# Patient Record
Sex: FEMALE | Race: WHITE | NOT HISPANIC OR LATINO | Employment: OTHER | URBAN - METROPOLITAN AREA
[De-identification: names, ages, dates, MRNs, and addresses within clinical notes are randomized per-mention and may not be internally consistent; named-entity substitution may affect disease eponyms.]

---

## 2017-01-03 ENCOUNTER — TELEPHONE (OUTPATIENT)
Dept: INTERNAL MEDICINE | Facility: CLINIC | Age: 82
End: 2017-01-03

## 2017-01-03 NOTE — TELEPHONE ENCOUNTER
----- Message from Romelia Antunez sent at 1/3/2017  9:23 AM CST -----  Contact: pt [phone]  Pt will like lab orders in for 2/20/2017 @ 9:00am,pt will like them at [place],pt has an appointment on 3/2/2017please advise pt

## 2017-02-24 ENCOUNTER — CLINICAL SUPPORT (OUTPATIENT)
Dept: ELECTROPHYSIOLOGY | Facility: CLINIC | Age: 82
End: 2017-02-24
Payer: MEDICARE

## 2017-02-24 ENCOUNTER — HOSPITAL ENCOUNTER (OUTPATIENT)
Dept: CARDIOLOGY | Facility: CLINIC | Age: 82
Discharge: HOME | End: 2017-02-24
Payer: MEDICARE

## 2017-02-24 ENCOUNTER — OFFICE VISIT (OUTPATIENT)
Dept: ELECTROPHYSIOLOGY | Facility: CLINIC | Age: 82
End: 2017-02-24
Payer: MEDICARE

## 2017-02-24 VITALS
HEART RATE: 68 BPM | SYSTOLIC BLOOD PRESSURE: 132 MMHG | WEIGHT: 181 LBS | BODY MASS INDEX: 29.09 KG/M2 | HEIGHT: 66 IN | DIASTOLIC BLOOD PRESSURE: 68 MMHG

## 2017-02-24 DIAGNOSIS — I10 ESSENTIAL HYPERTENSION: ICD-10-CM

## 2017-02-24 DIAGNOSIS — I44.1 AV BLOCK, 2ND DEGREE: Primary | ICD-10-CM

## 2017-02-24 DIAGNOSIS — Z95.0 PACEMAKER: ICD-10-CM

## 2017-02-24 DIAGNOSIS — I44.30 AVB (ATRIOVENTRICULAR BLOCK): ICD-10-CM

## 2017-02-24 DIAGNOSIS — Z95.0 CARDIAC PACEMAKER IN SITU: ICD-10-CM

## 2017-02-24 DIAGNOSIS — I44.30 AV BLOCK: ICD-10-CM

## 2017-02-24 DIAGNOSIS — Z95.1 S/P CABG X 3: ICD-10-CM

## 2017-02-24 DIAGNOSIS — I25.10 CORONARY ARTERY DISEASE INVOLVING NATIVE CORONARY ARTERY OF NATIVE HEART WITHOUT ANGINA PECTORIS: ICD-10-CM

## 2017-02-24 LAB
DIASTOLIC DYSFUNCTION: YES
ESTIMATED PA SYSTOLIC PRESSURE: 23
GLOBAL PERICARDIAL EFFUSION: ABNORMAL
MITRAL VALVE REGURGITATION: ABNORMAL
RETIRED EF AND QEF - SEE NOTES: 60 (ref 55–65)
TRICUSPID VALVE REGURGITATION: ABNORMAL

## 2017-02-24 PROCEDURE — 93010 ELECTROCARDIOGRAM REPORT: CPT | Mod: S$PBB,,, | Performed by: INTERNAL MEDICINE

## 2017-02-24 PROCEDURE — 93280 PM DEVICE PROGR EVAL DUAL: CPT | Mod: 26,S$PBB,, | Performed by: INTERNAL MEDICINE

## 2017-02-24 PROCEDURE — 99213 OFFICE O/P EST LOW 20 MIN: CPT | Mod: PBBFAC | Performed by: INTERNAL MEDICINE

## 2017-02-24 PROCEDURE — 93306 TTE W/DOPPLER COMPLETE: CPT | Mod: 26,S$PBB,, | Performed by: INTERNAL MEDICINE

## 2017-02-24 PROCEDURE — 99214 OFFICE O/P EST MOD 30 MIN: CPT | Mod: S$PBB,,, | Performed by: INTERNAL MEDICINE

## 2017-02-24 PROCEDURE — 99999 PR PBB SHADOW E&M-EST. PATIENT-LVL III: CPT | Mod: PBBFAC,,, | Performed by: INTERNAL MEDICINE

## 2017-02-24 NOTE — PROGRESS NOTES
Subjective:    Patient ID:  Lynn Mckinney is a 81 y.o. female who presents for evaluation of Pacemaker Check      Atrial Fibrillation   Symptoms are negative for chest pain, dizziness, palpitations, shortness of breath, syncope and weakness. Past medical history includes atrial fibrillation.   Hypertension   Pertinent negatives include no blurred vision, chest pain, malaise/fatigue, palpitations or shortness of breath.   81 y.o. F  CAD  CABG 2001 HL HTN PVD carotidStent    Progressive bradycardia over the years with Dr. Avery.  holter showed 2:1 AV block, AVWB. Also has RBBB and LAFB.  PPM placed 10/13. Feels well since. Paced >90% of the time. Good PPM function.  C/o PARMAR at 2 flights (stable), but exertion is limited mostly by PAD.    10/13 LVEF 60%; 1/2016: 50-55%. Today pending.  PPM: good function. NSAT, NSVT (all asx)    My interpretation of today's ECG is SR with A sense V pace 68 bpm    Review of Systems   Constitution: Negative. Negative for weakness and malaise/fatigue.   HENT: Negative.  Negative for ear pain and tinnitus.    Eyes: Negative for blurred vision.   Cardiovascular: Positive for dyspnea on exertion. Negative for chest pain, near-syncope, palpitations and syncope.   Respiratory: Negative.  Negative for shortness of breath.    Endocrine: Negative.  Negative for polyuria.   Hematologic/Lymphatic: Does not bruise/bleed easily.   Skin: Negative.  Negative for rash.   Musculoskeletal: Negative.  Negative for joint pain and muscle weakness.   Gastrointestinal: Negative.  Negative for abdominal pain and change in bowel habit.   Genitourinary: Negative for frequency.   Neurological: Negative.  Negative for dizziness.   Psychiatric/Behavioral: Negative.  Negative for depression. The patient is not nervous/anxious.    Allergic/Immunologic: Negative for environmental allergies.        Objective:    Physical Exam   Constitutional: She is oriented to person, place, and time. Vital signs are normal. She  appears well-developed and well-nourished. She is active and cooperative.   HENT:   Head: Normocephalic and atraumatic.   Eyes: Conjunctivae and EOM are normal.   Neck: Normal range of motion. Carotid bruit is not present. No tracheal deviation and no edema present. No thyroid mass and no thyromegaly present.   Cardiovascular: Normal rate, regular rhythm, normal heart sounds, intact distal pulses and normal pulses.   No extrasystoles are present. PMI is not displaced.  Exam reveals no gallop and no friction rub.    No murmur heard.  Pulmonary/Chest: Effort normal and breath sounds normal. No respiratory distress. She has no wheezes. She has no rales.   Abdominal: Soft. Normal appearance. She exhibits no distension. There is no hepatosplenomegaly.   Musculoskeletal: Normal range of motion.   Neurological: She is alert and oriented to person, place, and time. Coordination normal.   Skin: Skin is warm and dry. No rash noted.   Psychiatric: She has a normal mood and affect. Her speech is normal and behavior is normal. Thought content normal. Cognition and memory are normal.   Nursing note and vitals reviewed.        Assessment:       1. AV block, 2nd degree    2. Coronary artery disease involving native coronary artery of native heart without angina pectoris    3. Essential hypertension    4. Pacemaker    5. S/P CABG x 3         Plan:       Doing well re: PPM  will f/u on echo (pending)  F/u 1 year with echo, or earlier prn.

## 2017-02-24 NOTE — MR AVS SNAPSHOT
[facility]  [address]  [facility] [address]  Phone: [phone]  Fax: [phone]                  Lynn Mckinney   2017 2:40 PM   Office Visit    Description:  Female : 1935   Provider:  Diony Coleman MD   Department:  [facility]           Reason for Visit     Pacemaker Check           Diagnoses this Visit        Comments    AV block, 2nd degree    -  Primary     Coronary artery disease involving native coronary artery of native heart without angina pectoris         Essential hypertension         Pacemaker         S/P CABG x 3                To Do List           Future Appointments        Provider Department Dept Phone    3/2/2017 10:30 AM Marielena Moise MD [facility] - Internal Medicine [phone]    2017 10:40 AM TELEPHONE CHECK, [facility] [phone]      Goals (5 Years of Data)     None      Follow-Up and Disposition     Return in about 1 year (around 2018).      [facility]     [facility] On Call Nurse [facility] -  Assistance  Registered nurses in the [facility] On Call Center provide clinical advisement, health education, appointment booking, and other advisory services.  Call for this free service at [phone].             Medications           Message regarding Medications     Verify the changes and/or additions to your medication regime listed below are the same as discussed with your clinician today.  If any of these changes or additions are incorrect, please notify your healthcare provider.             Verify that the below list of medications is an accurate representation of the medications you are currently taking.  If none reported, the list may be blank. If incorrect, please contact your healthcare provider. Carry this list with you in case of emergency.           Current Medications     amlodipine (NORVASC) 10 MG tablet Take 1 tablet (10 mg total) by mouth once daily.    aspirin 81 MG Chew Take 81 mg  by mouth every evening. 1 Tablet, Chewable Oral Every day    b complex vitamins capsule Take 1 capsule by mouth every other day.     CALCIUM CARBONATE/VITAMIN D3 (CALTRATE 600 + D ORAL) Take 1 tablet by mouth once daily.    co-enzyme Q-10 30 mg capsule Take 30 mg by mouth once daily.     fluticasone (FLONASE) 50 mcg/actuation nasal spray 1 spray by Each Nare route once daily.    glucosamine-chondroitin 500-400 mg tablet Take 1 tablet by mouth once daily.     KRILL OIL ORAL Take 1 capsule by mouth once daily.    labetalol (NORMODYNE) 200 MG tablet TAKE 1 TABLET BY MOUTH TWICE DAILY    LACTOSE-FREE FOOD (BOOST ORAL) Take by mouth. 2-3 days per week    levothyroxine (SYNTHROID) 112 MCG tablet TAKE 1 TABLET BY MOUTH EVERY DAY FOR 6 WEEKS AND 1/2 TABLET ON SUNDAY    multivitamin (THERAGRAN) per tablet Take by mouth. 1 Tablet Oral Every day    nitroGLYCERIN (NITROSTAT) 0.4 MG SL tablet Place 1 tablet (0.4 mg total) under the tongue every 5 (five) minutes as needed for Chest pain (to a total of three).    rosuvastatin (CRESTOR) 20 MG tablet Take 1 tablet (20 mg total) by mouth once daily.    valsartan-hydrochlorothiazide (DIOVAN-HCT) 320-25 mg per tablet TAKE 1 TABLET BY MOUTH ONCE DAILY           Clinical Reference Information           Your Vitals Were     BP                   132/68           Blood Pressure          Most Recent Value    BP  132/68      Allergies as of 2/24/2017     Lipitor [Atorvastatin]    Fosamax [Alendronate]    Iodinated Contrast Media - Iv Dye    Sulfa (Sulfonamide Antibiotics)      Immunizations Administered on Date of Encounter - 2/24/2017     None      Orders Placed During Today's Visit     Future Labs/Procedures Expected by Expires    2D echo with color flow doppler  1/30/2018 2/24/2018      [facility] Sign-Up     Activating your [facility] account is as easy as 1-2-3!     1) Visit [email or web address], select Sign Up Now, enter this activation code and your date of birth, then select  Next.  [email or web address]  Expires: 4/10/2017  3:28 PM      2) Create a username and password to use when you visit [facility] in the future and select a security question in case you lose your password and select Next.    3) Enter your e-mail address and click Sign Up!    Additional Information  If you have questions, please e-mail [email or web address] or call [phone] to talk to our [facility] staff. Remember, [facility] is NOT to be used for urgent needs. For medical emergencies, dial 911.         Language Assistance Services     ATTENTION: Language assistance services are available, free of charge. Please call [phone].      ATENCIÓN: Si estefanía timmy, tiene a fenton disposición servicios gratuitos de asistencia lingüística. Llame al [phone].     BLAS Ý: N?u b?n nói Ti?ng Vi?t, có các d?ch v? h? tr? ngôn ng? mi?n phí dành cho b?n. G?i s? [phone].         [facility] complies with applicable Federal civil rights laws and does not discriminate on the basis of race, color, national origin, age, disability, or sex.

## 2017-02-27 ENCOUNTER — LAB VISIT (OUTPATIENT)
Dept: LAB | Facility: HOSPITAL | Age: 82
End: 2017-02-27
Attending: INTERNAL MEDICINE
Payer: MEDICARE

## 2017-02-27 DIAGNOSIS — E03.4 HYPOTHYROIDISM DUE TO ACQUIRED ATROPHY OF THYROID: ICD-10-CM

## 2017-02-27 DIAGNOSIS — I10 ESSENTIAL HYPERTENSION: ICD-10-CM

## 2017-02-27 DIAGNOSIS — E78.49 OTHER HYPERLIPIDEMIA: ICD-10-CM

## 2017-02-27 LAB
ALBUMIN SERPL BCP-MCNC: 3.8 G/DL
ALP SERPL-CCNC: 59 U/L
ALT SERPL W/O P-5'-P-CCNC: 14 U/L
ANION GAP SERPL CALC-SCNC: 9 MMOL/L
AST SERPL-CCNC: 24 U/L
BASOPHILS # BLD AUTO: 0.01 K/UL
BASOPHILS NFR BLD: 0.2 %
BILIRUB SERPL-MCNC: 0.6 MG/DL
BUN SERPL-MCNC: 32 MG/DL
CALCIUM SERPL-MCNC: 9.4 MG/DL
CHLORIDE SERPL-SCNC: 105 MMOL/L
CHOLEST/HDLC SERPL: 2.6 {RATIO}
CO2 SERPL-SCNC: 26 MMOL/L
CREAT SERPL-MCNC: 1.4 MG/DL
DIFFERENTIAL METHOD: NORMAL
EOSINOPHIL # BLD AUTO: 0.3 K/UL
EOSINOPHIL NFR BLD: 4.5 %
ERYTHROCYTE [DISTWIDTH] IN BLOOD BY AUTOMATED COUNT: 12.5 %
EST. GFR  (AFRICAN AMERICAN): 40.6 ML/MIN/1.73 M^2
EST. GFR  (NON AFRICAN AMERICAN): 35.3 ML/MIN/1.73 M^2
GLUCOSE SERPL-MCNC: 99 MG/DL
HCT VFR BLD AUTO: 39.6 %
HDL/CHOLESTEROL RATIO: 38.9 %
HDLC SERPL-MCNC: 126 MG/DL
HDLC SERPL-MCNC: 49 MG/DL
HGB BLD-MCNC: 13.3 G/DL
LDLC SERPL CALC-MCNC: 59.4 MG/DL
LYMPHOCYTES # BLD AUTO: 1.1 K/UL
LYMPHOCYTES NFR BLD: 18.7 %
MCH RBC QN AUTO: 30.9 PG
MCHC RBC AUTO-ENTMCNC: 33.6 %
MCV RBC AUTO: 92 FL
MONOCYTES # BLD AUTO: 0.6 K/UL
MONOCYTES NFR BLD: 9.9 %
NEUTROPHILS # BLD AUTO: 3.9 K/UL
NEUTROPHILS NFR BLD: 66.5 %
NONHDLC SERPL-MCNC: 77 MG/DL
PLATELET # BLD AUTO: 174 K/UL
PMV BLD AUTO: 9.5 FL
POTASSIUM SERPL-SCNC: 4.1 MMOL/L
PROT SERPL-MCNC: 7 G/DL
RBC # BLD AUTO: 4.3 M/UL
SODIUM SERPL-SCNC: 140 MMOL/L
T4 FREE SERPL-MCNC: 1.12 NG/DL
TRIGL SERPL-MCNC: 88 MG/DL
TSH SERPL DL<=0.005 MIU/L-ACNC: 4.16 UIU/ML
WBC # BLD AUTO: 5.83 K/UL

## 2017-02-27 PROCEDURE — 80061 LIPID PANEL: CPT

## 2017-02-27 PROCEDURE — 85025 COMPLETE CBC W/AUTO DIFF WBC: CPT

## 2017-02-27 PROCEDURE — 80053 COMPREHEN METABOLIC PANEL: CPT

## 2017-02-27 PROCEDURE — 36415 COLL VENOUS BLD VENIPUNCTURE: CPT | Mod: PO

## 2017-02-27 PROCEDURE — 84443 ASSAY THYROID STIM HORMONE: CPT

## 2017-02-27 PROCEDURE — 84439 ASSAY OF FREE THYROXINE: CPT

## 2017-03-02 ENCOUNTER — OFFICE VISIT (OUTPATIENT)
Dept: INTERNAL MEDICINE | Facility: CLINIC | Age: 82
End: 2017-03-02
Payer: MEDICARE

## 2017-03-02 VITALS
HEIGHT: 66 IN | WEIGHT: 179.44 LBS | SYSTOLIC BLOOD PRESSURE: 115 MMHG | HEART RATE: 67 BPM | BODY MASS INDEX: 28.84 KG/M2 | DIASTOLIC BLOOD PRESSURE: 62 MMHG

## 2017-03-02 DIAGNOSIS — I73.9 CLAUDICATION: ICD-10-CM

## 2017-03-02 DIAGNOSIS — N18.30 CHRONIC KIDNEY DISEASE, STAGE III (MODERATE): ICD-10-CM

## 2017-03-02 DIAGNOSIS — I25.10 CORONARY ARTERY DISEASE INVOLVING NATIVE CORONARY ARTERY OF NATIVE HEART WITHOUT ANGINA PECTORIS: ICD-10-CM

## 2017-03-02 DIAGNOSIS — I73.9 PERIPHERAL ARTERIAL DISEASE: ICD-10-CM

## 2017-03-02 DIAGNOSIS — R26.89 BALANCE DISORDER: ICD-10-CM

## 2017-03-02 DIAGNOSIS — I10 ESSENTIAL HYPERTENSION: Primary | ICD-10-CM

## 2017-03-02 DIAGNOSIS — I77.9 BILATERAL CAROTID ARTERY DISEASE: ICD-10-CM

## 2017-03-02 DIAGNOSIS — E55.9 VITAMIN D DEFICIENCY: ICD-10-CM

## 2017-03-02 DIAGNOSIS — E78.00 PURE HYPERCHOLESTEROLEMIA: ICD-10-CM

## 2017-03-02 DIAGNOSIS — Z95.0 PACEMAKER: ICD-10-CM

## 2017-03-02 DIAGNOSIS — Z12.31 ENCOUNTER FOR SCREENING MAMMOGRAM FOR BREAST CANCER: ICD-10-CM

## 2017-03-02 DIAGNOSIS — I65.23 BILATERAL CAROTID ARTERY STENOSIS: ICD-10-CM

## 2017-03-02 DIAGNOSIS — E03.4 HYPOTHYROIDISM DUE TO ACQUIRED ATROPHY OF THYROID: ICD-10-CM

## 2017-03-02 PROCEDURE — 99214 OFFICE O/P EST MOD 30 MIN: CPT | Mod: PBBFAC | Performed by: INTERNAL MEDICINE

## 2017-03-02 PROCEDURE — 99214 OFFICE O/P EST MOD 30 MIN: CPT | Mod: S$PBB,,, | Performed by: INTERNAL MEDICINE

## 2017-03-02 PROCEDURE — 99999 PR PBB SHADOW E&M-EST. PATIENT-LVL IV: CPT | Mod: PBBFAC,,, | Performed by: INTERNAL MEDICINE

## 2017-03-02 NOTE — PROGRESS NOTES
Subjective:       Patient ID: Lynn Mckinney is a 81 y.o. female.    Chief Complaint: Follow-up    HPI     Hypertension: well contolled, known CAD, PAD and followed in Cardiology and Vascular Surgery with follow up within the last year     Cardiology consult:  Pacemaker: recent follow up and no changes     Nephrology consult: stable  Pramod Manley  CKD  And followed in 11/2016 follow up in May     Optometry: Patient is due to see Dr. Melgar     Mammo: is due     Vascular: Patient is seen by Dr. Sams once a year in July     Review  Colonoscopy: 2007 normal, patient states that she had a small polyps on the initial colonoscopy ( last colonoscopy was normal): declines any further  Mammogram:: ordered after 3/2/3107  Gyn: hysterectomy and oophorectomy  Optho: Dr. Melgar  Flu: done 2016  Tetanus: had a Tdap 15 years ago)  Shingles 11/26/2014  Pneumovax 11/6/2014  PCV 7 vaccine     Consultants:  Dr. Talia Sanches        Pacemaker: Jared        Review of Systems   Constitutional: Negative for chills, fever and unexpected weight change.   HENT: Negative for trouble swallowing.    Respiratory: Negative for cough, shortness of breath and wheezing.    Cardiovascular: Negative for chest pain and palpitations.   Gastrointestinal: Negative for abdominal distention, abdominal pain, blood in stool and vomiting.   Musculoskeletal: Negative for back pain.       Patient complains of balance disorder  Objective:      Physical Exam   Constitutional: She is oriented to person, place, and time. She appears well-developed and well-nourished. No distress.   Neck: Carotid bruit is not present. No thyromegaly present.   Cardiovascular: Normal rate, regular rhythm and normal heart sounds.  PMI is not displaced.    Pulmonary/Chest: Effort normal and breath sounds normal. No respiratory distress.   Abdominal: Soft. Bowel sounds are normal. She exhibits no distension. There is no tenderness.   Musculoskeletal: She  exhibits no edema.   Neurological: She is alert and oriented to person, place, and time.       Assessment:       1. Essential hypertension    2. Encounter for screening mammogram for breast cancer    3. Balance disorder    4. Claudication    5. Bilateral carotid artery stenosis    6. Coronary artery disease involving native coronary artery of native heart without angina pectoris    7. Bilateral carotid artery disease    8. Peripheral arterial disease    9. Chronic kidney disease, stage 3    10. Hypothyroidism due to acquired atrophy of thyroid    11. Pure hypercholesterolemia    12. Vitamin D deficiency    13. Pacemaker        Plan:       Lynn was seen today for follow-up.    Diagnoses and all orders for this visit:    Essential hypertension: well controlled same medications    Encounter for screening mammogram for breast cancer  -     Mammo Digital Screening Bilat with CAD; Future    Balance disorder: patient wished to try PT  -     Ambulatory consult to Physical Therapy    Claudication: stable    Bilateral carotid artery stenosis: followed in vascular surgery    Coronary artery disease involving native coronary artery of native heart without angina pectoris; no active symptoms      Peripheral arterial disease; stable    Chronic kidney disease, stage 3: stable and followed in nephrology    Hypothyroidism due to acquired atrophy of thyroid: continue on levothyroxine 112    Pure hypercholesterolemia: continue on statin    Vitamin D deficiency; continue on vti D    Pacemaker      Return in about 6 months (around 9/2/2017) for Follow up.    New Prescriptions    No medications on file       Modified Medications    No medications on file       Orders Placed This Encounter   Procedures    Mammo Digital Screening Bilat with CAD     Standing Status:   Future     Standing Expiration Date:   3/2/2018    CBC auto differential     Standing Status:   Future     Standing Expiration Date:   10/28/2017    Comprehensive metabolic  panel     Standing Status:   Future     Standing Expiration Date:   10/28/2017    Lipid panel     Standing Status:   Future     Standing Expiration Date:   10/28/2017    TSH     Standing Status:   Future     Standing Expiration Date:   10/28/2017    Ambulatory consult to Physical Therapy     Referral Priority:   Routine     Referral Type:   Physical Medicine     Referral Reason:   Specialty Services Required     Requested Specialty:   Physical Therapy     Number of Visits Requested:   1       Labs, studies and consults associated with this visit were reviewed

## 2017-03-02 NOTE — MR AVS SNAPSHOT
[facility]  [address]  [facility] [address]  Phone: [phone]  Fax: [phone]                  Lynn Mckinney   3/2/2017 10:30 AM   Office Visit    Description:  Female : 1935   Provider:  Marielena Moise MD   Department:  [facility]           Reason for Visit     Follow-up           Diagnoses this Visit        Comments    Essential hypertension    -  Primary     Encounter for screening mammogram for breast cancer         Balance disorder         Claudication         Bilateral carotid artery stenosis         Coronary artery disease involving native coronary artery of native heart without angina pectoris         Bilateral carotid artery disease         Peripheral arterial disease         Chronic kidney disease, stage III (moderate)         Hypothyroidism due to acquired atrophy of thyroid         Pure hypercholesterolemia         Vitamin D deficiency         Pacemaker                To Do List           Future Appointments        Provider Department Dept Phone    3/24/2017 10:30 AM [facility] [phone]    2017 10:40 AM TELEPHONE CHECK, PACEMAKER [facility] [phone]      Goals (5 Years of Data)     None      Follow-Up and Disposition     Return in about 6 months (around 2017) for Follow up.      [facility]     [facility] -  Assistance  Registered nurses in the [facility] Center provide clinical advisement, health education, appointment booking, and other advisory services.  Call for this free service at [phone].             Medications           Message regarding Medications     Verify the changes and/or additions to your medication regime listed below are the same as discussed with your clinician today.  If any of these changes or additions are incorrect, please notify your healthcare provider.             Verify that the below list of medications  is an accurate representation of the medications you are currently taking.  If none reported, the list may be blank. If incorrect, please contact your healthcare provider. Carry this list with you in case of emergency.           Current Medications     amlodipine (NORVASC) 10 MG tablet Take 1 tablet (10 mg total) by mouth once daily.    aspirin 81 MG Chew Take 81 mg by mouth every evening. 1 Tablet, Chewable Oral Every day    b complex vitamins capsule Take 1 capsule by mouth every other day.     CALCIUM CARBONATE/VITAMIN D3 (CALTRATE 600 + D ORAL) Take 1 tablet by mouth once daily.    co-enzyme Q-10 30 mg capsule Take 30 mg by mouth once daily.     fluticasone (FLONASE) 50 mcg/actuation nasal spray 1 spray by Each Nare route once daily.    glucosamine-chondroitin 500-400 mg tablet Take 1 tablet by mouth once daily.     KRILL OIL ORAL Take 1 capsule by mouth once daily.    labetalol (NORMODYNE) 200 MG tablet TAKE 1 TABLET BY MOUTH TWICE DAILY    LACTOSE-FREE FOOD (BOOST ORAL) Take by mouth. 2-3 days per week    levothyroxine (SYNTHROID) 112 MCG tablet TAKE 1 TABLET BY MOUTH EVERY DAY FOR 6 WEEKS AND 1/2 TABLET ON SUNDAY    multivitamin (THERAGRAN) per tablet Take by mouth. 1 Tablet Oral Every day    nitroGLYCERIN (NITROSTAT) 0.4 MG SL tablet Place 1 tablet (0.4 mg total) under the tongue every 5 (five) minutes as needed for Chest pain (to a total of three).    rosuvastatin (CRESTOR) 20 MG tablet Take 1 tablet (20 mg total) by mouth once daily.    valsartan-hydrochlorothiazide (DIOVAN-HCT) 320-25 mg per tablet TAKE 1 TABLET BY MOUTH ONCE DAILY           Clinical Reference Information           Your Vitals Were     BP                   115/62           Blood Pressure          Most Recent Value    BP  115/62      Allergies as of 3/2/2017     Lipitor [Atorvastatin]    Fosamax [Alendronate]    Iodinated Contrast Media - Iv Dye    Sulfa (Sulfonamide Antibiotics)      Immunizations Administered on Date of Encounter -  3/2/2017     None      Orders Placed During Today's Visit      Normal Orders This Visit    Ambulatory consult to Physical Therapy     Future Labs/Procedures Expected by Expires    Mammo Digital Screening Bilat with CAD  3/2/2017 (Approximate) 3/2/2018    CBC auto differential  8/29/2017 (Approximate) 10/28/2017    Comprehensive metabolic panel  8/29/2017 (Approximate) 10/28/2017    Lipid panel  8/29/2017 (Approximate) 10/28/2017    TSH  8/29/2017 (Approximate) 10/28/2017      [facility] Sign-Up     Activating your [facility] account is as easy as 1-2-3!     1) Visit [email or web address], select Sign Up Now, enter this activation code and your date of birth, then select Next.  [email or web address]  Expires: 4/10/2017  3:28 PM      2) Create a username and password to use when you visit [facility] in the future and select a security question in case you lose your password and select Next.    3) Enter your e-mail address and click Sign Up!    Additional Information  If you have questions, please e-mail [email or web address] or call [phone] to talk to our [facility] staff. Remember, [facility] is NOT to be used for urgent needs. For medical emergencies, dial 911.         Language Assistance Services     ATTENTION: Language assistance services are available, free of charge. Please call [phone].      ATENCIÓN: Si habla español, tiene a fenton disposición servicios gratuitos de asistencia lingüística. Llame al [phone].     CHÚ Ý: N?u b?n nói Ti?ng Vi?t, có các d?ch v? h? tr? ngôn ng? mi?n phí dành cho b?n. G?i s? [phone].         [facility] complies with applicable Federal civil rights laws and does not discriminate on the basis of race, color, national origin, age, disability, or sex.

## 2017-03-30 ENCOUNTER — HOSPITAL ENCOUNTER (OUTPATIENT)
Dept: RADIOLOGY | Facility: HOSPITAL | Age: 82
Discharge: HOME | End: 2017-03-30
Attending: INTERNAL MEDICINE
Payer: MEDICARE

## 2017-03-30 DIAGNOSIS — Z12.31 ENCOUNTER FOR SCREENING MAMMOGRAM FOR BREAST CANCER: ICD-10-CM

## 2017-03-30 PROCEDURE — 77067 SCR MAMMO BI INCL CAD: CPT | Mod: TC

## 2017-05-17 ENCOUNTER — TELEPHONE (OUTPATIENT)
Dept: ELECTROPHYSIOLOGY | Facility: CLINIC | Age: 82
End: 2017-05-17

## 2017-05-17 RX ORDER — AMLODIPINE BESYLATE 10 MG/1
TABLET ORAL
Qty: 90 TABLET | Refills: 3 | Status: SHIPPED | OUTPATIENT
Start: 2017-05-17 | End: 2018-01-10

## 2017-05-17 NOTE — TELEPHONE ENCOUNTER
Returned patient's call on this morning.  Rescheduled patient's Transtelephonic pacemaker check with the patient to 5/24//17 @ 2:40 pm.

## 2017-05-17 NOTE — TELEPHONE ENCOUNTER
----- Message from Adrienne Westbrook sent at 5/17/2017  8:06 AM CDT -----  Contact: patient  Please call pt at [phone]. Need reschedule pacemaker appt for 05/25/17    Thank you

## 2017-05-24 ENCOUNTER — LAB VISIT (OUTPATIENT)
Dept: LAB | Facility: HOSPITAL | Age: 82
End: 2017-05-24
Attending: INTERNAL MEDICINE
Payer: MEDICARE

## 2017-05-24 ENCOUNTER — CLINICAL SUPPORT (OUTPATIENT)
Dept: ELECTROPHYSIOLOGY | Facility: CLINIC | Age: 82
End: 2017-05-24
Payer: MEDICARE

## 2017-05-24 DIAGNOSIS — I25.10 CORONARY ARTERY DISEASE INVOLVING NATIVE CORONARY ARTERY OF NATIVE HEART WITHOUT ANGINA PECTORIS: ICD-10-CM

## 2017-05-24 DIAGNOSIS — Z95.1 S/P CABG X 3: ICD-10-CM

## 2017-05-24 DIAGNOSIS — E03.4 HYPOTHYROIDISM DUE TO ACQUIRED ATROPHY OF THYROID: ICD-10-CM

## 2017-05-24 DIAGNOSIS — I73.9 PERIPHERAL ARTERIAL DISEASE: ICD-10-CM

## 2017-05-24 DIAGNOSIS — Z95.0 CARDIAC PACEMAKER IN SITU: ICD-10-CM

## 2017-05-24 DIAGNOSIS — Z95.0 PACEMAKER: ICD-10-CM

## 2017-05-24 DIAGNOSIS — N18.30 CHRONIC KIDNEY DISEASE, STAGE III (MODERATE): ICD-10-CM

## 2017-05-24 DIAGNOSIS — E78.5 HYPERLIPIDEMIA, UNSPECIFIED: ICD-10-CM

## 2017-05-24 DIAGNOSIS — I44.1 AV BLOCK, 2ND DEGREE: ICD-10-CM

## 2017-05-24 DIAGNOSIS — I77.9 LEFT-SIDED CAROTID ARTERY DISEASE: ICD-10-CM

## 2017-05-24 DIAGNOSIS — R32 INCONTINENCE: ICD-10-CM

## 2017-05-24 DIAGNOSIS — E55.9 VITAMIN D DEFICIENCY: ICD-10-CM

## 2017-05-24 DIAGNOSIS — I10 ESSENTIAL HYPERTENSION: ICD-10-CM

## 2017-05-24 LAB
25(OH)D3+25(OH)D2 SERPL-MCNC: 30 NG/ML
ALBUMIN SERPL BCP-MCNC: 3.7 G/DL
ANION GAP SERPL CALC-SCNC: 10 MMOL/L
BUN SERPL-MCNC: 31 MG/DL
CALCIUM SERPL-MCNC: 9.3 MG/DL
CHLORIDE SERPL-SCNC: 101 MMOL/L
CO2 SERPL-SCNC: 25 MMOL/L
CREAT SERPL-MCNC: 1.4 MG/DL
EST. GFR  (AFRICAN AMERICAN): 40.6 ML/MIN/1.73 M^2
EST. GFR  (NON AFRICAN AMERICAN): 35.3 ML/MIN/1.73 M^2
GLUCOSE SERPL-MCNC: 95 MG/DL
PHOSPHATE SERPL-MCNC: 3.5 MG/DL
POTASSIUM SERPL-SCNC: 4.3 MMOL/L
PTH-INTACT SERPL-MCNC: 70 PG/ML
SODIUM SERPL-SCNC: 136 MMOL/L

## 2017-05-24 PROCEDURE — 93293 PM PHONE R-STRIP DEVICE EVAL: CPT | Mod: PBBFAC | Performed by: INTERNAL MEDICINE

## 2017-05-25 ENCOUNTER — TELEPHONE (OUTPATIENT)
Dept: NEPHROLOGY | Facility: CLINIC | Age: 82
End: 2017-05-25

## 2017-05-25 NOTE — TELEPHONE ENCOUNTER
Spoke with patient who will keep her appointment on [facility].   She would love to see Dr. Corey, but [place] is still too far.

## 2017-05-25 NOTE — TELEPHONE ENCOUNTER
Received lab results from pt. She was a [facility] pt of [facility] who lives in [facility]. Please see if she would like to see me in [place]. Thank you

## 2017-05-26 ENCOUNTER — TELEPHONE (OUTPATIENT)
Dept: NEPHROLOGY | Facility: CLINIC | Age: 82
End: 2017-05-26

## 2017-05-26 NOTE — TELEPHONE ENCOUNTER
I spoke with the patient again this morning.  She really wanted to see Dr. Corey, but on the [facility].  I clarified that Dr. Corey only in [place] and [place] now.

## 2017-05-26 NOTE — TELEPHONE ENCOUNTER
----- Message from Omar Gama sent at 5/26/2017  9:23 AM CDT -----  Contact: same  Patient called in and stated that she got a call from nurse to see if patient wanted to be seen in [place]???   Patient stated that she was confused about that.  I did explain to patient that Dr. Corey also has patients at [facility].  Patient then stated she would love to see Dr. Corey in [facility].    Patient call back number [phone]

## 2017-06-01 ENCOUNTER — OFFICE VISIT (OUTPATIENT)
Dept: NEPHROLOGY | Facility: CLINIC | Age: 82
End: 2017-06-01
Payer: MEDICARE

## 2017-06-01 VITALS
HEART RATE: 72 BPM | WEIGHT: 177.94 LBS | OXYGEN SATURATION: 96 % | BODY MASS INDEX: 28.6 KG/M2 | SYSTOLIC BLOOD PRESSURE: 150 MMHG | DIASTOLIC BLOOD PRESSURE: 80 MMHG | HEIGHT: 66 IN

## 2017-06-01 DIAGNOSIS — E55.9 VITAMIN D DEFICIENCY: ICD-10-CM

## 2017-06-01 DIAGNOSIS — N18.30 CHRONIC KIDNEY DISEASE, STAGE III (MODERATE): Primary | ICD-10-CM

## 2017-06-01 DIAGNOSIS — I10 ESSENTIAL HYPERTENSION: ICD-10-CM

## 2017-06-01 PROCEDURE — 99213 OFFICE O/P EST LOW 20 MIN: CPT | Mod: PBBFAC | Performed by: INTERNAL MEDICINE

## 2017-06-01 PROCEDURE — 99999 PR PBB SHADOW E&M-EST. PATIENT-LVL III: CPT | Mod: PBBFAC,,, | Performed by: INTERNAL MEDICINE

## 2017-06-01 PROCEDURE — 99213 OFFICE O/P EST LOW 20 MIN: CPT | Mod: S$PBB,,, | Performed by: INTERNAL MEDICINE

## 2017-06-01 NOTE — PROGRESS NOTES
"Subjective:       Patient ID: Lynn Mckinney is a 81 y.o. White female who presents for follow-up evaluation of renal function   HPI 81 yo WF with HTn, CAD, PVD, CKD stage 3 baseline serum crt 1.3-1.4 stable since 2004. No LUTS, no hematuria, no dysuria. She claims she has white coat syndrome, she usually has a bp of 130-140 /80 , she ahs not taken her bp at home recently. She takes her amlodipine and valsartan/HCTZ in the am and did not take them this am.    Review of Systems   Constitutional: Negative for appetite change, chills, fatigue, fever and unexpected weight change.   HENT: Positive for congestion, sinus pressure and trouble swallowing. Negative for facial swelling, hearing loss and nosebleeds.         Postnasal drip x 1 month following with PCP   Eyes: Negative for pain, discharge, redness and visual disturbance.   Respiratory: Negative for cough, chest tightness, shortness of breath and wheezing.    Cardiovascular: Positive for leg swelling. Negative for chest pain and palpitations.        Wears support hose, and minimal edema.   Gastrointestinal: Negative for abdominal pain, constipation, diarrhea, nausea and vomiting.   Endocrine: Negative for cold intolerance, heat intolerance and polydipsia.   Genitourinary: Negative for decreased urine volume, difficulty urinating, dysuria, flank pain, hematuria and urgency.   Musculoskeletal: Negative for arthralgias, back pain, joint swelling and myalgias.        Rare Bilateral thumb cramping    Skin: Negative for color change, pallor, rash and wound.   Neurological: Negative for dizziness, tremors, seizures, syncope, speech difficulty, weakness and headaches.   Hematological: Negative for adenopathy. Does not bruise/bleed easily.   Psychiatric/Behavioral: Negative for agitation, behavioral problems, dysphoric mood, self-injury and sleep disturbance.       Objective:     Blood pressure (!) 150/80, pulse 72, height 5' 6" (1.676 m), weight 80.7 kg (177 lb 14.6 oz), " SpO2 96 %.       Physical Exam   Constitutional: She is oriented to person, place, and time. She appears well-developed and well-nourished. No distress.   HENT:   Head: Normocephalic and atraumatic.   Mouth/Throat: No oropharyngeal exudate.   Eyes: Conjunctivae and EOM are normal. Pupils are equal, round, and reactive to light. Right eye exhibits no discharge. Left eye exhibits no discharge. No scleral icterus.   Neck: Normal range of motion. Neck supple. No JVD present. No tracheal deviation present. No thyromegaly present.   Cardiovascular: Normal rate, regular rhythm, normal heart sounds and intact distal pulses.  Exam reveals no gallop.    No murmur heard.  No peripheral edema, DP pulses not palpable, radial pulses +2 bilaterally warm digits.   Pulmonary/Chest: Effort normal and breath sounds normal. No stridor. No respiratory distress. She has no wheezes. She has no rales. She exhibits no tenderness.   Abdominal: Soft. Bowel sounds are normal. She exhibits no distension and no mass. There is no tenderness. There is no rebound and no guarding. No hernia.   Musculoskeletal: She exhibits no edema or tenderness.   Lymphadenopathy:     She has no cervical adenopathy.   Neurological: She is alert and oriented to person, place, and time. She exhibits normal muscle tone.   Skin: Skin is warm and dry. No rash noted. She is not diaphoretic. No erythema.   Psychiatric: She has a normal mood and affect. Judgment and thought content normal.   Nursing note and vitals reviewed.      Assessment:       1. Chronic kidney disease, stage 3    2. Vitamin D deficiency    3. Essential hypertension        Plan:       1. CKD: stage 3 est gfr 35- 40 cc /min stable over > 15 years.   Stable, I have asked the patent for urine, lab never completed requested UA and UPRCT on order from 11/2016  Lab Results   Component Value Date    CREATININE 1.4 05/24/2017     Protein Creatinine Ratios:stable    Prot/Creat Ratio, Ur   Date Value Ref Range  Status   10/10/2016 0.06 0.00 - 0.20 Final   06/09/2016 0.08 0.00 - 0.20 Final   11/30/2015 0.10 0.00 - 0.20 Final       2. Acid-Base: stable  Lab Results   Component Value Date     05/24/2017    K 4.3 05/24/2017    CO2 25 05/24/2017         3. Renal osteodystrophy: last PTH stable  Bone density suggested  She has been on fosomax in past  Lab Results   Component Value Date    PTH 70.0 05/24/2017    CALCIUM 9.3 05/24/2017    PHOS 3.5 05/24/2017       4. Anemia: stable  Lab Results   Component Value Date    HGB 13.3 02/27/2017        5. HTN: stable      Medication: no change      Monitor BP as directed in instructions    7. Weight: Weight: 80.7 kg (177 lb 14.6 oz). she states that her daily weights  stable    Wt Readings from Last 3 Encounters:   06/01/17 80.7 kg (177 lb 14.6 oz)   03/02/17 81.4 kg (179 lb 7.3 oz)   02/24/17 82.1 kg (181 lb)       8. Lipid management: stable  Lab Results   Component Value Date    LDLCALC 59.4 (L) 02/27/2017       9. Diet:  Education/referral:       Sodium: 2 gm         11. Please avoid or minimize all NSAIDS (ibuprofen, motrin, aleve, indocin, naprosyn) to minimize the risk to your kidneys.      12. Follow up in  6 months

## 2017-06-02 ENCOUNTER — TELEPHONE (OUTPATIENT)
Dept: VASCULAR SURGERY | Facility: CLINIC | Age: 82
End: 2017-06-02

## 2017-06-02 RX ORDER — LABETALOL 200 MG/1
TABLET, FILM COATED ORAL
Qty: 180 TABLET | Refills: 3 | Status: SHIPPED | OUTPATIENT
Start: 2017-06-02 | End: 2018-01-10 | Stop reason: SDUPTHER

## 2017-06-02 NOTE — TELEPHONE ENCOUNTER
----- Message from Vane Christian sent at 6/2/2017 10:09 AM CDT -----  Contact: Self   Lynn  Called and wanted to schedule a appointment with   And also had general questions. Please call Lynn [phone]  . Thanks :)

## 2017-06-05 ENCOUNTER — TELEPHONE (OUTPATIENT)
Dept: VASCULAR SURGERY | Facility: CLINIC | Age: 82
End: 2017-06-05

## 2017-06-05 NOTE — TELEPHONE ENCOUNTER
----- Message from Eneida Mendez sent at 6/5/2017  8:23 AM CDT -----  Contact: self  Pt states she would like to reschedule apt to afternoon or another day.Please call Lynn back @ [phone].Thank you.

## 2017-06-12 ENCOUNTER — DOCUMENTATION ONLY (OUTPATIENT)
Dept: NEPHROLOGY | Facility: CLINIC | Age: 82
End: 2017-06-12

## 2017-06-12 ENCOUNTER — LAB VISIT (OUTPATIENT)
Dept: LAB | Facility: HOSPITAL | Age: 82
End: 2017-06-12
Attending: INTERNAL MEDICINE
Payer: MEDICARE

## 2017-06-12 DIAGNOSIS — N18.30 CHRONIC KIDNEY DISEASE, STAGE III (MODERATE): ICD-10-CM

## 2017-06-12 DIAGNOSIS — I25.10 CORONARY ARTERY DISEASE INVOLVING NATIVE CORONARY ARTERY OF NATIVE HEART WITHOUT ANGINA PECTORIS: ICD-10-CM

## 2017-06-12 DIAGNOSIS — E78.5 HYPERLIPIDEMIA: ICD-10-CM

## 2017-06-12 LAB
ALT SERPL W/O P-5'-P-CCNC: 12 U/L
ANION GAP SERPL CALC-SCNC: 11 MMOL/L
AST SERPL-CCNC: 22 U/L
BUN SERPL-MCNC: 25 MG/DL
CALCIUM SERPL-MCNC: 9.7 MG/DL
CHLORIDE SERPL-SCNC: 103 MMOL/L
CHOLEST/HDLC SERPL: 2.8 {RATIO}
CO2 SERPL-SCNC: 26 MMOL/L
CREAT SERPL-MCNC: 1.3 MG/DL
EST. GFR  (AFRICAN AMERICAN): 44.5 ML/MIN/1.73 M^2
EST. GFR  (NON AFRICAN AMERICAN): 38.6 ML/MIN/1.73 M^2
GLUCOSE SERPL-MCNC: 104 MG/DL
HDL/CHOLESTEROL RATIO: 35.7 %
HDLC SERPL-MCNC: 126 MG/DL
HDLC SERPL-MCNC: 45 MG/DL
LDLC SERPL CALC-MCNC: 61.6 MG/DL
NONHDLC SERPL-MCNC: 81 MG/DL
POTASSIUM SERPL-SCNC: 4.2 MMOL/L
SODIUM SERPL-SCNC: 140 MMOL/L
TRIGL SERPL-MCNC: 97 MG/DL

## 2017-06-12 PROCEDURE — 80061 LIPID PANEL: CPT

## 2017-06-12 PROCEDURE — 84450 TRANSFERASE (AST) (SGOT): CPT

## 2017-06-12 PROCEDURE — 80048 BASIC METABOLIC PNL TOTAL CA: CPT

## 2017-06-12 PROCEDURE — 36415 COLL VENOUS BLD VENIPUNCTURE: CPT | Mod: PO

## 2017-06-12 PROCEDURE — 84460 ALANINE AMINO (ALT) (SGPT): CPT

## 2017-06-13 ENCOUNTER — OFFICE VISIT (OUTPATIENT)
Dept: CARDIOLOGY | Facility: CLINIC | Age: 82
End: 2017-06-13
Payer: MEDICARE

## 2017-06-13 VITALS
DIASTOLIC BLOOD PRESSURE: 78 MMHG | HEART RATE: 76 BPM | HEIGHT: 65 IN | SYSTOLIC BLOOD PRESSURE: 130 MMHG | WEIGHT: 177.5 LBS | BODY MASS INDEX: 29.57 KG/M2

## 2017-06-13 DIAGNOSIS — I77.9 BILATERAL CAROTID ARTERY DISEASE: Chronic | ICD-10-CM

## 2017-06-13 DIAGNOSIS — Z95.0 PACEMAKER: ICD-10-CM

## 2017-06-13 DIAGNOSIS — I25.10 CORONARY ARTERY DISEASE INVOLVING NATIVE CORONARY ARTERY OF NATIVE HEART WITHOUT ANGINA PECTORIS: Primary | Chronic | ICD-10-CM

## 2017-06-13 DIAGNOSIS — N18.30 CHRONIC KIDNEY DISEASE, STAGE III (MODERATE): Chronic | ICD-10-CM

## 2017-06-13 DIAGNOSIS — Z95.1 S/P CABG X 3: ICD-10-CM

## 2017-06-13 DIAGNOSIS — I73.9 PERIPHERAL ARTERIAL DISEASE: Chronic | ICD-10-CM

## 2017-06-13 DIAGNOSIS — E78.00 PURE HYPERCHOLESTEROLEMIA: Chronic | ICD-10-CM

## 2017-06-13 DIAGNOSIS — E03.4 HYPOTHYROIDISM DUE TO ACQUIRED ATROPHY OF THYROID: Chronic | ICD-10-CM

## 2017-06-13 DIAGNOSIS — I10 ESSENTIAL HYPERTENSION: Chronic | ICD-10-CM

## 2017-06-13 PROCEDURE — 93010 ELECTROCARDIOGRAM REPORT: CPT | Mod: ,,, | Performed by: INTERNAL MEDICINE

## 2017-06-13 PROCEDURE — 99213 OFFICE O/P EST LOW 20 MIN: CPT | Mod: PBBFAC,PO | Performed by: NURSE PRACTITIONER

## 2017-06-13 PROCEDURE — 1159F MED LIST DOCD IN RCRD: CPT | Mod: ,,, | Performed by: NURSE PRACTITIONER

## 2017-06-13 PROCEDURE — 1126F AMNT PAIN NOTED NONE PRSNT: CPT | Mod: ,,, | Performed by: NURSE PRACTITIONER

## 2017-06-13 PROCEDURE — 99214 OFFICE O/P EST MOD 30 MIN: CPT | Mod: S$PBB,,, | Performed by: NURSE PRACTITIONER

## 2017-06-13 PROCEDURE — 99999 PR PBB SHADOW E&M-EST. PATIENT-LVL III: CPT | Mod: PBBFAC,,, | Performed by: NURSE PRACTITIONER

## 2017-06-13 RX ORDER — ERGOCALCIFEROL (VITAMIN D2) 10 MCG
2000 TABLET ORAL DAILY
COMMUNITY
End: 2022-06-07 | Stop reason: DRUGHIGH

## 2017-06-13 NOTE — PROGRESS NOTES
Ms. Mckinney is a patient of Dr. Avery and was last seen in [facility] Cardiology Visit 6/7/16.    Subjective:   Patient ID:  Lynn Mckinney is a 81 y.o. female who presents for follow-up of Coronary Artery Disease    HPI  Mr. Mckinney is an 81y.o. Female with coronary artery disease with CABGx3 vessels (LIMA-Ramus, SVG-OM1 and PDA, carotid artery disease with stent to left, hyperlipidemia, hypertension, pacemaker for complete heart block, PAD. Followed by Dr. Dupont in vascular surgery for carotid artery disease and was last seen 7/22/16.  Remote h/o smoking, quit 1984.  Hyperlipidemia treated with rosuvastatin 20mg and LDL 61.6.  CAD is treated with ASA and high intensity statin therapy.  Home blood pressures running 130s/60-70s.  Reports low salt diet.  Reports sedentary lifestyle. Patient denies chest pain with exertion or at rest, palpitations,   dizziness, syncope, edema, orthopnea, and PND. Patient cannot  walk up a flight of stairs without dyspnea.  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  She is followed by Dr. Coleman in the arrhythmia clinic and was last seen on 2/24/17.  Echo 1/27/17: Mitral inflow pattern reveals fusion of E & A waves. Therefore, no comment on diastolic function can be made. EF 60%    Review of Systems   Constitution: Negative for decreased appetite, diaphoresis, weakness, malaise/fatigue, weight gain and weight loss.   HENT: Negative for headaches.    Eyes: Negative for visual disturbance.   Cardiovascular: Negative for chest pain, claudication, dyspnea on exertion, irregular heartbeat, leg swelling, near-syncope, orthopnea, palpitations, paroxysmal nocturnal dyspnea and syncope.        Denies chest pressure   Respiratory: Negative for cough, hemoptysis, shortness of breath, sleep disturbances due to breathing and snoring.    Endocrine: Negative for cold intolerance and heat intolerance.   Hematologic/Lymphatic: Negative for bleeding  "problem. Does not bruise/bleed easily.   Musculoskeletal: Negative for myalgias.   Gastrointestinal: Negative for bloating, abdominal pain, anorexia, change in bowel habit, constipation, diarrhea, nausea and vomiting.   Neurological: Negative for difficulty with concentration, disturbances in coordination, excessive daytime sleepiness, dizziness, light-headedness, loss of balance and numbness.   Psychiatric/Behavioral: The patient does not have insomnia.      Allergies and current medications updated and reviewed:  Review of patient's allergies indicates:   Allergen Reactions    Lipitor [atorvastatin] Itching    Fosamax [alendronate]      Felt "strange"    Iodinated contrast media - oral and iv dye      Dye is only to be used if absolutely needed because of kidney function    Sulfa (sulfonamide antibiotics)      Current Outpatient Prescriptions   Medication Sig    amlodipine (NORVASC) 10 MG tablet TAKE 1 TABLET BY MOUTH ONCE DAILY (Patient taking differently: Pt taking 1/2 pill once a day.)    aspirin 81 MG Chew Take 81 mg by mouth every evening. 1 Tablet, Chewable Oral Every day    b complex vitamins capsule Take 1 capsule by mouth every other day.     CALCIUM CARBONATE/VITAMIN D3 (CALTRATE 600 + D ORAL) Take 1 tablet by mouth once daily.    co-enzyme Q-10 30 mg capsule Take 30 mg by mouth once daily.     ergocalciferol, vitamin D2, 400 unit Tab Take 400 Units by mouth once daily.    fluticasone (FLONASE) 50 mcg/actuation nasal spray 1 spray by Each Nare route once daily.    glucosamine-chondroitin 500-400 mg tablet Take 1 tablet by mouth once daily.     KRILL OIL ORAL Take 1 capsule by mouth once daily.    labetalol (NORMODYNE) 200 MG tablet TAKE 1 TABLET BY MOUTH TWICE DAILY    LACTOSE-FREE FOOD (BOOST ORAL) Take by mouth. 2-3 days per week    levothyroxine (SYNTHROID) 112 MCG tablet TAKE 1 TABLET BY MOUTH EVERY DAY FOR 6 WEEKS AND 1/2 TABLET ON SUNDAY    multivitamin (THERAGRAN) per tablet Take " "by mouth. Pt taking one pill three times a week.    nitroGLYCERIN (NITROSTAT) 0.4 MG SL tablet Place 1 tablet (0.4 mg total) under the tongue every 5 (five) minutes as needed for Chest pain (to a total of three).    rosuvastatin (CRESTOR) 20 MG tablet Take 1 tablet (20 mg total) by mouth once daily.    valsartan-hydrochlorothiazide (DIOVAN-HCT) 320-25 mg per tablet TAKE 1 TABLET BY MOUTH ONCE DAILY     No current facility-administered medications for this visit.      Objective:     /78   Pulse 76   Ht 5' 5" (1.651 m)   Wt 80.5 kg (177 lb 7.5 oz)   BMI 29.53 kg/m²     Physical Exam   Constitutional: She is oriented to person, place, and time. Vital signs are normal. She appears well-developed and well-nourished. She is active. No distress.   HENT:   Head: Normocephalic and atraumatic.   Eyes: Conjunctivae and lids are normal. No scleral icterus.   Neck: Neck supple. Normal carotid pulses, no hepatojugular reflux and no JVD present. Carotid bruit is present.   Cardiovascular: Normal rate, regular rhythm, S1 normal, S2 normal and intact distal pulses.  PMI is not displaced.  Exam reveals no gallop and no friction rub.    Murmur heard.  Pulses:       Carotid pulses are 2+ on the right side, and 2+ on the left side.       Radial pulses are 2+ on the right side, and 2+ on the left side.        Popliteal pulses are 1+ on the right side, and 1+ on the left side.        Dorsalis pedis pulses are 0 on the right side, and 0 on the left side.        Posterior tibial pulses are 0 on the right side, and 0 on the left side.   +click (h/o RBBB prior to pacemaker)   Pulmonary/Chest: Effort normal and breath sounds normal. No respiratory distress. She has no decreased breath sounds. She has no wheezes. She has no rhonchi. She has no rales. She exhibits no tenderness.   Basilar crackles clear with cough, likely atelectasis   Abdominal: Soft. Normal appearance and bowel sounds are normal. She exhibits no distension, no " fluid wave, no abdominal bruit, no ascites and no pulsatile midline mass. There is no hepatosplenomegaly. There is no tenderness.   Musculoskeletal: She exhibits no edema.   Neurological: She is alert and oriented to person, place, and time. Gait normal.   Skin: Skin is warm, dry and intact. No rash noted. She is not diaphoretic. Nails show no clubbing.   Psychiatric: She has a normal mood and affect. Her speech is normal and behavior is normal. Judgment and thought content normal. Cognition and memory are normal.   Nursing note and vitals reviewed.      Chemistry        Component Value Date/Time     06/12/2017 0944    K 4.2 06/12/2017 0944     06/12/2017 0944    CO2 26 06/12/2017 0944    BUN 25 (H) 06/12/2017 0944    CREATININE 1.3 06/12/2017 0944     06/12/2017 0944        Component Value Date/Time    CALCIUM 9.7 06/12/2017 0944    ALKPHOS 59 02/27/2017 0956    AST 22 06/12/2017 0944    ALT 12 06/12/2017 0944    BILITOT 0.6 02/27/2017 0956          Recent Labs  Lab 02/27/17  0956 06/12/17  0944   WHITE BLOOD CELL COUNT 5.83  --    HEMOGLOBIN 13.3  --    HEMATOCRIT 39.6  --    MCV 92  --    PLATELETS 174  --    TSH 4.164 H  --    CHOLESTEROL 126 126   HDL 49 45   LDL CHOLESTEROL 59.4 L 61.6 L   TRIGLYCERIDES 88 97   HDL/CHOLESTEROL RATIO 38.9 35.7          Lab Results   Component Value Date    HGBA1C 5.2 06/09/2016     Test(s) Reviewed  I have reviewed the following in detail:  [] Stress test   [] Angiography   [x] Echocardiogram   [x] Labs   [] Other:       Assessment:     1. Coronary artery disease involving native coronary artery of native heart without angina pectoris  Asymptomatic.  Taking GDMT   2. Bilateral carotid artery disease  Followed by Dr. Dupont. Carotid ultrasound 2015 60-79% stenosis on the left and 20-39% stenosis on the right. +bruit bilateral   3. Pure hypercholesterolemia  LDL<70 on Rosuvastatin 20mg   4. Essential hypertension  At goal, <150/90 per JNC8 for patients >65  years   5. Peripheral arterial disease  Walking with the cart in target with some claudication that is worse without the cart.   6. Chronic kidney disease, stage 3  stable   7. Hypothyroidism due to acquired atrophy of thyroid  TSH slightly elevated on 2/27/17   8. Pacemaker  Managed by Dr. Arreola   9. S/P CABG x 3        Plan:     Coronary artery disease involving native coronary artery of native heart without angina pectoris  Comments:  Continue GDMT  Orders:  -     EKG 12-lead    Bilateral carotid artery disease  Comments:  Follow up with Dr. Dupont in 7/2017    Pure hypercholesterolemia  Comments:  Continue Rosuvastatin    Essential hypertension  Comments:  Continue current regimen    Peripheral arterial disease  Comments:  Instructed to walk until claudication and slow pace until resolved. Given written information    Chronic kidney disease, stage 3  Comments:  Monitor.     Hypothyroidism due to acquired atrophy of thyroid  Comments:  Followed by PCP    Pacemaker  Comments:  Follow up with Dr. Coleman in 2/2018    S/P CABG x 3      Return in about 1 year (around 6/13/2018).

## 2017-06-13 NOTE — PATIENT INSTRUCTIONS
A Walking Program for Peripheral Arterial Disease (PAD)  Peripheral arterial disease (PAD) is a condition where the arteries in the legs are severely damaged. When you have PAD, walking can be painful. So you may start to walk less. Walking less makes your leg muscles weaker. It then becomes harder and more painful to walk. A walking program can break this cycle. This sheet gives you tips on how to get started.     Walking farther without pain  Exercise strengthens leg muscles that are out of shape. It also trains these muscles to work with less oxygen. This helps your leg muscles work better even with reduced blood flow to your legs. When you have PAD, a walking program can be helpful. Your program can:  · Let you walk longer and farther without claudication. This is an ache in your legs during exercise that goes away with rest.  · Let you do more and be more active  · Add to your overall health and well-being  · Help you control your blood sugar and blood pressure  · Help you become healthier with no risk and at little or no cost  Getting started  Your local hospital, vascular center, or cardiac rehab center may have a special walking program for people with PAD. If so, this is your best option. But if you cant find a program, or its not covered by insurance, you can still walk on your own. Follow these steps at each session:  · Step 1. Start at a pace that lets you walk for 5 to 10 minutes before you start to feel claudication. This feeling is unpleasant, but it doesnt hurt you. Keep going until the pain makes you feel that you need to stop.  · Step 2. Stop and rest for 3 to 5 minutes, just long enough for the pain to go away. You can rest standing or sitting. (Some people like to bring along a cane or a lightweight folding chair.)  · Step 3. Again walk at a pace that lets you walk for 5 to 10 minutes more before you feel pain. This may be slower than your starting pace in step 1. Then rest again.  · Step 4.  Repeat this process until youve walked for 45 minutes. This should be about 60 to 80 minutes total, including resting time. You may not be able to do a full 45 minutes at first. Do as much as you can, and increase your time as you improve.  Making the most of your program  · Walk at least 3 times a week. Take no more than 2 days off between sessions. If you stop walking, even for a week or two, you can lose the health benefits of your program.  · Find a good place to walk. A treadmill or a track may be better at first. That way, you wont run the risk of going too far and finding that you cant walk back. Be sure to have a place to walk indoors in bad weather, such as a gym or a mall.  · Wear shoes with sturdy, flexible soles. The heel should fit without slipping. You should have room to wiggle your toes.  · Keep track of how long you walk. A pedometer will show your daily progress. It can also show how much farther you can walk as time goes by.  · Ask a friend to keep you company. You may enjoy walking with someone else. Or you may want to make your walking sessions a time to relax by yourself.  · Make it fun. Listen to music while you walk and rest. Walk in a favorite park. Get to know the people at the gym, or the folks that you pass on your route. While you rest, you can window-shop, read, or feed the birds. Do whatever will help you enjoy your exercise sessions.  Date Last Reviewed: 6/1/2016 © 2000-2016 [employer]. [address] [place] [address], [place] [address]. All rights reserved. This information is not intended as a substitute for professional medical care. Always follow your healthcare professional's instructions.

## 2017-07-11 ENCOUNTER — HOSPITAL ENCOUNTER (OUTPATIENT)
Dept: VASCULAR SURGERY | Facility: CLINIC | Age: 82
Discharge: HOME | End: 2017-07-11
Attending: SURGERY
Payer: MEDICARE

## 2017-07-11 ENCOUNTER — OFFICE VISIT (OUTPATIENT)
Dept: VASCULAR SURGERY | Facility: CLINIC | Age: 82
End: 2017-07-11
Attending: SURGERY
Payer: MEDICARE

## 2017-07-11 VITALS
BODY MASS INDEX: 28.61 KG/M2 | TEMPERATURE: 98 F | WEIGHT: 178 LBS | HEIGHT: 66 IN | HEART RATE: 83 BPM | SYSTOLIC BLOOD PRESSURE: 130 MMHG | DIASTOLIC BLOOD PRESSURE: 60 MMHG

## 2017-07-11 DIAGNOSIS — I65.23 BILATERAL CAROTID ARTERY STENOSIS: ICD-10-CM

## 2017-07-11 DIAGNOSIS — I65.23 BILATERAL CAROTID ARTERY STENOSIS: Primary | ICD-10-CM

## 2017-07-11 DIAGNOSIS — I77.9 LEFT-SIDED CAROTID ARTERY DISEASE: ICD-10-CM

## 2017-07-11 PROCEDURE — 93880 EXTRACRANIAL BILAT STUDY: CPT | Mod: 26,S$PBB,, | Performed by: SURGERY

## 2017-07-11 PROCEDURE — 99213 OFFICE O/P EST LOW 20 MIN: CPT | Mod: S$PBB,,, | Performed by: SURGERY

## 2017-07-11 PROCEDURE — 99213 OFFICE O/P EST LOW 20 MIN: CPT | Mod: PBBFAC | Performed by: SURGERY

## 2017-07-11 PROCEDURE — 1125F AMNT PAIN NOTED PAIN PRSNT: CPT | Mod: ,,, | Performed by: SURGERY

## 2017-07-11 PROCEDURE — 99999 PR PBB SHADOW E&M-EST. PATIENT-LVL III: CPT | Mod: PBBFAC,,, | Performed by: SURGERY

## 2017-07-11 PROCEDURE — 1159F MED LIST DOCD IN RCRD: CPT | Mod: ,,, | Performed by: SURGERY

## 2017-07-11 NOTE — PROGRESS NOTES
See my prior note; review of systems, family history and social history are   unchanged.    HISTORY OF PRESENT ILLNESS:  An 81-year-old female status post:  1.  Left carotid stent placement in 2007, (Dr. Bowen) after an episode of   amaurosis fugax.  2.  CREST trial participant.    This is a 1-year followup.  She denies interval stroke, TIA or amaurosis.    MEDICATIONS:  Include aspirin and statin.    PHYSICAL EXAMINATION:  VITAL SIGNS:  See nursing note.  NEUROLOGIC:  Cranial nerves VII through XII are intact and 5/5 motor strength in   all extremities.    IMAGING:  Carotid duplex reveals no right carotid stenosis and a stable left 60%   to 79% carotid stenosis with peak systolic velocity of 217 and end diastolic of   48. 3 years ago,   this was 222 and 43.    ASSESSMENT:  A 10-year followup, left carotid stent placement in CREST trial with   stable moderate stenosis, asymptomatic.    RECOMMENDATIONS:  1.  Continue current medical therapy.  2.  Follow up in 1 year with a screening carotid duplex, sooner if clinically   indicated.    GUS Sams III, MD, FACS  Professor and Chief, Vascular and Endovascular Surgery

## 2017-08-14 RX ORDER — LEVOTHYROXINE SODIUM 112 UG/1
TABLET ORAL
Qty: 90 TABLET | Refills: 3 | Status: SHIPPED | OUTPATIENT
Start: 2017-08-14 | End: 2017-09-06

## 2017-08-23 ENCOUNTER — TELEPHONE (OUTPATIENT)
Dept: ELECTROPHYSIOLOGY | Facility: CLINIC | Age: 82
End: 2017-08-23

## 2017-08-23 NOTE — TELEPHONE ENCOUNTER
----- Message from Adrienne Westbrook sent at 8/23/2017 10:50 AM CDT -----  Contact: michelle  Please call pt at [phone] if any question. Please fax her medication list to [facility] office# [phone]/fax#[phone]. Patient is there now. Dr Coleman pt    Thank you

## 2017-08-29 ENCOUNTER — TELEPHONE (OUTPATIENT)
Dept: INTERNAL MEDICINE | Facility: CLINIC | Age: 82
End: 2017-08-29

## 2017-08-29 ENCOUNTER — CLINICAL SUPPORT (OUTPATIENT)
Dept: ELECTROPHYSIOLOGY | Facility: CLINIC | Age: 82
End: 2017-08-29
Payer: MEDICARE

## 2017-08-29 DIAGNOSIS — I44.1 AV BLOCK, 2ND DEGREE: ICD-10-CM

## 2017-08-29 DIAGNOSIS — Z95.0 CARDIAC PACEMAKER IN SITU: ICD-10-CM

## 2017-08-29 PROCEDURE — 93293 PM PHONE R-STRIP DEVICE EVAL: CPT | Mod: PBBFAC | Performed by: INTERNAL MEDICINE

## 2017-08-29 NOTE — TELEPHONE ENCOUNTER
----- Message from Kaye Crowder sent at 8/29/2017  2:41 PM CDT -----  Contact: Self  Doctor appointment and lab have been scheduled.  Please link lab orders to the lab appointment.  Date of doctor appointment:  9/6  Physical or EP:  Physical   Date of lab appointment:  9/2  Comments:

## 2017-09-02 ENCOUNTER — LAB VISIT (OUTPATIENT)
Dept: LAB | Facility: HOSPITAL | Age: 82
End: 2017-09-02
Attending: INTERNAL MEDICINE
Payer: MEDICARE

## 2017-09-02 DIAGNOSIS — I25.10 CORONARY ARTERY DISEASE INVOLVING NATIVE CORONARY ARTERY OF NATIVE HEART WITHOUT ANGINA PECTORIS: ICD-10-CM

## 2017-09-02 DIAGNOSIS — E03.4 HYPOTHYROIDISM DUE TO ACQUIRED ATROPHY OF THYROID: ICD-10-CM

## 2017-09-02 DIAGNOSIS — I10 ESSENTIAL HYPERTENSION: ICD-10-CM

## 2017-09-02 LAB
ALBUMIN SERPL BCP-MCNC: 3.6 G/DL
ALP SERPL-CCNC: 53 U/L
ALT SERPL W/O P-5'-P-CCNC: 14 U/L
ANION GAP SERPL CALC-SCNC: 8 MMOL/L
AST SERPL-CCNC: 26 U/L
BASOPHILS # BLD AUTO: 0.02 K/UL
BASOPHILS NFR BLD: 0.3 %
BILIRUB SERPL-MCNC: 0.5 MG/DL
BUN SERPL-MCNC: 25 MG/DL
CALCIUM SERPL-MCNC: 9.6 MG/DL
CHLORIDE SERPL-SCNC: 107 MMOL/L
CHOLEST SERPL-MCNC: 124 MG/DL
CHOLEST/HDLC SERPL: 2.7 {RATIO}
CO2 SERPL-SCNC: 26 MMOL/L
CREAT SERPL-MCNC: 1.3 MG/DL
DIFFERENTIAL METHOD: NORMAL
EOSINOPHIL # BLD AUTO: 0.3 K/UL
EOSINOPHIL NFR BLD: 4.4 %
ERYTHROCYTE [DISTWIDTH] IN BLOOD BY AUTOMATED COUNT: 12.6 %
EST. GFR  (AFRICAN AMERICAN): 44.5 ML/MIN/1.73 M^2
EST. GFR  (NON AFRICAN AMERICAN): 38.6 ML/MIN/1.73 M^2
GLUCOSE SERPL-MCNC: 95 MG/DL
HCT VFR BLD AUTO: 38.6 %
HDLC SERPL-MCNC: 46 MG/DL
HDLC SERPL: 37.1 %
HGB BLD-MCNC: 13.6 G/DL
LDLC SERPL CALC-MCNC: 58.2 MG/DL
LYMPHOCYTES # BLD AUTO: 1.4 K/UL
LYMPHOCYTES NFR BLD: 21 %
MCH RBC QN AUTO: 30.9 PG
MCHC RBC AUTO-ENTMCNC: 35.2 G/DL
MCV RBC AUTO: 88 FL
MONOCYTES # BLD AUTO: 0.5 K/UL
MONOCYTES NFR BLD: 7 %
NEUTROPHILS # BLD AUTO: 4.4 K/UL
NEUTROPHILS NFR BLD: 67.1 %
NONHDLC SERPL-MCNC: 78 MG/DL
PLATELET # BLD AUTO: 162 K/UL
PMV BLD AUTO: 9.5 FL
POTASSIUM SERPL-SCNC: 4.2 MMOL/L
PROT SERPL-MCNC: 7 G/DL
RBC # BLD AUTO: 4.4 M/UL
SODIUM SERPL-SCNC: 141 MMOL/L
TRIGL SERPL-MCNC: 99 MG/DL
TSH SERPL DL<=0.005 MIU/L-ACNC: 0.92 UIU/ML
WBC # BLD AUTO: 6.53 K/UL

## 2017-09-02 PROCEDURE — 36415 COLL VENOUS BLD VENIPUNCTURE: CPT | Mod: PO

## 2017-09-02 PROCEDURE — 80053 COMPREHEN METABOLIC PANEL: CPT

## 2017-09-02 PROCEDURE — 80061 LIPID PANEL: CPT

## 2017-09-02 PROCEDURE — 85025 COMPLETE CBC W/AUTO DIFF WBC: CPT

## 2017-09-02 PROCEDURE — 84443 ASSAY THYROID STIM HORMONE: CPT

## 2017-09-06 ENCOUNTER — OFFICE VISIT (OUTPATIENT)
Dept: INTERNAL MEDICINE | Facility: CLINIC | Age: 82
End: 2017-09-06
Payer: MEDICARE

## 2017-09-06 ENCOUNTER — HOSPITAL ENCOUNTER (OUTPATIENT)
Dept: RADIOLOGY | Facility: HOSPITAL | Age: 82
Discharge: HOME | End: 2017-09-06
Attending: INTERNAL MEDICINE
Payer: MEDICARE

## 2017-09-06 VITALS
WEIGHT: 175.69 LBS | BODY MASS INDEX: 28.36 KG/M2 | SYSTOLIC BLOOD PRESSURE: 138 MMHG | HEART RATE: 68 BPM | DIASTOLIC BLOOD PRESSURE: 72 MMHG

## 2017-09-06 DIAGNOSIS — E55.9 VITAMIN D DEFICIENCY: ICD-10-CM

## 2017-09-06 DIAGNOSIS — I65.23 BILATERAL CAROTID ARTERY STENOSIS: ICD-10-CM

## 2017-09-06 DIAGNOSIS — Z95.0 PACEMAKER: ICD-10-CM

## 2017-09-06 DIAGNOSIS — N18.30 CHRONIC KIDNEY DISEASE, STAGE III (MODERATE): ICD-10-CM

## 2017-09-06 DIAGNOSIS — I10 ESSENTIAL HYPERTENSION: Primary | ICD-10-CM

## 2017-09-06 DIAGNOSIS — M25.571 RIGHT ANKLE PAIN, UNSPECIFIED CHRONICITY: ICD-10-CM

## 2017-09-06 DIAGNOSIS — Z12.11 COLON CANCER SCREENING: ICD-10-CM

## 2017-09-06 DIAGNOSIS — I73.9 PERIPHERAL ARTERIAL DISEASE: ICD-10-CM

## 2017-09-06 DIAGNOSIS — E78.00 PURE HYPERCHOLESTEROLEMIA: ICD-10-CM

## 2017-09-06 DIAGNOSIS — E03.9 HYPOTHYROIDISM, UNSPECIFIED TYPE: ICD-10-CM

## 2017-09-06 DIAGNOSIS — I25.10 CORONARY ARTERY DISEASE INVOLVING NATIVE CORONARY ARTERY OF NATIVE HEART WITHOUT ANGINA PECTORIS: ICD-10-CM

## 2017-09-06 PROCEDURE — 3078F DIAST BP <80 MM HG: CPT | Mod: ,,, | Performed by: INTERNAL MEDICINE

## 2017-09-06 PROCEDURE — 1159F MED LIST DOCD IN RCRD: CPT | Mod: ,,, | Performed by: INTERNAL MEDICINE

## 2017-09-06 PROCEDURE — 99214 OFFICE O/P EST MOD 30 MIN: CPT | Mod: S$PBB,,, | Performed by: INTERNAL MEDICINE

## 2017-09-06 PROCEDURE — 73600 X-RAY EXAM OF ANKLE: CPT | Mod: 26,RT,, | Performed by: RADIOLOGY

## 2017-09-06 PROCEDURE — 99999 PR PBB SHADOW E&M-EST. PATIENT-LVL III: CPT | Mod: PBBFAC,,, | Performed by: INTERNAL MEDICINE

## 2017-09-06 PROCEDURE — 99213 OFFICE O/P EST LOW 20 MIN: CPT | Mod: PBBFAC,25 | Performed by: INTERNAL MEDICINE

## 2017-09-06 PROCEDURE — 73600 X-RAY EXAM OF ANKLE: CPT | Mod: TC,RT

## 2017-09-06 PROCEDURE — 3075F SYST BP GE 130 - 139MM HG: CPT | Mod: ,,, | Performed by: INTERNAL MEDICINE

## 2017-09-06 NOTE — PROGRESS NOTES
Subjective:      Patient ID: Lynn Mckinney is a 81 y.o. female.    Chief Complaint: Follow-up    HPI:  HPI     Hypertension: well contolled, known CAD, PAD and followed in Cardiology and Vascular Surgery .     Cardiology consult:  Pacemaker: recent follow up and no changes     Nephrology consult: stable  Dr: Vineet  CKD  5/2017     Optometry: Patient is due      Mammo: 3/2017     Vascular: Patient is seen by Dr. Sams once a year in July     Review  Colonoscopy: 2007 normal, patient states that she had a small polyps on the initial colonoscopy ( last colonoscopy was normal): declines any further  FIT: given 9/6/2017  Mammogram:: ordered after 3/2/3107  Gyn: hysterectomy and oophorectomy  Optho: Dr. Melgar will make appt  Flu: due  Tetanus: had a Tdap 15 years ago)  Shingles 11/26/2014  Pneumovax 11/6/2014  PCV 7 vaccine  Patient declines bone density     Consultants:  Dr. Talia Sanches        Pacemaker: Jared        Patient Active Problem List   Diagnosis    Hypertension    Hyperlipidemia    Peripheral arterial disease    CAD (coronary artery disease)    S/P CABG x 3    Hypothyroidism    Incontinence    Chronic kidney disease, stage 3    Vitamin D deficiency    AV block, 2nd degree    Pacemaker    Left-sided carotid artery disease    Claudication    Right hip pain    Lumbar stenosis    Thoracic or lumbosacral neuritis or radiculitis    Hip pain    Bilateral carotid artery stenosis     Past Medical History:   Diagnosis Date    Allergy     seasonal    Blood clotting tendency     Carotid artery disease 5/29/2014    Coronary artery disease     Heart block     Hyperlipidemia     Hypertension     Hypothyroid     Obesity     Pacemaker     PAD (peripheral artery disease)     Spinal stenosis     Thoracic or lumbosacral neuritis or radiculitis 11/25/2014    Tubular adenoma      Past Surgical History:   Procedure Laterality Date    ANGIOPLASTY       APPENDECTOMY      CARDIAC CATHETERIZATION      CARDIAC PACEMAKER PLACEMENT  10/10/13    by Dr. Coleman    CAROTID STENT Left 2007    by Dr. Bowen    CATARACT EXTRACTION W/  INTRAOCULAR LENS IMPLANT Bilateral 2012    Dr Sharp    CORONARY ARTERY BYPASS GRAFT  2001     x 3 LIMA-Ramus, SVG-OM1 & SVG-PDA    CYST REMOVAL      wrist    EYE SURGERY      HYSTERECTOMY      THYROIDECTOMY       Family History   Problem Relation Age of Onset    Heart disease Father      coronary thrombosis    Hypertension Father     Alzheimer's disease Mother     Heart disease Mother      pacemaker    Hypertension Mother     Diabetes Brother     No Known Problems Sister     No Known Problems Maternal Grandmother     No Known Problems Maternal Grandfather     No Known Problems Paternal Grandmother     No Known Problems Paternal Grandfather     Heart disease Brother     No Known Problems Maternal Aunt     No Known Problems Maternal Uncle     No Known Problems Paternal Aunt     No Known Problems Paternal Uncle     Amblyopia Neg Hx     Blindness Neg Hx     Cancer Neg Hx     Cataracts Neg Hx     Glaucoma Neg Hx     Macular degeneration Neg Hx     Retinal detachment Neg Hx     Strabismus Neg Hx     Stroke Neg Hx     Thyroid disease Neg Hx     Melanoma Neg Hx      Review of Systems   Constitutional: Negative for chills, fever and unexpected weight change.   HENT: Negative for ear pain, nosebleeds, sore throat, trouble swallowing and voice change.    Eyes: Negative for photophobia and visual disturbance.   Respiratory: Negative for cough, shortness of breath and wheezing.    Cardiovascular: Negative for chest pain, palpitations and leg swelling.   Gastrointestinal: Negative for abdominal distention, abdominal pain and blood in stool.   Genitourinary: Negative for difficulty urinating, dysuria, flank pain and hematuria.   Musculoskeletal: Negative for back pain, gait problem and joint swelling.   Skin: Negative for color  change and rash.   Neurological: Negative for seizures and headaches.   Hematological: Negative for adenopathy. Does not bruise/bleed easily.   Psychiatric/Behavioral: Negative for dysphoric mood and suicidal ideas.     Objective:     Vitals:    09/06/17 1257 09/06/17 1259 09/06/17 1300   BP:  138/72    Pulse:   68   Weight: 79.7 kg (175 lb 11.3 oz)       Body mass index is 28.36 kg/m².  Physical Exam   Constitutional: She is oriented to person, place, and time. She appears well-developed and well-nourished. No distress.   Neck: Carotid bruit is not present. No thyromegaly present.   Cardiovascular: Normal rate, regular rhythm and normal heart sounds.  PMI is not displaced.    Pulmonary/Chest: Effort normal and breath sounds normal. No respiratory distress.   Abdominal: Soft. Bowel sounds are normal. She exhibits no distension. There is no tenderness.   Musculoskeletal: She exhibits no edema.   Neurological: She is alert and oriented to person, place, and time.     Assessment:     1. Essential hypertension    2. Hypothyroidism, unspecified type    3. Bilateral carotid artery stenosis    4. Coronary artery disease involving native coronary artery of native heart without angina pectoris    5. Chronic kidney disease, stage 3    6. Pure hypercholesterolemia    7. Pacemaker    8. Peripheral arterial disease    9. Vitamin D deficiency    10. Colon cancer screening    11. Right ankle pain, unspecified chronicity      Plan:   Lynn was seen today for follow-up.    Diagnoses and all orders for this visit:    Essential hypertension    Hypothyroidism, unspecified type    Bilateral carotid artery stenosis    Coronary artery disease involving native coronary artery of native heart without angina pectoris    Chronic kidney disease, stage 3    Pure hypercholesterolemia    Pacemaker    Peripheral arterial disease    Vitamin D deficiency    Colon cancer screening  -     Fecal Immunochemical Test (iFOBT); Future    Right ankle pain,  unspecified chronicity  -     X-Ray Ankle 2 View Right; Future       Return in about 6 months (around 3/6/2018) for Follow up cbc, cmp, lipid and tsh.       Medication List          Accurate as of 9/6/17  1:07 PM. If you have any questions, ask your nurse or doctor.               CHANGE how you take these medications    amlodipine 10 MG tablet  Commonly known as:  NORVASC  TAKE 1 TABLET BY MOUTH ONCE DAILY  What changed:  See the new instructions.     levothyroxine 112 MCG tablet  Commonly known as:  SYNTHROID  TAKE 1 TABLET BY MOUTH EVERY DAY FOR 6 WEEKS AND 1/2 TABLET ON SUNDAY  What changed:  Another medication with the same name was removed. Continue taking this medication, and follow the directions you see here.  Changed by:  Marielena Moise MD        CONTINUE taking these medications    aspirin 81 MG Chew     b complex vitamins capsule     BOOST ORAL     CALTRATE 600 + D ORAL     co-enzyme Q-10 30 mg capsule     ergocalciferol (vitamin D2) 400 unit Tab     fluticasone 50 mcg/actuation nasal spray  Commonly known as:  FLONASE  1 spray by Each Nare route once daily.     glucosamine-chondroitin 500-400 mg tablet     KRILL OIL ORAL     labetalol 200 MG tablet  Commonly known as:  NORMODYNE  TAKE 1 TABLET BY MOUTH TWICE DAILY     multivitamin per tablet  Commonly known as:  THERAGRAN     nitroGLYCERIN 0.4 MG SL tablet  Commonly known as:  NITROSTAT  Place 1 tablet (0.4 mg total) under the tongue every 5 (five) minutes as needed for Chest pain (to a total of three).     rosuvastatin 20 MG tablet  Commonly known as:  CRESTOR  Take 1 tablet (20 mg total) by mouth once daily.     valsartan-hydrochlorothiazide 320-25 mg per tablet  Commonly known as:  DIOVAN-HCT  TAKE 1 TABLET BY MOUTH ONCE DAILY

## 2017-10-12 ENCOUNTER — TELEPHONE (OUTPATIENT)
Dept: NEPHROLOGY | Facility: CLINIC | Age: 82
End: 2017-10-12

## 2017-10-12 NOTE — TELEPHONE ENCOUNTER
Spoke with pt appt and labs schedule for Dec.----- Message from Minerva Arroyo sent at 10/12/2017  9:10 AM CDT -----  Contact: Self  Good morning,     Pt is requesting an appt due to recall (appt books are not open).    Pt can be reached at [phone].    Thank you!

## 2017-10-16 RX ORDER — ROSUVASTATIN CALCIUM 20 MG/1
TABLET, COATED ORAL
Qty: 90 TABLET | Refills: 3 | Status: SHIPPED | OUTPATIENT
Start: 2017-10-16 | End: 2018-01-10 | Stop reason: SDUPTHER

## 2017-11-17 RX ORDER — VALSARTAN AND HYDROCHLOROTHIAZIDE 320; 25 MG/1; MG/1
TABLET, FILM COATED ORAL
Qty: 90 TABLET | Refills: 3 | Status: SHIPPED | OUTPATIENT
Start: 2017-11-17 | End: 2018-01-10 | Stop reason: SDUPTHER

## 2017-12-05 ENCOUNTER — TELEPHONE (OUTPATIENT)
Dept: ELECTROPHYSIOLOGY | Facility: CLINIC | Age: 82
End: 2017-12-05

## 2017-12-05 ENCOUNTER — CLINICAL SUPPORT (OUTPATIENT)
Dept: ELECTROPHYSIOLOGY | Facility: CLINIC | Age: 82
End: 2017-12-05
Payer: MEDICARE

## 2017-12-05 DIAGNOSIS — Z95.0 CARDIAC PACEMAKER IN SITU: ICD-10-CM

## 2017-12-05 DIAGNOSIS — I44.1 AV BLOCK, 2ND DEGREE: ICD-10-CM

## 2017-12-05 DIAGNOSIS — I25.10 CORONARY ARTERY DISEASE, ANGINA PRESENCE UNSPECIFIED, UNSPECIFIED VESSEL OR LESION TYPE, UNSPECIFIED WHETHER NATIVE OR TRANSPLANTED HEART: Primary | ICD-10-CM

## 2017-12-05 PROCEDURE — 93293 PM PHONE R-STRIP DEVICE EVAL: CPT | Mod: PBBFAC | Performed by: INTERNAL MEDICINE

## 2017-12-08 ENCOUNTER — LAB VISIT (OUTPATIENT)
Dept: LAB | Facility: HOSPITAL | Age: 82
End: 2017-12-08
Attending: INTERNAL MEDICINE
Payer: MEDICARE

## 2017-12-08 DIAGNOSIS — I10 ESSENTIAL HYPERTENSION: ICD-10-CM

## 2017-12-08 DIAGNOSIS — N18.30 CHRONIC KIDNEY DISEASE, STAGE III (MODERATE): ICD-10-CM

## 2017-12-08 DIAGNOSIS — E55.9 VITAMIN D DEFICIENCY: ICD-10-CM

## 2017-12-08 LAB
ALBUMIN SERPL BCP-MCNC: 3.8 G/DL
ANION GAP SERPL CALC-SCNC: 8 MMOL/L
BASOPHILS # BLD AUTO: 0.02 K/UL
BASOPHILS NFR BLD: 0.3 %
BUN SERPL-MCNC: 31 MG/DL
CALCIUM SERPL-MCNC: 9.5 MG/DL
CHLORIDE SERPL-SCNC: 103 MMOL/L
CO2 SERPL-SCNC: 29 MMOL/L
CREAT SERPL-MCNC: 1.4 MG/DL
DIFFERENTIAL METHOD: ABNORMAL
EOSINOPHIL # BLD AUTO: 0.3 K/UL
EOSINOPHIL NFR BLD: 4.9 %
ERYTHROCYTE [DISTWIDTH] IN BLOOD BY AUTOMATED COUNT: 12.1 %
EST. GFR  (AFRICAN AMERICAN): 40.4 ML/MIN/1.73 M^2
EST. GFR  (NON AFRICAN AMERICAN): 35 ML/MIN/1.73 M^2
GLUCOSE SERPL-MCNC: 97 MG/DL
HCT VFR BLD AUTO: 38.6 %
HGB BLD-MCNC: 13.4 G/DL
IMM GRANULOCYTES # BLD AUTO: 0.01 K/UL
IMM GRANULOCYTES NFR BLD AUTO: 0.2 %
LYMPHOCYTES # BLD AUTO: 1 K/UL
LYMPHOCYTES NFR BLD: 15.1 %
MCH RBC QN AUTO: 31.8 PG
MCHC RBC AUTO-ENTMCNC: 34.7 G/DL
MCV RBC AUTO: 92 FL
MONOCYTES # BLD AUTO: 0.7 K/UL
MONOCYTES NFR BLD: 11.1 %
NEUTROPHILS # BLD AUTO: 4.5 K/UL
NEUTROPHILS NFR BLD: 68.4 %
NRBC BLD-RTO: 0 /100 WBC
PHOSPHATE SERPL-MCNC: 3.5 MG/DL
PLATELET # BLD AUTO: 178 K/UL
PMV BLD AUTO: 9.5 FL
POTASSIUM SERPL-SCNC: 4 MMOL/L
PTH-INTACT SERPL-MCNC: 86 PG/ML
RBC # BLD AUTO: 4.21 M/UL
SODIUM SERPL-SCNC: 140 MMOL/L
WBC # BLD AUTO: 6.5 K/UL

## 2017-12-08 PROCEDURE — 83970 ASSAY OF PARATHORMONE: CPT

## 2017-12-08 PROCEDURE — 36415 COLL VENOUS BLD VENIPUNCTURE: CPT | Mod: PO

## 2017-12-08 PROCEDURE — 80069 RENAL FUNCTION PANEL: CPT

## 2017-12-08 PROCEDURE — 85025 COMPLETE CBC W/AUTO DIFF WBC: CPT

## 2017-12-11 ENCOUNTER — OFFICE VISIT (OUTPATIENT)
Dept: NEPHROLOGY | Facility: CLINIC | Age: 82
End: 2017-12-11
Payer: MEDICARE

## 2017-12-11 VITALS
HEIGHT: 66 IN | BODY MASS INDEX: 28.12 KG/M2 | HEART RATE: 68 BPM | WEIGHT: 175 LBS | SYSTOLIC BLOOD PRESSURE: 124 MMHG | OXYGEN SATURATION: 94 % | DIASTOLIC BLOOD PRESSURE: 80 MMHG

## 2017-12-11 DIAGNOSIS — E55.9 VITAMIN D DEFICIENCY: ICD-10-CM

## 2017-12-11 DIAGNOSIS — E78.49 OTHER HYPERLIPIDEMIA: ICD-10-CM

## 2017-12-11 DIAGNOSIS — I10 ESSENTIAL HYPERTENSION: ICD-10-CM

## 2017-12-11 DIAGNOSIS — N18.30 CHRONIC KIDNEY DISEASE, STAGE III (MODERATE): Primary | ICD-10-CM

## 2017-12-11 PROCEDURE — 99213 OFFICE O/P EST LOW 20 MIN: CPT | Mod: PBBFAC | Performed by: INTERNAL MEDICINE

## 2017-12-11 PROCEDURE — 99999 PR PBB SHADOW E&M-EST. PATIENT-LVL III: CPT | Mod: PBBFAC,,, | Performed by: INTERNAL MEDICINE

## 2017-12-11 PROCEDURE — 99213 OFFICE O/P EST LOW 20 MIN: CPT | Mod: S$PBB,,, | Performed by: INTERNAL MEDICINE

## 2017-12-11 RX ORDER — PSYLLIUM HUSK 0.4 G
1 CAPSULE ORAL 2 TIMES DAILY
Qty: 180 CAPSULE | Refills: 3 | Status: SHIPPED | OUTPATIENT
Start: 2017-12-11 | End: 2018-07-05

## 2017-12-11 NOTE — PATIENT INSTRUCTIONS
LABS IN 6 MONTHS    PROBIOTIC AND PSYLLIUM CAP ONCE OR TWICE DAILY TO HELP REGULATE BOWELS     rtc 6 MO

## 2017-12-11 NOTE — PROGRESS NOTES
Subjective:       Patient ID: Lynn Mckinney is a 82 y.o. White female who presents for follow-up evaluation of renal function   HPI 79 yo WF with HTN, CAD, PVD, CKD stage 3 baseline serum crt 1.3-1.4 stable since 2004. No LUTS, no hematuria, no dysuria. She claims she has white coat syndrome, she usually has a bp of 130-140 /80 , she has not taken her bp at home recently. She takes her amlodipine and valsartan/HCTZ in the am and did not take them this am.  The patient has no symptoms of fatigue, shortness of breath, chest pain, peripheral edema, flank pain, dysuria, hematuria, foamy urine, orange colored urine, nephrolithiasis,  diarrhea, constipation, lower extremity leg cramping, headache, nausea and vomiting, or weakness.      Review of Systems   Constitutional: Negative for appetite change, chills, fatigue, fever and unexpected weight change.   HENT: Negative for facial swelling, hearing loss and nosebleeds.    Eyes: Negative for pain, discharge, redness and visual disturbance.   Respiratory: Negative for cough, chest tightness, shortness of breath and wheezing.    Cardiovascular: Positive for leg swelling. Negative for chest pain and palpitations.        Wears support hose, and minimal edema.   Gastrointestinal: Negative for abdominal pain, constipation, diarrhea, nausea and vomiting.   Endocrine: Negative for cold intolerance, heat intolerance and polydipsia.   Genitourinary: Negative for decreased urine volume, difficulty urinating, dysuria, flank pain, hematuria and urgency.   Musculoskeletal: Negative for arthralgias, back pain, joint swelling and myalgias.   Skin: Negative for color change, pallor, rash and wound.   Neurological: Negative for dizziness, tremors, seizures, syncope, speech difficulty, weakness and headaches.   Hematological: Negative for adenopathy. Does not bruise/bleed easily.   Psychiatric/Behavioral: Negative for agitation, behavioral problems, dysphoric mood, self-injury and sleep  "disturbance.       Objective:   Blood pressure 124/80, pulse 68, height 5' 6" (1.676 m), weight 79.4 kg (175 lb), SpO2 (!) 94 %.      Physical Exam   Constitutional: She is oriented to person, place, and time. She appears well-developed and well-nourished. No distress.   HENT:   Head: Normocephalic and atraumatic.   Mouth/Throat: No oropharyngeal exudate.   Eyes: Conjunctivae and EOM are normal. Pupils are equal, round, and reactive to light. Right eye exhibits no discharge. Left eye exhibits no discharge. No scleral icterus.   Neck: Normal range of motion. Neck supple. No JVD present. No tracheal deviation present. No thyromegaly present.   Cardiovascular: Normal rate, regular rhythm, normal heart sounds and intact distal pulses.  Exam reveals no gallop.    No murmur heard.  No peripheral edema, DP pulses not palpable, radial pulses +2 bilaterally warm digits.   Pulmonary/Chest: Effort normal and breath sounds normal. No stridor. No respiratory distress. She has no wheezes. She has no rales. She exhibits no tenderness.   Abdominal: Soft. Bowel sounds are normal. She exhibits no distension and no mass. There is no tenderness. There is no rebound and no guarding. No hernia.   Musculoskeletal: She exhibits no edema or tenderness.   Lymphadenopathy:     She has no cervical adenopathy.   Neurological: She is alert and oriented to person, place, and time. She exhibits normal muscle tone.   Skin: Skin is warm and dry. No rash noted. She is not diaphoretic. No erythema.   Psychiatric: She has a normal mood and affect. Judgment and thought content normal.   Nursing note and vitals reviewed.      Assessment:       1. Chronic kidney disease, stage 3    2. Vitamin D deficiency    3. Essential hypertension    4. Other hyperlipidemia        Plan:        81 yo WF with HTN, CAD, PVD, CKD stage 3 baseline serum crt 1.3-1.4 stable     1. CKD: stage 3 est gfr 35- 40 cc /min stable over > 15 years.   Stable,  Lab Results   Component " Value Date    CREATININE 1.4 12/08/2017     Protein Creatinine Ratios:stable    Prot/Creat Ratio, Ur   Date Value Ref Range Status   12/08/2017 0.06 0.00 - 0.20 Final   06/12/2017 0.09 0.00 - 0.20 Final   10/10/2016 0.06 0.00 - 0.20 Final       2. Acid-Base: stable  Lab Results   Component Value Date     12/08/2017    K 4.0 12/08/2017    CO2 29 12/08/2017         3. Renal osteodystrophy: last PTH stable  Bone density suggested  She has been on fosomax in past  Lab Results   Component Value Date    PTH 86.0 (H) 12/08/2017    CALCIUM 9.5 12/08/2017    PHOS 3.5 12/08/2017       4. Anemia: stable  Lab Results   Component Value Date    HGB 13.4 12/08/2017        5. HTN: stable      Medication: no change      Monitor BP as directed in instructions    7. Weight:  . she states that her daily weights  stable    Wt Readings from Last 3 Encounters:   09/06/17 79.7 kg (175 lb 11.3 oz)   07/11/17 80.7 kg (178 lb)   06/13/17 80.5 kg (177 lb 7.5 oz)       8. Lipid management: stable  Lab Results   Component Value Date    LDLCALC 58.2 (L) 09/02/2017       9. Diet:  Education/referral:       Sodium: 2 gm         11. Please avoid or minimize all NSAIDS (ibuprofen, motrin, aleve, indocin, naprosyn) to minimize the risk to your kidneys.      12. Follow up in  6 months

## 2018-01-10 ENCOUNTER — TELEPHONE (OUTPATIENT)
Dept: INTERNAL MEDICINE | Facility: CLINIC | Age: 83
End: 2018-01-10

## 2018-01-10 RX ORDER — LABETALOL 200 MG/1
200 TABLET, FILM COATED ORAL 2 TIMES DAILY
Qty: 180 TABLET | Refills: 3 | Status: SHIPPED | OUTPATIENT
Start: 2018-01-10 | End: 2019-01-05 | Stop reason: SDUPTHER

## 2018-01-10 RX ORDER — VALSARTAN AND HYDROCHLOROTHIAZIDE 320; 25 MG/1; MG/1
1 TABLET, FILM COATED ORAL DAILY
Qty: 90 TABLET | Refills: 3 | Status: SHIPPED | OUTPATIENT
Start: 2018-01-10 | End: 2018-09-26

## 2018-01-10 RX ORDER — LEVOTHYROXINE SODIUM 112 UG/1
TABLET ORAL
Qty: 90 TABLET | Refills: 3 | Status: SHIPPED | OUTPATIENT
Start: 2018-01-10 | End: 2019-01-05 | Stop reason: SDUPTHER

## 2018-01-10 RX ORDER — ROSUVASTATIN CALCIUM 20 MG/1
TABLET, COATED ORAL
Qty: 90 TABLET | Refills: 3 | Status: SHIPPED | OUTPATIENT
Start: 2018-01-10 | End: 2019-01-08 | Stop reason: SDUPTHER

## 2018-01-10 RX ORDER — AMLODIPINE BESYLATE 5 MG/1
5 TABLET ORAL DAILY
Qty: 90 TABLET | Refills: 3 | Status: SHIPPED | OUTPATIENT
Start: 2018-01-10 | End: 2019-01-05 | Stop reason: SDUPTHER

## 2018-01-10 NOTE — TELEPHONE ENCOUNTER
----- Message from [place] sent at 1/8/2018  1:33 PM CST -----  Contact: self [phone]  Patient is changing Insurance company and needs to change pharmacy to [facility] and needs guidance as to what to do next, she's also not feeling well, congested, sneezing, dry cough, please call her back at [phone].    Thank you

## 2018-01-10 NOTE — TELEPHONE ENCOUNTER
I spoke to the patient, she has no fever. She is feeling better on Benadryl which has dried the sinuses.    I sent her medications to [facility] as requested with her new insurance.

## 2018-01-22 ENCOUNTER — TELEPHONE (OUTPATIENT)
Dept: INTERNAL MEDICINE | Facility: CLINIC | Age: 83
End: 2018-01-22

## 2018-01-22 NOTE — TELEPHONE ENCOUNTER
----- Message from Jeanine Phillips sent at 1/22/2018  9:42 AM CST -----  Doctor appointment and lab have been scheduled.  Please link lab orders to the lab appointment.  Date of doctor appointment:  3/5  Physical or EP:  EP  Date of lab appointment:  2/28  Comments:        Rest, ice and elevate your left wrist.    Follow up with your doctor in 3-5 days or sooner if needed.      What Is Acute Bronchitis?  Acute or short-term bronchitis last for days or weeks. It occurs when the bronchial tubes (airways in the lungs) are irritated by a virus, bacteria, or allergen. This causes a cough that produces yellow or greenish mucus.  Inside healthy lungs    Air travels in and out of the lungs through the airways. The linings of these airways produce sticky mucus. This mucus traps particles that enter the lungs. Tiny structures called cilia then sweep the particles out of the airways.     Healthy airway: Airways are normally open. Air moves in and out easily.      Healthy cilia: Tiny, hairlike cilia sweep mucus and particles up and out of the airways.   Lungs with bronchitis  Bronchitis often occurs with a cold or the flu virus. The airways become inflamed (red and swollen). There is a deep “hacking” cough from the extra mucus. Other symptoms may include:  · Wheezing  · Chest discomfort  · Shortness of breath  · Mild fever  A second infection, this time due to bacteria, may then occur. And airways irritated by allergens or smoke are more likely to get infected.        Inflamed airway: Inflammation and extra mucus narrow the airway, causing shortness of breath.      Impaired cilia: Extra mucus impairs cilia, causing congestion and wheezing. Smoking makes the problem worse.   Making a diagnosis  A physical exam, health history, and certain tests help your healthcare provider make the diagnosis.  Health history  Your healthcare provider will ask you about your symptoms.  The exam  Your provider listens to your chest for signs of congestion. He or she may also check your ears, nose, and throat.  Possible tests  · A sputum test for bacteria. This requires a sample of mucus from the lungs.  · A nasal or throat swab for bacterial infection.  · A chest X-ray if your healthcare provider thinks you have  pneumonia.  · Tests to check for an underlying condition, such as allergies, asthma, or COPD. You may need to see a specialist for more lung function testing.  Treating a cough  The main treatment for bronchitis is easing symptoms. Avoiding smoke, allergens, and other things that trigger coughing can often help. If the infection is bacterial, you may be given antibiotics. During the illness, it's important to get plenty of sleep. To ease symptoms:  · Don’t smoke, and avoid secondhand smoke.  · Use a humidifier, or breathe in steam from a hot shower. This may help loosen mucus.  · Drink a lot of water and juice. They can soothe the throat and may help thin mucus.  · Sit up or use extra pillows when in bed to help lessen coughing and congestion.  · Ask your provider about using cough medicine, pain and fever medicine, or a decongestant.  Antibiotics  Most cases of bronchitis are caused by cold or flu viruses. Antibiotics don’t treat viral illness. Taking antibiotics when they are not needed increases your risk of getting an infection later that is antibiotic-resistant. Your provider will prescribe antibiotics if the infection is caused by bacteria. If they are prescribed:  · Take the medicine until it is used up, even if symptoms have improved. If you don’t, the bronchitis may come back.  · Take them as directed. For instance, some medicines should be taken with food.  · Ask your provider or pharmacist what side effects are common, and what to do about them.  Follow-up care  You should see your provider again in 2 to 3 weeks. By this time, symptoms should have improved. An infection that lasts longer may mean you have a more serious problem.  Prevention  · Avoid tobacco smoke. If you smoke, quit. Stay away from smoky places. Ask friends and family not to smoke around you, or in your home or car.  · Get checked for allergies.  · Ask your provider about getting a yearly flu shot, and pneumococcal or pneumonia  shots.  · Wash your hands often. This helps reduce the chance of picking up viruses that cause colds and flu.  Call your healthcare provider if:  · Symptoms worsen, or new symptoms develop.  · Breathing problems worsen or  become severe.  · Symptoms don’t get better within a week, or within 3 days of taking antibiotics.   © 8761-0931 [employer]. [address] [place] [address], [place] [address]. All rights reserved. This information is not intended as a substitute for professional medical care. Always follow your healthcare professional's instructions.            Wrist Fracture, General  You have a broken bone (fracture) in your wrist. This may be a small crack or chip in the bone. Or it may be a major break, with the broken parts pushed out of position. Wrist fractures are treated with a splint or cast. They take about 4 to 6 weeks to heal. Severe injuries may need surgery.    Home care  Follow these guidelines when caring for yourself at home:  · Keep your arm elevated to reduce pain and swelling. When sitting or lying down keep your arm above the level of your heart. You can do this by placing your arm on a pillow that rests on your chest or on a pillow at your side. This is most important during the first 2 days (48 hours) after the injury.  · Put an ice pack on the injured area. Do this for 20 minutes every 1 to 2 hours the first day for pain relief. You can make an ice pack by wrapping a plastic bag of ice cubes in a thin towel. As the ice melts, be careful that the cast or splint doesn’t get wet. Continue using the ice pack 3 to 4 times a day for the next 2 days. Then use the ice pack as needed to ease pain and swelling.  · Keep the cast or splint completely dry at all times. Bathe with your cast or splint out of the water. Protect it with a large plastic bag, rubber-banded at the top end. If a fiberglass cast or splint gets wet, you can dry it with a hair dryer.  · You may use acetaminophen or  ibuprofen to control pain, unless another pain medicine was prescribed. If you have chronic liver or kidney disease, talk with your health care provider before using these medicines. Also talk with your provider if you’ve had a stomach ulcer or GI bleeding.  · Don’t put creams or objects under the cast if you have itching.  Follow-up care  Follow up with your health care provider in 1 week, or as advised. This is to make sure the bone is healing the way it should. If a splint was put on, it will be changed to a cast during your follow-up visit. A cast may need to be changed at 2 to 3 weeks, as the swelling goes down.  If X-rays were taken, a radiologist will look at them. You will be told of any new findings that may affect your care.  When to seek medical advice  Call your health care provider right away if any of these occur:  · The plaster cast or splint becomes wet or soft  · The cast cracks  · Bad odor from the cast or wound fluid stains the cast  · The fiberglass cast or splint stays wet for more than 24 hours  · Tightness or pain under the cast or splint gets worse  · Fingers become swollen, cold, blue, numb, or tingly  · You can’t move your fingers  · Skin around cast becomes red  © 9591-0816 [employer]. [address] [place] [address], [place] [address]. All rights reserved. This information is not intended as a substitute for professional medical care. Always follow your healthcare professional's instructions.

## 2018-02-08 DIAGNOSIS — I44.1 AV BLOCK, 2ND DEGREE: ICD-10-CM

## 2018-02-08 DIAGNOSIS — Z95.0 CARDIAC PACEMAKER: Primary | ICD-10-CM

## 2018-02-09 ENCOUNTER — OFFICE VISIT (OUTPATIENT)
Dept: ELECTROPHYSIOLOGY | Facility: CLINIC | Age: 83
End: 2018-02-09
Payer: MEDICARE

## 2018-02-09 ENCOUNTER — HOSPITAL ENCOUNTER (OUTPATIENT)
Dept: CARDIOLOGY | Facility: CLINIC | Age: 83
Discharge: HOME | End: 2018-02-09
Payer: COMMERCIAL

## 2018-02-09 ENCOUNTER — HOSPITAL ENCOUNTER (OUTPATIENT)
Dept: CARDIOLOGY | Facility: CLINIC | Age: 83
Discharge: HOME | End: 2018-02-09
Payer: MEDICARE

## 2018-02-09 ENCOUNTER — CLINICAL SUPPORT (OUTPATIENT)
Dept: ELECTROPHYSIOLOGY | Facility: CLINIC | Age: 83
End: 2018-02-09
Payer: MEDICARE

## 2018-02-09 VITALS
BODY MASS INDEX: 29.37 KG/M2 | SYSTOLIC BLOOD PRESSURE: 126 MMHG | WEIGHT: 172 LBS | DIASTOLIC BLOOD PRESSURE: 64 MMHG | HEART RATE: 72 BPM | HEIGHT: 64 IN

## 2018-02-09 DIAGNOSIS — I44.1 AV BLOCK, 2ND DEGREE: ICD-10-CM

## 2018-02-09 DIAGNOSIS — N18.30 CHRONIC KIDNEY DISEASE, STAGE III (MODERATE): ICD-10-CM

## 2018-02-09 DIAGNOSIS — I25.10 CORONARY ARTERY DISEASE, ANGINA PRESENCE UNSPECIFIED, UNSPECIFIED VESSEL OR LESION TYPE, UNSPECIFIED WHETHER NATIVE OR TRANSPLANTED HEART: ICD-10-CM

## 2018-02-09 DIAGNOSIS — Z95.0 PACEMAKER: ICD-10-CM

## 2018-02-09 DIAGNOSIS — Z95.1 S/P CABG X 3: ICD-10-CM

## 2018-02-09 DIAGNOSIS — I25.10 CORONARY ARTERY DISEASE INVOLVING NATIVE CORONARY ARTERY OF NATIVE HEART WITHOUT ANGINA PECTORIS: ICD-10-CM

## 2018-02-09 DIAGNOSIS — Z95.0 CARDIAC PACEMAKER: ICD-10-CM

## 2018-02-09 DIAGNOSIS — Z95.1 S/P CABG X 3: Primary | ICD-10-CM

## 2018-02-09 DIAGNOSIS — E78.49 OTHER HYPERLIPIDEMIA: ICD-10-CM

## 2018-02-09 DIAGNOSIS — I10 ESSENTIAL HYPERTENSION: ICD-10-CM

## 2018-02-09 LAB
AORTIC VALVE REGURGITATION: NORMAL
ESTIMATED PA SYSTOLIC PRESSURE: 26.04
MITRAL VALVE REGURGITATION: NORMAL
RETIRED EF AND QEF - SEE NOTES: 55 (ref 55–65)

## 2018-02-09 PROCEDURE — 99999 PR PBB SHADOW E&M-EST. PATIENT-LVL III: CPT | Mod: PBBFAC,,, | Performed by: INTERNAL MEDICINE

## 2018-02-09 PROCEDURE — 1159F MED LIST DOCD IN RCRD: CPT | Mod: S$GLB,,, | Performed by: INTERNAL MEDICINE

## 2018-02-09 PROCEDURE — 93280 PM DEVICE PROGR EVAL DUAL: CPT | Mod: PBBFAC | Performed by: INTERNAL MEDICINE

## 2018-02-09 PROCEDURE — 1126F AMNT PAIN NOTED NONE PRSNT: CPT | Mod: S$GLB,,, | Performed by: INTERNAL MEDICINE

## 2018-02-09 PROCEDURE — 99214 OFFICE O/P EST MOD 30 MIN: CPT | Mod: S$GLB,,, | Performed by: INTERNAL MEDICINE

## 2018-02-09 PROCEDURE — 99213 OFFICE O/P EST LOW 20 MIN: CPT | Mod: PBBFAC | Performed by: INTERNAL MEDICINE

## 2018-02-09 PROCEDURE — 93306 TTE W/DOPPLER COMPLETE: CPT | Mod: PBBFAC | Performed by: INTERNAL MEDICINE

## 2018-02-09 PROCEDURE — 93005 ELECTROCARDIOGRAM TRACING: CPT | Mod: PBBFAC | Performed by: INTERNAL MEDICINE

## 2018-02-09 PROCEDURE — 93000 ELECTROCARDIOGRAM COMPLETE: CPT | Mod: S$GLB,,, | Performed by: INTERNAL MEDICINE

## 2018-02-09 NOTE — PROGRESS NOTES
Subjective:    Patient ID:  Lynn Mckinney is a 82 y.o. female who presents for evaluation of Pacemaker Check      Atrial Fibrillation   Symptoms are negative for chest pain, dizziness, palpitations, shortness of breath, syncope and weakness. Past medical history includes atrial fibrillation.   Hypertension   Pertinent negatives include no blurred vision, chest pain, malaise/fatigue, palpitations or shortness of breath.   82 y.o. F  CAD  CABG 2001   HL on meds   HTN on meds   PVD carotidStent    Progressive bradycardia over the years with Dr. Avery.  holter showed 2:1 AV block, AVWB. Also has RBBB and LAFB.  PPM placed 10/13. Feels well since. C/o PARMAR at 2 flights (stable), but exertion is limited mostly by PAD.    Today's interrogation: Paced 99% of the time. Good PPM function.    10/13 LVEF 60%; 1/2016: 50-55%. Today 55-60%  PPM: good function. NSAT, NSVT (all asx)    My interpretation of today's ECG is SR with A sense V pace 72 bpm. PVCs.    Review of Systems   Constitution: Negative. Negative for weakness and malaise/fatigue.   HENT: Negative.  Negative for ear pain and tinnitus.    Eyes: Negative for blurred vision.   Cardiovascular: Positive for dyspnea on exertion. Negative for chest pain, near-syncope, palpitations and syncope.   Respiratory: Negative.  Negative for shortness of breath.    Endocrine: Negative.  Negative for polyuria.   Hematologic/Lymphatic: Does not bruise/bleed easily.   Skin: Negative.  Negative for rash.   Musculoskeletal: Positive for joint pain. Negative for muscle weakness.   Gastrointestinal: Negative.  Negative for abdominal pain and change in bowel habit.   Genitourinary: Negative for frequency.   Neurological: Negative.  Negative for dizziness.   Psychiatric/Behavioral: Negative.  Negative for depression. The patient is not nervous/anxious.    Allergic/Immunologic: Negative for environmental allergies.        Objective:    Physical Exam   Constitutional: She is oriented to  person, place, and time. Vital signs are normal. She appears well-developed and well-nourished. She is active and cooperative.   HENT:   Head: Normocephalic and atraumatic.   Eyes: Conjunctivae and EOM are normal.   Neck: Normal range of motion. Carotid bruit is not present. No tracheal deviation and no edema present. No thyroid mass and no thyromegaly present.   Cardiovascular: Normal rate, regular rhythm, normal heart sounds, intact distal pulses and normal pulses.   No extrasystoles are present. PMI is not displaced.  Exam reveals no gallop and no friction rub.    No murmur heard.  Pulmonary/Chest: Effort normal and breath sounds normal. No respiratory distress. She has no wheezes. She has no rales.   Abdominal: Soft. Normal appearance. She exhibits no distension. There is no hepatosplenomegaly.   Musculoskeletal: Normal range of motion.   Neurological: She is alert and oriented to person, place, and time. Coordination normal.   Skin: Skin is warm and dry. No rash noted.   Psychiatric: She has a normal mood and affect. Her speech is normal and behavior is normal. Thought content normal. Cognition and memory are normal.   Nursing note and vitals reviewed.        Assessment:       1. S/P CABG x 3    2. Coronary artery disease involving native coronary artery of native heart without angina pectoris    3. Other hyperlipidemia    4. Essential hypertension    5. Chronic kidney disease, stage 3         Plan:       Doing well re: PPM  F/u 1 year with echo, or earlier prn.

## 2018-02-28 ENCOUNTER — LAB VISIT (OUTPATIENT)
Dept: LAB | Facility: HOSPITAL | Age: 83
End: 2018-02-28
Attending: INTERNAL MEDICINE
Payer: COMMERCIAL

## 2018-02-28 DIAGNOSIS — I25.10 CORONARY ARTERY DISEASE INVOLVING NATIVE CORONARY ARTERY OF NATIVE HEART WITHOUT ANGINA PECTORIS: ICD-10-CM

## 2018-02-28 DIAGNOSIS — I10 ESSENTIAL HYPERTENSION: ICD-10-CM

## 2018-02-28 DIAGNOSIS — E03.9 HYPOTHYROIDISM, UNSPECIFIED TYPE: ICD-10-CM

## 2018-02-28 LAB
ALBUMIN SERPL BCP-MCNC: 3.6 G/DL
ALP SERPL-CCNC: 51 U/L
ALT SERPL W/O P-5'-P-CCNC: 15 U/L
ANION GAP SERPL CALC-SCNC: 10 MMOL/L
AST SERPL-CCNC: 24 U/L
BASOPHILS # BLD AUTO: 0.02 K/UL
BASOPHILS NFR BLD: 0.4 %
BILIRUB SERPL-MCNC: 0.6 MG/DL
BUN SERPL-MCNC: 36 MG/DL
CALCIUM SERPL-MCNC: 9.7 MG/DL
CHLORIDE SERPL-SCNC: 104 MMOL/L
CHOLEST SERPL-MCNC: 129 MG/DL
CHOLEST/HDLC SERPL: 2.3 {RATIO}
CO2 SERPL-SCNC: 27 MMOL/L
CREAT SERPL-MCNC: 1.3 MG/DL
DIFFERENTIAL METHOD: ABNORMAL
EOSINOPHIL # BLD AUTO: 0.3 K/UL
EOSINOPHIL NFR BLD: 4.7 %
ERYTHROCYTE [DISTWIDTH] IN BLOOD BY AUTOMATED COUNT: 12.1 %
EST. GFR  (AFRICAN AMERICAN): 44.1 ML/MIN/1.73 M^2
EST. GFR  (NON AFRICAN AMERICAN): 38.3 ML/MIN/1.73 M^2
GLUCOSE SERPL-MCNC: 99 MG/DL
HCT VFR BLD AUTO: 38.3 %
HDLC SERPL-MCNC: 56 MG/DL
HDLC SERPL: 43.4 %
HGB BLD-MCNC: 12.9 G/DL
IMM GRANULOCYTES # BLD AUTO: 0.01 K/UL
IMM GRANULOCYTES NFR BLD AUTO: 0.2 %
LDLC SERPL CALC-MCNC: 58.4 MG/DL
LYMPHOCYTES # BLD AUTO: 1 K/UL
LYMPHOCYTES NFR BLD: 17.3 %
MCH RBC QN AUTO: 31 PG
MCHC RBC AUTO-ENTMCNC: 33.7 G/DL
MCV RBC AUTO: 92 FL
MONOCYTES # BLD AUTO: 0.6 K/UL
MONOCYTES NFR BLD: 10.7 %
NEUTROPHILS # BLD AUTO: 3.7 K/UL
NEUTROPHILS NFR BLD: 66.7 %
NONHDLC SERPL-MCNC: 73 MG/DL
NRBC BLD-RTO: 0 /100 WBC
PLATELET # BLD AUTO: 163 K/UL
PMV BLD AUTO: 9.5 FL
POTASSIUM SERPL-SCNC: 4.1 MMOL/L
PROT SERPL-MCNC: 7 G/DL
RBC # BLD AUTO: 4.16 M/UL
SODIUM SERPL-SCNC: 141 MMOL/L
TRIGL SERPL-MCNC: 73 MG/DL
TSH SERPL DL<=0.005 MIU/L-ACNC: 0.89 UIU/ML
WBC # BLD AUTO: 5.5 K/UL

## 2018-02-28 PROCEDURE — 85025 COMPLETE CBC W/AUTO DIFF WBC: CPT

## 2018-02-28 PROCEDURE — 80053 COMPREHEN METABOLIC PANEL: CPT

## 2018-02-28 PROCEDURE — 36415 COLL VENOUS BLD VENIPUNCTURE: CPT | Mod: PO

## 2018-02-28 PROCEDURE — 80061 LIPID PANEL: CPT

## 2018-02-28 PROCEDURE — 84443 ASSAY THYROID STIM HORMONE: CPT

## 2018-03-05 ENCOUNTER — HOSPITAL ENCOUNTER (OUTPATIENT)
Dept: RADIOLOGY | Facility: HOSPITAL | Age: 83
Discharge: HOME | End: 2018-03-05
Attending: INTERNAL MEDICINE
Payer: COMMERCIAL

## 2018-03-05 ENCOUNTER — OFFICE VISIT (OUTPATIENT)
Dept: INTERNAL MEDICINE | Facility: CLINIC | Age: 83
End: 2018-03-05
Payer: COMMERCIAL

## 2018-03-05 VITALS
SYSTOLIC BLOOD PRESSURE: 138 MMHG | BODY MASS INDEX: 29.77 KG/M2 | OXYGEN SATURATION: 94 % | DIASTOLIC BLOOD PRESSURE: 68 MMHG | HEART RATE: 68 BPM | WEIGHT: 174.38 LBS | HEIGHT: 64 IN

## 2018-03-05 DIAGNOSIS — M70.61 TROCHANTERIC BURSITIS OF RIGHT HIP: ICD-10-CM

## 2018-03-05 DIAGNOSIS — M79.604 RIGHT LEG PAIN: ICD-10-CM

## 2018-03-05 DIAGNOSIS — E55.9 VITAMIN D DEFICIENCY: ICD-10-CM

## 2018-03-05 DIAGNOSIS — I73.9 CLAUDICATION: ICD-10-CM

## 2018-03-05 DIAGNOSIS — I10 ESSENTIAL HYPERTENSION: ICD-10-CM

## 2018-03-05 DIAGNOSIS — N18.30 CHRONIC KIDNEY DISEASE, STAGE III (MODERATE): ICD-10-CM

## 2018-03-05 DIAGNOSIS — M81.0 POSTMENOPAUSAL BONE LOSS: Primary | ICD-10-CM

## 2018-03-05 DIAGNOSIS — I65.23 BILATERAL CAROTID ARTERY STENOSIS: ICD-10-CM

## 2018-03-05 DIAGNOSIS — I25.10 CORONARY ARTERY DISEASE INVOLVING NATIVE CORONARY ARTERY OF NATIVE HEART WITHOUT ANGINA PECTORIS: ICD-10-CM

## 2018-03-05 DIAGNOSIS — E78.00 PURE HYPERCHOLESTEROLEMIA: ICD-10-CM

## 2018-03-05 DIAGNOSIS — Z01.00 ENCOUNTER FOR VISION SCREENING: ICD-10-CM

## 2018-03-05 DIAGNOSIS — R26.89 BALANCE DISORDER: ICD-10-CM

## 2018-03-05 DIAGNOSIS — Z12.31 ENCOUNTER FOR SCREENING MAMMOGRAM FOR BREAST CANCER: ICD-10-CM

## 2018-03-05 DIAGNOSIS — E03.8 OTHER SPECIFIED HYPOTHYROIDISM: ICD-10-CM

## 2018-03-05 DIAGNOSIS — I73.9 PERIPHERAL ARTERIAL DISEASE: ICD-10-CM

## 2018-03-05 DIAGNOSIS — M25.551 PAIN OF RIGHT HIP JOINT: ICD-10-CM

## 2018-03-05 PROCEDURE — 73502 X-RAY EXAM HIP UNI 2-3 VIEWS: CPT | Mod: 26,RT,, | Performed by: RADIOLOGY

## 2018-03-05 PROCEDURE — 99999 PR PBB SHADOW E&M-EST. PATIENT-LVL V: CPT | Mod: PBBFAC,,, | Performed by: INTERNAL MEDICINE

## 2018-03-05 PROCEDURE — 99214 OFFICE O/P EST MOD 30 MIN: CPT | Mod: S$GLB,,, | Performed by: INTERNAL MEDICINE

## 2018-03-05 PROCEDURE — 73502 X-RAY EXAM HIP UNI 2-3 VIEWS: CPT | Mod: TC,RT

## 2018-03-05 PROCEDURE — 3075F SYST BP GE 130 - 139MM HG: CPT | Mod: S$GLB,,, | Performed by: INTERNAL MEDICINE

## 2018-03-05 PROCEDURE — 3078F DIAST BP <80 MM HG: CPT | Mod: S$GLB,,, | Performed by: INTERNAL MEDICINE

## 2018-03-05 NOTE — PROGRESS NOTES
Subjective:      Patient ID: Lynn Mckinney is a 82 y.o. female.    Chief Complaint: Follow-up    HPI:  HPI   Right leg: she points to the right buttock, right groin and lateral aspect of the thigh. She has a history of sciatic but has not had it evaluated.She has had her pulses checked for vascular disease.     She is due for bone density.    She is due for mammogram    Since the last appt:   Dr. Coleman results reviewed: follow up in one year  Dr. Manley: due around 6/2018 ( 6 months  Patient Active Problem List   Diagnosis    Hypertension    Hyperlipidemia    Peripheral arterial disease    CAD (coronary artery disease)    S/P CABG x 3    Hypothyroidism    Incontinence    Chronic kidney disease, stage 3    Vitamin D deficiency    AV block, 2nd degree    Pacemaker    Left-sided carotid artery disease    Claudication    Right hip pain    Lumbar stenosis    Thoracic or lumbosacral neuritis or radiculitis    Hip pain    Bilateral carotid artery stenosis     Past Medical History:   Diagnosis Date    Allergy     seasonal    Blood clotting tendency     Carotid artery disease 5/29/2014    Coronary artery disease     Heart block     Hyperlipidemia     Hypertension     Hypothyroid     Obesity     Pacemaker     PAD (peripheral artery disease)     Spinal stenosis     Thoracic or lumbosacral neuritis or radiculitis 11/25/2014    Tubular adenoma      Past Surgical History:   Procedure Laterality Date    ANGIOPLASTY      APPENDECTOMY      CARDIAC CATHETERIZATION      CARDIAC PACEMAKER PLACEMENT  10/10/13    by Dr. Coleman    CAROTID STENT Left 2007    by Dr. Bowen    CATARACT EXTRACTION W/  INTRAOCULAR LENS IMPLANT Bilateral 2012    Dr Sharp    CORONARY ARTERY BYPASS GRAFT  2001     x 3 LIMA-Ramus, SVG-OM1 & SVG-PDA    CYST REMOVAL      wrist    EYE SURGERY      HYSTERECTOMY      THYROIDECTOMY       Family History   Problem Relation Age of Onset    Heart disease Father      coronary  "thrombosis    Hypertension Father     Alzheimer's disease Mother     Heart disease Mother      pacemaker    Hypertension Mother     Diabetes Brother     No Known Problems Sister     No Known Problems Maternal Grandmother     No Known Problems Maternal Grandfather     No Known Problems Paternal Grandmother     No Known Problems Paternal Grandfather     Heart disease Brother     No Known Problems Maternal Aunt     No Known Problems Maternal Uncle     No Known Problems Paternal Aunt     No Known Problems Paternal Uncle     Amblyopia Neg Hx     Blindness Neg Hx     Cancer Neg Hx     Cataracts Neg Hx     Glaucoma Neg Hx     Macular degeneration Neg Hx     Retinal detachment Neg Hx     Strabismus Neg Hx     Stroke Neg Hx     Thyroid disease Neg Hx     Melanoma Neg Hx      Review of Systems   Constitutional: Negative for chills, fever and unexpected weight change.   HENT: Negative for trouble swallowing.    Respiratory: Negative for cough, shortness of breath and wheezing.    Cardiovascular: Negative for chest pain and palpitations.   Gastrointestinal: Negative for abdominal distention, abdominal pain, blood in stool and vomiting.   Musculoskeletal: Negative for back pain.     Objective:     Vitals:    03/05/18 1014   BP: 138/68   Pulse: 68   SpO2: (!) 94%   Weight: 79.1 kg (174 lb 6.1 oz)   Height: 5' 4" (1.626 m)   PainSc: 0-No pain     Body mass index is 29.93 kg/m².  Physical Exam   Constitutional: She is oriented to person, place, and time. She appears well-developed and well-nourished. No distress.   Neck: Carotid bruit is not present. No thyromegaly present.   Cardiovascular: Normal rate, regular rhythm and normal heart sounds.  PMI is not displaced.    Pulmonary/Chest: Effort normal and breath sounds normal. No respiratory distress.   Abdominal: Soft. Bowel sounds are normal. She exhibits no distension. There is no tenderness.   Musculoskeletal: She exhibits no edema.   Neurological: She " is alert and oriented to person, place, and time.   Pain over the right trochanteric burse  Assessment:     1. Postmenopausal bone loss    2. Encounter for screening mammogram for breast cancer    3. Encounter for vision screening    4. Right leg pain    5. Bilateral carotid artery stenosis    6. Coronary artery disease involving native coronary artery of native heart without angina pectoris    7. Chronic kidney disease, stage 3    8. Claudication    9. Pain of right hip joint    10. Pure hypercholesterolemia    11. Essential hypertension    12. Other specified hypothyroidism    13. Peripheral arterial disease    14. Vitamin D deficiency    15. Balance disorder    16. Trochanteric bursitis of right hip      Plan:   Lynn was seen today for follow-up.    Ordered This Visit            Ambulatory consult to Optometry         Ambulatory consult to Pain Clinic         Ambulatory consult to Physical Therapy         CBC auto differential         Comprehensive metabolic panel         DXA Bone Density Spine And Hip         Lipid panel         Mammo Digital Screening Bilat with Tomosynthesis CAD         TSH         X-Ray Hip 2 View Right             Diagnoses and all orders for this visit:    Postmenopausal bone loss  -     DXA Bone Density Spine And Hip; Future    Encounter for screening mammogram for breast cancer  -     Mammo Digital Screening Bilat with Tomosynthesis CAD; Future    Encounter for vision screening  -     Ambulatory consult to Optometry    Right leg pain    -     Ambulatory consult to Pain Clinic  -     X-Ray Hip 2 View Right; Future    Bilateral carotid artery stenosis: followed in Cards    Coronary artery disease involving native coronary artery of native heart without angina pectoris: followed in Cards on asa and statin    Chronic kidney disease, stage 3: followed in Nephrology    Claudication: stable    Pain of right hip joint: right trochanteric bursitis    Pure hypercholesterolemia: on  statin    Essential hypertension: elevated today but usually good  Other specified hypothyroidism: lab checked    Peripheral arterial disease    Vitamin D deficiency    Balance disorder  -     Ambulatory consult to Physical Therapy    Trochanteric bursitis of right hip  -     Ambulatory consult to Pain Clinic  -     X-Ray Hip 2 View Right; Future      Follow-up in about 6 months (around 9/5/2018) for Follow up.     Medication List          Accurate as of 3/5/18 10:45 AM. If you have any questions, ask your nurse or doctor.               CONTINUE taking these medications    amLODIPine 5 MG tablet  Commonly known as:  NORVASC  Take 1 tablet (5 mg total) by mouth once daily.     aspirin 81 MG Chew     BOOST ORAL     CALTRATE 600 + D ORAL     co-enzyme Q-10 30 mg capsule     ergocalciferol (vitamin D2) 400 unit Tab     fluticasone 50 mcg/actuation nasal spray  Commonly known as:  FLONASE  1 spray by Each Nare route once daily.     FLUZONE HIGH-DOSE 2017-18 (PF) 180 mcg/0.5 mL vaccine  Generic drug:  influenza     glucosamine-chondroitin 500-400 mg tablet     KRILL OIL ORAL     labetalol 200 MG tablet  Commonly known as:  NORMODYNE  Take 1 tablet (200 mg total) by mouth 2 (two) times daily.     levothyroxine 112 MCG tablet  Commonly known as:  SYNTHROID  TAKE 1 TABLET BY MOUTH EVERY DAY FOR 6 WEEKS AND 1/2 TABLET ON SUNDAY     multivitamin per tablet  Commonly known as:  THERAGRAN     nitroGLYCERIN 0.4 MG SL tablet  Commonly known as:  NITROSTAT  Place 1 tablet (0.4 mg total) under the tongue every 5 (five) minutes as needed for Chest pain (to a total of three).     psyllium husk 0.4 gram Cap  Take 1 capsule by mouth 2 (two) times daily.     rosuvastatin 20 MG tablet  Commonly known as:  CRESTOR  TAKE 1 TABLET(20 MG) BY MOUTH EVERY DAY     valsartan-hydrochlorothiazide 320-25 mg per tablet  Commonly known as:  DIOVAN-HCT  Take 1 tablet by mouth once daily.

## 2018-03-26 ENCOUNTER — OFFICE VISIT (OUTPATIENT)
Dept: PAIN MEDICINE | Facility: CLINIC | Age: 83
End: 2018-03-26
Attending: ANESTHESIOLOGY
Payer: COMMERCIAL

## 2018-03-26 VITALS — WEIGHT: 175.94 LBS | BODY MASS INDEX: 30.04 KG/M2 | HEIGHT: 64 IN

## 2018-03-26 DIAGNOSIS — M54.16 RIGHT LUMBAR RADICULITIS: Primary | ICD-10-CM

## 2018-03-26 DIAGNOSIS — M51.36 DDD (DEGENERATIVE DISC DISEASE), LUMBAR: ICD-10-CM

## 2018-03-26 DIAGNOSIS — M70.61 TROCHANTERIC BURSITIS OF RIGHT HIP: ICD-10-CM

## 2018-03-26 DIAGNOSIS — I73.9 PERIPHERAL ARTERY DISEASE: ICD-10-CM

## 2018-03-26 PROCEDURE — 99204 OFFICE O/P NEW MOD 45 MIN: CPT | Mod: S$GLB,,, | Performed by: ANESTHESIOLOGY

## 2018-03-26 RX ORDER — ATENOLOL 25 MG/1
25 TABLET ORAL 2 TIMES DAILY
COMMUNITY
End: 2018-06-25

## 2018-03-26 NOTE — LETTER
March 26, 2018      Marielena Moise MD  [address]  [facility] [address]           [facility]  [address]  [facility] [address]  Phone: [phone]  Fax: [phone]          Patient: Lynn Mckinney   MR Number: [id number]   YOB: 1935   Date of Visit: 3/26/2018       Dear Dr. Marielena Moise:    Thank you for referring Lynn Mckinney to me for evaluation. Attached you will find relevant portions of my assessment and plan of care.    If you have questions, please do not hesitate to call me. I look forward to following Lynn Mckinney along with you.    Sincerely,    Dong Ramos III, MD    Enclosure  CC:  No Recipients    If you would like to receive this communication electronically, please contact [email or web address] or [phone] to request more information on [employer] access.    For providers and/or their staff who would like to refer a patient to [facility], please contact us through our one-stop-shop provider referral line, [facility], at [phone].    If you feel you have received this communication in error or would no longer like to receive these types of communications, please e-mail [email or web address]

## 2018-03-26 NOTE — PROGRESS NOTES
Chronic Pain - New Consult    Referring Physician: Marielena Moise MD      Chief Complaint   Patient presents with    Low-back Pain        SUBJECTIVE:    Lynn Mckinney is a 83 y/o female with hx of PAD, CAD s/p CABG and AV block with pacemaker who presents to the clinic for the evaluation of low back pain. The pain started >3 years ago and symptoms have been worsening.The pain is located in the right buttock area and radiates down the lateral aspect of the right leg into the foot.  The pain is described as aching and is rated as 6/10. The pain is rated with a score of  3/10 on the BEST day and a score of 9/10 on the WORST day.  Symptoms interfere with daily activity. The pain is exacerbated by walking and prolonged standing.  The pain is mitigated by rest. The patient reports 8 hours of uninterrupted sleep per night.    Patient reports chronic urinary incontinence and subjective weakness in the right leg.    Physical Therapy: Yes, ordered but has not yet started    Pain Disability Index Review:  Last 3 PDI Scores 3/26/2018 1/21/2015   Pain Disability Index (PDI) 29 0       Pain Medications:    - Tylenol as needed    Anti-coagulants:    - ASA 81 mg daily     report: Reviewed    Imaging:    Right Hip X-ray (3/2018):    Right: No fracture dislocation bone destruction seen. There is mild DJD.    CT Lumbar (11/2014):    Results:  Grade 1 retrolisthesis is noted at L1-2.  Grade 1 anterolisthesis is noted at L3-4 and L4-5.  Remaining sagittal vertebral body alignment is appropriate.  Anterior wedging of the T12 vertebral body is noted, similar to radiograph from 6/8/2007. The   remaining vertebral body heights are well-maintained.    There is mild degenerative disc disease with the greatest level of involvement being L3-4.      T11-T12: No focal disk herniation.  No significant spinal canal stenosis or neuroforaminal narrowing.  T12-L1: No focal disk herniation.  Bilateral facet arthropathy.  No significant spinal  canal stenosis or neuroforaminal narrowing.  L1-L2: Broad-based disk bulge and bilateral facet arthropathy, which contribute to mild spinal canal stenosis.  No significant neuroforaminal narrowing is seen.  L2-L3: Broad-based disk bulge, bilateral facet arthropathy, and ligamentum flavum hypertrophy, which contribute to mild spinal canal stenosis.  No significant neuroforaminal narrowing is seen.  L3-L4: Broad-based disk bulge, bilateral facet arthropathy, ligamentum flavum hypertrophy, which contributes to moderate spinal canal stenosis.  Mild bilateral neuroforaminal narrowing is seen.  L4-L5: Broad-based disk bulge, bilateral facet arthropathy, and ligamentum flavum hypertrophy, which contributes to mild spinal canal stenosis.  No significant neuroforaminal narrowing seen.  L5-S1: Broad based disk bulge, bilateral facet arthropathy, and ligamentum flavum hypertrophy without significant spinal canal stenosis.  No significant neuroforaminal narrowing is seen.      Past Medical History:   Diagnosis Date    Allergy     seasonal    Blood clotting tendency     Carotid artery disease 5/29/2014    Coronary artery disease     Heart block     Hyperlipidemia     Hypertension     Hypothyroid     Obesity     Pacemaker     PAD (peripheral artery disease)     Spinal stenosis     Thoracic or lumbosacral neuritis or radiculitis 11/25/2014    Tubular adenoma      Past Surgical History:   Procedure Laterality Date    ANGIOPLASTY      APPENDECTOMY      CARDIAC CATHETERIZATION      CARDIAC PACEMAKER PLACEMENT  10/10/13    by Dr. Coleman    CAROTID STENT Left 2007    by Dr. Bowen    CATARACT EXTRACTION W/  INTRAOCULAR LENS IMPLANT Bilateral 2012    Dr Sharp    CORONARY ARTERY BYPASS GRAFT  2001     x 3 LIMA-Ramus, SVG-OM1 & SVG-PDA    CYST REMOVAL      wrist    EYE SURGERY      HYSTERECTOMY      THYROIDECTOMY       Social History     Social History    Marital status: Single     Spouse name: N/A    Number of  "children: N/A    Years of education: N/A     Occupational History    Not on file.     Social History Main Topics    Smoking status: Former Smoker     Quit date: 8/13/1984    Smokeless tobacco: Never Used    Alcohol use 0.6 oz/week     1 Shots of liquor per week      Comment: occ    Drug use: No    Sexual activity: Not on file      Comment: old fashion  with dinner every 6 monts      Other Topics Concern    Are You Pregnant Or Think You May Be? No    Breast-Feeding No     Social History Narrative    No narrative on file     Family History   Problem Relation Age of Onset    Heart disease Father      coronary thrombosis    Hypertension Father     Alzheimer's disease Mother     Heart disease Mother      pacemaker    Hypertension Mother     Diabetes Brother     No Known Problems Sister     No Known Problems Maternal Grandmother     No Known Problems Maternal Grandfather     No Known Problems Paternal Grandmother     No Known Problems Paternal Grandfather     Heart disease Brother     No Known Problems Maternal Aunt     No Known Problems Maternal Uncle     No Known Problems Paternal Aunt     No Known Problems Paternal Uncle     Amblyopia Neg Hx     Blindness Neg Hx     Cancer Neg Hx     Cataracts Neg Hx     Glaucoma Neg Hx     Macular degeneration Neg Hx     Retinal detachment Neg Hx     Strabismus Neg Hx     Stroke Neg Hx     Thyroid disease Neg Hx     Melanoma Neg Hx        Review of patient's allergies indicates:   Allergen Reactions    Lipitor [atorvastatin] Itching    Fosamax [alendronate]      Felt "strange"    Iodinated contrast- oral and iv dye      Dye is only to be used if absolutely needed because of kidney function    Sulfa (sulfonamide antibiotics)        Current Outpatient Prescriptions   Medication Sig    amLODIPine (NORVASC) 5 MG tablet Take 1 tablet (5 mg total) by mouth once daily.    aspirin 81 MG Chew Take 81 mg by mouth every evening. 1 Tablet, Chewable Oral " Every day    CALCIUM CARBONATE/VITAMIN D3 (CALTRATE 600 + D ORAL) Take 1 tablet by mouth once daily.    co-enzyme Q-10 30 mg capsule Take 30 mg by mouth once daily.     ergocalciferol, vitamin D2, 400 unit Tab Take 400 Units by mouth once daily.    fluticasone (FLONASE) 50 mcg/actuation nasal spray 1 spray by Each Nare route once daily.    FLUZONE HIGH-DOSE 2017-18, PF, 180 mcg/0.5 mL vaccine TO BE ADMINISTERED BY PHARMACIST FOR IMMUNIZATION    KRILL OIL ORAL Take 1 capsule by mouth once daily.    labetalol (NORMODYNE) 200 MG tablet Take 1 tablet (200 mg total) by mouth 2 (two) times daily.    LACTOSE-FREE FOOD (BOOST ORAL) Take by mouth. 2-3 days per week    levothyroxine (SYNTHROID) 112 MCG tablet TAKE 1 TABLET BY MOUTH EVERY DAY FOR 6 WEEKS AND 1/2 TABLET ON SUNDAY    multivitamin (THERAGRAN) per tablet Take by mouth. Pt taking one pill three times a week.    nitroGLYCERIN (NITROSTAT) 0.4 MG SL tablet Place 1 tablet (0.4 mg total) under the tongue every 5 (five) minutes as needed for Chest pain (to a total of three).    rosuvastatin (CRESTOR) 20 MG tablet TAKE 1 TABLET(20 MG) BY MOUTH EVERY DAY    valsartan-hydrochlorothiazide (DIOVAN-HCT) 320-25 mg per tablet Take 1 tablet by mouth once daily.    atenolol (TENORMIN) 25 MG tablet Take 25 mg by mouth 2 (two) times daily.    glucosamine-chondroitin 500-400 mg tablet Take 1 tablet by mouth once daily.     psyllium husk 0.4 gram Cap Take 1 capsule by mouth 2 (two) times daily.     No current facility-administered medications for this visit.        REVIEW OF SYSTEMS:    GENERAL:  No weight loss, malaise or fevers.  HEENT:  Negative for frequent or significant headaches.  NECK:  Negative for pain and significant neck swelling.  RESPIRATORY:  Negative for wheezing or shortness of breath.  CARDIOVASCULAR:  Negative for chest pain or palpitations. ICD in situ  GI:  Negative for abdominal discomfort, blood in stools or black stools or change in bowel  "habits.  MUSCULOSKELETAL:  See HPI.  SKIN:  Negative for lesions, rash, and itching.  PSYCH:  Negative for sleep disturbance, mood disorder and recent psychosocial stressors.  HEMATOLOGY/LYMPHOLOGY:  Negative for prolonged bleeding, bruising easily or swollen nodes.  NEURO:   No history of paralysis, seizures or tremors.  All other reviewed and negative other than HPI.    OBJECTIVE:    Ht 5' 4" (1.626 m)   Wt 79.8 kg (175 lb 14.8 oz)   BMI 30.20 kg/m²     PHYSICAL EXAMINATION:    General appearance: Well appearing, in no acute distress, alert and oriented x3.  Psych:  Mood and affect appropriate.  Skin: Skin color, texture, turgor normal, no rashes or lesions, in both upper and lower body.  Head/face:  Normocephalic, atraumatic.   Neck: No pain to palpation over the cervical paraspinous muscles. No pain with neck flexion, extension, or lateral flexion.   Cor: RRR  Pulm: Breathing unlabored.  GI:  Soft and non-tender.  Back: Straight leg raising is negative to radicular pain. No pain to palpation over the lumbar spine or paraspinous muscles.   Extremities: Peripheral joint ROM is full and pain free without obvious instability or laxity in all four extremities. No deformities, edema, or skin discoloration.   Musculoskeletal: Bilateral upper and lower extremity strength is normal and symmetric.  No atrophy or tone abnormalities are noted.  Pain to palpation over the PSIS on the right. Tenderness to palpation over the right GT bursa.  Neuro: Bilateral upper and lower extremity coordination and muscle stretch reflexes are physiologic and symmetric. No loss of sensation is noted.  Gait: Antalgic.    ASSESSMENT: 82 y.o.  female with chronic low back and right leg pain, consistent with discogenic pain and radiculitis.      1. Right lumbar radiculitis    2. DDD (degenerative disc disease), lumbar    3. Trochanteric bursitis of right hip    4. Peripheral artery disease        PLAN:     - I have stressed the importance of " physical activity and a home exercise plan to help with pain and improve health.  - Order CT of the Lumbar Spine to help evaluate possible source of pain.  Patient unable to have MRI due to icd/pacemaker.  - In the future I will consider lumbar YENIFER.  - Will contact patient with lumbar CT results.    The above plan and management options were discussed at length with patient. Patient is in agreement with the above and verbalized understanding. It will be communicated with the referring physician via electronic record, fax, or mail.    Dong Ramos III  03/26/2018

## 2018-04-02 ENCOUNTER — HOSPITAL ENCOUNTER (OUTPATIENT)
Dept: RADIOLOGY | Facility: HOSPITAL | Age: 83
Discharge: HOME | End: 2018-04-02
Attending: INTERNAL MEDICINE
Payer: MEDICARE

## 2018-04-02 DIAGNOSIS — M81.0 POSTMENOPAUSAL BONE LOSS: ICD-10-CM

## 2018-04-02 DIAGNOSIS — Z12.31 ENCOUNTER FOR SCREENING MAMMOGRAM FOR BREAST CANCER: ICD-10-CM

## 2018-04-02 PROCEDURE — 77067 SCR MAMMO BI INCL CAD: CPT | Mod: TC,PO

## 2018-04-02 PROCEDURE — 77080 DXA BONE DENSITY AXIAL: CPT | Mod: TC,PO

## 2018-04-04 ENCOUNTER — HOSPITAL ENCOUNTER (OUTPATIENT)
Dept: RADIOLOGY | Facility: HOSPITAL | Age: 83
Discharge: HOME | End: 2018-04-04
Attending: ANESTHESIOLOGY
Payer: COMMERCIAL

## 2018-04-04 DIAGNOSIS — M51.36 DDD (DEGENERATIVE DISC DISEASE), LUMBAR: ICD-10-CM

## 2018-04-04 DIAGNOSIS — M54.16 RIGHT LUMBAR RADICULITIS: ICD-10-CM

## 2018-04-04 PROCEDURE — 72131 CT LUMBAR SPINE W/O DYE: CPT | Mod: 26,,, | Performed by: RADIOLOGY

## 2018-04-04 PROCEDURE — 72131 CT LUMBAR SPINE W/O DYE: CPT | Mod: TC

## 2018-04-09 ENCOUNTER — OFFICE VISIT (OUTPATIENT)
Dept: OPTOMETRY | Facility: CLINIC | Age: 83
End: 2018-04-09
Payer: MEDICARE

## 2018-04-09 DIAGNOSIS — H35.363 MACULAR DRUSEN, BILATERAL: Primary | ICD-10-CM

## 2018-04-09 DIAGNOSIS — Z96.1 PSEUDOPHAKIA OF BOTH EYES: ICD-10-CM

## 2018-04-09 PROCEDURE — 99999 PR PBB SHADOW E&M-EST. PATIENT-LVL II: CPT | Mod: PBBFAC,,, | Performed by: OPTOMETRIST

## 2018-04-09 PROCEDURE — 92014 COMPRE OPH EXAM EST PT 1/>: CPT | Mod: S$GLB,,, | Performed by: OPTOMETRIST

## 2018-04-13 ENCOUNTER — TELEPHONE (OUTPATIENT)
Dept: PAIN MEDICINE | Facility: CLINIC | Age: 83
End: 2018-04-13

## 2018-04-16 ENCOUNTER — TELEPHONE (OUTPATIENT)
Dept: PAIN MEDICINE | Facility: CLINIC | Age: 83
End: 2018-04-16

## 2018-04-17 DIAGNOSIS — M54.16 LUMBAR RADICULITIS: Primary | ICD-10-CM

## 2018-04-18 ENCOUNTER — TELEPHONE (OUTPATIENT)
Dept: PAIN MEDICINE | Facility: CLINIC | Age: 83
End: 2018-04-18

## 2018-04-18 NOTE — TELEPHONE ENCOUNTER
R/t pt's phone call and went over arrival time and procedure time again. Went over procedure instructions and answered all questions.  Understanding verbalized.

## 2018-04-18 NOTE — TELEPHONE ENCOUNTER
----- Message from Zuleika Gaspar sent at 4/17/2018  5:24 PM CDT -----  Contact: Pt can be reached at [phone]  Pt is calling to get procedure times and instructions.      Please give pt a call back.      Thank you!

## 2018-04-20 ENCOUNTER — HOSPITAL ENCOUNTER (OUTPATIENT)
Facility: HOSPITAL | Age: 83
Discharge: HOME | End: 2018-04-20
Attending: ANESTHESIOLOGY | Admitting: ANESTHESIOLOGY
Payer: MEDICARE

## 2018-04-20 VITALS
BODY MASS INDEX: 28.99 KG/M2 | TEMPERATURE: 98 F | RESPIRATION RATE: 20 BRPM | DIASTOLIC BLOOD PRESSURE: 62 MMHG | OXYGEN SATURATION: 95 % | HEIGHT: 65 IN | HEART RATE: 64 BPM | WEIGHT: 174 LBS | SYSTOLIC BLOOD PRESSURE: 146 MMHG

## 2018-04-20 DIAGNOSIS — M54.16 LUMBAR RADICULITIS: Primary | ICD-10-CM

## 2018-04-20 PROCEDURE — 25000003 PHARM REV CODE 250

## 2018-04-20 PROCEDURE — 63600175 PHARM REV CODE 636 W HCPCS

## 2018-04-20 PROCEDURE — 99152 MOD SED SAME PHYS/QHP 5/>YRS: CPT | Mod: ,,, | Performed by: ANESTHESIOLOGY

## 2018-04-20 PROCEDURE — 64484 NJX AA&/STRD TFRM EPI L/S EA: CPT | Mod: RT,,, | Performed by: ANESTHESIOLOGY

## 2018-04-20 PROCEDURE — 64483 NJX AA&/STRD TFRM EPI L/S 1: CPT | Mod: RT,,, | Performed by: ANESTHESIOLOGY

## 2018-04-20 PROCEDURE — 64484 NJX AA&/STRD TFRM EPI L/S EA: CPT | Mod: RT

## 2018-04-20 NOTE — DISCHARGE INSTRUCTIONS

## 2018-04-20 NOTE — PLAN OF CARE
Problem: Patient Care Overview  Goal: Plan of Care Review  Outcome: Ongoing (interventions implemented as appropriate)  Received report from ТАТЬЯНА Galloway. Patient s/p injection, AAOx3. VSS, no c/o pain or discomfort at this time, resp even and unlabored. Bandaid dressing to  Lower back is CDI. No active bleeding. No hematoma noted. Post procedure protocol reviewed with patient and patient's family. Understanding verbalized. Family members at bedside. Nurse call bell within reach. Will continue to monitor per post procedure protocol.

## 2018-04-20 NOTE — INTERVAL H&P NOTE
The patient has been examined and the H&P has been reviewed:    I concur with the findings and no changes have occurred since H&P was written.    Anesthesia/Surgery risks, benefits and alternative options discussed and understood by patient/family.          Active Hospital Problems    Diagnosis  POA    Lumbar radiculitis [M54.16]  Yes      Resolved Hospital Problems    Diagnosis Date Resolved POA   No resolved problems to display.

## 2018-04-20 NOTE — DISCHARGE SUMMARY
Discharge Note  Short Stay      SUMMARY     Admit Date: 4/20/2018    Attending Physician: Dong Ramos III      Discharge Physician: Dong Ramos III      Discharge Date: 4/20/2018     Final Diagnosis:   1. Lumbar radiculitis        Disposition: Home or self care    Patient Instructions:   Current Discharge Medication List      CONTINUE these medications which have NOT CHANGED    Details   amLODIPine (NORVASC) 5 MG tablet Take 1 tablet (5 mg total) by mouth once daily.  Qty: 90 tablet, Refills: 3      aspirin 81 MG Chew Take 81 mg by mouth every evening. 1 Tablet, Chewable Oral Every day      atenolol (TENORMIN) 25 MG tablet Take 25 mg by mouth 2 (two) times daily.      CALCIUM CARBONATE/VITAMIN D3 (CALTRATE 600 + D ORAL) Take 1 tablet by mouth once daily.      co-enzyme Q-10 30 mg capsule Take 30 mg by mouth once daily.       ergocalciferol, vitamin D2, 400 unit Tab Take 400 Units by mouth once daily.      glucosamine-chondroitin 500-400 mg tablet Take 1 tablet by mouth once daily.       KRILL OIL ORAL Take 1 capsule by mouth once daily.      labetalol (NORMODYNE) 200 MG tablet Take 1 tablet (200 mg total) by mouth 2 (two) times daily.  Qty: 180 tablet, Refills: 3      levothyroxine (SYNTHROID) 112 MCG tablet TAKE 1 TABLET BY MOUTH EVERY DAY FOR 6 WEEKS AND 1/2 TABLET ON SUNDAY  Qty: 90 tablet, Refills: 3      multivitamin (THERAGRAN) per tablet Take by mouth. Pt taking one pill three times a week.      rosuvastatin (CRESTOR) 20 MG tablet TAKE 1 TABLET(20 MG) BY MOUTH EVERY DAY  Qty: 90 tablet, Refills: 3      valsartan-hydrochlorothiazide (DIOVAN-HCT) 320-25 mg per tablet Take 1 tablet by mouth once daily.  Qty: 90 tablet, Refills: 3      fluticasone (FLONASE) 50 mcg/actuation nasal spray 1 spray by Each Nare route once daily.  Qty: 1 Bottle, Refills: 12      LACTOSE-FREE FOOD (BOOST ORAL) Take by mouth. 2-3 days per week      nitroGLYCERIN (NITROSTAT) 0.4 MG SL tablet Place 1 tablet (0.4 mg  total) under the tongue every 5 (five) minutes as needed for Chest pain (to a total of three).  Qty: 100 tablet, Refills: 3    Comments: 25 tablets x4 bottles bottles      psyllium husk 0.4 gram Cap Take 1 capsule by mouth 2 (two) times daily.  Qty: 180 capsule, Refills: 3         STOP taking these medications       FLUZONE HIGH-DOSE 2017-18, PF, 180 mcg/0.5 mL vaccine Comments:   Reason for Stopping:                   Discharge Diagnosis: Same as Pre and Post Procedure  Condition on Discharge: Stable.  Diet on Discharge: Same as before.  Activity: as per instruction sheet.  Discharge to: Home with a responsible adult.  Follow up: as per Discharge instructions.

## 2018-04-20 NOTE — OP NOTE
Patient Name: Lynn Mckinney  MRN: [id number]    INFORMED CONSENT: The procedure, risks, benefits and options were discussed with patient. There are no contraindications to the procedure. The patient expressed understanding and agreed to proceed. The personnel performing the procedure was discussed. I verify that I personally obtained the patient's consent prior to the start of the procedure and the signed consent can be found on the patient's chart.    PROCEDURE: Right L4 AND L5 TRANSFORAMINAL EPIDURAL STEROID INJECTION    Procedure Date: 4/20/2018  Surgeon(s) and Role:     * Dong Ramos III, MD - Primary  Anesthesia: RN IV Sedation  Procedure(s) (LRB):  LUMBAR INJECTION-STEROID-EPIDURAL-TRANSFORAMINAL L4 & L5 (Right)    Pre Procedure diagnosis:   Right Lumbar radiculitis   Degenerative lumbar spinal stenosis    Post-Procedure diagnosis: SAME    Sedation: Yes - Fentanyl 50 mcg and Midazolam 1 mg IV    DESCRIPTION OF PROCEDURE: The patient was brought to the procedure room. IV access was obtained prior to the procedure. The patient was positioned prone on the fluoroscopy table. Continuous hemodynamic monitoring was initiated including blood pressure, EKG, and pulse oximetry.  The skin was prepped with chlorhexidine three times and draped in a sterile fashion. Skin anesthesia was achieved using 5 mL of lidocaine 1% over the respective injection sites.     An oblique fluoroscopic view was obtained, with the superior articular process of the inferior vertebral body aligned with the pedicle. The tip of a 22-gauge 5-inch Quincke-type spinal needle was advanced toward the 6 oclock position of the pedicle under intermittent fluoroscopic guidance. Confirmation of proper needle position was made with AP, oblique, and lateral fluoroscopic views. Negative aspiration for blood or CSF was confirmed. 0.75 mL of Omnipaque 300 was injected at each level. Live fluoroscopic imaging revealed a clear outline of the spinal nerve  with proximal spread of agent through the neural foramen into the anterior epidural space. A total combination of 3 mL of Lidocaine 0.5% and 80 mg Depo-Medrol was injected (2 mL at each level). The needles were removed and bleeding was nil.  A sterile dressing was applied. Lynn was taken back to the recovery room for further observation.     Blood Loss: Nil

## 2018-04-23 ENCOUNTER — TELEPHONE (OUTPATIENT)
Dept: CARDIOLOGY | Facility: CLINIC | Age: 83
End: 2018-04-23

## 2018-04-23 DIAGNOSIS — I10 ESSENTIAL HYPERTENSION: ICD-10-CM

## 2018-04-23 DIAGNOSIS — E78.00 PURE HYPERCHOLESTEROLEMIA: Primary | ICD-10-CM

## 2018-05-14 ENCOUNTER — OFFICE VISIT (OUTPATIENT)
Dept: PAIN MEDICINE | Facility: CLINIC | Age: 83
End: 2018-05-14
Attending: ANESTHESIOLOGY
Payer: MEDICARE

## 2018-05-14 VITALS
SYSTOLIC BLOOD PRESSURE: 105 MMHG | WEIGHT: 172.81 LBS | HEIGHT: 65 IN | HEART RATE: 72 BPM | DIASTOLIC BLOOD PRESSURE: 70 MMHG | BODY MASS INDEX: 28.79 KG/M2

## 2018-05-14 DIAGNOSIS — M54.16 RIGHT LUMBAR RADICULITIS: Primary | ICD-10-CM

## 2018-05-14 DIAGNOSIS — M51.36 DDD (DEGENERATIVE DISC DISEASE), LUMBAR: ICD-10-CM

## 2018-05-14 PROCEDURE — 3078F DIAST BP <80 MM HG: CPT | Mod: CPTII,S$GLB,, | Performed by: ANESTHESIOLOGY

## 2018-05-14 PROCEDURE — 3074F SYST BP LT 130 MM HG: CPT | Mod: CPTII,S$GLB,, | Performed by: ANESTHESIOLOGY

## 2018-05-14 PROCEDURE — 99213 OFFICE O/P EST LOW 20 MIN: CPT | Mod: S$GLB,,, | Performed by: ANESTHESIOLOGY

## 2018-05-14 NOTE — PROGRESS NOTES
Chronic Pain - Established    INTERVAL HISTORY (05/14/2018):    Lynn Mckinney presents to the clinic today for a follow-up appointment for low back and right leg pain. Since the last visit, the pain has been improving. The patient reports >60% pain relief after Right L4 and L5 TESI which continues. She reports complete resolution of her radicular pain since the procedure. Current pain intensity is 0/10.      SUBJECTIVE:    Lynn Mckinney is a 81 y/o female with hx of PAD, CAD s/p CABG and AV block with pacemaker who presents to the clinic for the evaluation of low back pain. The pain started >3 years ago and symptoms have been worsening.The pain is located in the right buttock area and radiates down the lateral aspect of the right leg into the foot.  The pain is described as aching and is rated as 6/10. The pain is rated with a score of  3/10 on the BEST day and a score of 9/10 on the WORST day.  Symptoms interfere with daily activity. The pain is exacerbated by walking and prolonged standing.  The pain is mitigated by rest. The patient reports 8 hours of uninterrupted sleep per night.    Patient reports chronic urinary incontinence and subjective weakness in the right leg.    Physical Therapy: Yes, ordered but has not yet started    Pain Disability Index Review:  Last 3 PDI Scores 5/14/2018 3/26/2018 1/21/2015   Pain Disability Index (PDI) 8 29 0       Pain Medications:    - Tylenol as needed    Anti-coagulants:    - ASA 81 mg daily     report: Reviewed    Imaging:    CT Lumbar (4/2018):    FINDINGS:  Spinal Alignment: There is grade 1 retrolisthesis of L1 on L2 as well as grade 1 anterolisthesis of L4 on L5.  Lumbar spine maintains appropriate lordotic curvature.    Vertebrae: Well maintained without evidence for acute fracture.    Discs: There is multilevel degenerative disc disease with disc space narrowing most prominent at L1-2 and L2-3.    Degenerative findings:    *T12-L1: No significant spinal canal  stenosis or neural foraminal narrowing.  *L1-L2: Mild bilateral facet hypertrophy without significant spinal canal stenosis or neural foraminal narrowing.  *L2-L3: Mild bilateral facet hypertrophy as well as circumferential disc bulge and ligamentum flavum thickening results in moderate central spinal canal stenosis.  No significant neural foraminal narrowing.  *L3-L4: Moderate bilateral facet hypertrophy along with circumferential disc bulge and ligamentum flavum thickening results in moderate to severe central spinal canal stenosis as well as mild bilateral neural foraminal narrowing.  *L4-L5: Severe bilateral facet hypertrophy, ligamentum flavum thickening, and circumferential disc bulge results in moderate central spinal canal stenosis.  No significant neural foraminal narrowing.  *L5-S1: Circumferential disc bulge as well as moderate bilateral facet hypertrophy without significant central spinal canal stenosis or neural foraminal narrowing.  Paraspinal muscles & soft tissues: Normal.    Partially visualized intraabdominal/ intrapelvic structures: Low-attenuation structure extending exophytically from the lower pole right kidney most consistent with simple renal cysts.  There is extensive calcific atherosclerosis throughout the abdominal aorta with postsurgical change of aorta bi-iliac stent graft.    Right Hip X-ray (3/2018):    Right: No fracture dislocation bone destruction seen. There is mild DJD.    CT Lumbar (11/2014):    Results:  Grade 1 retrolisthesis is noted at L1-2.  Grade 1 anterolisthesis is noted at L3-4 and L4-5.  Remaining sagittal vertebral body alignment is appropriate.  Anterior wedging of the T12 vertebral body is noted, similar to radiograph from 6/8/2007. The   remaining vertebral body heights are well-maintained.    There is mild degenerative disc disease with the greatest level of involvement being L3-4.      T11-T12: No focal disk herniation.  No significant spinal canal stenosis or  neuroforaminal narrowing.  T12-L1: No focal disk herniation.  Bilateral facet arthropathy.  No significant spinal canal stenosis or neuroforaminal narrowing.  L1-L2: Broad-based disk bulge and bilateral facet arthropathy, which contribute to mild spinal canal stenosis.  No significant neuroforaminal narrowing is seen.  L2-L3: Broad-based disk bulge, bilateral facet arthropathy, and ligamentum flavum hypertrophy, which contribute to mild spinal canal stenosis.  No significant neuroforaminal narrowing is seen.  L3-L4: Broad-based disk bulge, bilateral facet arthropathy, ligamentum flavum hypertrophy, which contributes to moderate spinal canal stenosis.  Mild bilateral neuroforaminal narrowing is seen.  L4-L5: Broad-based disk bulge, bilateral facet arthropathy, and ligamentum flavum hypertrophy, which contributes to mild spinal canal stenosis.  No significant neuroforaminal narrowing seen.  L5-S1: Broad based disk bulge, bilateral facet arthropathy, and ligamentum flavum hypertrophy without significant spinal canal stenosis.  No significant neuroforaminal narrowing is seen.      Past Medical History:   Diagnosis Date    Allergy     seasonal    Blood clotting tendency     Carotid artery disease 5/29/2014    Coronary artery disease     Heart block     Hyperlipidemia     Hypertension     Hypothyroid     Obesity     Pacemaker     PAD (peripheral artery disease)     Spinal stenosis     Thoracic or lumbosacral neuritis or radiculitis 11/25/2014    Tubular adenoma      Past Surgical History:   Procedure Laterality Date    ANGIOPLASTY      APPENDECTOMY      BREAST CYST ASPIRATION Bilateral     CARDIAC CATHETERIZATION      CARDIAC PACEMAKER PLACEMENT  10/10/13    by Dr. Coleman    CAROTID STENT Left 2007    by Dr. Bowen    CATARACT EXTRACTION W/  INTRAOCULAR LENS IMPLANT Bilateral 2012    Dr Sharp    CORONARY ARTERY BYPASS GRAFT  2001     x 3 LIMA-Ramus, SVG-OM1 & SVG-PDA    CYST REMOVAL      wrist    EYE  "SURGERY      HYSTERECTOMY      OOPHORECTOMY      THYROIDECTOMY       Social History     Social History    Marital status: Single     Spouse name: N/A    Number of children: N/A    Years of education: N/A     Occupational History    Not on file.     Social History Main Topics    Smoking status: Former Smoker     Quit date: 8/13/1984    Smokeless tobacco: Never Used    Alcohol use 0.6 oz/week     1 Shots of liquor per week      Comment: occ    Drug use: No    Sexual activity: Not on file      Comment: old fashion  with dinner every 6 monts      Other Topics Concern    Are You Pregnant Or Think You May Be? No    Breast-Feeding No     Social History Narrative    No narrative on file     Family History   Problem Relation Age of Onset    Heart disease Father         coronary thrombosis    Hypertension Father     Alzheimer's disease Mother     Heart disease Mother         pacemaker    Hypertension Mother     Diabetes Brother     No Known Problems Sister     No Known Problems Maternal Grandmother     No Known Problems Maternal Grandfather     No Known Problems Paternal Grandmother     No Known Problems Paternal Grandfather     Heart disease Brother     No Known Problems Maternal Aunt     No Known Problems Maternal Uncle     No Known Problems Paternal Aunt     No Known Problems Paternal Uncle     Amblyopia Neg Hx     Blindness Neg Hx     Cancer Neg Hx     Cataracts Neg Hx     Glaucoma Neg Hx     Macular degeneration Neg Hx     Retinal detachment Neg Hx     Strabismus Neg Hx     Stroke Neg Hx     Thyroid disease Neg Hx     Melanoma Neg Hx        Review of patient's allergies indicates:   Allergen Reactions    Lipitor [atorvastatin] Itching    Fosamax [alendronate]      Felt "strange"    Iodinated contrast- oral and iv dye      Dye is only to be used if absolutely needed because of kidney function    Sulfa (sulfonamide antibiotics)        Current Outpatient Prescriptions "   Medication Sig    amLODIPine (NORVASC) 5 MG tablet Take 1 tablet (5 mg total) by mouth once daily.    aspirin 81 MG Chew Take 81 mg by mouth every evening. 1 Tablet, Chewable Oral Every day    atenolol (TENORMIN) 25 MG tablet Take 25 mg by mouth 2 (two) times daily.    CALCIUM CARBONATE/VITAMIN D3 (CALTRATE 600 + D ORAL) Take 1 tablet by mouth once daily.    co-enzyme Q-10 30 mg capsule Take 30 mg by mouth once daily.     fluticasone (FLONASE) 50 mcg/actuation nasal spray 1 spray by Each Nare route once daily.    KRILL OIL ORAL Take 1 capsule by mouth once daily.    labetalol (NORMODYNE) 200 MG tablet Take 1 tablet (200 mg total) by mouth 2 (two) times daily.    LACTOSE-FREE FOOD (BOOST ORAL) Take by mouth. 2-3 days per week    levothyroxine (SYNTHROID) 112 MCG tablet TAKE 1 TABLET BY MOUTH EVERY DAY FOR 6 WEEKS AND 1/2 TABLET ON SUNDAY    multivitamin (THERAGRAN) per tablet Take by mouth. Pt taking one pill three times a week.    nitroGLYCERIN (NITROSTAT) 0.4 MG SL tablet Place 1 tablet (0.4 mg total) under the tongue every 5 (five) minutes as needed for Chest pain (to a total of three).    rosuvastatin (CRESTOR) 20 MG tablet TAKE 1 TABLET(20 MG) BY MOUTH EVERY DAY    valsartan-hydrochlorothiazide (DIOVAN-HCT) 320-25 mg per tablet Take 1 tablet by mouth once daily.    ergocalciferol, vitamin D2, 400 unit Tab Take 400 Units by mouth once daily.    glucosamine-chondroitin 500-400 mg tablet Take 1 tablet by mouth once daily.     psyllium husk 0.4 gram Cap Take 1 capsule by mouth 2 (two) times daily.     No current facility-administered medications for this visit.        REVIEW OF SYSTEMS:    GENERAL:  No weight loss, malaise or fevers.  HEENT:  Negative for frequent or significant headaches.  NECK:  Negative for pain and significant neck swelling.  RESPIRATORY:  Negative for wheezing or shortness of breath.  CARDIOVASCULAR:  Negative for chest pain or palpitations. ICD in situ  GI:  Negative for  "abdominal discomfort, blood in stools or black stools or change in bowel habits.  MUSCULOSKELETAL:  See HPI.  SKIN:  Negative for lesions, rash, and itching.  PSYCH:  Negative for sleep disturbance, mood disorder and recent psychosocial stressors.  HEMATOLOGY/LYMPHOLOGY:  Negative for prolonged bleeding, bruising easily or swollen nodes.  NEURO:   No history of paralysis, seizures or tremors.  All other reviewed and negative other than HPI.    OBJECTIVE:    /70 (BP Location: Left arm, Patient Position: Sitting)   Pulse 72   Ht 5' 4.5" (1.638 m)   Wt 78.4 kg (172 lb 13.5 oz)   BMI 29.21 kg/m²     PHYSICAL EXAMINATION:    General appearance: Well appearing, in no acute distress, alert and oriented x3.  Psych:  Mood and affect appropriate.  Skin: Skin color, texture, turgor normal, no rashes or lesions, in both upper and lower body.  Head/face:  Normocephalic, atraumatic.   Cor: RRR  Pulm: Breathing unlabored.  GI:  Soft and non-tender.  Back: No pain to palpation over the lumbar spine or paraspinous muscles.   Extremities: Peripheral joint ROM is full and pain free without obvious instability or laxity in all four extremities. No deformities, edema, or skin discoloration.   Musculoskeletal: Bilateral upper and lower extremity strength is normal and symmetric.  No atrophy or tone abnormalities are noted.    Neuro: No loss of sensation is noted.  Gait: Antalgic.    ASSESSMENT: 82 y.o.  female with chronic low back and right leg pain which is improving after right L4 and L5 TESI.    1. Right lumbar radiculitis    2. DDD (degenerative disc disease), lumbar        PLAN:     - I have stressed the importance of physical activity and a home exercise plan to help with pain and improve health.  - OK to proceed with physical therapy.  - Will repeat procedure in the future as needed.    The above plan and management options were discussed at length with patient. Patient is in agreement with the above and verbalized " understanding. It will be communicated with the referring physician via electronic record, fax, or mail.    Dong Ramos III  05/14/2018

## 2018-06-04 ENCOUNTER — LAB VISIT (OUTPATIENT)
Dept: LAB | Facility: HOSPITAL | Age: 83
End: 2018-06-04
Attending: INTERNAL MEDICINE
Payer: MEDICARE

## 2018-06-04 DIAGNOSIS — I10 ESSENTIAL HYPERTENSION: ICD-10-CM

## 2018-06-04 DIAGNOSIS — E55.9 VITAMIN D DEFICIENCY: ICD-10-CM

## 2018-06-04 DIAGNOSIS — E78.49 OTHER HYPERLIPIDEMIA: ICD-10-CM

## 2018-06-04 DIAGNOSIS — N18.30 CHRONIC KIDNEY DISEASE, STAGE III (MODERATE): ICD-10-CM

## 2018-06-04 LAB
25(OH)D3+25(OH)D2 SERPL-MCNC: 32 NG/ML
ALBUMIN SERPL BCP-MCNC: 3.6 G/DL
ANION GAP SERPL CALC-SCNC: 10 MMOL/L
BASOPHILS # BLD AUTO: 0.02 K/UL
BASOPHILS NFR BLD: 0.3 %
BUN SERPL-MCNC: 25 MG/DL
CALCIUM SERPL-MCNC: 9.8 MG/DL
CHLORIDE SERPL-SCNC: 103 MMOL/L
CO2 SERPL-SCNC: 26 MMOL/L
CREAT SERPL-MCNC: 1.3 MG/DL
DIFFERENTIAL METHOD: ABNORMAL
EOSINOPHIL # BLD AUTO: 0.2 K/UL
EOSINOPHIL NFR BLD: 3 %
ERYTHROCYTE [DISTWIDTH] IN BLOOD BY AUTOMATED COUNT: 12.2 %
EST. GFR  (AFRICAN AMERICAN): 44.1 ML/MIN/1.73 M^2
EST. GFR  (NON AFRICAN AMERICAN): 38.3 ML/MIN/1.73 M^2
GLUCOSE SERPL-MCNC: 109 MG/DL
HCT VFR BLD AUTO: 36.6 %
HGB BLD-MCNC: 12.5 G/DL
IMM GRANULOCYTES # BLD AUTO: 0.01 K/UL
IMM GRANULOCYTES NFR BLD AUTO: 0.2 %
LYMPHOCYTES # BLD AUTO: 0.9 K/UL
LYMPHOCYTES NFR BLD: 13.4 %
MCH RBC QN AUTO: 31.7 PG
MCHC RBC AUTO-ENTMCNC: 34.2 G/DL
MCV RBC AUTO: 93 FL
MONOCYTES # BLD AUTO: 0.8 K/UL
MONOCYTES NFR BLD: 11.6 %
NEUTROPHILS # BLD AUTO: 4.7 K/UL
NEUTROPHILS NFR BLD: 71.5 %
NRBC BLD-RTO: 0 /100 WBC
PHOSPHATE SERPL-MCNC: 3.2 MG/DL
PLATELET # BLD AUTO: 192 K/UL
PMV BLD AUTO: 9.4 FL
POTASSIUM SERPL-SCNC: 3.7 MMOL/L
PTH-INTACT SERPL-MCNC: 45 PG/ML
RBC # BLD AUTO: 3.94 M/UL
SODIUM SERPL-SCNC: 139 MMOL/L
WBC # BLD AUTO: 6.56 K/UL

## 2018-06-04 PROCEDURE — 85025 COMPLETE CBC W/AUTO DIFF WBC: CPT

## 2018-06-04 PROCEDURE — 36415 COLL VENOUS BLD VENIPUNCTURE: CPT | Mod: PO

## 2018-06-04 PROCEDURE — 82306 VITAMIN D 25 HYDROXY: CPT

## 2018-06-04 PROCEDURE — 83970 ASSAY OF PARATHORMONE: CPT

## 2018-06-04 PROCEDURE — 80069 RENAL FUNCTION PANEL: CPT

## 2018-06-25 ENCOUNTER — TELEPHONE (OUTPATIENT)
Dept: VASCULAR SURGERY | Facility: CLINIC | Age: 83
End: 2018-06-25

## 2018-06-25 ENCOUNTER — OFFICE VISIT (OUTPATIENT)
Dept: NEPHROLOGY | Facility: CLINIC | Age: 83
End: 2018-06-25
Payer: MEDICARE

## 2018-06-25 VITALS
DIASTOLIC BLOOD PRESSURE: 84 MMHG | OXYGEN SATURATION: 98 % | HEART RATE: 78 BPM | BODY MASS INDEX: 27.49 KG/M2 | SYSTOLIC BLOOD PRESSURE: 140 MMHG | HEIGHT: 65 IN | WEIGHT: 165 LBS

## 2018-06-25 DIAGNOSIS — I65.23 BILATERAL CAROTID ARTERY OCCLUSION: Primary | ICD-10-CM

## 2018-06-25 DIAGNOSIS — N18.30 CKD (CHRONIC KIDNEY DISEASE) STAGE 3, GFR 30-59 ML/MIN: ICD-10-CM

## 2018-06-25 DIAGNOSIS — I10 HYPERTENSION, UNSPECIFIED TYPE: Primary | ICD-10-CM

## 2018-06-25 DIAGNOSIS — E55.9 VITAMIN D DEFICIENCY: ICD-10-CM

## 2018-06-25 DIAGNOSIS — E78.49 OTHER HYPERLIPIDEMIA: ICD-10-CM

## 2018-06-25 PROCEDURE — 99213 OFFICE O/P EST LOW 20 MIN: CPT | Mod: S$GLB,,, | Performed by: INTERNAL MEDICINE

## 2018-06-25 PROCEDURE — 3079F DIAST BP 80-89 MM HG: CPT | Mod: CPTII,S$GLB,, | Performed by: INTERNAL MEDICINE

## 2018-06-25 PROCEDURE — 3077F SYST BP >= 140 MM HG: CPT | Mod: CPTII,S$GLB,, | Performed by: INTERNAL MEDICINE

## 2018-06-25 PROCEDURE — 99999 PR PBB SHADOW E&M-EST. PATIENT-LVL III: CPT | Mod: PBBFAC,,, | Performed by: INTERNAL MEDICINE

## 2018-06-25 RX ORDER — HYDROCODONE BITARTRATE AND HOMATROPINE METHYLBROMIDE ORAL SOLUTION 5; 1.5 MG/5ML; MG/5ML
LIQUID ORAL
Refills: 0 | COMMUNITY
Start: 2018-06-13 | End: 2018-07-05

## 2018-06-25 RX ORDER — AZITHROMYCIN 250 MG/1
TABLET, FILM COATED ORAL
Refills: 0 | COMMUNITY
Start: 2018-06-13 | End: 2018-07-05 | Stop reason: ALTCHOICE

## 2018-06-25 NOTE — PATIENT INSTRUCTIONS
1. Labs: labs and urine in 6 mo    rtc 6 mo    2.  Medications: no change        5. BP:  Take BP and pulse  twice daily for one week, record              Bring results  to next visit.              Goal :   <140/90    6. Diet:  [employer] Kid[employer]:  www.kidney.org       Sodium: < 2000 milligrams daily including all food and drink      (one teaspoon of table salt has 2300 milligrams of sodium)         Phosphorus: <1000mg daily   Mediterranean like diet     It is very important that you reduce your salt intake to below 2 grams daily  Low/moderate  oxalate diet: avoid tea, chocolate, spinach, rhubarb, nuts as peanuts, cashews, almonds, walnuts..vit C   Low red meat  As beef, pork, lamb, venison, once a month  Avoid cold cuts  Shellfish, once a month  Eat fillets of fish, white lean meat chicken and turkey.  Avoid animal fat and butter and fried food completely.  Use extra virgin olive oil first cold press and cannola oil.  Plenty of fresh fruits and vegetables   drink >64 oz of water daily 4-5 16 oz bottles of water daily  Avoid sodas and sugar drinks     Vaccines: please check with your PCP and be sure to have an annual flu vaccine and keep up to date with your pneumococcal vaccine for pneumonia      Please avoid or minimize all NSAIDS (ibuprofen, motrin, aleve, indocin, naprosyn, BC powder, mobic, relafen, alleve, and any others) to minimize the risk to your kidneys    Please avoid Proton pump inhibitors unless specifically necessary and speak with your PCP about alternatives such as H2 blockers     Return to clinic:  6 mo

## 2018-06-25 NOTE — TELEPHONE ENCOUNTER
----- Message from Bronson Giordano sent at 6/25/2018  8:25 AM CDT -----  Contact: Pt  Message for MA/Nurse/Provider     Who called: Pt  Message: Pt called in regards to scheduling an apt for her to check her stent and a f/u apt to see the doctor.   Contact preferences: [phone]  Additional Information: Pt stated that's her home number and to leave a message if she doesn't answer.

## 2018-06-25 NOTE — PROGRESS NOTES
Subjective:       Patient ID: Lynn Mckinney is a 82 y.o. White female who presents for follow-up evaluation of renal function     HPI 81 yo WF with HTN, CAD, PVD, CKD stage 3 baseline serum crt 1.3-1.4 stable since 2004. No LUTS, no hematuria, no dysuria. She claims she has white coat syndrome, she usually has a bp of 130-140 /80 , she has not taken her bp at home recently. She takes her amlodipine and valsartan/HCTZ in the am and did not take them this am.  The patient has no symptoms of fatigue, shortness of breath, chest pain, peripheral edema, flank pain, dysuria, hematuria, foamy urine, orange colored urine, nephrolithiasis,  diarrhea, constipation, lower extremity leg cramping, headache, nausea and vomiting, or weakness.      Review of Systems   Constitutional: Negative for appetite change, chills, fatigue, fever and unexpected weight change.   HENT: Negative for facial swelling, hearing loss and nosebleeds.    Eyes: Negative for pain, discharge, redness and visual disturbance.   Respiratory: Negative for cough, chest tightness, shortness of breath and wheezing.    Cardiovascular: Positive for leg swelling. Negative for chest pain and palpitations.        Wears support hose, and minimal edema.   Gastrointestinal: Negative for abdominal pain, constipation, diarrhea, nausea and vomiting.   Endocrine: Negative for cold intolerance, heat intolerance and polydipsia.   Genitourinary: Negative for decreased urine volume, difficulty urinating, dysuria, flank pain, hematuria and urgency.   Musculoskeletal: Negative for arthralgias, back pain, joint swelling and myalgias.   Skin: Negative for color change, pallor, rash and wound.   Neurological: Negative for dizziness, tremors, seizures, syncope, speech difficulty, weakness and headaches.   Hematological: Negative for adenopathy. Does not bruise/bleed easily.   Psychiatric/Behavioral: Negative for agitation, behavioral problems, dysphoric mood, self-injury and sleep  "disturbance.       Objective:   Blood pressure (!) 140/84, pulse 78, height 5' 4.5" (1.638 m), weight 74.8 kg (165 lb), SpO2 98 %.      Physical Exam   Constitutional: She is oriented to person, place, and time. She appears well-developed and well-nourished. No distress.   HENT:   Head: Normocephalic and atraumatic.   Mouth/Throat: No oropharyngeal exudate.   Eyes: Conjunctivae and EOM are normal. Pupils are equal, round, and reactive to light. Right eye exhibits no discharge. Left eye exhibits no discharge. No scleral icterus.   Neck: Normal range of motion. Neck supple. No JVD present. No tracheal deviation present. No thyromegaly present.   Cardiovascular: Normal rate, regular rhythm, normal heart sounds and intact distal pulses.  Exam reveals no gallop.    No murmur heard.  No peripheral edema, DP pulses not palpable, radial pulses +2 bilaterally warm digits.   Pulmonary/Chest: Effort normal and breath sounds normal. No stridor. No respiratory distress. She has no wheezes. She has no rales. She exhibits no tenderness.   Abdominal: Soft. Bowel sounds are normal. She exhibits no distension and no mass. There is no tenderness. There is no rebound and no guarding. No hernia.   Musculoskeletal: She exhibits no edema or tenderness.   Lymphadenopathy:     She has no cervical adenopathy.   Neurological: She is alert and oriented to person, place, and time. She exhibits normal muscle tone.   Skin: Skin is warm and dry. No rash noted. She is not diaphoretic. No erythema.   Psychiatric: She has a normal mood and affect. Judgment and thought content normal.   Nursing note and vitals reviewed.      Assessment:       No diagnosis found.    Plan:        81 yo WF with HTN, CAD, PVD, CKD stage 3 baseline serum crt 1.3-1.4 stable     1. CKD: stage 3 est gfr 35- 40 cc /min stable over > 15 years.   Stable,  Lab Results   Component Value Date    CREATININE 1.3 06/04/2018     Protein Creatinine Ratios:stable    Prot/Creat Ratio, Ur "   Date Value Ref Range Status   06/04/2018 Unable to calculate 0.00 - 0.20 Final   12/08/2017 0.06 0.00 - 0.20 Final   06/12/2017 0.09 0.00 - 0.20 Final       2. Acid-Base: stable  Lab Results   Component Value Date     06/04/2018    K 3.7 06/04/2018    CO2 26 06/04/2018         3. Renal osteodystrophy: last PTH stable  Bone density suggested  She has been on fosomax in past  Lab Results   Component Value Date    PTH 45.0 06/04/2018    CALCIUM 9.8 06/04/2018    PHOS 3.2 06/04/2018       4. Anemia: stable follow serially  Lab Results   Component Value Date    HGB 12.5 06/04/2018        5. HTN: stable      Medication: no change      Monitor BP as directed in instructions    7. Weight: Weight: 74.8 kg (165 lb). she states that her daily weights  stable    Wt Readings from Last 3 Encounters:   06/25/18 74.8 kg (165 lb)   05/14/18 78.4 kg (172 lb 13.5 oz)   04/20/18 78.9 kg (174 lb)       8. Lipid management: stable  Lab Results   Component Value Date    LDLCALC 58.4 (L) 02/28/2018       9. Diet:  Education/referral:       Sodium: 2 gm       11. Please avoid or minimize all NSAIDS (ibuprofen, motrin, aleve, indocin, naprosyn) to minimize the risk to your kidneys.      12. Follow up in  6 months labs prior

## 2018-06-26 ENCOUNTER — TELEPHONE (OUTPATIENT)
Dept: VASCULAR SURGERY | Facility: CLINIC | Age: 83
End: 2018-06-26

## 2018-06-26 NOTE — TELEPHONE ENCOUNTER
Patient called to schedule appt with Dr. Sams and with vascular surgery. Appts created, verified by patient. Appt letter placed in mail.

## 2018-07-02 ENCOUNTER — LAB VISIT (OUTPATIENT)
Dept: LAB | Facility: HOSPITAL | Age: 83
End: 2018-07-02
Attending: INTERNAL MEDICINE
Payer: MEDICARE

## 2018-07-02 DIAGNOSIS — I10 ESSENTIAL HYPERTENSION: ICD-10-CM

## 2018-07-02 DIAGNOSIS — E78.00 PURE HYPERCHOLESTEROLEMIA: ICD-10-CM

## 2018-07-02 LAB
ALT SERPL W/O P-5'-P-CCNC: 15 U/L
ANION GAP SERPL CALC-SCNC: 8 MMOL/L
AST SERPL-CCNC: 26 U/L
BUN SERPL-MCNC: 25 MG/DL
CALCIUM SERPL-MCNC: 10.1 MG/DL
CHLORIDE SERPL-SCNC: 103 MMOL/L
CHOLEST SERPL-MCNC: 123 MG/DL
CHOLEST/HDLC SERPL: 2.3 {RATIO}
CO2 SERPL-SCNC: 29 MMOL/L
CREAT SERPL-MCNC: 1.4 MG/DL
EST. GFR  (AFRICAN AMERICAN): 40.4 ML/MIN/1.73 M^2
EST. GFR  (NON AFRICAN AMERICAN): 35 ML/MIN/1.73 M^2
GLUCOSE SERPL-MCNC: 96 MG/DL
HDLC SERPL-MCNC: 54 MG/DL
HDLC SERPL: 43.9 %
LDLC SERPL CALC-MCNC: 50.4 MG/DL
NONHDLC SERPL-MCNC: 69 MG/DL
POTASSIUM SERPL-SCNC: 3.9 MMOL/L
SODIUM SERPL-SCNC: 140 MMOL/L
TRIGL SERPL-MCNC: 93 MG/DL

## 2018-07-02 PROCEDURE — 80061 LIPID PANEL: CPT

## 2018-07-02 PROCEDURE — 84460 ALANINE AMINO (ALT) (SGPT): CPT

## 2018-07-02 PROCEDURE — 80048 BASIC METABOLIC PNL TOTAL CA: CPT

## 2018-07-02 PROCEDURE — 36415 COLL VENOUS BLD VENIPUNCTURE: CPT | Mod: PO

## 2018-07-02 PROCEDURE — 84450 TRANSFERASE (AST) (SGOT): CPT

## 2018-07-05 ENCOUNTER — OFFICE VISIT (OUTPATIENT)
Dept: CARDIOLOGY | Facility: CLINIC | Age: 83
End: 2018-07-05
Payer: MEDICARE

## 2018-07-05 VITALS
WEIGHT: 170.88 LBS | SYSTOLIC BLOOD PRESSURE: 130 MMHG | DIASTOLIC BLOOD PRESSURE: 74 MMHG | HEART RATE: 72 BPM | HEIGHT: 64 IN | BODY MASS INDEX: 29.17 KG/M2

## 2018-07-05 DIAGNOSIS — Z95.0 CARDIAC PACEMAKER IN SITU: ICD-10-CM

## 2018-07-05 DIAGNOSIS — I25.10 CORONARY ARTERY DISEASE INVOLVING NATIVE CORONARY ARTERY OF NATIVE HEART WITHOUT ANGINA PECTORIS: Primary | ICD-10-CM

## 2018-07-05 DIAGNOSIS — E78.00 PURE HYPERCHOLESTEROLEMIA: ICD-10-CM

## 2018-07-05 DIAGNOSIS — I10 ESSENTIAL HYPERTENSION: ICD-10-CM

## 2018-07-05 DIAGNOSIS — N18.30 CHRONIC KIDNEY DISEASE, STAGE III (MODERATE): ICD-10-CM

## 2018-07-05 DIAGNOSIS — Z95.1 S/P CABG X 3: ICD-10-CM

## 2018-07-05 PROCEDURE — 3075F SYST BP GE 130 - 139MM HG: CPT | Mod: CPTII,S$GLB,, | Performed by: INTERNAL MEDICINE

## 2018-07-05 PROCEDURE — 99213 OFFICE O/P EST LOW 20 MIN: CPT | Mod: S$GLB,,, | Performed by: INTERNAL MEDICINE

## 2018-07-05 PROCEDURE — 99999 PR PBB SHADOW E&M-EST. PATIENT-LVL III: CPT | Mod: PBBFAC,,, | Performed by: INTERNAL MEDICINE

## 2018-07-05 PROCEDURE — 3078F DIAST BP <80 MM HG: CPT | Mod: CPTII,S$GLB,, | Performed by: INTERNAL MEDICINE

## 2018-07-05 RX ORDER — ACETAMINOPHEN 160 MG/5ML
200 SUSPENSION, ORAL (FINAL DOSE FORM) ORAL DAILY
COMMUNITY
End: 2023-05-30 | Stop reason: SDUPTHER

## 2018-07-05 NOTE — PROGRESS NOTES
Subjective:   Patient ID:  Lynn Mckinney is a 82 y.o. female who presents for follow-up of Coronary Artery Disease      Problem List:  CAD    CABG x 3 in 2001,  LAD    Carotid stenosis, s/p stent (L) ICA 2007    - moderate to severe asymptomatic stenosis (R) ICA (followed by Vasc. Surgery)    Hypertension    Hypercholesterolemia    PAD    CKD 3    PPM 10/13    HPI:   Lynn Mckinney feels generally well. She does not report angina or shortness of breath with exertion. She has not required S/L NTG.    On rosuvastatin 20 mg. Lipids are excellent. LDL is 50, HDL is in the 50s and triglycerides are <100 mg/dl. Hepatic transaminases are within normal limits.   She is scheduled to have a carotid u/s and fup w Dr. Hancock later this month.   She has sciatica but feels better since she recvd an epidural. She has discomfort extending from the gluteal region to the thigh on the right side. She also has pain in the right ankle. I don't think she has claudication.        ROS    Current Outpatient Prescriptions   Medication Sig    amLODIPine (NORVASC) 5 MG tablet Take 1 tablet (5 mg total) by mouth once daily.    aspirin 81 MG Chew Take 81 mg by mouth every evening. 1 Tablet, Chewable Oral Every day    CALCIUM CARBONATE/VITAMIN D3 (CALTRATE 600 + D ORAL) Take 1 tablet by mouth once daily.    coenzyme Q10 200 mg capsule Take 200 mg by mouth once daily.    ergocalciferol, vitamin D2, 400 unit Tab Take 400 Units by mouth once daily.    labetalol (NORMODYNE) 200 MG tablet Take 1 tablet (200 mg total) by mouth 2 (two) times daily.    LACTOSE-FREE FOOD (BOOST ORAL) Take by mouth. 2-3 days per week    levothyroxine (SYNTHROID) 112 MCG tablet TAKE 1 TABLET BY MOUTH EVERY DAY FOR 6 WEEKS AND 1/2 TABLET ON SUNDAY    multivitamin (THERAGRAN) per tablet Take by mouth. Pt taking one pill three times a week.    nitroGLYCERIN (NITROSTAT) 0.4 MG SL tablet Place 1 tablet (0.4 mg total) under the tongue every 5 (five) minutes as  needed for Chest pain (to a total of three).    rosuvastatin (CRESTOR) 20 MG tablet TAKE 1 TABLET(20 MG) BY MOUTH EVERY DAY    SALMON OIL-OMEGA-3 FATTY ACIDS ORAL Take 1 capsule by mouth once daily.    valsartan-hydrochlorothiazide (DIOVAN-HCT) 320-25 mg per tablet Take 1 tablet by mouth once daily.     No current facility-administered medications for this visit.        Social History   Substance Use Topics    Smoking status: Former Smoker     Quit date: 8/13/1984    Smokeless tobacco: Never Used    Alcohol use 0.6 oz/week     1 Shots of liquor per week      Comment: occ         Objective:     Physical Exam        Lab Results   Component Value Date    CHOL 123 07/02/2018    HDL 54 07/02/2018    LDLCALC 50.4 (L) 07/02/2018    TRIG 93 07/02/2018    CHOLHDL 43.9 07/02/2018     Lab Results   Component Value Date    GLU 96 07/02/2018    CREATININE 1.4 07/02/2018    BUN 25 (H) 07/02/2018     07/02/2018    K 3.9 07/02/2018     07/02/2018    CO2 29 07/02/2018     Lab Results   Component Value Date    ALT 15 07/02/2018    AST 26 07/02/2018    ALKPHOS 51 (L) 02/28/2018    BILITOT 0.6 02/28/2018           Assessment and Plan:     There are no diagnoses linked to this encounter.    No Follow-up on file.

## 2018-07-05 NOTE — PATIENT INSTRUCTIONS
Your cholesterol levels are excellent.  Her kidney function is stable.    You can use an extra labetalol if your BP shoots up (>160 mm Hg).   You can apply lidocaine cream to your ankle. Its available over the counter. Salon pas, Aspercream, Icsyed Hot.  You can also use Voltaren (diclofenac) cream. This requires a prescription and Dr. Manley needs to OK it.

## 2018-07-09 DIAGNOSIS — E78.00 PURE HYPERCHOLESTEROLEMIA: Primary | ICD-10-CM

## 2018-07-09 DIAGNOSIS — I10 ESSENTIAL HYPERTENSION: ICD-10-CM

## 2018-07-09 RX ORDER — NITROGLYCERIN 0.4 MG/1
0.4 TABLET SUBLINGUAL EVERY 5 MIN PRN
Qty: 100 TABLET | Refills: 3 | Status: SHIPPED | OUTPATIENT
Start: 2018-07-09 | End: 2020-01-07

## 2018-07-17 ENCOUNTER — OFFICE VISIT (OUTPATIENT)
Dept: VASCULAR SURGERY | Facility: CLINIC | Age: 83
End: 2018-07-17
Payer: MEDICARE

## 2018-07-17 ENCOUNTER — HOSPITAL ENCOUNTER (OUTPATIENT)
Dept: VASCULAR SURGERY | Facility: CLINIC | Age: 83
Discharge: HOME | End: 2018-07-17
Attending: SURGERY
Payer: MEDICARE

## 2018-07-17 VITALS
SYSTOLIC BLOOD PRESSURE: 171 MMHG | HEART RATE: 73 BPM | WEIGHT: 171.94 LBS | HEIGHT: 64 IN | TEMPERATURE: 99 F | DIASTOLIC BLOOD PRESSURE: 70 MMHG | BODY MASS INDEX: 29.35 KG/M2

## 2018-07-17 DIAGNOSIS — I65.23 BILATERAL CAROTID ARTERY OCCLUSION: ICD-10-CM

## 2018-07-17 DIAGNOSIS — I65.23 BILATERAL CAROTID ARTERY STENOSIS: Primary | ICD-10-CM

## 2018-07-17 PROCEDURE — 3077F SYST BP >= 140 MM HG: CPT | Mod: CPTII,S$GLB,, | Performed by: SURGERY

## 2018-07-17 PROCEDURE — 99213 OFFICE O/P EST LOW 20 MIN: CPT | Mod: S$GLB,,, | Performed by: SURGERY

## 2018-07-17 PROCEDURE — 99999 PR PBB SHADOW E&M-EST. PATIENT-LVL III: CPT | Mod: PBBFAC,,, | Performed by: SURGERY

## 2018-07-17 PROCEDURE — 3078F DIAST BP <80 MM HG: CPT | Mod: CPTII,S$GLB,, | Performed by: SURGERY

## 2018-07-17 PROCEDURE — 93880 EXTRACRANIAL BILAT STUDY: CPT | Mod: S$GLB,,, | Performed by: SURGERY

## 2018-07-17 NOTE — PROGRESS NOTES
See my prior note; review of systems, family history and social history are   unchanged.    HISTORY OF PRESENT ILLNESS:  An 82-year-old female status post:  1.  Left carotid stent placement in 2007, (Dr. Bowen) after an episode of   amaurosis fugax.  2.  CREST trial participant.    This is a 1-year followup.  She denies interval stroke, TIA or amaurosis. Stable    MEDICATIONS:  Include aspirin and statin.    SOCIAL:   Ex-smoking x 40 yrs    PHYSICAL EXAMINATION:  VITAL SIGNS:  See nursing note.  NEUROLOGIC:  Cranial nerves VII through XII are intact and 5/5 motor strength in   all extremities.    IMAGING:  Carotid duplex reveals no right carotid stenosis and a stable left 60%   to 79% carotid stenosis with peak systolic velocity of 274 (prior 217 and end diastolic of   46 (prior 48. 4 years ago,   this was 222 and 43.    ASSESSMENT:  A 11-year followup, left carotid stent placement in CREST trial with   stable moderate stenosis, asymptomatic.    RECOMMENDATIONS:  1.  Continue current medical therapy.  2.  Follow up in 1 year with a screening carotid duplex, sooner if clinically   Indicated.    GUS Sams III, MD, FACS  Professor and Chief, Vascular and Endovascular Surgery

## 2018-07-25 ENCOUNTER — TELEPHONE (OUTPATIENT)
Dept: INTERNAL MEDICINE | Facility: CLINIC | Age: 83
End: 2018-07-25

## 2018-07-25 RX ORDER — IRBESARTAN AND HYDROCHLOROTHIAZIDE 300; 12.5 MG/1; MG/1
1 TABLET, FILM COATED ORAL DAILY
Qty: 90 TABLET | Refills: 3 | Status: SHIPPED | OUTPATIENT
Start: 2018-07-25 | End: 2019-10-06 | Stop reason: SDUPTHER

## 2018-07-25 NOTE — TELEPHONE ENCOUNTER
----- Message from Francie Olvera sent at 7/24/2018  4:19 PM CDT -----  Contact: self/[phone]  Patient called in regards needing to talk with Dr Moise nurse about the recall on Valsartan. Patient stated that she is going out of town tomorrow and she needs a call back ASAP, because she is not going to take with her something that is going to harm her health. Please call and advise. Thank you!!!

## 2018-07-25 NOTE — TELEPHONE ENCOUNTER
Please tell her that I have sent Irbesartan/HCTZ to the [facility]. Slightly less hctz    This replaces valsartan/hctz and please check the BP

## 2018-07-25 NOTE — TELEPHONE ENCOUNTER
Message given to pt about new RX and to check blood pressure. Verbalizes understanding and will call with any problems

## 2018-08-14 ENCOUNTER — CLINICAL SUPPORT (OUTPATIENT)
Dept: ELECTROPHYSIOLOGY | Facility: CLINIC | Age: 83
End: 2018-08-14
Attending: INTERNAL MEDICINE
Payer: MEDICARE

## 2018-08-14 DIAGNOSIS — Z95.0 CARDIAC PACEMAKER: ICD-10-CM

## 2018-08-14 DIAGNOSIS — I44.1 AV BLOCK, 2ND DEGREE: ICD-10-CM

## 2018-08-14 PROCEDURE — 93280 PM DEVICE PROGR EVAL DUAL: CPT

## 2018-08-14 PROCEDURE — 93280 PM DEVICE PROGR EVAL DUAL: CPT | Mod: 26,,, | Performed by: INTERNAL MEDICINE

## 2018-09-12 ENCOUNTER — TELEPHONE (OUTPATIENT)
Dept: INTERNAL MEDICINE | Facility: CLINIC | Age: 83
End: 2018-09-12

## 2018-09-12 NOTE — TELEPHONE ENCOUNTER
Spoke with pt to confirm upcoming appt and to advise of over due HM. Pt agreed to additional services.

## 2018-09-21 ENCOUNTER — LAB VISIT (OUTPATIENT)
Dept: LAB | Facility: HOSPITAL | Age: 83
End: 2018-09-21
Attending: INTERNAL MEDICINE
Payer: MEDICARE

## 2018-09-21 DIAGNOSIS — I10 ESSENTIAL HYPERTENSION: ICD-10-CM

## 2018-09-21 DIAGNOSIS — E03.8 OTHER SPECIFIED HYPOTHYROIDISM: ICD-10-CM

## 2018-09-21 DIAGNOSIS — E78.00 PURE HYPERCHOLESTEROLEMIA: ICD-10-CM

## 2018-09-21 LAB
ALBUMIN SERPL BCP-MCNC: 3.9 G/DL
ALP SERPL-CCNC: 52 U/L
ALT SERPL W/O P-5'-P-CCNC: 17 U/L
ANION GAP SERPL CALC-SCNC: 9 MMOL/L
AST SERPL-CCNC: 28 U/L
BASOPHILS # BLD AUTO: 0.02 K/UL
BASOPHILS NFR BLD: 0.3 %
BILIRUB SERPL-MCNC: 0.8 MG/DL
BUN SERPL-MCNC: 33 MG/DL
CALCIUM SERPL-MCNC: 9.7 MG/DL
CHLORIDE SERPL-SCNC: 105 MMOL/L
CHOLEST SERPL-MCNC: 123 MG/DL
CHOLEST/HDLC SERPL: 2.6 {RATIO}
CO2 SERPL-SCNC: 28 MMOL/L
CREAT SERPL-MCNC: 1.4 MG/DL
DIFFERENTIAL METHOD: ABNORMAL
EOSINOPHIL # BLD AUTO: 0.3 K/UL
EOSINOPHIL NFR BLD: 4.2 %
ERYTHROCYTE [DISTWIDTH] IN BLOOD BY AUTOMATED COUNT: 12 %
EST. GFR  (AFRICAN AMERICAN): 40.4 ML/MIN/1.73 M^2
EST. GFR  (NON AFRICAN AMERICAN): 35 ML/MIN/1.73 M^2
GLUCOSE SERPL-MCNC: 92 MG/DL
HCT VFR BLD AUTO: 39.8 %
HDLC SERPL-MCNC: 48 MG/DL
HDLC SERPL: 39 %
HGB BLD-MCNC: 13.4 G/DL
IMM GRANULOCYTES # BLD AUTO: 0.01 K/UL
IMM GRANULOCYTES NFR BLD AUTO: 0.2 %
LDLC SERPL CALC-MCNC: 57.2 MG/DL
LYMPHOCYTES # BLD AUTO: 1 K/UL
LYMPHOCYTES NFR BLD: 16.2 %
MCH RBC QN AUTO: 31.9 PG
MCHC RBC AUTO-ENTMCNC: 33.7 G/DL
MCV RBC AUTO: 95 FL
MONOCYTES # BLD AUTO: 0.7 K/UL
MONOCYTES NFR BLD: 10.9 %
NEUTROPHILS # BLD AUTO: 4.2 K/UL
NEUTROPHILS NFR BLD: 68.2 %
NONHDLC SERPL-MCNC: 75 MG/DL
NRBC BLD-RTO: 0 /100 WBC
PLATELET # BLD AUTO: 171 K/UL
PMV BLD AUTO: 9.5 FL
POTASSIUM SERPL-SCNC: 3.8 MMOL/L
PROT SERPL-MCNC: 6.9 G/DL
RBC # BLD AUTO: 4.2 M/UL
SODIUM SERPL-SCNC: 142 MMOL/L
TRIGL SERPL-MCNC: 89 MG/DL
TSH SERPL DL<=0.005 MIU/L-ACNC: 1.74 UIU/ML
WBC # BLD AUTO: 6.16 K/UL

## 2018-09-21 PROCEDURE — 80053 COMPREHEN METABOLIC PANEL: CPT

## 2018-09-21 PROCEDURE — 84443 ASSAY THYROID STIM HORMONE: CPT

## 2018-09-21 PROCEDURE — 85025 COMPLETE CBC W/AUTO DIFF WBC: CPT

## 2018-09-21 PROCEDURE — 80061 LIPID PANEL: CPT

## 2018-09-21 PROCEDURE — 36415 COLL VENOUS BLD VENIPUNCTURE: CPT | Mod: PO

## 2018-09-26 ENCOUNTER — IMMUNIZATION (OUTPATIENT)
Dept: INTERNAL MEDICINE | Facility: CLINIC | Age: 83
End: 2018-09-26
Payer: MEDICARE

## 2018-09-26 ENCOUNTER — OFFICE VISIT (OUTPATIENT)
Dept: INTERNAL MEDICINE | Facility: CLINIC | Age: 83
End: 2018-09-26
Payer: MEDICARE

## 2018-09-26 VITALS
HEIGHT: 64 IN | WEIGHT: 171.94 LBS | BODY MASS INDEX: 29.35 KG/M2 | HEART RATE: 73 BPM | OXYGEN SATURATION: 93 % | SYSTOLIC BLOOD PRESSURE: 119 MMHG | DIASTOLIC BLOOD PRESSURE: 68 MMHG

## 2018-09-26 DIAGNOSIS — I10 ESSENTIAL HYPERTENSION: Primary | ICD-10-CM

## 2018-09-26 DIAGNOSIS — E78.00 PURE HYPERCHOLESTEROLEMIA: ICD-10-CM

## 2018-09-26 DIAGNOSIS — E03.9 HYPOTHYROIDISM, UNSPECIFIED TYPE: ICD-10-CM

## 2018-09-26 DIAGNOSIS — N18.30 CKD (CHRONIC KIDNEY DISEASE) STAGE 3, GFR 30-59 ML/MIN: ICD-10-CM

## 2018-09-26 DIAGNOSIS — Z95.1 S/P CABG X 3: ICD-10-CM

## 2018-09-26 PROCEDURE — 99999 PR PBB SHADOW E&M-EST. PATIENT-LVL III: CPT | Mod: PBBFAC,,, | Performed by: INTERNAL MEDICINE

## 2018-09-26 PROCEDURE — 3078F DIAST BP <80 MM HG: CPT | Mod: CPTII,,, | Performed by: INTERNAL MEDICINE

## 2018-09-26 PROCEDURE — 1101F PT FALLS ASSESS-DOCD LE1/YR: CPT | Mod: CPTII,,, | Performed by: INTERNAL MEDICINE

## 2018-09-26 PROCEDURE — 90662 IIV NO PRSV INCREASED AG IM: CPT | Mod: PBBFAC

## 2018-09-26 PROCEDURE — 99213 OFFICE O/P EST LOW 20 MIN: CPT | Mod: PBBFAC,25 | Performed by: INTERNAL MEDICINE

## 2018-09-26 PROCEDURE — 3074F SYST BP LT 130 MM HG: CPT | Mod: CPTII,,, | Performed by: INTERNAL MEDICINE

## 2018-09-26 PROCEDURE — 99214 OFFICE O/P EST MOD 30 MIN: CPT | Mod: S$PBB,,, | Performed by: INTERNAL MEDICINE

## 2018-09-26 NOTE — PROGRESS NOTES
Subjective:      Patient ID: Lynn Mckinney is a 82 y.o. female.    Chief Complaint: Follow-up and Ear Fullness    HPI:  HPI  Patient is here for a 6 month follow up of hypertension, hyperlipidemia, CAD and hypothyroidism and CKD 3.    Labs were done prior to the appt, CKD is stable, all other labs are stable and at goal.    Flu shot is due      Patient Active Problem List   Diagnosis    Hypertension    Hyperlipidemia    Peripheral arterial disease    CAD (coronary artery disease)    S/P CABG x 3    Hypothyroidism    Incontinence    CKD (chronic kidney disease) stage 3, GFR 30-59 ml/min    Vitamin D deficiency    AV block, 2nd degree    Pacemaker    Left-sided carotid artery disease    Claudication    Right hip pain    Lumbar stenosis    Thoracic or lumbosacral neuritis or radiculitis    Hip pain    Bilateral carotid artery stenosis    Lumbar radiculitis     Past Medical History:   Diagnosis Date    Allergy     seasonal    Blood clotting tendency     Carotid artery disease 5/29/2014    Coronary artery disease     Heart block     Hyperlipidemia     Hypertension     Hypothyroid     Obesity     Pacemaker     PAD (peripheral artery disease)     Spinal stenosis     Thoracic or lumbosacral neuritis or radiculitis 11/25/2014    Tubular adenoma      Past Surgical History:   Procedure Laterality Date    ANGIOPLASTY      APPENDECTOMY      BREAST CYST ASPIRATION Bilateral     CARDIAC CATHETERIZATION      CARDIAC PACEMAKER PLACEMENT  10/10/13    by Dr. Coleman    CAROTID STENT Left 2007    by Dr. Bowen    CATARACT EXTRACTION W/  INTRAOCULAR LENS IMPLANT Bilateral 2012    Dr Sharp    CORONARY ARTERY BYPASS GRAFT  2001     x 3 LIMA-Ramus, SVG-OM1 & SVG-PDA    CYST REMOVAL      wrist    EYE SURGERY      HYSTERECTOMY      INJECTION-JOINT Right 12/15/2014    Performed by [facility] PAIN MGT    OOPHORECTOMY      THYROIDECTOMY       Family History   Problem Relation Age of Onset  "   Heart disease Father         coronary thrombosis    Hypertension Father     Alzheimer's disease Mother     Heart disease Mother         pacemaker    Hypertension Mother     Diabetes Brother     No Known Problems Sister     No Known Problems Maternal Grandmother     No Known Problems Maternal Grandfather     No Known Problems Paternal Grandmother     No Known Problems Paternal Grandfather     Heart disease Brother     No Known Problems Maternal Aunt     No Known Problems Maternal Uncle     No Known Problems Paternal Aunt     No Known Problems Paternal Uncle     Amblyopia Neg Hx     Blindness Neg Hx     Cancer Neg Hx     Cataracts Neg Hx     Glaucoma Neg Hx     Macular degeneration Neg Hx     Retinal detachment Neg Hx     Strabismus Neg Hx     Stroke Neg Hx     Thyroid disease Neg Hx     Melanoma Neg Hx      Review of Systems   Constitutional: Negative for chills, fever and unexpected weight change.   HENT: Negative for trouble swallowing.    Respiratory: Negative for cough, shortness of breath and wheezing.    Cardiovascular: Negative for chest pain and palpitations.   Gastrointestinal: Negative for abdominal distention, abdominal pain, blood in stool and vomiting.   Musculoskeletal: Negative for back pain.     Objective:     Vitals:    09/26/18 1315   BP: 119/68   Pulse: 73   SpO2: (!) 93%   Weight: 78 kg (171 lb 15.3 oz)   Height: 5' 4" (1.626 m)   PainSc: 0-No pain     Body mass index is 29.52 kg/m².  Physical Exam   Constitutional: She is oriented to person, place, and time. She appears well-developed and well-nourished. No distress.   Neck: Carotid bruit is not present. No thyromegaly present.   Cardiovascular: Normal rate, regular rhythm and normal heart sounds. PMI is not displaced.   Pulmonary/Chest: Effort normal and breath sounds normal. No respiratory distress.   Abdominal: Soft. Bowel sounds are normal. She exhibits no distension. There is no tenderness.   Musculoskeletal: " She exhibits no edema.   Neurological: She is alert and oriented to person, place, and time.     Assessment:     1. Essential hypertension    2. Pure hypercholesterolemia    3. Hypothyroidism, unspecified type    4. S/P CABG x 3    5. CKD (chronic kidney disease) stage 3, GFR 30-59 ml/min      Plan:     Problem List Items Addressed This Visit     Hypertension - Primary    Current Assessment & Plan     A: Well controlled  P: Continue same medications  Amlodipine 5 mg  Irbesartan-hctz 300-12.5         Relevant Orders    CBC auto differential    Comprehensive metabolic panel    Hyperlipidemia    Overview     reported side effects from atorvastatin.  pain in both hands, both shoulders and her left ankle 3/15 while on 80 mg of atorvastatin   Switched to rosuvastatin 20 in 3/15           Current Assessment & Plan     A: LDL less than 70 at goal  P: Continue same medication  Rosuvastatin 20 mg nightly           Relevant Orders    Lipid panel    S/P CABG x 3    Overview      x 3 LIMA to ramus, SVG to OM1,      SVG to PDA -  2001         Current Assessment & Plan     A: Asymptomatic  P: Follow with Cardiology yearly         Hypothyroidism    Current Assessment & Plan     A: well controlled and checked every 6 months  P:Continue Same med  Levothyroxine 112mcg         Relevant Orders    TSH    CKD (chronic kidney disease) stage 3, GFR 30-59 ml/min    Current Assessment & Plan     A: GFR 3 GFR 35  P:Monitor every 6 months               Outpatient Procedures Ordered This Visit     CBC auto differential         Comprehensive metabolic panel         Lipid panel         TSH           Follow-up in about 25 weeks (around 3/20/2019).     Medication List           Accurate as of 9/26/18 11:59 PM. If you have any questions, ask your nurse or doctor.               CONTINUE taking these medications    amLODIPine 5 MG tablet  Commonly known as:  NORVASC  Take 1 tablet (5 mg total) by mouth once daily.     aspirin 81 MG Chew     BOOST  ORAL     CALTRATE 600 + D ORAL     coenzyme Q10 200 mg capsule     ergocalciferol (vitamin D2) 400 unit Tab     irbesartan-hydrochlorothiazide 300-12.5 mg per tablet  Commonly known as:  AVALIDE  Take 1 tablet by mouth once daily. Replaces valsartan/hctz     labetalol 200 MG tablet  Commonly known as:  NORMODYNE  Take 1 tablet (200 mg total) by mouth 2 (two) times daily.     levothyroxine 112 MCG tablet  Commonly known as:  SYNTHROID  TAKE 1 TABLET BY MOUTH EVERY DAY FOR 6 WEEKS AND 1/2 TABLET ON SUNDAY     multivitamin per tablet  Commonly known as:  THERAGRAN     nitroGLYCERIN 0.4 MG SL tablet  Commonly known as:  NITROSTAT  Place 1 tablet (0.4 mg total) under the tongue every 5 (five) minutes as needed for Chest pain (to a total of three).     rosuvastatin 20 MG tablet  Commonly known as:  CRESTOR  TAKE 1 TABLET(20 MG) BY MOUTH EVERY DAY     SALMON OIL-OMEGA-3 FATTY ACIDS ORAL        STOP taking these medications    valsartan-hydrochlorothiazide 320-25 mg per tablet  Commonly known as:  DIOVAN-HCT  Stopped by:  Marielena Moise MD

## 2018-09-26 NOTE — PATIENT INSTRUCTIONS
If your blood pressure is too low or you have symptoms of low pressure ( sleepy): stop the amlodipine or take 1/2 the amlodpine     New shingles vaccine: SHINGRIX ( 2018) not live, 90%,  2 shots, one at day zero and the 2nd at 2-6 months: any pharmacy can give it.    Left ear: Debrox over the counter: put in drops at night for 5 night

## 2019-01-05 RX ORDER — LABETALOL 200 MG/1
200 TABLET, FILM COATED ORAL 2 TIMES DAILY
Qty: 180 TABLET | Refills: 3 | Status: SHIPPED | OUTPATIENT
Start: 2019-01-05 | End: 2019-12-23

## 2019-01-05 RX ORDER — LEVOTHYROXINE SODIUM 112 UG/1
TABLET ORAL
Qty: 90 TABLET | Refills: 3 | Status: SHIPPED | OUTPATIENT
Start: 2019-01-05 | End: 2019-12-23

## 2019-01-05 RX ORDER — AMLODIPINE BESYLATE 5 MG/1
5 TABLET ORAL DAILY
Qty: 90 TABLET | Refills: 3 | Status: SHIPPED | OUTPATIENT
Start: 2019-01-05 | End: 2019-12-23

## 2019-01-08 RX ORDER — ROSUVASTATIN CALCIUM 20 MG/1
TABLET, COATED ORAL
Qty: 90 TABLET | Refills: 3 | Status: SHIPPED | OUTPATIENT
Start: 2019-01-08 | End: 2019-12-23

## 2019-02-04 ENCOUNTER — TELEPHONE (OUTPATIENT)
Dept: INTERNAL MEDICINE | Facility: CLINIC | Age: 84
End: 2019-02-04

## 2019-02-04 NOTE — TELEPHONE ENCOUNTER
----- Message from Francie Olvera sent at 2/4/2019  1:28 PM CST -----  Contact: self/[phone]  Patient called in regards needing to talk with Dr Moise about she receive today a paper from the pharmacy Rx irbesartan. Please call and advise. Thank you.

## 2019-02-04 NOTE — TELEPHONE ENCOUNTER
Discussed that she should take it to her pharmacy and check.    Please make her an appt with me the end of March. HERONL

## 2019-02-18 ENCOUNTER — HOSPITAL ENCOUNTER (OUTPATIENT)
Dept: CARDIOLOGY | Facility: CLINIC | Age: 84
Discharge: HOME | End: 2019-02-18
Payer: MEDICARE

## 2019-02-18 ENCOUNTER — CLINICAL SUPPORT (OUTPATIENT)
Dept: CARDIOLOGY | Facility: HOSPITAL | Age: 84
End: 2019-02-18
Attending: INTERNAL MEDICINE
Payer: MEDICARE

## 2019-02-18 ENCOUNTER — OFFICE VISIT (OUTPATIENT)
Dept: ELECTROPHYSIOLOGY | Facility: CLINIC | Age: 84
End: 2019-02-18
Payer: MEDICARE

## 2019-02-18 VITALS
HEART RATE: 73 BPM | SYSTOLIC BLOOD PRESSURE: 142 MMHG | DIASTOLIC BLOOD PRESSURE: 80 MMHG | BODY MASS INDEX: 29.35 KG/M2 | WEIGHT: 171.94 LBS | HEIGHT: 64 IN

## 2019-02-18 DIAGNOSIS — I44.1 AV BLOCK, 2ND DEGREE: ICD-10-CM

## 2019-02-18 DIAGNOSIS — Z95.0 CARDIAC PACEMAKER IN SITU: Primary | ICD-10-CM

## 2019-02-18 DIAGNOSIS — I10 ESSENTIAL HYPERTENSION: ICD-10-CM

## 2019-02-18 DIAGNOSIS — I49.9 CARDIAC ARRHYTHMIA, UNSPECIFIED CARDIAC ARRHYTHMIA TYPE: ICD-10-CM

## 2019-02-18 DIAGNOSIS — Z95.0 CARDIAC PACEMAKER: ICD-10-CM

## 2019-02-18 PROCEDURE — 99214 OFFICE O/P EST MOD 30 MIN: CPT | Mod: S$GLB,,, | Performed by: NURSE PRACTITIONER

## 2019-02-18 PROCEDURE — 99999 PR PBB SHADOW E&M-EST. PATIENT-LVL III: ICD-10-PCS | Mod: PBBFAC,,, | Performed by: NURSE PRACTITIONER

## 2019-02-18 PROCEDURE — 3079F PR MOST RECENT DIASTOLIC BLOOD PRESSURE 80-89 MM HG: ICD-10-PCS | Mod: CPTII,S$GLB,, | Performed by: NURSE PRACTITIONER

## 2019-02-18 PROCEDURE — 3077F SYST BP >= 140 MM HG: CPT | Mod: CPTII,S$GLB,, | Performed by: NURSE PRACTITIONER

## 2019-02-18 PROCEDURE — 99214 PR OFFICE/OUTPT VISIT, EST, LEVL IV, 30-39 MIN: ICD-10-PCS | Mod: S$GLB,,, | Performed by: NURSE PRACTITIONER

## 2019-02-18 PROCEDURE — 93000 ELECTROCARDIOGRAM COMPLETE: CPT | Mod: S$GLB,,, | Performed by: INTERNAL MEDICINE

## 2019-02-18 PROCEDURE — 99999 PR PBB SHADOW E&M-EST. PATIENT-LVL III: CPT | Mod: PBBFAC,,, | Performed by: NURSE PRACTITIONER

## 2019-02-18 PROCEDURE — 1101F PT FALLS ASSESS-DOCD LE1/YR: CPT | Mod: CPTII,S$GLB,, | Performed by: NURSE PRACTITIONER

## 2019-02-18 PROCEDURE — 93000 RHYTHM STRIP: ICD-10-PCS | Mod: S$GLB,,, | Performed by: INTERNAL MEDICINE

## 2019-02-18 PROCEDURE — 93280 PM DEVICE PROGR EVAL DUAL: CPT

## 2019-02-18 PROCEDURE — 1101F PR PT FALLS ASSESS DOC 0-1 FALLS W/OUT INJ PAST YR: ICD-10-PCS | Mod: CPTII,S$GLB,, | Performed by: NURSE PRACTITIONER

## 2019-02-18 PROCEDURE — 3077F PR MOST RECENT SYSTOLIC BLOOD PRESSURE >= 140 MM HG: ICD-10-PCS | Mod: CPTII,S$GLB,, | Performed by: NURSE PRACTITIONER

## 2019-02-18 PROCEDURE — 3079F DIAST BP 80-89 MM HG: CPT | Mod: CPTII,S$GLB,, | Performed by: NURSE PRACTITIONER

## 2019-02-18 NOTE — PROGRESS NOTES
Ms. Mckinney is a patient of Dr. Coleman and was last seen in clinic 2/9/2018.      Subjective:   Patient ID:  Lynn Mckinney is a 83 y.o. female who presents for follow-up of Pacemaker Check  .     HPI:    Ms. Mckinney is a 83 y.o. female with pAF (distant past), CAD (s/p CABH 2001), HTN, PVD, PPM (10/2013) here for annual follow up.     Background:  pAF (in past? No sustained AF seen on device reports)  CAD  CABG 2001   HL on meds   HTN on meds   PVD carotidStent  Progressive bradycardia over the years with Dr. Avery.  holter showed 2:1 AV block, AVWB. Also has RBBB and LAFB.  PPM placed 10/13. Feels well since. C/o PARMAR at 2 flights (stable), but exertion is limited mostly by PAD.    Update (02/18/2019):    Today she says she feels well with no cardiac complaints. Ms. Mckinney denies chest pain with exertion or at rest, palpitations, SOB, PARMAR, dizziness, or syncope.    Device Interrogation (2/18/2019) reveals an intrinsic CHB with junctional escape beats noted with stable lead and device function. Infrequent NSVT runs, max 13 seconds. PSVT/AT runs noted, max 2 seconds. She paces 21% in the RA and 99% in the RV. Estimated battery longevity 5 years.     I have personally reviewed the patient's EKG today, which shows ASVP at 73bpm.     Recent Cardiac Tests:    2D Echo (2/9/2018):  CONCLUSIONS     1 - Normal left ventricular systolic function (EF 55-60%) with abnormal septal motion from RV pacing.     2 - Mildly depressed right ventricular systolic function .     3 - Indeterminate LV diastolic function.     4 - Moderate left atrial enlargement.     5 - The estimated PA systolic pressure is 26 mmHg.     Current Outpatient Medications   Medication Sig    amLODIPine (NORVASC) 5 MG tablet TAKE 1 TABLET (5 MG TOTAL) BY MOUTH ONCE DAILY.    aspirin 81 MG Chew Take 81 mg by mouth every evening. 1 Tablet, Chewable Oral Every day    CALCIUM CARBONATE/VITAMIN D3 (CALTRATE 600 + D ORAL) Take 1 tablet by mouth once daily.     "coenzyme Q10 200 mg capsule Take 200 mg by mouth once daily.    ergocalciferol, vitamin D2, 400 unit Tab Take 400 Units by mouth once daily.    irbesartan-hydrochlorothiazide (AVALIDE) 300-12.5 mg per tablet Take 1 tablet by mouth once daily. Replaces valsartan/hctz    labetalol (NORMODYNE) 200 MG tablet TAKE 1 TABLET (200 MG TOTAL) BY MOUTH 2 (TWO) TIMES DAILY.    LACTOSE-FREE FOOD (BOOST ORAL) Take by mouth. 2-3 days per week    levothyroxine (SYNTHROID) 112 MCG tablet TAKE 1 TABLET BY MOUTH EVERY DAY FOR 6 WEEKS AND 1/2 TABLET ON SUNDAY    multivitamin (THERAGRAN) per tablet Take by mouth. Pt taking one pill three times a week.    rosuvastatin (CRESTOR) 20 MG tablet TAKE 1 TABLET(20 MG) BY MOUTH EVERY DAY    SALMON OIL-OMEGA-3 FATTY ACIDS ORAL Take 1 capsule by mouth once daily.    nitroGLYCERIN (NITROSTAT) 0.4 MG SL tablet Place 1 tablet (0.4 mg total) under the tongue every 5 (five) minutes as needed for Chest pain (to a total of three).     No current facility-administered medications for this visit.        Review of Systems   Constitution: Negative for malaise/fatigue.   Cardiovascular: Negative for chest pain, dyspnea on exertion, irregular heartbeat, leg swelling and palpitations.   Respiratory: Negative for shortness of breath.    Hematologic/Lymphatic: Negative for bleeding problem.   Skin: Negative for rash.   Musculoskeletal: Negative for myalgias.   Gastrointestinal: Negative for hematemesis, hematochezia and nausea.   Genitourinary: Negative for hematuria.   Neurological: Negative for light-headedness.   Psychiatric/Behavioral: Negative for altered mental status.   Allergic/Immunologic: Negative for persistent infections.     Objective:        BP (!) 142/80   Pulse 73   Ht 5' 4" (1.626 m)   Wt 78 kg (171 lb 15.3 oz)   BMI 29.52 kg/m²     Physical Exam   Constitutional: She is oriented to person, place, and time. She appears well-developed and well-nourished.   HENT:   Head: Normocephalic. "   Nose: Nose normal.   Eyes: Pupils are equal, round, and reactive to light.   Cardiovascular: Normal rate, regular rhythm, S1 normal and S2 normal.   No murmur heard.  Pulses:       Radial pulses are 2+ on the right side, and 2+ on the left side.   Pulmonary/Chest: Breath sounds normal. No respiratory distress.   Device to LUCW.   Abdominal: Normal appearance.   Musculoskeletal: Normal range of motion. She exhibits no edema.   Neurological: She is alert and oriented to person, place, and time.   Skin: Skin is warm and dry. No erythema.   Psychiatric: She has a normal mood and affect. Her speech is normal and behavior is normal.   Nursing note and vitals reviewed.    Lab Results   Component Value Date     09/21/2018    K 3.8 09/21/2018    MG 2.1 07/13/2011    BUN 33 (H) 09/21/2018    CREATININE 1.4 09/21/2018    ALT 17 09/21/2018    AST 28 09/21/2018    HGB 13.4 09/21/2018    HCT 39.8 09/21/2018    TSH 1.742 09/21/2018    LDLCALC 57.2 (L) 09/21/2018           Assessment:     1. Cardiac pacemaker in situ    2. AV block, 2nd degree    3. Essential hypertension      Plan:     In summary, Ms. Mckinney is a 83 y.o. female with pAF (distant past), CAD (s/p [facility] 2001), HTN, PVD, PPM (10/2013) here for annual follow up.   Ms. Mckinney is doing well from a device perspective with stable lead and device function. No sustained arrhythmia noted. (Note: Chart lists a history of AF. No AF seen on any device report and patient not on OAC.) BP slightly elevated in clinic but is normally controlled. She can bring BP log to upcoming PCP appt. She continues to V pace 100%; will obtain updated echo. No CHF symptoms.    Schedule echo.  Continue current medication regimen and device settings.   Follow up in device clinic as scheduled.   Follow up in EP clinic in 1 year, sooner as needed.     *A copy of this note has been sent to Dr. Coleman*    Follow-up in about 1 year (around  2/18/2020).    ------------------------------------------------------------------    MARCI Choe, NP-C  [facility]

## 2019-02-20 DIAGNOSIS — I49.9 CARDIAC ARRHYTHMIA, UNSPECIFIED CARDIAC ARRHYTHMIA TYPE: Primary | ICD-10-CM

## 2019-03-25 ENCOUNTER — LAB VISIT (OUTPATIENT)
Dept: LAB | Facility: HOSPITAL | Age: 84
End: 2019-03-25
Attending: INTERNAL MEDICINE
Payer: MEDICARE

## 2019-03-25 DIAGNOSIS — I10 ESSENTIAL HYPERTENSION: ICD-10-CM

## 2019-03-25 DIAGNOSIS — E03.9 HYPOTHYROIDISM, UNSPECIFIED TYPE: ICD-10-CM

## 2019-03-25 DIAGNOSIS — E78.00 PURE HYPERCHOLESTEROLEMIA: ICD-10-CM

## 2019-03-25 LAB
ALBUMIN SERPL BCP-MCNC: 3.7 G/DL (ref 3.5–5.2)
ALP SERPL-CCNC: 51 U/L (ref 55–135)
ALT SERPL W/O P-5'-P-CCNC: 17 U/L (ref 10–44)
ANION GAP SERPL CALC-SCNC: 11 MMOL/L (ref 8–16)
AST SERPL-CCNC: 27 U/L (ref 10–40)
BASOPHILS # BLD AUTO: 0.02 K/UL (ref 0–0.2)
BASOPHILS NFR BLD: 0.4 % (ref 0–1.9)
BILIRUB SERPL-MCNC: 0.9 MG/DL (ref 0.1–1)
BUN SERPL-MCNC: 33 MG/DL (ref 8–23)
CALCIUM SERPL-MCNC: 9.8 MG/DL (ref 8.7–10.5)
CHLORIDE SERPL-SCNC: 105 MMOL/L (ref 95–110)
CHOLEST SERPL-MCNC: 117 MG/DL (ref 120–199)
CHOLEST/HDLC SERPL: 2.3 {RATIO} (ref 2–5)
CO2 SERPL-SCNC: 25 MMOL/L (ref 23–29)
CREAT SERPL-MCNC: 1.4 MG/DL (ref 0.5–1.4)
DIFFERENTIAL METHOD: ABNORMAL
EOSINOPHIL # BLD AUTO: 0.2 K/UL (ref 0–0.5)
EOSINOPHIL NFR BLD: 4.1 % (ref 0–8)
ERYTHROCYTE [DISTWIDTH] IN BLOOD BY AUTOMATED COUNT: 12 % (ref 11.5–14.5)
EST. GFR  (AFRICAN AMERICAN): 40.1 ML/MIN/1.73 M^2
EST. GFR  (NON AFRICAN AMERICAN): 34.8 ML/MIN/1.73 M^2
GLUCOSE SERPL-MCNC: 101 MG/DL (ref 70–110)
HCT VFR BLD AUTO: 40 % (ref 37–48.5)
HDLC SERPL-MCNC: 51 MG/DL (ref 40–75)
HDLC SERPL: 43.6 % (ref 20–50)
HGB BLD-MCNC: 13.5 G/DL (ref 12–16)
IMM GRANULOCYTES # BLD AUTO: 0.01 K/UL (ref 0–0.04)
IMM GRANULOCYTES NFR BLD AUTO: 0.2 % (ref 0–0.5)
LDLC SERPL CALC-MCNC: 53.6 MG/DL (ref 63–159)
LYMPHOCYTES # BLD AUTO: 0.9 K/UL (ref 1–4.8)
LYMPHOCYTES NFR BLD: 17 % (ref 18–48)
MCH RBC QN AUTO: 31.6 PG (ref 27–31)
MCHC RBC AUTO-ENTMCNC: 33.8 G/DL (ref 32–36)
MCV RBC AUTO: 94 FL (ref 82–98)
MONOCYTES # BLD AUTO: 0.6 K/UL (ref 0.3–1)
MONOCYTES NFR BLD: 10.3 % (ref 4–15)
NEUTROPHILS # BLD AUTO: 3.7 K/UL (ref 1.8–7.7)
NEUTROPHILS NFR BLD: 68 % (ref 38–73)
NONHDLC SERPL-MCNC: 66 MG/DL
NRBC BLD-RTO: 0 /100 WBC
PLATELET # BLD AUTO: 170 K/UL (ref 150–350)
PMV BLD AUTO: 9.4 FL (ref 9.2–12.9)
POTASSIUM SERPL-SCNC: 4.5 MMOL/L (ref 3.5–5.1)
PROT SERPL-MCNC: 6.8 G/DL (ref 6–8.4)
RBC # BLD AUTO: 4.27 M/UL (ref 4–5.4)
SODIUM SERPL-SCNC: 141 MMOL/L (ref 136–145)
TRIGL SERPL-MCNC: 62 MG/DL (ref 30–150)
TSH SERPL DL<=0.005 MIU/L-ACNC: 2.44 UIU/ML (ref 0.4–4)
WBC # BLD AUTO: 5.36 K/UL (ref 3.9–12.7)

## 2019-03-25 PROCEDURE — 84443 ASSAY THYROID STIM HORMONE: CPT

## 2019-03-25 PROCEDURE — 80053 COMPREHEN METABOLIC PANEL: CPT

## 2019-03-25 PROCEDURE — 85025 COMPLETE CBC W/AUTO DIFF WBC: CPT

## 2019-03-25 PROCEDURE — 36415 COLL VENOUS BLD VENIPUNCTURE: CPT | Mod: PO

## 2019-03-25 PROCEDURE — 80061 LIPID PANEL: CPT

## 2019-03-27 ENCOUNTER — OFFICE VISIT (OUTPATIENT)
Dept: INTERNAL MEDICINE | Facility: CLINIC | Age: 84
End: 2019-03-27
Payer: MEDICARE

## 2019-03-27 ENCOUNTER — HOSPITAL ENCOUNTER (OUTPATIENT)
Dept: CARDIOLOGY | Facility: CLINIC | Age: 84
Discharge: HOME | End: 2019-03-27
Attending: NURSE PRACTITIONER
Payer: MEDICARE

## 2019-03-27 ENCOUNTER — TELEPHONE (OUTPATIENT)
Dept: ELECTROPHYSIOLOGY | Facility: CLINIC | Age: 84
End: 2019-03-27

## 2019-03-27 VITALS
SYSTOLIC BLOOD PRESSURE: 122 MMHG | BODY MASS INDEX: 29.92 KG/M2 | HEART RATE: 68 BPM | DIASTOLIC BLOOD PRESSURE: 60 MMHG | OXYGEN SATURATION: 95 % | WEIGHT: 175.25 LBS | HEIGHT: 64 IN

## 2019-03-27 VITALS
DIASTOLIC BLOOD PRESSURE: 78 MMHG | BODY MASS INDEX: 30.39 KG/M2 | SYSTOLIC BLOOD PRESSURE: 130 MMHG | HEIGHT: 64 IN | HEART RATE: 70 BPM | WEIGHT: 178 LBS

## 2019-03-27 DIAGNOSIS — Z12.31 ENCOUNTER FOR SCREENING MAMMOGRAM FOR BREAST CANCER: ICD-10-CM

## 2019-03-27 DIAGNOSIS — I10 ESSENTIAL HYPERTENSION: Primary | ICD-10-CM

## 2019-03-27 DIAGNOSIS — Z95.0 CARDIAC PACEMAKER IN SITU: Primary | ICD-10-CM

## 2019-03-27 DIAGNOSIS — I25.10 CORONARY ARTERY DISEASE INVOLVING NATIVE CORONARY ARTERY OF NATIVE HEART WITHOUT ANGINA PECTORIS: ICD-10-CM

## 2019-03-27 DIAGNOSIS — I73.9 PERIPHERAL ARTERIAL DISEASE: ICD-10-CM

## 2019-03-27 DIAGNOSIS — E03.9 HYPOTHYROIDISM, UNSPECIFIED TYPE: ICD-10-CM

## 2019-03-27 DIAGNOSIS — Z95.0 CARDIAC PACEMAKER IN SITU: ICD-10-CM

## 2019-03-27 DIAGNOSIS — R26.89 BALANCE PROBLEMS: ICD-10-CM

## 2019-03-27 DIAGNOSIS — I44.1 AV BLOCK, 2ND DEGREE: ICD-10-CM

## 2019-03-27 DIAGNOSIS — I44.2 CHB (COMPLETE HEART BLOCK): ICD-10-CM

## 2019-03-27 DIAGNOSIS — N18.30 CKD (CHRONIC KIDNEY DISEASE) STAGE 3, GFR 30-59 ML/MIN: ICD-10-CM

## 2019-03-27 DIAGNOSIS — E78.00 PURE HYPERCHOLESTEROLEMIA: ICD-10-CM

## 2019-03-27 DIAGNOSIS — Z12.11 COLON CANCER SCREENING: ICD-10-CM

## 2019-03-27 LAB
ASCENDING AORTA: 3.53 CM
AV INDEX (PROSTH): 0.66
AV MEAN GRADIENT: 4.15 MMHG
AV PEAK GRADIENT: 8.64 MMHG
AV VALVE AREA: 2.67 CM2
AV VELOCITY RATIO: 0.63
BSA FOR ECHO PROCEDURE: 1.91 M2
CV ECHO LV RWT: 0.44 CM
DOP CALC AO PEAK VEL: 1.47 M/S
DOP CALC AO VTI: 31.75 CM
DOP CALC LVOT AREA: 4.05 CM2
DOP CALC LVOT DIAMETER: 2.27 CM
DOP CALC LVOT PEAK VEL: 0.93 M/S
DOP CALC LVOT STROKE VOLUME: 84.66 CM3
DOP CALCLVOT PEAK VEL VTI: 20.93 CM
E WAVE DECELERATION TIME: 236.1 MSEC
E/A RATIO: 0.46
E/E' RATIO: 11.82
ECHO LV POSTERIOR WALL: 1.04 CM (ref 0.6–1.1)
FRACTIONAL SHORTENING: 29 % (ref 28–44)
INTERVENTRICULAR SEPTUM: 1.04 CM (ref 0.6–1.1)
LA MAJOR: 6.14 CM
LA MINOR: 6.4 CM
LA WIDTH: 4 CM
LEFT ATRIUM SIZE: 4 CM
LEFT ATRIUM VOLUME INDEX: 45.8 ML/M2
LEFT ATRIUM VOLUME: 85.24 CM3
LEFT INTERNAL DIMENSION IN SYSTOLE: 3.39 CM (ref 2.1–4)
LEFT VENTRICLE DIASTOLIC VOLUME INDEX: 56.82 ML/M2
LEFT VENTRICLE DIASTOLIC VOLUME: 105.77 ML
LEFT VENTRICLE MASS INDEX: 95.5 G/M2
LEFT VENTRICLE SYSTOLIC VOLUME INDEX: 25.3 ML/M2
LEFT VENTRICLE SYSTOLIC VOLUME: 47.09 ML
LEFT VENTRICULAR INTERNAL DIMENSION IN DIASTOLE: 4.77 CM (ref 3.5–6)
LEFT VENTRICULAR MASS: 177.72 G
LV LATERAL E/E' RATIO: 9.29
LV SEPTAL E/E' RATIO: 16.25
MV PEAK A VEL: 1.41 M/S
MV PEAK E VEL: 0.65 M/S
PISA TR MAX VEL: 3 M/S
PULM VEIN S/D RATIO: 0.69
PV PEAK D VEL: 0.67 M/S
PV PEAK S VEL: 0.46 M/S
RA MAJOR: 5.82 CM
RA PRESSURE: 3 MMHG
RA WIDTH: 4 CM
RIGHT VENTRICULAR END-DIASTOLIC DIMENSION: 2.44 CM
SINUS: 3.39 CM
STJ: 3.55 CM
TDI LATERAL: 0.07
TDI SEPTAL: 0.04
TDI: 0.06
TR MAX PG: 36 MMHG
TRICUSPID ANNULAR PLANE SYSTOLIC EXCURSION: 1.15 CM
TV REST PULMONARY ARTERY PRESSURE: 39 MMHG

## 2019-03-27 PROCEDURE — 3074F PR MOST RECENT SYSTOLIC BLOOD PRESSURE < 130 MM HG: ICD-10-PCS | Mod: CPTII,S$GLB,, | Performed by: INTERNAL MEDICINE

## 2019-03-27 PROCEDURE — 93306 TTE W/DOPPLER COMPLETE: CPT | Mod: S$GLB,,, | Performed by: INTERNAL MEDICINE

## 2019-03-27 PROCEDURE — 1101F PR PT FALLS ASSESS DOC 0-1 FALLS W/OUT INJ PAST YR: ICD-10-PCS | Mod: CPTII,S$GLB,, | Performed by: INTERNAL MEDICINE

## 2019-03-27 PROCEDURE — 3078F DIAST BP <80 MM HG: CPT | Mod: CPTII,S$GLB,, | Performed by: INTERNAL MEDICINE

## 2019-03-27 PROCEDURE — 1101F PT FALLS ASSESS-DOCD LE1/YR: CPT | Mod: CPTII,S$GLB,, | Performed by: INTERNAL MEDICINE

## 2019-03-27 PROCEDURE — 93306 TRANSTHORACIC ECHO (TTE) COMPLETE (CUPID ONLY): ICD-10-PCS | Mod: S$GLB,,, | Performed by: INTERNAL MEDICINE

## 2019-03-27 PROCEDURE — 3074F SYST BP LT 130 MM HG: CPT | Mod: CPTII,S$GLB,, | Performed by: INTERNAL MEDICINE

## 2019-03-27 PROCEDURE — 99214 PR OFFICE/OUTPT VISIT, EST, LEVL IV, 30-39 MIN: ICD-10-PCS | Mod: S$GLB,,, | Performed by: INTERNAL MEDICINE

## 2019-03-27 PROCEDURE — 3078F PR MOST RECENT DIASTOLIC BLOOD PRESSURE < 80 MM HG: ICD-10-PCS | Mod: CPTII,S$GLB,, | Performed by: INTERNAL MEDICINE

## 2019-03-27 PROCEDURE — 99999 PR PBB SHADOW E&M-EST. PATIENT-LVL V: ICD-10-PCS | Mod: PBBFAC,,, | Performed by: INTERNAL MEDICINE

## 2019-03-27 PROCEDURE — 99999 PR PBB SHADOW E&M-EST. PATIENT-LVL V: CPT | Mod: PBBFAC,,, | Performed by: INTERNAL MEDICINE

## 2019-03-27 PROCEDURE — 99214 OFFICE O/P EST MOD 30 MIN: CPT | Mod: S$GLB,,, | Performed by: INTERNAL MEDICINE

## 2019-03-27 NOTE — PROGRESS NOTES
Subjective:      Patient ID: Lynn Mckinney is a 83 y.o. female.    Chief Complaint: Follow-up    HPI:  HPI   Patient is here for follow up of    Hypertension:  Patient's blood pressure has been well controlled and is at goal there are no side effects to the medication    Hyperlipidemia:  Patient remains on statin and aspirin doing well    CKD:  Patient has CKD 3 which is monitored by me every 6 months and is stable.    Echo stable:  Recent echo is stable    Mammo will be due in April:  Discussed health screening    Balance problems  Patient Active Problem List   Diagnosis    Hypertension    Hyperlipidemia    Peripheral arterial disease    CAD (coronary artery disease)    S/P CABG x 3    Hypothyroidism    Incontinence    CKD (chronic kidney disease) stage 3, GFR 30-59 ml/min    Vitamin D deficiency    AV block, 2nd degree    Cardiac pacemaker in situ    Left-sided carotid artery disease    Claudication    Right hip pain    Lumbar stenosis    Thoracic or lumbosacral neuritis or radiculitis    Hip pain    Bilateral carotid artery stenosis    Lumbar radiculitis     Past Medical History:   Diagnosis Date    Allergy     seasonal    Blood clotting tendency     Carotid artery disease 5/29/2014    Coronary artery disease     Heart block     Hyperlipidemia     Hypertension     Hypothyroid     Obesity     Pacemaker     PAD (peripheral artery disease)     Spinal stenosis     Thoracic or lumbosacral neuritis or radiculitis 11/25/2014    Tubular adenoma      Past Surgical History:   Procedure Laterality Date    ANGIOPLASTY      APPENDECTOMY      BREAST CYST ASPIRATION Bilateral     CARDIAC CATHETERIZATION      CARDIAC PACEMAKER PLACEMENT  10/10/13    by Dr. Coleman    CAROTID STENT Left 2007    by Dr. Bowen    CATARACT EXTRACTION W/  INTRAOCULAR LENS IMPLANT Bilateral 2012    Dr Sharp    CORONARY ARTERY BYPASS GRAFT  2001     x 3 LIMA-Ramus, SVG-OM1 & SVG-PDA    CYST REMOVAL      wrist     "EYE SURGERY      HYSTERECTOMY      INJECTION-JOINT Right 12/15/2014    Performed by [facility] at [facility] PAIN MGT    OOPHORECTOMY      THYROIDECTOMY       Family History   Problem Relation Age of Onset    Heart disease Father         coronary thrombosis    Hypertension Father     Alzheimer's disease Mother     Heart disease Mother         pacemaker    Hypertension Mother     Diabetes Brother     No Known Problems Sister     No Known Problems Maternal Grandmother     No Known Problems Maternal Grandfather     No Known Problems Paternal Grandmother     No Known Problems Paternal Grandfather     Heart disease Brother     No Known Problems Maternal Aunt     No Known Problems Maternal Uncle     No Known Problems Paternal Aunt     No Known Problems Paternal Uncle     Amblyopia Neg Hx     Blindness Neg Hx     Cancer Neg Hx     Cataracts Neg Hx     Glaucoma Neg Hx     Macular degeneration Neg Hx     Retinal detachment Neg Hx     Strabismus Neg Hx     Stroke Neg Hx     Thyroid disease Neg Hx     Melanoma Neg Hx      Review of Systems   Constitutional: Negative for chills, fever and unexpected weight change.   HENT: Negative for trouble swallowing.         Allergies   Respiratory: Negative for cough, shortness of breath and wheezing.    Cardiovascular: Negative for chest pain and palpitations.   Gastrointestinal: Negative for abdominal distention, abdominal pain, blood in stool and vomiting.   Musculoskeletal: Negative for back pain.     Objective:     Vitals:    03/27/19 1307   BP: 122/60   Pulse: 68   SpO2: 95%   Weight: 79.5 kg (175 lb 4.3 oz)   Height: 5' 4" (1.626 m)   PainSc: 0-No pain     Body mass index is 30.08 kg/m².  Physical Exam   Constitutional: She is oriented to person, place, and time. She appears well-developed and well-nourished. No distress.   Neck: Carotid bruit is not present. No thyromegaly present.   Cardiovascular: Normal rate, regular rhythm and normal heart sounds. " PMI is not displaced.   Pulmonary/Chest: Effort normal and breath sounds normal. No respiratory distress.   Abdominal: Soft. Bowel sounds are normal. She exhibits no distension. There is no tenderness.   Musculoskeletal: She exhibits no edema.   Neurological: She is alert and oriented to person, place, and time.     Assessment:     1. Essential hypertension    2. Encounter for screening mammogram for breast cancer    3. Colon cancer screening    4. Balance problems    5. Hypothyroidism, unspecified type    6. Pure hypercholesterolemia    7. CKD (chronic kidney disease) stage 3, GFR 30-59 ml/min    8. Coronary artery disease involving native coronary artery of native heart without angina pectoris    9. Peripheral arterial disease      Plan:   Lynn was seen today for follow-up.    Diagnoses and all orders for this visit:    Essential hypertension:  Well controlled same medications    Encounter for screening mammogram for breast cancer  -     Mammo Digital Screening Bilat w/ Tyler; Future    Colon cancer screening  -     Fecal Immunochemical Test (iFOBT); Future    Balance problems:  Patient requested help with balance  -     WALKER FOR HOME USE  -     Ambulatory consult to Physical Therapy    Hypothyroidism, unspecified type:  Same medication    Pure hypercholesterolemia:  Monitored in same medication    CKD (chronic kidney disease) stage 3, GFR 30-59 ml/min:  Monitored by me every 6 months and when Nephrology is available she is due for an appointment    Coronary artery disease involving native coronary artery of native heart without angina pectoris:  Stable    Peripheral arterial disease:  Stable        Problem List Items Addressed This Visit     Hypertension - Primary    Hyperlipidemia    Overview     reported side effects from atorvastatin.  pain in both hands, both shoulders and her left ankle 3/15 while on 80 mg of atorvastatin   Switched to rosuvastatin 20 in 3/15           Peripheral arterial disease    CAD  "(coronary artery disease)    Overview     CAD S/P CABG  2001                                                          mid LAD          Hypothyroidism    CKD (chronic kidney disease) stage 3, GFR 30-59 ml/min      Other Visit Diagnoses     Encounter for screening mammogram for breast cancer        Relevant Orders    Mammo Digital Screening Bilat w/ [facility]    Colon cancer screening        Relevant Orders    Fecal Immunochemical Test (iFOBT)    Balance problems        Relevant Orders    WALKER FOR HOME USE    Ambulatory consult to Physical Therapy        Orders Placed This Encounter   Procedures    WALKER FOR HOME USE     Order Specific Question:   Type of Walker:     Answer:   Adult (5'4"-6'6")     Order Specific Question:   With wheels?     Answer:   Yes     Order Specific Question:   Height:     Answer:   5' 4" (1.626 m)     Order Specific Question:   Weight:     Answer:   79.5 kg (175 lb 4.3 oz)     Order Specific Question:   Length of need (1-99 months):     Answer:   99     Order Specific Question:   Accessories/Other:     Answer:   Seat, and brakes     Order Specific Question:   Does patient have medical equipment at home?     Answer:   none     Order Specific Question:   Please check all that apply:     Answer:   Patient's condition impairs ambulation.    Mammo Digital Screening Bilat w/ Tyler     Standing Status:   Future     Standing Expiration Date:   5/26/2020     Order Specific Question:   May the Radiologist modify the order per protocol to meet the clinical needs of the patient?     Answer:   Yes    Fecal Immunochemical Test (iFOBT)     Standing Status:   Future     Standing Expiration Date:   5/25/2020    Ambulatory consult to Physical Therapy     Referral Priority:   Routine     Referral Type:   Physical Medicine     Referral Reason:   Specialty Services Required     Requested Specialty:   Physical Therapy     Number of Visits Requested:   1     Follow up in about 6 months (around 9/27/2019) for " Follow up.     Medication List           Accurate as of 3/27/19  1:40 PM. If you have any questions, ask your nurse or doctor.               CONTINUE taking these medications    amLODIPine 5 MG tablet  Commonly known as:  NORVASC  TAKE 1 TABLET (5 MG TOTAL) BY MOUTH ONCE DAILY.     aspirin 81 MG Chew     BOOST ORAL     CALTRATE 600 + D ORAL     coenzyme Q10 200 mg capsule     ergocalciferol (vitamin D2) 400 unit Tab     irbesartan-hydrochlorothiazide 300-12.5 mg per tablet  Commonly known as:  AVALIDE  Take 1 tablet by mouth once daily. Replaces valsartan/hctz     labetalol 200 MG tablet  Commonly known as:  NORMODYNE  TAKE 1 TABLET (200 MG TOTAL) BY MOUTH 2 (TWO) TIMES DAILY.     levothyroxine 112 MCG tablet  Commonly known as:  SYNTHROID  TAKE 1 TABLET BY MOUTH EVERY DAY FOR 6 WEEKS AND 1/2 TABLET ON SUNDAY     multivitamin per tablet  Commonly known as:  THERAGRAN     nitroGLYCERIN 0.4 MG SL tablet  Commonly known as:  NITROSTAT  Place 1 tablet (0.4 mg total) under the tongue every 5 (five) minutes as needed for Chest pain (to a total of three).     rosuvastatin 20 MG tablet  Commonly known as:  CRESTOR  TAKE 1 TABLET(20 MG) BY MOUTH EVERY DAY     SALMON OIL-OMEGA-3 FATTY ACIDS ORAL

## 2019-03-28 ENCOUNTER — TELEPHONE (OUTPATIENT)
Dept: ELECTROPHYSIOLOGY | Facility: CLINIC | Age: 84
End: 2019-03-28

## 2019-04-09 ENCOUNTER — HOSPITAL ENCOUNTER (OUTPATIENT)
Dept: RADIOLOGY | Facility: HOSPITAL | Age: 84
Discharge: HOME | End: 2019-04-09
Attending: INTERNAL MEDICINE
Payer: MEDICARE

## 2019-04-09 DIAGNOSIS — Z12.31 ENCOUNTER FOR SCREENING MAMMOGRAM FOR BREAST CANCER: ICD-10-CM

## 2019-04-09 PROCEDURE — 77067 SCR MAMMO BI INCL CAD: CPT | Mod: TC,PO

## 2019-05-20 ENCOUNTER — TELEPHONE (OUTPATIENT)
Dept: INTERNAL MEDICINE | Facility: CLINIC | Age: 84
End: 2019-05-20

## 2019-05-20 DIAGNOSIS — N18.30 CKD (CHRONIC KIDNEY DISEASE) STAGE 3, GFR 30-59 ML/MIN: ICD-10-CM

## 2019-05-20 DIAGNOSIS — E78.00 PURE HYPERCHOLESTEROLEMIA: ICD-10-CM

## 2019-05-20 DIAGNOSIS — E03.9 HYPOTHYROIDISM, UNSPECIFIED TYPE: Primary | ICD-10-CM

## 2019-05-20 DIAGNOSIS — E55.9 VITAMIN D DEFICIENCY: ICD-10-CM

## 2019-05-20 NOTE — TELEPHONE ENCOUNTER
----- Message from Teetee Amos sent at 5/20/2019 10:41 AM CDT -----  Contact: self/[phone]  Pt called in regards to getting her 6 month lab orders put into the system. She would like to go on 6-28-19 at 9:30 or 10:00 am at  [place].      Please advise

## 2019-05-20 NOTE — TELEPHONE ENCOUNTER
Outpatient Procedures Ordered This Visit    CBC auto differential           Comprehensive metabolic panel           Lipid panel           TSH           Vitamin D

## 2019-05-27 ENCOUNTER — TELEPHONE (OUTPATIENT)
Dept: CARDIOLOGY | Facility: HOSPITAL | Age: 84
End: 2019-05-27

## 2019-05-27 DIAGNOSIS — I44.2 CHB (COMPLETE HEART BLOCK): ICD-10-CM

## 2019-05-27 DIAGNOSIS — Z95.0 CARDIAC PACEMAKER IN SITU: Primary | ICD-10-CM

## 2019-06-28 ENCOUNTER — LAB VISIT (OUTPATIENT)
Dept: LAB | Facility: HOSPITAL | Age: 84
End: 2019-06-28
Attending: INTERNAL MEDICINE
Payer: MEDICARE

## 2019-06-28 DIAGNOSIS — E55.9 VITAMIN D DEFICIENCY: ICD-10-CM

## 2019-06-28 DIAGNOSIS — E03.9 HYPOTHYROIDISM, UNSPECIFIED TYPE: ICD-10-CM

## 2019-06-28 DIAGNOSIS — E78.00 PURE HYPERCHOLESTEROLEMIA: ICD-10-CM

## 2019-06-28 DIAGNOSIS — N18.30 CKD (CHRONIC KIDNEY DISEASE) STAGE 3, GFR 30-59 ML/MIN: ICD-10-CM

## 2019-06-28 LAB
25(OH)D3+25(OH)D2 SERPL-MCNC: 35 NG/ML (ref 30–96)
ALBUMIN SERPL BCP-MCNC: 4.1 G/DL (ref 3.5–5.2)
ALP SERPL-CCNC: 56 U/L (ref 38–126)
ALT SERPL W/O P-5'-P-CCNC: 18 U/L (ref 10–44)
ANION GAP SERPL CALC-SCNC: 7 MMOL/L (ref 8–16)
AST SERPL-CCNC: 33 U/L (ref 15–46)
BASOPHILS # BLD AUTO: 0.02 K/UL (ref 0–0.2)
BASOPHILS NFR BLD: 0.2 % (ref 0–1.9)
BILIRUB SERPL-MCNC: 0.8 MG/DL (ref 0.1–1)
BUN SERPL-MCNC: 30 MG/DL (ref 7–17)
CALCIUM SERPL-MCNC: 9.8 MG/DL (ref 8.7–10.5)
CHLORIDE SERPL-SCNC: 102 MMOL/L (ref 95–110)
CHOLEST SERPL-MCNC: 120 MG/DL (ref 120–199)
CHOLEST/HDLC SERPL: 2.4 {RATIO} (ref 2–5)
CO2 SERPL-SCNC: 31 MMOL/L (ref 23–29)
CREAT SERPL-MCNC: 1.18 MG/DL (ref 0.5–1.4)
DIFFERENTIAL METHOD: ABNORMAL
EOSINOPHIL # BLD AUTO: 0.2 K/UL (ref 0–0.5)
EOSINOPHIL NFR BLD: 2 % (ref 0–8)
ERYTHROCYTE [DISTWIDTH] IN BLOOD BY AUTOMATED COUNT: 12.3 % (ref 11.5–14.5)
EST. GFR  (AFRICAN AMERICAN): 49.3 ML/MIN/1.73 M^2
EST. GFR  (NON AFRICAN AMERICAN): 42.8 ML/MIN/1.73 M^2
GLUCOSE SERPL-MCNC: 101 MG/DL (ref 70–110)
HCT VFR BLD AUTO: 41 % (ref 37–48.5)
HDLC SERPL-MCNC: 50 MG/DL (ref 40–75)
HDLC SERPL: 41.7 % (ref 20–50)
HGB BLD-MCNC: 13.6 G/DL (ref 12–16)
LDLC SERPL CALC-MCNC: 49.4 MG/DL (ref 63–159)
LYMPHOCYTES # BLD AUTO: 0.9 K/UL (ref 1–4.8)
LYMPHOCYTES NFR BLD: 11.1 % (ref 18–48)
MCH RBC QN AUTO: 30.5 PG (ref 27–31)
MCHC RBC AUTO-ENTMCNC: 33.2 G/DL (ref 32–36)
MCV RBC AUTO: 92 FL (ref 82–98)
MONOCYTES # BLD AUTO: 0.7 K/UL (ref 0.3–1)
MONOCYTES NFR BLD: 8.8 % (ref 4–15)
NEUTROPHILS # BLD AUTO: 6.6 K/UL (ref 1.8–7.7)
NEUTROPHILS NFR BLD: 77.9 % (ref 38–73)
NONHDLC SERPL-MCNC: 70 MG/DL
PLATELET # BLD AUTO: 180 K/UL (ref 150–350)
PMV BLD AUTO: 9.2 FL (ref 9.2–12.9)
POTASSIUM SERPL-SCNC: 4.4 MMOL/L (ref 3.5–5.1)
PROT SERPL-MCNC: 7.5 G/DL (ref 6–8.4)
RBC # BLD AUTO: 4.46 M/UL (ref 4–5.4)
SODIUM SERPL-SCNC: 140 MMOL/L (ref 136–145)
TRIGL SERPL-MCNC: 103 MG/DL (ref 30–150)
TSH SERPL DL<=0.005 MIU/L-ACNC: 1.15 UIU/ML (ref 0.4–4)
WBC # BLD AUTO: 8.44 K/UL (ref 3.9–12.7)

## 2019-06-28 PROCEDURE — 80061 LIPID PANEL: CPT

## 2019-06-28 PROCEDURE — 82306 VITAMIN D 25 HYDROXY: CPT | Mod: PO

## 2019-06-28 PROCEDURE — 84443 ASSAY THYROID STIM HORMONE: CPT | Mod: PO

## 2019-06-28 PROCEDURE — 85025 COMPLETE CBC W/AUTO DIFF WBC: CPT | Mod: PO

## 2019-06-28 PROCEDURE — 80053 COMPREHEN METABOLIC PANEL: CPT | Mod: PO

## 2019-06-28 PROCEDURE — 36415 COLL VENOUS BLD VENIPUNCTURE: CPT | Mod: PO

## 2019-07-08 ENCOUNTER — TELEPHONE (OUTPATIENT)
Dept: INFECTIOUS DISEASES | Facility: HOSPITAL | Age: 84
End: 2019-07-08

## 2019-07-08 ENCOUNTER — TELEPHONE (OUTPATIENT)
Dept: INTERNAL MEDICINE | Facility: CLINIC | Age: 84
End: 2019-07-08

## 2019-07-08 ENCOUNTER — OFFICE VISIT (OUTPATIENT)
Dept: INTERNAL MEDICINE | Facility: CLINIC | Age: 84
End: 2019-07-08
Payer: MEDICARE

## 2019-07-08 VITALS
WEIGHT: 172.63 LBS | HEIGHT: 65 IN | OXYGEN SATURATION: 94 % | BODY MASS INDEX: 28.76 KG/M2 | SYSTOLIC BLOOD PRESSURE: 122 MMHG | HEART RATE: 74 BPM | DIASTOLIC BLOOD PRESSURE: 68 MMHG

## 2019-07-08 DIAGNOSIS — Z95.0 CARDIAC PACEMAKER IN SITU: ICD-10-CM

## 2019-07-08 DIAGNOSIS — E03.9 HYPOTHYROIDISM, UNSPECIFIED TYPE: ICD-10-CM

## 2019-07-08 DIAGNOSIS — M81.0 OSTEOPOROSIS, UNSPECIFIED OSTEOPOROSIS TYPE, UNSPECIFIED PATHOLOGICAL FRACTURE PRESENCE: ICD-10-CM

## 2019-07-08 DIAGNOSIS — E78.00 PURE HYPERCHOLESTEROLEMIA: ICD-10-CM

## 2019-07-08 DIAGNOSIS — I10 ESSENTIAL HYPERTENSION: ICD-10-CM

## 2019-07-08 DIAGNOSIS — N18.30 CKD (CHRONIC KIDNEY DISEASE) STAGE 3, GFR 30-59 ML/MIN: ICD-10-CM

## 2019-07-08 DIAGNOSIS — I65.23 BILATERAL CAROTID ARTERY STENOSIS: ICD-10-CM

## 2019-07-08 DIAGNOSIS — I44.2 CHB (COMPLETE HEART BLOCK): ICD-10-CM

## 2019-07-08 DIAGNOSIS — M81.0 AGE-RELATED OSTEOPOROSIS WITHOUT CURRENT PATHOLOGICAL FRACTURE: ICD-10-CM

## 2019-07-08 DIAGNOSIS — I25.10 CORONARY ARTERY DISEASE INVOLVING NATIVE CORONARY ARTERY OF NATIVE HEART WITHOUT ANGINA PECTORIS: Primary | ICD-10-CM

## 2019-07-08 PROCEDURE — 3078F PR MOST RECENT DIASTOLIC BLOOD PRESSURE < 80 MM HG: ICD-10-PCS | Mod: CPTII,S$GLB,, | Performed by: INTERNAL MEDICINE

## 2019-07-08 PROCEDURE — 3078F DIAST BP <80 MM HG: CPT | Mod: CPTII,S$GLB,, | Performed by: INTERNAL MEDICINE

## 2019-07-08 PROCEDURE — 3074F PR MOST RECENT SYSTOLIC BLOOD PRESSURE < 130 MM HG: ICD-10-PCS | Mod: CPTII,S$GLB,, | Performed by: INTERNAL MEDICINE

## 2019-07-08 PROCEDURE — 99214 OFFICE O/P EST MOD 30 MIN: CPT | Mod: S$GLB,,, | Performed by: INTERNAL MEDICINE

## 2019-07-08 PROCEDURE — 99214 PR OFFICE/OUTPT VISIT, EST, LEVL IV, 30-39 MIN: ICD-10-PCS | Mod: S$GLB,,, | Performed by: INTERNAL MEDICINE

## 2019-07-08 PROCEDURE — 99999 PR PBB SHADOW E&M-EST. PATIENT-LVL III: CPT | Mod: PBBFAC,,, | Performed by: INTERNAL MEDICINE

## 2019-07-08 PROCEDURE — 1101F PT FALLS ASSESS-DOCD LE1/YR: CPT | Mod: CPTII,S$GLB,, | Performed by: INTERNAL MEDICINE

## 2019-07-08 PROCEDURE — 1101F PR PT FALLS ASSESS DOC 0-1 FALLS W/OUT INJ PAST YR: ICD-10-PCS | Mod: CPTII,S$GLB,, | Performed by: INTERNAL MEDICINE

## 2019-07-08 PROCEDURE — 3074F SYST BP LT 130 MM HG: CPT | Mod: CPTII,S$GLB,, | Performed by: INTERNAL MEDICINE

## 2019-07-08 PROCEDURE — 99999 PR PBB SHADOW E&M-EST. PATIENT-LVL III: ICD-10-PCS | Mod: PBBFAC,,, | Performed by: INTERNAL MEDICINE

## 2019-07-08 NOTE — TELEPHONE ENCOUNTER
----- Message from Marielena Moise MD sent at 7/8/2019  1:51 PM CDT -----  Contact: Marielena Reynoso,     This is another patient that I wanted to have start Prolia but  Of course do not have the order in correctly. If you pend it to me and send me the directions again I will review them.    Thank you,  Marielena Moise

## 2019-07-08 NOTE — PROGRESS NOTES
Subjective:      Patient ID: Lynn Mckinney is a 83 y.o. female.    Chief Complaint: Follow-up    HPI:  HPI    6 month follow up of medical issues    Patient has a history of osteoporosis without any history of fracture although the patient does have osteoporosis at the femoral neck bilaterally. We discussed options for medication with her current creatinine she is not a candidate for alendronate but could consider Prolia.  Patient wishes to be started on Prolia.  Her vitamin-D was over 30.    Patient has history of coronary artery disease, hyperlipidemia and hypertension.  Laboratory studies were done prior to the appointment which are all stable and within goal    Patient has history of chronic kidney disease stage 3 and her GFR today is improved.    Patient has a history of hypothyroidism continues on same medication levothyroxine 112 mcg with TSH in range  Patient Active Problem List   Diagnosis    Hypertension    Hyperlipidemia    Peripheral arterial disease    CAD (coronary artery disease)    S/P CABG x 3    Hypothyroidism    Incontinence    CKD (chronic kidney disease) stage 3, GFR 30-59 ml/min    Vitamin D deficiency    CHB (complete heart block)    Cardiac pacemaker in situ    Left-sided carotid artery disease    Claudication    Right hip pain    Lumbar stenosis    Thoracic or lumbosacral neuritis or radiculitis    Hip pain    Bilateral carotid artery stenosis    Lumbar radiculitis    Age-related osteoporosis without current pathological fracture     Past Medical History:   Diagnosis Date    Allergy     seasonal    Blood clotting tendency     Carotid artery disease 5/29/2014    Coronary artery disease     Heart block     Hyperlipidemia     Hypertension     Hypothyroid     Obesity     Pacemaker     PAD (peripheral artery disease)     Spinal stenosis     Thoracic or lumbosacral neuritis or radiculitis 11/25/2014    Tubular adenoma      Past Surgical History:   Procedure  Laterality Date    ANGIOPLASTY      APPENDECTOMY      BREAST CYST ASPIRATION Bilateral     CARDIAC CATHETERIZATION      CARDIAC PACEMAKER PLACEMENT  10/10/13    by Dr. Coleman    CAROTID STENT Left 2007    by Dr. Bowen    CATARACT EXTRACTION W/  INTRAOCULAR LENS IMPLANT Bilateral 2012    Dr Sharp    CORONARY ARTERY BYPASS GRAFT  2001     x 3 LIMA-Ramus, SVG-OM1 & SVG-PDA    CYST REMOVAL      wrist    EYE SURGERY      HYSTERECTOMY      INJECTION-JOINT Right 12/15/2014    [facility] PA[facility]    OOPHORECTOMY      THYROIDECTOMY       Family History   Problem Relation Age of Onset    Heart disease Father         coronary thrombosis    Hypertension Father     Alzheimer's disease Mother     Heart disease Mother         pacemaker    Hypertension Mother     Diabetes Brother     No Known Problems Sister     No Known Problems Maternal Grandmother     No Known Problems Maternal Grandfather     No Known Problems Paternal Grandmother     No Known Problems Paternal Grandfather     Heart disease Brother     No Known Problems Maternal Aunt     No Known Problems Maternal Uncle     No Known Problems Paternal Aunt     No Known Problems Paternal Uncle     Amblyopia Neg Hx     Blindness Neg Hx     Cancer Neg Hx     Cataracts Neg Hx     Glaucoma Neg Hx     Macular degeneration Neg Hx     Retinal detachment Neg Hx     Strabismus Neg Hx     Stroke Neg Hx     Thyroid disease Neg Hx     Melanoma Neg Hx      Review of Systems   Constitutional: Negative for chills, fever and unexpected weight change.   HENT: Negative for trouble swallowing.    Respiratory: Negative for cough, shortness of breath and wheezing.    Cardiovascular: Negative for chest pain and palpitations.   Gastrointestinal: Negative for abdominal distention, abdominal pain, blood in stool and vomiting.   Musculoskeletal: Negative for back pain.     Objective:     Vitals:    07/08/19 1317 07/08/19 1339   BP: 98/70  "122/68   Pulse: 74    SpO2: (!) 94%    Weight: 78.3 kg (172 lb 9.9 oz)    Height: 5' 4.5" (1.638 m)    PainSc:   3      Body mass index is 29.17 kg/m².  Physical Exam   Constitutional: She is oriented to person, place, and time. She appears well-developed and well-nourished. No distress.   Neck: Carotid bruit is not present. No thyromegaly present.   Cardiovascular: Normal rate, regular rhythm and normal heart sounds. PMI is not displaced.   Pulmonary/Chest: Effort normal and breath sounds normal. No respiratory distress.   Abdominal: Soft. Bowel sounds are normal. She exhibits no distension. There is no tenderness.   Musculoskeletal: She exhibits no edema.   Neurological: She is alert and oriented to person, place, and time.     Assessment:     1. Coronary artery disease involving native coronary artery of native heart without angina pectoris    2. CHB (complete heart block)    3. Bilateral carotid artery stenosis    4. Cardiac pacemaker in situ    5. CKD (chronic kidney disease) stage 3, GFR 30-59 ml/min    6. Pure hypercholesterolemia    7. Essential hypertension    8. Hypothyroidism, unspecified type    9. Osteoporosis, unspecified osteoporosis type, unspecified pathological fracture presence      Plan:   Lynn was seen today for follow-up.    Diagnoses and all orders for this visit:    Coronary artery disease involving native coronary artery of native heart without angina pectoris:  Stable continue same medication    CHB (complete heart block) patient has pacemaker    Bilateral carotid artery stenosis on statin aspirin    Cardiac pacemaker in situ    CKD (chronic kidney disease) stage 3, GFR 30-59 ml/min GFR slightly improved    Pure hypercholesterolemia:  Continue statin    Essential hypertension blood pressure initially slightly low but upon repeat did come up to a systolic of 120    Hypothyroidism, unspecified type continue same dose of medicine    Osteoporosis, unspecified osteoporosis type, unspecified " pathological fracture presence begin Prolia        Problem List Items Addressed This Visit     Hypertension    Hyperlipidemia    Overview     reported side effects from atorvastatin.  pain in both hands, both shoulders and her left ankle 3/15 while on 80 mg of atorvastatin   Switched to rosuvastatin 20 in 3/15           CAD (coronary artery disease) - Primary    Overview     CAD S/P CABG  2001                                                          mid LAD          Hypothyroidism    CKD (chronic kidney disease) stage 3, GFR 30-59 ml/min    CHB (complete heart block)    Cardiac pacemaker in situ    Bilateral carotid artery stenosis      Other Visit Diagnoses     Osteoporosis, unspecified osteoporosis type, unspecified pathological fracture presence            No orders of the defined types were placed in this encounter.    Follow up in 6 months (on 1/8/2020) for Follow up.     Medication List           Accurate as of 7/8/19  5:50 PM. If you have any questions, ask your nurse or doctor.               CONTINUE taking these medications    amLODIPine 5 MG tablet  Commonly known as:  NORVASC  TAKE 1 TABLET (5 MG TOTAL) BY MOUTH ONCE DAILY.     aspirin 81 MG Chew     BOOST ORAL     CALTRATE 600 + D ORAL     coenzyme Q10 200 mg capsule     ergocalciferol (vitamin D2) 400 unit Tab     irbesartan-hydrochlorothiazide 300-12.5 mg per tablet  Commonly known as:  AVALIDE  Take 1 tablet by mouth once daily. Replaces valsartan/hctz     labetalol 200 MG tablet  Commonly known as:  NORMODYNE  TAKE 1 TABLET (200 MG TOTAL) BY MOUTH 2 (TWO) TIMES DAILY.     levothyroxine 112 MCG tablet  Commonly known as:  SYNTHROID  TAKE 1 TABLET BY MOUTH EVERY DAY FOR 6 WEEKS AND 1/2 TABLET ON SUNDAY     multivitamin per tablet  Commonly known as:  THERAGRAN     nitroGLYCERIN 0.4 MG SL tablet  Commonly known as:  NITROSTAT  Place 1 tablet (0.4 mg total) under the tongue every 5 (five) minutes as needed for Chest pain (to a total of three).      rosuvastatin 20 MG tablet  Commonly known as:  CRESTOR  TAKE 1 TABLET(20 MG) BY MOUTH EVERY DAY     SALMON OIL-OMEGA-3 FATTY ACIDS ORAL

## 2019-07-19 ENCOUNTER — LAB VISIT (OUTPATIENT)
Dept: LAB | Facility: HOSPITAL | Age: 84
End: 2019-07-19
Attending: INTERNAL MEDICINE
Payer: MEDICARE

## 2019-07-19 DIAGNOSIS — E78.00 PURE HYPERCHOLESTEROLEMIA: ICD-10-CM

## 2019-07-19 DIAGNOSIS — I10 ESSENTIAL HYPERTENSION: ICD-10-CM

## 2019-07-19 LAB
ALT SERPL W/O P-5'-P-CCNC: 17 U/L (ref 10–44)
ANION GAP SERPL CALC-SCNC: 6 MMOL/L (ref 8–16)
AST SERPL-CCNC: 30 U/L (ref 15–46)
BUN SERPL-MCNC: 29 MG/DL (ref 7–17)
CALCIUM SERPL-MCNC: 9.4 MG/DL (ref 8.7–10.5)
CHLORIDE SERPL-SCNC: 104 MMOL/L (ref 95–110)
CHOLEST SERPL-MCNC: 121 MG/DL (ref 120–199)
CHOLEST/HDLC SERPL: 2.6 {RATIO} (ref 2–5)
CO2 SERPL-SCNC: 30 MMOL/L (ref 23–29)
CREAT SERPL-MCNC: 1.27 MG/DL (ref 0.5–1.4)
EST. GFR  (AFRICAN AMERICAN): 45.1 ML/MIN/1.73 M^2
EST. GFR  (NON AFRICAN AMERICAN): 39.1 ML/MIN/1.73 M^2
GLUCOSE SERPL-MCNC: 106 MG/DL (ref 70–110)
HDLC SERPL-MCNC: 47 MG/DL (ref 40–75)
HDLC SERPL: 38.8 % (ref 20–50)
LDLC SERPL CALC-MCNC: 57.4 MG/DL (ref 63–159)
NONHDLC SERPL-MCNC: 74 MG/DL
POTASSIUM SERPL-SCNC: 4.1 MMOL/L (ref 3.5–5.1)
SODIUM SERPL-SCNC: 140 MMOL/L (ref 136–145)
TRIGL SERPL-MCNC: 83 MG/DL (ref 30–150)

## 2019-07-19 PROCEDURE — 80048 BASIC METABOLIC PNL TOTAL CA: CPT | Mod: PO

## 2019-07-19 PROCEDURE — 36415 COLL VENOUS BLD VENIPUNCTURE: CPT | Mod: PO

## 2019-07-19 PROCEDURE — 84460 ALANINE AMINO (ALT) (SGPT): CPT | Mod: PO

## 2019-07-19 PROCEDURE — 80061 LIPID PANEL: CPT

## 2019-07-19 PROCEDURE — 84450 TRANSFERASE (AST) (SGOT): CPT | Mod: PO

## 2019-07-25 ENCOUNTER — OFFICE VISIT (OUTPATIENT)
Dept: CARDIOLOGY | Facility: CLINIC | Age: 84
End: 2019-07-25
Payer: MEDICARE

## 2019-07-25 VITALS
DIASTOLIC BLOOD PRESSURE: 80 MMHG | HEIGHT: 64 IN | WEIGHT: 170.75 LBS | HEART RATE: 80 BPM | SYSTOLIC BLOOD PRESSURE: 144 MMHG | BODY MASS INDEX: 29.15 KG/M2

## 2019-07-25 DIAGNOSIS — I25.10 CORONARY ARTERY DISEASE INVOLVING NATIVE CORONARY ARTERY OF NATIVE HEART WITHOUT ANGINA PECTORIS: Primary | ICD-10-CM

## 2019-07-25 DIAGNOSIS — N18.30 CHRONIC KIDNEY DISEASE, STAGE III (MODERATE): ICD-10-CM

## 2019-07-25 DIAGNOSIS — Z95.0 CARDIAC PACEMAKER: ICD-10-CM

## 2019-07-25 DIAGNOSIS — E78.00 PURE HYPERCHOLESTEROLEMIA: ICD-10-CM

## 2019-07-25 DIAGNOSIS — R69 DIAGNOSIS DEFERRED: ICD-10-CM

## 2019-07-25 DIAGNOSIS — Z95.1 S/P CABG X 3: ICD-10-CM

## 2019-07-25 DIAGNOSIS — I65.22 STENOSIS OF LEFT CAROTID ARTERY: ICD-10-CM

## 2019-07-25 PROCEDURE — 3079F DIAST BP 80-89 MM HG: CPT | Mod: CPTII,S$GLB,, | Performed by: INTERNAL MEDICINE

## 2019-07-25 PROCEDURE — 1101F PR PT FALLS ASSESS DOC 0-1 FALLS W/OUT INJ PAST YR: ICD-10-PCS | Mod: CPTII,S$GLB,, | Performed by: INTERNAL MEDICINE

## 2019-07-25 PROCEDURE — 3077F PR MOST RECENT SYSTOLIC BLOOD PRESSURE >= 140 MM HG: ICD-10-PCS | Mod: CPTII,S$GLB,, | Performed by: INTERNAL MEDICINE

## 2019-07-25 PROCEDURE — 3077F SYST BP >= 140 MM HG: CPT | Mod: CPTII,S$GLB,, | Performed by: INTERNAL MEDICINE

## 2019-07-25 PROCEDURE — 93010 ELECTROCARDIOGRAM REPORT: CPT | Mod: S$GLB,,, | Performed by: INTERNAL MEDICINE

## 2019-07-25 PROCEDURE — 93005 EKG 12-LEAD: ICD-10-PCS | Mod: S$GLB,,, | Performed by: INTERNAL MEDICINE

## 2019-07-25 PROCEDURE — 99999 PR PBB SHADOW E&M-EST. PATIENT-LVL III: ICD-10-PCS | Mod: PBBFAC,,, | Performed by: INTERNAL MEDICINE

## 2019-07-25 PROCEDURE — 99213 OFFICE O/P EST LOW 20 MIN: CPT | Mod: S$GLB,,, | Performed by: INTERNAL MEDICINE

## 2019-07-25 PROCEDURE — 93005 ELECTROCARDIOGRAM TRACING: CPT | Mod: S$GLB,,, | Performed by: INTERNAL MEDICINE

## 2019-07-25 PROCEDURE — 99999 PR PBB SHADOW E&M-EST. PATIENT-LVL III: CPT | Mod: PBBFAC,,, | Performed by: INTERNAL MEDICINE

## 2019-07-25 PROCEDURE — 99213 PR OFFICE/OUTPT VISIT, EST, LEVL III, 20-29 MIN: ICD-10-PCS | Mod: S$GLB,,, | Performed by: INTERNAL MEDICINE

## 2019-07-25 PROCEDURE — 93010 EKG 12-LEAD: ICD-10-PCS | Mod: S$GLB,,, | Performed by: INTERNAL MEDICINE

## 2019-07-25 PROCEDURE — 1101F PT FALLS ASSESS-DOCD LE1/YR: CPT | Mod: CPTII,S$GLB,, | Performed by: INTERNAL MEDICINE

## 2019-07-25 PROCEDURE — 3079F PR MOST RECENT DIASTOLIC BLOOD PRESSURE 80-89 MM HG: ICD-10-PCS | Mod: CPTII,S$GLB,, | Performed by: INTERNAL MEDICINE

## 2019-07-25 NOTE — PATIENT INSTRUCTIONS
Continue low dose aspirin.  Do not take NSAIDs because they can worsen your kidney function. These include both prescription and OTC meds such as Ibuprofen, Advil, Motrin, Aleeve, Naproxen, Meloxicam, Mobic, Diclofenac and Voltaren.  You can take Tylenol for pain. It is not a NSAID. Limit Tylenol to 4 tabs (500 mg each) in a 24 hr period.     LDL - bad type - improves with diet and medications: typically statins; most other medications that lower LDL have not been proven to prevent heart attacks.  May not improve significantly with exercise alone.  Ideally less than 100 mg/dl.   But the lower the better. Statins (cholesterol lowering meds) lower LDL. If you have coronary artery disease or blockages anywhere else in the body, it should be well below 70 mg/dl.    HDL - good type - improves with exercise.  Ideally greater than 50 mg/dl. The proportion of HDL to the total cholesterol is more important than the absolute HDL.  This means a HDL of 45 out of a total cholesterol of 130 mg'dl is pretty good, but the same HDL out of a total of  200 mg/dl is not quite as good. A level of 30% or higher is ideal.    TGs (triglycerides) - also bad - can change very quickly and considerably with certain foods. Improve with diet, exercise and high dose fish oil.  In some cases a low carbohydrate diet will lower TGs better than a low fat diet.  Ideal range  mg/dl.    Sugar, fat and cholesterol in food:     A sensible diet that limits the intake of sugars, saturated (bad) fats and trans fats while increasing the intake of unsaturated (good) fats and plant proteins is the basis of the current dietary recommendations.      We now recommend drastically reducing the intake of sugar. There is less emphasis on excluding all fat and more emphasis on the types of different fats.    Cholesterol in our food is generally present in relatively small amounts. New dietary guidelines are less obsessed with the amount of cholesterol. However  please do not confuse this with the role of cholesterol in our blood and arteries. The liver converts certain foods into cholesterol.  It is this cholesterol and other fats that clogs up our arteries.       Most foods that are high in cholesterol are also high in saturated fat. But there is much more saturated fat than cholesterol in these foods. Researchers believe that the saturated fat content matters more than cholesterol. On the other hand, there are a handful of foods that are high in cholesterol but do not contain much saturated fat: eggs, shrimp, crab legs and crawfish are OK to eat in modest quantities as long as you do not deep prince them. So a few eggs a week are fine the white and the yolk, but you can eat as many egg whites as you want.      Saturated fat is the bad fat - you should limit your intake of this. Deep fried foods, meats and other animal fats are high in saturated fat. Cookies, donuts and most dessert and cakes are usually high in both saturated fat and sugar.       Unsaturated fat is the good fat. It contains the same number of calories as saturated fat but these fats do not get deposited in our arteries. The Mediterranean style diet encourages the intake of unsaturated fat - olive oil, avocado and unsalted nuts. So instead of baking a piece of fish, consider pan-frying it using olive oil.     You should eat a few servings of vegetables (and fruit as long as you are not diabetic) everyday. Substitute some plant proteins in place of meat: beans, lentils, quinoa and oatmeal. They are lean proteins.     Do not use stick butter or stick margarine. Butter that comes in a tub is soft butter. It consists of 1/2 butter and 1/2 vegetable oil., either canola or olive oil. It is fine to use that.       Trans fats should definitely be avoided. Most foods that are labelled as containing 0 gms of trans fat may still contain several hundred milligrams of trans fat: creamer, margarine, dough, deep fried  foods, ready made frosting, potato, corn and torilla chips, cakes, cookies, pie crusts and crackers containing shortening made with hydrogenated vegetable oil.

## 2019-07-25 NOTE — PROGRESS NOTES
Subjective:   Patient ID:  Lynn Mckinney is a 83 y.o. female who presents for follow-up of Coronary Artery Disease      Problem List:  CAD    CABG x 3 in 2001,  LAD    Carotid stenosis, s/p stent (L) ICA 2007    - moderate to severe asymptomatic stenosis (R) ICA (followed by Vasc. Surgery)    Hypertension    Hypercholesterolemia    PAD    CKD 3    PPM 10/13    HPI:   Lynn Mckinney feels generally well. She does not report angina or shortness of breath with exertion.     She feels that she has lost some weight, but records indicate that it has fluctuated b/w 170 and 178 lbs.   She has brief postural lightheadedness      She arrived 2 hours early for her appt. SBP is in the 140s today. She has not checked her BP at home recently but it was 122/68 mm Hg in Dr. Moise's office recently.      ROS    Current Outpatient Medications   Medication Sig    amLODIPine (NORVASC) 5 MG tablet TAKE 1 TABLET (5 MG TOTAL) BY MOUTH ONCE DAILY.    aspirin 81 MG Chew Take 81 mg by mouth every evening. 1 Tablet, Chewable Oral Every day    CALCIUM CARBONATE/VITAMIN D3 (CALTRATE 600 + D ORAL) Take 1 tablet by mouth once daily.    coenzyme Q10 200 mg capsule Take 200 mg by mouth once daily.    ergocalciferol, vitamin D2, 400 unit Tab Take 400 Units by mouth once daily.    irbesartan-hydrochlorothiazide (AVALIDE) 300-12.5 mg per tablet Take 1 tablet by mouth once daily. Replaces valsartan/hctz    labetalol (NORMODYNE) 200 MG tablet TAKE 1 TABLET (200 MG TOTAL) BY MOUTH 2 (TWO) TIMES DAILY.    LACTOSE-FREE FOOD (BOOST ORAL) Take by mouth. 2-3 days per week    levothyroxine (SYNTHROID) 112 MCG tablet TAKE 1 TABLET BY MOUTH EVERY DAY FOR 6 WEEKS AND 1/2 TABLET ON SUNDAY    multivitamin (THERAGRAN) per tablet Take by mouth. Pt taking one pill three times a week.    nitroGLYCERIN (NITROSTAT) 0.4 MG SL tablet Place 1 tablet (0.4 mg total) under the tongue every 5 (five) minutes as needed for Chest pain (to a total of three).     "rosuvastatin (CRESTOR) 20 MG tablet TAKE 1 TABLET(20 MG) BY MOUTH EVERY DAY    SALMON OIL-OMEGA-3 FATTY ACIDS ORAL Take 1 capsule by mouth once daily.         Social history:  Lynn Mckinney  reports that she quit smoking in the 1980s. She has never used smokeless tobacco. She reports that she drinks about 1/2 a glass of wine socially about evry 2 months.. She reports that she does not use drugs.      Objective:     Physical Exam   Constitutional: She is oriented to person, place, and time. She appears well-developed and well-nourished.   BP (!) 144/80   Pulse 80   Ht 5' 4" (1.626 m)   Wt 77.4 kg (170 lb 11.9 oz)   BMI 29.31 kg/m²      Neck: No JVD present.   Cardiovascular: Normal rate and regular rhythm.   No murmur heard.  Pulses:       Radial pulses are 2+ on the right side, and 2+ on the left side.        Dorsalis pedis pulses are 1+ on the right side, and 1+ on the left side.   Pulmonary/Chest: She has no decreased breath sounds. She has no wheezes. She has no rales. Chest wall is not dull to percussion.   Abdominal: Soft. Normal appearance. There is no splenomegaly or hepatomegaly. There is no tenderness.   Musculoskeletal:        Right lower leg: She exhibits no edema.        Left lower leg: She exhibits no edema.   Neurological: She is alert and oriented to person, place, and time.   Skin: Skin is warm. No bruising noted. Nails show no clubbing.   Psychiatric: Her speech is normal and behavior is normal. Cognition and memory are normal.           Lab Results   Component Value Date    CHOL 121 07/19/2019    HDL 47 07/19/2019    LDLCALC 57.4 (L) 07/19/2019    TRIG 83 07/19/2019    CHOLHDL 38.8 07/19/2019     Lab Results   Component Value Date     07/19/2019    CREATININE 1.27 07/19/2019    BUN 29 (H) 07/19/2019     07/19/2019    K 4.1 07/19/2019     07/19/2019    CO2 30 (H) 07/19/2019     Lab Results   Component Value Date    ALT 17 07/19/2019    AST 30 07/19/2019    ALKPHOS 56 " 06/28/2019    BILITOT 0.8 06/28/2019           Assessment and Plan:     Coronary artery disease involving native coronary artery of native heart without angina pectoris  -     IN OFFICE EKG 12-LEAD (to [place]); Future    S/P CABG x 3    Chronic kidney disease, stage 3  -     Basic metabolic panel; Future; Expected date: 07/25/2020    Pure hypercholesterolemia  -     AST (SGOT); Future; Expected date: 07/25/2020  -     ALT (SGPT); Future; Expected date: 07/25/2020  -     Lipid panel; Future; Expected date: 07/25/2020    Stenosis of left carotid artery    Cardiac pacemaker  -     EKG 12-lead; Future; Expected date: 07/25/2020         Stable. Continue same meds.   Cholesterol education.  Nutritional counseling.  Avoid NSAIDs.    Follow up in about 1 year (around 7/25/2020).

## 2019-07-29 ENCOUNTER — INFUSION (OUTPATIENT)
Dept: INFECTIOUS DISEASES | Facility: HOSPITAL | Age: 84
End: 2019-07-29
Attending: INTERNAL MEDICINE
Payer: MEDICARE

## 2019-07-29 VITALS — BODY MASS INDEX: 29.13 KG/M2 | WEIGHT: 170.63 LBS | HEIGHT: 64 IN

## 2019-07-29 DIAGNOSIS — M81.0 AGE-RELATED OSTEOPOROSIS WITHOUT CURRENT PATHOLOGICAL FRACTURE: Primary | ICD-10-CM

## 2019-07-29 PROCEDURE — 63600175 PHARM REV CODE 636 W HCPCS: Mod: JG | Performed by: INTERNAL MEDICINE

## 2019-07-29 PROCEDURE — 96372 THER/PROPH/DIAG INJ SC/IM: CPT

## 2019-07-29 RX ADMIN — DENOSUMAB 60 MG: 60 INJECTION SUBCUTANEOUS at 10:07

## 2019-08-21 DIAGNOSIS — I65.23 BILATERAL CAROTID ARTERY STENOSIS: ICD-10-CM

## 2019-08-21 DIAGNOSIS — Z95.0 CARDIAC PACEMAKER IN SITU: Primary | ICD-10-CM

## 2019-08-28 ENCOUNTER — CLINICAL SUPPORT (OUTPATIENT)
Dept: CARDIOLOGY | Facility: HOSPITAL | Age: 84
End: 2019-08-28
Attending: INTERNAL MEDICINE
Payer: MEDICARE

## 2019-08-28 DIAGNOSIS — Z95.0 CARDIAC PACEMAKER IN SITU: ICD-10-CM

## 2019-08-28 DIAGNOSIS — I44.2 CHB (COMPLETE HEART BLOCK): ICD-10-CM

## 2019-08-28 PROCEDURE — 93280 PM DEVICE PROGR EVAL DUAL: CPT

## 2019-09-10 ENCOUNTER — TELEPHONE (OUTPATIENT)
Dept: INFECTIOUS DISEASES | Facility: HOSPITAL | Age: 84
End: 2019-09-10

## 2019-09-10 NOTE — TELEPHONE ENCOUNTER
Patient contacted me to let me know that she experienced severe bone pain in her head and around eye right after first injection of prolia on 7/29/19.  She said the pain was bad and lasted a few days. It has went away and everyday seems to get a little better but she does not want to continue taking the injection. I cancelled her next appt and will d/c therapy plan.  Dr Moise notified to contact patient with alternative treatment. She was interested in Reclast if that is an option.

## 2019-09-16 ENCOUNTER — TELEPHONE (OUTPATIENT)
Dept: VASCULAR SURGERY | Facility: CLINIC | Age: 84
End: 2019-09-16

## 2019-09-16 NOTE — TELEPHONE ENCOUNTER
Attempted to contact patient in response to message. Notified brother that nurse was unable to reach patient and that nurse will be rescheduling patient's clinic appt with Dr. Sams due to change in clinic schedule and that patient should not report for clinic visit tomorrow with Dr. Sams. Brother states he will notify patient. Appts rescheduled, appt letter placed in mail. ----- Message from Jung Haynes Patient Care Assistant sent at 9/16/2019  2:06 PM CDT -----  Contact: Self  Pt is calling to verify if she has to reschedule her appointment with Dr. Sams on tomorrow or not.    Pt can be reached at [phone].    Thank you

## 2019-09-16 NOTE — TELEPHONE ENCOUNTER
Attempted to contact patient to notify her of rescheduled appt with Dr. Sams. Unable to reach patient at number in chart. Multiple attempts to contact patient at number listed.----- Message from Denise Haider sent at 9/16/2019  2:56 PM CDT -----  Contact: pt [phone]  Pt is returning call from nurse. Please call back at [phone]

## 2019-09-20 ENCOUNTER — TELEPHONE (OUTPATIENT)
Dept: VASCULAR SURGERY | Facility: CLINIC | Age: 84
End: 2019-09-20

## 2019-09-20 NOTE — TELEPHONE ENCOUNTER
----- Message from Pebbles Rodriguez sent at 9/20/2019  3:02 PM CDT -----  Contact: Patient   Needs Medical Advice    Who Called: Patient states that she is calling to speak to someone regarding the way her upcoming appts are scheduled. Patient states that she wants to be sure that her vascular lab appt is not supposed to be scheduled first. Please contact pt to advise.     Best Call Back Number: [phone] (leave detailed message if no answer)

## 2019-09-20 NOTE — TELEPHONE ENCOUNTER
No ans, no voicemail   Normally vaslab appointments are scheduled before the visit with Dr. Sams , maybe appointment was scheduled this way because of availability

## 2019-09-20 NOTE — TELEPHONE ENCOUNTER
----- Message from Lisandro Bipin sent at 9/20/2019  9:46 AM CDT -----  Pt calling to speak with nurse in regards to her upcoming appt, pt says usually she does her vascular lab first and want to know why she's seeing the doctor before her vascular lab appt.    [phone]

## 2019-09-26 ENCOUNTER — PATIENT OUTREACH (OUTPATIENT)
Dept: ADMINISTRATIVE | Facility: OTHER | Age: 84
End: 2019-09-26

## 2019-10-01 ENCOUNTER — HOSPITAL ENCOUNTER (OUTPATIENT)
Dept: VASCULAR SURGERY | Facility: CLINIC | Age: 84
Discharge: HOME | End: 2019-10-01
Attending: SURGERY
Payer: MEDICARE

## 2019-10-01 ENCOUNTER — OFFICE VISIT (OUTPATIENT)
Dept: VASCULAR SURGERY | Facility: CLINIC | Age: 84
End: 2019-10-01
Attending: SURGERY
Payer: MEDICARE

## 2019-10-01 VITALS
HEIGHT: 65 IN | WEIGHT: 169.75 LBS | TEMPERATURE: 98 F | BODY MASS INDEX: 28.28 KG/M2 | DIASTOLIC BLOOD PRESSURE: 63 MMHG | SYSTOLIC BLOOD PRESSURE: 132 MMHG | HEART RATE: 74 BPM

## 2019-10-01 DIAGNOSIS — I65.23 BILATERAL CAROTID ARTERY STENOSIS: ICD-10-CM

## 2019-10-01 DIAGNOSIS — I65.23 BILATERAL CAROTID ARTERY STENOSIS: Primary | ICD-10-CM

## 2019-10-01 PROCEDURE — 1101F PT FALLS ASSESS-DOCD LE1/YR: CPT | Mod: CPTII,S$GLB,, | Performed by: SURGERY

## 2019-10-01 PROCEDURE — 99213 OFFICE O/P EST LOW 20 MIN: CPT | Mod: S$GLB,,, | Performed by: SURGERY

## 2019-10-01 PROCEDURE — 3075F PR MOST RECENT SYSTOLIC BLOOD PRESS GE 130-139MM HG: ICD-10-PCS | Mod: CPTII,S$GLB,, | Performed by: SURGERY

## 2019-10-01 PROCEDURE — 99999 PR PBB SHADOW E&M-EST. PATIENT-LVL III: CPT | Mod: PBBFAC,,, | Performed by: SURGERY

## 2019-10-01 PROCEDURE — 3075F SYST BP GE 130 - 139MM HG: CPT | Mod: CPTII,S$GLB,, | Performed by: SURGERY

## 2019-10-01 PROCEDURE — 99213 PR OFFICE/OUTPT VISIT, EST, LEVL III, 20-29 MIN: ICD-10-PCS | Mod: S$GLB,,, | Performed by: SURGERY

## 2019-10-01 PROCEDURE — 99999 PR PBB SHADOW E&M-EST. PATIENT-LVL III: ICD-10-PCS | Mod: PBBFAC,,, | Performed by: SURGERY

## 2019-10-01 PROCEDURE — 3078F DIAST BP <80 MM HG: CPT | Mod: CPTII,S$GLB,, | Performed by: SURGERY

## 2019-10-01 PROCEDURE — 1101F PR PT FALLS ASSESS DOC 0-1 FALLS W/OUT INJ PAST YR: ICD-10-PCS | Mod: CPTII,S$GLB,, | Performed by: SURGERY

## 2019-10-01 PROCEDURE — 3078F PR MOST RECENT DIASTOLIC BLOOD PRESSURE < 80 MM HG: ICD-10-PCS | Mod: CPTII,S$GLB,, | Performed by: SURGERY

## 2019-10-01 PROCEDURE — 93880 PR DUPLEX SCAN EXTRACRANIAL,BILAT: ICD-10-PCS | Mod: S$GLB,,, | Performed by: SURGERY

## 2019-10-01 PROCEDURE — 93880 EXTRACRANIAL BILAT STUDY: CPT | Mod: S$GLB,,, | Performed by: SURGERY

## 2019-10-01 NOTE — PROGRESS NOTES
See my prior note; review of systems, family history and social history are   unchanged.    HISTORY OF PRESENT ILLNESS:  An 83-year-old female status post:  1.  Left carotid stent placement in 2007, (Dr. Bowen) after an episode of   amaurosis fugax.  2.  CREST trial participant.    This is another 1-year followup.  She denies interval stroke, TIA or amaurosis. Stable    MEDICATIONS:  Include aspirin and statin.    SOCIAL:   Ex-smoking x 40 yrs    PHYSICAL EXAMINATION:  VITAL SIGNS:  See nursing note.  NEUROLOGIC:  Cranial nerves VII through XII are intact and 5/5 motor strength in   all extremities.    IMAGING:  Carotid duplex reveals no right carotid stenosis and a stable left 60%   to 79% carotid stenosis with peak systolic velocity of 251 (prior 274 (prior 217 and end diastolic of   44 (prior 46 (prior 48. 4 years ago,   this was 222 and 43.    ASSESSMENT:  A 12-year followup, left carotid stent placement in CREST trial with   stable moderate stenosis x 5 yrs, asymptomatic.    RECOMMENDATIONS:  1.  Continue current medical therapy.  2.  Follow up in 2 year with a screening carotid duplex, sooner if clinically   Indicated.    GUS Sams III, MD, FACS  Professor and Chief, Vascular and Endovascular Surgery

## 2019-10-06 RX ORDER — IRBESARTAN AND HYDROCHLOROTHIAZIDE 300; 12.5 MG/1; MG/1
TABLET, FILM COATED ORAL
Qty: 90 TABLET | Refills: 3 | Status: SHIPPED | OUTPATIENT
Start: 2019-10-06 | End: 2019-10-11 | Stop reason: ALTCHOICE

## 2019-10-08 ENCOUNTER — TELEPHONE (OUTPATIENT)
Dept: INTERNAL MEDICINE | Facility: CLINIC | Age: 84
End: 2019-10-08

## 2019-10-09 ENCOUNTER — TELEPHONE (OUTPATIENT)
Dept: INTERNAL MEDICINE | Facility: CLINIC | Age: 84
End: 2019-10-09

## 2019-10-09 NOTE — TELEPHONE ENCOUNTER
----- Message from Teetee Richard sent at 10/9/2019  3:30 PM CDT -----  Contact: patient [phone]  When patient went to  her rx for irbesartan/hctz she was told that a 3 month supply would cost $100 for a 3 month supply. She was told that this combo would not be available because they are discontinuing. But patient could get each of these rx's separately for $4 each for a 3 month supply. Pat said that someone called and told her that  Her medication was ordered and is $4.99 but patient wants to know if each of her medications have been ordered. Patient doesn't mind taking 2 medications but just wants to know exactly what she is picking up.    [facility]/pharmacy #[id number] - [address] AT [facility] [phone]

## 2019-10-09 NOTE — TELEPHONE ENCOUNTER
Spoke with pharmacist. Pharmacist was confused with message. Stated they received the refill and the medication is in the cue to be filled. Stated the medication is $4.45. LM on recorder for pt.

## 2019-10-11 RX ORDER — HYDROCHLOROTHIAZIDE 12.5 MG/1
12.5 TABLET ORAL DAILY
Qty: 90 TABLET | Refills: 3 | Status: SHIPPED | OUTPATIENT
Start: 2019-10-11 | End: 2020-02-21 | Stop reason: SDUPTHER

## 2019-10-11 RX ORDER — IRBESARTAN 300 MG/1
300 TABLET ORAL NIGHTLY
Qty: 90 TABLET | Refills: 3 | Status: SHIPPED | OUTPATIENT
Start: 2019-10-11 | End: 2020-08-03

## 2019-10-13 ENCOUNTER — TELEPHONE (OUTPATIENT)
Dept: INTERNAL MEDICINE | Facility: CLINIC | Age: 84
End: 2019-10-13

## 2019-10-13 DIAGNOSIS — M81.8 OTHER OSTEOPOROSIS, UNSPECIFIED PATHOLOGICAL FRACTURE PRESENCE: Primary | ICD-10-CM

## 2019-10-13 NOTE — TELEPHONE ENCOUNTER
Please tell the patient that Ms. Newell informed us that she had side effects to the Prolia. This will last 6 months so the plan for further treatment needs to be made with the help of Endocrinology.    Please tell her that I would like her to see Endocrine for a consult and help with an appt.    Dr. Moise

## 2019-10-16 ENCOUNTER — PATIENT OUTREACH (OUTPATIENT)
Dept: ADMINISTRATIVE | Facility: OTHER | Age: 84
End: 2019-10-16

## 2019-10-18 ENCOUNTER — OFFICE VISIT (OUTPATIENT)
Dept: ENDOCRINOLOGY | Facility: CLINIC | Age: 84
End: 2019-10-18
Payer: MEDICARE

## 2019-10-18 VITALS
RESPIRATION RATE: 18 BRPM | HEIGHT: 65 IN | WEIGHT: 172.75 LBS | HEART RATE: 72 BPM | BODY MASS INDEX: 28.78 KG/M2 | DIASTOLIC BLOOD PRESSURE: 74 MMHG | SYSTOLIC BLOOD PRESSURE: 140 MMHG

## 2019-10-18 DIAGNOSIS — E55.9 VITAMIN D DEFICIENCY: ICD-10-CM

## 2019-10-18 DIAGNOSIS — M81.8 OTHER OSTEOPOROSIS, UNSPECIFIED PATHOLOGICAL FRACTURE PRESENCE: ICD-10-CM

## 2019-10-18 DIAGNOSIS — Z87.19 HISTORY OF DENTAL PROBLEMS: ICD-10-CM

## 2019-10-18 DIAGNOSIS — N18.30 CKD (CHRONIC KIDNEY DISEASE) STAGE 3, GFR 30-59 ML/MIN: ICD-10-CM

## 2019-10-18 DIAGNOSIS — M81.0 AGE-RELATED OSTEOPOROSIS WITHOUT CURRENT PATHOLOGICAL FRACTURE: Primary | ICD-10-CM

## 2019-10-18 DIAGNOSIS — E89.0 POSTOPERATIVE HYPOTHYROIDISM: ICD-10-CM

## 2019-10-18 PROCEDURE — 99204 PR OFFICE/OUTPT VISIT, NEW, LEVL IV, 45-59 MIN: ICD-10-PCS | Mod: S$GLB,,, | Performed by: INTERNAL MEDICINE

## 2019-10-18 PROCEDURE — 3077F SYST BP >= 140 MM HG: CPT | Mod: CPTII,S$GLB,, | Performed by: INTERNAL MEDICINE

## 2019-10-18 PROCEDURE — 99999 PR PBB SHADOW E&M-EST. PATIENT-LVL IV: ICD-10-PCS | Mod: PBBFAC,,, | Performed by: INTERNAL MEDICINE

## 2019-10-18 PROCEDURE — 99999 PR PBB SHADOW E&M-EST. PATIENT-LVL IV: CPT | Mod: PBBFAC,,, | Performed by: INTERNAL MEDICINE

## 2019-10-18 PROCEDURE — 1101F PT FALLS ASSESS-DOCD LE1/YR: CPT | Mod: CPTII,S$GLB,, | Performed by: INTERNAL MEDICINE

## 2019-10-18 PROCEDURE — 3078F PR MOST RECENT DIASTOLIC BLOOD PRESSURE < 80 MM HG: ICD-10-PCS | Mod: CPTII,S$GLB,, | Performed by: INTERNAL MEDICINE

## 2019-10-18 PROCEDURE — 99204 OFFICE O/P NEW MOD 45 MIN: CPT | Mod: S$GLB,,, | Performed by: INTERNAL MEDICINE

## 2019-10-18 PROCEDURE — 1101F PR PT FALLS ASSESS DOC 0-1 FALLS W/OUT INJ PAST YR: ICD-10-PCS | Mod: CPTII,S$GLB,, | Performed by: INTERNAL MEDICINE

## 2019-10-18 PROCEDURE — 3077F PR MOST RECENT SYSTOLIC BLOOD PRESSURE >= 140 MM HG: ICD-10-PCS | Mod: CPTII,S$GLB,, | Performed by: INTERNAL MEDICINE

## 2019-10-18 PROCEDURE — 3078F DIAST BP <80 MM HG: CPT | Mod: CPTII,S$GLB,, | Performed by: INTERNAL MEDICINE

## 2019-10-18 NOTE — ASSESSMENT & PLAN NOTE
Has been told that one of maxillary teeth needs to be replaced several years ago but has not recently seen dentist.  I have asked that she have a dental eval and have any necessary dental work completed prior to initiating medication for osteoporosis to minimize risk of ONJ.

## 2019-10-18 NOTE — ASSESSMENT & PLAN NOTE
We discussed treatment options and the patient would not like to use prolia because of previous side effects.  May be candidate for reclast after dental eval as she had side effects with fosamax in the past.  Will revisit with GFR in 3 months.    I encouraged use of cane and PT for balance.  She will let us know if she needs a referral after contacting her insurance company.

## 2019-10-18 NOTE — ASSESSMENT & PLAN NOTE
GFR has been stable ~40 but has been in low 30s in the past.  We discussed that bisphosphonates may still be an option if at the time of treatment GFR continues to be ~40.  Will repeat at next visit.

## 2019-10-18 NOTE — PATIENT INSTRUCTIONS
Please see a dentist to find out if you will need any dental work before we start you on a medication

## 2019-10-18 NOTE — PROGRESS NOTES
"ENDOCRINOLOGY INITIAL VISIT  10/18/19    Reason for Visit:  Lynn Mckinney is a 83 y.o. female referred by [facility] for evaluation of osteoporosis.    HPI:  She has a hx of HTN, HLD, PAD, stage 3B CKD, post surgical hypothyroidism, and spinal stenosis. She was referred for management of osteoporosis after having reaction to prolia.  She denies any recent falls and has never had a fracture.  She doesn't feel she has good balance and is interested in doing PT for balance but will check with her insurance company to see who is covered locally.  She has a walker but is not able to get it in and out of her car so doesn't use it much.  Also has cane but doesn't like using it.    BMD: T-score and % change  Year Lumbar Spine %ch vs prior %ch. vs base Femoral Neck %ch. vs prior %ch. vs baseline   2018/04 -0.2   -2.6       Secondary osteoporosis workup: no hx of hypercalcemia, has remote vitD deficiency in 2013 but resolved w/ supplementation.  Couple elevated PTH values but most recently normal.  Previous Treatment: 7/2019 one dose of Prolia but had cutaneous side effects  Was treated w/ fosamax for a few months 5-6 years ago it made her "feel funny in the head" so she discontinued.  History of previous fracture: No has lost some height   Parent with fractured hip: Yes in 80s  Smoking status: no quit in 1980s  Glucocorticoid use: Yes recently had steroid injection for hip pain  History of RA: No  Alcohol 3 or more/day: No  Nephrolithiasis: No  Dental up to date: no, 1.5 years ago had a bad experience with dentist but was told she needs further dental work      Past Medical History:   Diagnosis Date    Allergy     seasonal    Blood clotting tendency     Carotid artery disease 5/29/2014    Coronary artery disease     Heart block     Hyperlipidemia     Hypertension     Hypothyroid     Obesity     Pacemaker     PAD (peripheral artery disease)     Spinal stenosis     Thoracic or lumbosacral neuritis or " "radiculitis 11/25/2014    Tubular adenoma        Past Surgical History:   Procedure Laterality Date    ANGIOPLASTY      APPENDECTOMY      BREAST CYST ASPIRATION Bilateral     CARDIAC CATHETERIZATION      CARDIAC PACEMAKER PLACEMENT  10/10/13    by Dr. Coleman    CAROTID STENT Left 2007    by Dr. Bowen    CATARACT EXTRACTION W/  INTRAOCULAR LENS IMPLANT Bilateral 2012    Dr Sharp    CORONARY ARTERY BYPASS GRAFT  2001     x 3 LIMA-Ramus, SVG-OM1 & SVG-PDA    CYST REMOVAL      wrist    EYE SURGERY      HYSTERECTOMY      OOPHORECTOMY      THYROIDECTOMY         Review of patient's allergies indicates:   Allergen Reactions    Lipitor [atorvastatin] Itching    Fosamax [alendronate]      Felt "strange"    Iodinated contrast media      Dye is only to be used if absolutely needed because of kidney function    Sulfa (sulfonamide antibiotics)        Current Outpatient Medications:     amLODIPine (NORVASC) 5 MG tablet, TAKE 1 TABLET (5 MG TOTAL) BY MOUTH ONCE DAILY., Disp: 90 tablet, Rfl: 3    aspirin 81 MG Chew, Take 81 mg by mouth every evening. 1 Tablet, Chewable Oral Every day, Disp: , Rfl:     CALCIUM CARBONATE/VITAMIN D3 (CALTRATE 600 + D ORAL), Take 1 tablet by mouth once daily., Disp: , Rfl:     coenzyme Q10 200 mg capsule, Take 200 mg by mouth once daily., Disp: , Rfl:     ergocalciferol, vitamin D2, 400 unit Tab, Take 400 Units by mouth once daily., Disp: , Rfl:     hydroCHLOROthiazide (HYDRODIURIL) 12.5 MG Tab, Take 1 tablet (12.5 mg total) by mouth once daily., Disp: 90 tablet, Rfl: 3    irbesartan (AVAPRO) 300 MG tablet, Take 1 tablet (300 mg total) by mouth every evening., Disp: 90 tablet, Rfl: 3    labetalol (NORMODYNE) 200 MG tablet, TAKE 1 TABLET (200 MG TOTAL) BY MOUTH 2 (TWO) TIMES DAILY., Disp: 180 tablet, Rfl: 3    LACTOSE-FREE FOOD (BOOST ORAL), Take by mouth. 2-3 days per week, Disp: , Rfl:     levothyroxine (SYNTHROID) 112 MCG tablet, TAKE 1 TABLET BY MOUTH EVERY DAY FOR 6 WEEKS " AND 1/2 TABLET ON , Disp: 90 tablet, Rfl: 3    multivitamin (THERAGRAN) per tablet, Take by mouth. Pt taking one pill three times a week., Disp: , Rfl:     nitroGLYCERIN (NITROSTAT) 0.4 MG SL tablet, Place 1 tablet (0.4 mg total) under the tongue every 5 (five) minutes as needed for Chest pain (to a total of three)., Disp: 100 tablet, Rfl: 3    rosuvastatin (CRESTOR) 20 MG tablet, TAKE 1 TABLET(20 MG) BY MOUTH EVERY DAY, Disp: 90 tablet, Rfl: 3    SALMON OIL-OMEGA-3 FATTY ACIDS ORAL, Take 1 capsule by mouth once daily., Disp: , Rfl:       Social History     Socioeconomic History    Marital status: Single     Spouse name: Not on file    Number of children: Not on file    Years of education: Not on file    Highest education level: Not on file   Occupational History    Not on file   Social Needs    Financial resource strain: Not on file    Food insecurity:     Worry: Not on file     Inability: Not on file    Transportation needs:     Medical: Not on file     Non-medical: Not on file   Tobacco Use    Smoking status: Former Smoker     Last attempt to quit: 1984     Years since quittin.2    Smokeless tobacco: Never Used   Substance and Sexual Activity    Alcohol use: Yes     Alcohol/week: 1.0 standard drinks     Types: 1 Shots of liquor per week     Comment: occ    Drug use: No    Sexual activity: Not on file     Comment: old fashion  with dinner every 6 monts    Lifestyle    Physical activity:     Days per week: Not on file     Minutes per session: Not on file    Stress: Not on file   Relationships    Social connections:     Talks on phone: Not on file     Gets together: Not on file     Attends Buddhism service: Not on file     Active member of club or organization: Not on file     Attends meetings of clubs or organizations: Not on file     Relationship status: Not on file   Other Topics Concern    Are you pregnant or think you may be? No    Breast-feeding No   Social History  "Narrative    Not on file       Family History   Problem Relation Age of Onset    Heart disease Father         coronary thrombosis    Hypertension Father     Alzheimer's disease Mother     Heart disease Mother         pacemaker    Hypertension Mother     Diabetes Brother     No Known Problems Sister     No Known Problems Maternal Grandmother     No Known Problems Maternal Grandfather     No Known Problems Paternal Grandmother     No Known Problems Paternal Grandfather     Heart disease Brother     No Known Problems Maternal Aunt     No Known Problems Maternal Uncle     No Known Problems Paternal Aunt     No Known Problems Paternal Uncle     Amblyopia Neg Hx     Blindness Neg Hx     Cancer Neg Hx     Cataracts Neg Hx     Glaucoma Neg Hx     Macular degeneration Neg Hx     Retinal detachment Neg Hx     Strabismus Neg Hx     Stroke Neg Hx     Thyroid disease Neg Hx     Melanoma Neg Hx        REVIEW OF SYSTEMS  Constitutional: Weight stable. no fatigue  Temperature: Denies heat/cold intolerance  Eyes: No blurry vision, loss of vision, diplopia.  ENT: No voice alterations, no problems swallowing.  Thyroid: No masses in neck area.  CV: No chest pain, palpitations, no swelling of the lower extremities.  Pulm: No cough, no shortness of breath, no wheezing.  GI: No abdominal pain, no nausea/vomiting, regular bowel movements.  : No dysuria  MSK: +hip pain, -myalgias.  Neuro: No headaches. Denies tremors.  Skin: No skin lesions.  Endo: No polyuria/polydipsia.    PHYSICAL EXAM  BP (!) 140/74   Pulse 72   Resp 18   Ht 5' 5" (1.651 m)   Wt 78.4 kg (172 lb 11.7 oz)   BMI 28.74 kg/m²   Wt Readings from Last 3 Encounters:   10/18/19 78.4 kg (172 lb 11.7 oz)   10/01/19 77 kg (169 lb 12.1 oz)   07/29/19 77.4 kg (170 lb 10.2 oz)   ]    Constitutional:  Pleasant,  in no acute distress.   HENT:   Head:    Normocephalic and atraumatic.   Mouth/Throat:   Oropharynx is clear and moist. No oropharyngeal " exudate.   Eyes:    EOMI. No scleral icterus.   Neck:    No palpable thyroid tissue, no cervical LAD  Cardiovascular:  Normal rate, regular rhythm, no murmurs.  No carotid bruits.  Respiratory:   Effort normal and breath sounds normal.   Gastrointestinal: Soft, nontender  Neurological:  No tremor, normal speech, ambulates slowly, mild difficulty w/ balance but does not need assistance to ambulate or get up from chair  Skin:    Skin is warm, dry  Psych:   Normal mood and affect.   Extremity:  No edema or wounds, no tenderness to palpation over spine      LABORATORY    Chemistry        Component Value Date/Time     07/19/2019 0936    K 4.1 07/19/2019 0936     07/19/2019 0936    CO2 30 (H) 07/19/2019 0936    BUN 29 (H) 07/19/2019 0936    CREATININE 1.27 07/19/2019 0936     07/19/2019 0936        Component Value Date/Time    CALCIUM 9.4 07/19/2019 0936    ALKPHOS 56 06/28/2019 1017    AST 30 07/19/2019 0936    ALT 17 07/19/2019 0936    BILITOT 0.8 06/28/2019 1017    ESTGFRAFRICA 45.1 (A) 07/19/2019 0936    EGFRNONAA 39.1 (A) 07/19/2019 0936      Results for WINDY ROSADO (MRN [id number]) as of 10/18/2019 14:25   Ref. Range 10/10/2016 10:30 2/27/2017 09:56 5/24/2017 09:13 6/12/2017 09:44 9/2/2017 10:36 12/8/2017 10:00 2/28/2018 09:37 6/4/2018 10:11 7/2/2018 10:02 9/21/2018 09:38 3/25/2019 09:36 6/28/2019 10:17   Vit D, 25-Hydroxy Latest Ref Range: 30 - 96 ng/mL   30     32    35   Calcium Latest Ref Range: 8.7 - 10.5 mg/dL 9.3 9.4 9.3 9.7 9.6 9.5 9.7 9.8 10.1 9.7 9.8 9.8   Phosphorus Latest Ref Range: 2.7 - 4.5 mg/dL 3.2  3.5   3.5  3.2       Albumin Latest Ref Range: 3.5 - 5.2 g/dL 3.6 3.8 3.7  3.6 3.8 3.6 3.6  3.9 3.7 4.1   TSH Latest Ref Range: 0.400 - 4.000 uIU/mL  4.164 (H)   0.924  0.889   1.742 2.439 1.150   Free T4 Latest Ref Range: 0.71 - 1.51 ng/dL  1.12             PTH Latest Ref Range: 9.0 - 77.0 pg/mL 67.0  70.0   86.0 (H)  45.0         IMAGING STUDIES  See dexa  above    ASSESSMENT/PLAN    Problem List Items Addressed This Visit        ENT    History of dental problems     Has been told that one of maxillary teeth needs to be replaced several years ago but has not recently seen dentist.  I have asked that she have a dental eval and have any necessary dental work completed prior to initiating medication for osteoporosis to minimize risk of ONJ.            Renal/    CKD (chronic kidney disease) stage 3, GFR 30-59 ml/min     GFR has been stable ~40 but has been in low 30s in the past.  We discussed that bisphosphonates may still be an option if at the time of treatment GFR continues to be ~40.  Will repeat at next visit.            Endocrine    Hypothyroidism     Normal TSH, continue current replacement         Vitamin D deficiency     Remote hx of low vitD but normal on replacement.  Continue current dose            Orthopedic    Age-related osteoporosis without current pathological fracture - Primary     We discussed treatment options and the patient would not like to use prolia because of previous side effects.  May be candidate for reclast after dental eval as she had side effects with fosamax in the past.  Will revisit with GFR in 3 months.         Relevant Orders    Basic metabolic panel          RTC in 3 months    Rama Littlejohn MD

## 2019-10-21 ENCOUNTER — IMMUNIZATION (OUTPATIENT)
Dept: PHARMACY | Facility: CLINIC | Age: 84
End: 2019-10-21
Payer: MEDICARE

## 2019-12-23 RX ORDER — ROSUVASTATIN CALCIUM 20 MG/1
TABLET, COATED ORAL
Qty: 90 TABLET | Refills: 3 | Status: SHIPPED | OUTPATIENT
Start: 2019-12-23 | End: 2020-12-18 | Stop reason: SDUPTHER

## 2019-12-23 RX ORDER — LEVOTHYROXINE SODIUM 112 UG/1
TABLET ORAL
Qty: 90 TABLET | Refills: 3 | Status: SHIPPED | OUTPATIENT
Start: 2019-12-23 | End: 2020-12-09

## 2019-12-23 RX ORDER — AMLODIPINE BESYLATE 5 MG/1
5 TABLET ORAL DAILY
Qty: 90 TABLET | Refills: 3 | Status: SHIPPED | OUTPATIENT
Start: 2019-12-23 | End: 2020-12-09

## 2019-12-23 RX ORDER — LABETALOL 200 MG/1
200 TABLET, FILM COATED ORAL 2 TIMES DAILY
Qty: 180 TABLET | Refills: 3 | Status: SHIPPED | OUTPATIENT
Start: 2019-12-23 | End: 2020-12-09

## 2020-01-07 RX ORDER — NITROGLYCERIN 0.4 MG/1
TABLET SUBLINGUAL
Qty: 100 TABLET | Refills: 3 | Status: SHIPPED | OUTPATIENT
Start: 2020-01-07 | End: 2021-07-10

## 2020-01-28 ENCOUNTER — TELEPHONE (OUTPATIENT)
Dept: INTERNAL MEDICINE | Facility: CLINIC | Age: 85
End: 2020-01-28

## 2020-01-28 DIAGNOSIS — I10 ESSENTIAL HYPERTENSION: Primary | ICD-10-CM

## 2020-01-28 DIAGNOSIS — E03.9 HYPOTHYROIDISM, UNSPECIFIED TYPE: ICD-10-CM

## 2020-01-28 DIAGNOSIS — E55.9 VITAMIN D DEFICIENCY: ICD-10-CM

## 2020-01-28 NOTE — TELEPHONE ENCOUNTER
----- Message from Rebekah Warner MA sent at 1/28/2020  3:26 PM CST -----  Contact: Self   Pt requesting labs  ----- Message -----  From: Sunshine Fraga  Sent: 1/28/2020   2:09 PM CST  To: Jose Maria PRUETT Staff    Doctor appointment and lab have been scheduled.  Please link lab orders to the lab appointment.  Date of doctor appointment:  2/10  Date of lab appointment:  1/29  Physical or EP: 6 month follow up  Comments:

## 2020-01-29 ENCOUNTER — LAB VISIT (OUTPATIENT)
Dept: LAB | Facility: HOSPITAL | Age: 85
End: 2020-01-29
Attending: INTERNAL MEDICINE
Payer: MEDICARE

## 2020-01-29 DIAGNOSIS — I10 ESSENTIAL HYPERTENSION: ICD-10-CM

## 2020-01-29 DIAGNOSIS — E03.9 HYPOTHYROIDISM, UNSPECIFIED TYPE: ICD-10-CM

## 2020-01-29 DIAGNOSIS — E55.9 VITAMIN D DEFICIENCY: ICD-10-CM

## 2020-01-29 LAB
25(OH)D3+25(OH)D2 SERPL-MCNC: 39 NG/ML (ref 30–96)
ALBUMIN SERPL BCP-MCNC: 3.9 G/DL (ref 3.5–5.2)
ALP SERPL-CCNC: 46 U/L (ref 55–135)
ALT SERPL W/O P-5'-P-CCNC: 15 U/L (ref 10–44)
ANION GAP SERPL CALC-SCNC: 8 MMOL/L (ref 8–16)
AST SERPL-CCNC: 22 U/L (ref 10–40)
BASOPHILS # BLD AUTO: 0.03 K/UL (ref 0–0.2)
BASOPHILS NFR BLD: 0.4 % (ref 0–1.9)
BILIRUB SERPL-MCNC: 0.6 MG/DL (ref 0.1–1)
BUN SERPL-MCNC: 26 MG/DL (ref 8–23)
CALCIUM SERPL-MCNC: 9.5 MG/DL (ref 8.7–10.5)
CHLORIDE SERPL-SCNC: 103 MMOL/L (ref 95–110)
CHOLEST SERPL-MCNC: 120 MG/DL (ref 120–199)
CHOLEST/HDLC SERPL: 2.4 {RATIO} (ref 2–5)
CO2 SERPL-SCNC: 27 MMOL/L (ref 23–29)
CREAT SERPL-MCNC: 1.3 MG/DL (ref 0.5–1.4)
DIFFERENTIAL METHOD: ABNORMAL
EOSINOPHIL # BLD AUTO: 0.2 K/UL (ref 0–0.5)
EOSINOPHIL NFR BLD: 2.2 % (ref 0–8)
ERYTHROCYTE [DISTWIDTH] IN BLOOD BY AUTOMATED COUNT: 12 % (ref 11.5–14.5)
EST. GFR  (AFRICAN AMERICAN): 43.5 ML/MIN/1.73 M^2
EST. GFR  (NON AFRICAN AMERICAN): 37.8 ML/MIN/1.73 M^2
GLUCOSE SERPL-MCNC: 107 MG/DL (ref 70–110)
HCT VFR BLD AUTO: 42.5 % (ref 37–48.5)
HDLC SERPL-MCNC: 51 MG/DL (ref 40–75)
HDLC SERPL: 42.5 % (ref 20–50)
HGB BLD-MCNC: 14 G/DL (ref 12–16)
IMM GRANULOCYTES # BLD AUTO: 0.02 K/UL (ref 0–0.04)
IMM GRANULOCYTES NFR BLD AUTO: 0.3 % (ref 0–0.5)
LDLC SERPL CALC-MCNC: 52 MG/DL (ref 63–159)
LYMPHOCYTES # BLD AUTO: 0.9 K/UL (ref 1–4.8)
LYMPHOCYTES NFR BLD: 12 % (ref 18–48)
MCH RBC QN AUTO: 32 PG (ref 27–31)
MCHC RBC AUTO-ENTMCNC: 32.9 G/DL (ref 32–36)
MCV RBC AUTO: 97 FL (ref 82–98)
MONOCYTES # BLD AUTO: 0.7 K/UL (ref 0.3–1)
MONOCYTES NFR BLD: 8.8 % (ref 4–15)
NEUTROPHILS # BLD AUTO: 5.6 K/UL (ref 1.8–7.7)
NEUTROPHILS NFR BLD: 76.3 % (ref 38–73)
NONHDLC SERPL-MCNC: 69 MG/DL
NRBC BLD-RTO: 0 /100 WBC
PLATELET # BLD AUTO: 215 K/UL (ref 150–350)
PMV BLD AUTO: 9.2 FL (ref 9.2–12.9)
POTASSIUM SERPL-SCNC: 3.9 MMOL/L (ref 3.5–5.1)
PROT SERPL-MCNC: 7.3 G/DL (ref 6–8.4)
RBC # BLD AUTO: 4.38 M/UL (ref 4–5.4)
SODIUM SERPL-SCNC: 138 MMOL/L (ref 136–145)
TRIGL SERPL-MCNC: 85 MG/DL (ref 30–150)
TSH SERPL DL<=0.005 MIU/L-ACNC: 2.92 UIU/ML (ref 0.4–4)
WBC # BLD AUTO: 7.39 K/UL (ref 3.9–12.7)

## 2020-01-29 PROCEDURE — 82306 VITAMIN D 25 HYDROXY: CPT

## 2020-01-29 PROCEDURE — 84443 ASSAY THYROID STIM HORMONE: CPT

## 2020-01-29 PROCEDURE — 36415 COLL VENOUS BLD VENIPUNCTURE: CPT | Mod: PO

## 2020-01-29 PROCEDURE — 80053 COMPREHEN METABOLIC PANEL: CPT

## 2020-01-29 PROCEDURE — 85025 COMPLETE CBC W/AUTO DIFF WBC: CPT

## 2020-01-29 PROCEDURE — 80061 LIPID PANEL: CPT

## 2020-01-30 ENCOUNTER — PATIENT OUTREACH (OUTPATIENT)
Dept: ADMINISTRATIVE | Facility: HOSPITAL | Age: 85
End: 2020-01-30

## 2020-02-10 ENCOUNTER — OFFICE VISIT (OUTPATIENT)
Dept: INTERNAL MEDICINE | Facility: CLINIC | Age: 85
End: 2020-02-10
Payer: MEDICARE

## 2020-02-10 VITALS
OXYGEN SATURATION: 95 % | SYSTOLIC BLOOD PRESSURE: 134 MMHG | BODY MASS INDEX: 28.21 KG/M2 | WEIGHT: 169.31 LBS | TEMPERATURE: 98 F | HEIGHT: 65 IN | HEART RATE: 70 BPM | DIASTOLIC BLOOD PRESSURE: 66 MMHG

## 2020-02-10 DIAGNOSIS — N18.30 CKD (CHRONIC KIDNEY DISEASE) STAGE 3, GFR 30-59 ML/MIN: Primary | ICD-10-CM

## 2020-02-10 DIAGNOSIS — I44.2 CHB (COMPLETE HEART BLOCK): ICD-10-CM

## 2020-02-10 DIAGNOSIS — E03.9 HYPOTHYROIDISM, UNSPECIFIED TYPE: ICD-10-CM

## 2020-02-10 DIAGNOSIS — E78.5 HYPERLIPIDEMIA, UNSPECIFIED HYPERLIPIDEMIA TYPE: ICD-10-CM

## 2020-02-10 DIAGNOSIS — Z95.0 CARDIAC PACEMAKER IN SITU: ICD-10-CM

## 2020-02-10 DIAGNOSIS — M81.0 AGE-RELATED OSTEOPOROSIS WITHOUT CURRENT PATHOLOGICAL FRACTURE: ICD-10-CM

## 2020-02-10 DIAGNOSIS — E55.9 VITAMIN D DEFICIENCY: ICD-10-CM

## 2020-02-10 DIAGNOSIS — I73.9 PERIPHERAL ARTERIAL DISEASE: ICD-10-CM

## 2020-02-10 DIAGNOSIS — Z12.31 ENCOUNTER FOR SCREENING MAMMOGRAM FOR BREAST CANCER: ICD-10-CM

## 2020-02-10 DIAGNOSIS — I25.10 CORONARY ARTERY DISEASE INVOLVING NATIVE CORONARY ARTERY OF NATIVE HEART WITHOUT ANGINA PECTORIS: ICD-10-CM

## 2020-02-10 DIAGNOSIS — I10 HYPERTENSION, UNSPECIFIED TYPE: ICD-10-CM

## 2020-02-10 PROCEDURE — 99214 OFFICE O/P EST MOD 30 MIN: CPT | Mod: S$GLB,,, | Performed by: INTERNAL MEDICINE

## 2020-02-10 PROCEDURE — 1101F PR PT FALLS ASSESS DOC 0-1 FALLS W/OUT INJ PAST YR: ICD-10-PCS | Mod: CPTII,S$GLB,, | Performed by: INTERNAL MEDICINE

## 2020-02-10 PROCEDURE — 1159F MED LIST DOCD IN RCRD: CPT | Mod: S$GLB,,, | Performed by: INTERNAL MEDICINE

## 2020-02-10 PROCEDURE — 3078F DIAST BP <80 MM HG: CPT | Mod: CPTII,S$GLB,, | Performed by: INTERNAL MEDICINE

## 2020-02-10 PROCEDURE — 99214 PR OFFICE/OUTPT VISIT, EST, LEVL IV, 30-39 MIN: ICD-10-PCS | Mod: S$GLB,,, | Performed by: INTERNAL MEDICINE

## 2020-02-10 PROCEDURE — 1159F PR MEDICATION LIST DOCUMENTED IN MEDICAL RECORD: ICD-10-PCS | Mod: S$GLB,,, | Performed by: INTERNAL MEDICINE

## 2020-02-10 PROCEDURE — 3078F PR MOST RECENT DIASTOLIC BLOOD PRESSURE < 80 MM HG: ICD-10-PCS | Mod: CPTII,S$GLB,, | Performed by: INTERNAL MEDICINE

## 2020-02-10 PROCEDURE — 1126F PR PAIN SEVERITY QUANTIFIED, NO PAIN PRESENT: ICD-10-PCS | Mod: S$GLB,,, | Performed by: INTERNAL MEDICINE

## 2020-02-10 PROCEDURE — 1101F PT FALLS ASSESS-DOCD LE1/YR: CPT | Mod: CPTII,S$GLB,, | Performed by: INTERNAL MEDICINE

## 2020-02-10 PROCEDURE — 1126F AMNT PAIN NOTED NONE PRSNT: CPT | Mod: S$GLB,,, | Performed by: INTERNAL MEDICINE

## 2020-02-10 PROCEDURE — 3075F SYST BP GE 130 - 139MM HG: CPT | Mod: CPTII,S$GLB,, | Performed by: INTERNAL MEDICINE

## 2020-02-10 PROCEDURE — 99999 PR PBB SHADOW E&M-EST. PATIENT-LVL IV: ICD-10-PCS | Mod: PBBFAC,,, | Performed by: INTERNAL MEDICINE

## 2020-02-10 PROCEDURE — 99999 PR PBB SHADOW E&M-EST. PATIENT-LVL IV: CPT | Mod: PBBFAC,,, | Performed by: INTERNAL MEDICINE

## 2020-02-10 PROCEDURE — 3075F PR MOST RECENT SYSTOLIC BLOOD PRESS GE 130-139MM HG: ICD-10-PCS | Mod: CPTII,S$GLB,, | Performed by: INTERNAL MEDICINE

## 2020-02-10 NOTE — PROGRESS NOTES
Subjective:      Patient ID: Lynn Mckinney is a 84 y.o. female.    Chief Complaint: Follow-up    HPI:  HPI     6 month follow up of medical issues     Patient has a history of osteoporosis without any history of fracture although the patient does have osteoporosis at the femoral neck bilaterally. GFR in the past was not in the range for alendronate.     Patient has history of coronary artery disease, hyperlipidemia and hypertension. Laboratory studies were done prior to the appointment which are all stable and within goal     Patient has history of chronic kidney disease stage 3 and her GFR today is improved.     Patient has a history of hypothyroidism continues on same medication levothyroxine 112 mcg with TSH in range  Patient Active Problem List   Diagnosis    Hypertension    Hyperlipidemia    Peripheral arterial disease    CAD (coronary artery disease)    S/P CABG x 3    Hypothyroidism    Incontinence    CKD (chronic kidney disease) stage 3, GFR 30-59 ml/min    Vitamin D deficiency    CHB (complete heart block)    Cardiac pacemaker in situ    Left-sided carotid artery disease    Claudication    Right hip pain    Lumbar stenosis    Thoracic or lumbosacral neuritis or radiculitis    Hip pain    Bilateral carotid artery stenosis    Lumbar radiculitis    Age-related osteoporosis without current pathological fracture    History of dental problems     Past Medical History:   Diagnosis Date    Allergy     seasonal    Blood clotting tendency     Carotid artery disease 5/29/2014    Coronary artery disease     Heart block     Hyperlipidemia     Hypertension     Hypothyroid     Obesity     Pacemaker     PAD (peripheral artery disease)     Spinal stenosis     Thoracic or lumbosacral neuritis or radiculitis 11/25/2014    Tubular adenoma      Past Surgical History:   Procedure Laterality Date    ANGIOPLASTY      APPENDECTOMY      BREAST CYST ASPIRATION Bilateral     CARDIAC  "CATHETERIZATION      CARDIAC PACEMAKER PLACEMENT  10/10/13    by Dr. Coleman    CAROTID STENT Left 2007    by Dr. Bowen    CATARACT EXTRACTION W/  INTRAOCULAR LENS IMPLANT Bilateral 2012    Dr Sharp    CORONARY ARTERY BYPASS GRAFT  2001     x 3 LIMA-Ramus, SVG-OM1 & SVG-PDA    CYST REMOVAL      wrist    EYE SURGERY      HYSTERECTOMY      OOPHORECTOMY      THYROIDECTOMY       Family History   Problem Relation Age of Onset    Heart disease Father         coronary thrombosis    Hypertension Father     Alzheimer's disease Mother     Heart disease Mother         pacemaker    Hypertension Mother     Diabetes Brother     No Known Problems Sister     No Known Problems Maternal Grandmother     No Known Problems Maternal Grandfather     No Known Problems Paternal Grandmother     No Known Problems Paternal Grandfather     Heart disease Brother     No Known Problems Maternal Aunt     No Known Problems Maternal Uncle     No Known Problems Paternal Aunt     No Known Problems Paternal Uncle     Amblyopia Neg Hx     Blindness Neg Hx     Cancer Neg Hx     Cataracts Neg Hx     Glaucoma Neg Hx     Macular degeneration Neg Hx     Retinal detachment Neg Hx     Strabismus Neg Hx     Stroke Neg Hx     Thyroid disease Neg Hx     Melanoma Neg Hx      Review of Systems   Constitutional: Negative for chills, fever and unexpected weight change.   HENT: Negative for trouble swallowing.    Respiratory: Negative for cough, shortness of breath and wheezing.    Cardiovascular: Negative for chest pain and palpitations.   Gastrointestinal: Negative for abdominal distention, abdominal pain, blood in stool and vomiting.   Musculoskeletal: Negative for back pain.     Objective:     Vitals:    02/10/20 1329   BP: 134/66   Pulse: 70   Temp: 97.9 °F (36.6 °C)   TempSrc: Oral   SpO2: 95%   Weight: 76.8 kg (169 lb 5 oz)   Height: 5' 5" (1.651 m)   PainSc: 0-No pain     Body mass index is 28.18 kg/m².  Physical Exam "   Constitutional: She is oriented to person, place, and time. She appears well-developed and well-nourished. No distress.   Neck: Carotid bruit is not present. No thyromegaly present.   Cardiovascular: Normal rate, regular rhythm and normal heart sounds. PMI is not displaced.   Pulmonary/Chest: Effort normal and breath sounds normal. No respiratory distress.   Abdominal: Soft. Bowel sounds are normal. She exhibits no distension. There is no tenderness.   Musculoskeletal: She exhibits no edema.   Neurological: She is alert and oriented to person, place, and time.     Assessment:     1. CKD (chronic kidney disease) stage 3, GFR 30-59 ml/min    2. CHB (complete heart block)    3. Peripheral arterial disease    4. Age-related osteoporosis without current pathological fracture    5. Coronary artery disease involving native coronary artery of native heart without angina pectoris    6. Cardiac pacemaker in situ    7. Hyperlipidemia, unspecified hyperlipidemia type    8. Hypertension, unspecified type    9. Hypothyroidism, unspecified type    10. Vitamin D deficiency    11. Encounter for screening mammogram for breast cancer      Plan:   Lynn was seen today for follow-up.    Diagnoses and all orders for this visit:    CKD (chronic kidney disease) stage 3, GFR 30-59 ml/min  Comments:  stable will check urine, and microalbumin  Orders:  -     Microalbumin/creatinine urine ratio; Future    CHB (complete heart block)  Comments:  Follow up with Dr. Coleman    Peripheral arterial disease  Comments:  Legs are monitored    Age-related osteoporosis without current pathological fracture  Comments:  Could not tolerate Prolia , GFR will not allow alendronate  Orders:  -     DXA Bone Density Spine And Hip; Future    Coronary artery disease involving native coronary artery of native heart without angina pectoris  Comments:  Stable    Cardiac pacemaker in situ  Comments:  Dr. Coleman follow up scheduled    Hyperlipidemia, unspecified  hyperlipidemia type  Comments:  Well controlled same med  Orders:  -     Lipid panel; Future    Hypertension, unspecified type  Comments:  Well controlled same med  Orders:  -     CBC auto differential; Future  -     Comprehensive metabolic panel; Future    Hypothyroidism, unspecified type  Comments:  TSH stable, same med    Vitamin D deficiency  Comments:  stable , same med  Orders:  -     Vitamin D; Future    Encounter for screening mammogram for breast cancer  -     Mammo Digital Screening Bilat w/ Tyler; Future        Problem List Items Addressed This Visit        INFUSION TREATMENT    Age-related osteoporosis without current pathological fracture    Relevant Orders    DXA Bone Density Spine And Hip       Other    Hypertension    Relevant Orders    CBC auto differential    Comprehensive metabolic panel    Hyperlipidemia    Overview     reported side effects from atorvastatin.  pain in both hands, both shoulders and her left ankle 3/15 while on 80 mg of atorvastatin   Switched to rosuvastatin 20 in 3/15           Relevant Orders    Lipid panel    Peripheral arterial disease    CAD (coronary artery disease)    Overview     CAD S/P CABG  2001                                                          mid LAD          Hypothyroidism    CKD (chronic kidney disease) stage 3, GFR 30-59 ml/min - Primary    Relevant Orders    Microalbumin/creatinine urine ratio    Vitamin D deficiency    Relevant Orders    Vitamin D    CHB (complete heart block)    Cardiac pacemaker in situ      Other Visit Diagnoses     Encounter for screening mammogram for breast cancer        Relevant Orders    Mammo Digital Screening Bilat w/ [facility]        Orders Placed This Encounter   Procedures    Mammo Digital Screening Bilat w/ Tyler     Standing Status:   Future     Standing Expiration Date:   4/11/2021     Order Specific Question:   May the Radiologist modify the order per protocol to meet the clinical needs of the patient?     Answer:   Yes     DXA Bone Density Spine And Hip     Standing Status:   Future     Standing Expiration Date:   2/9/2021     Order Specific Question:   Reason for Exam:     Answer:   screening for osteoporosis    Microalbumin/creatinine urine ratio     Standing Status:   Future     Standing Expiration Date:   4/10/2020     Order Specific Question:   Specimen Source     Answer:   Urine    CBC auto differential     Standing Status:   Future     Standing Expiration Date:   5/10/2021    Comprehensive metabolic panel     Standing Status:   Future     Standing Expiration Date:   5/10/2021    Vitamin D     Standing Status:   Future     Standing Expiration Date:   5/10/2021    Lipid panel     Standing Status:   Future     Standing Expiration Date:   5/10/2021     Follow up in about 6 months (around 8/10/2020) for FU with cbc,c mp lipid .     Medication List           Accurate as of February 10, 2020 11:59 PM. If you have any questions, ask your nurse or doctor.               CONTINUE taking these medications    amLODIPine 5 MG tablet  Commonly known as:  NORVASC  TAKE 1 TABLET (5 MG TOTAL) BY MOUTH ONCE DAILY.     aspirin 81 MG Chew     BOOST ORAL     CALTRATE 600 + D ORAL     coenzyme Q10 200 mg capsule     ergocalciferol (vitamin D2) 400 unit Tab     hydroCHLOROthiazide 12.5 MG Tab  Commonly known as:  HYDRODIURIL  Take 1 tablet (12.5 mg total) by mouth once daily.     irbesartan 300 MG tablet  Commonly known as:  AVAPRO  Take 1 tablet (300 mg total) by mouth every evening.     labetalol 200 MG tablet  Commonly known as:  NORMODYNE  TAKE 1 TABLET (200 MG TOTAL) BY MOUTH 2 (TWO) TIMES DAILY.     levothyroxine 112 MCG tablet  Commonly known as:  SYNTHROID  TAKE 1 TABLET BY MOUTH EVERY DAY FOR 6 WEEKS AND 1/2 TABLET ON SUNDAY     multivitamin per tablet  Commonly known as:  THERAGRAN     nitroGLYCERIN 0.4 MG SL tablet  Commonly known as:  NITROSTAT  PLACE 1 TABLET UNDER THE TONGUE EVERY 5 MIN AS NEEDED FOR CHEST PAIN. MAX:3 DOSES      rosuvastatin 20 MG tablet  Commonly known as:  CRESTOR  TAKE 1 TABLET(20 MG) BY MOUTH EVERY DAY     SALMON OIL-OMEGA-3 FATTY ACIDS ORAL        STOP taking these medications    Fluzone High-Dose 2019-20 (PF) 180 mcg/0.5 mL Syrg  Generic drug:  flu vacc [id number](65yr up)PF  Stopped by:  Marielena Moise MD

## 2020-02-11 DIAGNOSIS — I49.8 OTHER SPECIFIED CARDIAC ARRHYTHMIAS: Primary | ICD-10-CM

## 2020-02-19 ENCOUNTER — PATIENT OUTREACH (OUTPATIENT)
Dept: ADMINISTRATIVE | Facility: OTHER | Age: 85
End: 2020-02-19

## 2020-02-19 NOTE — PROGRESS NOTES
Chart reviewed.   Immunizations: updated  Orders placed: n/a  Upcoming appts to satisfy CLAUDIA topics: Mammogram 4/13

## 2020-02-21 ENCOUNTER — CLINICAL SUPPORT (OUTPATIENT)
Dept: CARDIOLOGY | Facility: HOSPITAL | Age: 85
End: 2020-02-21
Attending: INTERNAL MEDICINE
Payer: MEDICARE

## 2020-02-21 ENCOUNTER — OFFICE VISIT (OUTPATIENT)
Dept: ELECTROPHYSIOLOGY | Facility: CLINIC | Age: 85
End: 2020-02-21
Payer: MEDICARE

## 2020-02-21 ENCOUNTER — HOSPITAL ENCOUNTER (OUTPATIENT)
Dept: CARDIOLOGY | Facility: CLINIC | Age: 85
Discharge: HOME | End: 2020-02-21
Payer: MEDICARE

## 2020-02-21 ENCOUNTER — HOSPITAL ENCOUNTER (OUTPATIENT)
Dept: CARDIOLOGY | Facility: HOSPITAL | Age: 85
Discharge: HOME | End: 2020-02-21
Attending: INTERNAL MEDICINE
Payer: MEDICARE

## 2020-02-21 VITALS — SYSTOLIC BLOOD PRESSURE: 140 MMHG | DIASTOLIC BLOOD PRESSURE: 80 MMHG | HEART RATE: 63 BPM

## 2020-02-21 DIAGNOSIS — I44.2 CHB (COMPLETE HEART BLOCK): ICD-10-CM

## 2020-02-21 DIAGNOSIS — I25.10 CORONARY ARTERY DISEASE INVOLVING NATIVE CORONARY ARTERY OF NATIVE HEART WITHOUT ANGINA PECTORIS: ICD-10-CM

## 2020-02-21 DIAGNOSIS — Z95.1 S/P CABG X 3: ICD-10-CM

## 2020-02-21 DIAGNOSIS — I49.8 OTHER SPECIFIED CARDIAC ARRHYTHMIAS: ICD-10-CM

## 2020-02-21 DIAGNOSIS — Z95.0 CARDIAC PACEMAKER IN SITU: ICD-10-CM

## 2020-02-21 DIAGNOSIS — I10 HYPERTENSION, UNSPECIFIED TYPE: ICD-10-CM

## 2020-02-21 DIAGNOSIS — E78.5 HYPERLIPIDEMIA, UNSPECIFIED HYPERLIPIDEMIA TYPE: ICD-10-CM

## 2020-02-21 DIAGNOSIS — I10 HYPERTENSION, UNSPECIFIED TYPE: Primary | ICD-10-CM

## 2020-02-21 LAB
AORTIC ROOT ANNULUS: 2.88 CM
AORTIC VALVE CUSP SEPERATION: 1.56 CM
AV INDEX (PROSTH): 0.69
AV MEAN GRADIENT: 5 MMHG
AV PEAK GRADIENT: 7 MMHG
AV VALVE AREA: 2.51 CM2
AV VELOCITY RATIO: 0.7
CV ECHO LV RWT: 0.49 CM
DOP CALC AO PEAK VEL: 1.33 M/S
DOP CALC AO VTI: 35.37 CM
DOP CALC LVOT AREA: 3.7 CM2
DOP CALC LVOT DIAMETER: 2.16 CM
DOP CALC LVOT PEAK VEL: 0.93 M/S
DOP CALC LVOT STROKE VOLUME: 88.85 CM3
DOP CALCLVOT PEAK VEL VTI: 24.26 CM
E WAVE DECELERATION TIME: 226.05 MSEC
E/A RATIO: 0.54
E/E' RATIO: 14.8 M/S
ECHO LV POSTERIOR WALL: 1.17 CM (ref 0.6–1.1)
FRACTIONAL SHORTENING: 36 % (ref 28–44)
INTERVENTRICULAR SEPTUM: 1.26 CM (ref 0.6–1.1)
LA MAJOR: 5.15 CM
LA MINOR: 5.87 CM
LA WIDTH: 4.13 CM
LEFT ATRIUM SIZE: 3.96 CM
LEFT ATRIUM VOLUME: 76.27 CM3
LEFT INTERNAL DIMENSION IN SYSTOLE: 3.01 CM (ref 2.1–4)
LEFT VENTRICLE DIASTOLIC VOLUME: 104.33 ML
LEFT VENTRICLE SYSTOLIC VOLUME: 35.37 ML
LEFT VENTRICULAR INTERNAL DIMENSION IN DIASTOLE: 4.74 CM (ref 3.5–6)
LEFT VENTRICULAR MASS: 218.67 G
LV LATERAL E/E' RATIO: 12.33 M/S
LV SEPTAL E/E' RATIO: 18.5 M/S
MV PEAK A VEL: 1.37 M/S
MV PEAK E VEL: 0.74 M/S
PISA TR MAX VEL: 2.8 M/S
PULM VEIN S/D RATIO: 1.78
PV PEAK D VEL: 0.32 M/S
PV PEAK S VEL: 0.57 M/S
PV PEAK VELOCITY: 0.96 CM/S
RA MAJOR: 3.17 CM
RA WIDTH: 2.83 CM
RIGHT VENTRICULAR END-DIASTOLIC DIMENSION: 3.11 CM
SINUS: 2.73 CM
TDI LATERAL: 0.06 M/S
TDI SEPTAL: 0.04 M/S
TDI: 0.05 M/S
TR MAX PG: 31 MMHG

## 2020-02-21 PROCEDURE — 1126F PR PAIN SEVERITY QUANTIFIED, NO PAIN PRESENT: ICD-10-PCS | Mod: S$GLB,,, | Performed by: INTERNAL MEDICINE

## 2020-02-21 PROCEDURE — 93306 TTE W/DOPPLER COMPLETE: CPT | Mod: PO

## 2020-02-21 PROCEDURE — 93005 RHYTHM STRIP: ICD-10-PCS | Mod: S$GLB,,, | Performed by: INTERNAL MEDICINE

## 2020-02-21 PROCEDURE — 99999 PR PBB SHADOW E&M-EST. PATIENT-LVL III: CPT | Mod: PBBFAC,,, | Performed by: INTERNAL MEDICINE

## 2020-02-21 PROCEDURE — 1159F MED LIST DOCD IN RCRD: CPT | Mod: S$GLB,,, | Performed by: INTERNAL MEDICINE

## 2020-02-21 PROCEDURE — 99999 PR PBB SHADOW E&M-EST. PATIENT-LVL III: ICD-10-PCS | Mod: PBBFAC,,, | Performed by: INTERNAL MEDICINE

## 2020-02-21 PROCEDURE — 1101F PT FALLS ASSESS-DOCD LE1/YR: CPT | Mod: CPTII,S$GLB,, | Performed by: INTERNAL MEDICINE

## 2020-02-21 PROCEDURE — 93010 ELECTROCARDIOGRAM REPORT: CPT | Mod: S$GLB,,, | Performed by: INTERNAL MEDICINE

## 2020-02-21 PROCEDURE — 3077F PR MOST RECENT SYSTOLIC BLOOD PRESSURE >= 140 MM HG: ICD-10-PCS | Mod: CPTII,S$GLB,, | Performed by: INTERNAL MEDICINE

## 2020-02-21 PROCEDURE — 3079F PR MOST RECENT DIASTOLIC BLOOD PRESSURE 80-89 MM HG: ICD-10-PCS | Mod: CPTII,S$GLB,, | Performed by: INTERNAL MEDICINE

## 2020-02-21 PROCEDURE — 99214 PR OFFICE/OUTPT VISIT, EST, LEVL IV, 30-39 MIN: ICD-10-PCS | Mod: S$GLB,,, | Performed by: INTERNAL MEDICINE

## 2020-02-21 PROCEDURE — 93010 RHYTHM STRIP: ICD-10-PCS | Mod: S$GLB,,, | Performed by: INTERNAL MEDICINE

## 2020-02-21 PROCEDURE — 99214 OFFICE O/P EST MOD 30 MIN: CPT | Mod: S$GLB,,, | Performed by: INTERNAL MEDICINE

## 2020-02-21 PROCEDURE — 93306 ECHO (CUPID ONLY): ICD-10-PCS | Mod: 26,,, | Performed by: INTERNAL MEDICINE

## 2020-02-21 PROCEDURE — 3077F SYST BP >= 140 MM HG: CPT | Mod: CPTII,S$GLB,, | Performed by: INTERNAL MEDICINE

## 2020-02-21 PROCEDURE — 1159F PR MEDICATION LIST DOCUMENTED IN MEDICAL RECORD: ICD-10-PCS | Mod: S$GLB,,, | Performed by: INTERNAL MEDICINE

## 2020-02-21 PROCEDURE — 93280 PM DEVICE PROGR EVAL DUAL: CPT

## 2020-02-21 PROCEDURE — 3079F DIAST BP 80-89 MM HG: CPT | Mod: CPTII,S$GLB,, | Performed by: INTERNAL MEDICINE

## 2020-02-21 PROCEDURE — 1126F AMNT PAIN NOTED NONE PRSNT: CPT | Mod: S$GLB,,, | Performed by: INTERNAL MEDICINE

## 2020-02-21 PROCEDURE — 93306 TTE W/DOPPLER COMPLETE: CPT | Mod: 26,,, | Performed by: INTERNAL MEDICINE

## 2020-02-21 PROCEDURE — 1101F PR PT FALLS ASSESS DOC 0-1 FALLS W/OUT INJ PAST YR: ICD-10-PCS | Mod: CPTII,S$GLB,, | Performed by: INTERNAL MEDICINE

## 2020-02-21 PROCEDURE — 93005 ELECTROCARDIOGRAM TRACING: CPT | Mod: S$GLB,,, | Performed by: INTERNAL MEDICINE

## 2020-02-21 RX ORDER — HYDROCHLOROTHIAZIDE 25 MG/1
25 TABLET ORAL DAILY
Qty: 90 TABLET | Refills: 3 | Status: SHIPPED | OUTPATIENT
Start: 2020-02-21 | End: 2020-09-07

## 2020-02-21 NOTE — PROGRESS NOTES
Ms. Mckinney is a patient of Dr. Coleman and was last seen in clinic 2/9/2018.      Subjective:   Patient ID:  Lynn Mckinney is a 84 y.o. female who presents for follow-up of Irregular Heart Beat  .     HPI:    Ms. Mckinney is a 84 y.o. female with pAF (distant past), CAD (s/p CABH 2001), HTN, PVD, PPM (10/2013) here for annual follow up.     Background:  pAF (in past? No sustained AF seen on device reports)  CAD  CABG 2001   HL on meds   HTN on meds   PVD carotidStent  holter showed 2:1 AV block, AVWB. Also has RBBB and LAFB.  PPM placed 10/13. Feels well since. C/o PARMAR at 2 flights (stable), but exertion is limited mostly by PAD.    Her exertion is limited by PAD, but she also gets PARMAR at 1 flight stairs. No CP.    Device Interrogation (2/18/2019) reveals an intrinsic CHB with junctional escape beats noted with stable lead and device function. She paces 30% in the RA and 99% in the RV. Has had some VT, including 38 seconds yesterday AM at 6:30. She had been awake since 4 AM, and had no sx.     I have personally reviewed the patient's EKG today, which shows ASVP at 73bpm.     echo 3/2019 50% (lower than previously)    Current Outpatient Medications   Medication Sig    amLODIPine (NORVASC) 5 MG tablet TAKE 1 TABLET (5 MG TOTAL) BY MOUTH ONCE DAILY.    aspirin 81 MG Chew Take 81 mg by mouth every evening. 1 Tablet, Chewable Oral Every day    CALCIUM CARBONATE/VITAMIN D3 (CALTRATE 600 + D ORAL) Take 1 tablet by mouth once daily.    coenzyme Q10 200 mg capsule Take 200 mg by mouth once daily.    ergocalciferol, vitamin D2, 400 unit Tab Take 400 Units by mouth once daily.    hydroCHLOROthiazide (HYDRODIURIL) 25 MG tablet Take 1 tablet (25 mg total) by mouth once daily.    irbesartan (AVAPRO) 300 MG tablet Take 1 tablet (300 mg total) by mouth every evening.    labetalol (NORMODYNE) 200 MG tablet TAKE 1 TABLET (200 MG TOTAL) BY MOUTH 2 (TWO) TIMES DAILY.    LACTOSE-FREE FOOD (BOOST ORAL) Take by mouth. 2-3 days per  week    levothyroxine (SYNTHROID) 112 MCG tablet TAKE 1 TABLET BY MOUTH EVERY DAY FOR 6 WEEKS AND 1/2 TABLET ON SUNDAY    multivitamin (THERAGRAN) per tablet Take by mouth. Pt taking one pill three times a week.    nitroGLYCERIN (NITROSTAT) 0.4 MG SL tablet PLACE 1 TABLET UNDER THE TONGUE EVERY 5 MIN AS NEEDED FOR CHEST PAIN. MAX:3 DOSES    rosuvastatin (CRESTOR) 20 MG tablet TAKE 1 TABLET(20 MG) BY MOUTH EVERY DAY    SALMON OIL-OMEGA-3 FATTY ACIDS ORAL Take 1 capsule by mouth once daily.     No current facility-administered medications for this visit.        Review of Systems   Constitution: Negative for malaise/fatigue.   Cardiovascular: Positive for claudication and dyspnea on exertion. Negative for chest pain, irregular heartbeat, leg swelling and palpitations.   Respiratory: Negative for shortness of breath.    Hematologic/Lymphatic: Negative for bleeding problem.   Skin: Negative for rash.   Musculoskeletal: Negative for myalgias.   Gastrointestinal: Negative for hematemesis, hematochezia and nausea.   Genitourinary: Negative for hematuria.   Neurological: Negative for light-headedness.   Psychiatric/Behavioral: Negative for altered mental status.   Allergic/Immunologic: Negative for persistent infections.     Objective:        BP (!) 140/80   Pulse 63     Physical Exam   Constitutional: She is oriented to person, place, and time. Vital signs are normal. She appears well-developed and well-nourished. She is active and cooperative.   HENT:   Head: Normocephalic and atraumatic.   Nose: Nose normal.   Eyes: Pupils are equal, round, and reactive to light. Conjunctivae and EOM are normal.   Neck: Normal range of motion. Carotid bruit is not present. No tracheal deviation and no edema present. No thyroid mass and no thyromegaly present.   Cardiovascular: Normal rate, regular rhythm, S1 normal, S2 normal, normal heart sounds and normal pulses.  No extrasystoles are present. PMI is not displaced. Exam reveals no  gallop and no friction rub.   No murmur heard.  Pulses:       Radial pulses are 2+ on the right side, and 2+ on the left side.   Pulmonary/Chest: Effort normal and breath sounds normal. No respiratory distress. She has no wheezes. She has no rales.   Device to LUCW.   Abdominal: Soft. Normal appearance. She exhibits no distension. There is no hepatosplenomegaly.   Musculoskeletal: Normal range of motion. She exhibits no edema.   Neurological: She is alert and oriented to person, place, and time. Coordination normal.   Skin: Skin is warm and dry. No rash noted. No erythema.   Psychiatric: She has a normal mood and affect. Her speech is normal and behavior is normal. Thought content normal. Cognition and memory are normal.   Nursing note and vitals reviewed.    Lab Results   Component Value Date     01/29/2020    K 3.9 01/29/2020    MG 2.1 07/13/2011    BUN 26 (H) 01/29/2020    CREATININE 1.3 01/29/2020    ALT 15 01/29/2020    AST 22 01/29/2020    HGB 14.0 01/29/2020    HCT 42.5 01/29/2020    TSH 2.921 01/29/2020    LDLCALC 52.0 (L) 01/29/2020           Assessment:     1. Hypertension, unspecified type    2. Hyperlipidemia, unspecified hyperlipidemia type    3. Coronary artery disease involving native coronary artery of native heart without angina pectoris    4. S/P CABG x 3    5. CHB (complete heart block)    6. Cardiac pacemaker in situ      Plan:     In summary, Ms. Mckinney is a 84 y.o. female with pAF (distant past), CAD (s/p [facility] 2001), HTN, PVD, PPM (10/2013) here for annual follow up.   New long NSVT/VT, though asymptomatic. Also with new PARMAR.    Update echo.  PET stress.  Increase HCTZ.  f/u with Dr Avery re: the above tests    Return in 1 year with echo, or earlier prn.    HPI

## 2020-03-05 ENCOUNTER — TELEPHONE (OUTPATIENT)
Dept: CARDIOLOGY | Facility: CLINIC | Age: 85
End: 2020-03-05

## 2020-03-09 ENCOUNTER — CLINICAL SUPPORT (OUTPATIENT)
Dept: CARDIOLOGY | Facility: CLINIC | Age: 85
End: 2020-03-09
Attending: INTERNAL MEDICINE
Payer: MEDICARE

## 2020-03-09 VITALS — WEIGHT: 169 LBS | HEIGHT: 65 IN | BODY MASS INDEX: 28.16 KG/M2

## 2020-03-09 DIAGNOSIS — Z95.1 S/P CABG X 3: ICD-10-CM

## 2020-03-09 DIAGNOSIS — I44.2 CHB (COMPLETE HEART BLOCK): ICD-10-CM

## 2020-03-09 DIAGNOSIS — Z95.0 CARDIAC PACEMAKER IN SITU: ICD-10-CM

## 2020-03-09 DIAGNOSIS — I25.10 CORONARY ARTERY DISEASE INVOLVING NATIVE CORONARY ARTERY OF NATIVE HEART WITHOUT ANGINA PECTORIS: ICD-10-CM

## 2020-03-09 DIAGNOSIS — I10 HYPERTENSION, UNSPECIFIED TYPE: ICD-10-CM

## 2020-03-09 LAB
CFR FLOW - ANTERIOR: 1.47
CFR FLOW - INFERIOR: 1.9
CFR FLOW - LATERAL: 1.89
CFR FLOW - MAX: 2.56
CFR FLOW - MIN: 1.19
CFR FLOW - SEPTAL: 1.64
CFR FLOW - WHOLE HEART: 1.73
CV PHARM DOSE: 43 MG
CV STRESS BASE HR: 63 BPM
DIASTOLIC BLOOD PRESSURE: 69 MMHG
END DIASTOLIC INDEX-HIGH: 170 ML/M2
END SYSTOLIC INDEX-HIGH: 70 ML/M2
NUC REST DIASTOLIC VOLUME INDEX: 67
NUC REST EJECTION FRACTION: 71
NUC REST SYSTOLIC VOLUME INDEX: 20
OHS CV CPX 85 PERCENT MAX PREDICTED HEART RATE MALE: 112
OHS CV CPX MAX PREDICTED HEART RATE: 132
OHS CV CPX PATIENT IS FEMALE: 1
OHS CV CPX PATIENT IS MALE: 0
OHS CV CPX PEAK DIASTOLIC BLOOD PRESSURE: 55 MMHG
OHS CV CPX PEAK HEAR RATE: 65 BPM
OHS CV CPX PEAK RATE PRESSURE PRODUCT: 8060
OHS CV CPX PEAK SYSTOLIC BLOOD PRESSURE: 124 MMHG
OHS CV CPX PERCENT MAX PREDICTED HEART RATE ACHIEVED: 49
OHS CV CPX RATE PRESSURE PRODUCT PRESENTING: 8064
REST FLOW - ANTERIOR: 0.74 CC/MIN/G
REST FLOW - INFERIOR: 0.74 CC/MIN/G
REST FLOW - LATERAL: 0.67 CC/MIN/G
REST FLOW - MAX: 1 CC/MIN/G
REST FLOW - MIN: 0.4 CC/MIN/G
REST FLOW - SEPTAL: 0.78 CC/MIN/G
REST FLOW - WHOLE HEART: 0.73 CC/MIN/G
RETIRED EF AND QEF - SEE NOTES: 51 %
STRESS ECHO TARGET HR: 116 BPM
STRESS FLOW - ANTERIOR: 1.1 CC/MIN/G
STRESS FLOW - INFERIOR: 1.32 CC/MIN/G
STRESS FLOW - LATERAL: 1.24 CC/MIN/G
STRESS FLOW - MAX: 1.6 CC/MIN/G
STRESS FLOW - MIN: 0.6 CC/MIN/G
STRESS FLOW - SEPTAL: 1.24 CC/MIN/G
STRESS FLOW - WHOLE HEART: 1.23 CC/MIN/G
SYSTOLIC BLOOD PRESSURE: 128 MMHG

## 2020-03-09 PROCEDURE — A9555 CARDIAC PET SCAN STRESS (CUPID ONLY): ICD-10-PCS | Mod: S$GLB,,, | Performed by: INTERNAL MEDICINE

## 2020-03-09 PROCEDURE — 99999 PR PBB SHADOW E&M-EST. PATIENT-LVL I: ICD-10-PCS | Mod: PBBFAC,,,

## 2020-03-09 PROCEDURE — 93015 CV STRESS TEST SUPVJ I&R: CPT | Mod: S$GLB,,, | Performed by: INTERNAL MEDICINE

## 2020-03-09 PROCEDURE — 99999 PR PBB SHADOW E&M-EST. PATIENT-LVL I: CPT | Mod: PBBFAC,,,

## 2020-03-09 PROCEDURE — A9555 RB82 RUBIDIUM: HCPCS | Mod: S$GLB,,, | Performed by: INTERNAL MEDICINE

## 2020-03-09 PROCEDURE — 93015 CARDIAC PET SCAN STRESS (CUPID ONLY): ICD-10-PCS | Mod: S$GLB,,, | Performed by: INTERNAL MEDICINE

## 2020-03-09 PROCEDURE — 78492 CARDIAC PET SCAN STRESS (CUPID ONLY): ICD-10-PCS | Mod: S$GLB,,, | Performed by: INTERNAL MEDICINE

## 2020-03-09 PROCEDURE — 78492 MYOCRD IMG PET MLT RST&STRS: CPT | Mod: S$GLB,,, | Performed by: INTERNAL MEDICINE

## 2020-03-09 RX ORDER — DIPYRIDAMOLE 5 MG/ML
43.02 INJECTION INTRAVENOUS
Status: COMPLETED | OUTPATIENT
Start: 2020-03-09 | End: 2020-03-09

## 2020-03-09 RX ADMIN — DIPYRIDAMOLE 43 MG: 5 INJECTION INTRAVENOUS at 10:03

## 2020-05-28 ENCOUNTER — HOSPITAL ENCOUNTER (OUTPATIENT)
Dept: RADIOLOGY | Facility: HOSPITAL | Age: 85
Discharge: HOME | End: 2020-05-28
Attending: INTERNAL MEDICINE
Payer: MEDICARE

## 2020-05-28 DIAGNOSIS — M81.0 AGE-RELATED OSTEOPOROSIS WITHOUT CURRENT PATHOLOGICAL FRACTURE: ICD-10-CM

## 2020-05-28 DIAGNOSIS — Z12.31 ENCOUNTER FOR SCREENING MAMMOGRAM FOR BREAST CANCER: ICD-10-CM

## 2020-05-28 PROCEDURE — 77067 SCR MAMMO BI INCL CAD: CPT | Mod: TC,PO

## 2020-05-28 PROCEDURE — 77080 DXA BONE DENSITY AXIAL: CPT | Mod: TC,PO

## 2020-06-01 ENCOUNTER — TELEPHONE (OUTPATIENT)
Dept: INTERNAL MEDICINE | Facility: CLINIC | Age: 85
End: 2020-06-01

## 2020-06-01 NOTE — TELEPHONE ENCOUNTER
----- Message from Marielena Moise MD sent at 5/29/2020  3:21 PM CDT -----  The bone density continues to show osteoporosis at the femoral neck of the hip.  And endocrinology evaluation last October suggested that you would be a candidate for Reclast once all your dental work was completed.  They asked when you are ready to make an appointment with Endocrine and they would reconsider the Reclast injection.

## 2020-06-01 NOTE — TELEPHONE ENCOUNTER
Pt informed of results, understanding verbalized. Pt stated she had tried the reclast injection before and stated the injection had hurt so bad she didn't want to got through that again. Gave pt number to endocrine dept to call and schedule a f/u to see if she could try anything else.

## 2020-07-27 ENCOUNTER — LAB VISIT (OUTPATIENT)
Dept: LAB | Facility: HOSPITAL | Age: 85
End: 2020-07-27
Attending: INTERNAL MEDICINE
Payer: MEDICARE

## 2020-07-27 DIAGNOSIS — E78.00 PURE HYPERCHOLESTEROLEMIA: ICD-10-CM

## 2020-07-27 DIAGNOSIS — N18.30 CHRONIC KIDNEY DISEASE, STAGE III (MODERATE): ICD-10-CM

## 2020-07-27 LAB
ALT SERPL W/O P-5'-P-CCNC: 15 U/L (ref 10–44)
ANION GAP SERPL CALC-SCNC: 5 MMOL/L (ref 8–16)
AST SERPL-CCNC: 28 U/L (ref 15–46)
BUN SERPL-MCNC: 26 MG/DL (ref 7–17)
CALCIUM SERPL-MCNC: 10 MG/DL (ref 8.7–10.5)
CHLORIDE SERPL-SCNC: 103 MMOL/L (ref 95–110)
CHOLEST SERPL-MCNC: 120 MG/DL (ref 120–199)
CHOLEST/HDLC SERPL: 2.3 {RATIO} (ref 2–5)
CO2 SERPL-SCNC: 33 MMOL/L (ref 23–29)
CREAT SERPL-MCNC: 1.16 MG/DL (ref 0.5–1.4)
EST. GFR  (AFRICAN AMERICAN): 50 ML/MIN/1.73 M^2
EST. GFR  (NON AFRICAN AMERICAN): 43.3 ML/MIN/1.73 M^2
GLUCOSE SERPL-MCNC: 100 MG/DL (ref 70–110)
HDLC SERPL-MCNC: 52 MG/DL (ref 40–75)
HDLC SERPL: 43.3 % (ref 20–50)
LDLC SERPL CALC-MCNC: 49.8 MG/DL (ref 63–159)
NONHDLC SERPL-MCNC: 68 MG/DL
POTASSIUM SERPL-SCNC: 3.9 MMOL/L (ref 3.5–5.1)
SODIUM SERPL-SCNC: 141 MMOL/L (ref 136–145)
TRIGL SERPL-MCNC: 91 MG/DL (ref 30–150)

## 2020-07-27 PROCEDURE — 80048 BASIC METABOLIC PNL TOTAL CA: CPT | Mod: PO

## 2020-07-27 PROCEDURE — 80061 LIPID PANEL: CPT

## 2020-07-27 PROCEDURE — 36415 COLL VENOUS BLD VENIPUNCTURE: CPT | Mod: PO

## 2020-07-27 PROCEDURE — 84460 ALANINE AMINO (ALT) (SGPT): CPT | Mod: PO

## 2020-07-27 PROCEDURE — 84450 TRANSFERASE (AST) (SGOT): CPT | Mod: PO

## 2020-07-28 ENCOUNTER — PATIENT OUTREACH (OUTPATIENT)
Dept: ADMINISTRATIVE | Facility: OTHER | Age: 85
End: 2020-07-28

## 2020-08-03 NOTE — PROGRESS NOTES
Ms. Mckinney is a patient of Dr. Avery and was last seen in [facility] Cardiology Visit 7/25/19.      Subjective:   Patient ID:  Lynn Mckinney is a 84 y.o. female who presents for follow-up of Coronary artery disease involving native coronary artery of     Problem List:  CAD    CABG x 3 in 2001,  LAD    Carotid stenosis, s/p stent (L) ICA 2007    - moderate to severe asymptomatic stenosis (R) [facility]    Hypertension    Hypercholesterolemia    PAD    CKD 3    2nd degree AVB s/p PPM 10/13 (Dr. Coleman)  Former smoker, quit in 1985    HPI:   Lynn Mckinney is in clinic today for routine follow up.  Patient denies chest pain with exertion or at rest, palpitations, SOB, PARMAR, dizziness, syncope, edema, orthopnea, or PND.  Reports no routine exercise.  She is treated with low dose ASA and high intensity statin.  Patient is taking rosuvastatin 20 mg and LDL is 50.  She is followed by Dr. Sams for her PAD and carotid disease.  Her last ultrasound was in 10/2019 with stable.  Her clinic BP runs 130-140s.     Review of Systems   Constitution: Negative for decreased appetite, diaphoresis, malaise/fatigue, weight gain and weight loss.   Eyes: Negative for visual disturbance.   Cardiovascular: Positive for claudication. Negative for chest pain, dyspnea on exertion, irregular heartbeat, leg swelling, near-syncope, orthopnea, palpitations, paroxysmal nocturnal dyspnea and syncope.        Denies chest pressure   Respiratory: Positive for cough. Negative for hemoptysis, shortness of breath, sleep disturbances due to breathing and snoring.    Endocrine: Negative for cold intolerance and heat intolerance.   Hematologic/Lymphatic: Negative for bleeding problem. Does not bruise/bleed easily.   Musculoskeletal: Negative for myalgias.   Gastrointestinal: Negative for bloating, abdominal pain, anorexia, change in bowel habit, constipation, diarrhea, nausea and vomiting.   Neurological: Positive for disturbances in  "coordination and loss of balance. Negative for difficulty with concentration, excessive daytime sleepiness, dizziness, headaches, light-headedness, numbness and weakness.   Psychiatric/Behavioral: The patient does not have insomnia.        Allergies and current medications updated and reviewed:  Review of patient's allergies indicates:   Allergen Reactions    Lipitor [atorvastatin] Itching    Fosamax [alendronate]      Felt "strange"    Iodinated contrast media      Dye is only to be used if absolutely needed because of kidney function    Prolia [denosumab]      Reaction after the Prolia    Sulfa (sulfonamide antibiotics)      Current Outpatient Medications   Medication Sig    amLODIPine (NORVASC) 5 MG tablet TAKE 1 TABLET (5 MG TOTAL) BY MOUTH ONCE DAILY.    aspirin 81 MG Chew Take 81 mg by mouth every evening. 1 Tablet, Chewable Oral Every day    CALCIUM CARBONATE/VITAMIN D3 (CALTRATE 600 + D ORAL) Take 1 tablet by mouth once daily.    coenzyme Q10 200 mg capsule Take 200 mg by mouth once daily.    ergocalciferol, vitamin D2, 400 unit Tab Take 400 Units by mouth once daily.    hydroCHLOROthiazide (HYDRODIURIL) 25 MG tablet Take 1 tablet (25 mg total) by mouth once daily.    irbesartan (AVAPRO) 300 MG tablet Take 1 tablet (300 mg total) by mouth every evening.    labetalol (NORMODYNE) 200 MG tablet TAKE 1 TABLET (200 MG TOTAL) BY MOUTH 2 (TWO) TIMES DAILY.    LACTOSE-FREE FOOD (BOOST ORAL) Take by mouth. 2-3 days per week    levothyroxine (SYNTHROID) 112 MCG tablet TAKE 1 TABLET BY MOUTH EVERY DAY FOR 6 WEEKS AND 1/2 TABLET ON SUNDAY    multivitamin (THERAGRAN) per tablet Take by mouth. Pt taking one pill three times a week.    nitroGLYCERIN (NITROSTAT) 0.4 MG SL tablet PLACE 1 TABLET UNDER THE TONGUE EVERY 5 MIN AS NEEDED FOR CHEST PAIN. MAX:3 DOSES    rosuvastatin (CRESTOR) 20 MG tablet TAKE 1 TABLET(20 MG) BY MOUTH EVERY DAY    SALMON OIL-OMEGA-3 FATTY ACIDS ORAL Take 1 capsule by mouth once " "daily.     No current facility-administered medications for this visit.        Objective:        /76   Pulse 78   Ht 5' 5" (1.651 m)   Wt 75.7 kg (166 lb 14.2 oz)   SpO2 95%   BMI 27.77 kg/m²       Physical Exam   Constitutional: She is oriented to person, place, and time. Vital signs are normal. She appears well-developed and well-nourished. She is active. No distress.   HENT:   Head: Normocephalic and atraumatic.   Eyes: Conjunctivae and lids are normal. No scleral icterus.   Neck: Neck supple. Normal carotid pulses, no hepatojugular reflux and no JVD present. Carotid bruit is not present.   Cardiovascular: Normal rate, regular rhythm, S1 normal, S2 normal and intact distal pulses. PMI is not displaced. Exam reveals no gallop and no friction rub.   No murmur heard.  Pulses:       Carotid pulses are 2+ on the right side and 2+ on the left side.       Radial pulses are 2+ on the right side and 2+ on the left side.        Dorsalis pedis pulses are 0 on the right side and 0 on the left side.        Posterior tibial pulses are 0 on the right side and 0 on the left side.   Pulmonary/Chest: Effort normal and breath sounds normal. No respiratory distress. She has no decreased breath sounds. She has no wheezes. She has no rhonchi. She has no rales. She exhibits no tenderness.   Abdominal: Soft. Normal appearance and bowel sounds are normal. She exhibits no distension, no fluid wave, no abdominal bruit, no ascites and no pulsatile midline mass. There is no hepatosplenomegaly. There is no abdominal tenderness.   Musculoskeletal:         General: No edema.   Neurological: She is alert and oriented to person, place, and time. Gait normal.   Skin: Skin is warm, dry and intact. No rash noted. She is not diaphoretic. Nails show no clubbing.   Psychiatric: She has a normal mood and affect. Her speech is normal and behavior is normal. Judgment and thought content normal. Cognition and memory are normal.   Nursing note " and vitals reviewed.      Chemistry        Component Value Date/Time     07/27/2020 0921    K 3.9 07/27/2020 0921     07/27/2020 0921    CO2 33 (H) 07/27/2020 0921    BUN 26 (H) 07/27/2020 0921    CREATININE 1.16 07/27/2020 0921     07/27/2020 0921        Component Value Date/Time    CALCIUM 10.0 07/27/2020 0921    ALKPHOS 46 (L) 01/29/2020 1007    AST 28 07/27/2020 0921    ALT 15 07/27/2020 0921    BILITOT 0.6 01/29/2020 1007    ESTGFRAFRICA 50.0 (A) 07/27/2020 0921    EGFRNONAA 43.3 (A) 07/27/2020 0921        Lab Results   Component Value Date    HGBA1C 5.2 06/09/2016     Recent Labs   Lab 01/29/20  1007 07/27/20 0921   WBC 7.39  --    Hemoglobin 14.0  --    Hematocrit 42.5  --    Mean Corpuscular Volume 97  --    Platelets 215  --    TSH 2.921  --    Cholesterol 120 120   HDL 51 52   LDL Cholesterol 52.0 L 49.8 L   Triglycerides 85 91   Hdl/Cholesterol Ratio 42.5 43.3              Test(s) Reviewed  I have reviewed the following in detail:  [x] Stress test   [x] Angiography   [x] Echocardiogram   [x] Labs   [] Other:         Assessment/Plan:   1. Coronary artery disease involving native coronary artery of native heart without angina pectoris  Asymptomatic.  PET stress done in March by Dr. Coleman for a 38 second run of VT noted on her pacemaker interrogation revealed no clinically significant regional resting or stress induced perfusion defects but did show thinning of the basal anterior wall (LCX territory) with moderate to severely reduced CF c/w subendocardial ischemia.  Discussed results with Dr. Oliveira yesterday given the unusual read.  Also, d/w Dr. Mendoza today and given that she is asymptomatic and that this is not affecting greater than or equal to 10% of her LV myocardium, will medically manage her.  Continue high intensity statin and ASA.     - IN OFFICE EKG 12-LEAD (to Muse)    2. S/P CABG x 3      3. Peripheral arterial disease  Symptomatic walking longer distances on the right  anterior tibialis.  Encouraged walking program. F/u with Dr. Sams as planned.     4. Bilateral carotid artery stenosis  Asymptomatic. Stable by ultrasound. No bruit on exam. No changes.    5. Hyperlipidemia, unspecified hyperlipidemia type  LDL at goal <70. No changes      6. Hypertension, unspecified type  BP at goal <140/90. No changes.    7. CHB (complete heart block)      8. Cardiac pacemaker in situ  Followed by Dr. Coleman    9. CKD (chronic kidney disease) stage 3, GFR 30-59 ml/min      10. Hypothyroidism, unspecified type  TSH 1/29/20 wnl. F/u with PCP as planned.        Patient was discussed with but not examined by Dr. Mendoza    Follow up in about 1 year (around 8/4/2021).

## 2020-08-04 ENCOUNTER — OFFICE VISIT (OUTPATIENT)
Dept: CARDIOLOGY | Facility: CLINIC | Age: 85
End: 2020-08-04
Payer: MEDICARE

## 2020-08-04 VITALS
WEIGHT: 166.88 LBS | BODY MASS INDEX: 27.81 KG/M2 | SYSTOLIC BLOOD PRESSURE: 132 MMHG | DIASTOLIC BLOOD PRESSURE: 76 MMHG | HEART RATE: 78 BPM | OXYGEN SATURATION: 95 % | HEIGHT: 65 IN

## 2020-08-04 DIAGNOSIS — E03.9 HYPOTHYROIDISM, UNSPECIFIED TYPE: ICD-10-CM

## 2020-08-04 DIAGNOSIS — I25.10 CORONARY ARTERY DISEASE INVOLVING NATIVE CORONARY ARTERY OF NATIVE HEART WITHOUT ANGINA PECTORIS: Primary | ICD-10-CM

## 2020-08-04 DIAGNOSIS — N18.30 CKD (CHRONIC KIDNEY DISEASE) STAGE 3, GFR 30-59 ML/MIN: ICD-10-CM

## 2020-08-04 DIAGNOSIS — I65.23 BILATERAL CAROTID ARTERY STENOSIS: ICD-10-CM

## 2020-08-04 DIAGNOSIS — Z95.1 S/P CABG X 3: ICD-10-CM

## 2020-08-04 DIAGNOSIS — E78.5 HYPERLIPIDEMIA, UNSPECIFIED HYPERLIPIDEMIA TYPE: ICD-10-CM

## 2020-08-04 DIAGNOSIS — I73.9 PERIPHERAL ARTERIAL DISEASE: ICD-10-CM

## 2020-08-04 DIAGNOSIS — Z95.0 CARDIAC PACEMAKER IN SITU: ICD-10-CM

## 2020-08-04 DIAGNOSIS — I10 HYPERTENSION, UNSPECIFIED TYPE: ICD-10-CM

## 2020-08-04 DIAGNOSIS — I44.2 CHB (COMPLETE HEART BLOCK): ICD-10-CM

## 2020-08-04 PROCEDURE — 93005 EKG 12-LEAD: ICD-10-PCS | Mod: S$GLB,,, | Performed by: INTERNAL MEDICINE

## 2020-08-04 PROCEDURE — 3078F DIAST BP <80 MM HG: CPT | Mod: CPTII,S$GLB,, | Performed by: NURSE PRACTITIONER

## 2020-08-04 PROCEDURE — 1101F PT FALLS ASSESS-DOCD LE1/YR: CPT | Mod: CPTII,S$GLB,, | Performed by: NURSE PRACTITIONER

## 2020-08-04 PROCEDURE — 99214 OFFICE O/P EST MOD 30 MIN: CPT | Mod: S$GLB,,, | Performed by: NURSE PRACTITIONER

## 2020-08-04 PROCEDURE — 1126F PR PAIN SEVERITY QUANTIFIED, NO PAIN PRESENT: ICD-10-PCS | Mod: S$GLB,,, | Performed by: NURSE PRACTITIONER

## 2020-08-04 PROCEDURE — 99214 PR OFFICE/OUTPT VISIT, EST, LEVL IV, 30-39 MIN: ICD-10-PCS | Mod: S$GLB,,, | Performed by: NURSE PRACTITIONER

## 2020-08-04 PROCEDURE — 3078F PR MOST RECENT DIASTOLIC BLOOD PRESSURE < 80 MM HG: ICD-10-PCS | Mod: CPTII,S$GLB,, | Performed by: NURSE PRACTITIONER

## 2020-08-04 PROCEDURE — 99999 PR PBB SHADOW E&M-EST. PATIENT-LVL IV: ICD-10-PCS | Mod: PBBFAC,,, | Performed by: NURSE PRACTITIONER

## 2020-08-04 PROCEDURE — 93005 ELECTROCARDIOGRAM TRACING: CPT | Mod: S$GLB,,, | Performed by: INTERNAL MEDICINE

## 2020-08-04 PROCEDURE — 1159F PR MEDICATION LIST DOCUMENTED IN MEDICAL RECORD: ICD-10-PCS | Mod: S$GLB,,, | Performed by: NURSE PRACTITIONER

## 2020-08-04 PROCEDURE — 3075F SYST BP GE 130 - 139MM HG: CPT | Mod: CPTII,S$GLB,, | Performed by: NURSE PRACTITIONER

## 2020-08-04 PROCEDURE — 99999 PR PBB SHADOW E&M-EST. PATIENT-LVL IV: CPT | Mod: PBBFAC,,, | Performed by: NURSE PRACTITIONER

## 2020-08-04 PROCEDURE — 3075F PR MOST RECENT SYSTOLIC BLOOD PRESS GE 130-139MM HG: ICD-10-PCS | Mod: CPTII,S$GLB,, | Performed by: NURSE PRACTITIONER

## 2020-08-04 PROCEDURE — 93010 ELECTROCARDIOGRAM REPORT: CPT | Mod: S$GLB,,, | Performed by: INTERNAL MEDICINE

## 2020-08-04 PROCEDURE — 93010 EKG 12-LEAD: ICD-10-PCS | Mod: S$GLB,,, | Performed by: INTERNAL MEDICINE

## 2020-08-04 PROCEDURE — 1159F MED LIST DOCD IN RCRD: CPT | Mod: S$GLB,,, | Performed by: NURSE PRACTITIONER

## 2020-08-04 PROCEDURE — 1126F AMNT PAIN NOTED NONE PRSNT: CPT | Mod: S$GLB,,, | Performed by: NURSE PRACTITIONER

## 2020-08-04 PROCEDURE — 1101F PR PT FALLS ASSESS DOC 0-1 FALLS W/OUT INJ PAST YR: ICD-10-PCS | Mod: CPTII,S$GLB,, | Performed by: NURSE PRACTITIONER

## 2020-08-04 NOTE — LETTER
August 4, 2020      Basilio Avery MD  [address] [facility]  8th Floor - Cardiology  [place] [address]           [place] - Cardiology  [address] [facility]  [place] [address]  Phone: [phone]          Patient: Lynn Mckinney   MR Number: [id number]   YOB: 1935   Date of Visit: 8/4/2020       Dear Dr. Basilio Avery:    Thank you for referring Lynn Mckinney to me for evaluation. Attached you will find relevant portions of my assessment and plan of care.    If you have questions, please do not hesitate to call me. I look forward to following Lynn Mckinney along with you.    Sincerely,    Lisa Cannon, NP    Enclosure  CC:  No Recipients    If you would like to receive this communication electronically, please contact [email or web address] or [phone] to request more information on [facility] Link access.    For providers and/or their staff who would like to refer a patient to [facility], please contact us through our one-stop-shop provider referral line, [facility], at [phone].    If you feel you have received this communication in error or would no longer like to receive these types of communications, please e-mail [email or web address]

## 2020-08-05 ENCOUNTER — TELEPHONE (OUTPATIENT)
Dept: ELECTROPHYSIOLOGY | Facility: CLINIC | Age: 85
End: 2020-08-05

## 2020-08-05 NOTE — TELEPHONE ENCOUNTER
Pt did not show for PPM appt and is not on home monitoring.    Left vm for pt to reschedule appt.  Asked pt to contact the clinic.

## 2020-08-12 ENCOUNTER — CLINICAL SUPPORT (OUTPATIENT)
Dept: CARDIOLOGY | Facility: HOSPITAL | Age: 85
End: 2020-08-12
Attending: INTERNAL MEDICINE
Payer: MEDICARE

## 2020-08-12 ENCOUNTER — TELEPHONE (OUTPATIENT)
Dept: ELECTROPHYSIOLOGY | Facility: CLINIC | Age: 85
End: 2020-08-12

## 2020-08-12 DIAGNOSIS — Z95.0 CARDIAC PACEMAKER IN SITU: ICD-10-CM

## 2020-08-12 DIAGNOSIS — I44.2 CHB (COMPLETE HEART BLOCK): ICD-10-CM

## 2020-08-12 PROCEDURE — 93280 PM DEVICE PROGR EVAL DUAL: CPT

## 2020-08-12 PROCEDURE — 93280 CARDIAC DEVICE CHECK - IN CLINIC & HOSPITAL: ICD-10-PCS | Mod: 26,,, | Performed by: INTERNAL MEDICINE

## 2020-08-12 PROCEDURE — 93280 PM DEVICE PROGR EVAL DUAL: CPT | Mod: 26,,, | Performed by: INTERNAL MEDICINE

## 2020-08-17 ENCOUNTER — LAB VISIT (OUTPATIENT)
Dept: LAB | Facility: HOSPITAL | Age: 85
End: 2020-08-17
Attending: INTERNAL MEDICINE
Payer: MEDICARE

## 2020-08-17 DIAGNOSIS — E78.5 HYPERLIPIDEMIA, UNSPECIFIED HYPERLIPIDEMIA TYPE: ICD-10-CM

## 2020-08-17 DIAGNOSIS — I10 HYPERTENSION, UNSPECIFIED TYPE: ICD-10-CM

## 2020-08-17 DIAGNOSIS — E55.9 VITAMIN D DEFICIENCY: ICD-10-CM

## 2020-08-17 LAB
ALBUMIN SERPL BCP-MCNC: 3.9 G/DL (ref 3.5–5.2)
ALP SERPL-CCNC: 51 U/L (ref 38–126)
ALT SERPL W/O P-5'-P-CCNC: 15 U/L (ref 10–44)
ANION GAP SERPL CALC-SCNC: 5 MMOL/L (ref 8–16)
AST SERPL-CCNC: 27 U/L (ref 15–46)
BASOPHILS # BLD AUTO: 0.03 K/UL (ref 0–0.2)
BASOPHILS NFR BLD: 0.5 % (ref 0–1.9)
BILIRUB SERPL-MCNC: 0.7 MG/DL (ref 0.1–1)
BUN SERPL-MCNC: 37 MG/DL (ref 7–17)
CALCIUM SERPL-MCNC: 9.4 MG/DL (ref 8.7–10.5)
CHLORIDE SERPL-SCNC: 104 MMOL/L (ref 95–110)
CHOLEST SERPL-MCNC: 114 MG/DL (ref 120–199)
CHOLEST/HDLC SERPL: 2.6 {RATIO} (ref 2–5)
CO2 SERPL-SCNC: 30 MMOL/L (ref 23–29)
CREAT SERPL-MCNC: 1.32 MG/DL (ref 0.5–1.4)
DIFFERENTIAL METHOD: ABNORMAL
EOSINOPHIL # BLD AUTO: 0.2 K/UL (ref 0–0.5)
EOSINOPHIL NFR BLD: 3.9 % (ref 0–8)
ERYTHROCYTE [DISTWIDTH] IN BLOOD BY AUTOMATED COUNT: 11.7 % (ref 11.5–14.5)
EST. GFR  (AFRICAN AMERICAN): 42.7 ML/MIN/1.73 M^2
EST. GFR  (NON AFRICAN AMERICAN): 37.1 ML/MIN/1.73 M^2
GLUCOSE SERPL-MCNC: 107 MG/DL (ref 70–110)
HCT VFR BLD AUTO: 38.2 % (ref 37–48.5)
HDLC SERPL-MCNC: 44 MG/DL (ref 40–75)
HDLC SERPL: 38.6 % (ref 20–50)
HGB BLD-MCNC: 12.8 G/DL (ref 12–16)
IMM GRANULOCYTES # BLD AUTO: 0.01 K/UL (ref 0–0.04)
IMM GRANULOCYTES NFR BLD AUTO: 0.2 % (ref 0–0.5)
LDLC SERPL CALC-MCNC: 52.2 MG/DL (ref 63–159)
LYMPHOCYTES # BLD AUTO: 1 K/UL (ref 1–4.8)
LYMPHOCYTES NFR BLD: 15.3 % (ref 18–48)
MCH RBC QN AUTO: 31.3 PG (ref 27–31)
MCHC RBC AUTO-ENTMCNC: 33.5 G/DL (ref 32–36)
MCV RBC AUTO: 93 FL (ref 82–98)
MONOCYTES # BLD AUTO: 0.7 K/UL (ref 0.3–1)
MONOCYTES NFR BLD: 10.8 % (ref 4–15)
NEUTROPHILS # BLD AUTO: 4.3 K/UL (ref 1.8–7.7)
NEUTROPHILS NFR BLD: 69.3 % (ref 38–73)
NONHDLC SERPL-MCNC: 70 MG/DL
NRBC BLD-RTO: 0 /100 WBC
PLATELET # BLD AUTO: 190 K/UL (ref 150–350)
PMV BLD AUTO: 9 FL (ref 9.2–12.9)
POTASSIUM SERPL-SCNC: 3.9 MMOL/L (ref 3.5–5.1)
PROT SERPL-MCNC: 7.1 G/DL (ref 6–8.4)
RBC # BLD AUTO: 4.09 M/UL (ref 4–5.4)
SODIUM SERPL-SCNC: 139 MMOL/L (ref 136–145)
TRIGL SERPL-MCNC: 89 MG/DL (ref 30–150)
WBC # BLD AUTO: 6.21 K/UL (ref 3.9–12.7)

## 2020-08-17 PROCEDURE — 82306 VITAMIN D 25 HYDROXY: CPT | Mod: PO

## 2020-08-17 PROCEDURE — 85025 COMPLETE CBC W/AUTO DIFF WBC: CPT | Mod: PO

## 2020-08-17 PROCEDURE — 80061 LIPID PANEL: CPT

## 2020-08-17 PROCEDURE — 36415 COLL VENOUS BLD VENIPUNCTURE: CPT | Mod: PO

## 2020-08-17 PROCEDURE — 80053 COMPREHEN METABOLIC PANEL: CPT | Mod: PO

## 2020-08-18 LAB — 25(OH)D3+25(OH)D2 SERPL-MCNC: 34 NG/ML (ref 30–96)

## 2020-08-25 ENCOUNTER — OFFICE VISIT (OUTPATIENT)
Dept: INTERNAL MEDICINE | Facility: CLINIC | Age: 85
End: 2020-08-25
Payer: MEDICARE

## 2020-08-25 ENCOUNTER — IMMUNIZATION (OUTPATIENT)
Dept: PHARMACY | Facility: CLINIC | Age: 85
End: 2020-08-25
Payer: MEDICARE

## 2020-08-25 VITALS
SYSTOLIC BLOOD PRESSURE: 136 MMHG | WEIGHT: 166.44 LBS | BODY MASS INDEX: 28.42 KG/M2 | OXYGEN SATURATION: 96 % | HEIGHT: 64 IN | DIASTOLIC BLOOD PRESSURE: 80 MMHG | HEART RATE: 63 BPM

## 2020-08-25 DIAGNOSIS — E03.9 HYPOTHYROIDISM, UNSPECIFIED TYPE: ICD-10-CM

## 2020-08-25 DIAGNOSIS — E55.9 MILD VITAMIN D DEFICIENCY: ICD-10-CM

## 2020-08-25 DIAGNOSIS — E55.9 VITAMIN D DEFICIENCY: ICD-10-CM

## 2020-08-25 DIAGNOSIS — E78.5 HYPERLIPIDEMIA, UNSPECIFIED HYPERLIPIDEMIA TYPE: ICD-10-CM

## 2020-08-25 DIAGNOSIS — Z12.83 SCREENING EXAM FOR SKIN CANCER: ICD-10-CM

## 2020-08-25 DIAGNOSIS — I25.10 CORONARY ARTERY DISEASE INVOLVING NATIVE CORONARY ARTERY OF NATIVE HEART WITHOUT ANGINA PECTORIS: ICD-10-CM

## 2020-08-25 DIAGNOSIS — I10 ESSENTIAL HYPERTENSION: Primary | ICD-10-CM

## 2020-08-25 PROCEDURE — 3079F PR MOST RECENT DIASTOLIC BLOOD PRESSURE 80-89 MM HG: ICD-10-PCS | Mod: CPTII,S$GLB,, | Performed by: INTERNAL MEDICINE

## 2020-08-25 PROCEDURE — 99999 PR PBB SHADOW E&M-EST. PATIENT-LVL V: ICD-10-PCS | Mod: PBBFAC,,, | Performed by: INTERNAL MEDICINE

## 2020-08-25 PROCEDURE — 99214 OFFICE O/P EST MOD 30 MIN: CPT | Mod: S$GLB,,, | Performed by: INTERNAL MEDICINE

## 2020-08-25 PROCEDURE — 3079F DIAST BP 80-89 MM HG: CPT | Mod: CPTII,S$GLB,, | Performed by: INTERNAL MEDICINE

## 2020-08-25 PROCEDURE — 1126F PR PAIN SEVERITY QUANTIFIED, NO PAIN PRESENT: ICD-10-PCS | Mod: S$GLB,,, | Performed by: INTERNAL MEDICINE

## 2020-08-25 PROCEDURE — 1159F PR MEDICATION LIST DOCUMENTED IN MEDICAL RECORD: ICD-10-PCS | Mod: S$GLB,,, | Performed by: INTERNAL MEDICINE

## 2020-08-25 PROCEDURE — 1101F PT FALLS ASSESS-DOCD LE1/YR: CPT | Mod: CPTII,S$GLB,, | Performed by: INTERNAL MEDICINE

## 2020-08-25 PROCEDURE — 1101F PR PT FALLS ASSESS DOC 0-1 FALLS W/OUT INJ PAST YR: ICD-10-PCS | Mod: CPTII,S$GLB,, | Performed by: INTERNAL MEDICINE

## 2020-08-25 PROCEDURE — 1159F MED LIST DOCD IN RCRD: CPT | Mod: S$GLB,,, | Performed by: INTERNAL MEDICINE

## 2020-08-25 PROCEDURE — 3075F SYST BP GE 130 - 139MM HG: CPT | Mod: CPTII,S$GLB,, | Performed by: INTERNAL MEDICINE

## 2020-08-25 PROCEDURE — 99214 PR OFFICE/OUTPT VISIT, EST, LEVL IV, 30-39 MIN: ICD-10-PCS | Mod: S$GLB,,, | Performed by: INTERNAL MEDICINE

## 2020-08-25 PROCEDURE — 99999 PR PBB SHADOW E&M-EST. PATIENT-LVL V: CPT | Mod: PBBFAC,,, | Performed by: INTERNAL MEDICINE

## 2020-08-25 PROCEDURE — 3075F PR MOST RECENT SYSTOLIC BLOOD PRESS GE 130-139MM HG: ICD-10-PCS | Mod: CPTII,S$GLB,, | Performed by: INTERNAL MEDICINE

## 2020-08-25 PROCEDURE — 1126F AMNT PAIN NOTED NONE PRSNT: CPT | Mod: S$GLB,,, | Performed by: INTERNAL MEDICINE

## 2020-08-25 NOTE — PROGRESS NOTES
Subjective:      Patient ID: Lynn Mckinney is a 84 y.o. female.    Chief Complaint: Follow-up    HPI:  HPI   Patient is seen every 6 months for medical problems.  She has a history of hypertension and currently is tolerating her medications.  She is also taking medication for hyperlipidemia.  She has I history of coronary artery disease and bypass surgery and was recently seen in Cardiology with a PET scan reviewed.  There is some concern about the PET scan this will be followed by cardiology.  She has chronic kidney disease which is stable her current laboratory studies.  She does have a pacemaker which is checked on a regular basis.    Health maintenance was also updated and reviewed  Patient Active Problem List   Diagnosis    Hypertension    Hyperlipidemia    Peripheral arterial disease    CAD (coronary artery disease)    S/P CABG x 3    Hypothyroidism    Incontinence    CKD (chronic kidney disease) stage 3, GFR 30-59 ml/min    Vitamin D deficiency    CHB (complete heart block)    Cardiac pacemaker in situ    Left-sided carotid artery disease    Claudication    Right hip pain    Lumbar stenosis    Thoracic or lumbosacral neuritis or radiculitis    Hip pain    Bilateral carotid artery stenosis    Lumbar radiculitis    Age-related osteoporosis without current pathological fracture    History of dental problems     Past Medical History:   Diagnosis Date    Allergy     seasonal    Blood clotting tendency     Carotid artery disease 5/29/2014    Coronary artery disease     Heart block     Hyperlipidemia     Hypertension     Hypothyroid     Obesity     Pacemaker     PAD (peripheral artery disease)     Spinal stenosis     Thoracic or lumbosacral neuritis or radiculitis 11/25/2014    Tubular adenoma      Past Surgical History:   Procedure Laterality Date    ANGIOPLASTY      APPENDECTOMY      BREAST CYST ASPIRATION Bilateral     CARDIAC CATHETERIZATION      CARDIAC PACEMAKER  "PLACEMENT  10/10/13    by Dr. Coleman    CAROTID STENT Left 2007    by Dr. Bowen    CATARACT EXTRACTION W/  INTRAOCULAR LENS IMPLANT Bilateral 2012    Dr Sharp    CORONARY ARTERY BYPASS GRAFT  2001     x 3 LIMA-Ramus, SVG-OM1 & SVG-PDA    CYST REMOVAL      wrist    EYE SURGERY      HYSTERECTOMY      OOPHORECTOMY      THYROIDECTOMY       Family History   Problem Relation Age of Onset    Heart disease Father         coronary thrombosis    Hypertension Father     Alzheimer's disease Mother     Heart disease Mother         pacemaker    Hypertension Mother     Diabetes Brother     No Known Problems Sister     No Known Problems Maternal Grandmother     No Known Problems Maternal Grandfather     No Known Problems Paternal Grandmother     No Known Problems Paternal Grandfather     Heart disease Brother     No Known Problems Maternal Aunt     No Known Problems Maternal Uncle     No Known Problems Paternal Aunt     No Known Problems Paternal Uncle     Amblyopia Neg Hx     Blindness Neg Hx     Cancer Neg Hx     Cataracts Neg Hx     Glaucoma Neg Hx     Macular degeneration Neg Hx     Retinal detachment Neg Hx     Strabismus Neg Hx     Stroke Neg Hx     Thyroid disease Neg Hx     Melanoma Neg Hx      Review of Systems   Constitutional: Negative for chills, fatigue, fever and unexpected weight change.   HENT: Negative for trouble swallowing.    Respiratory: Negative for cough, shortness of breath and wheezing.    Cardiovascular: Negative for chest pain, palpitations and leg swelling.   Gastrointestinal: Negative for abdominal distention, abdominal pain, blood in stool and vomiting.     Objective:     Vitals:    08/25/20 1431   BP: 136/80   Pulse: 63   SpO2: 96%   Weight: 75.5 kg (166 lb 7.2 oz)   Height: 5' 4" (1.626 m)   PainSc: 0-No pain     Body mass index is 28.57 kg/m².  Physical Exam  Constitutional:       General: She is not in acute distress.     Appearance: She is well-developed.   Neck: "      Thyroid: No thyromegaly.      Vascular: No carotid bruit.   Cardiovascular:      Rate and Rhythm: Normal rate and regular rhythm.      Chest Wall: PMI is not displaced.      Heart sounds: Normal heart sounds.   Pulmonary:      Effort: Pulmonary effort is normal. No respiratory distress.      Breath sounds: Normal breath sounds.   Abdominal:      General: Bowel sounds are normal. There is no distension.      Palpations: Abdomen is soft.      Tenderness: There is no abdominal tenderness.   Neurological:      Mental Status: She is alert and oriented to person, place, and time.       Assessment:     1. Essential hypertension    2. Hyperlipidemia, unspecified hyperlipidemia type    3. Hypothyroidism, unspecified type    4. Mild vitamin D deficiency    5. Screening exam for skin cancer    6. Vitamin D deficiency    7. Coronary artery disease involving native coronary artery of native heart without angina pectoris      Plan:   Lynn was seen today for follow-up.    Diagnoses and all orders for this visit:    Essential hypertension  -     CBC auto differential; Future  -     Comprehensive metabolic panel; Future    Hyperlipidemia, unspecified hyperlipidemia type  -     Lipid Panel; Future    Hypothyroidism, unspecified type  -     TSH; Future    Mild vitamin D deficiency  -     Vitamin D; Future    Screening exam for skin cancer  -     Ambulatory referral/consult to Dermatology; Future    Vitamin D deficiency    Coronary artery disease involving native coronary artery of native heart without angina pectoris      At this point all problems appear to be stable.  I have asked the patient to follow-up with dermatology for skin cancer screening.  Laboratory studies ordered are for the next appointment.  If she has any problems she should contact us or cardiology.  Problem List Items Addressed This Visit     Hypertension - Primary    Relevant Orders    CBC auto differential    Comprehensive metabolic panel    Hyperlipidemia     Overview     reported side effects from atorvastatin.  pain in both hands, both shoulders and her left ankle 3/15 while on 80 mg of atorvastatin   Switched to rosuvastatin 20 in 3/15           Relevant Orders    Lipid Panel    CAD (coronary artery disease)    Overview     CAD S/P CABG  2001                                                          mid LAD          Hypothyroidism    Relevant Orders    TSH    Vitamin D deficiency      Other Visit Diagnoses     Mild vitamin D deficiency        Relevant Orders    Vitamin D    Screening exam for skin cancer        Relevant Orders    Ambulatory referral/consult to Dermatology        Orders Placed This Encounter   Procedures    CBC auto differential     Standing Status:   Future     Standing Expiration Date:   10/25/2021    Comprehensive metabolic panel     Standing Status:   Future     Standing Expiration Date:   10/25/2021    Lipid Panel     Standing Status:   Future     Standing Expiration Date:   10/25/2021    TSH     Standing Status:   Future     Standing Expiration Date:   10/25/2021    Vitamin D     Standing Status:   Future     Standing Expiration Date:   10/25/2021    Ambulatory referral/consult to Dermatology     Standing Status:   Future     Standing Expiration Date:   9/25/2021     Referral Priority:   Routine     Referral Type:   Consultation     Referral Reason:   Specialty Services Required     Requested Specialty:   Dermatology     Number of Visits Requested:   1     Follow up in about 6 months (around 2/25/2021) for Follow up: cbc,cmp , lipid, tsh and vit D.     Medication List          Accurate as of August 25, 2020  6:42 PM. If you have any questions, ask your nurse or doctor.            START taking these medications    SHINGRIX (PF) 50 mcg/0.5 mL injection  Generic drug: varicella-zoster [id number] (PF)  Inject 0.5 mLs into the muscle once. For one dose. for 1 dose        CHANGE how you take these medications    amLODIPine 5 MG  tablet  Commonly known as: NORVASC  TAKE 1 TABLET (5 MG TOTAL) BY MOUTH ONCE DAILY.  What changed: how much to take        CONTINUE taking these medications    aspirin 81 MG Chew     BOOST ORAL     CALTRATE 600 + D ORAL     coenzyme Q10 200 mg capsule     ergocalciferol (vitamin D2) 10 mcg (400 unit) Tab     hydroCHLOROthiazide 25 MG tablet  Commonly known as: HYDRODIURIL  Take 1 tablet (25 mg total) by mouth once daily.     irbesartan 300 MG tablet  Commonly known as: AVAPRO  TAKE 1 TABLET (300 MG TOTAL) BY MOUTH EVERY EVENING. CANCEL IRBESARTAN/HCTZ COMBO     labetaloL 200 MG tablet  Commonly known as: NORMODYNE  TAKE 1 TABLET (200 MG TOTAL) BY MOUTH 2 (TWO) TIMES DAILY.     levothyroxine 112 MCG tablet  Commonly known as: SYNTHROID  TAKE 1 TABLET BY MOUTH EVERY DAY FOR 6 WEEKS AND 1/2 TABLET ON SUNDAY     multivitamin per tablet  Commonly known as: THERAGRAN     nitroGLYCERIN 0.4 MG SL tablet  Commonly known as: NITROSTAT  PLACE 1 TABLET UNDER THE TONGUE EVERY 5 MIN AS NEEDED FOR CHEST PAIN. MAX:3 DOSES     rosuvastatin 20 MG tablet  Commonly known as: CRESTOR  TAKE 1 TABLET(20 MG) BY MOUTH EVERY DAY     SALMON OIL-OMEGA-3 FATTY ACIDS ORAL           Where to Get Your Medications      These medications were sent to [facility]  [address] [facility] [address]    Hours: Mon-Fri, 8a-5:30p Phone: [phone]   · SHINGRIX (PF) 50 mcg/0.5 mL injection

## 2020-08-25 NOTE — PATIENT INSTRUCTIONS
New shingles vaccine: SHINGRIX ( 2018) not live, 90%,  2 shots, one at day zero and the 2nd at 2-6 months: any pharmacy can give it.    Flu shot

## 2020-12-04 ENCOUNTER — CLINICAL SUPPORT (OUTPATIENT)
Dept: CARDIOLOGY | Facility: HOSPITAL | Age: 85
End: 2020-12-04
Payer: MEDICARE

## 2020-12-04 DIAGNOSIS — Z95.0 PRESENCE OF CARDIAC PACEMAKER: ICD-10-CM

## 2020-12-04 PROCEDURE — 93296 REM INTERROG EVL PM/IDS: CPT | Performed by: INTERNAL MEDICINE

## 2020-12-04 PROCEDURE — 93294 REM INTERROG EVL PM/LDLS PM: CPT | Mod: ,,, | Performed by: INTERNAL MEDICINE

## 2020-12-04 PROCEDURE — 93294 CARDIAC DEVICE CHECK - REMOTE: ICD-10-PCS | Mod: ,,, | Performed by: INTERNAL MEDICINE

## 2020-12-18 ENCOUNTER — TELEPHONE (OUTPATIENT)
Dept: INTERNAL MEDICINE | Facility: CLINIC | Age: 85
End: 2020-12-18

## 2020-12-18 RX ORDER — ROSUVASTATIN CALCIUM 20 MG/1
20 TABLET, COATED ORAL DAILY
Qty: 90 TABLET | Refills: 3 | Status: ON HOLD | OUTPATIENT
Start: 2020-12-18 | End: 2021-03-13 | Stop reason: HOSPADM

## 2020-12-18 NOTE — TELEPHONE ENCOUNTER
Pt informed of stated she always took it in the morning. Informed pt to continue taking the medication like she has been.

## 2020-12-18 NOTE — TELEPHONE ENCOUNTER
Pt would like to clarify the directions on her Avapro. Please confirm if pt should be taking the Rx in the evening as last directed.

## 2020-12-18 NOTE — TELEPHONE ENCOUNTER
----- Message from Caty Thibodeaux sent at 12/18/2020  8:44 AM CST -----  Contact: [phone]  Patient would like to speak to the nurse in regards to her Rx irbesartan (AVAPRO) 300 MG tablet. Patient states another Physician change the directions on this Rx and she needs to clarify this with Dr. Moise. Please call and advise

## 2020-12-18 NOTE — TELEPHONE ENCOUNTER
She may take it either morning or night. If she has always been taking it in the morning please continue to do so and let me know.

## 2021-01-10 ENCOUNTER — IMMUNIZATION (OUTPATIENT)
Dept: INTERNAL MEDICINE | Facility: CLINIC | Age: 86
End: 2021-01-10
Payer: MEDICARE

## 2021-01-10 DIAGNOSIS — Z23 NEED FOR VACCINATION: ICD-10-CM

## 2021-01-10 PROCEDURE — 91300 COVID-19, MRNA, LNP-S, PF, 30 MCG/0.3 ML DOSE VACCINE: CPT | Mod: PBBFAC | Performed by: FAMILY MEDICINE

## 2021-01-31 ENCOUNTER — IMMUNIZATION (OUTPATIENT)
Dept: INTERNAL MEDICINE | Facility: CLINIC | Age: 86
End: 2021-01-31
Payer: MEDICARE

## 2021-01-31 DIAGNOSIS — Z23 NEED FOR VACCINATION: Primary | ICD-10-CM

## 2021-01-31 PROCEDURE — 91300 COVID-19, MRNA, LNP-S, PF, 30 MCG/0.3 ML DOSE VACCINE: CPT | Mod: PBBFAC | Performed by: FAMILY MEDICINE

## 2021-01-31 PROCEDURE — 0002A COVID-19, MRNA, LNP-S, PF, 30 MCG/0.3 ML DOSE VACCINE: CPT | Mod: PBBFAC | Performed by: FAMILY MEDICINE

## 2021-02-22 ENCOUNTER — LAB VISIT (OUTPATIENT)
Dept: LAB | Facility: HOSPITAL | Age: 86
End: 2021-02-22
Attending: INTERNAL MEDICINE
Payer: MEDICARE

## 2021-02-22 DIAGNOSIS — E55.9 MILD VITAMIN D DEFICIENCY: ICD-10-CM

## 2021-02-22 DIAGNOSIS — E03.9 HYPOTHYROIDISM, UNSPECIFIED TYPE: ICD-10-CM

## 2021-02-22 DIAGNOSIS — E78.5 HYPERLIPIDEMIA, UNSPECIFIED HYPERLIPIDEMIA TYPE: ICD-10-CM

## 2021-02-22 DIAGNOSIS — I10 ESSENTIAL HYPERTENSION: ICD-10-CM

## 2021-02-22 LAB
ALBUMIN SERPL BCP-MCNC: 4.3 G/DL (ref 3.5–5.2)
ALP SERPL-CCNC: 70 U/L (ref 38–126)
ALT SERPL W/O P-5'-P-CCNC: 14 U/L (ref 10–44)
ANION GAP SERPL CALC-SCNC: 5 MMOL/L (ref 8–16)
AST SERPL-CCNC: 31 U/L (ref 15–46)
BASOPHILS # BLD AUTO: 0.02 K/UL (ref 0–0.2)
BASOPHILS NFR BLD: 0.3 % (ref 0–1.9)
BILIRUB SERPL-MCNC: 0.9 MG/DL (ref 0.1–1)
CALCIUM SERPL-MCNC: 9.8 MG/DL (ref 8.7–10.5)
CHLORIDE SERPL-SCNC: 105 MMOL/L (ref 95–110)
CHOLEST SERPL-MCNC: 122 MG/DL (ref 120–199)
CHOLEST/HDLC SERPL: 2.5 {RATIO} (ref 2–5)
CO2 SERPL-SCNC: 32 MMOL/L (ref 23–29)
CREAT SERPL-MCNC: 1.3 MG/DL (ref 0.5–1.4)
DIFFERENTIAL METHOD: ABNORMAL
EOSINOPHIL # BLD AUTO: 0.2 K/UL (ref 0–0.5)
EOSINOPHIL NFR BLD: 3 % (ref 0–8)
ERYTHROCYTE [DISTWIDTH] IN BLOOD BY AUTOMATED COUNT: 11.8 % (ref 11.5–14.5)
EST. GFR  (AFRICAN AMERICAN): 43.2 ML/MIN/1.73 M^2
EST. GFR  (NON AFRICAN AMERICAN): 37.5 ML/MIN/1.73 M^2
GLUCOSE SERPL-MCNC: 108 MG/DL (ref 70–110)
HCT VFR BLD AUTO: 40.9 % (ref 37–48.5)
HDLC SERPL-MCNC: 48 MG/DL (ref 40–75)
HDLC SERPL: 39.3 % (ref 20–50)
HGB BLD-MCNC: 13.8 G/DL (ref 12–16)
IMM GRANULOCYTES # BLD AUTO: 0.01 K/UL (ref 0–0.04)
IMM GRANULOCYTES NFR BLD AUTO: 0.2 % (ref 0–0.5)
LDLC SERPL CALC-MCNC: 54.4 MG/DL (ref 63–159)
LYMPHOCYTES # BLD AUTO: 0.9 K/UL (ref 1–4.8)
LYMPHOCYTES NFR BLD: 15.1 % (ref 18–48)
MCH RBC QN AUTO: 31.9 PG (ref 27–31)
MCHC RBC AUTO-ENTMCNC: 33.7 G/DL (ref 32–36)
MCV RBC AUTO: 95 FL (ref 82–98)
MONOCYTES # BLD AUTO: 0.6 K/UL (ref 0.3–1)
MONOCYTES NFR BLD: 9.6 % (ref 4–15)
NEUTROPHILS # BLD AUTO: 4.5 K/UL (ref 1.8–7.7)
NEUTROPHILS NFR BLD: 71.8 % (ref 38–73)
NONHDLC SERPL-MCNC: 74 MG/DL
NRBC BLD-RTO: 0 /100 WBC
PLATELET # BLD AUTO: 176 K/UL (ref 150–350)
PMV BLD AUTO: 9 FL (ref 9.2–12.9)
POTASSIUM SERPL-SCNC: 4 MMOL/L (ref 3.5–5.1)
PROT SERPL-MCNC: 7.4 G/DL (ref 6–8.4)
RBC # BLD AUTO: 4.32 M/UL (ref 4–5.4)
SODIUM SERPL-SCNC: 142 MMOL/L (ref 136–145)
T4 FREE SERPL-MCNC: 1.66 NG/DL (ref 0.71–1.51)
TRIGL SERPL-MCNC: 98 MG/DL (ref 30–150)
TSH SERPL DL<=0.005 MIU/L-ACNC: 6.39 UIU/ML (ref 0.4–4)
UUN UR-MCNC: 30 MG/DL (ref 7–17)
WBC # BLD AUTO: 6.23 K/UL (ref 3.9–12.7)

## 2021-02-22 PROCEDURE — 80061 LIPID PANEL: CPT

## 2021-02-22 PROCEDURE — 80053 COMPREHEN METABOLIC PANEL: CPT | Mod: PO

## 2021-02-22 PROCEDURE — 84443 ASSAY THYROID STIM HORMONE: CPT | Mod: PO

## 2021-02-22 PROCEDURE — 36415 COLL VENOUS BLD VENIPUNCTURE: CPT | Mod: PO

## 2021-02-22 PROCEDURE — 82306 VITAMIN D 25 HYDROXY: CPT | Mod: PO

## 2021-02-22 PROCEDURE — 84439 ASSAY OF FREE THYROXINE: CPT

## 2021-02-22 PROCEDURE — 85025 COMPLETE CBC W/AUTO DIFF WBC: CPT | Mod: PO

## 2021-02-23 LAB — 25(OH)D3+25(OH)D2 SERPL-MCNC: 56 NG/ML (ref 30–96)

## 2021-02-25 ENCOUNTER — OFFICE VISIT (OUTPATIENT)
Dept: INTERNAL MEDICINE | Facility: CLINIC | Age: 86
End: 2021-02-25
Payer: MEDICARE

## 2021-02-25 VITALS
HEART RATE: 73 BPM | WEIGHT: 163.13 LBS | SYSTOLIC BLOOD PRESSURE: 136 MMHG | DIASTOLIC BLOOD PRESSURE: 74 MMHG | BODY MASS INDEX: 27.85 KG/M2 | HEIGHT: 64 IN | OXYGEN SATURATION: 95 %

## 2021-02-25 DIAGNOSIS — E05.90 HYPERTHYROIDISM: ICD-10-CM

## 2021-02-25 DIAGNOSIS — I10 ESSENTIAL HYPERTENSION: ICD-10-CM

## 2021-02-25 DIAGNOSIS — E55.9 MILD VITAMIN D DEFICIENCY: ICD-10-CM

## 2021-02-25 DIAGNOSIS — E78.5 HYPERLIPIDEMIA, UNSPECIFIED HYPERLIPIDEMIA TYPE: ICD-10-CM

## 2021-02-25 DIAGNOSIS — N18.30 STAGE 3 CHRONIC KIDNEY DISEASE, UNSPECIFIED WHETHER STAGE 3A OR 3B CKD: ICD-10-CM

## 2021-02-25 DIAGNOSIS — E03.9 HYPOTHYROIDISM, UNSPECIFIED TYPE: Primary | ICD-10-CM

## 2021-02-25 DIAGNOSIS — Z12.31 ENCOUNTER FOR SCREENING MAMMOGRAM FOR BREAST CANCER: ICD-10-CM

## 2021-02-25 PROCEDURE — 1159F PR MEDICATION LIST DOCUMENTED IN MEDICAL RECORD: ICD-10-PCS | Mod: S$GLB,,, | Performed by: INTERNAL MEDICINE

## 2021-02-25 PROCEDURE — 3078F DIAST BP <80 MM HG: CPT | Mod: CPTII,S$GLB,, | Performed by: INTERNAL MEDICINE

## 2021-02-25 PROCEDURE — 1126F AMNT PAIN NOTED NONE PRSNT: CPT | Mod: S$GLB,,, | Performed by: INTERNAL MEDICINE

## 2021-02-25 PROCEDURE — 3075F SYST BP GE 130 - 139MM HG: CPT | Mod: CPTII,S$GLB,, | Performed by: INTERNAL MEDICINE

## 2021-02-25 PROCEDURE — 99214 PR OFFICE/OUTPT VISIT, EST, LEVL IV, 30-39 MIN: ICD-10-PCS | Mod: S$GLB,,, | Performed by: INTERNAL MEDICINE

## 2021-02-25 PROCEDURE — 3288F PR FALLS RISK ASSESSMENT DOCUMENTED: ICD-10-PCS | Mod: CPTII,S$GLB,, | Performed by: INTERNAL MEDICINE

## 2021-02-25 PROCEDURE — 1100F PTFALLS ASSESS-DOCD GE2>/YR: CPT | Mod: CPTII,S$GLB,, | Performed by: INTERNAL MEDICINE

## 2021-02-25 PROCEDURE — 99999 PR PBB SHADOW E&M-EST. PATIENT-LVL IV: ICD-10-PCS | Mod: PBBFAC,,, | Performed by: INTERNAL MEDICINE

## 2021-02-25 PROCEDURE — 1100F PR PT FALLS ASSESS DOC 2+ FALLS/FALL W/INJURY/YR: ICD-10-PCS | Mod: CPTII,S$GLB,, | Performed by: INTERNAL MEDICINE

## 2021-02-25 PROCEDURE — 1126F PR PAIN SEVERITY QUANTIFIED, NO PAIN PRESENT: ICD-10-PCS | Mod: S$GLB,,, | Performed by: INTERNAL MEDICINE

## 2021-02-25 PROCEDURE — 1159F MED LIST DOCD IN RCRD: CPT | Mod: S$GLB,,, | Performed by: INTERNAL MEDICINE

## 2021-02-25 PROCEDURE — 99999 PR PBB SHADOW E&M-EST. PATIENT-LVL IV: CPT | Mod: PBBFAC,,, | Performed by: INTERNAL MEDICINE

## 2021-02-25 PROCEDURE — 3075F PR MOST RECENT SYSTOLIC BLOOD PRESS GE 130-139MM HG: ICD-10-PCS | Mod: CPTII,S$GLB,, | Performed by: INTERNAL MEDICINE

## 2021-02-25 PROCEDURE — 3078F PR MOST RECENT DIASTOLIC BLOOD PRESSURE < 80 MM HG: ICD-10-PCS | Mod: CPTII,S$GLB,, | Performed by: INTERNAL MEDICINE

## 2021-02-25 PROCEDURE — 99214 OFFICE O/P EST MOD 30 MIN: CPT | Mod: S$GLB,,, | Performed by: INTERNAL MEDICINE

## 2021-02-25 PROCEDURE — 3288F FALL RISK ASSESSMENT DOCD: CPT | Mod: CPTII,S$GLB,, | Performed by: INTERNAL MEDICINE

## 2021-02-26 ENCOUNTER — LAB VISIT (OUTPATIENT)
Dept: LAB | Facility: HOSPITAL | Age: 86
End: 2021-02-26
Attending: INTERNAL MEDICINE
Payer: MEDICARE

## 2021-02-26 DIAGNOSIS — E03.9 HYPOTHYROIDISM, UNSPECIFIED TYPE: ICD-10-CM

## 2021-02-26 LAB
T4 FREE SERPL-MCNC: 1.35 NG/DL (ref 0.71–1.51)
TSH SERPL DL<=0.005 MIU/L-ACNC: 7.15 UIU/ML (ref 0.4–4)

## 2021-02-26 PROCEDURE — 84443 ASSAY THYROID STIM HORMONE: CPT | Mod: PO

## 2021-02-26 PROCEDURE — 36415 COLL VENOUS BLD VENIPUNCTURE: CPT | Mod: PO

## 2021-02-26 PROCEDURE — 84439 ASSAY OF FREE THYROXINE: CPT

## 2021-02-28 ENCOUNTER — TELEPHONE (OUTPATIENT)
Dept: INTERNAL MEDICINE | Facility: CLINIC | Age: 86
End: 2021-02-28

## 2021-03-04 ENCOUNTER — CLINICAL SUPPORT (OUTPATIENT)
Dept: CARDIOLOGY | Facility: HOSPITAL | Age: 86
End: 2021-03-04
Payer: MEDICARE

## 2021-03-04 DIAGNOSIS — I44.2 ATRIOVENTRICULAR BLOCK, COMPLETE: ICD-10-CM

## 2021-03-04 DIAGNOSIS — Z95.0 PRESENCE OF CARDIAC PACEMAKER: ICD-10-CM

## 2021-03-04 DIAGNOSIS — I47.20 VENTRICULAR TACHYCARDIA: ICD-10-CM

## 2021-03-04 PROCEDURE — 93296 REM INTERROG EVL PM/IDS: CPT | Performed by: INTERNAL MEDICINE

## 2021-03-04 PROCEDURE — 93294 CARDIAC DEVICE CHECK - REMOTE: ICD-10-PCS | Mod: ,,, | Performed by: INTERNAL MEDICINE

## 2021-03-04 PROCEDURE — 93294 REM INTERROG EVL PM/LDLS PM: CPT | Mod: ,,, | Performed by: INTERNAL MEDICINE

## 2021-03-10 ENCOUNTER — HOSPITAL ENCOUNTER (INPATIENT)
Facility: HOSPITAL | Age: 86
LOS: 3 days | Discharge: HOME | DRG: 225 | End: 2021-03-13
Attending: EMERGENCY MEDICINE | Admitting: INTERNAL MEDICINE
Payer: MEDICARE

## 2021-03-10 ENCOUNTER — TELEPHONE (OUTPATIENT)
Dept: ELECTROPHYSIOLOGY | Facility: CLINIC | Age: 86
End: 2021-03-10

## 2021-03-10 DIAGNOSIS — I25.10 CAD (CORONARY ARTERY DISEASE): ICD-10-CM

## 2021-03-10 DIAGNOSIS — I44.2 COMPLETE HEART BLOCK: ICD-10-CM

## 2021-03-10 DIAGNOSIS — I49.01 VF (VENTRICULAR FIBRILLATION): ICD-10-CM

## 2021-03-10 DIAGNOSIS — I47.20 VT (VENTRICULAR TACHYCARDIA): Primary | ICD-10-CM

## 2021-03-10 DIAGNOSIS — I48.91 A-FIB: ICD-10-CM

## 2021-03-10 DIAGNOSIS — I49.01 VENTRICULAR FIBRILLATION: ICD-10-CM

## 2021-03-10 DIAGNOSIS — I47.20 VENTRICULAR TACHYCARDIA: ICD-10-CM

## 2021-03-10 DIAGNOSIS — I47.20 VT (VENTRICULAR TACHYCARDIA): ICD-10-CM

## 2021-03-10 DIAGNOSIS — I25.10 CORONARY ARTERY DISEASE INVOLVING NATIVE CORONARY ARTERY OF NATIVE HEART WITHOUT ANGINA PECTORIS: ICD-10-CM

## 2021-03-10 LAB
ALBUMIN SERPL BCP-MCNC: 3.8 G/DL (ref 3.5–5.2)
ALP SERPL-CCNC: 62 U/L (ref 55–135)
ALT SERPL W/O P-5'-P-CCNC: 12 U/L (ref 10–44)
ANION GAP SERPL CALC-SCNC: 11 MMOL/L (ref 8–16)
AST SERPL-CCNC: 22 U/L (ref 10–40)
BASOPHILS # BLD AUTO: 0.03 K/UL (ref 0–0.2)
BASOPHILS NFR BLD: 0.4 % (ref 0–1.9)
BILIRUB SERPL-MCNC: 0.6 MG/DL (ref 0.1–1)
BILIRUB UR QL STRIP: NEGATIVE
BNP SERPL-MCNC: 57 PG/ML (ref 0–99)
BUN SERPL-MCNC: 30 MG/DL (ref 8–23)
CALCIUM SERPL-MCNC: 9.1 MG/DL (ref 8.7–10.5)
CHLORIDE SERPL-SCNC: 102 MMOL/L (ref 95–110)
CLARITY UR REFRACT.AUTO: CLEAR
CO2 SERPL-SCNC: 24 MMOL/L (ref 23–29)
COLOR UR AUTO: NORMAL
CREAT SERPL-MCNC: 1.4 MG/DL (ref 0.5–1.4)
CTP QC/QA: YES
DIFFERENTIAL METHOD: ABNORMAL
EOSINOPHIL # BLD AUTO: 0.2 K/UL (ref 0–0.5)
EOSINOPHIL NFR BLD: 3.1 % (ref 0–8)
ERYTHROCYTE [DISTWIDTH] IN BLOOD BY AUTOMATED COUNT: 12.2 % (ref 11.5–14.5)
EST. GFR  (AFRICAN AMERICAN): 39.5 ML/MIN/1.73 M^2
EST. GFR  (NON AFRICAN AMERICAN): 34.3 ML/MIN/1.73 M^2
GLUCOSE SERPL-MCNC: 130 MG/DL (ref 70–110)
GLUCOSE UR QL STRIP: NEGATIVE
HCT VFR BLD AUTO: 38.5 % (ref 37–48.5)
HGB BLD-MCNC: 13.3 G/DL (ref 12–16)
HGB UR QL STRIP: NEGATIVE
IMM GRANULOCYTES # BLD AUTO: 0.02 K/UL (ref 0–0.04)
IMM GRANULOCYTES NFR BLD AUTO: 0.3 % (ref 0–0.5)
KETONES UR QL STRIP: NEGATIVE
LEUKOCYTE ESTERASE UR QL STRIP: NEGATIVE
LYMPHOCYTES # BLD AUTO: 0.9 K/UL (ref 1–4.8)
LYMPHOCYTES NFR BLD: 12.8 % (ref 18–48)
MCH RBC QN AUTO: 31.8 PG (ref 27–31)
MCHC RBC AUTO-ENTMCNC: 34.5 G/DL (ref 32–36)
MCV RBC AUTO: 92 FL (ref 82–98)
MONOCYTES # BLD AUTO: 0.8 K/UL (ref 0.3–1)
MONOCYTES NFR BLD: 11.5 % (ref 4–15)
NEUTROPHILS # BLD AUTO: 5.1 K/UL (ref 1.8–7.7)
NEUTROPHILS NFR BLD: 71.9 % (ref 38–73)
NITRITE UR QL STRIP: NEGATIVE
NRBC BLD-RTO: 0 /100 WBC
PH UR STRIP: 6 [PH] (ref 5–8)
PLATELET # BLD AUTO: 192 K/UL (ref 150–350)
PMV BLD AUTO: 9.8 FL (ref 9.2–12.9)
POTASSIUM SERPL-SCNC: 3.9 MMOL/L (ref 3.5–5.1)
PROT SERPL-MCNC: 7.3 G/DL (ref 6–8.4)
PROT UR QL STRIP: NEGATIVE
RBC # BLD AUTO: 4.18 M/UL (ref 4–5.4)
SARS-COV-2 RDRP RESP QL NAA+PROBE: NEGATIVE
SODIUM SERPL-SCNC: 137 MMOL/L (ref 136–145)
SP GR UR STRIP: 1 (ref 1–1.03)
T4 FREE SERPL-MCNC: 1.23 NG/DL (ref 0.71–1.51)
TROPONIN I SERPL DL<=0.01 NG/ML-MCNC: 0.01 NG/ML (ref 0–0.03)
TSH SERPL DL<=0.005 MIU/L-ACNC: 6.44 UIU/ML (ref 0.4–4)
URN SPEC COLLECT METH UR: NORMAL
WBC # BLD AUTO: 7.05 K/UL (ref 3.9–12.7)

## 2021-03-10 PROCEDURE — 25000003 PHARM REV CODE 250: Performed by: STUDENT IN AN ORGANIZED HEALTH CARE EDUCATION/TRAINING PROGRAM

## 2021-03-10 PROCEDURE — 93010 EKG 12-LEAD: ICD-10-PCS | Mod: ,,, | Performed by: INTERNAL MEDICINE

## 2021-03-10 PROCEDURE — 80053 COMPREHEN METABOLIC PANEL: CPT | Performed by: STUDENT IN AN ORGANIZED HEALTH CARE EDUCATION/TRAINING PROGRAM

## 2021-03-10 PROCEDURE — 93005 ELECTROCARDIOGRAM TRACING: CPT

## 2021-03-10 PROCEDURE — 84484 ASSAY OF TROPONIN QUANT: CPT | Performed by: STUDENT IN AN ORGANIZED HEALTH CARE EDUCATION/TRAINING PROGRAM

## 2021-03-10 PROCEDURE — 99285 PR EMERGENCY DEPT VISIT,LEVEL V: ICD-10-PCS | Mod: GC,CS,, | Performed by: EMERGENCY MEDICINE

## 2021-03-10 PROCEDURE — U0002 COVID-19 LAB TEST NON-CDC: HCPCS | Performed by: STUDENT IN AN ORGANIZED HEALTH CARE EDUCATION/TRAINING PROGRAM

## 2021-03-10 PROCEDURE — 25000003 PHARM REV CODE 250: Performed by: INTERNAL MEDICINE

## 2021-03-10 PROCEDURE — 63600175 PHARM REV CODE 636 W HCPCS: Performed by: INTERNAL MEDICINE

## 2021-03-10 PROCEDURE — 93010 ELECTROCARDIOGRAM REPORT: CPT | Mod: ,,, | Performed by: INTERNAL MEDICINE

## 2021-03-10 PROCEDURE — 84439 ASSAY OF FREE THYROXINE: CPT | Performed by: STUDENT IN AN ORGANIZED HEALTH CARE EDUCATION/TRAINING PROGRAM

## 2021-03-10 PROCEDURE — 84100 ASSAY OF PHOSPHORUS: CPT | Performed by: STUDENT IN AN ORGANIZED HEALTH CARE EDUCATION/TRAINING PROGRAM

## 2021-03-10 PROCEDURE — 63600175 PHARM REV CODE 636 W HCPCS: Performed by: STUDENT IN AN ORGANIZED HEALTH CARE EDUCATION/TRAINING PROGRAM

## 2021-03-10 PROCEDURE — 12000002 HC ACUTE/MED SURGE SEMI-PRIVATE ROOM

## 2021-03-10 PROCEDURE — 84443 ASSAY THYROID STIM HORMONE: CPT | Performed by: STUDENT IN AN ORGANIZED HEALTH CARE EDUCATION/TRAINING PROGRAM

## 2021-03-10 PROCEDURE — 85025 COMPLETE CBC W/AUTO DIFF WBC: CPT | Performed by: STUDENT IN AN ORGANIZED HEALTH CARE EDUCATION/TRAINING PROGRAM

## 2021-03-10 PROCEDURE — 83880 ASSAY OF NATRIURETIC PEPTIDE: CPT | Performed by: STUDENT IN AN ORGANIZED HEALTH CARE EDUCATION/TRAINING PROGRAM

## 2021-03-10 PROCEDURE — 99285 EMERGENCY DEPT VISIT HI MDM: CPT | Mod: GC,CS,, | Performed by: EMERGENCY MEDICINE

## 2021-03-10 PROCEDURE — 99285 EMERGENCY DEPT VISIT HI MDM: CPT | Mod: 25

## 2021-03-10 PROCEDURE — 83735 ASSAY OF MAGNESIUM: CPT | Performed by: STUDENT IN AN ORGANIZED HEALTH CARE EDUCATION/TRAINING PROGRAM

## 2021-03-10 PROCEDURE — 81003 URINALYSIS AUTO W/O SCOPE: CPT | Performed by: STUDENT IN AN ORGANIZED HEALTH CARE EDUCATION/TRAINING PROGRAM

## 2021-03-10 RX ORDER — HEPARIN SODIUM 5000 [USP'U]/ML
5000 INJECTION, SOLUTION INTRAVENOUS; SUBCUTANEOUS EVERY 8 HOURS
Status: DISCONTINUED | OUTPATIENT
Start: 2021-03-10 | End: 2021-03-12

## 2021-03-10 RX ORDER — POLYETHYLENE GLYCOL 3350 17 G/17G
17 POWDER, FOR SOLUTION ORAL DAILY
Status: DISCONTINUED | OUTPATIENT
Start: 2021-03-11 | End: 2021-03-12

## 2021-03-10 RX ORDER — ACETAMINOPHEN 325 MG/1
650 TABLET ORAL EVERY 4 HOURS PRN
Status: DISCONTINUED | OUTPATIENT
Start: 2021-03-10 | End: 2021-03-13 | Stop reason: HOSPADM

## 2021-03-10 RX ORDER — AMOXICILLIN 250 MG
1 CAPSULE ORAL 2 TIMES DAILY
Status: DISCONTINUED | OUTPATIENT
Start: 2021-03-10 | End: 2021-03-12

## 2021-03-10 RX ORDER — LEVOTHYROXINE SODIUM 112 UG/1
112 TABLET ORAL
Status: DISCONTINUED | OUTPATIENT
Start: 2021-03-11 | End: 2021-03-12

## 2021-03-10 RX ORDER — POTASSIUM CHLORIDE 20 MEQ/1
40 TABLET, EXTENDED RELEASE ORAL ONCE
Status: COMPLETED | OUTPATIENT
Start: 2021-03-10 | End: 2021-03-10

## 2021-03-10 RX ORDER — FUROSEMIDE 10 MG/ML
40 INJECTION INTRAMUSCULAR; INTRAVENOUS ONCE
Status: COMPLETED | OUTPATIENT
Start: 2021-03-10 | End: 2021-03-10

## 2021-03-10 RX ORDER — ERGOCALCIFEROL (VITAMIN D2) 10 MCG
2000 TABLET ORAL DAILY
Status: DISCONTINUED | OUTPATIENT
Start: 2021-03-11 | End: 2021-03-11

## 2021-03-10 RX ORDER — ROSUVASTATIN CALCIUM 20 MG/1
20 TABLET, COATED ORAL NIGHTLY
Status: DISCONTINUED | OUTPATIENT
Start: 2021-03-10 | End: 2021-03-12

## 2021-03-10 RX ORDER — HYDROMORPHONE HYDROCHLORIDE 1 MG/ML
1 INJECTION, SOLUTION INTRAMUSCULAR; INTRAVENOUS; SUBCUTANEOUS EVERY 4 HOURS PRN
Status: DISCONTINUED | OUTPATIENT
Start: 2021-03-10 | End: 2021-03-13 | Stop reason: HOSPADM

## 2021-03-10 RX ORDER — POTASSIUM CHLORIDE 20 MEQ/1
60 TABLET, EXTENDED RELEASE ORAL ONCE
Status: DISCONTINUED | OUTPATIENT
Start: 2021-03-10 | End: 2021-03-10

## 2021-03-10 RX ORDER — CARVEDILOL 3.12 MG/1
6.25 TABLET ORAL 2 TIMES DAILY
Status: DISCONTINUED | OUTPATIENT
Start: 2021-03-10 | End: 2021-03-11

## 2021-03-10 RX ORDER — TALC
6 POWDER (GRAM) TOPICAL NIGHTLY
Status: DISCONTINUED | OUTPATIENT
Start: 2021-03-10 | End: 2021-03-13 | Stop reason: HOSPADM

## 2021-03-10 RX ORDER — NAPROXEN SODIUM 220 MG/1
81 TABLET, FILM COATED ORAL NIGHTLY
Status: DISCONTINUED | OUTPATIENT
Start: 2021-03-10 | End: 2021-03-11

## 2021-03-10 RX ORDER — NITROGLYCERIN 0.3 MG/1
0.3 TABLET SUBLINGUAL EVERY 5 MIN PRN
Status: DISCONTINUED | OUTPATIENT
Start: 2021-03-10 | End: 2021-03-12

## 2021-03-10 RX ORDER — HYDROCODONE BITARTRATE AND ACETAMINOPHEN 5; 325 MG/1; MG/1
1 TABLET ORAL EVERY 4 HOURS PRN
Status: DISCONTINUED | OUTPATIENT
Start: 2021-03-10 | End: 2021-03-13 | Stop reason: HOSPADM

## 2021-03-10 RX ADMIN — POTASSIUM CHLORIDE 40 MEQ: 1500 TABLET, EXTENDED RELEASE ORAL at 09:03

## 2021-03-10 RX ADMIN — CARVEDILOL 6.25 MG: 3.12 TABLET, FILM COATED ORAL at 10:03

## 2021-03-10 RX ADMIN — FUROSEMIDE 40 MG: 10 INJECTION, SOLUTION INTRAMUSCULAR; INTRAVENOUS at 09:03

## 2021-03-10 RX ADMIN — MELATONIN TAB 3 MG 6 MG: 3 TAB at 10:03

## 2021-03-10 RX ADMIN — HEPARIN SODIUM 5000 UNITS: 5000 INJECTION INTRAVENOUS; SUBCUTANEOUS at 10:03

## 2021-03-10 RX ADMIN — DOCUSATE SODIUM 50MG AND SENNOSIDES 8.6MG 1 TABLET: 8.6; 5 TABLET, FILM COATED ORAL at 10:03

## 2021-03-10 RX ADMIN — ROSUVASTATIN CALCIUM 20 MG: 20 TABLET, FILM COATED ORAL at 10:03

## 2021-03-10 RX ADMIN — ASPIRIN 81 MG: 81 TABLET, CHEWABLE ORAL at 10:03

## 2021-03-11 ENCOUNTER — ANESTHESIA EVENT (OUTPATIENT)
Dept: MEDSURG UNIT | Facility: HOSPITAL | Age: 86
DRG: 225 | End: 2021-03-11
Payer: MEDICARE

## 2021-03-11 LAB
ABO + RH BLD: NORMAL
ALBUMIN SERPL BCP-MCNC: 3.8 G/DL (ref 3.5–5.2)
ALP SERPL-CCNC: 68 U/L (ref 55–135)
ALT SERPL W/O P-5'-P-CCNC: 12 U/L (ref 10–44)
ANION GAP SERPL CALC-SCNC: 12 MMOL/L (ref 8–16)
APTT BLDCRRT: 25.4 SEC (ref 21–32)
ASCENDING AORTA: 3.29 CM
AST SERPL-CCNC: 23 U/L (ref 10–40)
AV INDEX (PROSTH): 1.05
AV MEAN GRADIENT: 2 MMHG
AV PEAK GRADIENT: 3 MMHG
AV VALVE AREA: 3.61 CM2
AV VELOCITY RATIO: 0.98
BASOPHILS # BLD AUTO: 0.02 K/UL (ref 0–0.2)
BASOPHILS NFR BLD: 0.3 % (ref 0–1.9)
BILIRUB SERPL-MCNC: 0.8 MG/DL (ref 0.1–1)
BLD GP AB SCN CELLS X3 SERPL QL: NORMAL
BSA FOR ECHO PROCEDURE: 1.82 M2
BUN SERPL-MCNC: 28 MG/DL (ref 8–23)
CALCIUM SERPL-MCNC: 9.5 MG/DL (ref 8.7–10.5)
CHLORIDE SERPL-SCNC: 104 MMOL/L (ref 95–110)
CHOLEST SERPL-MCNC: 114 MG/DL (ref 120–199)
CHOLEST/HDLC SERPL: 2.4 {RATIO} (ref 2–5)
CO2 SERPL-SCNC: 26 MMOL/L (ref 23–29)
CREAT SERPL-MCNC: 1.4 MG/DL (ref 0.5–1.4)
CV ECHO LV RWT: 0.43 CM
DIFFERENTIAL METHOD: ABNORMAL
DOP CALC AO PEAK VEL: 0.86 M/S
DOP CALC AO VTI: 18 CM
DOP CALC LVOT AREA: 3.4 CM2
DOP CALC LVOT DIAMETER: 2.09 CM
DOP CALC LVOT PEAK VEL: 0.84 M/S
DOP CALC LVOT STROKE VOLUME: 64.98 CM3
DOP CALCLVOT PEAK VEL VTI: 18.95 CM
ECHO LV POSTERIOR WALL: 0.96 CM (ref 0.6–1.1)
EOSINOPHIL # BLD AUTO: 0.2 K/UL (ref 0–0.5)
EOSINOPHIL NFR BLD: 4.1 % (ref 0–8)
ERYTHROCYTE [DISTWIDTH] IN BLOOD BY AUTOMATED COUNT: 12.1 % (ref 11.5–14.5)
EST. GFR  (AFRICAN AMERICAN): 39.5 ML/MIN/1.73 M^2
EST. GFR  (NON AFRICAN AMERICAN): 34.3 ML/MIN/1.73 M^2
FRACTIONAL SHORTENING: 33 % (ref 28–44)
GLUCOSE SERPL-MCNC: 112 MG/DL (ref 70–110)
HCT VFR BLD AUTO: 40.1 % (ref 37–48.5)
HDLC SERPL-MCNC: 47 MG/DL (ref 40–75)
HDLC SERPL: 41.2 % (ref 20–50)
HGB BLD-MCNC: 13.9 G/DL (ref 12–16)
IMM GRANULOCYTES # BLD AUTO: 0.02 K/UL (ref 0–0.04)
IMM GRANULOCYTES NFR BLD AUTO: 0.3 % (ref 0–0.5)
INR PPP: 1 (ref 0.8–1.2)
INTERVENTRICULAR SEPTUM: 1.16 CM (ref 0.6–1.1)
LA MAJOR: 5.86 CM
LA MINOR: 5.14 CM
LA WIDTH: 3.84 CM
LDLC SERPL CALC-MCNC: 47.6 MG/DL (ref 63–159)
LEFT ATRIUM SIZE: 3.27 CM
LEFT ATRIUM VOLUME INDEX MOD: 37.6 ML/M2
LEFT ATRIUM VOLUME INDEX: 32.8 ML/M2
LEFT ATRIUM VOLUME MOD: 67.01 CM3
LEFT ATRIUM VOLUME: 58.45 CM3
LEFT INTERNAL DIMENSION IN SYSTOLE: 3.01 CM (ref 2.1–4)
LEFT VENTRICLE DIASTOLIC VOLUME INDEX: 51.28 ML/M2
LEFT VENTRICLE DIASTOLIC VOLUME: 91.28 ML
LEFT VENTRICLE MASS INDEX: 93 G/M2
LEFT VENTRICLE SYSTOLIC VOLUME INDEX: 19.8 ML/M2
LEFT VENTRICLE SYSTOLIC VOLUME: 35.18 ML
LEFT VENTRICULAR INTERNAL DIMENSION IN DIASTOLE: 4.48 CM (ref 3.5–6)
LEFT VENTRICULAR MASS: 164.99 G
LYMPHOCYTES # BLD AUTO: 0.9 K/UL (ref 1–4.8)
LYMPHOCYTES NFR BLD: 14.6 % (ref 18–48)
MAGNESIUM SERPL-MCNC: 2.1 MG/DL (ref 1.6–2.6)
MAGNESIUM SERPL-MCNC: 2.1 MG/DL (ref 1.6–2.6)
MCH RBC QN AUTO: 32.1 PG (ref 27–31)
MCHC RBC AUTO-ENTMCNC: 34.7 G/DL (ref 32–36)
MCV RBC AUTO: 93 FL (ref 82–98)
MONOCYTES # BLD AUTO: 0.7 K/UL (ref 0.3–1)
MONOCYTES NFR BLD: 11.6 % (ref 4–15)
NEUTROPHILS # BLD AUTO: 4.1 K/UL (ref 1.8–7.7)
NEUTROPHILS NFR BLD: 69.1 % (ref 38–73)
NONHDLC SERPL-MCNC: 67 MG/DL
NRBC BLD-RTO: 0 /100 WBC
PHOSPHATE SERPL-MCNC: 3.3 MG/DL (ref 2.7–4.5)
PHOSPHATE SERPL-MCNC: 4 MG/DL (ref 2.7–4.5)
PISA TR MAX VEL: 2.22 M/S
PLATELET # BLD AUTO: 183 K/UL (ref 150–350)
PMV BLD AUTO: 9.1 FL (ref 9.2–12.9)
POTASSIUM SERPL-SCNC: 4 MMOL/L (ref 3.5–5.1)
PROT SERPL-MCNC: 7.4 G/DL (ref 6–8.4)
PROTHROMBIN TIME: 10.5 SEC (ref 9–12.5)
RBC # BLD AUTO: 4.33 M/UL (ref 4–5.4)
RV TISSUE DOPPLER FREE WALL SYSTOLIC VELOCITY 1 (APICAL 4 CHAMBER VIEW): 10.5 CM/S
SINUS: 3.69 CM
SODIUM SERPL-SCNC: 142 MMOL/L (ref 136–145)
STJ: 3.19 CM
TDI LATERAL: 0.05 M/S
TDI SEPTAL: 0.03 M/S
TDI: 0.04 M/S
TR MAX PG: 20 MMHG
TRICUSPID ANNULAR PLANE SYSTOLIC EXCURSION: 2.02 CM
TRIGL SERPL-MCNC: 97 MG/DL (ref 30–150)
WBC # BLD AUTO: 5.88 K/UL (ref 3.9–12.7)

## 2021-03-11 PROCEDURE — 85730 THROMBOPLASTIN TIME PARTIAL: CPT | Performed by: STUDENT IN AN ORGANIZED HEALTH CARE EDUCATION/TRAINING PROGRAM

## 2021-03-11 PROCEDURE — 27201423 OPTIME MED/SURG SUP & DEVICES STERILE SUPPLY: Performed by: INTERNAL MEDICINE

## 2021-03-11 PROCEDURE — 83735 ASSAY OF MAGNESIUM: CPT | Performed by: STUDENT IN AN ORGANIZED HEALTH CARE EDUCATION/TRAINING PROGRAM

## 2021-03-11 PROCEDURE — 63600175 PHARM REV CODE 636 W HCPCS: Performed by: INTERNAL MEDICINE

## 2021-03-11 PROCEDURE — C1725 CATH, TRANSLUMIN NON-LASER: HCPCS | Performed by: INTERNAL MEDICINE

## 2021-03-11 PROCEDURE — 86900 BLOOD TYPING SEROLOGIC ABO: CPT | Performed by: STUDENT IN AN ORGANIZED HEALTH CARE EDUCATION/TRAINING PROGRAM

## 2021-03-11 PROCEDURE — 80053 COMPREHEN METABOLIC PANEL: CPT | Performed by: STUDENT IN AN ORGANIZED HEALTH CARE EDUCATION/TRAINING PROGRAM

## 2021-03-11 PROCEDURE — C1757 CATH, THROMBECTOMY/EMBOLECT: HCPCS | Performed by: INTERNAL MEDICINE

## 2021-03-11 PROCEDURE — 85610 PROTHROMBIN TIME: CPT | Performed by: STUDENT IN AN ORGANIZED HEALTH CARE EDUCATION/TRAINING PROGRAM

## 2021-03-11 PROCEDURE — 99233 PR SUBSEQUENT HOSPITAL CARE,LEVL III: ICD-10-PCS | Mod: ,,, | Performed by: INTERNAL MEDICINE

## 2021-03-11 PROCEDURE — C1887 CATHETER, GUIDING: HCPCS | Performed by: INTERNAL MEDICINE

## 2021-03-11 PROCEDURE — 85347 COAGULATION TIME ACTIVATED: CPT | Performed by: INTERNAL MEDICINE

## 2021-03-11 PROCEDURE — C1769 GUIDE WIRE: HCPCS | Performed by: INTERNAL MEDICINE

## 2021-03-11 PROCEDURE — 27000221 HC OXYGEN, UP TO 24 HOURS

## 2021-03-11 PROCEDURE — 25500020 PHARM REV CODE 255: Performed by: INTERNAL MEDICINE

## 2021-03-11 PROCEDURE — C1876 STENT, NON-COA/NON-COV W/DEL: HCPCS | Performed by: INTERNAL MEDICINE

## 2021-03-11 PROCEDURE — 25000003 PHARM REV CODE 250: Performed by: INTERNAL MEDICINE

## 2021-03-11 PROCEDURE — 25000003 PHARM REV CODE 250: Performed by: STUDENT IN AN ORGANIZED HEALTH CARE EDUCATION/TRAINING PROGRAM

## 2021-03-11 PROCEDURE — 94761 N-INVAS EAR/PLS OXIMETRY MLT: CPT

## 2021-03-11 PROCEDURE — 85025 COMPLETE CBC W/AUTO DIFF WBC: CPT | Performed by: STUDENT IN AN ORGANIZED HEALTH CARE EDUCATION/TRAINING PROGRAM

## 2021-03-11 PROCEDURE — C1894 INTRO/SHEATH, NON-LASER: HCPCS | Performed by: INTERNAL MEDICINE

## 2021-03-11 PROCEDURE — 92937 PR BYPASS (IN OR THROUGH): ICD-10-PCS | Mod: RC,,, | Performed by: INTERNAL MEDICINE

## 2021-03-11 PROCEDURE — C9604 PERC D-E COR REVASC T CABG S: HCPCS | Mod: RC | Performed by: INTERNAL MEDICINE

## 2021-03-11 PROCEDURE — 99152 MOD SED SAME PHYS/QHP 5/>YRS: CPT | Mod: ,,, | Performed by: INTERNAL MEDICINE

## 2021-03-11 PROCEDURE — 99223 PR INITIAL HOSPITAL CARE,LEVL III: ICD-10-PCS | Mod: ,,, | Performed by: INTERNAL MEDICINE

## 2021-03-11 PROCEDURE — C1874 STENT, COATED/COV W/DEL SYS: HCPCS | Performed by: INTERNAL MEDICINE

## 2021-03-11 PROCEDURE — 20000000 HC ICU ROOM

## 2021-03-11 PROCEDURE — 99900035 HC TECH TIME PER 15 MIN (STAT)

## 2021-03-11 PROCEDURE — 93459: ICD-10-PCS | Mod: 26,59,51, | Performed by: INTERNAL MEDICINE

## 2021-03-11 PROCEDURE — 99233 SBSQ HOSP IP/OBS HIGH 50: CPT | Mod: ,,, | Performed by: INTERNAL MEDICINE

## 2021-03-11 PROCEDURE — 99153 MOD SED SAME PHYS/QHP EA: CPT | Performed by: INTERNAL MEDICINE

## 2021-03-11 PROCEDURE — 93459 L HRT ART/GRFT ANGIO: CPT | Mod: 59 | Performed by: INTERNAL MEDICINE

## 2021-03-11 PROCEDURE — 99223 1ST HOSP IP/OBS HIGH 75: CPT | Mod: ,,, | Performed by: INTERNAL MEDICINE

## 2021-03-11 PROCEDURE — 99152 PR MOD CONSCIOUS SEDATION, SAME PHYS, 5+ YRS, FIRST 15 MIN: ICD-10-PCS | Mod: ,,, | Performed by: INTERNAL MEDICINE

## 2021-03-11 PROCEDURE — 80061 LIPID PANEL: CPT | Performed by: STUDENT IN AN ORGANIZED HEALTH CARE EDUCATION/TRAINING PROGRAM

## 2021-03-11 PROCEDURE — 93459 L HRT ART/GRFT ANGIO: CPT | Mod: 26,59,51, | Performed by: INTERNAL MEDICINE

## 2021-03-11 PROCEDURE — 92937 PRQ TRLUML REVSC CAB GRF 1: CPT | Mod: RC,,, | Performed by: INTERNAL MEDICINE

## 2021-03-11 PROCEDURE — 99152 MOD SED SAME PHYS/QHP 5/>YRS: CPT | Performed by: INTERNAL MEDICINE

## 2021-03-11 PROCEDURE — 63600175 PHARM REV CODE 636 W HCPCS: Performed by: STUDENT IN AN ORGANIZED HEALTH CARE EDUCATION/TRAINING PROGRAM

## 2021-03-11 PROCEDURE — 84100 ASSAY OF PHOSPHORUS: CPT | Performed by: STUDENT IN AN ORGANIZED HEALTH CARE EDUCATION/TRAINING PROGRAM

## 2021-03-11 DEVICE — STENT RONYX45030UX RESOLUTE ONYX 4.50X30
Type: IMPLANTABLE DEVICE | Site: CORONARY | Status: FUNCTIONAL
Brand: RESOLUTE ONYX™

## 2021-03-11 RX ORDER — NITROGLYCERIN 5 MG/ML
INJECTION, SOLUTION INTRAVENOUS
Status: DISCONTINUED | OUTPATIENT
Start: 2021-03-11 | End: 2021-03-11 | Stop reason: HOSPADM

## 2021-03-11 RX ORDER — ONDANSETRON 8 MG/1
8 TABLET, ORALLY DISINTEGRATING ORAL EVERY 8 HOURS PRN
Status: DISCONTINUED | OUTPATIENT
Start: 2021-03-11 | End: 2021-03-11

## 2021-03-11 RX ORDER — CARVEDILOL 12.5 MG/1
12.5 TABLET ORAL 2 TIMES DAILY
Status: DISCONTINUED | OUTPATIENT
Start: 2021-03-11 | End: 2021-03-12

## 2021-03-11 RX ORDER — NAPROXEN SODIUM 220 MG/1
81 TABLET, FILM COATED ORAL NIGHTLY
Status: DISCONTINUED | OUTPATIENT
Start: 2021-03-12 | End: 2021-03-12

## 2021-03-11 RX ORDER — CHOLECALCIFEROL (VITAMIN D3) 25 MCG
2000 TABLET ORAL DAILY
Status: DISCONTINUED | OUTPATIENT
Start: 2021-03-11 | End: 2021-03-13 | Stop reason: HOSPADM

## 2021-03-11 RX ORDER — CLOPIDOGREL BISULFATE 75 MG/1
75 TABLET ORAL DAILY
Status: DISCONTINUED | OUTPATIENT
Start: 2021-03-12 | End: 2021-03-13 | Stop reason: HOSPADM

## 2021-03-11 RX ORDER — SODIUM CHLORIDE 9 MG/ML
INJECTION, SOLUTION INTRAVENOUS
Status: DISCONTINUED | OUTPATIENT
Start: 2021-03-11 | End: 2021-03-12

## 2021-03-11 RX ORDER — ASPIRIN 325 MG
325 TABLET ORAL ONCE
Status: DISCONTINUED | OUTPATIENT
Start: 2021-03-11 | End: 2021-03-11

## 2021-03-11 RX ORDER — HEPARIN SOD,PORCINE/0.9 % NACL 1000/500ML
INTRAVENOUS SOLUTION INTRAVENOUS
Status: DISCONTINUED | OUTPATIENT
Start: 2021-03-11 | End: 2021-03-11 | Stop reason: HOSPADM

## 2021-03-11 RX ORDER — CLOPIDOGREL 300 MG/1
600 TABLET, FILM COATED ORAL ONCE
Status: COMPLETED | OUTPATIENT
Start: 2021-03-11 | End: 2021-03-11

## 2021-03-11 RX ORDER — NAPROXEN SODIUM 220 MG/1
324 TABLET, FILM COATED ORAL ONCE
Status: COMPLETED | OUTPATIENT
Start: 2021-03-11 | End: 2021-03-11

## 2021-03-11 RX ORDER — MIDAZOLAM HYDROCHLORIDE 1 MG/ML
INJECTION, SOLUTION INTRAMUSCULAR; INTRAVENOUS
Status: DISCONTINUED | OUTPATIENT
Start: 2021-03-11 | End: 2021-03-11 | Stop reason: HOSPADM

## 2021-03-11 RX ORDER — FENTANYL CITRATE 50 UG/ML
INJECTION, SOLUTION INTRAMUSCULAR; INTRAVENOUS
Status: DISCONTINUED | OUTPATIENT
Start: 2021-03-11 | End: 2021-03-11 | Stop reason: HOSPADM

## 2021-03-11 RX ORDER — LIDOCAINE HYDROCHLORIDE 20 MG/ML
INJECTION, SOLUTION EPIDURAL; INFILTRATION; INTRACAUDAL; PERINEURAL
Status: DISCONTINUED | OUTPATIENT
Start: 2021-03-11 | End: 2021-03-11 | Stop reason: HOSPADM

## 2021-03-11 RX ORDER — HEPARIN SODIUM 1000 [USP'U]/ML
INJECTION, SOLUTION INTRAVENOUS; SUBCUTANEOUS
Status: DISCONTINUED | OUTPATIENT
Start: 2021-03-11 | End: 2021-03-11 | Stop reason: HOSPADM

## 2021-03-11 RX ORDER — SODIUM CHLORIDE 9 MG/ML
INJECTION, SOLUTION INTRAVENOUS CONTINUOUS
Status: ACTIVE | OUTPATIENT
Start: 2021-03-11 | End: 2021-03-11

## 2021-03-11 RX ORDER — CARVEDILOL 6.25 MG/1
6.25 TABLET ORAL ONCE
Status: DISCONTINUED | OUTPATIENT
Start: 2021-03-11 | End: 2021-03-11

## 2021-03-11 RX ORDER — DIPHENHYDRAMINE HCL 50 MG
50 CAPSULE ORAL ONCE
Status: COMPLETED | OUTPATIENT
Start: 2021-03-11 | End: 2021-03-11

## 2021-03-11 RX ADMIN — SODIUM CHLORIDE 1460 ML: 0.9 INJECTION, SOLUTION INTRAVENOUS at 06:03

## 2021-03-11 RX ADMIN — HEPARIN SODIUM 5000 UNITS: 5000 INJECTION INTRAVENOUS; SUBCUTANEOUS at 06:03

## 2021-03-11 RX ADMIN — SODIUM CHLORIDE: 0.9 INJECTION, SOLUTION INTRAVENOUS at 11:03

## 2021-03-11 RX ADMIN — DOCUSATE SODIUM 50MG AND SENNOSIDES 8.6MG 1 TABLET: 8.6; 5 TABLET, FILM COATED ORAL at 08:03

## 2021-03-11 RX ADMIN — CARVEDILOL 12.5 MG: 12.5 TABLET, FILM COATED ORAL at 08:03

## 2021-03-11 RX ADMIN — ROSUVASTATIN CALCIUM 20 MG: 20 TABLET, FILM COATED ORAL at 08:03

## 2021-03-11 RX ADMIN — THERA TABS 1 TABLET: TAB at 09:03

## 2021-03-11 RX ADMIN — HEPARIN SODIUM 5000 UNITS: 5000 INJECTION INTRAVENOUS; SUBCUTANEOUS at 09:03

## 2021-03-11 RX ADMIN — CARVEDILOL 12.5 MG: 12.5 TABLET, FILM COATED ORAL at 07:03

## 2021-03-11 RX ADMIN — ASPIRIN 324 MG: 81 TABLET, CHEWABLE ORAL at 11:03

## 2021-03-11 RX ADMIN — CLOPIDOGREL BISULFATE 600 MG: 300 TABLET, FILM COATED ORAL at 07:03

## 2021-03-11 RX ADMIN — DIPHENHYDRAMINE HYDROCHLORIDE 50 MG: 50 CAPSULE ORAL at 11:03

## 2021-03-11 RX ADMIN — DOCUSATE SODIUM 50MG AND SENNOSIDES 8.6MG 1 TABLET: 8.6; 5 TABLET, FILM COATED ORAL at 09:03

## 2021-03-11 RX ADMIN — MELATONIN TAB 3 MG 6 MG: 3 TAB at 08:03

## 2021-03-12 ENCOUNTER — ANESTHESIA (OUTPATIENT)
Dept: MEDSURG UNIT | Facility: HOSPITAL | Age: 86
DRG: 225 | End: 2021-03-12
Payer: MEDICARE

## 2021-03-12 DIAGNOSIS — Z98.61 POSTSURGICAL PERCUTANEOUS TRANSLUMINAL CORONARY ANGIOPLASTY STATUS: Primary | ICD-10-CM

## 2021-03-12 PROBLEM — I47.20 VT (VENTRICULAR TACHYCARDIA): Status: ACTIVE | Noted: 2021-03-12

## 2021-03-12 LAB
ALBUMIN SERPL BCP-MCNC: 3 G/DL (ref 3.5–5.2)
ALP SERPL-CCNC: 55 U/L (ref 55–135)
ALT SERPL W/O P-5'-P-CCNC: 11 U/L (ref 10–44)
ANION GAP SERPL CALC-SCNC: 7 MMOL/L (ref 8–16)
AST SERPL-CCNC: 21 U/L (ref 10–40)
BASOPHILS # BLD AUTO: 0.01 K/UL (ref 0–0.2)
BASOPHILS NFR BLD: 0.2 % (ref 0–1.9)
BILIRUB SERPL-MCNC: 0.6 MG/DL (ref 0.1–1)
BUN SERPL-MCNC: 30 MG/DL (ref 8–23)
CALCIUM SERPL-MCNC: 8.1 MG/DL (ref 8.7–10.5)
CHLORIDE SERPL-SCNC: 107 MMOL/L (ref 95–110)
CO2 SERPL-SCNC: 25 MMOL/L (ref 23–29)
CREAT SERPL-MCNC: 1.3 MG/DL (ref 0.5–1.4)
DIFFERENTIAL METHOD: ABNORMAL
EOSINOPHIL # BLD AUTO: 0.2 K/UL (ref 0–0.5)
EOSINOPHIL NFR BLD: 3.8 % (ref 0–8)
ERYTHROCYTE [DISTWIDTH] IN BLOOD BY AUTOMATED COUNT: 12 % (ref 11.5–14.5)
EST. GFR  (AFRICAN AMERICAN): 43.2 ML/MIN/1.73 M^2
EST. GFR  (NON AFRICAN AMERICAN): 37.5 ML/MIN/1.73 M^2
GLUCOSE SERPL-MCNC: 85 MG/DL (ref 70–110)
HCT VFR BLD AUTO: 33.5 % (ref 37–48.5)
HGB BLD-MCNC: 11.5 G/DL (ref 12–16)
IMM GRANULOCYTES # BLD AUTO: 0.01 K/UL (ref 0–0.04)
IMM GRANULOCYTES NFR BLD AUTO: 0.2 % (ref 0–0.5)
LYMPHOCYTES # BLD AUTO: 0.5 K/UL (ref 1–4.8)
LYMPHOCYTES NFR BLD: 9.6 % (ref 18–48)
MAGNESIUM SERPL-MCNC: 1.8 MG/DL (ref 1.6–2.6)
MCH RBC QN AUTO: 31.9 PG (ref 27–31)
MCHC RBC AUTO-ENTMCNC: 34.3 G/DL (ref 32–36)
MCV RBC AUTO: 93 FL (ref 82–98)
MONOCYTES # BLD AUTO: 0.6 K/UL (ref 0.3–1)
MONOCYTES NFR BLD: 11.9 % (ref 4–15)
NEUTROPHILS # BLD AUTO: 3.5 K/UL (ref 1.8–7.7)
NEUTROPHILS NFR BLD: 74.3 % (ref 38–73)
NRBC BLD-RTO: 0 /100 WBC
PHOSPHATE SERPL-MCNC: 4.1 MG/DL (ref 2.7–4.5)
PLATELET # BLD AUTO: 163 K/UL (ref 150–350)
PMV BLD AUTO: 9 FL (ref 9.2–12.9)
POC ACTIVATED CLOTTING TIME K: 213 SEC (ref 74–137)
POTASSIUM SERPL-SCNC: 3.7 MMOL/L (ref 3.5–5.1)
PROT SERPL-MCNC: 5.7 G/DL (ref 6–8.4)
RBC # BLD AUTO: 3.61 M/UL (ref 4–5.4)
SAMPLE: ABNORMAL
SODIUM SERPL-SCNC: 139 MMOL/L (ref 136–145)
WBC # BLD AUTO: 4.77 K/UL (ref 3.9–12.7)

## 2021-03-12 PROCEDURE — 25000003 PHARM REV CODE 250: Performed by: INTERNAL MEDICINE

## 2021-03-12 PROCEDURE — 33249 INSJ/RPLCMT DEFIB W/LEAD(S): CPT | Mod: ,,, | Performed by: INTERNAL MEDICINE

## 2021-03-12 PROCEDURE — 37000009 HC ANESTHESIA EA ADD 15 MINS: Performed by: INTERNAL MEDICINE

## 2021-03-12 PROCEDURE — 33233 REMOVAL OF PM GENERATOR: CPT | Mod: 51,,, | Performed by: INTERNAL MEDICINE

## 2021-03-12 PROCEDURE — 83735 ASSAY OF MAGNESIUM: CPT | Performed by: STUDENT IN AN ORGANIZED HEALTH CARE EDUCATION/TRAINING PROGRAM

## 2021-03-12 PROCEDURE — 37000008 HC ANESTHESIA 1ST 15 MINUTES: Performed by: INTERNAL MEDICINE

## 2021-03-12 PROCEDURE — 33233 REMOVAL OF PM GENERATOR: CPT

## 2021-03-12 PROCEDURE — 99231 SBSQ HOSP IP/OBS SF/LOW 25: CPT | Mod: GC,,, | Performed by: INTERNAL MEDICINE

## 2021-03-12 PROCEDURE — 25000003 PHARM REV CODE 250: Performed by: STUDENT IN AN ORGANIZED HEALTH CARE EDUCATION/TRAINING PROGRAM

## 2021-03-12 PROCEDURE — 63600175 PHARM REV CODE 636 W HCPCS: Performed by: STUDENT IN AN ORGANIZED HEALTH CARE EDUCATION/TRAINING PROGRAM

## 2021-03-12 PROCEDURE — D9220A PRA ANESTHESIA: Mod: CRNA,,, | Performed by: NURSE ANESTHETIST, CERTIFIED REGISTERED

## 2021-03-12 PROCEDURE — 63600175 PHARM REV CODE 636 W HCPCS: Performed by: INTERNAL MEDICINE

## 2021-03-12 PROCEDURE — D9220A PRA ANESTHESIA: ICD-10-PCS | Mod: CRNA,,, | Performed by: NURSE ANESTHETIST, CERTIFIED REGISTERED

## 2021-03-12 PROCEDURE — 33249 INSJ/RPLCMT DEFIB W/LEAD(S): CPT | Performed by: INTERNAL MEDICINE

## 2021-03-12 PROCEDURE — 27800903 OPTIME MED/SURG SUP & DEVICES OTHER IMPLANTS: Performed by: INTERNAL MEDICINE

## 2021-03-12 PROCEDURE — 99233 PR SUBSEQUENT HOSPITAL CARE,LEVL III: ICD-10-PCS | Mod: ,,, | Performed by: INTERNAL MEDICINE

## 2021-03-12 PROCEDURE — 94761 N-INVAS EAR/PLS OXIMETRY MLT: CPT

## 2021-03-12 PROCEDURE — C1721 AICD, DUAL CHAMBER: HCPCS | Performed by: INTERNAL MEDICINE

## 2021-03-12 PROCEDURE — 27201423 OPTIME MED/SURG SUP & DEVICES STERILE SUPPLY: Performed by: INTERNAL MEDICINE

## 2021-03-12 PROCEDURE — 33249 PR ICD INSERT SNGL/DUAL W/LEADS: ICD-10-PCS | Mod: ,,, | Performed by: INTERNAL MEDICINE

## 2021-03-12 PROCEDURE — 99231 PR SUBSEQUENT HOSPITAL CARE,LEVL I: ICD-10-PCS | Mod: GC,,, | Performed by: INTERNAL MEDICINE

## 2021-03-12 PROCEDURE — D9220A PRA ANESTHESIA: ICD-10-PCS | Mod: ANES,,, | Performed by: STUDENT IN AN ORGANIZED HEALTH CARE EDUCATION/TRAINING PROGRAM

## 2021-03-12 PROCEDURE — 20600001 HC STEP DOWN PRIVATE ROOM

## 2021-03-12 PROCEDURE — 25000003 PHARM REV CODE 250: Performed by: NURSE ANESTHETIST, CERTIFIED REGISTERED

## 2021-03-12 PROCEDURE — 85025 COMPLETE CBC W/AUTO DIFF WBC: CPT | Performed by: STUDENT IN AN ORGANIZED HEALTH CARE EDUCATION/TRAINING PROGRAM

## 2021-03-12 PROCEDURE — 80053 COMPREHEN METABOLIC PANEL: CPT | Performed by: STUDENT IN AN ORGANIZED HEALTH CARE EDUCATION/TRAINING PROGRAM

## 2021-03-12 PROCEDURE — 84100 ASSAY OF PHOSPHORUS: CPT | Performed by: STUDENT IN AN ORGANIZED HEALTH CARE EDUCATION/TRAINING PROGRAM

## 2021-03-12 PROCEDURE — C1894 INTRO/SHEATH, NON-LASER: HCPCS | Performed by: INTERNAL MEDICINE

## 2021-03-12 PROCEDURE — 63600175 PHARM REV CODE 636 W HCPCS: Performed by: NURSE ANESTHETIST, CERTIFIED REGISTERED

## 2021-03-12 PROCEDURE — D9220A PRA ANESTHESIA: Mod: ANES,,, | Performed by: STUDENT IN AN ORGANIZED HEALTH CARE EDUCATION/TRAINING PROGRAM

## 2021-03-12 PROCEDURE — 33233 PR REMV PERM PACER GENERATOR: ICD-10-PCS | Mod: 51,,, | Performed by: INTERNAL MEDICINE

## 2021-03-12 PROCEDURE — 99233 SBSQ HOSP IP/OBS HIGH 50: CPT | Mod: ,,, | Performed by: INTERNAL MEDICINE

## 2021-03-12 PROCEDURE — C1777 LEAD, AICD, ENDO SINGLE COIL: HCPCS | Performed by: INTERNAL MEDICINE

## 2021-03-12 DEVICE — TIERED-THERAPY CARDIOVERTER/DEFIBRILLATOR VVEV VVIR
Type: IMPLANTABLE DEVICE | Site: CHEST | Status: NON-FUNCTIONAL
Brand: FORTIFY ASSURA™
Removed: 2023-05-09

## 2021-03-12 DEVICE — DEFIBRILLATION LEAD
Type: IMPLANTABLE DEVICE | Site: CHEST | Status: FUNCTIONAL
Brand: DURATA™

## 2021-03-12 DEVICE — LEAD CAP (IS-1/DF-1/SJ4/DF4/IS4)
Type: IMPLANTABLE DEVICE | Site: CHEST | Status: FUNCTIONAL
Brand: SJM™

## 2021-03-12 RX ORDER — NAPROXEN SODIUM 220 MG/1
81 TABLET, FILM COATED ORAL DAILY
Status: DISCONTINUED | OUTPATIENT
Start: 2021-03-12 | End: 2021-03-13 | Stop reason: HOSPADM

## 2021-03-12 RX ORDER — CEFAZOLIN SODIUM 1 G/3ML
2 INJECTION, POWDER, FOR SOLUTION INTRAMUSCULAR; INTRAVENOUS
Status: CANCELLED | OUTPATIENT
Start: 2021-03-12 | End: 2021-03-13

## 2021-03-12 RX ORDER — LIDOCAINE HYDROCHLORIDE 20 MG/ML
INJECTION, SOLUTION INFILTRATION; PERINEURAL
Status: DISCONTINUED | OUTPATIENT
Start: 2021-03-12 | End: 2021-03-13 | Stop reason: HOSPADM

## 2021-03-12 RX ORDER — NITROGLYCERIN 0.3 MG/1
0.3 TABLET SUBLINGUAL EVERY 5 MIN PRN
Status: DISCONTINUED | OUTPATIENT
Start: 2021-03-12 | End: 2021-03-13 | Stop reason: HOSPADM

## 2021-03-12 RX ORDER — VANCOMYCIN HYDROCHLORIDE 1 G/20ML
INJECTION, POWDER, LYOPHILIZED, FOR SOLUTION INTRAVENOUS
Status: DISCONTINUED | OUTPATIENT
Start: 2021-03-12 | End: 2021-03-13 | Stop reason: HOSPADM

## 2021-03-12 RX ORDER — BUPIVACAINE HYDROCHLORIDE 2.5 MG/ML
INJECTION, SOLUTION EPIDURAL; INFILTRATION; INTRACAUDAL
Status: DISCONTINUED | OUTPATIENT
Start: 2021-03-12 | End: 2021-03-13 | Stop reason: HOSPADM

## 2021-03-12 RX ORDER — LIDOCAINE HCL/PF 100 MG/5ML
SYRINGE (ML) INTRAVENOUS
Status: DISCONTINUED | OUTPATIENT
Start: 2021-03-12 | End: 2021-03-12

## 2021-03-12 RX ORDER — CEPHALEXIN 500 MG/1
500 CAPSULE ORAL EVERY 12 HOURS
Status: CANCELLED | OUTPATIENT
Start: 2021-03-13 | End: 2021-03-18

## 2021-03-12 RX ORDER — SODIUM CHLORIDE 0.9 % (FLUSH) 0.9 %
10 SYRINGE (ML) INJECTION
Status: DISCONTINUED | OUTPATIENT
Start: 2021-03-12 | End: 2021-03-13 | Stop reason: HOSPADM

## 2021-03-12 RX ORDER — PROPOFOL 10 MG/ML
INJECTION, EMULSION INTRAVENOUS CONTINUOUS PRN
Status: DISCONTINUED | OUTPATIENT
Start: 2021-03-12 | End: 2021-03-12

## 2021-03-12 RX ORDER — MAGNESIUM SULFATE HEPTAHYDRATE 40 MG/ML
2 INJECTION, SOLUTION INTRAVENOUS ONCE
Status: COMPLETED | OUTPATIENT
Start: 2021-03-12 | End: 2021-03-12

## 2021-03-12 RX ORDER — CEFAZOLIN SODIUM 1 G/3ML
INJECTION, POWDER, FOR SOLUTION INTRAMUSCULAR; INTRAVENOUS
Status: DISCONTINUED | OUTPATIENT
Start: 2021-03-12 | End: 2021-03-12

## 2021-03-12 RX ORDER — FENTANYL CITRATE 50 UG/ML
INJECTION, SOLUTION INTRAMUSCULAR; INTRAVENOUS
Status: DISCONTINUED | OUTPATIENT
Start: 2021-03-12 | End: 2021-03-12

## 2021-03-12 RX ORDER — POLYETHYLENE GLYCOL 3350 17 G/17G
17 POWDER, FOR SOLUTION ORAL DAILY PRN
Status: DISCONTINUED | OUTPATIENT
Start: 2021-03-12 | End: 2021-03-13 | Stop reason: HOSPADM

## 2021-03-12 RX ORDER — PROPOFOL 10 MG/ML
INJECTION, EMULSION INTRAVENOUS
Status: DISCONTINUED | OUTPATIENT
Start: 2021-03-12 | End: 2021-03-12

## 2021-03-12 RX ORDER — VASOPRESSIN 20 [USP'U]/ML
INJECTION, SOLUTION INTRAMUSCULAR; SUBCUTANEOUS
Status: DISCONTINUED | OUTPATIENT
Start: 2021-03-12 | End: 2021-03-12

## 2021-03-12 RX ORDER — LEVOTHYROXINE SODIUM 50 UG/1
50 TABLET ORAL
Status: DISCONTINUED | OUTPATIENT
Start: 2021-03-14 | End: 2021-03-13 | Stop reason: HOSPADM

## 2021-03-12 RX ORDER — LEVOTHYROXINE SODIUM 112 UG/1
112 TABLET ORAL
Status: DISCONTINUED | OUTPATIENT
Start: 2021-03-13 | End: 2021-03-13 | Stop reason: HOSPADM

## 2021-03-12 RX ORDER — LABETALOL 100 MG/1
200 TABLET, FILM COATED ORAL 2 TIMES DAILY
Status: DISCONTINUED | OUTPATIENT
Start: 2021-03-12 | End: 2021-03-13 | Stop reason: HOSPADM

## 2021-03-12 RX ORDER — PHENYLEPHRINE HCL IN 0.9% NACL 1 MG/10 ML
SYRINGE (ML) INTRAVENOUS
Status: DISCONTINUED | OUTPATIENT
Start: 2021-03-12 | End: 2021-03-12

## 2021-03-12 RX ORDER — HYDROCHLOROTHIAZIDE 25 MG/1
25 TABLET ORAL DAILY
Status: DISCONTINUED | OUTPATIENT
Start: 2021-03-12 | End: 2021-03-12

## 2021-03-12 RX ORDER — SODIUM CHLORIDE 0.9 G/100ML
IRRIGANT IRRIGATION
Status: DISCONTINUED | OUTPATIENT
Start: 2021-03-12 | End: 2021-03-13 | Stop reason: HOSPADM

## 2021-03-12 RX ORDER — ROSUVASTATIN CALCIUM 10 MG/1
10 TABLET, COATED ORAL NIGHTLY
Status: DISCONTINUED | OUTPATIENT
Start: 2021-03-12 | End: 2021-03-13 | Stop reason: HOSPADM

## 2021-03-12 RX ADMIN — CEFAZOLIN 2 G: 330 INJECTION, POWDER, FOR SOLUTION INTRAMUSCULAR; INTRAVENOUS at 12:03

## 2021-03-12 RX ADMIN — HYDROCHLOROTHIAZIDE 25 MG: 25 TABLET ORAL at 08:03

## 2021-03-12 RX ADMIN — LABETALOL HYDROCHLORIDE 200 MG: 100 TABLET, FILM COATED ORAL at 09:03

## 2021-03-12 RX ADMIN — PROPOFOL 75 MCG/KG/MIN: 10 INJECTION, EMULSION INTRAVENOUS at 12:03

## 2021-03-12 RX ADMIN — Medication 2000 UNITS: at 08:03

## 2021-03-12 RX ADMIN — SODIUM CHLORIDE: 0.9 INJECTION, SOLUTION INTRAVENOUS at 12:03

## 2021-03-12 RX ADMIN — VASOPRESSIN 2 UNITS: 20 INJECTION INTRAVENOUS at 01:03

## 2021-03-12 RX ADMIN — MELATONIN TAB 3 MG 6 MG: 3 TAB at 08:03

## 2021-03-12 RX ADMIN — THERA TABS 1 TABLET: TAB at 08:03

## 2021-03-12 RX ADMIN — LABETALOL HYDROCHLORIDE 200 MG: 100 TABLET, FILM COATED ORAL at 08:03

## 2021-03-12 RX ADMIN — VASOPRESSIN 1 UNITS: 20 INJECTION INTRAVENOUS at 12:03

## 2021-03-12 RX ADMIN — POTASSIUM BICARBONATE 40 MEQ: 391 TABLET, EFFERVESCENT ORAL at 06:03

## 2021-03-12 RX ADMIN — MAGNESIUM SULFATE 2 G: 2 INJECTION INTRAVENOUS at 06:03

## 2021-03-12 RX ADMIN — FENTANYL CITRATE 25 MCG: 50 INJECTION INTRAMUSCULAR; INTRAVENOUS at 01:03

## 2021-03-12 RX ADMIN — GLYCOPYRROLATE 0.2 MG: 0.2 INJECTION, SOLUTION INTRAMUSCULAR; INTRAVITREAL at 12:03

## 2021-03-12 RX ADMIN — LEVOTHYROXINE SODIUM 112 MCG: 112 TABLET ORAL at 06:03

## 2021-03-12 RX ADMIN — LIDOCAINE HYDROCHLORIDE 50 MG: 20 INJECTION, SOLUTION INTRAVENOUS at 12:03

## 2021-03-12 RX ADMIN — PROPOFOL 20 MG: 10 INJECTION, EMULSION INTRAVENOUS at 12:03

## 2021-03-12 RX ADMIN — Medication 200 MCG: at 12:03

## 2021-03-12 RX ADMIN — ROSUVASTATIN CALCIUM 10 MG: 10 TABLET, FILM COATED ORAL at 08:03

## 2021-03-12 RX ADMIN — CLOPIDOGREL 75 MG: 75 TABLET, FILM COATED ORAL at 08:03

## 2021-03-13 VITALS
OXYGEN SATURATION: 92 % | HEART RATE: 76 BPM | HEIGHT: 64 IN | SYSTOLIC BLOOD PRESSURE: 124 MMHG | BODY MASS INDEX: 28.12 KG/M2 | WEIGHT: 164.69 LBS | RESPIRATION RATE: 19 BRPM | TEMPERATURE: 98 F | DIASTOLIC BLOOD PRESSURE: 72 MMHG

## 2021-03-13 LAB
ALBUMIN SERPL BCP-MCNC: 3.2 G/DL (ref 3.5–5.2)
ALP SERPL-CCNC: 56 U/L (ref 55–135)
ALT SERPL W/O P-5'-P-CCNC: 10 U/L (ref 10–44)
ANION GAP SERPL CALC-SCNC: 10 MMOL/L (ref 8–16)
AST SERPL-CCNC: 27 U/L (ref 10–40)
BASOPHILS # BLD AUTO: 0.01 K/UL (ref 0–0.2)
BASOPHILS NFR BLD: 0.2 % (ref 0–1.9)
BILIRUB SERPL-MCNC: 0.7 MG/DL (ref 0.1–1)
BUN SERPL-MCNC: 30 MG/DL (ref 8–23)
CALCIUM SERPL-MCNC: 8.9 MG/DL (ref 8.7–10.5)
CHLORIDE SERPL-SCNC: 103 MMOL/L (ref 95–110)
CO2 SERPL-SCNC: 25 MMOL/L (ref 23–29)
CREAT SERPL-MCNC: 1.5 MG/DL (ref 0.5–1.4)
DIFFERENTIAL METHOD: ABNORMAL
EOSINOPHIL # BLD AUTO: 0.1 K/UL (ref 0–0.5)
EOSINOPHIL NFR BLD: 1.4 % (ref 0–8)
ERYTHROCYTE [DISTWIDTH] IN BLOOD BY AUTOMATED COUNT: 12.1 % (ref 11.5–14.5)
EST. GFR  (AFRICAN AMERICAN): 36.4 ML/MIN/1.73 M^2
EST. GFR  (NON AFRICAN AMERICAN): 31.5 ML/MIN/1.73 M^2
GLUCOSE SERPL-MCNC: 92 MG/DL (ref 70–110)
HCT VFR BLD AUTO: 32.8 % (ref 37–48.5)
HGB BLD-MCNC: 11.3 G/DL (ref 12–16)
IMM GRANULOCYTES # BLD AUTO: 0.01 K/UL (ref 0–0.04)
IMM GRANULOCYTES NFR BLD AUTO: 0.2 % (ref 0–0.5)
LYMPHOCYTES # BLD AUTO: 0.8 K/UL (ref 1–4.8)
LYMPHOCYTES NFR BLD: 12.3 % (ref 18–48)
MAGNESIUM SERPL-MCNC: 1.8 MG/DL (ref 1.6–2.6)
MCH RBC QN AUTO: 31.6 PG (ref 27–31)
MCHC RBC AUTO-ENTMCNC: 34.5 G/DL (ref 32–36)
MCV RBC AUTO: 92 FL (ref 82–98)
MONOCYTES # BLD AUTO: 0.8 K/UL (ref 0.3–1)
MONOCYTES NFR BLD: 12.3 % (ref 4–15)
NEUTROPHILS # BLD AUTO: 4.6 K/UL (ref 1.8–7.7)
NEUTROPHILS NFR BLD: 73.6 % (ref 38–73)
NRBC BLD-RTO: 0 /100 WBC
PHOSPHATE SERPL-MCNC: 4 MG/DL (ref 2.7–4.5)
PLATELET # BLD AUTO: 134 K/UL (ref 150–350)
PMV BLD AUTO: 9.1 FL (ref 9.2–12.9)
POTASSIUM SERPL-SCNC: 3.8 MMOL/L (ref 3.5–5.1)
PROT SERPL-MCNC: 6.1 G/DL (ref 6–8.4)
RBC # BLD AUTO: 3.58 M/UL (ref 4–5.4)
SODIUM SERPL-SCNC: 138 MMOL/L (ref 136–145)
WBC # BLD AUTO: 6.27 K/UL (ref 3.9–12.7)

## 2021-03-13 PROCEDURE — 25000003 PHARM REV CODE 250: Performed by: STUDENT IN AN ORGANIZED HEALTH CARE EDUCATION/TRAINING PROGRAM

## 2021-03-13 PROCEDURE — 99233 SBSQ HOSP IP/OBS HIGH 50: CPT | Mod: ,,, | Performed by: INTERNAL MEDICINE

## 2021-03-13 PROCEDURE — 99239 PR HOSPITAL DISCHARGE DAY,>30 MIN: ICD-10-PCS | Mod: GC,,, | Performed by: INTERNAL MEDICINE

## 2021-03-13 PROCEDURE — 84100 ASSAY OF PHOSPHORUS: CPT | Performed by: STUDENT IN AN ORGANIZED HEALTH CARE EDUCATION/TRAINING PROGRAM

## 2021-03-13 PROCEDURE — 99239 HOSP IP/OBS DSCHRG MGMT >30: CPT | Mod: GC,,, | Performed by: INTERNAL MEDICINE

## 2021-03-13 PROCEDURE — 80053 COMPREHEN METABOLIC PANEL: CPT | Performed by: STUDENT IN AN ORGANIZED HEALTH CARE EDUCATION/TRAINING PROGRAM

## 2021-03-13 PROCEDURE — 85025 COMPLETE CBC W/AUTO DIFF WBC: CPT | Performed by: STUDENT IN AN ORGANIZED HEALTH CARE EDUCATION/TRAINING PROGRAM

## 2021-03-13 PROCEDURE — 36415 COLL VENOUS BLD VENIPUNCTURE: CPT | Performed by: STUDENT IN AN ORGANIZED HEALTH CARE EDUCATION/TRAINING PROGRAM

## 2021-03-13 PROCEDURE — 83735 ASSAY OF MAGNESIUM: CPT | Performed by: STUDENT IN AN ORGANIZED HEALTH CARE EDUCATION/TRAINING PROGRAM

## 2021-03-13 PROCEDURE — 99233 PR SUBSEQUENT HOSPITAL CARE,LEVL III: ICD-10-PCS | Mod: ,,, | Performed by: INTERNAL MEDICINE

## 2021-03-13 PROCEDURE — 25000003 PHARM REV CODE 250: Performed by: INTERNAL MEDICINE

## 2021-03-13 RX ORDER — DOXYCYCLINE HYCLATE 100 MG
100 TABLET ORAL EVERY 12 HOURS
Qty: 10 TABLET | Refills: 0 | Status: SHIPPED | OUTPATIENT
Start: 2021-03-13 | End: 2021-03-18

## 2021-03-13 RX ORDER — CLOPIDOGREL BISULFATE 75 MG/1
75 TABLET ORAL DAILY
Qty: 30 TABLET | Refills: 11 | Status: SHIPPED | OUTPATIENT
Start: 2021-03-13 | End: 2021-05-06 | Stop reason: SDUPTHER

## 2021-03-13 RX ORDER — ROSUVASTATIN CALCIUM 10 MG/1
10 TABLET, COATED ORAL NIGHTLY
Qty: 90 TABLET | Refills: 3 | Status: SHIPPED | OUTPATIENT
Start: 2021-03-13 | End: 2022-01-26 | Stop reason: SDUPTHER

## 2021-03-13 RX ORDER — DOXYCYCLINE HYCLATE 100 MG
100 TABLET ORAL EVERY 12 HOURS
Status: DISCONTINUED | OUTPATIENT
Start: 2021-03-13 | End: 2021-03-13 | Stop reason: HOSPADM

## 2021-03-13 RX ADMIN — CLOPIDOGREL 75 MG: 75 TABLET, FILM COATED ORAL at 08:03

## 2021-03-13 RX ADMIN — LABETALOL HYDROCHLORIDE 200 MG: 100 TABLET, FILM COATED ORAL at 08:03

## 2021-03-13 RX ADMIN — LEVOTHYROXINE SODIUM 112 MCG: 0.11 TABLET ORAL at 05:03

## 2021-03-13 RX ADMIN — ASPIRIN 81 MG: 81 TABLET, CHEWABLE ORAL at 08:03

## 2021-03-13 RX ADMIN — Medication 2000 UNITS: at 08:03

## 2021-03-13 RX ADMIN — DOXYCYCLINE HYCLATE 100 MG: 100 TABLET, COATED ORAL at 08:03

## 2021-03-13 RX ADMIN — THERA TABS 1 TABLET: TAB at 08:03

## 2021-03-16 DIAGNOSIS — I47.20 VT (VENTRICULAR TACHYCARDIA): Primary | ICD-10-CM

## 2021-03-17 ENCOUNTER — TELEPHONE (OUTPATIENT)
Dept: INTERNAL MEDICINE | Facility: CLINIC | Age: 86
End: 2021-03-17

## 2021-03-18 ENCOUNTER — CLINICAL SUPPORT (OUTPATIENT)
Dept: CARDIOLOGY | Facility: HOSPITAL | Age: 86
End: 2021-03-18
Attending: STUDENT IN AN ORGANIZED HEALTH CARE EDUCATION/TRAINING PROGRAM
Payer: MEDICARE

## 2021-03-18 DIAGNOSIS — I47.20 VENTRICULAR TACHYCARDIA: ICD-10-CM

## 2021-03-18 PROCEDURE — 93283 PRGRMG EVAL IMPLANTABLE DFB: CPT

## 2021-03-22 ENCOUNTER — TELEPHONE (OUTPATIENT)
Dept: ELECTROPHYSIOLOGY | Facility: CLINIC | Age: 86
End: 2021-03-22

## 2021-03-30 ENCOUNTER — PATIENT OUTREACH (OUTPATIENT)
Dept: ADMINISTRATIVE | Facility: OTHER | Age: 86
End: 2021-03-30

## 2021-04-01 ENCOUNTER — OFFICE VISIT (OUTPATIENT)
Dept: CARDIOLOGY | Facility: CLINIC | Age: 86
End: 2021-04-01
Payer: MEDICARE

## 2021-04-01 VITALS
HEART RATE: 74 BPM | BODY MASS INDEX: 27.43 KG/M2 | SYSTOLIC BLOOD PRESSURE: 166 MMHG | OXYGEN SATURATION: 95 % | DIASTOLIC BLOOD PRESSURE: 70 MMHG | HEIGHT: 64 IN | WEIGHT: 160.69 LBS

## 2021-04-01 DIAGNOSIS — Z95.5 S/P DRUG ELUTING CORONARY STENT PLACEMENT: ICD-10-CM

## 2021-04-01 DIAGNOSIS — Z87.891 FORMER SMOKER: ICD-10-CM

## 2021-04-01 DIAGNOSIS — I10 ESSENTIAL HYPERTENSION: ICD-10-CM

## 2021-04-01 DIAGNOSIS — I25.110 ATHEROSCLEROSIS OF NATIVE CORONARY ARTERY OF NATIVE HEART WITH UNSTABLE ANGINA PECTORIS: Primary | ICD-10-CM

## 2021-04-01 DIAGNOSIS — I49.01 VF (VENTRICULAR FIBRILLATION): ICD-10-CM

## 2021-04-01 DIAGNOSIS — E78.5 HYPERLIPIDEMIA, UNSPECIFIED HYPERLIPIDEMIA TYPE: ICD-10-CM

## 2021-04-01 DIAGNOSIS — I47.20 VT (VENTRICULAR TACHYCARDIA): ICD-10-CM

## 2021-04-01 DIAGNOSIS — I44.2 CHB (COMPLETE HEART BLOCK): ICD-10-CM

## 2021-04-01 DIAGNOSIS — Z95.1 S/P CABG X 3: ICD-10-CM

## 2021-04-01 DIAGNOSIS — I73.9 PERIPHERAL ARTERIAL DISEASE: ICD-10-CM

## 2021-04-01 DIAGNOSIS — Z95.810 ICD (IMPLANTABLE CARDIOVERTER-DEFIBRILLATOR), DUAL, IN SITU: ICD-10-CM

## 2021-04-01 DIAGNOSIS — R49.0 HOARSENESS: ICD-10-CM

## 2021-04-01 PROCEDURE — 3288F FALL RISK ASSESSMENT DOCD: CPT | Mod: CPTII,S$GLB,, | Performed by: NURSE PRACTITIONER

## 2021-04-01 PROCEDURE — 1126F PR PAIN SEVERITY QUANTIFIED, NO PAIN PRESENT: ICD-10-PCS | Mod: S$GLB,,, | Performed by: NURSE PRACTITIONER

## 2021-04-01 PROCEDURE — 3288F PR FALLS RISK ASSESSMENT DOCUMENTED: ICD-10-PCS | Mod: CPTII,S$GLB,, | Performed by: NURSE PRACTITIONER

## 2021-04-01 PROCEDURE — 99999 PR PBB SHADOW E&M-EST. PATIENT-LVL V: CPT | Mod: PBBFAC,,, | Performed by: NURSE PRACTITIONER

## 2021-04-01 PROCEDURE — 1101F PT FALLS ASSESS-DOCD LE1/YR: CPT | Mod: CPTII,S$GLB,, | Performed by: NURSE PRACTITIONER

## 2021-04-01 PROCEDURE — 1101F PR PT FALLS ASSESS DOC 0-1 FALLS W/OUT INJ PAST YR: ICD-10-PCS | Mod: CPTII,S$GLB,, | Performed by: NURSE PRACTITIONER

## 2021-04-01 PROCEDURE — 99214 PR OFFICE/OUTPT VISIT, EST, LEVL IV, 30-39 MIN: ICD-10-PCS | Mod: S$GLB,,, | Performed by: NURSE PRACTITIONER

## 2021-04-01 PROCEDURE — 1159F MED LIST DOCD IN RCRD: CPT | Mod: S$GLB,,, | Performed by: NURSE PRACTITIONER

## 2021-04-01 PROCEDURE — 1159F PR MEDICATION LIST DOCUMENTED IN MEDICAL RECORD: ICD-10-PCS | Mod: S$GLB,,, | Performed by: NURSE PRACTITIONER

## 2021-04-01 PROCEDURE — 99999 PR PBB SHADOW E&M-EST. PATIENT-LVL V: ICD-10-PCS | Mod: PBBFAC,,, | Performed by: NURSE PRACTITIONER

## 2021-04-01 PROCEDURE — 1126F AMNT PAIN NOTED NONE PRSNT: CPT | Mod: S$GLB,,, | Performed by: NURSE PRACTITIONER

## 2021-04-01 PROCEDURE — 99214 OFFICE O/P EST MOD 30 MIN: CPT | Mod: S$GLB,,, | Performed by: NURSE PRACTITIONER

## 2021-05-05 ENCOUNTER — TELEPHONE (OUTPATIENT)
Dept: INTERNAL MEDICINE | Facility: CLINIC | Age: 86
End: 2021-05-05

## 2021-05-06 ENCOUNTER — TELEPHONE (OUTPATIENT)
Dept: INTERNAL MEDICINE | Facility: CLINIC | Age: 86
End: 2021-05-06

## 2021-05-06 RX ORDER — IRBESARTAN 300 MG/1
TABLET ORAL
Qty: 90 TABLET | Refills: 3 | Status: SHIPPED | OUTPATIENT
Start: 2021-05-06 | End: 2022-06-07 | Stop reason: SDUPTHER

## 2021-05-06 RX ORDER — IRBESARTAN 300 MG/1
TABLET ORAL
Qty: 90 TABLET | Refills: 3 | Status: CANCELLED | OUTPATIENT
Start: 2021-05-06

## 2021-05-06 RX ORDER — CLOPIDOGREL BISULFATE 75 MG/1
75 TABLET ORAL DAILY
Qty: 30 TABLET | Refills: 11 | Status: SHIPPED | OUTPATIENT
Start: 2021-05-06 | End: 2021-08-26 | Stop reason: SDUPTHER

## 2021-05-06 RX ORDER — CLOPIDOGREL BISULFATE 75 MG/1
75 TABLET ORAL DAILY
Qty: 30 TABLET | Refills: 11 | Status: SHIPPED | OUTPATIENT
Start: 2021-05-06 | End: 2022-05-16 | Stop reason: HOSPADM

## 2021-06-02 ENCOUNTER — CLINICAL SUPPORT (OUTPATIENT)
Dept: CARDIOLOGY | Facility: HOSPITAL | Age: 86
End: 2021-06-02
Payer: MEDICARE

## 2021-06-02 DIAGNOSIS — Z95.810 PRESENCE OF AUTOMATIC (IMPLANTABLE) CARDIAC DEFIBRILLATOR: ICD-10-CM

## 2021-06-02 PROCEDURE — 93295 CARDIAC DEVICE CHECK - REMOTE: ICD-10-PCS | Mod: ,,, | Performed by: INTERNAL MEDICINE

## 2021-06-02 PROCEDURE — 93296 REM INTERROG EVL PM/IDS: CPT | Performed by: INTERNAL MEDICINE

## 2021-06-02 PROCEDURE — 93295 DEV INTERROG REMOTE 1/2/MLT: CPT | Mod: ,,, | Performed by: INTERNAL MEDICINE

## 2021-06-09 ENCOUNTER — PATIENT OUTREACH (OUTPATIENT)
Dept: ADMINISTRATIVE | Facility: OTHER | Age: 86
End: 2021-06-09

## 2021-06-10 ENCOUNTER — OFFICE VISIT (OUTPATIENT)
Dept: ELECTROPHYSIOLOGY | Facility: CLINIC | Age: 86
End: 2021-06-10
Payer: MEDICARE

## 2021-06-10 ENCOUNTER — HOSPITAL ENCOUNTER (OUTPATIENT)
Dept: CARDIOLOGY | Facility: CLINIC | Age: 86
Discharge: HOME | End: 2021-06-10
Payer: MEDICARE

## 2021-06-10 ENCOUNTER — CLINICAL SUPPORT (OUTPATIENT)
Dept: CARDIOLOGY | Facility: HOSPITAL | Age: 86
End: 2021-06-10
Attending: INTERNAL MEDICINE
Payer: MEDICARE

## 2021-06-10 VITALS
HEART RATE: 60 BPM | DIASTOLIC BLOOD PRESSURE: 72 MMHG | HEIGHT: 64 IN | WEIGHT: 155.63 LBS | SYSTOLIC BLOOD PRESSURE: 138 MMHG | BODY MASS INDEX: 26.57 KG/M2

## 2021-06-10 DIAGNOSIS — I49.8 OTHER SPECIFIED CARDIAC ARRHYTHMIAS: ICD-10-CM

## 2021-06-10 DIAGNOSIS — I47.20 VT (VENTRICULAR TACHYCARDIA): ICD-10-CM

## 2021-06-10 DIAGNOSIS — I49.01 VF (VENTRICULAR FIBRILLATION): ICD-10-CM

## 2021-06-10 DIAGNOSIS — Z95.810 ICD (IMPLANTABLE CARDIOVERTER-DEFIBRILLATOR), DUAL, IN SITU: Primary | ICD-10-CM

## 2021-06-10 DIAGNOSIS — I44.2 CHB (COMPLETE HEART BLOCK): ICD-10-CM

## 2021-06-10 DIAGNOSIS — I10 ESSENTIAL HYPERTENSION: ICD-10-CM

## 2021-06-10 PROCEDURE — 1101F PR PT FALLS ASSESS DOC 0-1 FALLS W/OUT INJ PAST YR: ICD-10-PCS | Mod: CPTII,S$GLB,, | Performed by: NURSE PRACTITIONER

## 2021-06-10 PROCEDURE — 93283 CARDIAC DEVICE CHECK - IN CLINIC & HOSPITAL: ICD-10-PCS | Mod: 26,,, | Performed by: INTERNAL MEDICINE

## 2021-06-10 PROCEDURE — 93010 RHYTHM STRIP: ICD-10-PCS | Mod: S$GLB,,, | Performed by: INTERNAL MEDICINE

## 2021-06-10 PROCEDURE — 99214 PR OFFICE/OUTPT VISIT, EST, LEVL IV, 30-39 MIN: ICD-10-PCS | Mod: S$GLB,,, | Performed by: NURSE PRACTITIONER

## 2021-06-10 PROCEDURE — 1159F MED LIST DOCD IN RCRD: CPT | Mod: S$GLB,,, | Performed by: NURSE PRACTITIONER

## 2021-06-10 PROCEDURE — 99999 PR PBB SHADOW E&M-EST. PATIENT-LVL IV: ICD-10-PCS | Mod: PBBFAC,,, | Performed by: NURSE PRACTITIONER

## 2021-06-10 PROCEDURE — 3288F FALL RISK ASSESSMENT DOCD: CPT | Mod: CPTII,S$GLB,, | Performed by: NURSE PRACTITIONER

## 2021-06-10 PROCEDURE — 93283 PRGRMG EVAL IMPLANTABLE DFB: CPT

## 2021-06-10 PROCEDURE — 1126F PR PAIN SEVERITY QUANTIFIED, NO PAIN PRESENT: ICD-10-PCS | Mod: S$GLB,,, | Performed by: NURSE PRACTITIONER

## 2021-06-10 PROCEDURE — 93283 PRGRMG EVAL IMPLANTABLE DFB: CPT | Mod: 26,,, | Performed by: INTERNAL MEDICINE

## 2021-06-10 PROCEDURE — 1101F PT FALLS ASSESS-DOCD LE1/YR: CPT | Mod: CPTII,S$GLB,, | Performed by: NURSE PRACTITIONER

## 2021-06-10 PROCEDURE — 99214 OFFICE O/P EST MOD 30 MIN: CPT | Mod: S$GLB,,, | Performed by: NURSE PRACTITIONER

## 2021-06-10 PROCEDURE — 99999 PR PBB SHADOW E&M-EST. PATIENT-LVL IV: CPT | Mod: PBBFAC,,, | Performed by: NURSE PRACTITIONER

## 2021-06-10 PROCEDURE — 3288F PR FALLS RISK ASSESSMENT DOCUMENTED: ICD-10-PCS | Mod: CPTII,S$GLB,, | Performed by: NURSE PRACTITIONER

## 2021-06-10 PROCEDURE — 93010 ELECTROCARDIOGRAM REPORT: CPT | Mod: S$GLB,,, | Performed by: INTERNAL MEDICINE

## 2021-06-10 PROCEDURE — 93005 ELECTROCARDIOGRAM TRACING: CPT | Mod: S$GLB,,, | Performed by: INTERNAL MEDICINE

## 2021-06-10 PROCEDURE — 93005 RHYTHM STRIP: ICD-10-PCS | Mod: S$GLB,,, | Performed by: INTERNAL MEDICINE

## 2021-06-10 PROCEDURE — 1126F AMNT PAIN NOTED NONE PRSNT: CPT | Mod: S$GLB,,, | Performed by: NURSE PRACTITIONER

## 2021-06-10 PROCEDURE — 1159F PR MEDICATION LIST DOCUMENTED IN MEDICAL RECORD: ICD-10-PCS | Mod: S$GLB,,, | Performed by: NURSE PRACTITIONER

## 2021-07-14 NOTE — TELEPHONE ENCOUNTER
"----- Message from Rebekah Warner MA sent at 10/8/2019  1:18 PM CDT -----  Contact: Pt [phone]      ----- Message -----  From: Ryder Wang  Sent: 10/8/2019   9:09 AM CDT  To: Jose [facility] PRUETT Staff    The patient's pharmacy informed her verbally that they are nolonger filling the irbesartan-hydrochlorothiazide (AVALIDE) 300-12.5 mg per tablet or any other drugs starting with "ir' therefore, she is requesting an alternative drug. She doesn't want to change pharmacies.    Thank you  " Cephalexin Counseling: I counseled the patient regarding use of cephalexin as an antibiotic for prophylactic and/or therapeutic purposes. Cephalexin (commonly prescribed under brand name Keflex) is a cephalosporin antibiotic which is active against numerous classes of bacteria, including most skin bacteria. Side effects may include nausea, diarrhea, gastrointestinal upset, rash, hives, yeast infections, and in rare cases, hepatitis, kidney disease, seizures, fever, confusion, neurologic symptoms, and others. Patients with severe allergies to penicillin medications are cautioned that there is about a 10% incidence of cross-reactivity with cephalosporins. When possible, patients with penicillin allergies should use alternatives to cephalosporins for antibiotic therapy.

## 2021-08-05 ENCOUNTER — LAB VISIT (OUTPATIENT)
Dept: LAB | Facility: HOSPITAL | Age: 86
End: 2021-08-05
Attending: NURSE PRACTITIONER
Payer: MEDICARE

## 2021-08-05 DIAGNOSIS — I25.10 CORONARY ARTERY DISEASE INVOLVING NATIVE CORONARY ARTERY OF NATIVE HEART WITHOUT ANGINA PECTORIS: ICD-10-CM

## 2021-08-05 LAB
ALBUMIN SERPL BCP-MCNC: 4.1 G/DL (ref 3.5–5.2)
ALP SERPL-CCNC: 71 U/L (ref 38–126)
ALT SERPL W/O P-5'-P-CCNC: 13 U/L (ref 10–44)
ANION GAP SERPL CALC-SCNC: 7 MMOL/L (ref 8–16)
AST SERPL-CCNC: 32 U/L (ref 15–46)
BASOPHILS # BLD AUTO: 0.02 K/UL (ref 0–0.2)
BASOPHILS NFR BLD: 0.4 % (ref 0–1.9)
BILIRUB SERPL-MCNC: 0.8 MG/DL (ref 0.1–1)
CALCIUM SERPL-MCNC: 9.6 MG/DL (ref 8.7–10.5)
CHLORIDE SERPL-SCNC: 105 MMOL/L (ref 95–110)
CHOLEST SERPL-MCNC: 121 MG/DL (ref 120–199)
CHOLEST/HDLC SERPL: 2.6 {RATIO} (ref 2–5)
CO2 SERPL-SCNC: 30 MMOL/L (ref 23–29)
CREAT SERPL-MCNC: 1.02 MG/DL (ref 0.5–1.4)
DIFFERENTIAL METHOD: ABNORMAL
EOSINOPHIL # BLD AUTO: 0.2 K/UL (ref 0–0.5)
EOSINOPHIL NFR BLD: 4.1 % (ref 0–8)
ERYTHROCYTE [DISTWIDTH] IN BLOOD BY AUTOMATED COUNT: 12.2 % (ref 11.5–14.5)
EST. GFR  (AFRICAN AMERICAN): 58 ML/MIN/1.73 M^2
EST. GFR  (NON AFRICAN AMERICAN): 50.3 ML/MIN/1.73 M^2
GLUCOSE SERPL-MCNC: 93 MG/DL (ref 70–110)
HCT VFR BLD AUTO: 40.1 % (ref 37–48.5)
HDLC SERPL-MCNC: 47 MG/DL (ref 40–75)
HDLC SERPL: 38.8 % (ref 20–50)
HGB BLD-MCNC: 13.4 G/DL (ref 12–16)
IMM GRANULOCYTES # BLD AUTO: 0.02 K/UL (ref 0–0.04)
IMM GRANULOCYTES NFR BLD AUTO: 0.4 % (ref 0–0.5)
LDLC SERPL CALC-MCNC: 59.6 MG/DL (ref 63–159)
LYMPHOCYTES # BLD AUTO: 0.8 K/UL (ref 1–4.8)
LYMPHOCYTES NFR BLD: 15 % (ref 18–48)
MAGNESIUM SERPL-MCNC: 2 MG/DL (ref 1.6–2.6)
MCH RBC QN AUTO: 32.1 PG (ref 27–31)
MCHC RBC AUTO-ENTMCNC: 33.4 G/DL (ref 32–36)
MCV RBC AUTO: 96 FL (ref 82–98)
MONOCYTES # BLD AUTO: 0.6 K/UL (ref 0.3–1)
MONOCYTES NFR BLD: 12.2 % (ref 4–15)
NEUTROPHILS # BLD AUTO: 3.4 K/UL (ref 1.8–7.7)
NEUTROPHILS NFR BLD: 67.9 % (ref 38–73)
NONHDLC SERPL-MCNC: 74 MG/DL
NRBC BLD-RTO: 0 /100 WBC
PLATELET # BLD AUTO: 162 K/UL (ref 150–450)
PMV BLD AUTO: 9.5 FL (ref 9.2–12.9)
POTASSIUM SERPL-SCNC: 4.3 MMOL/L (ref 3.5–5.1)
PROT SERPL-MCNC: 7.1 G/DL (ref 6–8.4)
RBC # BLD AUTO: 4.18 M/UL (ref 4–5.4)
SODIUM SERPL-SCNC: 142 MMOL/L (ref 136–145)
TRIGL SERPL-MCNC: 72 MG/DL (ref 30–150)
UUN UR-MCNC: 20 MG/DL (ref 7–17)
WBC # BLD AUTO: 5.07 K/UL (ref 3.9–12.7)

## 2021-08-05 PROCEDURE — 80061 LIPID PANEL: CPT | Performed by: NURSE PRACTITIONER

## 2021-08-05 PROCEDURE — 36415 COLL VENOUS BLD VENIPUNCTURE: CPT | Mod: PO | Performed by: NURSE PRACTITIONER

## 2021-08-05 PROCEDURE — 83735 ASSAY OF MAGNESIUM: CPT | Mod: PO | Performed by: NURSE PRACTITIONER

## 2021-08-05 PROCEDURE — 80053 COMPREHEN METABOLIC PANEL: CPT | Mod: PO | Performed by: NURSE PRACTITIONER

## 2021-08-05 PROCEDURE — 85025 COMPLETE CBC W/AUTO DIFF WBC: CPT | Mod: PO | Performed by: NURSE PRACTITIONER

## 2021-08-06 ENCOUNTER — PATIENT MESSAGE (OUTPATIENT)
Dept: CARDIOLOGY | Facility: CLINIC | Age: 86
End: 2021-08-06

## 2021-08-10 ENCOUNTER — TELEPHONE (OUTPATIENT)
Dept: INTERNAL MEDICINE | Facility: CLINIC | Age: 86
End: 2021-08-10

## 2021-08-19 ENCOUNTER — HOSPITAL ENCOUNTER (OUTPATIENT)
Dept: RADIOLOGY | Facility: HOSPITAL | Age: 86
Discharge: HOME | End: 2021-08-19
Attending: INTERNAL MEDICINE
Payer: MEDICARE

## 2021-08-19 DIAGNOSIS — Z12.31 ENCOUNTER FOR SCREENING MAMMOGRAM FOR BREAST CANCER: ICD-10-CM

## 2021-08-19 PROCEDURE — 77067 SCR MAMMO BI INCL CAD: CPT | Mod: TC,PO

## 2021-08-25 ENCOUNTER — OFFICE VISIT (OUTPATIENT)
Dept: INTERNAL MEDICINE | Facility: CLINIC | Age: 86
End: 2021-08-25
Payer: MEDICARE

## 2021-08-25 VITALS
DIASTOLIC BLOOD PRESSURE: 70 MMHG | SYSTOLIC BLOOD PRESSURE: 134 MMHG | BODY MASS INDEX: 25.7 KG/M2 | HEIGHT: 64 IN | HEART RATE: 61 BPM | WEIGHT: 150.56 LBS | OXYGEN SATURATION: 96 %

## 2021-08-25 DIAGNOSIS — N18.30 STAGE 3 CHRONIC KIDNEY DISEASE, UNSPECIFIED WHETHER STAGE 3A OR 3B CKD: Primary | ICD-10-CM

## 2021-08-25 DIAGNOSIS — E03.9 HYPOTHYROIDISM, UNSPECIFIED TYPE: ICD-10-CM

## 2021-08-25 DIAGNOSIS — I10 ESSENTIAL HYPERTENSION: ICD-10-CM

## 2021-08-25 DIAGNOSIS — E78.5 HYPERLIPIDEMIA, UNSPECIFIED HYPERLIPIDEMIA TYPE: ICD-10-CM

## 2021-08-25 DIAGNOSIS — I25.110 ATHEROSCLEROSIS OF NATIVE CORONARY ARTERY OF NATIVE HEART WITH UNSTABLE ANGINA PECTORIS: ICD-10-CM

## 2021-08-25 PROCEDURE — 1101F PT FALLS ASSESS-DOCD LE1/YR: CPT | Mod: CPTII,S$GLB,, | Performed by: INTERNAL MEDICINE

## 2021-08-25 PROCEDURE — 3078F DIAST BP <80 MM HG: CPT | Mod: CPTII,S$GLB,, | Performed by: INTERNAL MEDICINE

## 2021-08-25 PROCEDURE — 3288F FALL RISK ASSESSMENT DOCD: CPT | Mod: CPTII,S$GLB,, | Performed by: INTERNAL MEDICINE

## 2021-08-25 PROCEDURE — 3075F PR MOST RECENT SYSTOLIC BLOOD PRESS GE 130-139MM HG: ICD-10-PCS | Mod: CPTII,S$GLB,, | Performed by: INTERNAL MEDICINE

## 2021-08-25 PROCEDURE — 99999 PR PBB SHADOW E&M-EST. PATIENT-LVL IV: CPT | Mod: PBBFAC,,, | Performed by: INTERNAL MEDICINE

## 2021-08-25 PROCEDURE — 1126F AMNT PAIN NOTED NONE PRSNT: CPT | Mod: CPTII,S$GLB,, | Performed by: INTERNAL MEDICINE

## 2021-08-25 PROCEDURE — 1159F MED LIST DOCD IN RCRD: CPT | Mod: CPTII,S$GLB,, | Performed by: INTERNAL MEDICINE

## 2021-08-25 PROCEDURE — 1160F PR REVIEW ALL MEDS BY PRESCRIBER/CLIN PHARMACIST DOCUMENTED: ICD-10-PCS | Mod: CPTII,S$GLB,, | Performed by: INTERNAL MEDICINE

## 2021-08-25 PROCEDURE — 3288F PR FALLS RISK ASSESSMENT DOCUMENTED: ICD-10-PCS | Mod: CPTII,S$GLB,, | Performed by: INTERNAL MEDICINE

## 2021-08-25 PROCEDURE — 99214 PR OFFICE/OUTPT VISIT, EST, LEVL IV, 30-39 MIN: ICD-10-PCS | Mod: S$GLB,,, | Performed by: INTERNAL MEDICINE

## 2021-08-25 PROCEDURE — 1160F RVW MEDS BY RX/DR IN RCRD: CPT | Mod: CPTII,S$GLB,, | Performed by: INTERNAL MEDICINE

## 2021-08-25 PROCEDURE — 3078F PR MOST RECENT DIASTOLIC BLOOD PRESSURE < 80 MM HG: ICD-10-PCS | Mod: CPTII,S$GLB,, | Performed by: INTERNAL MEDICINE

## 2021-08-25 PROCEDURE — 99214 OFFICE O/P EST MOD 30 MIN: CPT | Mod: S$GLB,,, | Performed by: INTERNAL MEDICINE

## 2021-08-25 PROCEDURE — 1126F PR PAIN SEVERITY QUANTIFIED, NO PAIN PRESENT: ICD-10-PCS | Mod: CPTII,S$GLB,, | Performed by: INTERNAL MEDICINE

## 2021-08-25 PROCEDURE — 1101F PR PT FALLS ASSESS DOC 0-1 FALLS W/OUT INJ PAST YR: ICD-10-PCS | Mod: CPTII,S$GLB,, | Performed by: INTERNAL MEDICINE

## 2021-08-25 PROCEDURE — 99999 PR PBB SHADOW E&M-EST. PATIENT-LVL IV: ICD-10-PCS | Mod: PBBFAC,,, | Performed by: INTERNAL MEDICINE

## 2021-08-25 PROCEDURE — 1159F PR MEDICATION LIST DOCUMENTED IN MEDICAL RECORD: ICD-10-PCS | Mod: CPTII,S$GLB,, | Performed by: INTERNAL MEDICINE

## 2021-08-25 PROCEDURE — 3075F SYST BP GE 130 - 139MM HG: CPT | Mod: CPTII,S$GLB,, | Performed by: INTERNAL MEDICINE

## 2021-08-25 RX ORDER — LEVOTHYROXINE SODIUM 112 UG/1
112 TABLET ORAL
Qty: 90 TABLET | Refills: 3
Start: 2021-08-25 | End: 2021-11-22

## 2021-08-25 RX ORDER — NITROGLYCERIN 0.4 MG/1
TABLET SUBLINGUAL
Qty: 30 TABLET | Refills: 3 | Status: SHIPPED | OUTPATIENT
Start: 2021-08-25

## 2021-08-31 ENCOUNTER — CLINICAL SUPPORT (OUTPATIENT)
Dept: CARDIOLOGY | Facility: HOSPITAL | Age: 86
End: 2021-08-31
Payer: MEDICARE

## 2021-08-31 DIAGNOSIS — Z95.810 PRESENCE OF AUTOMATIC (IMPLANTABLE) CARDIAC DEFIBRILLATOR: ICD-10-CM

## 2021-08-31 DIAGNOSIS — I44.2 ATRIOVENTRICULAR BLOCK, COMPLETE: ICD-10-CM

## 2021-08-31 DIAGNOSIS — I47.20 VENTRICULAR TACHYCARDIA: ICD-10-CM

## 2021-08-31 PROCEDURE — 93295 CARDIAC DEVICE CHECK - REMOTE: ICD-10-PCS | Mod: ,,, | Performed by: INTERNAL MEDICINE

## 2021-08-31 PROCEDURE — 93296 REM INTERROG EVL PM/IDS: CPT | Performed by: INTERNAL MEDICINE

## 2021-08-31 PROCEDURE — 93295 DEV INTERROG REMOTE 1/2/MLT: CPT | Mod: ,,, | Performed by: INTERNAL MEDICINE

## 2021-10-04 ENCOUNTER — OFFICE VISIT (OUTPATIENT)
Dept: OPHTHALMOLOGY | Facility: CLINIC | Age: 86
End: 2021-10-04
Payer: MEDICARE

## 2021-10-04 ENCOUNTER — TELEPHONE (OUTPATIENT)
Dept: FAMILY MEDICINE | Facility: CLINIC | Age: 86
End: 2021-10-04

## 2021-10-04 DIAGNOSIS — H35.3211 EXUDATIVE AGE-RELATED MACULAR DEGENERATION, RIGHT EYE, WITH ACTIVE CHOROIDAL NEOVASCULARIZATION: ICD-10-CM

## 2021-10-04 DIAGNOSIS — H35.3122 NONEXUDATIVE AGE-RELATED MACULAR DEGENERATION, LEFT EYE, INTERMEDIATE DRY STAGE: ICD-10-CM

## 2021-10-04 DIAGNOSIS — H53.411 CENTRAL SCOTOMA, RIGHT EYE: Primary | ICD-10-CM

## 2021-10-04 PROCEDURE — 99203 PR OFFICE/OUTPT VISIT, NEW, LEVL III, 30-44 MIN: ICD-10-PCS | Mod: S$GLB,,, | Performed by: OPHTHALMOLOGY

## 2021-10-04 PROCEDURE — 99203 OFFICE O/P NEW LOW 30 MIN: CPT | Mod: S$GLB,,, | Performed by: OPHTHALMOLOGY

## 2021-10-04 PROCEDURE — 99999 PR PBB SHADOW E&M-EST. PATIENT-LVL III: ICD-10-PCS | Mod: PBBFAC,,, | Performed by: OPHTHALMOLOGY

## 2021-10-04 PROCEDURE — 1159F MED LIST DOCD IN RCRD: CPT | Mod: CPTII,S$GLB,, | Performed by: OPHTHALMOLOGY

## 2021-10-04 PROCEDURE — 92134 POSTERIOR SEGMENT OCT RETINA (OCULAR COHERENCE TOMOGRAPHY)-BOTH EYES: ICD-10-PCS | Mod: S$GLB,,, | Performed by: OPHTHALMOLOGY

## 2021-10-04 PROCEDURE — 1126F PR PAIN SEVERITY QUANTIFIED, NO PAIN PRESENT: ICD-10-PCS | Mod: CPTII,S$GLB,, | Performed by: OPHTHALMOLOGY

## 2021-10-04 PROCEDURE — 99999 PR PBB SHADOW E&M-EST. PATIENT-LVL III: CPT | Mod: PBBFAC,,, | Performed by: OPHTHALMOLOGY

## 2021-10-04 PROCEDURE — 1159F PR MEDICATION LIST DOCUMENTED IN MEDICAL RECORD: ICD-10-PCS | Mod: CPTII,S$GLB,, | Performed by: OPHTHALMOLOGY

## 2021-10-04 PROCEDURE — 1160F RVW MEDS BY RX/DR IN RCRD: CPT | Mod: CPTII,S$GLB,, | Performed by: OPHTHALMOLOGY

## 2021-10-04 PROCEDURE — 1126F AMNT PAIN NOTED NONE PRSNT: CPT | Mod: CPTII,S$GLB,, | Performed by: OPHTHALMOLOGY

## 2021-10-04 PROCEDURE — 92134 CPTRZ OPH DX IMG PST SGM RTA: CPT | Mod: S$GLB,,, | Performed by: OPHTHALMOLOGY

## 2021-10-04 PROCEDURE — 1160F PR REVIEW ALL MEDS BY PRESCRIBER/CLIN PHARMACIST DOCUMENTED: ICD-10-PCS | Mod: CPTII,S$GLB,, | Performed by: OPHTHALMOLOGY

## 2021-10-05 ENCOUNTER — OFFICE VISIT (OUTPATIENT)
Dept: CARDIOLOGY | Facility: CLINIC | Age: 86
End: 2021-10-05
Payer: MEDICARE

## 2021-10-05 ENCOUNTER — PATIENT OUTREACH (OUTPATIENT)
Dept: ADMINISTRATIVE | Facility: OTHER | Age: 86
End: 2021-10-05

## 2021-10-05 VITALS
DIASTOLIC BLOOD PRESSURE: 70 MMHG | OXYGEN SATURATION: 96 % | BODY MASS INDEX: 25.63 KG/M2 | HEIGHT: 64 IN | SYSTOLIC BLOOD PRESSURE: 128 MMHG | HEART RATE: 72 BPM | WEIGHT: 150.13 LBS

## 2021-10-05 DIAGNOSIS — Z95.810 ICD (IMPLANTABLE CARDIOVERTER-DEFIBRILLATOR), DUAL, IN SITU: ICD-10-CM

## 2021-10-05 DIAGNOSIS — I44.2 CHB (COMPLETE HEART BLOCK): ICD-10-CM

## 2021-10-05 DIAGNOSIS — I47.20 VT (VENTRICULAR TACHYCARDIA): ICD-10-CM

## 2021-10-05 DIAGNOSIS — Z95.1 S/P CABG X 3: ICD-10-CM

## 2021-10-05 DIAGNOSIS — I73.9 PERIPHERAL ARTERIAL DISEASE: ICD-10-CM

## 2021-10-05 DIAGNOSIS — E78.5 HYPERLIPIDEMIA, UNSPECIFIED HYPERLIPIDEMIA TYPE: ICD-10-CM

## 2021-10-05 DIAGNOSIS — E03.9 HYPOTHYROIDISM, UNSPECIFIED TYPE: ICD-10-CM

## 2021-10-05 DIAGNOSIS — I65.23 BILATERAL CAROTID ARTERY STENOSIS: ICD-10-CM

## 2021-10-05 DIAGNOSIS — I49.01 VF (VENTRICULAR FIBRILLATION): ICD-10-CM

## 2021-10-05 DIAGNOSIS — I10 ESSENTIAL HYPERTENSION: ICD-10-CM

## 2021-10-05 DIAGNOSIS — I25.110 ATHEROSCLEROSIS OF NATIVE CORONARY ARTERY OF NATIVE HEART WITH UNSTABLE ANGINA PECTORIS: Primary | ICD-10-CM

## 2021-10-05 PROCEDURE — 3074F PR MOST RECENT SYSTOLIC BLOOD PRESSURE < 130 MM HG: ICD-10-PCS | Mod: CPTII,S$GLB,, | Performed by: NURSE PRACTITIONER

## 2021-10-05 PROCEDURE — 3078F PR MOST RECENT DIASTOLIC BLOOD PRESSURE < 80 MM HG: ICD-10-PCS | Mod: CPTII,S$GLB,, | Performed by: NURSE PRACTITIONER

## 2021-10-05 PROCEDURE — 1159F PR MEDICATION LIST DOCUMENTED IN MEDICAL RECORD: ICD-10-PCS | Mod: CPTII,S$GLB,, | Performed by: NURSE PRACTITIONER

## 2021-10-05 PROCEDURE — 1126F PR PAIN SEVERITY QUANTIFIED, NO PAIN PRESENT: ICD-10-PCS | Mod: CPTII,S$GLB,, | Performed by: NURSE PRACTITIONER

## 2021-10-05 PROCEDURE — 99214 OFFICE O/P EST MOD 30 MIN: CPT | Mod: S$GLB,,, | Performed by: NURSE PRACTITIONER

## 2021-10-05 PROCEDURE — 3074F SYST BP LT 130 MM HG: CPT | Mod: CPTII,S$GLB,, | Performed by: NURSE PRACTITIONER

## 2021-10-05 PROCEDURE — 1159F MED LIST DOCD IN RCRD: CPT | Mod: CPTII,S$GLB,, | Performed by: NURSE PRACTITIONER

## 2021-10-05 PROCEDURE — 1101F PT FALLS ASSESS-DOCD LE1/YR: CPT | Mod: CPTII,S$GLB,, | Performed by: NURSE PRACTITIONER

## 2021-10-05 PROCEDURE — 3288F PR FALLS RISK ASSESSMENT DOCUMENTED: ICD-10-PCS | Mod: CPTII,S$GLB,, | Performed by: NURSE PRACTITIONER

## 2021-10-05 PROCEDURE — 1160F PR REVIEW ALL MEDS BY PRESCRIBER/CLIN PHARMACIST DOCUMENTED: ICD-10-PCS | Mod: CPTII,S$GLB,, | Performed by: NURSE PRACTITIONER

## 2021-10-05 PROCEDURE — 99999 PR PBB SHADOW E&M-EST. PATIENT-LVL IV: ICD-10-PCS | Mod: PBBFAC,,, | Performed by: NURSE PRACTITIONER

## 2021-10-05 PROCEDURE — 3078F DIAST BP <80 MM HG: CPT | Mod: CPTII,S$GLB,, | Performed by: NURSE PRACTITIONER

## 2021-10-05 PROCEDURE — 1160F RVW MEDS BY RX/DR IN RCRD: CPT | Mod: CPTII,S$GLB,, | Performed by: NURSE PRACTITIONER

## 2021-10-05 PROCEDURE — 3288F FALL RISK ASSESSMENT DOCD: CPT | Mod: CPTII,S$GLB,, | Performed by: NURSE PRACTITIONER

## 2021-10-05 PROCEDURE — 99214 PR OFFICE/OUTPT VISIT, EST, LEVL IV, 30-39 MIN: ICD-10-PCS | Mod: S$GLB,,, | Performed by: NURSE PRACTITIONER

## 2021-10-05 PROCEDURE — 1126F AMNT PAIN NOTED NONE PRSNT: CPT | Mod: CPTII,S$GLB,, | Performed by: NURSE PRACTITIONER

## 2021-10-05 PROCEDURE — 1101F PR PT FALLS ASSESS DOC 0-1 FALLS W/OUT INJ PAST YR: ICD-10-PCS | Mod: CPTII,S$GLB,, | Performed by: NURSE PRACTITIONER

## 2021-10-05 PROCEDURE — 99999 PR PBB SHADOW E&M-EST. PATIENT-LVL IV: CPT | Mod: PBBFAC,,, | Performed by: NURSE PRACTITIONER

## 2021-10-06 ENCOUNTER — TELEPHONE (OUTPATIENT)
Dept: OPHTHALMOLOGY | Facility: CLINIC | Age: 86
End: 2021-10-06

## 2021-10-07 ENCOUNTER — OFFICE VISIT (OUTPATIENT)
Dept: OPHTHALMOLOGY | Facility: CLINIC | Age: 86
End: 2021-10-07
Payer: MEDICARE

## 2021-10-07 ENCOUNTER — TELEPHONE (OUTPATIENT)
Dept: OPHTHALMOLOGY | Facility: CLINIC | Age: 86
End: 2021-10-07

## 2021-10-07 DIAGNOSIS — H35.3122 NONEXUDATIVE AGE-RELATED MACULAR DEGENERATION, LEFT EYE, INTERMEDIATE DRY STAGE: ICD-10-CM

## 2021-10-07 DIAGNOSIS — H35.3211 EXUDATIVE AGE-RELATED MACULAR DEGENERATION, RIGHT EYE, WITH ACTIVE CHOROIDAL NEOVASCULARIZATION: Primary | ICD-10-CM

## 2021-10-07 PROCEDURE — 67028 PR INJECT INTRAVITREAL PHARMCOLOGIC: ICD-10-PCS | Mod: RT,S$GLB,, | Performed by: OPHTHALMOLOGY

## 2021-10-07 PROCEDURE — 1159F PR MEDICATION LIST DOCUMENTED IN MEDICAL RECORD: ICD-10-PCS | Mod: CPTII,S$GLB,, | Performed by: OPHTHALMOLOGY

## 2021-10-07 PROCEDURE — 1160F PR REVIEW ALL MEDS BY PRESCRIBER/CLIN PHARMACIST DOCUMENTED: ICD-10-PCS | Mod: CPTII,S$GLB,, | Performed by: OPHTHALMOLOGY

## 2021-10-07 PROCEDURE — 1101F PT FALLS ASSESS-DOCD LE1/YR: CPT | Mod: CPTII,S$GLB,, | Performed by: OPHTHALMOLOGY

## 2021-10-07 PROCEDURE — 99214 PR OFFICE/OUTPT VISIT, EST, LEVL IV, 30-39 MIN: ICD-10-PCS | Mod: 25,S$GLB,, | Performed by: OPHTHALMOLOGY

## 2021-10-07 PROCEDURE — 99999 PR PBB SHADOW E&M-EST. PATIENT-LVL III: CPT | Mod: PBBFAC,,, | Performed by: OPHTHALMOLOGY

## 2021-10-07 PROCEDURE — 1159F MED LIST DOCD IN RCRD: CPT | Mod: CPTII,S$GLB,, | Performed by: OPHTHALMOLOGY

## 2021-10-07 PROCEDURE — 99214 OFFICE O/P EST MOD 30 MIN: CPT | Mod: 25,S$GLB,, | Performed by: OPHTHALMOLOGY

## 2021-10-07 PROCEDURE — 1126F AMNT PAIN NOTED NONE PRSNT: CPT | Mod: CPTII,S$GLB,, | Performed by: OPHTHALMOLOGY

## 2021-10-07 PROCEDURE — 67028 INJECTION EYE DRUG: CPT | Mod: RT,S$GLB,, | Performed by: OPHTHALMOLOGY

## 2021-10-07 PROCEDURE — 99999 PR PBB SHADOW E&M-EST. PATIENT-LVL III: ICD-10-PCS | Mod: PBBFAC,,, | Performed by: OPHTHALMOLOGY

## 2021-10-07 PROCEDURE — 3288F PR FALLS RISK ASSESSMENT DOCUMENTED: ICD-10-PCS | Mod: CPTII,S$GLB,, | Performed by: OPHTHALMOLOGY

## 2021-10-07 PROCEDURE — 1126F PR PAIN SEVERITY QUANTIFIED, NO PAIN PRESENT: ICD-10-PCS | Mod: CPTII,S$GLB,, | Performed by: OPHTHALMOLOGY

## 2021-10-07 PROCEDURE — 1160F RVW MEDS BY RX/DR IN RCRD: CPT | Mod: CPTII,S$GLB,, | Performed by: OPHTHALMOLOGY

## 2021-10-07 PROCEDURE — 3288F FALL RISK ASSESSMENT DOCD: CPT | Mod: CPTII,S$GLB,, | Performed by: OPHTHALMOLOGY

## 2021-10-07 PROCEDURE — 1101F PR PT FALLS ASSESS DOC 0-1 FALLS W/OUT INJ PAST YR: ICD-10-PCS | Mod: CPTII,S$GLB,, | Performed by: OPHTHALMOLOGY

## 2021-10-07 RX ADMIN — Medication 1.25 MG: at 10:10

## 2021-10-10 ENCOUNTER — LAB VISIT (OUTPATIENT)
Dept: SPORTS MEDICINE | Facility: CLINIC | Age: 86
End: 2021-10-10
Payer: MEDICARE

## 2021-10-10 DIAGNOSIS — H35.3211 EXUDATIVE AGE-RELATED MACULAR DEGENERATION, RIGHT EYE, WITH ACTIVE CHOROIDAL NEOVASCULARIZATION: ICD-10-CM

## 2021-10-10 PROCEDURE — U0003 INFECTIOUS AGENT DETECTION BY NUCLEIC ACID (DNA OR RNA); SEVERE ACUTE RESPIRATORY SYNDROME CORONAVIRUS 2 (SARS-COV-2) (CORONAVIRUS DISEASE [COVID-19]), AMPLIFIED PROBE TECHNIQUE, MAKING USE OF HIGH THROUGHPUT TECHNOLOGIES AS DESCRIBED BY CMS-2020-01-R: HCPCS | Performed by: OPHTHALMOLOGY

## 2021-10-10 PROCEDURE — U0005 INFEC AGEN DETEC AMPLI PROBE: HCPCS | Performed by: OPHTHALMOLOGY

## 2021-10-11 ENCOUNTER — TELEPHONE (OUTPATIENT)
Dept: OPHTHALMOLOGY | Facility: CLINIC | Age: 86
End: 2021-10-11

## 2021-10-11 LAB
SARS-COV-2 RNA RESP QL NAA+PROBE: NOT DETECTED
SARS-COV-2- CYCLE NUMBER: NORMAL

## 2021-10-12 ENCOUNTER — DOCUMENTATION ONLY (OUTPATIENT)
Dept: ELECTROPHYSIOLOGY | Facility: CLINIC | Age: 86
End: 2021-10-12

## 2021-10-12 ENCOUNTER — ANESTHESIA (OUTPATIENT)
Dept: SURGERY | Facility: HOSPITAL | Age: 86
End: 2021-10-12
Payer: MEDICARE

## 2021-10-12 ENCOUNTER — HOSPITAL ENCOUNTER (OUTPATIENT)
Facility: HOSPITAL | Age: 86
Discharge: HOME | End: 2021-10-12
Attending: OPHTHALMOLOGY | Admitting: OPHTHALMOLOGY
Payer: MEDICARE

## 2021-10-12 ENCOUNTER — ANESTHESIA EVENT (OUTPATIENT)
Dept: SURGERY | Facility: HOSPITAL | Age: 86
End: 2021-10-12
Payer: MEDICARE

## 2021-10-12 VITALS
OXYGEN SATURATION: 91 % | TEMPERATURE: 98 F | SYSTOLIC BLOOD PRESSURE: 159 MMHG | HEART RATE: 65 BPM | DIASTOLIC BLOOD PRESSURE: 70 MMHG | RESPIRATION RATE: 31 BRPM

## 2021-10-12 DIAGNOSIS — H35.61 SUBRETINAL HEMORRHAGE OF RIGHT EYE: ICD-10-CM

## 2021-10-12 DIAGNOSIS — H35.61: Primary | ICD-10-CM

## 2021-10-12 PROCEDURE — D9220A PRA ANESTHESIA: Mod: ANES,,, | Performed by: ANESTHESIOLOGY

## 2021-10-12 PROCEDURE — 63600175 PHARM REV CODE 636 W HCPCS: Performed by: STUDENT IN AN ORGANIZED HEALTH CARE EDUCATION/TRAINING PROGRAM

## 2021-10-12 PROCEDURE — 36000707: Performed by: OPHTHALMOLOGY

## 2021-10-12 PROCEDURE — 36000706: Performed by: OPHTHALMOLOGY

## 2021-10-12 PROCEDURE — 25000003 PHARM REV CODE 250: Performed by: OPHTHALMOLOGY

## 2021-10-12 PROCEDURE — 25000003 PHARM REV CODE 250: Performed by: STUDENT IN AN ORGANIZED HEALTH CARE EDUCATION/TRAINING PROGRAM

## 2021-10-12 PROCEDURE — 63600175 PHARM REV CODE 636 W HCPCS: Performed by: OPHTHALMOLOGY

## 2021-10-12 PROCEDURE — 67036 REMOVAL OF INNER EYE FLUID: CPT | Mod: RT,,, | Performed by: OPHTHALMOLOGY

## 2021-10-12 PROCEDURE — 37000008 HC ANESTHESIA 1ST 15 MINUTES: Performed by: OPHTHALMOLOGY

## 2021-10-12 PROCEDURE — S0020 INJECTION, BUPIVICAINE HYDRO: HCPCS | Performed by: OPHTHALMOLOGY

## 2021-10-12 PROCEDURE — D9220A PRA ANESTHESIA: Mod: CRNA,,, | Performed by: STUDENT IN AN ORGANIZED HEALTH CARE EDUCATION/TRAINING PROGRAM

## 2021-10-12 PROCEDURE — A4216 STERILE WATER/SALINE, 10 ML: HCPCS | Performed by: STUDENT IN AN ORGANIZED HEALTH CARE EDUCATION/TRAINING PROGRAM

## 2021-10-12 PROCEDURE — 67036 PR VITRECTOMY,MECHANICAL: ICD-10-PCS | Mod: RT,,, | Performed by: OPHTHALMOLOGY

## 2021-10-12 PROCEDURE — 63600175 PHARM REV CODE 636 W HCPCS: Mod: JG | Performed by: STUDENT IN AN ORGANIZED HEALTH CARE EDUCATION/TRAINING PROGRAM

## 2021-10-12 PROCEDURE — D9220A PRA ANESTHESIA: ICD-10-PCS | Mod: CRNA,,, | Performed by: STUDENT IN AN ORGANIZED HEALTH CARE EDUCATION/TRAINING PROGRAM

## 2021-10-12 PROCEDURE — D9220A PRA ANESTHESIA: ICD-10-PCS | Mod: ANES,,, | Performed by: ANESTHESIOLOGY

## 2021-10-12 PROCEDURE — 71000044 HC DOSC ROUTINE RECOVERY FIRST HOUR: Performed by: OPHTHALMOLOGY

## 2021-10-12 PROCEDURE — 37000009 HC ANESTHESIA EA ADD 15 MINS: Performed by: OPHTHALMOLOGY

## 2021-10-12 PROCEDURE — 71000015 HC POSTOP RECOV 1ST HR: Performed by: OPHTHALMOLOGY

## 2021-10-12 RX ORDER — HYDROCODONE BITARTRATE AND ACETAMINOPHEN 5; 325 MG/1; MG/1
1 TABLET ORAL EVERY 4 HOURS PRN
Status: DISCONTINUED | OUTPATIENT
Start: 2021-10-12 | End: 2021-10-12 | Stop reason: HOSPADM

## 2021-10-12 RX ORDER — ATROPINE SULFATE 10 MG/ML
1 SOLUTION/ DROPS OPHTHALMIC
Status: DISCONTINUED | OUTPATIENT
Start: 2021-10-12 | End: 2021-10-12 | Stop reason: HOSPADM

## 2021-10-12 RX ORDER — CYCLOPENTOLATE HYDROCHLORIDE 10 MG/ML
1 SOLUTION/ DROPS OPHTHALMIC
Status: DISCONTINUED | OUTPATIENT
Start: 2021-10-12 | End: 2021-10-12 | Stop reason: HOSPADM

## 2021-10-12 RX ORDER — PROPOFOL 10 MG/ML
VIAL (ML) INTRAVENOUS
Status: DISCONTINUED | OUTPATIENT
Start: 2021-10-12 | End: 2021-10-12

## 2021-10-12 RX ORDER — NEOMYCIN SULFATE, POLYMYXIN B SULFATE, AND DEXAMETHASONE 3.5; 10000; 1 MG/G; [USP'U]/G; MG/G
OINTMENT OPHTHALMIC
Status: DISCONTINUED | OUTPATIENT
Start: 2021-10-12 | End: 2021-10-12 | Stop reason: HOSPADM

## 2021-10-12 RX ORDER — PREDNISOLONE ACETATE 10 MG/ML
1 SUSPENSION/ DROPS OPHTHALMIC
Status: DISCONTINUED | OUTPATIENT
Start: 2021-10-12 | End: 2021-10-12 | Stop reason: HOSPADM

## 2021-10-12 RX ORDER — MIDAZOLAM HYDROCHLORIDE 1 MG/ML
INJECTION INTRAMUSCULAR; INTRAVENOUS
Status: DISCONTINUED | OUTPATIENT
Start: 2021-10-12 | End: 2021-10-12

## 2021-10-12 RX ORDER — PHENYLEPHRINE HYDROCHLORIDE 25 MG/ML
1 SOLUTION/ DROPS OPHTHALMIC
Status: DISCONTINUED | OUTPATIENT
Start: 2021-10-12 | End: 2021-10-12 | Stop reason: HOSPADM

## 2021-10-12 RX ORDER — EPINEPHRINE 1 MG/ML
INJECTION, SOLUTION INTRACARDIAC; INTRAMUSCULAR; INTRAVENOUS; SUBCUTANEOUS
Status: DISCONTINUED | OUTPATIENT
Start: 2021-10-12 | End: 2021-10-12 | Stop reason: HOSPADM

## 2021-10-12 RX ORDER — FENTANYL CITRATE 50 UG/ML
25 INJECTION, SOLUTION INTRAMUSCULAR; INTRAVENOUS EVERY 5 MIN PRN
Status: DISCONTINUED | OUTPATIENT
Start: 2021-10-12 | End: 2021-10-12 | Stop reason: HOSPADM

## 2021-10-12 RX ORDER — ACETAMINOPHEN 325 MG/1
650 TABLET ORAL EVERY 4 HOURS PRN
Status: DISCONTINUED | OUTPATIENT
Start: 2021-10-12 | End: 2021-10-12 | Stop reason: HOSPADM

## 2021-10-12 RX ORDER — TROPICAMIDE 10 MG/ML
1 SOLUTION/ DROPS OPHTHALMIC
Status: DISCONTINUED | OUTPATIENT
Start: 2021-10-12 | End: 2021-10-12 | Stop reason: HOSPADM

## 2021-10-12 RX ORDER — LIDOCAINE HYDROCHLORIDE 20 MG/ML
INJECTION INTRAVENOUS
Status: DISCONTINUED | OUTPATIENT
Start: 2021-10-12 | End: 2021-10-12

## 2021-10-12 RX ORDER — LIDOCAINE HYDROCHLORIDE 20 MG/ML
INJECTION, SOLUTION EPIDURAL; INFILTRATION; INTRACAUDAL; PERINEURAL
Status: DISCONTINUED | OUTPATIENT
Start: 2021-10-12 | End: 2021-10-12 | Stop reason: HOSPADM

## 2021-10-12 RX ORDER — DEXAMETHASONE SODIUM PHOSPHATE 4 MG/ML
INJECTION, SOLUTION INTRA-ARTICULAR; INTRALESIONAL; INTRAMUSCULAR; INTRAVENOUS; SOFT TISSUE
Status: DISCONTINUED | OUTPATIENT
Start: 2021-10-12 | End: 2021-10-12 | Stop reason: HOSPADM

## 2021-10-12 RX ORDER — SODIUM CHLORIDE 0.9 % (FLUSH) 0.9 %
3 SYRINGE (ML) INJECTION
Status: DISCONTINUED | OUTPATIENT
Start: 2021-10-12 | End: 2021-10-12 | Stop reason: HOSPADM

## 2021-10-12 RX ORDER — SODIUM CHLORIDE 0.9 % (FLUSH) 0.9 %
10 SYRINGE (ML) INJECTION
Status: DISCONTINUED | OUTPATIENT
Start: 2021-10-12 | End: 2021-10-12 | Stop reason: HOSPADM

## 2021-10-12 RX ORDER — MOXIFLOXACIN 5 MG/ML
1 SOLUTION/ DROPS OPHTHALMIC
Status: DISCONTINUED | OUTPATIENT
Start: 2021-10-12 | End: 2021-10-12 | Stop reason: HOSPADM

## 2021-10-12 RX ORDER — TETRACAINE HYDROCHLORIDE 5 MG/ML
1 SOLUTION OPHTHALMIC
Status: DISCONTINUED | OUTPATIENT
Start: 2021-10-12 | End: 2021-10-12 | Stop reason: HOSPADM

## 2021-10-12 RX ORDER — BUPIVACAINE HYDROCHLORIDE 7.5 MG/ML
INJECTION, SOLUTION EPIDURAL; RETROBULBAR
Status: DISCONTINUED | OUTPATIENT
Start: 2021-10-12 | End: 2021-10-12 | Stop reason: HOSPADM

## 2021-10-12 RX ORDER — VANCOMYCIN HYDROCHLORIDE 500 MG/10ML
INJECTION, POWDER, LYOPHILIZED, FOR SOLUTION INTRAVENOUS
Status: DISCONTINUED | OUTPATIENT
Start: 2021-10-12 | End: 2021-10-12 | Stop reason: HOSPADM

## 2021-10-12 RX ORDER — ACETAMINOPHEN 325 MG/1
325 TABLET ORAL EVERY 6 HOURS PRN
Refills: 0 | Status: ON HOLD
Start: 2021-10-12 | End: 2023-01-16 | Stop reason: SDUPTHER

## 2021-10-12 RX ADMIN — MIDAZOLAM HYDROCHLORIDE 0.5 MG: 1 INJECTION, SOLUTION INTRAMUSCULAR; INTRAVENOUS at 05:10

## 2021-10-12 RX ADMIN — TROPICAMIDE 1 DROP: 10 SOLUTION/ DROPS OPHTHALMIC at 03:10

## 2021-10-12 RX ADMIN — PHENYLEPHRINE HYDROCHLORIDE 1 DROP: 2.5 SOLUTION/ DROPS OPHTHALMIC at 04:10

## 2021-10-12 RX ADMIN — PREDNISOLONE ACETATE 1 DROP: 10 SUSPENSION OPHTHALMIC at 03:10

## 2021-10-12 RX ADMIN — PROPOFOL 30 MG: 10 INJECTION, EMULSION INTRAVENOUS at 05:10

## 2021-10-12 RX ADMIN — PHENYLEPHRINE HYDROCHLORIDE 1 DROP: 2.5 SOLUTION/ DROPS OPHTHALMIC at 05:10

## 2021-10-12 RX ADMIN — MOXIFLOXACIN 1 DROP: 5 SOLUTION/ DROPS OPHTHALMIC at 03:10

## 2021-10-12 RX ADMIN — CYCLOPENTOLATE HYDROCHLORIDE 1 DROP: 10 SOLUTION OPHTHALMIC at 03:10

## 2021-10-12 RX ADMIN — ALTEPLASE 37.5 MCG: 2.2 INJECTION, POWDER, LYOPHILIZED, FOR SOLUTION INTRAVENOUS at 12:10

## 2021-10-12 RX ADMIN — ATROPINE SULFATE 1 DROP: 10 SOLUTION OPHTHALMIC at 03:10

## 2021-10-12 RX ADMIN — SODIUM CHLORIDE: 9 INJECTION, SOLUTION INTRAVENOUS at 05:10

## 2021-10-12 RX ADMIN — LIDOCAINE HYDROCHLORIDE 40 MG: 20 INJECTION, SOLUTION INTRAVENOUS at 05:10

## 2021-10-12 RX ADMIN — TETRACAINE HYDROCHLORIDE 1 DROP: 5 SOLUTION OPHTHALMIC at 03:10

## 2021-10-12 RX ADMIN — PROPOFOL 20 MG: 10 INJECTION, EMULSION INTRAVENOUS at 05:10

## 2021-10-12 RX ADMIN — BEVACIZUMAB 1.25 MG: 100 INJECTION, SOLUTION INTRAVENOUS at 12:10

## 2021-10-13 ENCOUNTER — OFFICE VISIT (OUTPATIENT)
Dept: OPHTHALMOLOGY | Facility: CLINIC | Age: 86
End: 2021-10-13
Payer: MEDICARE

## 2021-10-13 DIAGNOSIS — H35.61: ICD-10-CM

## 2021-10-13 DIAGNOSIS — H35.3211 EXUDATIVE AGE-RELATED MACULAR DEGENERATION, RIGHT EYE, WITH ACTIVE CHOROIDAL NEOVASCULARIZATION: Primary | ICD-10-CM

## 2021-10-13 PROCEDURE — 3288F FALL RISK ASSESSMENT DOCD: CPT | Mod: CPTII,S$GLB,, | Performed by: OPHTHALMOLOGY

## 2021-10-13 PROCEDURE — 99024 PR POST-OP FOLLOW-UP VISIT: ICD-10-PCS | Mod: S$GLB,,, | Performed by: OPHTHALMOLOGY

## 2021-10-13 PROCEDURE — 1159F MED LIST DOCD IN RCRD: CPT | Mod: CPTII,S$GLB,, | Performed by: OPHTHALMOLOGY

## 2021-10-13 PROCEDURE — 3288F PR FALLS RISK ASSESSMENT DOCUMENTED: ICD-10-PCS | Mod: CPTII,S$GLB,, | Performed by: OPHTHALMOLOGY

## 2021-10-13 PROCEDURE — 1160F PR REVIEW ALL MEDS BY PRESCRIBER/CLIN PHARMACIST DOCUMENTED: ICD-10-PCS | Mod: CPTII,S$GLB,, | Performed by: OPHTHALMOLOGY

## 2021-10-13 PROCEDURE — 1126F PR PAIN SEVERITY QUANTIFIED, NO PAIN PRESENT: ICD-10-PCS | Mod: CPTII,S$GLB,, | Performed by: OPHTHALMOLOGY

## 2021-10-13 PROCEDURE — 99999 PR PBB SHADOW E&M-EST. PATIENT-LVL III: ICD-10-PCS | Mod: PBBFAC,,, | Performed by: OPHTHALMOLOGY

## 2021-10-13 PROCEDURE — 1126F AMNT PAIN NOTED NONE PRSNT: CPT | Mod: CPTII,S$GLB,, | Performed by: OPHTHALMOLOGY

## 2021-10-13 PROCEDURE — 1159F PR MEDICATION LIST DOCUMENTED IN MEDICAL RECORD: ICD-10-PCS | Mod: CPTII,S$GLB,, | Performed by: OPHTHALMOLOGY

## 2021-10-13 PROCEDURE — 1101F PT FALLS ASSESS-DOCD LE1/YR: CPT | Mod: CPTII,S$GLB,, | Performed by: OPHTHALMOLOGY

## 2021-10-13 PROCEDURE — 1160F RVW MEDS BY RX/DR IN RCRD: CPT | Mod: CPTII,S$GLB,, | Performed by: OPHTHALMOLOGY

## 2021-10-13 PROCEDURE — 1101F PR PT FALLS ASSESS DOC 0-1 FALLS W/OUT INJ PAST YR: ICD-10-PCS | Mod: CPTII,S$GLB,, | Performed by: OPHTHALMOLOGY

## 2021-10-13 PROCEDURE — 99999 PR PBB SHADOW E&M-EST. PATIENT-LVL III: CPT | Mod: PBBFAC,,, | Performed by: OPHTHALMOLOGY

## 2021-10-13 PROCEDURE — 99024 POSTOP FOLLOW-UP VISIT: CPT | Mod: S$GLB,,, | Performed by: OPHTHALMOLOGY

## 2021-10-19 ENCOUNTER — TELEPHONE (OUTPATIENT)
Dept: FAMILY MEDICINE | Facility: CLINIC | Age: 86
End: 2021-10-19

## 2021-10-21 ENCOUNTER — OFFICE VISIT (OUTPATIENT)
Dept: OPHTHALMOLOGY | Facility: CLINIC | Age: 86
End: 2021-10-21
Payer: MEDICARE

## 2021-10-21 DIAGNOSIS — H35.3211 EXUDATIVE AGE-RELATED MACULAR DEGENERATION, RIGHT EYE, WITH ACTIVE CHOROIDAL NEOVASCULARIZATION: Primary | ICD-10-CM

## 2021-10-21 DIAGNOSIS — H35.61: ICD-10-CM

## 2021-10-21 PROCEDURE — 1159F PR MEDICATION LIST DOCUMENTED IN MEDICAL RECORD: ICD-10-PCS | Mod: CPTII,S$GLB,, | Performed by: OPHTHALMOLOGY

## 2021-10-21 PROCEDURE — 1101F PR PT FALLS ASSESS DOC 0-1 FALLS W/OUT INJ PAST YR: ICD-10-PCS | Mod: CPTII,S$GLB,, | Performed by: OPHTHALMOLOGY

## 2021-10-21 PROCEDURE — 3288F FALL RISK ASSESSMENT DOCD: CPT | Mod: CPTII,S$GLB,, | Performed by: OPHTHALMOLOGY

## 2021-10-21 PROCEDURE — 3288F PR FALLS RISK ASSESSMENT DOCUMENTED: ICD-10-PCS | Mod: CPTII,S$GLB,, | Performed by: OPHTHALMOLOGY

## 2021-10-21 PROCEDURE — 99024 PR POST-OP FOLLOW-UP VISIT: ICD-10-PCS | Mod: S$GLB,,, | Performed by: OPHTHALMOLOGY

## 2021-10-21 PROCEDURE — 99024 POSTOP FOLLOW-UP VISIT: CPT | Mod: S$GLB,,, | Performed by: OPHTHALMOLOGY

## 2021-10-21 PROCEDURE — 1101F PT FALLS ASSESS-DOCD LE1/YR: CPT | Mod: CPTII,S$GLB,, | Performed by: OPHTHALMOLOGY

## 2021-10-21 PROCEDURE — 92134 CPTRZ OPH DX IMG PST SGM RTA: CPT | Mod: S$GLB,,, | Performed by: OPHTHALMOLOGY

## 2021-10-21 PROCEDURE — 92134 POSTERIOR SEGMENT OCT RETINA (OCULAR COHERENCE TOMOGRAPHY)-BOTH EYES: ICD-10-PCS | Mod: S$GLB,,, | Performed by: OPHTHALMOLOGY

## 2021-10-21 PROCEDURE — 1159F MED LIST DOCD IN RCRD: CPT | Mod: CPTII,S$GLB,, | Performed by: OPHTHALMOLOGY

## 2021-10-21 PROCEDURE — 99999 PR PBB SHADOW E&M-EST. PATIENT-LVL III: CPT | Mod: PBBFAC,,, | Performed by: OPHTHALMOLOGY

## 2021-10-21 PROCEDURE — 99999 PR PBB SHADOW E&M-EST. PATIENT-LVL III: ICD-10-PCS | Mod: PBBFAC,,, | Performed by: OPHTHALMOLOGY

## 2021-10-21 PROCEDURE — 1126F AMNT PAIN NOTED NONE PRSNT: CPT | Mod: CPTII,S$GLB,, | Performed by: OPHTHALMOLOGY

## 2021-10-21 PROCEDURE — 1126F PR PAIN SEVERITY QUANTIFIED, NO PAIN PRESENT: ICD-10-PCS | Mod: CPTII,S$GLB,, | Performed by: OPHTHALMOLOGY

## 2021-11-04 ENCOUNTER — PROCEDURE VISIT (OUTPATIENT)
Dept: OPHTHALMOLOGY | Facility: CLINIC | Age: 86
End: 2021-11-04
Payer: MEDICARE

## 2021-11-04 ENCOUNTER — IMMUNIZATION (OUTPATIENT)
Dept: INTERNAL MEDICINE | Facility: CLINIC | Age: 86
End: 2021-11-04
Payer: MEDICARE

## 2021-11-04 DIAGNOSIS — H35.61: ICD-10-CM

## 2021-11-04 DIAGNOSIS — H35.3211 EXUDATIVE AGE-RELATED MACULAR DEGENERATION, RIGHT EYE, WITH ACTIVE CHOROIDAL NEOVASCULARIZATION: Primary | ICD-10-CM

## 2021-11-04 DIAGNOSIS — Z23 NEED FOR VACCINATION: Primary | ICD-10-CM

## 2021-11-04 PROCEDURE — 67028 PR INJECT INTRAVITREAL PHARMCOLOGIC: ICD-10-PCS | Mod: 79,RT,S$GLB, | Performed by: OPHTHALMOLOGY

## 2021-11-04 PROCEDURE — 99024 POSTOP FOLLOW-UP VISIT: CPT | Mod: S$GLB,,, | Performed by: OPHTHALMOLOGY

## 2021-11-04 PROCEDURE — 0004A COVID-19, MRNA, LNP-S, PF, 30 MCG/0.3 ML DOSE VACCINE: CPT | Mod: CV19,PBBFAC | Performed by: INTERNAL MEDICINE

## 2021-11-04 PROCEDURE — 67028 INJECTION EYE DRUG: CPT | Mod: 79,RT,S$GLB, | Performed by: OPHTHALMOLOGY

## 2021-11-04 PROCEDURE — 92134 POSTERIOR SEGMENT OCT RETINA (OCULAR COHERENCE TOMOGRAPHY)-BOTH EYES: ICD-10-PCS | Mod: S$GLB,,, | Performed by: OPHTHALMOLOGY

## 2021-11-04 PROCEDURE — 92134 CPTRZ OPH DX IMG PST SGM RTA: CPT | Mod: S$GLB,,, | Performed by: OPHTHALMOLOGY

## 2021-11-04 PROCEDURE — 99024 PR POST-OP FOLLOW-UP VISIT: ICD-10-PCS | Mod: S$GLB,,, | Performed by: OPHTHALMOLOGY

## 2021-11-04 RX ADMIN — Medication 1.25 MG: at 11:11

## 2021-11-29 ENCOUNTER — CLINICAL SUPPORT (OUTPATIENT)
Dept: CARDIOLOGY | Facility: HOSPITAL | Age: 86
End: 2021-11-29
Payer: MEDICARE

## 2021-11-29 DIAGNOSIS — Z95.810 PRESENCE OF AUTOMATIC (IMPLANTABLE) CARDIAC DEFIBRILLATOR: ICD-10-CM

## 2021-11-29 DIAGNOSIS — I47.20 VENTRICULAR TACHYCARDIA: ICD-10-CM

## 2021-11-29 DIAGNOSIS — I44.2 ATRIOVENTRICULAR BLOCK, COMPLETE: ICD-10-CM

## 2021-11-29 PROCEDURE — 93295 DEV INTERROG REMOTE 1/2/MLT: CPT | Mod: ,,, | Performed by: INTERNAL MEDICINE

## 2021-11-29 PROCEDURE — 93295 CARDIAC DEVICE CHECK - REMOTE: ICD-10-PCS | Mod: ,,, | Performed by: INTERNAL MEDICINE

## 2021-11-29 PROCEDURE — 93296 REM INTERROG EVL PM/IDS: CPT | Performed by: INTERNAL MEDICINE

## 2021-12-09 ENCOUNTER — OFFICE VISIT (OUTPATIENT)
Dept: OPHTHALMOLOGY | Facility: CLINIC | Age: 86
End: 2021-12-09
Payer: MEDICARE

## 2021-12-09 DIAGNOSIS — H35.3211 EXUDATIVE AGE-RELATED MACULAR DEGENERATION, RIGHT EYE, WITH ACTIVE CHOROIDAL NEOVASCULARIZATION: Primary | ICD-10-CM

## 2021-12-09 PROCEDURE — 92134 CPTRZ OPH DX IMG PST SGM RTA: CPT | Mod: S$GLB,,, | Performed by: OPHTHALMOLOGY

## 2021-12-09 PROCEDURE — 67028 INJECTION EYE DRUG: CPT | Mod: 79,RT,S$GLB, | Performed by: OPHTHALMOLOGY

## 2021-12-09 PROCEDURE — 99999 PR PBB SHADOW E&M-EST. PATIENT-LVL III: ICD-10-PCS | Mod: PBBFAC,,, | Performed by: OPHTHALMOLOGY

## 2021-12-09 PROCEDURE — 92134 POSTERIOR SEGMENT OCT RETINA (OCULAR COHERENCE TOMOGRAPHY)-BOTH EYES: ICD-10-PCS | Mod: S$GLB,,, | Performed by: OPHTHALMOLOGY

## 2021-12-09 PROCEDURE — 99499 NO LOS: ICD-10-PCS | Mod: S$GLB,,, | Performed by: OPHTHALMOLOGY

## 2021-12-09 PROCEDURE — 99499 UNLISTED E&M SERVICE: CPT | Mod: S$GLB,,, | Performed by: OPHTHALMOLOGY

## 2021-12-09 PROCEDURE — 2023F PR DILATED RETINAL EXAM W/O EVID OF RETINOPATHY: ICD-10-PCS | Mod: CPTII,S$GLB,, | Performed by: OPHTHALMOLOGY

## 2021-12-09 PROCEDURE — 2023F DILAT RTA XM W/O RTNOPTHY: CPT | Mod: CPTII,S$GLB,, | Performed by: OPHTHALMOLOGY

## 2021-12-09 PROCEDURE — 67028 PR INJECT INTRAVITREAL PHARMCOLOGIC: ICD-10-PCS | Mod: 79,RT,S$GLB, | Performed by: OPHTHALMOLOGY

## 2021-12-09 PROCEDURE — 99999 PR PBB SHADOW E&M-EST. PATIENT-LVL III: CPT | Mod: PBBFAC,,, | Performed by: OPHTHALMOLOGY

## 2021-12-09 RX ADMIN — Medication 1.25 MG: at 11:12

## 2021-12-10 ENCOUNTER — TELEPHONE (OUTPATIENT)
Dept: CARDIOLOGY | Facility: CLINIC | Age: 86
End: 2021-12-10
Payer: MEDICARE

## 2022-01-04 ENCOUNTER — HOSPITAL ENCOUNTER (OUTPATIENT)
Dept: RADIOLOGY | Facility: HOSPITAL | Age: 87
Discharge: HOME | End: 2022-01-04
Attending: INTERNAL MEDICINE
Payer: MEDICARE

## 2022-01-04 ENCOUNTER — OFFICE VISIT (OUTPATIENT)
Dept: INTERNAL MEDICINE | Facility: CLINIC | Age: 87
End: 2022-01-04
Payer: MEDICARE

## 2022-01-04 VITALS
BODY MASS INDEX: 25.45 KG/M2 | HEART RATE: 63 BPM | DIASTOLIC BLOOD PRESSURE: 70 MMHG | HEIGHT: 64 IN | WEIGHT: 149.06 LBS | OXYGEN SATURATION: 94 % | SYSTOLIC BLOOD PRESSURE: 136 MMHG

## 2022-01-04 DIAGNOSIS — I25.110 ATHEROSCLEROSIS OF NATIVE CORONARY ARTERY OF NATIVE HEART WITH UNSTABLE ANGINA PECTORIS: Primary | ICD-10-CM

## 2022-01-04 DIAGNOSIS — M25.561 CHRONIC PAIN OF RIGHT KNEE: ICD-10-CM

## 2022-01-04 DIAGNOSIS — M81.0 AGE-RELATED OSTEOPOROSIS WITHOUT CURRENT PATHOLOGICAL FRACTURE: ICD-10-CM

## 2022-01-04 DIAGNOSIS — I10 ESSENTIAL HYPERTENSION: ICD-10-CM

## 2022-01-04 DIAGNOSIS — Z95.810 ICD (IMPLANTABLE CARDIOVERTER-DEFIBRILLATOR), DUAL, IN SITU: ICD-10-CM

## 2022-01-04 DIAGNOSIS — R79.9 ABNORMAL BLOOD CHEMISTRY: ICD-10-CM

## 2022-01-04 DIAGNOSIS — E78.5 HYPERLIPIDEMIA, UNSPECIFIED HYPERLIPIDEMIA TYPE: ICD-10-CM

## 2022-01-04 DIAGNOSIS — I44.2 CHB (COMPLETE HEART BLOCK): ICD-10-CM

## 2022-01-04 DIAGNOSIS — E03.9 HYPOTHYROIDISM, UNSPECIFIED TYPE: ICD-10-CM

## 2022-01-04 DIAGNOSIS — G89.29 CHRONIC PAIN OF RIGHT KNEE: ICD-10-CM

## 2022-01-04 PROCEDURE — 99214 PR OFFICE/OUTPT VISIT, EST, LEVL IV, 30-39 MIN: ICD-10-PCS | Mod: S$GLB,,, | Performed by: INTERNAL MEDICINE

## 2022-01-04 PROCEDURE — 1126F AMNT PAIN NOTED NONE PRSNT: CPT | Mod: CPTII,S$GLB,, | Performed by: INTERNAL MEDICINE

## 2022-01-04 PROCEDURE — 1159F PR MEDICATION LIST DOCUMENTED IN MEDICAL RECORD: ICD-10-PCS | Mod: CPTII,S$GLB,, | Performed by: INTERNAL MEDICINE

## 2022-01-04 PROCEDURE — 1101F PR PT FALLS ASSESS DOC 0-1 FALLS W/OUT INJ PAST YR: ICD-10-PCS | Mod: CPTII,S$GLB,, | Performed by: INTERNAL MEDICINE

## 2022-01-04 PROCEDURE — 3288F PR FALLS RISK ASSESSMENT DOCUMENTED: ICD-10-PCS | Mod: CPTII,S$GLB,, | Performed by: INTERNAL MEDICINE

## 2022-01-04 PROCEDURE — 73562 XR KNEE 3 VIEW RIGHT: ICD-10-PCS | Mod: 26,RT,, | Performed by: RADIOLOGY

## 2022-01-04 PROCEDURE — 73562 X-RAY EXAM OF KNEE 3: CPT | Mod: 26,RT,, | Performed by: RADIOLOGY

## 2022-01-04 PROCEDURE — 1159F MED LIST DOCD IN RCRD: CPT | Mod: CPTII,S$GLB,, | Performed by: INTERNAL MEDICINE

## 2022-01-04 PROCEDURE — 73562 X-RAY EXAM OF KNEE 3: CPT | Mod: TC,RT

## 2022-01-04 PROCEDURE — 1126F PR PAIN SEVERITY QUANTIFIED, NO PAIN PRESENT: ICD-10-PCS | Mod: CPTII,S$GLB,, | Performed by: INTERNAL MEDICINE

## 2022-01-04 PROCEDURE — 3288F FALL RISK ASSESSMENT DOCD: CPT | Mod: CPTII,S$GLB,, | Performed by: INTERNAL MEDICINE

## 2022-01-04 PROCEDURE — 1101F PT FALLS ASSESS-DOCD LE1/YR: CPT | Mod: CPTII,S$GLB,, | Performed by: INTERNAL MEDICINE

## 2022-01-04 PROCEDURE — 99214 OFFICE O/P EST MOD 30 MIN: CPT | Mod: S$GLB,,, | Performed by: INTERNAL MEDICINE

## 2022-01-04 PROCEDURE — 99999 PR PBB SHADOW E&M-EST. PATIENT-LVL IV: ICD-10-PCS | Mod: PBBFAC,,, | Performed by: INTERNAL MEDICINE

## 2022-01-04 PROCEDURE — 99999 PR PBB SHADOW E&M-EST. PATIENT-LVL IV: CPT | Mod: PBBFAC,,, | Performed by: INTERNAL MEDICINE

## 2022-01-04 RX ORDER — DICLOFENAC SODIUM 10 MG/G
2 GEL TOPICAL DAILY
Qty: 200 G | Refills: 1 | Status: SHIPPED | OUTPATIENT
Start: 2022-01-04 | End: 2022-06-07

## 2022-01-04 NOTE — PROGRESS NOTES
INTERNAL MEDICINE INITIAL VISIT NOTE      CHIEF COMPLAINT     Chief Complaint   Patient presents with    Establish Care       HPI     Lynn Mckinney is a 86 y.o. female with lumbar radiculitis, CAD s/p CABGx 3 (2001)/SHEREEN to RCA (3/2021), PAD, CHB s/p PPM (2013), VF s/p dual ICD (3/2021), HTN, HLD, carotid artery stenosis, vit D def, hypothyroidism, osteoporosis, macular degeneration here today to establish care. PCP listed as Dr. Moise, transferring care.     Follows with Dr. Carver for macular degeneration; persisent macular hemorrhage while on DAPT. Wants to know if she can stop Plavix sooner than 1 year.    Reports intermittent R ankle swelling, ongoing for many months. Worse at end of day, resolves by AM.     Also reports R knee pain, intermittent. Has not tried any medications.    Past Medical History:  Past Medical History:   Diagnosis Date    Allergy     seasonal    Blood clotting tendency     Carotid artery disease 5/29/2014    Coronary artery disease     Heart block     Hyperlipidemia     Hypertension     Hypothyroid     Obesity     Pacemaker     PAD (peripheral artery disease)     Spinal stenosis     Thoracic or lumbosacral neuritis or radiculitis 11/25/2014    Tubular adenoma        Review of Systems:  Review of Systems   Constitutional: Negative for chills and fever.   HENT: Negative for congestion.    Respiratory: Negative for cough and shortness of breath.    Cardiovascular: Positive for leg swelling. Negative for chest pain.   Gastrointestinal: Negative for constipation, nausea and vomiting.   Genitourinary: Negative for hematuria and urgency.   Musculoskeletal: Positive for joint pain (knee ). Negative for falls.   Skin: Negative for rash.   Neurological: Negative for dizziness and loss of consciousness.       Health Maintenance:   Immunizations:   Influenza - complete  Tdap - next visit  Covid 19 - complete  HPV  Prevnar rec at 65 - 3/2016, Pneumovax 11/2014  Shingrix rec at 50 -  "2nd dose    Cancer Screening:  PAP: s/p hys  Mammogram:  8/2021 BIRAD 1  Colonoscopy: defer to age  DEXA:  5/2020 osteoporosis       PHYSICAL EXAM     /70 (BP Location: Left arm, Patient Position: Sitting, BP Method: Medium (Manual))   Pulse 63   Ht 5' 4" (1.626 m)   Wt 67.6 kg (149 lb 0.5 oz)   SpO2 (!) 94%   BMI 25.58 kg/m²     GEN - A+OX4, NAD   HEENT - PERRL, EOMI,   Neck - No thyromegaly or thyroid masses felt.  No cervical lymphadenopathy appreciated.  CV - RRR, no m/r/g  Chest - CTAB, no wheezing, crackles, or rhonchi  Abd - S/NT/ND/+BS.   Ext - 2+BDP. No C/C/E.  Skin - Normal color and texture, no rash, no skin lesions.  MSK - R knee flexion/extension ROM intact, minimal pain. No crepitus.     ASSESSMENT/PLAN     Lnyn Mckinney is a 86 y.o. female with conditions as above.     Coronary artery disease (CAD)  Established with Cardiology, Dr. Avery  Requested discussion with Cardiology if Plavix could be discontinued early given persisent macular hemorrhage  -     Lipid Panel; Future; Expected date: 07/04/2022    Hypothyroidism, unspecified type  TSH elevated but stable range, continue Synthroid  Discuss timing of med administration at next visit  -     TSH; Future; Expected date: 07/04/2022  -     T4, Free; Future; Expected date: 07/04/2022    CHB (complete heart block)  S/p dual ICD, stable  Followed by EP    ICD (implantable cardioverter-defibrillator), dual, in situ  As above    Hyperlipidemia, unspecified hyperlipidemia type  Stable, statin    Essential hypertension  Controlled, continue meds  -     CBC Auto Differential; Future; Expected date: 07/04/2022  -     Comprehensive Metabolic Panel; Future; Expected date: 07/04/2022    Age-related osteoporosis without current pathological fracture  On Vit D/Ca supplement; not on treatment currently  Candidate for Reclast once dental work complete per prior Endocrine evaluation; will discuss further at next visit    Chronic pain of right knee  -     " X-Ray Knee 3 View Right; Future; Expected date: 01/04/2022  -     diclofenac sodium (VOLTAREN) 1 % Gel; Apply 2 g topically once daily.  Dispense: 200 g; Refill: 1    Abnormal blood chemistry  -     Hemoglobin A1C; Future; Expected date: 07/04/2022    HM as above.    RTC in 6 mo, sooner if needed and depending on labs.    Shira Fagan MD  Department of Internal Medicine - [facility]  01/07/2022

## 2022-01-10 ENCOUNTER — DOCUMENTATION ONLY (OUTPATIENT)
Dept: CARDIOLOGY | Facility: CLINIC | Age: 87
End: 2022-01-10
Payer: MEDICARE

## 2022-01-10 NOTE — PROGRESS NOTES
Patient having macular hemorrhages.  D/w Dr. Shore and ok to stop the plavix and continue ASA alone.

## 2022-01-11 ENCOUNTER — TELEPHONE (OUTPATIENT)
Dept: INTERNAL MEDICINE | Facility: CLINIC | Age: 87
End: 2022-01-11
Payer: MEDICARE

## 2022-01-11 NOTE — TELEPHONE ENCOUNTER
Results reviewed. Please call and inform pt of following:    X-ray reviewed. Consistent with arthritis. Also contacted Cardiology team involved in placing stent 3/2021 regarding Plavix/ASA. Ok to stop Plavix early. Continue ASA.

## 2022-01-11 NOTE — TELEPHONE ENCOUNTER
Spoke with pt, notified of message below. Pt would like a letter stating it id okay to stop Plavix so she can show her eye doctor.

## 2022-01-13 ENCOUNTER — PROCEDURE VISIT (OUTPATIENT)
Dept: OPHTHALMOLOGY | Facility: CLINIC | Age: 87
End: 2022-01-13
Payer: MEDICARE

## 2022-01-13 DIAGNOSIS — H35.3211 EXUDATIVE AGE-RELATED MACULAR DEGENERATION, RIGHT EYE, WITH ACTIVE CHOROIDAL NEOVASCULARIZATION: Primary | ICD-10-CM

## 2022-01-13 DIAGNOSIS — H35.61: ICD-10-CM

## 2022-01-13 PROCEDURE — 92134 CPTRZ OPH DX IMG PST SGM RTA: CPT | Mod: S$GLB,,, | Performed by: OPHTHALMOLOGY

## 2022-01-13 PROCEDURE — 92134 POSTERIOR SEGMENT OCT RETINA (OCULAR COHERENCE TOMOGRAPHY)-BOTH EYES: ICD-10-PCS | Mod: S$GLB,,, | Performed by: OPHTHALMOLOGY

## 2022-01-13 PROCEDURE — 67028 PR INJECT INTRAVITREAL PHARMCOLOGIC: ICD-10-PCS | Mod: RT,S$GLB,, | Performed by: OPHTHALMOLOGY

## 2022-01-13 PROCEDURE — 99499 NO LOS: ICD-10-PCS | Mod: S$GLB,,, | Performed by: OPHTHALMOLOGY

## 2022-01-13 PROCEDURE — 67028 INJECTION EYE DRUG: CPT | Mod: RT,S$GLB,, | Performed by: OPHTHALMOLOGY

## 2022-01-13 PROCEDURE — 99499 UNLISTED E&M SERVICE: CPT | Mod: S$GLB,,, | Performed by: OPHTHALMOLOGY

## 2022-01-13 RX ADMIN — Medication 1.25 MG: at 12:01

## 2022-01-13 NOTE — PROGRESS NOTES
Subjective:       Patient ID: Lynn Mckinney is a 86 y.o. female      No chief complaint on file.    History of Present Illness  HPI     1 mo  OCT /NAHOMI OD only    DLS 12/09/2021 by Dr. SUDHA Carver MD    Pt reports: I tend to use my left eye more.  I am stopping plavix today.     Denies pain   ++blurred Va OD (I see a cloud over that eye x 2-4 mo)  vision seems about   the same OD  -eye pain  -headaches    Eye Meds: none    POHx:   1. Exudative ARMD OD w/ Active Choroidal NVO  S/p Ppv/Subretinal injection of TPA/Injection of 20% SFG gas- Right Eye     Wet AMD OD s/p Avastin OD    2. Macular Subretinal hem. OD        Last edited by Flako Carver MD on 1/13/2022 12:30 PM. (History)          Assessment/Plan:     1. Exudative age-related macular degeneration, right eye, with active choroidal neovascularization  2. Macular subretinal hemorrhage, right  Still active again today with new SRH surrounding old  Significant amount of heme migrated inf but still some in macula  Pt was instructed by cardiologist to d/c Plavix, stopping today    Proceed with monthly Av OD until dry    RD/Endophthalmitis precautions   RTC 1 wk sooner PRN if any problems (edwar pain, redness, dimming or loss of vision)      Follow up in about 1 month (around 2/13/2022), or if symptoms worsen or fail to improve, for OCT and INJECTION ONLY, Injection Right eye, Avastin.     Patient identified.  Timeout performed.    Risks, benefits, and alternatives to treatment were discussed in detail with the patient, including bleeding/infection (endophthalmitis)/etc.  The patient voiced understanding and wished to proceed with the procedure.  See separate consent form.    Injection Procedure Note:  Diagnosis: Wet AMD Right Eye    Topical Proparacaine drop placed then topical 5% Betadine  Sterile gloves used, and sterile lid speculum placed.  5% Betadine placed at injection site again prior to injection.  Avastin 1.25mg in 0.05cc Injected inferotemporally  3.5-4mm posterior to the limbus.  Complications: None  Va at least CF at 5 feet post injection.  Retina, ONH, IOP normal after injection.    Followup as above.  Patient should return immediately PRN.  Retinal Detachment and Endophthalmitis precautions given.

## 2022-01-18 NOTE — PATIENT INSTRUCTIONS
.POST INTRAVITREAL INJECTION PATIENT INSTRUCTIONS   Below are some guidelines for what to expect after your treatment and additional care instructions.   * you may experience mild discomfort in your eye after the treatment. Your eye usually feels better within 24 to 48 hours after the procedure.   * you have been given drops that numb the surface of your eye.   DO NOT rub or touch your eye, DO NOT wear contacts until numbness goes away.   * you may experience small spots that appear in your field of vision, these are usually caused by an air bubble or from the medication. It taked a few hours or days for these to go away.   * use of sunglasses will help reduce light sensitivity and glare.   * DO NOT swim   for at least 3 hours after the injection   * If you have a gritty, burning, irritating or stinging feeling in the injected eye. This may be a result of the antiseptic used. Use artifical tears or eye lubricant ( from over- the- counter from your local pharmacy ). If you have some at home already please check the expiration date, so not to use anything contaminated in your eye. A cool pack over your eye brow above the injected eye may also relieve discomfort.   Call us right away or go to the Emergency Department if you have a dramatic decrease in vision or extreme pain in the eye.   [facility] [phone]     [Jugular Venous Distention Increased] : there was no jugular-venous distention [Decreased Breath Sounds] : decreased breath sounds [Bibasilar Rales/Crackles] : bibasilar rales [Irregularly Irregular] : the rhythm was irregularly irregular [___ +] : bilateral [unfilled]+ pitting edema to the ankles [Abdomen Tenderness] : non-tender [] : no hepato-splenomegaly [FreeTextEntry1] : Lethargic, asterixis. [Oriented To Time, Place, And Person] : oriented to person, place, and time

## 2022-01-27 RX ORDER — ROSUVASTATIN CALCIUM 10 MG/1
10 TABLET, COATED ORAL NIGHTLY
Qty: 90 TABLET | Refills: 3 | Status: ON HOLD | OUTPATIENT
Start: 2022-01-27 | End: 2023-01-18

## 2022-02-17 ENCOUNTER — PROCEDURE VISIT (OUTPATIENT)
Dept: OPHTHALMOLOGY | Facility: CLINIC | Age: 87
End: 2022-02-17
Payer: MEDICARE

## 2022-02-17 DIAGNOSIS — H35.3211 EXUDATIVE AGE-RELATED MACULAR DEGENERATION, RIGHT EYE, WITH ACTIVE CHOROIDAL NEOVASCULARIZATION: Primary | ICD-10-CM

## 2022-02-17 PROCEDURE — 92134 POSTERIOR SEGMENT OCT RETINA (OCULAR COHERENCE TOMOGRAPHY)-BOTH EYES: ICD-10-PCS | Mod: S$GLB,,, | Performed by: OPHTHALMOLOGY

## 2022-02-17 PROCEDURE — 99499 UNLISTED E&M SERVICE: CPT | Mod: S$GLB,,, | Performed by: OPHTHALMOLOGY

## 2022-02-17 PROCEDURE — 99499 NO LOS: ICD-10-PCS | Mod: S$GLB,,, | Performed by: OPHTHALMOLOGY

## 2022-02-17 PROCEDURE — 67028 INJECTION EYE DRUG: CPT | Mod: RT,S$GLB,, | Performed by: OPHTHALMOLOGY

## 2022-02-17 PROCEDURE — 67028 PR INJECT INTRAVITREAL PHARMCOLOGIC: ICD-10-PCS | Mod: RT,S$GLB,, | Performed by: OPHTHALMOLOGY

## 2022-02-17 PROCEDURE — 92134 CPTRZ OPH DX IMG PST SGM RTA: CPT | Mod: S$GLB,,, | Performed by: OPHTHALMOLOGY

## 2022-02-17 RX ADMIN — Medication 1.25 MG: at 12:02

## 2022-02-18 NOTE — PATIENT INSTRUCTIONS

## 2022-02-27 ENCOUNTER — CLINICAL SUPPORT (OUTPATIENT)
Dept: CARDIOLOGY | Facility: HOSPITAL | Age: 87
End: 2022-02-27
Payer: MEDICARE

## 2022-02-27 DIAGNOSIS — I42.9 CARDIOMYOPATHY, UNSPECIFIED: ICD-10-CM

## 2022-02-27 DIAGNOSIS — Z95.810 PRESENCE OF AUTOMATIC (IMPLANTABLE) CARDIAC DEFIBRILLATOR: ICD-10-CM

## 2022-02-27 DIAGNOSIS — I44.2 ATRIOVENTRICULAR BLOCK, COMPLETE: ICD-10-CM

## 2022-02-27 DIAGNOSIS — I47.20 VENTRICULAR TACHYCARDIA: ICD-10-CM

## 2022-02-27 PROCEDURE — 93296 REM INTERROG EVL PM/IDS: CPT | Performed by: INTERNAL MEDICINE

## 2022-04-07 ENCOUNTER — PROCEDURE VISIT (OUTPATIENT)
Dept: OPHTHALMOLOGY | Facility: CLINIC | Age: 87
End: 2022-04-07
Payer: MEDICARE

## 2022-04-07 DIAGNOSIS — H35.3211 EXUDATIVE AGE-RELATED MACULAR DEGENERATION, RIGHT EYE, WITH ACTIVE CHOROIDAL NEOVASCULARIZATION: Primary | ICD-10-CM

## 2022-04-07 DIAGNOSIS — H35.61: ICD-10-CM

## 2022-04-07 PROCEDURE — 99499 NO LOS: ICD-10-PCS | Mod: S$GLB,,, | Performed by: OPHTHALMOLOGY

## 2022-04-07 PROCEDURE — 99499 UNLISTED E&M SERVICE: CPT | Mod: S$GLB,,, | Performed by: OPHTHALMOLOGY

## 2022-04-07 PROCEDURE — 92134 POSTERIOR SEGMENT OCT RETINA (OCULAR COHERENCE TOMOGRAPHY)-BOTH EYES: ICD-10-PCS | Mod: S$GLB,,, | Performed by: OPHTHALMOLOGY

## 2022-04-07 PROCEDURE — 92134 CPTRZ OPH DX IMG PST SGM RTA: CPT | Mod: S$GLB,,, | Performed by: OPHTHALMOLOGY

## 2022-04-07 PROCEDURE — 67028 INJECTION EYE DRUG: CPT | Mod: RT,S$GLB,, | Performed by: OPHTHALMOLOGY

## 2022-04-07 PROCEDURE — 67028 PR INJECT INTRAVITREAL PHARMCOLOGIC: ICD-10-PCS | Mod: RT,S$GLB,, | Performed by: OPHTHALMOLOGY

## 2022-04-07 RX ADMIN — Medication 1.25 MG: at 12:04

## 2022-04-07 NOTE — PROGRESS NOTES
Subjective:       Patient ID: Lynn Mckinney is a 86 y.o. female      No chief complaint on file.    History of Present Illness  HPI     6 wk OCT/DFE/ Avastin OD    DLS 02/17/2022 by Dr. SUDHA Carver MD    Pt states is requestin for only the Right Eye to be dilated due to she is   driving her self.     Vision seems the same OU, OD can sometimes see things in bright light.  OS   Va very god  -eye pain   -flashes/floaters  -headaches  -diplopia  -curtains/shadows/veils    Eye Meds: [employer] Vitamins    POHx:   1. 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 Macular Subretinal hem. OD    Last edited by Flako Carver MD on 4/7/2022 12:29 PM. (History)        Imaging:    See report    Assessment/Plan:     1. Exudative age-related macular degeneration, right eye, with active choroidal neovascularization  Inc in active heme today 7 wks s/p Av.  Has SR scar  D/w pt that vision is likely at or near final vision OD.    Recommend go back to monthly Av    AREDS 2/Amsler-- vitamins written down for patient    - Prior authorization Order  - Posterior Segment OCT Retina-Both eyes    Follow up in about 1 month (around 5/7/2022), or if symptoms worsen or fail to improve, for Dilated examination, OCT Mac, Injection Right eye, Avastin- DILATE OU NEXT VISIT!!.     Patient identified.  Timeout performed.    Risks, benefits, and alternatives to treatment were discussed in detail with the patient, including bleeding/infection (endophthalmitis)/etc.  The patient voiced understanding and wished to proceed with the procedure.  See separate consent form.    Injection Procedure Note:  Diagnosis: Wet AMD Right Eye    Topical Proparacaine drop placed then topical 5% Betadine  Sterile gloves used, and sterile lid speculum placed.  5% Betadine placed at injection site again prior to injection.  Avastin 1.25mg in 0.05cc  Injected inferotemporally 3.5-4mm posterior to the limbus.  Complications: None  Va at least CF at 5 feet post injection.  Retina, ONH, IOP normal after injection.    Followup as above.  Patient should return immediately PRN.  Retinal Detachment and Endophthalmitis precautions given.

## 2022-04-08 NOTE — PATIENT INSTRUCTIONS

## 2022-05-16 ENCOUNTER — PROCEDURE VISIT (OUTPATIENT)
Dept: OPHTHALMOLOGY | Facility: CLINIC | Age: 87
End: 2022-05-16
Payer: MEDICARE

## 2022-05-16 DIAGNOSIS — H35.3211 EXUDATIVE AGE-RELATED MACULAR DEGENERATION, RIGHT EYE, WITH ACTIVE CHOROIDAL NEOVASCULARIZATION: Primary | ICD-10-CM

## 2022-05-16 DIAGNOSIS — H35.3122 NONEXUDATIVE AGE-RELATED MACULAR DEGENERATION, LEFT EYE, INTERMEDIATE DRY STAGE: ICD-10-CM

## 2022-05-16 PROCEDURE — 92202 PR OPHTHALMOSCOPY, EXT, W/DRAW OPTIC NERVE/MACULA, I&R, UNI/BI: ICD-10-PCS | Mod: S$GLB,,, | Performed by: OPHTHALMOLOGY

## 2022-05-16 PROCEDURE — 92014 PR EYE EXAM, EST PATIENT,COMPREHESV: ICD-10-PCS | Mod: S$GLB,,, | Performed by: OPHTHALMOLOGY

## 2022-05-16 PROCEDURE — 92134 CPTRZ OPH DX IMG PST SGM RTA: CPT | Mod: S$GLB,,, | Performed by: OPHTHALMOLOGY

## 2022-05-16 PROCEDURE — 92202 OPSCPY EXTND ON/MAC DRAW: CPT | Mod: S$GLB,,, | Performed by: OPHTHALMOLOGY

## 2022-05-16 PROCEDURE — 92014 COMPRE OPH EXAM EST PT 1/>: CPT | Mod: S$GLB,,, | Performed by: OPHTHALMOLOGY

## 2022-05-16 PROCEDURE — 92134 POSTERIOR SEGMENT OCT RETINA (OCULAR COHERENCE TOMOGRAPHY)-BOTH EYES: ICD-10-PCS | Mod: S$GLB,,, | Performed by: OPHTHALMOLOGY

## 2022-05-16 NOTE — PROGRESS NOTES
Subjective:       Patient ID: Lynn Mckinney is a 86 y.o. female      No chief complaint on file.    History of Present Illness  HPI     6 wk OCT/DFE/ Avastin OD    DLS 04/07/2022 by Dr. SUDHA Carver MD    Pt states she is able to see a little better looking to the left OD.  Va   OS is good.     -flashes/floaters  -headaches  -diplopia  -curtains/shadows/veils    Eye Meds: Areds Vitamins    POHx:   1. 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 Macular Subretinal hem. OD    Last edited by Flako Carver MD on 5/16/2022  4:01 PM. (History)        Imaging:    See report    Assessment/Plan:     1. Exudative age-related macular degeneration, right eye, with active choroidal neovascularization  S/p PPV for massive SRH and Avastin injections  No active heme today  Has large SR scar which will limit Va  Discussed in detail  Rec obs and injection only if sig worsening  Now is off of blood thinners  - OCT- Retina; Future  - Posterior Segment OCT Retina-Both eyes    2. Nonexudative age-related macular degeneration, left eye, intermediate dry stage  Discussed Dry and Wet AMD in detail  Recommend AREDS 2 Vitamins--continue  Home Amsler Grid Testing- given and instructed today  RTC immediately PRN any changes in vision    - Posterior Segment OCT Retina-Both eyes    Follow up in about 2 months (around 7/16/2022), or if symptoms worsen or fail to improve, for Dilated examination, OCT Mac.

## 2022-05-19 ENCOUNTER — LAB VISIT (OUTPATIENT)
Dept: LAB | Facility: HOSPITAL | Age: 87
End: 2022-05-19
Attending: NURSE PRACTITIONER
Payer: MEDICARE

## 2022-05-19 DIAGNOSIS — I25.110 ATHEROSCLEROSIS OF NATIVE CORONARY ARTERY OF NATIVE HEART WITH UNSTABLE ANGINA PECTORIS: ICD-10-CM

## 2022-05-19 LAB
ALBUMIN SERPL BCP-MCNC: 4.3 G/DL (ref 3.5–5.2)
ALP SERPL-CCNC: 64 U/L (ref 38–126)
ALT SERPL W/O P-5'-P-CCNC: 14 U/L (ref 10–44)
ANION GAP SERPL CALC-SCNC: 10 MMOL/L (ref 8–16)
AST SERPL-CCNC: 27 U/L (ref 15–46)
BILIRUB SERPL-MCNC: 0.8 MG/DL (ref 0.1–1)
CALCIUM SERPL-MCNC: 9 MG/DL (ref 8.7–10.5)
CHLORIDE SERPL-SCNC: 105 MMOL/L (ref 95–110)
CHOLEST SERPL-MCNC: 166 MG/DL (ref 120–199)
CHOLEST/HDLC SERPL: 2.9 {RATIO} (ref 2–5)
CO2 SERPL-SCNC: 28 MMOL/L (ref 23–29)
CREAT SERPL-MCNC: 1.03 MG/DL (ref 0.5–1.4)
ERYTHROCYTE [DISTWIDTH] IN BLOOD BY AUTOMATED COUNT: 12.3 % (ref 11.5–14.5)
EST. GFR  (AFRICAN AMERICAN): 56.9 ML/MIN/1.73 M^2
EST. GFR  (NON AFRICAN AMERICAN): 49.3 ML/MIN/1.73 M^2
GLUCOSE SERPL-MCNC: 109 MG/DL (ref 70–110)
HCT VFR BLD AUTO: 40.8 % (ref 37–48.5)
HDLC SERPL-MCNC: 57 MG/DL (ref 40–75)
HDLC SERPL: 34.3 % (ref 20–50)
HGB BLD-MCNC: 13.7 G/DL (ref 12–16)
LDLC SERPL CALC-MCNC: 90 MG/DL (ref 63–159)
MCH RBC QN AUTO: 32.9 PG (ref 27–31)
MCHC RBC AUTO-ENTMCNC: 33.6 G/DL (ref 32–36)
MCV RBC AUTO: 98 FL (ref 82–98)
NONHDLC SERPL-MCNC: 109 MG/DL
PLATELET # BLD AUTO: 172 K/UL (ref 150–450)
PMV BLD AUTO: 8.8 FL (ref 9.2–12.9)
POTASSIUM SERPL-SCNC: 4.1 MMOL/L (ref 3.5–5.1)
PROT SERPL-MCNC: 7.2 G/DL (ref 6–8.4)
RBC # BLD AUTO: 4.16 M/UL (ref 4–5.4)
SODIUM SERPL-SCNC: 143 MMOL/L (ref 136–145)
TRIGL SERPL-MCNC: 95 MG/DL (ref 30–150)
UUN UR-MCNC: 27 MG/DL (ref 7–17)
WBC # BLD AUTO: 5.54 K/UL (ref 3.9–12.7)

## 2022-05-19 PROCEDURE — 80061 LIPID PANEL: CPT | Performed by: NURSE PRACTITIONER

## 2022-05-19 PROCEDURE — 85027 COMPLETE CBC AUTOMATED: CPT | Mod: PO | Performed by: NURSE PRACTITIONER

## 2022-05-19 PROCEDURE — 36415 COLL VENOUS BLD VENIPUNCTURE: CPT | Mod: PO | Performed by: NURSE PRACTITIONER

## 2022-05-19 PROCEDURE — 80053 COMPREHEN METABOLIC PANEL: CPT | Mod: PO | Performed by: NURSE PRACTITIONER

## 2022-05-22 RX ORDER — AMLODIPINE BESYLATE 5 MG/1
TABLET ORAL
Qty: 90 TABLET | Refills: 3 | Status: SHIPPED | OUTPATIENT
Start: 2022-05-22 | End: 2022-10-05

## 2022-05-28 ENCOUNTER — CLINICAL SUPPORT (OUTPATIENT)
Dept: CARDIOLOGY | Facility: HOSPITAL | Age: 87
End: 2022-05-28
Payer: MEDICARE

## 2022-05-28 DIAGNOSIS — Z95.810 PRESENCE OF AUTOMATIC (IMPLANTABLE) CARDIAC DEFIBRILLATOR: ICD-10-CM

## 2022-05-28 PROCEDURE — 93295 CARDIAC DEVICE CHECK - REMOTE: ICD-10-PCS | Mod: ,,, | Performed by: INTERNAL MEDICINE

## 2022-05-28 PROCEDURE — 93295 DEV INTERROG REMOTE 1/2/MLT: CPT | Mod: ,,, | Performed by: INTERNAL MEDICINE

## 2022-05-28 PROCEDURE — 93296 REM INTERROG EVL PM/IDS: CPT | Performed by: INTERNAL MEDICINE

## 2022-05-31 ENCOUNTER — LAB VISIT (OUTPATIENT)
Dept: LAB | Facility: HOSPITAL | Age: 87
End: 2022-05-31
Attending: NURSE PRACTITIONER
Payer: MEDICARE

## 2022-05-31 DIAGNOSIS — I25.110 ATHEROSCLEROSIS OF NATIVE CORONARY ARTERY OF NATIVE HEART WITH UNSTABLE ANGINA PECTORIS: ICD-10-CM

## 2022-05-31 LAB — MAGNESIUM SERPL-MCNC: 2 MG/DL (ref 1.6–2.6)

## 2022-05-31 PROCEDURE — 36415 COLL VENOUS BLD VENIPUNCTURE: CPT | Mod: PO | Performed by: NURSE PRACTITIONER

## 2022-05-31 PROCEDURE — 83735 ASSAY OF MAGNESIUM: CPT | Mod: PO | Performed by: NURSE PRACTITIONER

## 2022-06-07 ENCOUNTER — OFFICE VISIT (OUTPATIENT)
Dept: CARDIOLOGY | Facility: CLINIC | Age: 87
End: 2022-06-07
Payer: MEDICARE

## 2022-06-07 ENCOUNTER — TELEPHONE (OUTPATIENT)
Dept: VASCULAR SURGERY | Facility: CLINIC | Age: 87
End: 2022-06-07
Payer: MEDICARE

## 2022-06-07 VITALS
DIASTOLIC BLOOD PRESSURE: 76 MMHG | OXYGEN SATURATION: 97 % | SYSTOLIC BLOOD PRESSURE: 152 MMHG | HEIGHT: 64 IN | WEIGHT: 145.06 LBS | BODY MASS INDEX: 24.77 KG/M2 | HEART RATE: 67 BPM

## 2022-06-07 DIAGNOSIS — Z95.1 S/P CABG X 3: ICD-10-CM

## 2022-06-07 DIAGNOSIS — I73.9 PERIPHERAL ARTERIAL DISEASE: ICD-10-CM

## 2022-06-07 DIAGNOSIS — I44.2 CHB (COMPLETE HEART BLOCK): Primary | ICD-10-CM

## 2022-06-07 DIAGNOSIS — I47.20 VT (VENTRICULAR TACHYCARDIA): Primary | ICD-10-CM

## 2022-06-07 DIAGNOSIS — I49.01 VF (VENTRICULAR FIBRILLATION): ICD-10-CM

## 2022-06-07 DIAGNOSIS — I10 PRIMARY HYPERTENSION: ICD-10-CM

## 2022-06-07 DIAGNOSIS — I47.20 VT (VENTRICULAR TACHYCARDIA): ICD-10-CM

## 2022-06-07 DIAGNOSIS — Z95.810 ICD (IMPLANTABLE CARDIOVERTER-DEFIBRILLATOR), DUAL, IN SITU: ICD-10-CM

## 2022-06-07 DIAGNOSIS — E78.5 HYPERLIPIDEMIA, UNSPECIFIED HYPERLIPIDEMIA TYPE: ICD-10-CM

## 2022-06-07 DIAGNOSIS — I65.23 BILATERAL CAROTID ARTERY STENOSIS: Primary | ICD-10-CM

## 2022-06-07 DIAGNOSIS — I25.110 ATHEROSCLEROSIS OF NATIVE CORONARY ARTERY OF NATIVE HEART WITH UNSTABLE ANGINA PECTORIS: ICD-10-CM

## 2022-06-07 PROCEDURE — 99214 OFFICE O/P EST MOD 30 MIN: CPT | Mod: S$GLB,,, | Performed by: NURSE PRACTITIONER

## 2022-06-07 PROCEDURE — 1126F AMNT PAIN NOTED NONE PRSNT: CPT | Mod: CPTII,S$GLB,, | Performed by: NURSE PRACTITIONER

## 2022-06-07 PROCEDURE — 1101F PT FALLS ASSESS-DOCD LE1/YR: CPT | Mod: CPTII,S$GLB,, | Performed by: NURSE PRACTITIONER

## 2022-06-07 PROCEDURE — 3288F FALL RISK ASSESSMENT DOCD: CPT | Mod: CPTII,S$GLB,, | Performed by: NURSE PRACTITIONER

## 2022-06-07 PROCEDURE — 93005 EKG 12-LEAD: ICD-10-PCS | Mod: S$GLB,,, | Performed by: NURSE PRACTITIONER

## 2022-06-07 PROCEDURE — 99214 PR OFFICE/OUTPT VISIT, EST, LEVL IV, 30-39 MIN: ICD-10-PCS | Mod: S$GLB,,, | Performed by: NURSE PRACTITIONER

## 2022-06-07 PROCEDURE — 99999 PR PBB SHADOW E&M-EST. PATIENT-LVL IV: CPT | Mod: PBBFAC,,, | Performed by: NURSE PRACTITIONER

## 2022-06-07 PROCEDURE — 93010 ELECTROCARDIOGRAM REPORT: CPT | Mod: S$GLB,,, | Performed by: INTERNAL MEDICINE

## 2022-06-07 PROCEDURE — 1159F PR MEDICATION LIST DOCUMENTED IN MEDICAL RECORD: ICD-10-PCS | Mod: CPTII,S$GLB,, | Performed by: NURSE PRACTITIONER

## 2022-06-07 PROCEDURE — 1126F PR PAIN SEVERITY QUANTIFIED, NO PAIN PRESENT: ICD-10-PCS | Mod: CPTII,S$GLB,, | Performed by: NURSE PRACTITIONER

## 2022-06-07 PROCEDURE — 1160F PR REVIEW ALL MEDS BY PRESCRIBER/CLIN PHARMACIST DOCUMENTED: ICD-10-PCS | Mod: CPTII,S$GLB,, | Performed by: NURSE PRACTITIONER

## 2022-06-07 PROCEDURE — 1159F MED LIST DOCD IN RCRD: CPT | Mod: CPTII,S$GLB,, | Performed by: NURSE PRACTITIONER

## 2022-06-07 PROCEDURE — 93010 EKG 12-LEAD: ICD-10-PCS | Mod: S$GLB,,, | Performed by: INTERNAL MEDICINE

## 2022-06-07 PROCEDURE — 1101F PR PT FALLS ASSESS DOC 0-1 FALLS W/OUT INJ PAST YR: ICD-10-PCS | Mod: CPTII,S$GLB,, | Performed by: NURSE PRACTITIONER

## 2022-06-07 PROCEDURE — 93005 ELECTROCARDIOGRAM TRACING: CPT | Mod: S$GLB,,, | Performed by: NURSE PRACTITIONER

## 2022-06-07 PROCEDURE — 99999 PR PBB SHADOW E&M-EST. PATIENT-LVL IV: ICD-10-PCS | Mod: PBBFAC,,, | Performed by: NURSE PRACTITIONER

## 2022-06-07 PROCEDURE — 1160F RVW MEDS BY RX/DR IN RCRD: CPT | Mod: CPTII,S$GLB,, | Performed by: NURSE PRACTITIONER

## 2022-06-07 PROCEDURE — 3288F PR FALLS RISK ASSESSMENT DOCUMENTED: ICD-10-PCS | Mod: CPTII,S$GLB,, | Performed by: NURSE PRACTITIONER

## 2022-06-07 RX ORDER — IRBESARTAN 300 MG/1
TABLET ORAL
Qty: 90 TABLET | Refills: 3 | Status: SHIPPED | OUTPATIENT
Start: 2022-06-07 | End: 2023-05-10

## 2022-06-07 RX ORDER — ACETAMINOPHEN 500 MG
2000 TABLET ORAL DAILY
COMMUNITY

## 2022-06-07 RX ORDER — LABETALOL 200 MG/1
200 TABLET, FILM COATED ORAL 2 TIMES DAILY
Qty: 180 TABLET | Refills: 3 | Status: SHIPPED | OUTPATIENT
Start: 2022-06-07 | End: 2023-05-19

## 2022-06-07 RX ORDER — VIT A/VIT C/VIT E/ZINC/COPPER 4296-226
CAPSULE ORAL DAILY
COMMUNITY
End: 2023-05-30 | Stop reason: SDUPTHER

## 2022-06-07 NOTE — TELEPHONE ENCOUNTER
Appointment scheduled, pt verified. Appointment letter placed in mail.   ----- Message from Lisa Cannon NP sent at 6/7/2022  1:58 PM CDT -----  Regarding: follow up  Hi Dr. Sams,    She saw you in 10/2019 and it looks like you wanted to see her back in 2 years with carotid us before.  Did you still want to have her follow up with you?  Thanks,  Lisa

## 2022-06-07 NOTE — PROGRESS NOTES
Ms. Mckinney is a patient of Dr. Avery and was last seen in [facility] Visit 10/5/21      Subjective:   Patient ID:  Lynn Mckinney is a 86 y.o. female who presents for follow-up of Coronary Artery Disease    Problem List:  CAD    CABG x 3 in 2001,  LAD    -SHEREEN to SVG to dRCA using nicardipine for embolic protection 3/11/21  Carotid stenosis, s/p stent (L) ICA 2007    - moderate to severe asymptomatic stenosis (R) ICA (followed by [facility]. Surgery)    Hypertension    Hypercholesterolemia    PAD    CKD 3    2nd degree AVB s/p PPM 10/13 (Dr. Coleman)  VT/VF s/p upgrade to dual ICD 3/12/21  Former smoker, quit in 1985    HPI:   Lynn Mckinney is in clinic today for routine follow up.  Patient denies chest pain with exertion or at rest, palpitations, SOB, PARMAR, dizziness, syncope, edema, orthopnea, PND, or claudication.  Reports no routine exercise.  Her weight is down about 10 pounds since June 2021.  Not trying to lose weight.      Review of Systems   Constitutional: Positive for weight loss (unintentional). Negative for decreased appetite, diaphoresis, malaise/fatigue and weight gain.   Eyes: Negative for visual disturbance.   Cardiovascular: Negative for chest pain, claudication, dyspnea on exertion, irregular heartbeat, leg swelling, near-syncope, orthopnea, palpitations, paroxysmal nocturnal dyspnea and syncope.        Denies chest pressure   Respiratory: Negative for cough, hemoptysis, shortness of breath, sleep disturbances due to breathing and snoring.    Endocrine: Negative for cold intolerance and heat intolerance.   Hematologic/Lymphatic: Negative for bleeding problem. Does not bruise/bleed easily.   Musculoskeletal: Negative for myalgias.   Gastrointestinal: Negative for bloating, abdominal pain, anorexia, change in bowel habit, constipation, diarrhea, nausea and vomiting.   Neurological: Negative for difficulty with concentration, disturbances in coordination, excessive daytime sleepiness, dizziness,  "headaches, light-headedness, loss of balance, numbness and weakness.   Psychiatric/Behavioral: The patient does not have insomnia.        Allergies and current medications updated and reviewed:  Review of patient's allergies indicates:   Allergen Reactions    Lipitor [atorvastatin] Itching    Fosamax [alendronate]      Felt "strange"    Iodinated contrast media      Dye is only to be used if absolutely needed because of kidney function    Prolia [denosumab]      Reaction after the Prolia    Sulfa (sulfonamide antibiotics)      Current Outpatient Medications   Medication Sig    acetaminophen (TYLENOL) 325 MG tablet Take 1 tablet (325 mg total) by mouth every 6 (six) hours as needed for Pain.    amLODIPine (NORVASC) 5 MG tablet TAKE 1 TABLET BY MOUTH EVERY DAY    aspirin 81 MG Chew Take 81 mg by mouth every evening. 1 Tablet, Chewable Oral Every day    CALCIUM CARBONATE/VITAMIN D3 (CALTRATE 600 + D ORAL) Take 1 tablet by mouth once daily.    cholecalciferol, vitamin D3, (VITAMIN D3) 50 mcg (2,000 unit) Cap capsule Take 2,000 Units by mouth once daily.    coenzyme Q10 200 mg capsule Take 200 mg by mouth once daily.    irbesartan (AVAPRO) 300 MG tablet TAKE 1 TABLET (300 MG TOTAL) BY MOUTH EVERY EVENING.     labetaloL (NORMODYNE) 200 MG tablet TAKE 1 TABLET BY MOUTH TWICE A DAY    LACTOSE-FREE FOOD (BOOST ORAL) Take by mouth. 2-3 days per week    levothyroxine (SYNTHROID) 112 MCG tablet TAKE 1 TABLET BY MOUTH EVERY DAY FOR 6 WEEKS AND 1/2 TABLET ON SUNDAY (Patient taking differently: Pt taking one pill every day.)    multivitamin (THERAGRAN) per tablet Take by mouth. Pt taking one pill three times a week.    nitroGLYCERIN (NITROSTAT) 0.4 MG SL tablet PLACE 1 TABLET UNDER THE TONGUE EVERY 5 MIN AS NEEDED FOR CHEST PAIN. MAX:3 DOSES    rosuvastatin (CRESTOR) 10 MG tablet Take 1 tablet (10 mg total) by mouth every evening.    SALMON OIL-OMEGA-3 FATTY ACIDS ORAL Take 1 capsule by mouth once daily.    " "vitamins  A,C,E-zinc-copper (PRESERVISION AREDS) 14,320-226-200 unit-mg-unit Cap Take by mouth Daily.     No current facility-administered medications for this visit.       Objective:        BP (!) 152/76   Pulse 67   Ht 5' 4" (1.626 m)   Wt 65.8 kg (145 lb 1 oz)   SpO2 97%   BMI 24.90 kg/m²     146/74 manual    Physical Exam  Vitals and nursing note reviewed.   Constitutional:       General: She is not in acute distress.     Appearance: Normal appearance. She is well-developed. She is not diaphoretic.   HENT:      Head: Normocephalic and atraumatic.   Eyes:      General: Lids are normal. No scleral icterus.     Conjunctiva/sclera: Conjunctivae normal.   Neck:      Vascular: Normal carotid pulses. No carotid bruit, hepatojugular reflux or JVD.   Cardiovascular:      Rate and Rhythm: Normal rate and regular rhythm.      Chest Wall: PMI is not displaced.      Pulses: Intact distal pulses.           Carotid pulses are 2+ on the right side and 2+ on the left side.       Radial pulses are 2+ on the right side and 2+ on the left side.        Dorsalis pedis pulses are 2+ on the right side and 2+ on the left side.        Posterior tibial pulses are 1+ on the right side and 1+ on the left side.      Heart sounds: S1 normal and S2 normal. No murmur heard.    No friction rub. No gallop.   Pulmonary:      Effort: Pulmonary effort is normal. No respiratory distress.      Breath sounds: Normal breath sounds. No decreased breath sounds, wheezing, rhonchi or rales.   Chest:      Chest wall: No tenderness.   Abdominal:      General: Bowel sounds are normal. There is no distension or abdominal bruit.      Palpations: Abdomen is soft. There is no fluid wave or pulsatile mass.      Tenderness: There is no abdominal tenderness.   Musculoskeletal:      Cervical back: Neck supple.   Skin:     General: Skin is warm and dry.      Findings: No rash.      Nails: There is no clubbing.   Neurological:      Mental Status: She is alert and " oriented to person, place, and time.      Gait: Gait normal.   Psychiatric:         Speech: Speech normal.         Behavior: Behavior normal.         Thought Content: Thought content normal.         Judgment: Judgment normal.         Chemistry        Component Value Date/Time     05/19/2022 1011    K 4.1 05/19/2022 1011     05/19/2022 1011    CO2 28 05/19/2022 1011    BUN 27 (H) 05/19/2022 1011    CREATININE 1.03 05/19/2022 1011     05/19/2022 1011        Component Value Date/Time    CALCIUM 9.0 05/19/2022 1011    ALKPHOS 64 05/19/2022 1011    AST 27 05/19/2022 1011    ALT 14 05/19/2022 1011    BILITOT 0.8 05/19/2022 1011    ESTGFRAFRICA 56.9 (A) 05/19/2022 1011    EGFRNONAA 49.3 (A) 05/19/2022 1011        Lab Results   Component Value Date    HGBA1C 5.2 06/09/2016     Recent Labs   Lab 03/10/21  1954 03/11/21  0357 08/19/21  0941 05/19/22  1011   WBC 7.05   < >  --  5.54   Hemoglobin 13.3   < >  --  13.7   Hematocrit 38.5   < >  --  40.8   MCV 92   < >  --  98   Platelets 192   < >  --  172   BNP 57  --   --   --    TSH 6.442 H  --  6.540 H  --    Cholesterol  --    < >  --  166   HDL  --    < >  --  57   LDL Cholesterol  --    < >  --  90.0   Triglycerides  --    < >  --  95   HDL/Cholesterol Ratio  --    < >  --  34.3    < > = values in this interval not displayed.       Recent Labs   Lab 03/11/21  0357   INR 1.0        Test(s) Reviewed  I have reviewed the following in detail:  [] Stress test   [] Angiography   [x] Echocardiogram   [x] Labs   [] Other:         Assessment/Plan:   1. CHB (complete heart block)      2. ICD (implantable cardioverter-defibrillator), dual, in situ  Denies discharge of device.  Due for f/u in EP.  Email sent to Dr. Coleman's staff to facilitate follow up.     3. VF (ventricular fibrillation)      4. VT (ventricular tachycardia)      5. Coronary artery disease (CAD)  Asymptomatic.  ECG today Atrial sensed V paced rhythm.  Stable. Monitor.     - IN OFFICE EKG 12-LEAD (to  [facility]); Future    6. S/P CABG x 3      7. Peripheral arterial disease  Asymptomatic. Encouraged walking program.     8. Hyperlipidemia, unspecified hyperlipidemia type  LDL not at goal <70. Previously at goal on current regimen for the last 2 years.  Stressed adherence and mediterranean diet.        9. Primary hypertension  BP elevated today in clinic.        - irbesartan (AVAPRO) 300 MG tablet; TAKE 1 TABLET (300 MG TOTAL) BY MOUTH EVERY EVENING. CANCEL IRBESARTAN/HCTZ COMBO  Dispense: 90 tablet; Refill: 3  - labetaloL (NORMODYNE) 200 MG tablet; Take 1 tablet (200 mg total) by mouth 2 (two) times daily.  Dispense: 180 tablet; Refill: 3       A copy of this note will be forwarded to Dr. Avery and Dr. Fagan     Follow up in about 6 months (around 12/7/2022).

## 2022-06-07 NOTE — PATIENT INSTRUCTIONS
Increase cardiovascular exercise to 30 minutes of brisk walking a day for 4-5 days a week.  You can use a stationary bike or swim for 30 minutes a day instead of walking.  Whatever exercise you choose, make sure you are working hard enough to increase your heart rate.     Increase your water to 6-8 glasses of water a day (48-64 ounces)    Please check your blood pressure daily and call or send a message with your readings in 2 weeks, [phone].  It is important to sit for 5-15 minutes before doing measurements and keep feet flat on the floor with legs uncrossed and no talking to get the best readings

## 2022-06-21 ENCOUNTER — OFFICE VISIT (OUTPATIENT)
Dept: VASCULAR SURGERY | Facility: CLINIC | Age: 87
End: 2022-06-21
Payer: MEDICARE

## 2022-06-21 ENCOUNTER — HOSPITAL ENCOUNTER (OUTPATIENT)
Dept: VASCULAR SURGERY | Facility: CLINIC | Age: 87
Discharge: HOME | End: 2022-06-21
Attending: SURGERY
Payer: MEDICARE

## 2022-06-21 VITALS
DIASTOLIC BLOOD PRESSURE: 77 MMHG | WEIGHT: 145.5 LBS | BODY MASS INDEX: 24.84 KG/M2 | HEIGHT: 64 IN | HEART RATE: 69 BPM | SYSTOLIC BLOOD PRESSURE: 176 MMHG | TEMPERATURE: 98 F

## 2022-06-21 DIAGNOSIS — I65.23 BILATERAL CAROTID ARTERY STENOSIS: ICD-10-CM

## 2022-06-21 DIAGNOSIS — I65.22 STENOSIS OF LEFT CAROTID ARTERY: Primary | ICD-10-CM

## 2022-06-21 PROCEDURE — 1159F PR MEDICATION LIST DOCUMENTED IN MEDICAL RECORD: ICD-10-PCS | Mod: CPTII,S$GLB,, | Performed by: SURGERY

## 2022-06-21 PROCEDURE — 99999 PR PBB SHADOW E&M-EST. PATIENT-LVL III: ICD-10-PCS | Mod: PBBFAC,,, | Performed by: SURGERY

## 2022-06-21 PROCEDURE — 1160F RVW MEDS BY RX/DR IN RCRD: CPT | Mod: CPTII,S$GLB,, | Performed by: SURGERY

## 2022-06-21 PROCEDURE — 93880 EXTRACRANIAL BILAT STUDY: CPT | Mod: S$GLB,,, | Performed by: SURGERY

## 2022-06-21 PROCEDURE — 99213 OFFICE O/P EST LOW 20 MIN: CPT | Mod: S$GLB,,, | Performed by: SURGERY

## 2022-06-21 PROCEDURE — 1126F PR PAIN SEVERITY QUANTIFIED, NO PAIN PRESENT: ICD-10-PCS | Mod: CPTII,S$GLB,, | Performed by: SURGERY

## 2022-06-21 PROCEDURE — 1101F PT FALLS ASSESS-DOCD LE1/YR: CPT | Mod: CPTII,S$GLB,, | Performed by: SURGERY

## 2022-06-21 PROCEDURE — 1159F MED LIST DOCD IN RCRD: CPT | Mod: CPTII,S$GLB,, | Performed by: SURGERY

## 2022-06-21 PROCEDURE — 1101F PR PT FALLS ASSESS DOC 0-1 FALLS W/OUT INJ PAST YR: ICD-10-PCS | Mod: CPTII,S$GLB,, | Performed by: SURGERY

## 2022-06-21 PROCEDURE — 93880 PR DUPLEX SCAN EXTRACRANIAL,BILAT: ICD-10-PCS | Mod: S$GLB,,, | Performed by: SURGERY

## 2022-06-21 PROCEDURE — 1160F PR REVIEW ALL MEDS BY PRESCRIBER/CLIN PHARMACIST DOCUMENTED: ICD-10-PCS | Mod: CPTII,S$GLB,, | Performed by: SURGERY

## 2022-06-21 PROCEDURE — 3288F PR FALLS RISK ASSESSMENT DOCUMENTED: ICD-10-PCS | Mod: CPTII,S$GLB,, | Performed by: SURGERY

## 2022-06-21 PROCEDURE — 3288F FALL RISK ASSESSMENT DOCD: CPT | Mod: CPTII,S$GLB,, | Performed by: SURGERY

## 2022-06-21 PROCEDURE — 99999 PR PBB SHADOW E&M-EST. PATIENT-LVL III: CPT | Mod: PBBFAC,,, | Performed by: SURGERY

## 2022-06-21 PROCEDURE — 1126F AMNT PAIN NOTED NONE PRSNT: CPT | Mod: CPTII,S$GLB,, | Performed by: SURGERY

## 2022-06-21 PROCEDURE — 99213 PR OFFICE/OUTPT VISIT, EST, LEVL III, 20-29 MIN: ICD-10-PCS | Mod: S$GLB,,, | Performed by: SURGERY

## 2022-06-21 NOTE — PROGRESS NOTES
HISTORY OF PRESENT ILLNESS:  An 86-year-old female status post:  1.  Left carotid stent placement in 2007, (Dr. Bowen) after an episode of   amaurosis fugax.  2.  CREST trial participant.    6/21/2022:  This is a 2-year followup.  She denies interval stroke, TIA or amaurosis.     MEDICATIONS:  Include aspirin and statin.    SOCIAL:   Ex-smoking x 40 yrs    PHYSICAL EXAMINATION:  VITAL SIGNS:  See nursing note.  NEUROLOGIC:  Cranial nerves VII through XII are intact and 5/5 motor strength in   all extremities.    IMAGING:  Carotid duplex reveals no right carotid stenosis and a stable left 60%   to 79% carotid stenosis with peak systolic velocity of 227 (prior 251 (prior 274 (prior 217 and end diastolic of   46 (prior44 (prior 46 (prior 48. 4 years ago,   this was 222 and 43.    ASSESSMENT:  A 15-year followup, left carotid stent placement in CREST trial with   stable moderate stenosis x 7 yrs, asymptomatic.    RECOMMENDATIONS:  1.  Continue current medical therapy.  2.  Follow up in 2 year with a screening carotid duplex, sooner if clinically   Indicated.    GUS Sams III, MD, FACS  Professor and Chief, Vascular and Endovascular Surgery

## 2022-06-22 ENCOUNTER — TELEPHONE (OUTPATIENT)
Dept: VASCULAR SURGERY | Facility: CLINIC | Age: 87
End: 2022-06-22
Payer: MEDICARE

## 2022-06-22 ENCOUNTER — TELEPHONE (OUTPATIENT)
Dept: INTERNAL MEDICINE | Facility: CLINIC | Age: 87
End: 2022-06-22
Payer: MEDICARE

## 2022-06-22 NOTE — TELEPHONE ENCOUNTER
----- Message from Vinny Dupree sent at 6/22/2022  1:35 PM CDT -----  Contact: patient  Patient requesting callback in regards to speaking with the nurse about Covid shot. She stated that she would like to know if she needs the 2nd booster.      Patient@[phone] (home) [phone] (work)

## 2022-06-22 NOTE — TELEPHONE ENCOUNTER
Contacted patient in response to message. Pt states she would like a copy of progress note from visit with Dr. Sams on Tuesday 6/21/22. Explained that progress note has been shared to patient portal and that this can be accessed and printed from My Chart. Pt states her niece has access to pt's portal and she will ask niece to print this for her.  ----- Message from Vinny Dupree sent at 6/22/2022  1:31 PM CDT -----  Contact: patient  Patient requesting call back in regards to speaking with the nice about her after care paperwork.       Patient @[phone] (work)

## 2022-07-05 ENCOUNTER — OFFICE VISIT (OUTPATIENT)
Dept: INTERNAL MEDICINE | Facility: CLINIC | Age: 87
End: 2022-07-05
Payer: MEDICARE

## 2022-07-05 VITALS
HEIGHT: 63 IN | SYSTOLIC BLOOD PRESSURE: 132 MMHG | BODY MASS INDEX: 25.8 KG/M2 | WEIGHT: 145.63 LBS | DIASTOLIC BLOOD PRESSURE: 80 MMHG | OXYGEN SATURATION: 96 % | HEART RATE: 94 BPM

## 2022-07-05 DIAGNOSIS — N18.30 STAGE 3 CHRONIC KIDNEY DISEASE, UNSPECIFIED WHETHER STAGE 3A OR 3B CKD: ICD-10-CM

## 2022-07-05 DIAGNOSIS — H35.61: ICD-10-CM

## 2022-07-05 DIAGNOSIS — Z00.00 VISIT FOR ANNUAL HEALTH EXAMINATION: Primary | ICD-10-CM

## 2022-07-05 DIAGNOSIS — I44.2 CHB (COMPLETE HEART BLOCK): ICD-10-CM

## 2022-07-05 DIAGNOSIS — I25.110 ATHEROSCLEROSIS OF NATIVE CORONARY ARTERY OF NATIVE HEART WITH UNSTABLE ANGINA PECTORIS: ICD-10-CM

## 2022-07-05 DIAGNOSIS — I10 PRIMARY HYPERTENSION: ICD-10-CM

## 2022-07-05 DIAGNOSIS — E78.5 HYPERLIPIDEMIA, UNSPECIFIED HYPERLIPIDEMIA TYPE: ICD-10-CM

## 2022-07-05 DIAGNOSIS — H61.23 BILATERAL IMPACTED CERUMEN: ICD-10-CM

## 2022-07-05 DIAGNOSIS — Z95.810 ICD (IMPLANTABLE CARDIOVERTER-DEFIBRILLATOR), DUAL, IN SITU: ICD-10-CM

## 2022-07-05 DIAGNOSIS — M81.0 AGE-RELATED OSTEOPOROSIS WITHOUT CURRENT PATHOLOGICAL FRACTURE: ICD-10-CM

## 2022-07-05 PROCEDURE — 1159F MED LIST DOCD IN RCRD: CPT | Mod: CPTII,S$GLB,, | Performed by: INTERNAL MEDICINE

## 2022-07-05 PROCEDURE — 1126F PR PAIN SEVERITY QUANTIFIED, NO PAIN PRESENT: ICD-10-PCS | Mod: CPTII,S$GLB,, | Performed by: INTERNAL MEDICINE

## 2022-07-05 PROCEDURE — 99397 PER PM REEVAL EST PAT 65+ YR: CPT | Mod: S$GLB,,, | Performed by: INTERNAL MEDICINE

## 2022-07-05 PROCEDURE — 1101F PR PT FALLS ASSESS DOC 0-1 FALLS W/OUT INJ PAST YR: ICD-10-PCS | Mod: CPTII,S$GLB,, | Performed by: INTERNAL MEDICINE

## 2022-07-05 PROCEDURE — 1126F AMNT PAIN NOTED NONE PRSNT: CPT | Mod: CPTII,S$GLB,, | Performed by: INTERNAL MEDICINE

## 2022-07-05 PROCEDURE — 99999 PR PBB SHADOW E&M-EST. PATIENT-LVL V: CPT | Mod: PBBFAC,,, | Performed by: INTERNAL MEDICINE

## 2022-07-05 PROCEDURE — 1160F PR REVIEW ALL MEDS BY PRESCRIBER/CLIN PHARMACIST DOCUMENTED: ICD-10-PCS | Mod: CPTII,S$GLB,, | Performed by: INTERNAL MEDICINE

## 2022-07-05 PROCEDURE — 1101F PT FALLS ASSESS-DOCD LE1/YR: CPT | Mod: CPTII,S$GLB,, | Performed by: INTERNAL MEDICINE

## 2022-07-05 PROCEDURE — 3288F PR FALLS RISK ASSESSMENT DOCUMENTED: ICD-10-PCS | Mod: CPTII,S$GLB,, | Performed by: INTERNAL MEDICINE

## 2022-07-05 PROCEDURE — 1160F RVW MEDS BY RX/DR IN RCRD: CPT | Mod: CPTII,S$GLB,, | Performed by: INTERNAL MEDICINE

## 2022-07-05 PROCEDURE — 99999 PR PBB SHADOW E&M-EST. PATIENT-LVL V: ICD-10-PCS | Mod: PBBFAC,,, | Performed by: INTERNAL MEDICINE

## 2022-07-05 PROCEDURE — 1159F PR MEDICATION LIST DOCUMENTED IN MEDICAL RECORD: ICD-10-PCS | Mod: CPTII,S$GLB,, | Performed by: INTERNAL MEDICINE

## 2022-07-05 PROCEDURE — 3288F FALL RISK ASSESSMENT DOCD: CPT | Mod: CPTII,S$GLB,, | Performed by: INTERNAL MEDICINE

## 2022-07-05 PROCEDURE — 99397 PR PREVENTIVE VISIT,EST,65 & OVER: ICD-10-PCS | Mod: S$GLB,,, | Performed by: INTERNAL MEDICINE

## 2022-07-05 NOTE — PROGRESS NOTES
"  Subjective:      Patient ID: Lynn Mckinney is a 86 y.o. female.    Chief Complaint:  Osteoporosis    History of Present Illness  Lynn Mckinney is here for follow up of osteoporosis.  Previously seen by Dr. Littlejohn.  Last seen 10/2019.  This is their first visit with me.      With regards to osteoporosis:     BMD:   7/6/2022  The L1 to L4 vertebral bone mineral density is equal to 1.212 g/cm squared with a T score of 0.1.  There has been no significant change relative to the prior study.  The left femoral neck bone mineral density is equal to 0.65 g/cm squared with a T score of -2.8.  There has been  no significant change relative to the prior study.  The total hip bone mineral density is equal to -2.9 g/cm squared with a T score of .  There has been decreased relative to the prior study.  Prior T-score was -2.0.  Impression:  Osteoporosis is present.  Bone density is decreased involving the total left hip since previous study.    Medications:   Fosamax ~ 2013 - "felt funny"  7/2019 one dose of Prolia but had cutaneous side effects    Calcium intake: Multivitamin  Vit D intake: Multivitamin     Weight bearing exercise: walking but "not much"  Falls: Denies  Fractures: Denies any in the last 12 months  Significant height loss (>2 inches): Unsure    Family history: Unsure    Menopause: Unsure    Tobacco Use: in the past - quit in the 80s  Alcohol Use: Denies  Glucocorticoid History: Denies  Anticoagulant Use: Denies  GERD/PPI Use: Denies  History of Malabsorption: Denies  Antiseizure Medications: Denies  History of Thyroid Disease: hypothyroidism  Kidney Disease: CKD  Personal history of kidney stones: Denies  Family history of kidney stones: Denies  Family history of bone disease or fracture: Denies    Denies point tenderness along spine  Denies bilateral hip tenderness  Gait -steady with a cane    Lab Results   Component Value Date    CALCIUM 9.0 05/19/2022    PHOS 4.0 03/13/2021     Vit D, 25-Hydroxy   Date Value " "Ref Range Status   08/19/2021 46 30 - 96 ng/mL Final     Comment:     Vitamin D deficiency.........<10 ng/mL                              Vitamin D insufficiency......10-29 ng/mL       Vitamin D sufficiency........> or equal to 30 ng/mL  Vitamin D toxicity............>100 ng/mL       With regards to hypothyroidism:    Follows with PCP.    Total Thyroidectomy 2011    Levothyroxine 112mcg daily    Review of Systems   as above    Objective:   Physical Exam  Vitals reviewed.   Cardiovascular:      Rate and Rhythm: Normal rate.      Comments: No edema present  Pulmonary:      Effort: Pulmonary effort is normal.   Abdominal:      Palpations: Abdomen is soft.         Visit Vitals  /80   Pulse 63   Ht 5' 3" (1.6 m)   Wt 65.1 kg (143 lb 6.6 oz)   SpO2 96%   BMI 25.40 kg/m²       Body mass index is 25.4 kg/m².    Lab Review:   Lab Results   Component Value Date    HGBA1C 5.2 06/09/2016    HGBA1C 5.6 04/30/2015    HGBA1C 5.4 11/10/2014       Lab Results   Component Value Date    CHOL 166 05/19/2022    HDL 57 05/19/2022    LDLCALC 90.0 05/19/2022    TRIG 95 05/19/2022    CHOLHDL 34.3 05/19/2022     Lab Results   Component Value Date     05/19/2022    K 4.1 05/19/2022     05/19/2022    CO2 28 05/19/2022     05/19/2022    BUN 27 (H) 05/19/2022    CREATININE 1.03 05/19/2022    CALCIUM 9.0 05/19/2022    PROT 7.2 05/19/2022    ALBUMIN 4.3 05/19/2022    BILITOT 0.8 05/19/2022    ALKPHOS 64 05/19/2022    AST 27 05/19/2022    ALT 14 05/19/2022    ANIONGAP 10 05/19/2022    ESTGFRAFRICA 56.9 (A) 05/19/2022    EGFRNONAA 49.3 (A) 05/19/2022    TSH 6.540 (H) 08/19/2021     Vit D, 25-Hydroxy   Date Value Ref Range Status   08/19/2021 46 30 - 96 ng/mL Final     Comment:     Vitamin D deficiency.........<10 ng/mL                              Vitamin D insufficiency......10-29 ng/mL       Vitamin D sufficiency........> or equal to 30 ng/mL  Vitamin D toxicity............>100 ng/mL       Assessment and Plan     1. " "Age-related osteoporosis without current pathological fracture  Ambulatory referral/consult to Endocrinology   2. Hypothyroidism, unspecified type     3. Vitamin D deficiency         Age-related osteoporosis without current pathological fracture  -- Risks include low weight,  race, menopause, history of smoking.  -- Reviewed basics of quantity versus quality.  -- Reassuring they are not fracturing.  -- Recommend:  -Pharmacological therapy is recommended for patients with osteopenia if the 10 year probability of a hip fracture is >3% and 10 year probability of other major osteoporotic fractures is >20%.  Treatment options and potential side effects discussed for PO bisphosphonates, reclast, Denosumab, and Teriparatide.   -Treatment:   Fosamax ~ 2013 - "felt funny"  7/2019 one dose of Prolia but had cutaneous side effects    Dicussed various treatment options.   Will avoid Prolia given previous side effect.  Discussed Reclast, however, concern she will have a side effect as Fosamax. Patient reports she thinks she was taking Fosamax daily vs weekly.   Will discuss with staff - may be reasonable to do a trial of Fosamax since previous reported side effect was mild.    -Calcium and Vitamin D RDD provided.  -Exercise: recommended.  -Fall precautions made in the home.  -Alerted that if dental work needs to be done it should be done prior to initiating therapy. Dental health: UTD.  -- Repeat DEXA scan 7/2024.    Hypothyroidism  -- Continue following with PCP.    Vitamin D deficiency  -- Continue vitamin D supplement.      Follow up in about 1 year (around 7/8/2023).      "

## 2022-07-05 NOTE — PROGRESS NOTES
INTERNAL MEDICINE ESTABLISHED PATIENT VISIT NOTE    Subjective:     Chief Complaint: Establish Care       Patient ID: Lynn Mckinney is a 86 y.o. female with lumbar radiculitis, CAD s/p CABGx 3 (2001)/SHEREEN to RCA (3/2021), PAD, CHB s/p PPM (2013), VF s/p dual ICD (3/2021), HTN, HLD, carotid artery stenosis, vit D def, hypothyroidism, osteoporosis, macular degeneration, last seen by me 1/2022, here today for follow-up. Accompanied by adalgisa Mullins today.     Taking Vit D/Ca supplement. No treatment currently for osteoporosis. Candidate for Reclast once dental work complete per prior Endocrine evaluation. Today reports dental work complete.     Uses cane at times when walking.    6/2022 Vascular Surgery - Follow up for carotid artery stenosis. Continue current medical therapy. Follow up in 2 year with a screening carotid duplex, sooner if clinically   Indicated.    6/2022 Cardiology - No changes. Monitor BP.     Family provides meals. Drives to get groceries. Brother is now helping with finances.      Past Medical History:  Past Medical History:   Diagnosis Date    Allergy     seasonal    Blood clotting tendency     Carotid artery disease 5/29/2014    Coronary artery disease     Heart block     Hyperlipidemia     Hypertension     Hypothyroid     Obesity     Pacemaker     PAD (peripheral artery disease)     Spinal stenosis     Thoracic or lumbosacral neuritis or radiculitis 11/25/2014    Tubular adenoma           Review of Systems:  Review of Systems   Constitutional: Negative for chills and fever.   HENT: Negative for congestion.    Respiratory: Negative for cough and shortness of breath.    Cardiovascular: Negative for chest pain.   Gastrointestinal: Negative for constipation, nausea and vomiting.   Genitourinary: Negative for hematuria and urgency.   Musculoskeletal: Negative for falls.   Skin: Negative for rash.   Neurological: Negative for dizziness and loss of consciousness.       Health  "Maintenance:   Immunizations:   Influenza - fall 2022  Tdap - next visit  Covid 19 - complete  HPV  Prevnar rec at 65 - 3/2016, Pneumovax 11/2014  Shingrix rec at 50 - 2nd dose     Cancer Screening:  PAP: s/p hys  Mammogram:  8/2021 BIRAD 1  Colonoscopy: defer to age  DEXA:  5/2020 osteoporosis, repeat scan    Objective:   /80 (BP Location: Right arm, Patient Position: Sitting)   Pulse 94   Ht 5' 3" (1.6 m)   Wt 66 kg (145 lb 9.8 oz)   SpO2 96%   BMI 25.79 kg/m²      General: AAO x3, no apparent distress  HEENT: PERRL, bilateral impacted cerumen  CV: RRR, device in place  Pulm: Lungs CTAB, no crackles, no wheezes  Abd: s/NT/ND +BS  Extremities: no c/c/e    Labs:       Assessment/Plan     Visit for annual health examination  Labs reviewed    Coronary artery disease (CAD)  Follows with Cardiology; s/p SHEREEN to RCA (3/2021)  ASA/statin/BB; plavix discontinued 1/2022 due to persistent macular hemorrhage    Stage 3 chronic kidney disease, unspecified whether stage 3a or 3b CKD  Stable, Crt 1-1.3    CHB (complete heart block)    ICD (implantable cardioverter-defibrillator), dual, in situ  No discharge, follows with EP    Hyperlipidemia, unspecified hyperlipidemia type  LDL above goal; continue diet, modify diet    Age-related osteoporosis without current pathological fracture  Discussed indications for treatment; amenable to repeating DEXA and Endocrinology referral  Continue supplements  -     Ambulatory referral/consult to Endocrinology; Future; Expected date: 07/12/2022  -     DXA Bone Density Spine And Hip; Future; Expected date: 07/05/2022    Macular subretinal hemorrhage, right  Follows with Ophthalmology    Primary hypertension  Controlled, continue current regimen    Bilateral impacted cerumen  Ear irrigation today      HM as above    RTC in 6 mo, sooner if needed.    Shira Fagan MD  Department of Internal Medicine - [facility]  07/08/2022    "

## 2022-07-06 ENCOUNTER — HOSPITAL ENCOUNTER (OUTPATIENT)
Dept: RADIOLOGY | Facility: HOSPITAL | Age: 87
Discharge: HOME | End: 2022-07-06
Attending: INTERNAL MEDICINE
Payer: MEDICARE

## 2022-07-06 DIAGNOSIS — M81.0 AGE-RELATED OSTEOPOROSIS WITHOUT CURRENT PATHOLOGICAL FRACTURE: ICD-10-CM

## 2022-07-06 PROCEDURE — 77080 DXA BONE DENSITY AXIAL: CPT | Mod: TC,PO

## 2022-07-08 ENCOUNTER — OFFICE VISIT (OUTPATIENT)
Dept: ENDOCRINOLOGY | Facility: CLINIC | Age: 87
End: 2022-07-08
Payer: MEDICARE

## 2022-07-08 VITALS
OXYGEN SATURATION: 96 % | DIASTOLIC BLOOD PRESSURE: 80 MMHG | HEIGHT: 63 IN | WEIGHT: 143.44 LBS | HEART RATE: 63 BPM | SYSTOLIC BLOOD PRESSURE: 130 MMHG | BODY MASS INDEX: 25.41 KG/M2

## 2022-07-08 DIAGNOSIS — E03.9 HYPOTHYROIDISM, UNSPECIFIED TYPE: ICD-10-CM

## 2022-07-08 DIAGNOSIS — E55.9 VITAMIN D DEFICIENCY: ICD-10-CM

## 2022-07-08 DIAGNOSIS — M81.0 AGE-RELATED OSTEOPOROSIS WITHOUT CURRENT PATHOLOGICAL FRACTURE: Primary | ICD-10-CM

## 2022-07-08 PROCEDURE — 99999 PR PBB SHADOW E&M-EST. PATIENT-LVL IV: CPT | Mod: PBBFAC,,, | Performed by: NURSE PRACTITIONER

## 2022-07-08 PROCEDURE — 1126F PR PAIN SEVERITY QUANTIFIED, NO PAIN PRESENT: ICD-10-PCS | Mod: CPTII,S$GLB,, | Performed by: NURSE PRACTITIONER

## 2022-07-08 PROCEDURE — 1160F PR REVIEW ALL MEDS BY PRESCRIBER/CLIN PHARMACIST DOCUMENTED: ICD-10-PCS | Mod: CPTII,S$GLB,, | Performed by: NURSE PRACTITIONER

## 2022-07-08 PROCEDURE — 1101F PT FALLS ASSESS-DOCD LE1/YR: CPT | Mod: CPTII,S$GLB,, | Performed by: NURSE PRACTITIONER

## 2022-07-08 PROCEDURE — 99999 PR PBB SHADOW E&M-EST. PATIENT-LVL IV: ICD-10-PCS | Mod: PBBFAC,,, | Performed by: NURSE PRACTITIONER

## 2022-07-08 PROCEDURE — 1101F PR PT FALLS ASSESS DOC 0-1 FALLS W/OUT INJ PAST YR: ICD-10-PCS | Mod: CPTII,S$GLB,, | Performed by: NURSE PRACTITIONER

## 2022-07-08 PROCEDURE — 1159F MED LIST DOCD IN RCRD: CPT | Mod: CPTII,S$GLB,, | Performed by: NURSE PRACTITIONER

## 2022-07-08 PROCEDURE — 99214 PR OFFICE/OUTPT VISIT, EST, LEVL IV, 30-39 MIN: ICD-10-PCS | Mod: S$GLB,,, | Performed by: NURSE PRACTITIONER

## 2022-07-08 PROCEDURE — 3288F FALL RISK ASSESSMENT DOCD: CPT | Mod: CPTII,S$GLB,, | Performed by: NURSE PRACTITIONER

## 2022-07-08 PROCEDURE — 1126F AMNT PAIN NOTED NONE PRSNT: CPT | Mod: CPTII,S$GLB,, | Performed by: NURSE PRACTITIONER

## 2022-07-08 PROCEDURE — 1159F PR MEDICATION LIST DOCUMENTED IN MEDICAL RECORD: ICD-10-PCS | Mod: CPTII,S$GLB,, | Performed by: NURSE PRACTITIONER

## 2022-07-08 PROCEDURE — 99214 OFFICE O/P EST MOD 30 MIN: CPT | Mod: S$GLB,,, | Performed by: NURSE PRACTITIONER

## 2022-07-08 PROCEDURE — 3288F PR FALLS RISK ASSESSMENT DOCUMENTED: ICD-10-PCS | Mod: CPTII,S$GLB,, | Performed by: NURSE PRACTITIONER

## 2022-07-08 PROCEDURE — 1160F RVW MEDS BY RX/DR IN RCRD: CPT | Mod: CPTII,S$GLB,, | Performed by: NURSE PRACTITIONER

## 2022-07-08 NOTE — Clinical Note
Can you review her case? Dicussed various treatment options.  Will avoid Prolia given previous side effect. Discussed Reclast, however, concern she will have a side effect as Fosamax. Patient reports she thinks she was taking Fosamax daily vs weekly.  Will discuss with staff - may be reasonable to do a trial of Fosamax since previous reported side effect was mild.

## 2022-07-08 NOTE — ASSESSMENT & PLAN NOTE
"-- Risks include low weight,  race, menopause, history of smoking.  -- Reviewed basics of quantity versus quality.  -- Reassuring they are not fracturing.  -- Recommend:  -Pharmacological therapy is recommended for patients with osteopenia if the 10 year probability of a hip fracture is >3% and 10 year probability of other major osteoporotic fractures is >20%.  Treatment options and potential side effects discussed for PO bisphosphonates, reclast, Denosumab, and Teriparatide.   -Treatment:   Fosamax ~ 2013 - "felt funny"  7/2019 one dose of Prolia but had cutaneous side effects    Dicussed various treatment options.   Will avoid Prolia given previous side effect.  Discussed Reclast, however, concern she will have a side effect as Fosamax. Patient reports she thinks she was taking Fosamax daily vs weekly.   Will discuss with staff - may be reasonable to do a trial of Fosamax since previous reported side effect was mild.    -Calcium and Vitamin D RDD provided.  -Exercise: recommended.  -Fall precautions made in the home.  -Alerted that if dental work needs to be done it should be done prior to initiating therapy. Dental health: UTD.  -- Repeat DEXA scan 7/2024.  "

## 2022-07-10 ENCOUNTER — PATIENT MESSAGE (OUTPATIENT)
Dept: ENDOCRINOLOGY | Facility: CLINIC | Age: 87
End: 2022-07-10
Payer: MEDICARE

## 2022-07-10 DIAGNOSIS — M81.0 AGE-RELATED OSTEOPOROSIS WITHOUT CURRENT PATHOLOGICAL FRACTURE: Primary | ICD-10-CM

## 2022-07-10 RX ORDER — ALENDRONATE SODIUM 70 MG/1
70 TABLET ORAL
Qty: 12 TABLET | Refills: 3 | Status: SHIPPED | OUTPATIENT
Start: 2022-07-10 | End: 2023-01-11

## 2022-07-10 NOTE — TELEPHONE ENCOUNTER
See LOV note.  Case discussed with Dr. Belle. In agreement to do a trial on Fosamax.   Patient was in agreement during office visit - discussed MOA, side effects and administration.

## 2022-08-02 DIAGNOSIS — Z95.810 ICD (IMPLANTABLE CARDIOVERTER-DEFIBRILLATOR), DUAL, IN SITU: Primary | ICD-10-CM

## 2022-08-02 DIAGNOSIS — I44.2 CHB (COMPLETE HEART BLOCK): ICD-10-CM

## 2022-08-04 ENCOUNTER — OFFICE VISIT (OUTPATIENT)
Dept: OPHTHALMOLOGY | Facility: CLINIC | Age: 87
End: 2022-08-04
Payer: MEDICARE

## 2022-08-04 DIAGNOSIS — H31.011 MACULA SCAR OF POSTERIOR POLE OF RIGHT EYE: ICD-10-CM

## 2022-08-04 DIAGNOSIS — H35.3122 NONEXUDATIVE AGE-RELATED MACULAR DEGENERATION, LEFT EYE, INTERMEDIATE DRY STAGE: ICD-10-CM

## 2022-08-04 DIAGNOSIS — H35.3213 EXUDATIVE AGE-RELATED MACULAR DEGENERATION, RIGHT EYE, WITH INACTIVE SCAR: Primary | ICD-10-CM

## 2022-08-04 PROCEDURE — 1126F PR PAIN SEVERITY QUANTIFIED, NO PAIN PRESENT: ICD-10-PCS | Mod: CPTII,S$GLB,, | Performed by: OPHTHALMOLOGY

## 2022-08-04 PROCEDURE — 99999 PR PBB SHADOW E&M-EST. PATIENT-LVL II: ICD-10-PCS | Mod: PBBFAC,,, | Performed by: OPHTHALMOLOGY

## 2022-08-04 PROCEDURE — 1160F RVW MEDS BY RX/DR IN RCRD: CPT | Mod: CPTII,S$GLB,, | Performed by: OPHTHALMOLOGY

## 2022-08-04 PROCEDURE — 92202 PR OPHTHALMOSCOPY, EXT, W/DRAW OPTIC NERVE/MACULA, I&R, UNI/BI: ICD-10-PCS | Mod: S$GLB,,, | Performed by: OPHTHALMOLOGY

## 2022-08-04 PROCEDURE — 3288F PR FALLS RISK ASSESSMENT DOCUMENTED: ICD-10-PCS | Mod: CPTII,S$GLB,, | Performed by: OPHTHALMOLOGY

## 2022-08-04 PROCEDURE — 92202 OPSCPY EXTND ON/MAC DRAW: CPT | Mod: S$GLB,,, | Performed by: OPHTHALMOLOGY

## 2022-08-04 PROCEDURE — 1126F AMNT PAIN NOTED NONE PRSNT: CPT | Mod: CPTII,S$GLB,, | Performed by: OPHTHALMOLOGY

## 2022-08-04 PROCEDURE — 99999 PR PBB SHADOW E&M-EST. PATIENT-LVL II: CPT | Mod: PBBFAC,,, | Performed by: OPHTHALMOLOGY

## 2022-08-04 PROCEDURE — 1159F PR MEDICATION LIST DOCUMENTED IN MEDICAL RECORD: ICD-10-PCS | Mod: CPTII,S$GLB,, | Performed by: OPHTHALMOLOGY

## 2022-08-04 PROCEDURE — 1160F PR REVIEW ALL MEDS BY PRESCRIBER/CLIN PHARMACIST DOCUMENTED: ICD-10-PCS | Mod: CPTII,S$GLB,, | Performed by: OPHTHALMOLOGY

## 2022-08-04 PROCEDURE — 1101F PT FALLS ASSESS-DOCD LE1/YR: CPT | Mod: CPTII,S$GLB,, | Performed by: OPHTHALMOLOGY

## 2022-08-04 PROCEDURE — 1159F MED LIST DOCD IN RCRD: CPT | Mod: CPTII,S$GLB,, | Performed by: OPHTHALMOLOGY

## 2022-08-04 PROCEDURE — 92134 POSTERIOR SEGMENT OCT RETINA (OCULAR COHERENCE TOMOGRAPHY)-BOTH EYES: ICD-10-PCS | Mod: S$GLB,,, | Performed by: OPHTHALMOLOGY

## 2022-08-04 PROCEDURE — 1101F PR PT FALLS ASSESS DOC 0-1 FALLS W/OUT INJ PAST YR: ICD-10-PCS | Mod: CPTII,S$GLB,, | Performed by: OPHTHALMOLOGY

## 2022-08-04 PROCEDURE — 92014 COMPRE OPH EXAM EST PT 1/>: CPT | Mod: S$GLB,,, | Performed by: OPHTHALMOLOGY

## 2022-08-04 PROCEDURE — 3288F FALL RISK ASSESSMENT DOCD: CPT | Mod: CPTII,S$GLB,, | Performed by: OPHTHALMOLOGY

## 2022-08-04 PROCEDURE — 92014 PR EYE EXAM, EST PATIENT,COMPREHESV: ICD-10-PCS | Mod: S$GLB,,, | Performed by: OPHTHALMOLOGY

## 2022-08-04 PROCEDURE — 92134 CPTRZ OPH DX IMG PST SGM RTA: CPT | Mod: S$GLB,,, | Performed by: OPHTHALMOLOGY

## 2022-08-05 NOTE — PROGRESS NOTES
Subjective:       Patient ID: Lynn Mckinney is a 86 y.o. female      Chief Complaint   Patient presents with    Macular Degeneration     History of Present Illness  HPI     2 mo OCT/DFE     DLS 05/16/2022 by Dr. SUDHA Carver MD    Pt states : vision is still blurry OD.  Doing well OS     -flashes/floaters  -headaches  -diplopia  -curtains/shadows/veils    Eye Meds: Areds Vitamins    POHx:   1. 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 Macular Subretinal hem. OD    Last edited by Flako Carver MD on 8/5/2022  1:51 PM. (History)        Imaging:    See report    Assessment/Plan:     1. Exudative age-related macular degeneration, right eye, with active choroidal neovascularization    2. Macular scar right    S/p PPV for massive SRH and Avastin injections  No active heme today  Has large SR scar which will limit Va  Discussed in detail  Rec obs and injection only if sig worsening  Now is off of blood thinners  - OCT- Retina; Future  - Posterior Segment OCT Retina-Both eyes    3. Nonexudative age-related macular degeneration, left eye, intermediate dry stage  Discussed Dry and Wet AMD in detail  Recommend AREDS 2 Vitamins--continue  Home Amsler Grid Testing- given and instructed today  RTC immediately PRN any changes in vision    - Posterior Segment OCT Retina-Both eyes    Follow up in about 3 months (around 11/4/2022), or if symptoms worsen or fail to improve, for Dilated examination, OCT Mac.

## 2022-08-09 ENCOUNTER — TELEPHONE (OUTPATIENT)
Dept: INTERNAL MEDICINE | Facility: CLINIC | Age: 87
End: 2022-08-09
Payer: MEDICARE

## 2022-08-09 ENCOUNTER — NURSE TRIAGE (OUTPATIENT)
Dept: ADMINISTRATIVE | Facility: CLINIC | Age: 87
End: 2022-08-09
Payer: MEDICARE

## 2022-08-09 NOTE — TELEPHONE ENCOUNTER
Pt stated that she had a tetanus shot a long time ago and had a reaction. Pt had redness to arm and was unable to received second dose. Pt requesting f/u on receiving covid booster and tetanus vaccine. Pt also inquiring if she should still be getting a mammogram. Encounter routed to provider.     Reason for Disposition   [1] Caller requesting NON-URGENT health information AND [2] PCP's office is the best resource    Protocols used: INFORMATION ONLY CALL - NO TRIAGE-A-

## 2022-08-09 NOTE — TELEPHONE ENCOUNTER
----- Message from Kia James sent at 8/9/2022 11:20 AM CDT -----  Regarding: advice  Contact: patient [phone]  Patient is inquiring if it is necessary to still have a mammogram at the age of 86.  Please call and advise.     The last tetanus shot  she had a reaction.l  Her arm turned red and she could not take the 2nd shot.  Should she still take the shot. Please advise after 3:30pm

## 2022-08-10 NOTE — TELEPHONE ENCOUNTER
Her last mammogram was normal and her risk of breast cancer is very low. Ok to discontinue screening in my opinion. Can forward to PCP to review when she returns as well.    If she had issue with tdap then I recommend she hold off on getting a repeat for now.

## 2022-08-10 NOTE — TELEPHONE ENCOUNTER
Call Pt no answer left a voice mail stated that she can come and get her covid shot at  [facility]

## 2022-08-11 ENCOUNTER — TELEPHONE (OUTPATIENT)
Dept: ELECTROPHYSIOLOGY | Facility: CLINIC | Age: 87
End: 2022-08-11
Payer: MEDICARE

## 2022-08-11 ENCOUNTER — HOSPITAL ENCOUNTER (OUTPATIENT)
Dept: CARDIOLOGY | Facility: HOSPITAL | Age: 87
Discharge: HOME | End: 2022-08-11
Attending: INTERNAL MEDICINE
Payer: MEDICARE

## 2022-08-11 VITALS — HEIGHT: 63 IN | BODY MASS INDEX: 25.34 KG/M2 | WEIGHT: 143 LBS

## 2022-08-11 DIAGNOSIS — Z95.810 ICD (IMPLANTABLE CARDIOVERTER-DEFIBRILLATOR), DUAL, IN SITU: ICD-10-CM

## 2022-08-11 DIAGNOSIS — I44.2 CHB (COMPLETE HEART BLOCK): ICD-10-CM

## 2022-08-11 PROCEDURE — 93306 ECHO (CUPID ONLY): ICD-10-PCS | Mod: 26,,, | Performed by: INTERNAL MEDICINE

## 2022-08-11 PROCEDURE — 93306 TTE W/DOPPLER COMPLETE: CPT | Mod: PO

## 2022-08-11 PROCEDURE — 93306 TTE W/DOPPLER COMPLETE: CPT | Mod: 26,,, | Performed by: INTERNAL MEDICINE

## 2022-08-11 NOTE — TELEPHONE ENCOUNTER
Spoke with pt to discuss appointment. Informed her that she has an echo scheduled today not a ekg. Pt gave verbal understanding

## 2022-08-11 NOTE — TELEPHONE ENCOUNTER
----- Message from Apoorva Valles sent at 8/11/2022  8:42 AM CDT -----  Regarding: EKG  PT wants to know why she is having multiple EKG's.  Please call PT @ [phone].      Thanks

## 2022-08-12 LAB
AV INDEX (PROSTH): 0.61
AV MEAN GRADIENT: 5 MMHG
AV PEAK GRADIENT: 9 MMHG
AV VALVE AREA: 2.03 CM2
AV VELOCITY RATIO: 0.7
BSA FOR ECHO PROCEDURE: 1.7 M2
CV ECHO LV RWT: 0.46 CM
DOP CALC AO PEAK VEL: 1.48 M/S
DOP CALC AO VTI: 38.27 CM
DOP CALC LVOT AREA: 3.3 CM2
DOP CALC LVOT DIAMETER: 2.05 CM
DOP CALC LVOT PEAK VEL: 1.03 M/S
DOP CALC LVOT STROKE VOLUME: 77.62 CM3
DOP CALC MV VTI: 39.35 CM
DOP CALCLVOT PEAK VEL VTI: 23.53 CM
E WAVE DECELERATION TIME: 238.94 MSEC
E/A RATIO: 0.66
E/E' RATIO: 18 M/S
ECHO LV POSTERIOR WALL: 1.24 CM (ref 0.6–1.1)
EJECTION FRACTION: 50 %
FRACTIONAL SHORTENING: 33 % (ref 28–44)
INTERVENTRICULAR SEPTUM: 1.15 CM (ref 0.6–1.1)
IVC DIAMETER: 2.2 CM
LA MAJOR: 6.79 CM
LA MINOR: 6.5 CM
LA WIDTH: 4.38 CM
LEFT ATRIUM SIZE: 5.02 CM
LEFT ATRIUM VOLUME INDEX MOD: 27.9 ML/M2
LEFT ATRIUM VOLUME INDEX: 73.9 ML/M2
LEFT ATRIUM VOLUME MOD: 46.87 CM3
LEFT ATRIUM VOLUME: 124.13 CM3
LEFT INTERNAL DIMENSION IN SYSTOLE: 3.6 CM (ref 2.1–4)
LEFT VENTRICLE DIASTOLIC VOLUME INDEX: 83.71 ML/M2
LEFT VENTRICLE DIASTOLIC VOLUME: 140.64 ML
LEFT VENTRICLE MASS INDEX: 156 G/M2
LEFT VENTRICLE SYSTOLIC VOLUME INDEX: 32.5 ML/M2
LEFT VENTRICLE SYSTOLIC VOLUME: 54.58 ML
LEFT VENTRICULAR INTERNAL DIMENSION IN DIASTOLE: 5.39 CM (ref 3.5–6)
LEFT VENTRICULAR MASS: 262.11 G
LV LATERAL E/E' RATIO: 16.2 M/S
LV SEPTAL E/E' RATIO: 20.25 M/S
MV A" WAVE DURATION": 10.61 MSEC
MV MEAN GRADIENT: 1 MMHG
MV PEAK A VEL: 1.23 M/S
MV PEAK E VEL: 0.81 M/S
MV PEAK GRADIENT: 9 MMHG
MV STENOSIS PRESSURE HALF TIME: 69.29 MS
MV VALVE AREA BY CONTINUITY EQUATION: 1.97 CM2
MV VALVE AREA P 1/2 METHOD: 3.18 CM2
PISA MRMAX VEL: 0.05 M/S
PISA TR MAX VEL: 2.9 M/S
PULM VEIN S/D RATIO: 1.24
PV PEAK D VEL: 0.42 M/S
PV PEAK S VEL: 0.52 M/S
RA MAJOR: 3.91 CM
RA PRESSURE: 15 MMHG
RA WIDTH: 3.66 CM
RIGHT VENTRICULAR END-DIASTOLIC DIMENSION: 3.48 CM
RV TISSUE DOPPLER FREE WALL SYSTOLIC VELOCITY 1 (APICAL 4 CHAMBER VIEW): 10.43 CM/S
TDI LATERAL: 0.05 M/S
TDI SEPTAL: 0.04 M/S
TDI: 0.05 M/S
TR MAX PG: 34 MMHG
TV REST PULMONARY ARTERY PRESSURE: 49 MMHG

## 2022-08-22 ENCOUNTER — TELEPHONE (OUTPATIENT)
Dept: INTERNAL MEDICINE | Facility: CLINIC | Age: 87
End: 2022-08-22
Payer: MEDICARE

## 2022-08-26 ENCOUNTER — CLINICAL SUPPORT (OUTPATIENT)
Dept: CARDIOLOGY | Facility: HOSPITAL | Age: 87
End: 2022-08-26
Payer: MEDICARE

## 2022-08-26 DIAGNOSIS — Z95.810 PRESENCE OF AUTOMATIC (IMPLANTABLE) CARDIAC DEFIBRILLATOR: ICD-10-CM

## 2022-08-26 PROCEDURE — 93296 REM INTERROG EVL PM/IDS: CPT | Performed by: INTERNAL MEDICINE

## 2022-08-29 ENCOUNTER — TELEPHONE (OUTPATIENT)
Dept: INTERNAL MEDICINE | Facility: CLINIC | Age: 87
End: 2022-08-29
Payer: MEDICARE

## 2022-08-29 NOTE — TELEPHONE ENCOUNTER
Attempted to call pt to reschedule appt  Left message to call back  Will also send portal message

## 2022-08-31 ENCOUNTER — LAB VISIT (OUTPATIENT)
Dept: LAB | Facility: HOSPITAL | Age: 87
End: 2022-08-31
Attending: FAMILY MEDICINE
Payer: MEDICARE

## 2022-08-31 ENCOUNTER — TELEPHONE (OUTPATIENT)
Dept: INTERNAL MEDICINE | Facility: CLINIC | Age: 87
End: 2022-08-31

## 2022-08-31 ENCOUNTER — OFFICE VISIT (OUTPATIENT)
Dept: INTERNAL MEDICINE | Facility: CLINIC | Age: 87
End: 2022-08-31
Payer: MEDICARE

## 2022-08-31 VITALS
HEART RATE: 64 BPM | BODY MASS INDEX: 24.96 KG/M2 | DIASTOLIC BLOOD PRESSURE: 80 MMHG | WEIGHT: 140.88 LBS | HEIGHT: 63 IN | SYSTOLIC BLOOD PRESSURE: 139 MMHG | OXYGEN SATURATION: 95 %

## 2022-08-31 DIAGNOSIS — R41.3 MEMORY DIFFICULTIES: Primary | ICD-10-CM

## 2022-08-31 DIAGNOSIS — R41.3 OTHER AMNESIA: Primary | ICD-10-CM

## 2022-08-31 DIAGNOSIS — R41.3 OTHER AMNESIA: ICD-10-CM

## 2022-08-31 DIAGNOSIS — R41.3 MEMORY DIFFICULTIES: ICD-10-CM

## 2022-08-31 DIAGNOSIS — Z53.09 MRI CONTRAINDICATED DUE TO METAL IMPLANT: ICD-10-CM

## 2022-08-31 LAB
T4 FREE SERPL-MCNC: 0.76 NG/DL (ref 0.71–1.51)
TSH SERPL DL<=0.005 MIU/L-ACNC: 96.74 UIU/ML (ref 0.4–4)
VIT B12 SERPL-MCNC: 215 PG/ML (ref 210–950)

## 2022-08-31 PROCEDURE — 84439 ASSAY OF FREE THYROXINE: CPT | Performed by: FAMILY MEDICINE

## 2022-08-31 PROCEDURE — 84443 ASSAY THYROID STIM HORMONE: CPT | Performed by: FAMILY MEDICINE

## 2022-08-31 PROCEDURE — 1101F PR PT FALLS ASSESS DOC 0-1 FALLS W/OUT INJ PAST YR: ICD-10-PCS | Mod: CPTII,S$GLB,, | Performed by: FAMILY MEDICINE

## 2022-08-31 PROCEDURE — 99203 PR OFFICE/OUTPT VISIT, NEW, LEVL III, 30-44 MIN: ICD-10-PCS | Mod: S$GLB,,, | Performed by: FAMILY MEDICINE

## 2022-08-31 PROCEDURE — 36415 COLL VENOUS BLD VENIPUNCTURE: CPT | Performed by: FAMILY MEDICINE

## 2022-08-31 PROCEDURE — 99203 OFFICE O/P NEW LOW 30 MIN: CPT | Mod: S$GLB,,, | Performed by: FAMILY MEDICINE

## 2022-08-31 PROCEDURE — 3288F PR FALLS RISK ASSESSMENT DOCUMENTED: ICD-10-PCS | Mod: CPTII,S$GLB,, | Performed by: FAMILY MEDICINE

## 2022-08-31 PROCEDURE — 1126F PR PAIN SEVERITY QUANTIFIED, NO PAIN PRESENT: ICD-10-PCS | Mod: CPTII,S$GLB,, | Performed by: FAMILY MEDICINE

## 2022-08-31 PROCEDURE — 3288F FALL RISK ASSESSMENT DOCD: CPT | Mod: CPTII,S$GLB,, | Performed by: FAMILY MEDICINE

## 2022-08-31 PROCEDURE — 1126F AMNT PAIN NOTED NONE PRSNT: CPT | Mod: CPTII,S$GLB,, | Performed by: FAMILY MEDICINE

## 2022-08-31 PROCEDURE — 1101F PT FALLS ASSESS-DOCD LE1/YR: CPT | Mod: CPTII,S$GLB,, | Performed by: FAMILY MEDICINE

## 2022-08-31 PROCEDURE — 99999 PR PBB SHADOW E&M-EST. PATIENT-LVL V: ICD-10-PCS | Mod: PBBFAC,,, | Performed by: FAMILY MEDICINE

## 2022-08-31 PROCEDURE — 1159F PR MEDICATION LIST DOCUMENTED IN MEDICAL RECORD: ICD-10-PCS | Mod: CPTII,S$GLB,, | Performed by: FAMILY MEDICINE

## 2022-08-31 PROCEDURE — 82607 VITAMIN B-12: CPT | Performed by: FAMILY MEDICINE

## 2022-08-31 PROCEDURE — 86592 SYPHILIS TEST NON-TREP QUAL: CPT | Performed by: FAMILY MEDICINE

## 2022-08-31 PROCEDURE — 1159F MED LIST DOCD IN RCRD: CPT | Mod: CPTII,S$GLB,, | Performed by: FAMILY MEDICINE

## 2022-08-31 PROCEDURE — 99999 PR PBB SHADOW E&M-EST. PATIENT-LVL V: CPT | Mod: PBBFAC,,, | Performed by: FAMILY MEDICINE

## 2022-08-31 NOTE — PROGRESS NOTES
"Subjective:       Patient ID: Lynn Mckinney is a 86 y.o. female. PCP Brie Fagan MD-     Chief Complaint: Memory Loss    Patient is here for UC visit with her niece for a few years of increasing memory problems  No current crisis but wants something done to further explore/improve    Review of patient's allergies indicates:   Allergen Reactions    Lipitor [atorvastatin] Itching    Fosamax [alendronate]      Felt "strange"    Iodinated contrast media      Dye is only to be used if absolutely needed because of kidney function    Prolia [denosumab]      Reaction after the Prolia    Sulfa (sulfonamide antibiotics)        Social History     Tobacco Use    Smoking status: Former     Types: Cigarettes     Quit date: 1984     Years since quittin.0    Smokeless tobacco: Never   Substance Use Topics    Alcohol use: Yes     Alcohol/week: 1.0 standard drink     Types: 1 Shots of liquor per week     Comment: old fashion  with dinner every 6 mo     Drug use: No       Family History   Problem Relation Age of Onset    Heart disease Father         coronary thrombosis    Hypertension Father     Alzheimer's disease Mother     Heart disease Mother         pacemaker    Hypertension Mother     Diabetes Brother     No Known Problems Sister     No Known Problems Maternal Grandmother     No Known Problems Maternal Grandfather     No Known Problems Paternal Grandmother     No Known Problems Paternal Grandfather     Heart disease Brother     No Known Problems Maternal Aunt     No Known Problems Maternal Uncle     No Known Problems Paternal Aunt     No Known Problems Paternal Uncle     Amblyopia Neg Hx     Blindness Neg Hx     Cancer Neg Hx     Cataracts Neg Hx     Glaucoma Neg Hx     Macular degeneration Neg Hx     Retinal detachment Neg Hx     Strabismus Neg Hx     Stroke Neg Hx     Thyroid disease Neg Hx     Melanoma Neg Hx        Past Surgical History:   Procedure Laterality Date    ANGIOPLASTY      APPENDECTOMY      " BREAST CYST ASPIRATION Bilateral     CARDIAC CATHETERIZATION      CARDIAC PACEMAKER PLACEMENT  10/10/13    by Dr. Coleman    CAROTID STENT Left 2007    by Dr. Bowen    CATARACT EXTRACTION W/  INTRAOCULAR LENS IMPLANT Bilateral 2012    Dr Sharp    CORONARY ARTERY BYPASS GRAFT  2001     x 3 LIMA-Ramus, SVG-OM1 & SVG-PDA    CORONARY BYPASS GRAFT ANGIOGRAPHY  3/11/2021    Procedure: Bypass graft study;  Surgeon: Gil Garcia MD;  Location: [facility] CATH LAB;  Service: Cardiology;;    CYST REMOVAL      wrist    EYE SURGERY      HYSTERECTOMY      LEFT HEART CATHETERIZATION Left 3/11/2021    Procedure: Left heart cath;  Surgeon: Gil Garcia MD;  Location: [facility] CATH LAB;  Service: Cardiology;  Laterality: Left;    OOPHORECTOMY      THYROIDECTOMY      VITRECTOMY BY PARS PLANA APPROACH Right 10/12/2021    Procedure: VITRECTOMY, PARS PLANA APPROACH;  Surgeon: Flako Carver MD;  Location: [facility] OR [address] [place] [address];  Service: Ophthalmology;  Laterality: Right;       Patient Active Problem List   Diagnosis    Hypertension    Hyperlipidemia    Peripheral arterial disease    Coronary artery disease (CAD)    Hypothyroidism    Incontinence    CKD (chronic kidney disease) stage 3, GFR 30-59 ml/min    Vitamin D deficiency    CHB (complete heart block)    ICD (implantable cardioverter-defibrillator), dual, in situ    Left-sided carotid artery disease    Claudication    Right hip pain    Lumbar stenosis    Thoracic or lumbosacral neuritis or radiculitis    Hip pain    Bilateral carotid artery stenosis    Lumbar radiculitis    Age-related osteoporosis without current pathological fracture    History of dental problems    VF (ventricular fibrillation)    VT (ventricular tachycardia)    S/P CABG x 3    Macular subretinal hemorrhage, right    Memory difficulties       Current Outpatient Medications on File Prior to Visit   Medication Sig Dispense Refill    acetaminophen (TYLENOL) 325 MG tablet Take 1 tablet (325 mg total) by mouth every  6 (six) hours as needed for Pain.  0    alendronate (FOSAMAX) 70 MG tablet Take 1 tablet (70 mg total) by mouth every 7 days. Take with a full glass of water & remain upright for at least 30 minutes 12 tablet 3    amLODIPine (NORVASC) 5 MG tablet TAKE 1 TABLET BY MOUTH EVERY DAY 90 tablet 3    aspirin 81 MG Chew Take 81 mg by mouth every evening. 1 Tablet, Chewable Oral Every day      CALCIUM CARBONATE/VITAMIN D3 (CALTRATE 600 + D ORAL) Take 1 tablet by mouth once daily.      cholecalciferol, vitamin D3, (VITAMIN D3) 50 mcg (2,000 unit) Cap capsule Take 2,000 Units by mouth once daily.      coenzyme Q10 200 mg capsule Take 200 mg by mouth once daily.      irbesartan (AVAPRO) 300 MG tablet TAKE 1 TABLET (300 MG TOTAL) BY MOUTH EVERY EVENING. CANCEL IRBESARTAN/HCTZ COMBO 90 tablet 3    labetaloL (NORMODYNE) 200 MG tablet Take 1 tablet (200 mg total) by mouth 2 (two) times daily. 180 tablet 3    LACTOSE-FREE FOOD (BOOST ORAL) Take by mouth. 2-3 days per week      levothyroxine (SYNTHROID) 112 MCG tablet TAKE 1 TABLET BY MOUTH EVERY DAY FOR 6 WEEKS AND 1/2 TABLET ON SUNDAY (Patient taking differently: Pt taking one pill every day.) 75 tablet 1    multivitamin (THERAGRAN) per tablet Take by mouth. Pt taking one pill three times a week.      nitroGLYCERIN (NITROSTAT) 0.4 MG SL tablet PLACE 1 TABLET UNDER THE TONGUE EVERY 5 MIN AS NEEDED FOR CHEST PAIN. MAX:3 DOSES 30 tablet 3    rosuvastatin (CRESTOR) 10 MG tablet Take 1 tablet (10 mg total) by mouth every evening. 90 tablet 3    SALMON OIL-OMEGA-3 FATTY ACIDS ORAL Take 1 capsule by mouth once daily.      vitamins  A,C,E-zinc-copper (PRESERVISION AREDS) 14,320-226-200 unit-mg-unit Cap Take by mouth Daily.       No current facility-administered medications on file prior to visit.           Review of Systems   Constitutional:  Negative for chills and fever.   HENT:  Negative for ear pain.    Eyes:  Negative for pain.   Respiratory:  Negative for chest tightness.   "  Cardiovascular:  Negative for chest pain.   Gastrointestinal:  Negative for abdominal pain.   Genitourinary:  Negative for flank pain.   Musculoskeletal:  Negative for gait problem.   Neurological:  Negative for syncope.        Memory problems   Psychiatric/Behavioral:  Negative for behavioral problems.      Objective:    /80 (BP Location: Left arm, Patient Position: Sitting, BP Method: Medium (Manual))   Pulse 64   Ht 5' 3" (1.6 m)   Wt 63.9 kg (140 lb 14 oz)   SpO2 95%   BMI 24.95 kg/m²     Physical Exam  Vitals and nursing note reviewed.   Constitutional:       Appearance: She is well-developed.   HENT:      Head: Normocephalic and atraumatic.   Cardiovascular:      Rate and Rhythm: Normal rate.      Heart sounds: Normal heart sounds.   Pulmonary:      Effort: No respiratory distress.      Breath sounds: Normal breath sounds. No wheezing or rales.   Abdominal:      Palpations: Abdomen is soft.   Musculoskeletal:      Cervical back: Neck supple.   Skin:     General: Skin is dry.   Neurological:      Mental Status: She is alert.      Comments: I spent a minute at the beginning of visit explaining how to pronounce my name (NEISHA pronounced BRO) and the background of it. Later in the visit she saw my name on the white coat and asked about the unusual spelling forgetting how we already talked about it.   Psychiatric:         Behavior: Behavior normal.       Assessment:       1. Memory difficulties    2. Other amnesia          Plan:       Lynn was seen today for memory loss.    Diagnoses and all orders for this visit:    Memory difficulties  -     Ambulatory referral/consult to Neurology; Future  -     Ambulatory referral/consult to Adult Neuropsychology; Future  -     VITAMIN B12; Future  -     RPR; Future  -     TSH; Future    Other amnesia  -     MRI Brain Without Contrast; Future  Addendum 8/31/22 5:26 PM - messaged Dr Coleman to see if the implanted pacemaker is one that can have an MRI  Addendum 2 on " 9/1: EPS doc response: Sadly, no. She has an abandoned lead, which is a contraindication.   Therefore will order a CT head      Cc: PCP  30 min n visit with 85% of it in pt discussion about her memory

## 2022-08-31 NOTE — TELEPHONE ENCOUNTER
Pt cannot perform MRI due to having a pace maker and Defibrillator. Appt cancelled.     Please advise.

## 2022-09-01 ENCOUNTER — TELEPHONE (OUTPATIENT)
Dept: ELECTROPHYSIOLOGY | Facility: CLINIC | Age: 87
End: 2022-09-01
Payer: MEDICARE

## 2022-09-01 PROBLEM — Z53.09 MRI CONTRAINDICATED DUE TO METAL IMPLANT: Status: ACTIVE | Noted: 2022-09-01

## 2022-09-01 LAB — RPR SER QL: NORMAL

## 2022-09-01 NOTE — TELEPHONE ENCOUNTER
Please call pt/family    It turns out unfortunately that with your particular pacemaker situation, MRIs cannot be done  Therefore I have ordered a CT instead  Please set up    ===     Plan:       Memory difficulties  Other amnesia  -     MRI Brain Without Contrast; Future  Addendum 8/31/22 5:26 PM - messaged Dr Coleman to see if the implanted pacemaker is one that can have an MRI  Addendum 2 on 9/1: EPS doc response: Sadly, no. She has an abandoned lead, which is a contraindication.   Therefore will order a CT head

## 2022-09-02 ENCOUNTER — OFFICE VISIT (OUTPATIENT)
Dept: ELECTROPHYSIOLOGY | Facility: CLINIC | Age: 87
End: 2022-09-02
Payer: MEDICARE

## 2022-09-02 ENCOUNTER — CLINICAL SUPPORT (OUTPATIENT)
Dept: CARDIOLOGY | Facility: HOSPITAL | Age: 87
End: 2022-09-02
Attending: INTERNAL MEDICINE
Payer: MEDICARE

## 2022-09-02 ENCOUNTER — HOSPITAL ENCOUNTER (OUTPATIENT)
Dept: CARDIOLOGY | Facility: CLINIC | Age: 87
Discharge: HOME | End: 2022-09-02
Payer: MEDICARE

## 2022-09-02 VITALS
HEART RATE: 80 BPM | BODY MASS INDEX: 24.96 KG/M2 | SYSTOLIC BLOOD PRESSURE: 180 MMHG | WEIGHT: 140.88 LBS | DIASTOLIC BLOOD PRESSURE: 86 MMHG | HEIGHT: 63 IN

## 2022-09-02 DIAGNOSIS — I47.20 VT (VENTRICULAR TACHYCARDIA): ICD-10-CM

## 2022-09-02 DIAGNOSIS — N18.30 STAGE 3 CHRONIC KIDNEY DISEASE, UNSPECIFIED WHETHER STAGE 3A OR 3B CKD: ICD-10-CM

## 2022-09-02 DIAGNOSIS — I25.110 ATHEROSCLEROSIS OF NATIVE CORONARY ARTERY OF NATIVE HEART WITH UNSTABLE ANGINA PECTORIS: ICD-10-CM

## 2022-09-02 DIAGNOSIS — I44.2 CHB (COMPLETE HEART BLOCK): Primary | ICD-10-CM

## 2022-09-02 DIAGNOSIS — I49.01 VF (VENTRICULAR FIBRILLATION): ICD-10-CM

## 2022-09-02 DIAGNOSIS — Z95.810 ICD (IMPLANTABLE CARDIOVERTER-DEFIBRILLATOR), DUAL, IN SITU: ICD-10-CM

## 2022-09-02 DIAGNOSIS — Z95.1 S/P CABG X 3: ICD-10-CM

## 2022-09-02 DIAGNOSIS — I65.23 BILATERAL CAROTID ARTERY STENOSIS: ICD-10-CM

## 2022-09-02 DIAGNOSIS — I10 PRIMARY HYPERTENSION: ICD-10-CM

## 2022-09-02 DIAGNOSIS — E78.5 HYPERLIPIDEMIA, UNSPECIFIED HYPERLIPIDEMIA TYPE: ICD-10-CM

## 2022-09-02 PROCEDURE — 99215 OFFICE O/P EST HI 40 MIN: CPT | Mod: S$GLB,,, | Performed by: INTERNAL MEDICINE

## 2022-09-02 PROCEDURE — 93010 ELECTROCARDIOGRAM REPORT: CPT | Mod: S$GLB,,, | Performed by: INTERNAL MEDICINE

## 2022-09-02 PROCEDURE — 3288F FALL RISK ASSESSMENT DOCD: CPT | Mod: CPTII,S$GLB,, | Performed by: INTERNAL MEDICINE

## 2022-09-02 PROCEDURE — 1160F PR REVIEW ALL MEDS BY PRESCRIBER/CLIN PHARMACIST DOCUMENTED: ICD-10-PCS | Mod: CPTII,S$GLB,, | Performed by: INTERNAL MEDICINE

## 2022-09-02 PROCEDURE — 3288F PR FALLS RISK ASSESSMENT DOCUMENTED: ICD-10-PCS | Mod: CPTII,S$GLB,, | Performed by: INTERNAL MEDICINE

## 2022-09-02 PROCEDURE — 1126F AMNT PAIN NOTED NONE PRSNT: CPT | Mod: CPTII,S$GLB,, | Performed by: INTERNAL MEDICINE

## 2022-09-02 PROCEDURE — 99999 PR PBB SHADOW E&M-EST. PATIENT-LVL IV: CPT | Mod: PBBFAC,,, | Performed by: INTERNAL MEDICINE

## 2022-09-02 PROCEDURE — 1160F RVW MEDS BY RX/DR IN RCRD: CPT | Mod: CPTII,S$GLB,, | Performed by: INTERNAL MEDICINE

## 2022-09-02 PROCEDURE — 1101F PT FALLS ASSESS-DOCD LE1/YR: CPT | Mod: CPTII,S$GLB,, | Performed by: INTERNAL MEDICINE

## 2022-09-02 PROCEDURE — 1126F PR PAIN SEVERITY QUANTIFIED, NO PAIN PRESENT: ICD-10-PCS | Mod: CPTII,S$GLB,, | Performed by: INTERNAL MEDICINE

## 2022-09-02 PROCEDURE — 93283 PRGRMG EVAL IMPLANTABLE DFB: CPT | Mod: 26,,, | Performed by: INTERNAL MEDICINE

## 2022-09-02 PROCEDURE — 93010 RHYTHM STRIP: ICD-10-PCS | Mod: S$GLB,,, | Performed by: INTERNAL MEDICINE

## 2022-09-02 PROCEDURE — 93005 ELECTROCARDIOGRAM TRACING: CPT | Mod: S$GLB,,, | Performed by: INTERNAL MEDICINE

## 2022-09-02 PROCEDURE — 93283 PRGRMG EVAL IMPLANTABLE DFB: CPT

## 2022-09-02 PROCEDURE — 99999 PR PBB SHADOW E&M-EST. PATIENT-LVL IV: ICD-10-PCS | Mod: PBBFAC,,, | Performed by: INTERNAL MEDICINE

## 2022-09-02 PROCEDURE — 1101F PR PT FALLS ASSESS DOC 0-1 FALLS W/OUT INJ PAST YR: ICD-10-PCS | Mod: CPTII,S$GLB,, | Performed by: INTERNAL MEDICINE

## 2022-09-02 PROCEDURE — 1159F PR MEDICATION LIST DOCUMENTED IN MEDICAL RECORD: ICD-10-PCS | Mod: CPTII,S$GLB,, | Performed by: INTERNAL MEDICINE

## 2022-09-02 PROCEDURE — 1159F MED LIST DOCD IN RCRD: CPT | Mod: CPTII,S$GLB,, | Performed by: INTERNAL MEDICINE

## 2022-09-02 PROCEDURE — 93283 CARDIAC DEVICE CHECK - IN CLINIC & HOSPITAL: ICD-10-PCS | Mod: 26,,, | Performed by: INTERNAL MEDICINE

## 2022-09-02 PROCEDURE — 93005 RHYTHM STRIP: ICD-10-PCS | Mod: S$GLB,,, | Performed by: INTERNAL MEDICINE

## 2022-09-02 PROCEDURE — 99215 PR OFFICE/OUTPT VISIT, EST, LEVL V, 40-54 MIN: ICD-10-PCS | Mod: S$GLB,,, | Performed by: INTERNAL MEDICINE

## 2022-09-02 NOTE — PROGRESS NOTES
Subjective:   Patient ID:  Lynn Mckinney is a 86 y.o. female who presents for follow-up of ICM, VT    Ms. Mckinney is an 85 y.o. female with pAF (distant past), CAD (s/p CABH 2001), HTN, PVD, PPM (10/2013) here for follow up after ICD upgrade.      pAF (in past? No sustained AF seen on device reports)  CAD  CABG 2001   HL on meds   HTN on meds   PVD carotidStent  holter showed 2:1 AV block, AVWB. Also has RBBB and LAFB.  PPM placed 10/13. Feels well since. C/o PARMAR at 2 flights (stable), but exertion is limited mostly by PAD.     3/10/2021: She was called today regarding a VF episode that occurred at 0413 this morning which lasted 25 seconds. Patient reports being awake during this time, but denies any symptoms.   Patient admitted to CCU after episode of asymptomatic VT on PPM. LHC completed on 3/12 with SHEREEN SVG to RCA. Patient evaluated by EP with AICD placement on 3/12.     She c/o PARMAR with long walks.  Echo 3/21 55% -> 8/22 45-50%  ICD: 97% V-paced    My interpretation of today's ECG is ASVP    Current Outpatient Medications   Medication Sig    amLODIPine (NORVASC) 5 MG tablet TAKE 1 TABLET BY MOUTH EVERY DAY    aspirin 81 MG Chew Take 81 mg by mouth every evening. 1 Tablet, Chewable Oral Every day    CALCIUM CARBONATE/VITAMIN D3 (CALTRATE 600 + D ORAL) Take 1 tablet by mouth once daily.    clopidogreL (PLAVIX) 75 mg tablet Take 1 tablet (75 mg total) by mouth once daily.    clopidogreL (PLAVIX) 75 mg tablet Take 1 tablet (75 mg total) by mouth once daily.    coenzyme Q10 200 mg capsule Take 200 mg by mouth once daily.    ergocalciferol, vitamin D2, 400 unit Tab Take 2,000 Units by mouth once daily.     irbesartan (AVAPRO) 300 MG tablet TAKE 1 TABLET (300 MG TOTAL) BY MOUTH EVERY EVENING. CANCEL IRBESARTAN/HCTZ COMBO    labetaloL (NORMODYNE) 200 MG tablet TAKE 1 TABLET BY MOUTH TWICE A DAY    LACTOSE-FREE FOOD (BOOST ORAL) Take by mouth. 2-3 days per week    levothyroxine (SYNTHROID) 112 MCG tablet TAKE 1 TABLET  "BY MOUTH EVERY DAY FOR 6 WEEKS AND 1/2 TABLET ON SUNDAY    multivitamin (THERAGRAN) per tablet Take by mouth. Pt taking one pill three times a week.    nitroGLYCERIN (NITROSTAT) 0.4 MG SL tablet PLACE 1 TABLET UNDER THE TONGUE EVERY 5 MIN AS NEEDED FOR CHEST PAIN. MAX:3 DOSES    rosuvastatin (CRESTOR) 10 MG tablet Take 1 tablet (10 mg total) by mouth every evening.    SALMON OIL-OMEGA-3 FATTY ACIDS ORAL Take 1 capsule by mouth once daily.     No current facility-administered medications for this visit.       Review of Systems   Constitutional: Negative for malaise/fatigue.   Cardiovascular:  Positive for dyspnea on exertion. Negative for chest pain, irregular heartbeat, leg swelling and palpitations.   Respiratory:  Negative for shortness of breath.    Hematologic/Lymphatic: Negative for bleeding problem. Bruises/bleeds easily.   Skin:  Negative for rash.   Musculoskeletal:  Negative for myalgias.   Gastrointestinal:  Negative for hematemesis, hematochezia and nausea.   Genitourinary:  Negative for hematuria.   Neurological:  Positive for loss of balance. Negative for light-headedness.   Psychiatric/Behavioral:  Negative for altered mental status.    Allergic/Immunologic: Negative for persistent infections.     Objective:          BP (!) 180/86   Pulse 80   Ht 5' 3" (1.6 m)   Wt 63.9 kg (140 lb 14 oz)   BMI 24.95 kg/m²     Physical Exam  Vitals and nursing note reviewed.   Constitutional:       Appearance: Normal appearance. She is well-developed.   HENT:      Head: Normocephalic and atraumatic.      Nose: Nose normal.   Eyes:      Conjunctiva/sclera: Conjunctivae normal.      Pupils: Pupils are equal, round, and reactive to light.   Neck:      Thyroid: No thyroid mass or thyromegaly.      Trachea: No tracheal deviation.   Cardiovascular:      Rate and Rhythm: Normal rate and regular rhythm.   Pulmonary:      Effort: Pulmonary effort is normal. No respiratory distress.      Breath sounds: Normal breath sounds. " No wheezing.   Chest:      Comments: Device to LUCW. Incision and pocket in good repair.  Abdominal:      General: There is no distension.   Musculoskeletal:         General: Normal range of motion.      Cervical back: Normal range of motion. No edema.   Skin:     General: Skin is warm and dry.      Findings: No erythema or rash.   Neurological:      Mental Status: She is alert and oriented to person, place, and time.      Coordination: Coordination normal.   Psychiatric:         Speech: Speech normal.         Behavior: Behavior normal. Behavior is cooperative.         Thought Content: Thought content normal.         Lab Results   Component Value Date     05/19/2022    K 4.1 05/19/2022    MG 2.0 05/31/2022    BUN 27 (H) 05/19/2022    CREATININE 1.03 05/19/2022    ALT 14 05/19/2022    AST 27 05/19/2022    HGB 13.7 05/19/2022    HCT 40.8 05/19/2022    TSH 96.744 (H) 08/31/2022    LDLCALC 90.0 05/19/2022       Recent Labs   Lab 03/11/21  0357   INR 1.0           Assessment:     1. CHB (complete heart block)    2. Bilateral carotid artery stenosis    3. Coronary artery disease (CAD)    4. Hyperlipidemia, unspecified hyperlipidemia type    5. Primary hypertension    6. ICD (implantable cardioverter-defibrillator), dual, in situ    7. S/P CABG x 3    8. VF (ventricular fibrillation)    9. VT (ventricular tachycardia)    10. Stage 3 chronic kidney disease, unspecified whether stage 3a or 3b CKD      Plan:     In summary, Ms. Mckinney is an 86 y.o. female with pAF (distant past), CAD (s/p [facility] 2001), HTN, PVD, PPM (10/2013) here for follow up after ICD upgrade for VT.    Mild decrease in LVEF c/w last year. ? related to 97% RVP.  Didn't take her AM meds today, so we can't titrate meds more; going forward, she'll be more adherent, she says, including on doctor/NP visit days.    f/u 1 month for med titration  Echo 3 months after that. If LVEF remains decreased, would consider upgrade to biV.

## 2022-09-09 ENCOUNTER — TELEPHONE (OUTPATIENT)
Dept: NEUROLOGY | Facility: CLINIC | Age: 87
End: 2022-09-09
Payer: MEDICARE

## 2022-09-09 NOTE — TELEPHONE ENCOUNTER
----- Message from Jodee De La Torre sent at 8/31/2022  3:41 PM CDT -----  Regarding: Scheduling  Please contact patient for an appointment for Memory Loss

## 2022-09-18 ENCOUNTER — TELEPHONE (OUTPATIENT)
Dept: INTERNAL MEDICINE | Facility: CLINIC | Age: 87
End: 2022-09-18
Payer: MEDICARE

## 2022-09-18 DIAGNOSIS — E03.9 HYPOTHYROIDISM, UNSPECIFIED TYPE: Primary | ICD-10-CM

## 2022-09-18 NOTE — TELEPHONE ENCOUNTER
Please call pt and inform them of following:    Labs ordered by Dr. De La Rosa reviewed. TSH significantly elevated; thyroid dysfunction likely contributing to memory issues. Recommend starting thyroid hormone replacement and repeating labs in 6 weeks.     Please let me know if pt/family agreeable.

## 2022-09-19 NOTE — TELEPHONE ENCOUNTER
Pt advised, she would like to start on thyroid medication. She would like pcp to also mail her a copy of her results with explanation.

## 2022-09-20 RX ORDER — LEVOTHYROXINE SODIUM 125 UG/1
125 TABLET ORAL
Qty: 30 TABLET | Refills: 3 | Status: SHIPPED | OUTPATIENT
Start: 2022-09-20 | End: 2023-01-23

## 2022-09-20 NOTE — TELEPHONE ENCOUNTER
TSH significantly elevated in pt with known hypothyroidism; thyroid dysfunction likely contributing to memory issues. Recommend increasing thyroid hormone replacement and repeating labs in 6 weeks.

## 2022-10-04 ENCOUNTER — TELEPHONE (OUTPATIENT)
Dept: ELECTROPHYSIOLOGY | Facility: CLINIC | Age: 87
End: 2022-10-04
Payer: MEDICARE

## 2022-10-05 ENCOUNTER — OFFICE VISIT (OUTPATIENT)
Dept: ELECTROPHYSIOLOGY | Facility: CLINIC | Age: 87
End: 2022-10-05
Payer: MEDICARE

## 2022-10-05 ENCOUNTER — HOSPITAL ENCOUNTER (OUTPATIENT)
Dept: CARDIOLOGY | Facility: CLINIC | Age: 87
Discharge: HOME | End: 2022-10-05
Payer: MEDICARE

## 2022-10-05 VITALS
SYSTOLIC BLOOD PRESSURE: 164 MMHG | BODY MASS INDEX: 24.1 KG/M2 | DIASTOLIC BLOOD PRESSURE: 72 MMHG | HEART RATE: 63 BPM | WEIGHT: 136 LBS | HEIGHT: 63 IN

## 2022-10-05 DIAGNOSIS — I47.20 VT (VENTRICULAR TACHYCARDIA): ICD-10-CM

## 2022-10-05 DIAGNOSIS — Z95.810 ICD (IMPLANTABLE CARDIOVERTER-DEFIBRILLATOR), DUAL, IN SITU: Primary | ICD-10-CM

## 2022-10-05 DIAGNOSIS — I50.42 CHRONIC COMBINED SYSTOLIC AND DIASTOLIC HEART FAILURE: ICD-10-CM

## 2022-10-05 PROCEDURE — 99214 OFFICE O/P EST MOD 30 MIN: CPT | Mod: S$GLB,,, | Performed by: INTERNAL MEDICINE

## 2022-10-05 PROCEDURE — 1101F PT FALLS ASSESS-DOCD LE1/YR: CPT | Mod: CPTII,S$GLB,, | Performed by: INTERNAL MEDICINE

## 2022-10-05 PROCEDURE — 1159F PR MEDICATION LIST DOCUMENTED IN MEDICAL RECORD: ICD-10-PCS | Mod: CPTII,S$GLB,, | Performed by: INTERNAL MEDICINE

## 2022-10-05 PROCEDURE — 3288F FALL RISK ASSESSMENT DOCD: CPT | Mod: CPTII,S$GLB,, | Performed by: INTERNAL MEDICINE

## 2022-10-05 PROCEDURE — 99999 PR PBB SHADOW E&M-EST. PATIENT-LVL IV: ICD-10-PCS | Mod: PBBFAC,,, | Performed by: INTERNAL MEDICINE

## 2022-10-05 PROCEDURE — 93010 RHYTHM STRIP: ICD-10-PCS | Mod: S$GLB,,, | Performed by: INTERNAL MEDICINE

## 2022-10-05 PROCEDURE — 93010 ELECTROCARDIOGRAM REPORT: CPT | Mod: S$GLB,,, | Performed by: INTERNAL MEDICINE

## 2022-10-05 PROCEDURE — 1101F PR PT FALLS ASSESS DOC 0-1 FALLS W/OUT INJ PAST YR: ICD-10-PCS | Mod: CPTII,S$GLB,, | Performed by: INTERNAL MEDICINE

## 2022-10-05 PROCEDURE — 93005 ELECTROCARDIOGRAM TRACING: CPT | Mod: S$GLB,,, | Performed by: INTERNAL MEDICINE

## 2022-10-05 PROCEDURE — 99214 PR OFFICE/OUTPT VISIT, EST, LEVL IV, 30-39 MIN: ICD-10-PCS | Mod: S$GLB,,, | Performed by: INTERNAL MEDICINE

## 2022-10-05 PROCEDURE — 99999 PR PBB SHADOW E&M-EST. PATIENT-LVL IV: CPT | Mod: PBBFAC,,, | Performed by: INTERNAL MEDICINE

## 2022-10-05 PROCEDURE — 1160F RVW MEDS BY RX/DR IN RCRD: CPT | Mod: CPTII,S$GLB,, | Performed by: INTERNAL MEDICINE

## 2022-10-05 PROCEDURE — 1126F AMNT PAIN NOTED NONE PRSNT: CPT | Mod: CPTII,S$GLB,, | Performed by: INTERNAL MEDICINE

## 2022-10-05 PROCEDURE — 1126F PR PAIN SEVERITY QUANTIFIED, NO PAIN PRESENT: ICD-10-PCS | Mod: CPTII,S$GLB,, | Performed by: INTERNAL MEDICINE

## 2022-10-05 PROCEDURE — 93005 RHYTHM STRIP: ICD-10-PCS | Mod: S$GLB,,, | Performed by: INTERNAL MEDICINE

## 2022-10-05 PROCEDURE — 1159F MED LIST DOCD IN RCRD: CPT | Mod: CPTII,S$GLB,, | Performed by: INTERNAL MEDICINE

## 2022-10-05 PROCEDURE — 1160F PR REVIEW ALL MEDS BY PRESCRIBER/CLIN PHARMACIST DOCUMENTED: ICD-10-PCS | Mod: CPTII,S$GLB,, | Performed by: INTERNAL MEDICINE

## 2022-10-05 PROCEDURE — 3288F PR FALLS RISK ASSESSMENT DOCUMENTED: ICD-10-PCS | Mod: CPTII,S$GLB,, | Performed by: INTERNAL MEDICINE

## 2022-10-05 RX ORDER — AMLODIPINE BESYLATE 10 MG/1
10 TABLET ORAL DAILY
Qty: 90 TABLET | Refills: 3 | Status: SHIPPED | OUTPATIENT
Start: 2022-10-05 | End: 2023-01-31 | Stop reason: SDUPTHER

## 2022-10-05 NOTE — PROGRESS NOTES
Subjective:   Patient ID:  Lynn Mckinney is a 86 y.o. female who presents for follow-up of ICM, VT    Ms. Mckinney is an 86 y.o. female with pAF (distant past), CAD (s/p [facility] 2001), HTN, PVD, PPM (10/2013) here for follow up after ICD upgrade.      pAF (in past? No sustained AF seen on device reports)  CAD  CABG 2001   HL on meds   HTN on meds   PVD carotidStent  holter showed 2:1 AV block, AVWB. Also has RBBB and LAFB.  PPM placed 10/13. Feels well since. C/o PARMAR at 2 flights (stable), but exertion is limited mostly by PAD.     3/10/2021: She was called today regarding a VF episode that occurred at 0413 this morning which lasted 25 seconds. Patient reports being awake during this time, but denies any symptoms.   Patient admitted to CCU after episode of asymptomatic VT on PPM. LHC completed on 3/12 with SHEREEN SVG to RCA. Patient evaluated by EP with AICD placement on 3/12.     She c/o PARMAR with long walks.  Echo 3/21 55% -> 8/22 45-50%  ICD: 97% V-paced    My interpretation of today's ECG is ASVP      Interval History:  Patient denies chest pain fevers chills SOB.     AS  62 BPM      Current Outpatient Medications   Medication Sig    amLODIPine (NORVASC) 5 MG tablet TAKE 1 TABLET BY MOUTH EVERY DAY    aspirin 81 MG Chew Take 81 mg by mouth every evening. 1 Tablet, Chewable Oral Every day    CALCIUM CARBONATE/VITAMIN D3 (CALTRATE 600 + D ORAL) Take 1 tablet by mouth once daily.    clopidogreL (PLAVIX) 75 mg tablet Take 1 tablet (75 mg total) by mouth once daily.    clopidogreL (PLAVIX) 75 mg tablet Take 1 tablet (75 mg total) by mouth once daily.    coenzyme Q10 200 mg capsule Take 200 mg by mouth once daily.    ergocalciferol, vitamin D2, 400 unit Tab Take 2,000 Units by mouth once daily.     irbesartan (AVAPRO) 300 MG tablet TAKE 1 TABLET (300 MG TOTAL) BY MOUTH EVERY EVENING. CANCEL IRBESARTAN/HCTZ COMBO    labetaloL (NORMODYNE) 200 MG tablet TAKE 1 TABLET BY MOUTH TWICE A DAY    LACTOSE-FREE FOOD (BOOST ORAL) Take  "by mouth. 2-3 days per week    levothyroxine (SYNTHROID) 112 MCG tablet TAKE 1 TABLET BY MOUTH EVERY DAY FOR 6 WEEKS AND 1/2 TABLET ON SUNDAY    multivitamin (THERAGRAN) per tablet Take by mouth. Pt taking one pill three times a week.    nitroGLYCERIN (NITROSTAT) 0.4 MG SL tablet PLACE 1 TABLET UNDER THE TONGUE EVERY 5 MIN AS NEEDED FOR CHEST PAIN. MAX:3 DOSES    rosuvastatin (CRESTOR) 10 MG tablet Take 1 tablet (10 mg total) by mouth every evening.    SALMON OIL-OMEGA-3 FATTY ACIDS ORAL Take 1 capsule by mouth once daily.     No current facility-administered medications for this visit.       Review of Systems   Constitutional: Negative for malaise/fatigue.   Cardiovascular:  Positive for dyspnea on exertion. Negative for chest pain, irregular heartbeat, leg swelling and palpitations.   Respiratory:  Negative for shortness of breath.    Hematologic/Lymphatic: Negative for bleeding problem. Bruises/bleeds easily.   Skin:  Negative for rash.   Musculoskeletal:  Negative for myalgias.   Gastrointestinal:  Negative for hematemesis, hematochezia and nausea.   Genitourinary:  Negative for hematuria.   Neurological:  Positive for loss of balance. Negative for light-headedness.   Psychiatric/Behavioral:  Negative for altered mental status.    Allergic/Immunologic: Negative for persistent infections.     Objective:          BP (!) 164/72   Pulse 63   Ht 5' 3" (1.6 m)   Wt 61.7 kg (136 lb 0.4 oz)   BMI 24.10 kg/m²     Physical Exam  Vitals and nursing note reviewed.   Constitutional:       Appearance: Normal appearance. She is well-developed.   HENT:      Head: Normocephalic and atraumatic.      Nose: Nose normal.   Eyes:      Conjunctiva/sclera: Conjunctivae normal.      Pupils: Pupils are equal, round, and reactive to light.   Neck:      Thyroid: No thyroid mass or thyromegaly.      Trachea: No tracheal deviation.   Cardiovascular:      Rate and Rhythm: Normal rate and regular rhythm.   Pulmonary:      Effort: Pulmonary " effort is normal. No respiratory distress.      Breath sounds: Normal breath sounds. No wheezing.   Chest:      Comments: Device to LUCW. Incision and pocket in good repair.  Abdominal:      General: There is no distension.   Musculoskeletal:         General: Normal range of motion.      Cervical back: Normal range of motion. No edema.   Skin:     General: Skin is warm and dry.      Findings: No erythema or rash.   Neurological:      Mental Status: She is alert and oriented to person, place, and time.      Coordination: Coordination normal.   Psychiatric:         Speech: Speech normal.         Behavior: Behavior normal. Behavior is cooperative.         Thought Content: Thought content normal.         Lab Results   Component Value Date     05/19/2022    K 4.1 05/19/2022    MG 2.0 05/31/2022    BUN 27 (H) 05/19/2022    CREATININE 1.03 05/19/2022    ALT 14 05/19/2022    AST 27 05/19/2022    HGB 13.7 05/19/2022    HCT 40.8 05/19/2022    TSH 96.744 (H) 08/31/2022    LDLCALC 90.0 05/19/2022       Recent Labs   Lab 03/11/21  0357   INR 1.0         Assessment:     1. ICD (implantable cardioverter-defibrillator), dual, in situ    2. Chronic combined systolic and diastolic heart failure        Plan:     In summary, Ms. Mckinney is an 86 y.o. female with pAF (distant past), CAD (s/p [facility] 2001), HTN, PVD, PPM (10/2013) here for follow up after ICD upgrade for VT.  Mild to borderline reduced EF. Given this may necessitate CRT upgrade. Did take meds today and is still hypertensive so will increase amlodipine to 10mg   Repeat echo in 2 months  If still reduced then CRTD upgrade

## 2022-10-12 ENCOUNTER — TELEPHONE (OUTPATIENT)
Dept: OPHTHALMOLOGY | Facility: CLINIC | Age: 87
End: 2022-10-12
Payer: MEDICARE

## 2022-10-14 ENCOUNTER — NURSE TRIAGE (OUTPATIENT)
Dept: ADMINISTRATIVE | Facility: CLINIC | Age: 87
End: 2022-10-14
Payer: MEDICARE

## 2022-10-14 NOTE — TELEPHONE ENCOUNTER
Reason for Disposition   Systolic BP >= 160 OR Diastolic >= 100, and any cardiac or neurologic symptoms (e.g., chest pain, difficulty breathing, unsteady gait, blurred vision)    Additional Information   Negative: Sounds like a life-threatening emergency to the triager   Negative: Pregnant > 20 weeks or postpartum (< 6 weeks after delivery) and new hand or face swelling   Negative: Pregnant > 20 weeks and BP > 140/90    Protocols used: High Blood Pressure-A-OH    Pt stated she has been trying to reach Dr. Coleman. Stated he was supposed to call her and tell her if she needs an EKG or other test.    Pt stated she does not feel well today. Stated her BP is 171/81. Stated her balance is worse today.     Per triage protocol pt advised to have someone bring her to the ED for evaluation or call 911 if no one can bring her.     Pt angry stating she only wanted to ask Dr. Coleman some questions. Pt advised a message will be sent to the Provider and office staff, but the triage disposition cannot change. Pt verbalized understanding.

## 2022-10-14 NOTE — TELEPHONE ENCOUNTER
"Spoke with pt, reports she was calling to let Dr Coleman know that her b/p is still high, first reports she is unable to check her b/p by herself because "it slides down her arm" she attempted to take it on the phone 170/80 hr 82, informed pt that at her last visit with Dr Coleman last week he increased her amlodipine to 10mg daily, she is not sure if she has increased this medication reports her brother puts out her meds for her, repeated the new directions for her amlodipine again and she reports she will check with her brother to make sure she is taking the correct dose, informed pt to make sure she is taking the correct dose and have him monitor her b/p over the weekend  "

## 2022-11-17 ENCOUNTER — IMMUNIZATION (OUTPATIENT)
Dept: INTERNAL MEDICINE | Facility: CLINIC | Age: 87
End: 2022-11-17
Payer: MEDICARE

## 2022-11-17 ENCOUNTER — TELEPHONE (OUTPATIENT)
Dept: NEUROLOGY | Facility: CLINIC | Age: 87
End: 2022-11-17
Payer: MEDICARE

## 2022-11-17 ENCOUNTER — OFFICE VISIT (OUTPATIENT)
Dept: NEUROLOGY | Facility: CLINIC | Age: 87
End: 2022-11-17
Payer: MEDICARE

## 2022-11-17 VITALS
DIASTOLIC BLOOD PRESSURE: 61 MMHG | HEART RATE: 62 BPM | BODY MASS INDEX: 24.1 KG/M2 | SYSTOLIC BLOOD PRESSURE: 135 MMHG | HEIGHT: 63 IN

## 2022-11-17 DIAGNOSIS — R41.3 OTHER AMNESIA: Primary | ICD-10-CM

## 2022-11-17 DIAGNOSIS — F03.90 MULTIFACTORIAL DEMENTIA: ICD-10-CM

## 2022-11-17 DIAGNOSIS — Z23 NEED FOR VACCINATION: Primary | ICD-10-CM

## 2022-11-17 PROCEDURE — 1126F PR PAIN SEVERITY QUANTIFIED, NO PAIN PRESENT: ICD-10-PCS | Mod: CPTII,S$GLB,, | Performed by: PSYCHIATRY & NEUROLOGY

## 2022-11-17 PROCEDURE — 99999 PR PBB SHADOW E&M-EST. PATIENT-LVL II: ICD-10-PCS | Mod: PBBFAC,,, | Performed by: PSYCHIATRY & NEUROLOGY

## 2022-11-17 PROCEDURE — 99205 PR OFFICE/OUTPT VISIT, NEW, LEVL V, 60-74 MIN: ICD-10-PCS | Mod: S$GLB,,, | Performed by: PSYCHIATRY & NEUROLOGY

## 2022-11-17 PROCEDURE — 91312 COVID-19, MRNA, LNP-S, BIVALENT BOOSTER, PF, 30 MCG/0.3 ML DOSE: CPT | Mod: S$GLB,,, | Performed by: INTERNAL MEDICINE

## 2022-11-17 PROCEDURE — 91312 COVID-19, MRNA, LNP-S, BIVALENT BOOSTER, PF, 30 MCG/0.3 ML DOSE: ICD-10-PCS | Mod: S$GLB,,, | Performed by: INTERNAL MEDICINE

## 2022-11-17 PROCEDURE — 99205 OFFICE O/P NEW HI 60 MIN: CPT | Mod: S$GLB,,, | Performed by: PSYCHIATRY & NEUROLOGY

## 2022-11-17 PROCEDURE — 0124A COVID-19, MRNA, LNP-S, BIVALENT BOOSTER, PF, 30 MCG/0.3 ML DOSE: CPT | Mod: CV19,PBBFAC | Performed by: INTERNAL MEDICINE

## 2022-11-17 PROCEDURE — 99999 PR PBB SHADOW E&M-EST. PATIENT-LVL II: CPT | Mod: PBBFAC,,, | Performed by: PSYCHIATRY & NEUROLOGY

## 2022-11-17 PROCEDURE — 1126F AMNT PAIN NOTED NONE PRSNT: CPT | Mod: CPTII,S$GLB,, | Performed by: PSYCHIATRY & NEUROLOGY

## 2022-11-17 RX ORDER — MEMANTINE HYDROCHLORIDE 5 MG/1
5 TABLET ORAL 2 TIMES DAILY
Qty: 60 TABLET | Refills: 11 | Status: SHIPPED | OUTPATIENT
Start: 2022-11-17 | End: 2023-04-03

## 2022-11-17 NOTE — TELEPHONE ENCOUNTER
came into the clinic today as a new patient to see . I started to go through the rooming process and proceed to take her blood pressure and the results were 74/48 at first then did another set of blood pressure and came up with 78/49. I asked  if she is having any weakness, dizziness or lightheadedness she respond no to weakness, dizziness or lightheadedness but tired from not sleeping the night before.I informed  and Nurse Mallorie regarding this matter and proceed to take a manual blood pressure and her results were 80/52.
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
## 2022-11-24 ENCOUNTER — CLINICAL SUPPORT (OUTPATIENT)
Dept: CARDIOLOGY | Facility: HOSPITAL | Age: 87
End: 2022-11-24
Payer: MEDICARE

## 2022-11-24 DIAGNOSIS — Z95.810 PRESENCE OF AUTOMATIC (IMPLANTABLE) CARDIAC DEFIBRILLATOR: ICD-10-CM

## 2022-11-24 PROCEDURE — 93296 REM INTERROG EVL PM/IDS: CPT | Performed by: INTERNAL MEDICINE

## 2022-11-24 PROCEDURE — 93295 CARDIAC DEVICE CHECK - REMOTE: ICD-10-PCS | Mod: ,,, | Performed by: INTERNAL MEDICINE

## 2022-11-24 PROCEDURE — 93295 DEV INTERROG REMOTE 1/2/MLT: CPT | Mod: ,,, | Performed by: INTERNAL MEDICINE

## 2022-11-28 ENCOUNTER — HOSPITAL ENCOUNTER (OUTPATIENT)
Dept: RADIOLOGY | Facility: HOSPITAL | Age: 87
Discharge: HOME | End: 2022-11-28
Attending: PSYCHIATRY & NEUROLOGY
Payer: MEDICARE

## 2022-11-28 DIAGNOSIS — R41.3 OTHER AMNESIA: ICD-10-CM

## 2022-11-28 PROCEDURE — 70450 CT HEAD/BRAIN W/O DYE: CPT | Mod: TC,PO

## 2022-11-29 ENCOUNTER — TELEPHONE (OUTPATIENT)
Dept: NEUROLOGY | Facility: CLINIC | Age: 87
End: 2022-11-29
Payer: MEDICARE

## 2022-11-29 DIAGNOSIS — Z95.810 ICD (IMPLANTABLE CARDIOVERTER-DEFIBRILLATOR), DUAL, IN SITU: Primary | ICD-10-CM

## 2022-11-29 NOTE — TELEPHONE ENCOUNTER
----- Message from Demarco Fontanez MD sent at 11/28/2022  1:56 PM CST -----  Regarding: FW: pt appt  Contact: Susanna (adalgisa) @ [phone]  Is this just an appointment request?    ----- Message -----  From: Nancy Vogel MA  Sent: 11/28/2022  11:38 AM CST  To: Demarco Fontanez MD  Subject: FW: pt appt                                        ----- Message -----  From: Darius Tremayne Brown  Sent: 11/28/2022  10:35 AM CST  To: Huseyin SANDHU Staff  Subject: pt appt                                          Unable to Schedule Appointment in [facility]    Caller:Susanna, Adalgisa of Pt     Reason for call: Driving Test     Reason appt not scheduled:Unable to schedule     Patient's DX: Dementia    Patient requesting call back or Amandachroz msg: Call Back     Additional: Pt is currently an EST PT, Family advise pt has dementia and shouldn't be driving per OT.

## 2022-11-30 ENCOUNTER — TELEPHONE (OUTPATIENT)
Dept: ELECTROPHYSIOLOGY | Facility: CLINIC | Age: 87
End: 2022-11-30
Payer: MEDICARE

## 2022-11-30 NOTE — TELEPHONE ENCOUNTER
----- Message from Diony Coleman MD sent at 11/30/2022  2:52 PM CST -----  Regarding: RE: device  yes please    ----- Message -----  From: Bryant Briggs MA  Sent: 11/29/2022   4:07 PM CST  To: Diony Coleman MD  Subject: device                                           Do this patient need a device check for her appointment

## 2022-11-30 NOTE — TELEPHONE ENCOUNTER
Spoke with pt's brother to inform pt of device check being scheduled. She does not want to come in before 9 am. Appointment scheduled on 12/5 after echo. Appointment confirmed

## 2022-12-01 ENCOUNTER — OFFICE VISIT (OUTPATIENT)
Dept: OPHTHALMOLOGY | Facility: CLINIC | Age: 87
End: 2022-12-01
Payer: MEDICARE

## 2022-12-01 ENCOUNTER — PATIENT MESSAGE (OUTPATIENT)
Dept: NEUROLOGY | Facility: CLINIC | Age: 87
End: 2022-12-01
Payer: MEDICARE

## 2022-12-01 DIAGNOSIS — H35.3122 NONEXUDATIVE AGE-RELATED MACULAR DEGENERATION, LEFT EYE, INTERMEDIATE DRY STAGE: ICD-10-CM

## 2022-12-01 DIAGNOSIS — H35.3213 EXUDATIVE AGE-RELATED MACULAR DEGENERATION, RIGHT EYE, WITH INACTIVE SCAR: Primary | ICD-10-CM

## 2022-12-01 PROCEDURE — 1126F PR PAIN SEVERITY QUANTIFIED, NO PAIN PRESENT: ICD-10-PCS | Mod: CPTII,S$GLB,, | Performed by: OPHTHALMOLOGY

## 2022-12-01 PROCEDURE — 1126F AMNT PAIN NOTED NONE PRSNT: CPT | Mod: CPTII,S$GLB,, | Performed by: OPHTHALMOLOGY

## 2022-12-01 PROCEDURE — 92012 PR EYE EXAM, EST PATIENT,INTERMED: ICD-10-PCS | Mod: S$GLB,,, | Performed by: OPHTHALMOLOGY

## 2022-12-01 PROCEDURE — 1160F PR REVIEW ALL MEDS BY PRESCRIBER/CLIN PHARMACIST DOCUMENTED: ICD-10-PCS | Mod: CPTII,S$GLB,, | Performed by: OPHTHALMOLOGY

## 2022-12-01 PROCEDURE — 3288F FALL RISK ASSESSMENT DOCD: CPT | Mod: CPTII,S$GLB,, | Performed by: OPHTHALMOLOGY

## 2022-12-01 PROCEDURE — 1160F RVW MEDS BY RX/DR IN RCRD: CPT | Mod: CPTII,S$GLB,, | Performed by: OPHTHALMOLOGY

## 2022-12-01 PROCEDURE — 99999 PR PBB SHADOW E&M-EST. PATIENT-LVL III: CPT | Mod: PBBFAC,,, | Performed by: OPHTHALMOLOGY

## 2022-12-01 PROCEDURE — 1101F PR PT FALLS ASSESS DOC 0-1 FALLS W/OUT INJ PAST YR: ICD-10-PCS | Mod: CPTII,S$GLB,, | Performed by: OPHTHALMOLOGY

## 2022-12-01 PROCEDURE — 92134 CPTRZ OPH DX IMG PST SGM RTA: CPT | Mod: S$GLB,,, | Performed by: OPHTHALMOLOGY

## 2022-12-01 PROCEDURE — 1159F PR MEDICATION LIST DOCUMENTED IN MEDICAL RECORD: ICD-10-PCS | Mod: CPTII,S$GLB,, | Performed by: OPHTHALMOLOGY

## 2022-12-01 PROCEDURE — 92134 POSTERIOR SEGMENT OCT RETINA (OCULAR COHERENCE TOMOGRAPHY)-BOTH EYES: ICD-10-PCS | Mod: S$GLB,,, | Performed by: OPHTHALMOLOGY

## 2022-12-01 PROCEDURE — 92012 INTRM OPH EXAM EST PATIENT: CPT | Mod: S$GLB,,, | Performed by: OPHTHALMOLOGY

## 2022-12-01 PROCEDURE — 1101F PT FALLS ASSESS-DOCD LE1/YR: CPT | Mod: CPTII,S$GLB,, | Performed by: OPHTHALMOLOGY

## 2022-12-01 PROCEDURE — 3288F PR FALLS RISK ASSESSMENT DOCUMENTED: ICD-10-PCS | Mod: CPTII,S$GLB,, | Performed by: OPHTHALMOLOGY

## 2022-12-01 PROCEDURE — 99999 PR PBB SHADOW E&M-EST. PATIENT-LVL III: ICD-10-PCS | Mod: PBBFAC,,, | Performed by: OPHTHALMOLOGY

## 2022-12-01 PROCEDURE — 1159F MED LIST DOCD IN RCRD: CPT | Mod: CPTII,S$GLB,, | Performed by: OPHTHALMOLOGY

## 2022-12-04 NOTE — PROGRESS NOTES
Subjective:       Patient ID: Lynn Mckinney is a 87 y.o. female      Chief Complaint   Patient presents with    Macular Degeneration     History of Present Illness  HPI    2 mo OCT/DFE     DLS 08/04/2022  by Dr. SUDHA Carver MD    Pt unhappy with vision.  Feels that it's blurry.  Wishes OD got better.    Vision stable OS    -flashes/floaters  -headaches  -diplopia  -curtains/shadows/veils    Eye Meds: Areds Vitamins    POHx:   1. 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 Macular Subretinal hem. OD  Last edited by Flako Carver MD on 12/4/2022  4:47 PM.        Imaging:    See report    Assessment/Plan:     1. Exudative age-related macular degeneration, right eye, with active choroidal neovascularization    2. Macular scar right    S/p PPV for massive SRH and Avastin injections  No active heme today  Has large SR scar which will limit Va  Discussed in detail  Rec obs and injection only if sig worsening  Now is off of blood thinners  - OCT- Retina; Future  - Posterior Segment OCT Retina-Both eyes    3. Nonexudative age-related macular degeneration, left eye, intermediate dry stage  Discussed Dry and Wet AMD in detail  Recommend AREDS 2 Vitamins--continue  Home A[employer]ler Grid Testing- given and instructed today  RTC immediately PRN any changes in vision    - Posterior Segment OCT Retina-Both eyes    Follow up in about 4 months (around 4/1/2023), or if symptoms worsen or fail to improve, for Comprehensive Examination, OCT Mac.

## 2022-12-05 ENCOUNTER — CLINICAL SUPPORT (OUTPATIENT)
Dept: CARDIOLOGY | Facility: HOSPITAL | Age: 87
End: 2022-12-05
Attending: INTERNAL MEDICINE
Payer: MEDICARE

## 2022-12-05 ENCOUNTER — HOSPITAL ENCOUNTER (OUTPATIENT)
Dept: CARDIOLOGY | Facility: HOSPITAL | Age: 87
Discharge: HOME | End: 2022-12-05
Attending: INTERNAL MEDICINE
Payer: MEDICARE

## 2022-12-05 VITALS
WEIGHT: 136 LBS | SYSTOLIC BLOOD PRESSURE: 132 MMHG | HEART RATE: 70 BPM | BODY MASS INDEX: 24.1 KG/M2 | DIASTOLIC BLOOD PRESSURE: 85 MMHG | HEIGHT: 63 IN

## 2022-12-05 DIAGNOSIS — Z95.810 ICD (IMPLANTABLE CARDIOVERTER-DEFIBRILLATOR), DUAL, IN SITU: ICD-10-CM

## 2022-12-05 LAB
ASCENDING AORTA: 3.33 CM
AV INDEX (PROSTH): 0.68
AV MEAN GRADIENT: 3 MMHG
AV PEAK GRADIENT: 7 MMHG
AV VALVE AREA: 2.22 CM2
AV VELOCITY RATIO: 0.58
BSA FOR ECHO PROCEDURE: 1.66 M2
CV ECHO LV RWT: 0.34 CM
DOP CALC AO PEAK VEL: 1.28 M/S
DOP CALC AO VTI: 22.97 CM
DOP CALC LVOT AREA: 3.3 CM2
DOP CALC LVOT DIAMETER: 2.04 CM
DOP CALC LVOT PEAK VEL: 0.74 M/S
DOP CALC LVOT STROKE VOLUME: 51.06 CM3
DOP CALCLVOT PEAK VEL VTI: 15.63 CM
E WAVE DECELERATION TIME: 285.77 MSEC
E/A RATIO: 0.8
E/E' RATIO: 18.6 M/S
ECHO LV POSTERIOR WALL: 0.8 CM (ref 0.6–1.1)
EJECTION FRACTION: 35 %
FRACTIONAL SHORTENING: 24 % (ref 28–44)
INTERVENTRICULAR SEPTUM: 1.08 CM (ref 0.6–1.1)
LA MAJOR: 6.83 CM
LA MINOR: 6.76 CM
LA WIDTH: 3.54 CM
LEFT ATRIUM SIZE: 5.34 CM
LEFT ATRIUM VOLUME INDEX MOD: 36.3 ML/M2
LEFT ATRIUM VOLUME INDEX: 66.6 ML/M2
LEFT ATRIUM VOLUME MOD: 59.54 CM3
LEFT ATRIUM VOLUME: 109.18 CM3
LEFT INTERNAL DIMENSION IN SYSTOLE: 3.59 CM (ref 2.1–4)
LEFT VENTRICLE DIASTOLIC VOLUME INDEX: 63 ML/M2
LEFT VENTRICLE DIASTOLIC VOLUME: 103.32 ML
LEFT VENTRICLE MASS INDEX: 93 G/M2
LEFT VENTRICLE SYSTOLIC VOLUME INDEX: 33 ML/M2
LEFT VENTRICLE SYSTOLIC VOLUME: 54.04 ML
LEFT VENTRICULAR INTERNAL DIMENSION IN DIASTOLE: 4.72 CM (ref 3.5–6)
LEFT VENTRICULAR MASS: 152.31 G
LV LATERAL E/E' RATIO: 15.5 M/S
LV SEPTAL E/E' RATIO: 23.25 M/S
MV PEAK A VEL: 1.16 M/S
MV PEAK E VEL: 0.93 M/S
MV STENOSIS PRESSURE HALF TIME: 82.87 MS
MV VALVE AREA P 1/2 METHOD: 2.65 CM2
PISA TR MAX VEL: 2.43 M/S
QEF: 35 %
RA MAJOR: 5.09 CM
RA PRESSURE: 3 MMHG
RA WIDTH: 3.11 CM
RIGHT VENTRICULAR END-DIASTOLIC DIMENSION: 2.91 CM
RV TISSUE DOPPLER FREE WALL SYSTOLIC VELOCITY 1 (APICAL 4 CHAMBER VIEW): 6.17 CM/S
SINUS: 3.22 CM
STJ: 3.02 CM
TDI LATERAL: 0.06 M/S
TDI SEPTAL: 0.04 M/S
TDI: 0.05 M/S
TR MAX PG: 24 MMHG
TRICUSPID ANNULAR PLANE SYSTOLIC EXCURSION: 1.32 CM
TV REST PULMONARY ARTERY PRESSURE: 27 MMHG

## 2022-12-05 PROCEDURE — 93306 TTE W/DOPPLER COMPLETE: CPT

## 2022-12-05 PROCEDURE — 93283 CARDIAC DEVICE CHECK - IN CLINIC & HOSPITAL: ICD-10-PCS | Mod: 26,,, | Performed by: INTERNAL MEDICINE

## 2022-12-05 PROCEDURE — 93283 PRGRMG EVAL IMPLANTABLE DFB: CPT | Mod: 26,,, | Performed by: INTERNAL MEDICINE

## 2022-12-05 PROCEDURE — 93283 PRGRMG EVAL IMPLANTABLE DFB: CPT

## 2022-12-05 PROCEDURE — 93306 ECHO (CUPID ONLY): ICD-10-PCS | Mod: 26,,, | Performed by: INTERNAL MEDICINE

## 2022-12-05 PROCEDURE — 93306 TTE W/DOPPLER COMPLETE: CPT | Mod: 26,,, | Performed by: INTERNAL MEDICINE

## 2022-12-09 ENCOUNTER — TELEPHONE (OUTPATIENT)
Dept: ELECTROPHYSIOLOGY | Facility: CLINIC | Age: 87
End: 2022-12-09

## 2022-12-09 ENCOUNTER — OFFICE VISIT (OUTPATIENT)
Dept: ELECTROPHYSIOLOGY | Facility: CLINIC | Age: 87
End: 2022-12-09
Payer: MEDICARE

## 2022-12-09 ENCOUNTER — HOSPITAL ENCOUNTER (OUTPATIENT)
Dept: CARDIOLOGY | Facility: CLINIC | Age: 87
Discharge: HOME | End: 2022-12-09
Payer: MEDICARE

## 2022-12-09 VITALS — DIASTOLIC BLOOD PRESSURE: 55 MMHG | SYSTOLIC BLOOD PRESSURE: 116 MMHG | HEART RATE: 60 BPM

## 2022-12-09 DIAGNOSIS — Z95.810 ICD (IMPLANTABLE CARDIOVERTER-DEFIBRILLATOR), DUAL, IN SITU: ICD-10-CM

## 2022-12-09 DIAGNOSIS — I44.2 CHB (COMPLETE HEART BLOCK): Primary | ICD-10-CM

## 2022-12-09 DIAGNOSIS — I25.110 ATHEROSCLEROSIS OF NATIVE CORONARY ARTERY OF NATIVE HEART WITH UNSTABLE ANGINA PECTORIS: ICD-10-CM

## 2022-12-09 DIAGNOSIS — N18.30 STAGE 3 CHRONIC KIDNEY DISEASE, UNSPECIFIED WHETHER STAGE 3A OR 3B CKD: ICD-10-CM

## 2022-12-09 DIAGNOSIS — I47.20 VT (VENTRICULAR TACHYCARDIA): ICD-10-CM

## 2022-12-09 DIAGNOSIS — I70.0 AORTIC ATHEROSCLEROSIS: ICD-10-CM

## 2022-12-09 DIAGNOSIS — I49.01 VF (VENTRICULAR FIBRILLATION): Primary | ICD-10-CM

## 2022-12-09 DIAGNOSIS — I49.9 CARDIAC ARRHYTHMIA, UNSPECIFIED CARDIAC ARRHYTHMIA TYPE: ICD-10-CM

## 2022-12-09 DIAGNOSIS — I49.01 VF (VENTRICULAR FIBRILLATION): ICD-10-CM

## 2022-12-09 DIAGNOSIS — I44.2 CHB (COMPLETE HEART BLOCK): ICD-10-CM

## 2022-12-09 DIAGNOSIS — I10 PRIMARY HYPERTENSION: ICD-10-CM

## 2022-12-09 DIAGNOSIS — E78.5 HYPERLIPIDEMIA, UNSPECIFIED HYPERLIPIDEMIA TYPE: ICD-10-CM

## 2022-12-09 PROCEDURE — 1101F PT FALLS ASSESS-DOCD LE1/YR: CPT | Mod: CPTII,S$GLB,, | Performed by: INTERNAL MEDICINE

## 2022-12-09 PROCEDURE — 99999 PR PBB SHADOW E&M-EST. PATIENT-LVL III: ICD-10-PCS | Mod: PBBFAC,,, | Performed by: INTERNAL MEDICINE

## 2022-12-09 PROCEDURE — 99215 OFFICE O/P EST HI 40 MIN: CPT | Mod: S$GLB,,, | Performed by: INTERNAL MEDICINE

## 2022-12-09 PROCEDURE — 1159F MED LIST DOCD IN RCRD: CPT | Mod: CPTII,S$GLB,, | Performed by: INTERNAL MEDICINE

## 2022-12-09 PROCEDURE — 1159F PR MEDICATION LIST DOCUMENTED IN MEDICAL RECORD: ICD-10-PCS | Mod: CPTII,S$GLB,, | Performed by: INTERNAL MEDICINE

## 2022-12-09 PROCEDURE — 3288F FALL RISK ASSESSMENT DOCD: CPT | Mod: CPTII,S$GLB,, | Performed by: INTERNAL MEDICINE

## 2022-12-09 PROCEDURE — 99215 PR OFFICE/OUTPT VISIT, EST, LEVL V, 40-54 MIN: ICD-10-PCS | Mod: S$GLB,,, | Performed by: INTERNAL MEDICINE

## 2022-12-09 PROCEDURE — 93010 RHYTHM STRIP: ICD-10-PCS | Mod: S$GLB,,, | Performed by: INTERNAL MEDICINE

## 2022-12-09 PROCEDURE — 3288F PR FALLS RISK ASSESSMENT DOCUMENTED: ICD-10-PCS | Mod: CPTII,S$GLB,, | Performed by: INTERNAL MEDICINE

## 2022-12-09 PROCEDURE — 1160F RVW MEDS BY RX/DR IN RCRD: CPT | Mod: CPTII,S$GLB,, | Performed by: INTERNAL MEDICINE

## 2022-12-09 PROCEDURE — 93005 ELECTROCARDIOGRAM TRACING: CPT | Mod: S$GLB,,, | Performed by: INTERNAL MEDICINE

## 2022-12-09 PROCEDURE — 99999 PR PBB SHADOW E&M-EST. PATIENT-LVL III: CPT | Mod: PBBFAC,,, | Performed by: INTERNAL MEDICINE

## 2022-12-09 PROCEDURE — 1160F PR REVIEW ALL MEDS BY PRESCRIBER/CLIN PHARMACIST DOCUMENTED: ICD-10-PCS | Mod: CPTII,S$GLB,, | Performed by: INTERNAL MEDICINE

## 2022-12-09 PROCEDURE — 1101F PR PT FALLS ASSESS DOC 0-1 FALLS W/OUT INJ PAST YR: ICD-10-PCS | Mod: CPTII,S$GLB,, | Performed by: INTERNAL MEDICINE

## 2022-12-09 PROCEDURE — 93010 ELECTROCARDIOGRAM REPORT: CPT | Mod: S$GLB,,, | Performed by: INTERNAL MEDICINE

## 2022-12-09 PROCEDURE — 1126F PR PAIN SEVERITY QUANTIFIED, NO PAIN PRESENT: ICD-10-PCS | Mod: CPTII,S$GLB,, | Performed by: INTERNAL MEDICINE

## 2022-12-09 PROCEDURE — 93005 RHYTHM STRIP: ICD-10-PCS | Mod: S$GLB,,, | Performed by: INTERNAL MEDICINE

## 2022-12-09 PROCEDURE — 1126F AMNT PAIN NOTED NONE PRSNT: CPT | Mod: CPTII,S$GLB,, | Performed by: INTERNAL MEDICINE

## 2022-12-09 NOTE — PROGRESS NOTES
Subjective:   Patient ID:  Lynn Mckinney is a 87 y.o. female who presents for follow-up of ICM, VT    Ms. Mckinney is an 87 y.o. female with pAF (distant past), CAD (s/p [facility] 2001), HTN, PVD, PPM (10/2013) here for follow up after ICD upgrade.      pAF (in past? No sustained AF seen on device reports)  CAD  CABG 2001   HL on meds   HTN on meds   PVD carotidStent  holter showed 2:1 AV block, AVWB. Also has RBBB and LAFB.  PPM placed 10/13. Feels well since. C/o PARMAR at 2 flights (stable), but exertion is limited mostly by PAD.     3/10/2021: She was called today regarding a VF episode that occurred at 0413 this morning which lasted 25 seconds. Patient reports being awake during this time, but denies any symptoms.   Patient admitted to CCU after episode of asymptomatic VT on PPM. LHC completed on 3/12 with SHEREEN SVG to RCA. Patient evaluated by EP with AICD placement on 3/12.     She c/o PARMAR with long walks, consistent with NYHA III heart failure symptoms.    Echo 3/21 55% -> 8/22 45-50% -> 35%  ICD: 98% V-paced    My interpretation of today's ECG is ASVP    Interval History:  Patient denies chest pain fevers chills SOB.   LVEF remains depressed, with nearly 100% V pacing.      Current Outpatient Medications   Medication Sig    amLODIPine (NORVASC) 5 MG tablet TAKE 1 TABLET BY MOUTH EVERY DAY    aspirin 81 MG Chew Take 81 mg by mouth every evening. 1 Tablet, Chewable Oral Every day    CALCIUM CARBONATE/VITAMIN D3 (CALTRATE 600 + D ORAL) Take 1 tablet by mouth once daily.    clopidogreL (PLAVIX) 75 mg tablet Take 1 tablet (75 mg total) by mouth once daily.    clopidogreL (PLAVIX) 75 mg tablet Take 1 tablet (75 mg total) by mouth once daily.    coenzyme Q10 200 mg capsule Take 200 mg by mouth once daily.    ergocalciferol, vitamin D2, 400 unit Tab Take 2,000 Units by mouth once daily.     irbesartan (AVAPRO) 300 MG tablet TAKE 1 TABLET (300 MG TOTAL) BY MOUTH EVERY EVENING. CANCEL IRBESARTAN/HCTZ COMBO    labetaloL  (NORMODYNE) 200 MG tablet TAKE 1 TABLET BY MOUTH TWICE A DAY    LACTOSE-FREE FOOD (BOOST ORAL) Take by mouth. 2-3 days per week    levothyroxine (SYNTHROID) 112 MCG tablet TAKE 1 TABLET BY MOUTH EVERY DAY FOR 6 WEEKS AND 1/2 TABLET ON SUNDAY    multivitamin (THERAGRAN) per tablet Take by mouth. Pt taking one pill three times a week.    nitroGLYCERIN (NITROSTAT) 0.4 MG SL tablet PLACE 1 TABLET UNDER THE TONGUE EVERY 5 MIN AS NEEDED FOR CHEST PAIN. MAX:3 DOSES    rosuvastatin (CRESTOR) 10 MG tablet Take 1 tablet (10 mg total) by mouth every evening.    SALMON OIL-OMEGA-3 FATTY ACIDS ORAL Take 1 capsule by mouth once daily.     No current facility-administered medications for this visit.       Review of Systems   Constitutional: Positive for malaise/fatigue.   Cardiovascular:  Positive for dyspnea on exertion. Negative for chest pain, irregular heartbeat, leg swelling and palpitations.   Respiratory:  Negative for shortness of breath.    Hematologic/Lymphatic: Negative for bleeding problem. Bruises/bleeds easily.   Skin:  Negative for rash.   Musculoskeletal:  Negative for myalgias.   Gastrointestinal:  Negative for hematemesis, hematochezia and nausea.   Genitourinary:  Negative for hematuria.   Neurological:  Positive for loss of balance. Negative for light-headedness.   Psychiatric/Behavioral:  Negative for altered mental status.    Allergic/Immunologic: Negative for persistent infections.     Objective:          BP (!) 116/55   Pulse 60     Physical Exam  Vitals and nursing note reviewed.   Constitutional:       Appearance: Normal appearance. She is well-developed.   HENT:      Head: Normocephalic and atraumatic.      Nose: Nose normal.   Eyes:      Conjunctiva/sclera: Conjunctivae normal.      Pupils: Pupils are equal, round, and reactive to light.   Neck:      Thyroid: No thyroid mass or thyromegaly.      Trachea: No tracheal deviation.   Cardiovascular:      Rate and Rhythm: Normal rate and regular rhythm.    Pulmonary:      Effort: Pulmonary effort is normal. No respiratory distress.      Breath sounds: No wheezing.   Chest:      Comments: Device to LUCW. Incision and pocket in good repair.  Abdominal:      General: There is no distension.   Musculoskeletal:         General: Normal range of motion.      Cervical back: Normal range of motion. No edema.   Skin:     General: Skin is warm and dry.      Findings: No erythema or rash.   Neurological:      Mental Status: She is alert and oriented to person, place, and time.      Coordination: Coordination normal.   Psychiatric:         Speech: Speech normal.         Behavior: Behavior normal. Behavior is cooperative.         Thought Content: Thought content normal.         Lab Results   Component Value Date     05/19/2022    K 4.1 05/19/2022    MG 2.0 05/31/2022    BUN 27 (H) 05/19/2022    CREATININE 1.03 05/19/2022    ALT 14 05/19/2022    AST 27 05/19/2022    HGB 13.7 05/19/2022    HCT 40.8 05/19/2022    TSH 96.744 (H) 08/31/2022    LDLCALC 90.0 05/19/2022       Recent Labs   Lab 03/11/21  0357   INR 1.0           Assessment:     1. CHB (complete heart block)    2. Coronary artery disease (CAD)    3. Hyperlipidemia, unspecified hyperlipidemia type    4. Primary hypertension    5. ICD (implantable cardioverter-defibrillator), dual, in situ    6. VF (ventricular fibrillation)    7. VT (ventricular tachycardia)    8. Stage 3 chronic kidney disease, unspecified whether stage 3a or 3b CKD    9. Aortic atherosclerosis        Plan:     In summary, Ms. Mckinney is an 87 y.o. female with pAF (distant past), CAD (s/p [facility] 2001), HTN, PVD, PPM (10/2013) here for follow up after ICD upgrade for VT.    I spent about a half hour discussing the risks and benefits of ICD upgrade to CRT-D. Our discussion of risks included (but was not limited to) the possibility of infection, death, stroke, MI, pneumothorax, embolism, cardiac perforation, cardiac tamponade, renal injury, and  bleeding.  I discussed with patient risks, indications, benefits, and alternatives of the planned procedure. All questions were answered. Patient understands and wishes to proceed.

## 2022-12-26 ENCOUNTER — HOSPITAL ENCOUNTER (EMERGENCY)
Facility: HOSPITAL | Age: 87
Discharge: HOME | End: 2022-12-26
Attending: EMERGENCY MEDICINE
Payer: MEDICARE

## 2022-12-26 VITALS
DIASTOLIC BLOOD PRESSURE: 72 MMHG | HEIGHT: 64 IN | BODY MASS INDEX: 23.9 KG/M2 | HEART RATE: 68 BPM | RESPIRATION RATE: 18 BRPM | OXYGEN SATURATION: 98 % | TEMPERATURE: 98 F | SYSTOLIC BLOOD PRESSURE: 164 MMHG | WEIGHT: 140 LBS

## 2022-12-26 DIAGNOSIS — F03.94 DEMENTIA WITH ANXIETY, UNSPECIFIED DEMENTIA SEVERITY, UNSPECIFIED DEMENTIA TYPE: Primary | ICD-10-CM

## 2022-12-26 PROCEDURE — 99283 EMERGENCY DEPT VISIT LOW MDM: CPT | Mod: ER

## 2022-12-26 PROCEDURE — 25000003 PHARM REV CODE 250: Mod: ER | Performed by: EMERGENCY MEDICINE

## 2022-12-26 RX ORDER — HYDROXYZINE HYDROCHLORIDE 25 MG/1
25 TABLET, FILM COATED ORAL 3 TIMES DAILY PRN
Qty: 15 TABLET | Refills: 0 | Status: ON HOLD | OUTPATIENT
Start: 2022-12-26 | End: 2023-05-05 | Stop reason: HOSPADM

## 2022-12-26 RX ORDER — HYDROXYZINE PAMOATE 25 MG/1
25 CAPSULE ORAL
Status: COMPLETED | OUTPATIENT
Start: 2022-12-26 | End: 2022-12-26

## 2022-12-26 RX ADMIN — HYDROXYZINE PAMOATE 25 MG: 25 CAPSULE ORAL at 03:12

## 2022-12-26 NOTE — ED PROVIDER NOTES
"Encounter Date: 12/26/2022       History     Chief Complaint   Patient presents with    Anxiety     Family reports pts anxiety has been really bad the last couple days. Pt reports she has dementia and she wants to go home but they wont let her. Family reports that she can not stay by herself     HPI  This is a 87 y.o. female who has a past medical history of Allergy, Blood clotting tendency, Carotid artery disease (5/29/2014), Coronary artery disease, Heart block, Hyperlipidemia, Hypertension, Hypothyroid, Obesity, Pacemaker, PAD (peripheral artery disease), Spinal stenosis, Thoracic or lumbosacral neuritis or radiculitis (11/25/2014), and Tubular adenoma.     The patient presents to the Emergency Department with anxiety attack.   Hx limited slightly due to pt dementia  Hx provided by both the patient, pt's brother and pt's niece.  Patient wants to live in her house but understands that brother/niece say that she is demented and cannot.   She has not found anyone to live with her yet.  Currently living with the niece.  Brother pays bills, they all do shopping for food. Pt does not cook but can make a sandwich and coffee.  Pt has driving test tomorrow, per niece.  Pt is fairly independent at home, dresses self and showers by herself.     Pt states that she went into a crying fit today bc she wants to go home.  Brother mentions that this is common though pt forgets.  Last episode was yesterday and pt was being argumentative.    Family requesting something for patient's anxiety.      Review of patient's allergies indicates:   Allergen Reactions    Lipitor [atorvastatin] Itching    Fosamax [alendronate]      Felt "strange"    Iodinated contrast media      Dye is only to be used if absolutely needed because of kidney function    Prolia [denosumab]      Reaction after the Prolia    Sulfa (sulfonamide antibiotics)      Past Medical History:   Diagnosis Date    Allergy     seasonal    Blood clotting tendency     Carotid " artery disease 5/29/2014    Coronary artery disease     Heart block     Hyperlipidemia     Hypertension     Hypothyroid     Obesity     Pacemaker     PAD (peripheral artery disease)     Spinal stenosis     Thoracic or lumbosacral neuritis or radiculitis 11/25/2014    Tubular adenoma      Past Surgical History:   Procedure Laterality Date    ANGIOPLASTY      APPENDECTOMY      BREAST CYST ASPIRATION Bilateral     CARDIAC CATHETERIZATION      CARDIAC PACEMAKER PLACEMENT  10/10/13    by Dr. Coleman    CAROTID STENT Left 2007    by Dr. Bowen    CATARACT EXTRACTION W/  INTRAOCULAR LENS IMPLANT Bilateral 2012    Dr Sharp    CORONARY ARTERY BYPASS GRAFT  2001     x 3 LIMA-Ramus, SVG-OM1 & SVG-PDA    CORONARY BYPASS GRAFT ANGIOGRAPHY  3/11/2021    Procedure: Bypass graft study;  Surgeon: Gil Garcia MD;  Location: [facility] CATH LAB;  Service: Cardiology;;    CYST REMOVAL      wrist    EYE SURGERY      HYSTERECTOMY      LEFT HEART CATHETERIZATION Left 3/11/2021    Procedure: Left heart cath;  Surgeon: Gil Garcia MD;  Location: [facility] CATH LAB;  Service: Cardiology;  Laterality: Left;    OOPHORECTOMY      THYROIDECTOMY      VITRECTOMY BY PARS PLANA APPROACH Right 10/12/2021    Procedure: VITRECTOMY, PARS PLANA APPROACH;  Surgeon: Flako Carver MD;  Location: [facility] OR [address] [place] [address];  Service: Ophthalmology;  Laterality: Right;     Family History   Problem Relation Age of Onset    Heart disease Father         coronary thrombosis    Hypertension Father     Alzheimer's disease Mother     Heart disease Mother         pacemaker    Hypertension Mother     Diabetes Brother     No Known Problems Sister     No Known Problems Maternal Grandmother     No Known Problems Maternal Grandfather     No Known Problems Paternal Grandmother     No Known Problems Paternal Grandfather     Heart disease Brother     No Known Problems Maternal Aunt     No Known Problems Maternal Uncle     No Known Problems Paternal Aunt     No Known Problems  Paternal Uncle     Amblyopia Neg Hx     Blindness Neg Hx     Cancer Neg Hx     Cataracts Neg Hx     Glaucoma Neg Hx     Macular degeneration Neg Hx     Retinal detachment Neg Hx     Strabismus Neg Hx     Stroke Neg Hx     Thyroid disease Neg Hx     Melanoma Neg Hx      Social History     Tobacco Use    Smoking status: Former     Types: Cigarettes     Quit date: 1984     Years since quittin.3    Smokeless tobacco: Never   Substance Use Topics    Alcohol use: Yes     Alcohol/week: 1.0 standard drink     Types: 1 Shots of liquor per week     Comment: old fashion  with dinner every 6 mo     Drug use: No     Review of Systems   Psychiatric/Behavioral:  Positive for confusion. The patient is nervous/anxious.    All other systems reviewed and are negative.    Physical Exam     Initial Vitals [22 1418]   BP Pulse Resp Temp SpO2   (!) 164/72 68 18 98.2 °F (36.8 °C) 98 %      MAP       --         Physical Exam    Nursing note and vitals reviewed.  Constitutional: She appears well-developed and well-nourished. She is not diaphoretic. No distress.   HENT:   Head: Normocephalic and atraumatic.   Mouth/Throat: Oropharynx is clear and moist.   Eyes: Conjunctivae are normal.   Cardiovascular:  Normal rate, regular rhythm and intact distal pulses.           Pulmonary/Chest: No respiratory distress.   Musculoskeletal:         General: Normal range of motion.     Neurological: She is alert.   Oriented x2.  MATTHEW, conversant, limited recall though can spell well and compute/count simple functions.    Skin: Skin is warm and dry. Capillary refill takes less than 2 seconds. No rash noted. No erythema.   Psychiatric: She has a normal mood and affect. Her speech is normal and behavior is normal. Judgment and thought content normal. She exhibits abnormal recent memory. She exhibits normal remote memory.       ED Course   Procedures  Labs Reviewed - No data to display       Imaging Results    None          Medications    hydrOXYzine pamoate capsule 25 mg (25 mg Oral Given 12/26/22 1524)     Medical Decision Making:   Initial Assessment:   Evaluation for Emergency Medical Condition  The patient received a medical screening exam and within a reasonable degree of clinical confidence an emergency medical condition has not been identified.      Patient presents with anxiety secondary to being outside of the home and living with the niece.  Patient has concerns of her independence and also on familiarity with other people at niece's house.  I think that both patient and family are in agreement that they are going to attempt to get somebody to stay with the patient, though family believes that assisted living might be the better option for her.  Instructed family that considering patient's dementia that they are the ones that are going to need to find assistance for the patient whether at her own house or otherwise.  They expressed understanding, agree with plan for discharge at this time.  Rx for hydroxyzine as needed for anxiety.                        Clinical Impression:   Final diagnoses:  [F03.94] Dementia with anxiety, unspecified dementia severity, unspecified dementia type (Primary)        ED Disposition Condition    Discharge Stable          ED Prescriptions       Medication Sig Dispense Start Date End Date Auth. Provider    hydrOXYzine HCL (ATARAX) 25 MG tablet Take 1 tablet (25 mg total) by mouth 3 (three) times daily as needed for Anxiety. 15 tablet 12/26/2022 -- Suhail Larsen MD          Follow-up Information       Follow up With Specialties Details Why Contact Info    Brie Fagan MD Internal Medicine Schedule an appointment as soon as possible for a visit   [address] [facility] [address]  [phone]               Suhail Larsen MD  12/26/22 5828

## 2022-12-26 NOTE — ED NOTES
Pt assisted to bed. Pt identifiers verified.  Presents to ED with brother and niece, primary care-givers, who are having difficulty with her care. Patient has hx of dementia and they believe it has worsened since the ICD placement after an episode of Vfib.     APPEARANCE: Pt is awake and alert. Pt is clean and well groomed with clothes appropriately fastened.   RESPIRATORY: Respirations are even and unlabored. Airway is patent.   SKIN: Skin is warm, dry, intact and appropriately colored for ethnicity.   NEURO: Pt is moving all extremities without difficulty. Sensation is intact. Speech is clear and appropriate. Facial movement is symmetrical.   CARDIAC: No edema noted. Peripheral pulses are intact and palpable. Cap refill is <3 seconds.     Bed is in low, locked position with side rails upx2. Call light is in reach.

## 2022-12-27 ENCOUNTER — CLINICAL SUPPORT (OUTPATIENT)
Dept: REHABILITATION | Facility: HOSPITAL | Age: 87
End: 2022-12-27
Payer: MEDICARE

## 2022-12-27 DIAGNOSIS — R41.3 MEMORY DIFFICULTIES: Primary | ICD-10-CM

## 2022-12-27 DIAGNOSIS — F03.90 MULTIFACTORIAL DEMENTIA: ICD-10-CM

## 2022-12-27 NOTE — PROGRESS NOTES
Occupational Therapy   Driving Evaluation Clinic Only     Lynn Mckinney   MRN: [id number]     Referring Physician: Demarco Fontanez MD  Diagnosis: Multifactorial dementia   History: Pt is currently a licensed  but has been referred to Occupational Therapy by her MD for clinical and on-road testing to be used for driving evaluation. Ms. Todd arrived to the session today and was accompanied by her brother and her niece.     Ms. Todd could not find her 's license in her purse, only her Impaired mobility card # [id number]      SUBJECTIVE:    Ms. Mckinney said that her family set her up for this as they do not want her to drive. During testing she stated that she did not like these type of tests and felt that they were ridiculous.     OBJECTIVE:   Physical Function Testing:    ROM:  WFLs B UE's    Head / Neck ROM: WFL's   Pt is Left hand dominant, but stated that she writes with her Right hand and does all other tasks Left handed   Strength: WFL's B UEs  Balance:    Static and Dynamic sitting- Normal   Static Standing - Good     Dynamic standing - Fair    Transfers and Mobility: Modified Independent for increased time but she does not use a device   Rapid Pace Walk: 9.4 seconds which is slower than the average standard of 8 seconds    Perceptual testing:   Letter cancellation:  31/34, a non-passing score where testing was completed in 2 minutes 56 seconds, average time for completion is 1 minute 15 seconds   Line bisection:  WNL's, but multiple explanation was needed for the task  Maze Test - 59  seconds, 1 error(s)  (Cut point score is 60 seconds or less with 1 error or less)   Trail Making Test A - 99 seconds, no error(s) (Average 29 seconds, Deficient > 78 seconds, rule of thumb: most in 90 seconds)  Trail Making Test B - Unable to complete even with multiple instructions repeated. Testing stopped after 180 seconds with multiple errors.(Average 75 seconds, Deficient > 273 seconds, rule of thumb: most  in 3 minutes)  Motor Free Visual Perception Test (MVPT),  which assesses figure ground, visual discrimination, spatial relationships, visual closure and visual memory, 23/36, a non passing score, where testing was competed in 21 minutes 39 seconds, average time for completion is 7 minutes   Right/Left discrimination: 3/4 , with delay   Auditory discrimination: WFL's as patient does not wear hearing aids     Vision testing:    Vision testing: no glasses worn during testing  (Pt had reading glasses with her and wanted to use those but she was told that this device tests distance vision. She stated that at night she wears the reading glasses to drive.   Peripheral vision: bilateral nasal vision is absent. Left peripheral vision is intact at 85°, Right is intact at 85°.  Acuity  Acuity Left eye 20/40  Acuity Right eye - Unable to see any letters on chart (Pt reports that she has macular degeneration in her Right eye.   Acuity both eyes 20/40  Color vision: 2/5   Lateral phoria which assesses binocular vision - Unable to test due to vision in only one eye.  Correctly identified 0/12 road signs and was 0/3 for depth perception.(She stated that the signs were too small to read them. She was unable to determine the type of signs by shape and color.  Comment: Visual acuity minimum standards for the [facility] is 20/40 in one eye.    Cognitive testing:   Short term memory:  1/4,    Immediate # recall (Digit span) : 6, a passing  score    Cognitive sequencing of months of year in reverse:  Unable to perform   Long term memory:  [facility]'s     [facility] Mental Status Exam (SLUMS) which is used to  detect mild neurocognitive disorder and dementia: Pt scored 10/30 which  scores in the range indicating dementia.    Judgment questions regarding rules of the road: 0/5 (Testing in this area could not be completed due to patient becoming frustrated by this time.)    Insight:  Poor, as she feels that she is still able to  drive and that her family has taken her car away from her because they want it.     Communication:   Expressive aphasia:  Not found  Receptive aphasia:  Not found    In-car / on-road assessment:  On road testing not performed as Pt's has too many deficits in clinical testing to be able safely operate an automobile.       After testing a conference was held with Ms. Mckinney, her brother and her niece where findings were discussed and recommendations were made that she no longer drive. Ms. Mckinney agreed that she does not see well but feels that she is still able to drive. She was not happy with the recommendation. Her family assured that she will have the necessary transportation that she will need and that they will make sure that she does not drive.     ASSESSMENT:   Ms. Mckinney had poor scores in multiple areas of testing in the clinic. The lower scores in functional memory, road sign recognition and overall poor scores as well as slow processing in all areas show that Ms. Mckinney does not have the skills to safely operate a motor vehicle. It is recommended that she retire from driving. Conversion of her 's license to a State ID card was discussed with her niece and brother in order to stop the eligibility to drive. Her family expressed understanding and agreement. Findings were notified to the office of Demarco Fontanez MD.     PLAN:   Discharge patient from outpatient Occupational Therapy services as driving evaluation is completed.     GRAEME Keller, CORTES  Occupational Therapist, Certified  Rehabilitation Specialist  12/27/2022

## 2023-01-05 ENCOUNTER — OFFICE VISIT (OUTPATIENT)
Dept: INTERNAL MEDICINE | Facility: CLINIC | Age: 88
End: 2023-01-05
Payer: MEDICARE

## 2023-01-05 ENCOUNTER — HOSPITAL ENCOUNTER (OUTPATIENT)
Dept: RADIOLOGY | Facility: HOSPITAL | Age: 88
Discharge: HOME | End: 2023-01-05
Attending: INTERNAL MEDICINE
Payer: MEDICARE

## 2023-01-05 VITALS
HEART RATE: 61 BPM | HEIGHT: 64 IN | DIASTOLIC BLOOD PRESSURE: 66 MMHG | WEIGHT: 133.69 LBS | OXYGEN SATURATION: 94 % | SYSTOLIC BLOOD PRESSURE: 144 MMHG | BODY MASS INDEX: 22.82 KG/M2

## 2023-01-05 DIAGNOSIS — Z23 NEED FOR TDAP VACCINATION: ICD-10-CM

## 2023-01-05 DIAGNOSIS — J06.9 VIRAL URI WITH COUGH: ICD-10-CM

## 2023-01-05 DIAGNOSIS — Z02.2 ENCOUNTER FOR EXAMINATION FOR ADMISSION TO ASSISTED LIVING FACILITY: ICD-10-CM

## 2023-01-05 DIAGNOSIS — R09.89 SYMPTOMS OF UPPER RESPIRATORY INFECTION (URI): ICD-10-CM

## 2023-01-05 DIAGNOSIS — Z02.2 ENCOUNTER FOR EXAMINATION FOR ADMISSION TO ASSISTED LIVING FACILITY: Primary | ICD-10-CM

## 2023-01-05 LAB
CTP QC/QA: YES
CTP QC/QA: YES
FLUAV AG NPH QL: NEGATIVE
FLUBV AG NPH QL: NEGATIVE
SARS-COV-2 RDRP RESP QL NAA+PROBE: NEGATIVE

## 2023-01-05 PROCEDURE — 87804 INFLUENZA ASSAY W/OPTIC: CPT | Mod: QW,S$GLB,, | Performed by: INTERNAL MEDICINE

## 2023-01-05 PROCEDURE — 71046 X-RAY EXAM CHEST 2 VIEWS: CPT | Mod: 26,,, | Performed by: RADIOLOGY

## 2023-01-05 PROCEDURE — 87635: ICD-10-PCS | Mod: QW,S$GLB,, | Performed by: INTERNAL MEDICINE

## 2023-01-05 PROCEDURE — 87804 POCT INFLUENZA A/B: ICD-10-PCS | Mod: QW,S$GLB,, | Performed by: INTERNAL MEDICINE

## 2023-01-05 PROCEDURE — 71046 X-RAY EXAM CHEST 2 VIEWS: CPT | Mod: TC

## 2023-01-05 PROCEDURE — 1126F PR PAIN SEVERITY QUANTIFIED, NO PAIN PRESENT: ICD-10-PCS | Mod: CPTII,S$GLB,, | Performed by: INTERNAL MEDICINE

## 2023-01-05 PROCEDURE — 87635 SARS-COV-2 COVID-19 AMP PRB: CPT | Mod: QW,S$GLB,, | Performed by: INTERNAL MEDICINE

## 2023-01-05 PROCEDURE — 1101F PT FALLS ASSESS-DOCD LE1/YR: CPT | Mod: CPTII,S$GLB,, | Performed by: INTERNAL MEDICINE

## 2023-01-05 PROCEDURE — 90715 TDAP VACCINE GREATER THAN OR EQUAL TO 7YO IM: ICD-10-PCS | Mod: S$GLB,,, | Performed by: INTERNAL MEDICINE

## 2023-01-05 PROCEDURE — 1126F AMNT PAIN NOTED NONE PRSNT: CPT | Mod: CPTII,S$GLB,, | Performed by: INTERNAL MEDICINE

## 2023-01-05 PROCEDURE — 99214 OFFICE O/P EST MOD 30 MIN: CPT | Mod: 25,S$GLB,, | Performed by: INTERNAL MEDICINE

## 2023-01-05 PROCEDURE — 1160F PR REVIEW ALL MEDS BY PRESCRIBER/CLIN PHARMACIST DOCUMENTED: ICD-10-PCS | Mod: CPTII,S$GLB,, | Performed by: INTERNAL MEDICINE

## 2023-01-05 PROCEDURE — 90715 TDAP VACCINE 7 YRS/> IM: CPT | Mod: S$GLB,,, | Performed by: INTERNAL MEDICINE

## 2023-01-05 PROCEDURE — 71046 XR CHEST PA AND LATERAL: ICD-10-PCS | Mod: 26,,, | Performed by: RADIOLOGY

## 2023-01-05 PROCEDURE — 99999 PR PBB SHADOW E&M-EST. PATIENT-LVL III: CPT | Mod: PBBFAC,,, | Performed by: INTERNAL MEDICINE

## 2023-01-05 PROCEDURE — 99214 PR OFFICE/OUTPT VISIT, EST, LEVL IV, 30-39 MIN: ICD-10-PCS | Mod: 25,S$GLB,, | Performed by: INTERNAL MEDICINE

## 2023-01-05 PROCEDURE — 1160F RVW MEDS BY RX/DR IN RCRD: CPT | Mod: CPTII,S$GLB,, | Performed by: INTERNAL MEDICINE

## 2023-01-05 PROCEDURE — 90471 IMMUNIZATION ADMIN: CPT | Mod: S$GLB,,, | Performed by: INTERNAL MEDICINE

## 2023-01-05 PROCEDURE — 99999 PR PBB SHADOW E&M-EST. PATIENT-LVL III: ICD-10-PCS | Mod: PBBFAC,,, | Performed by: INTERNAL MEDICINE

## 2023-01-05 PROCEDURE — 1159F MED LIST DOCD IN RCRD: CPT | Mod: CPTII,S$GLB,, | Performed by: INTERNAL MEDICINE

## 2023-01-05 PROCEDURE — 90471 TDAP VACCINE GREATER THAN OR EQUAL TO 7YO IM: ICD-10-PCS | Mod: S$GLB,,, | Performed by: INTERNAL MEDICINE

## 2023-01-05 PROCEDURE — 1159F PR MEDICATION LIST DOCUMENTED IN MEDICAL RECORD: ICD-10-PCS | Mod: CPTII,S$GLB,, | Performed by: INTERNAL MEDICINE

## 2023-01-05 PROCEDURE — 3288F PR FALLS RISK ASSESSMENT DOCUMENTED: ICD-10-PCS | Mod: CPTII,S$GLB,, | Performed by: INTERNAL MEDICINE

## 2023-01-05 PROCEDURE — 1101F PR PT FALLS ASSESS DOC 0-1 FALLS W/OUT INJ PAST YR: ICD-10-PCS | Mod: CPTII,S$GLB,, | Performed by: INTERNAL MEDICINE

## 2023-01-05 PROCEDURE — 3288F FALL RISK ASSESSMENT DOCD: CPT | Mod: CPTII,S$GLB,, | Performed by: INTERNAL MEDICINE

## 2023-01-05 RX ORDER — CETIRIZINE HYDROCHLORIDE 10 MG/1
10 TABLET ORAL DAILY
Qty: 30 TABLET | Refills: 0 | Status: SHIPPED | OUTPATIENT
Start: 2023-01-05 | End: 2023-04-25

## 2023-01-05 RX ORDER — BENZONATATE 200 MG/1
200 CAPSULE ORAL 3 TIMES DAILY PRN
Qty: 90 CAPSULE | Refills: 0 | Status: SHIPPED | OUTPATIENT
Start: 2023-01-05 | End: 2023-04-25

## 2023-01-05 RX ORDER — PROMETHAZINE HYDROCHLORIDE AND DEXTROMETHORPHAN HYDROBROMIDE 6.25; 15 MG/5ML; MG/5ML
5 SYRUP ORAL NIGHTLY PRN
Qty: 180 ML | Refills: 0 | Status: SHIPPED | OUTPATIENT
Start: 2023-01-05 | End: 2023-04-25

## 2023-01-05 NOTE — PROGRESS NOTES
Administered TDAP vaccine at 10:21am     2 patient identifier used     Pt tolerated well    Pt advised to wait in clinic for 15minutes after injection

## 2023-01-05 NOTE — PROGRESS NOTES
INTERNAL MEDICINE ESTABLISHED PATIENT VISIT NOTE    Subjective:     Chief Complaint: Establish Care and Cough (/)       Patient ID: Lynn Mckinney is a 87 y.o. female with lumbar radiculitis, CAD s/p CABGx 3 (2001)/SHEREEN to RCA (3/2021), PAD, CHB s/p PPM (2013), VF s/p dual ICD (3/2021), HTN, HLD, carotid artery stenosis, vit D def, hypothyroidism, osteoporosis, macular degeneration, last seen by me 7/2022, here today for follow-up. Accompanied by adalgisa Mullins and brother Kristina today.    12/2022 EP - Plan for ICD upgrade. Labs to be completed at end of month.     Family planning to move pt to assisted living facility soon. To establish care with [facility] PCP near facility. Requesting assistance with paperwork completion.    Required testing including CXR, TB test.     New dry cough for past 3-4 days. Recent OSH UC visit for same. Given antibiotic, steroid and cough medication. Pt reports not being tested for Covid-19 or Flu. Family was not present at time of UC visit.     Past Medical History:  Past Medical History:   Diagnosis Date    Allergy     seasonal    Blood clotting tendency     Carotid artery disease 5/29/2014    Coronary artery disease     Heart block     Hyperlipidemia     Hypertension     Hypothyroid     Obesity     Pacemaker     PAD (peripheral artery disease)     Spinal stenosis     Thoracic or lumbosacral neuritis or radiculitis 11/25/2014    Tubular adenoma           Review of Systems:  Review of Systems   Constitutional:  Negative for chills and fever.   HENT:  Negative for congestion and sore throat.         Rhinorrhea   Respiratory:  Positive for cough. Negative for shortness of breath.    Cardiovascular:  Negative for chest pain.   Gastrointestinal:  Negative for constipation, nausea and vomiting.   Genitourinary:  Negative for hematuria and urgency.   Musculoskeletal:  Negative for falls.   Skin:  Negative for rash.   Neurological:  Negative for dizziness and loss of consciousness.     Health  "Maintenance:   Immunizations:   Influenza - complete  Tdap - next visit  Covid 19 - complete  HPV  Prevnar rec at 65 - 3/2016, Pneumovax 11/2014  Shingrix rec at 50 - 2nd dose     Cancer Screening:  PAP: s/p hys  Mammogram:  8/2021 BIRAD 1  Colonoscopy: defer to age  DEXA:  7/2022 osteoporosis    Objective:   BP (!) 144/66 (BP Location: Left arm, Patient Position: Sitting, BP Method: Medium (Manual))   Pulse 61   Ht 5' 4" (1.626 m)   Wt 60.6 kg (133 lb 11.3 oz)   SpO2 (!) 94%   BMI 22.95 kg/m²        General: AAO x3, no apparent distress, sitting in wheelchair  HEENT: PERRL, +rhinorrhea/congestion  CV: RRR, no m/r/g, device in place  Pulm: CTAB, no wheezing  Abd: s/NT/ND +BS  Extremities: no c/c/e    Labs:       Assessment/Plan     Encounter for examination for admission to assisted living facility  CXR, update vaccines today  Schedule TB test  -     X-Ray Chest PA And Lateral; Future; Expected date: 01/05/2023    Symptoms of upper respiratory infection (URI)  POCT tests negative; symptomatic treatment, notify clinic of any worsening   -     POCT COVID-19 Rapid Screening  -     POCT Influenza A/B    Viral URI with cough  -     benzonatate (TESSALON) 200 MG capsule; Take 1 capsule (200 mg total) by mouth 3 (three) times daily as needed for Cough.  Dispense: 90 capsule; Refill: 0  -     promethazine-dextromethorphan (PROMETHAZINE-DM) 6.25-15 mg/5 mL Syrp; Take 5 mLs by mouth nightly as needed (cough). May cause sedation.  Dispense: 180 mL; Refill: 0  -     cetirizine (ZYRTEC) 10 MG tablet; Take 1 tablet (10 mg total) by mouth once daily.  Dispense: 30 tablet; Refill: 0    Need for Tdap vaccination    HM as above.    Shira Fagan MD  Department of Internal Medicine - [facility]  01/05/2023    "

## 2023-01-06 ENCOUNTER — CLINICAL SUPPORT (OUTPATIENT)
Dept: INTERNAL MEDICINE | Facility: CLINIC | Age: 88
End: 2023-01-06
Payer: MEDICARE

## 2023-01-06 DIAGNOSIS — Z11.1 SCREENING-PULMONARY TB: Primary | ICD-10-CM

## 2023-01-06 PROCEDURE — 86580 POCT TB SKIN TEST: ICD-10-PCS | Mod: S$GLB,,, | Performed by: INTERNAL MEDICINE

## 2023-01-06 PROCEDURE — 86580 TB INTRADERMAL TEST: CPT | Mod: S$GLB,,, | Performed by: INTERNAL MEDICINE

## 2023-01-09 ENCOUNTER — CLINICAL SUPPORT (OUTPATIENT)
Dept: INTERNAL MEDICINE | Facility: CLINIC | Age: 88
End: 2023-01-09
Payer: MEDICARE

## 2023-01-09 LAB
TB INDURATION - 48 HR READ: 0 MM
TB INDURATION - 72 HR READ: NORMAL
TB SKIN TEST - 48 HR READ: NEGATIVE
TB SKIN TEST - 72 HR READ: NORMAL

## 2023-01-12 ENCOUNTER — HOSPITAL ENCOUNTER (INPATIENT)
Facility: HOSPITAL | Age: 88
LOS: 7 days | Discharge: NURSING, REHAB OR LONG-TERM CARE | DRG: 522 | End: 2023-01-19
Attending: EMERGENCY MEDICINE | Admitting: INTERNAL MEDICINE
Payer: MEDICARE

## 2023-01-12 DIAGNOSIS — S72.002A LEFT DISPLACED FEMORAL NECK FRACTURE: ICD-10-CM

## 2023-01-12 DIAGNOSIS — S72.002A CLOSED DISPLACED FRACTURE OF LEFT FEMORAL NECK: ICD-10-CM

## 2023-01-12 DIAGNOSIS — T14.90XA TRAUMA: ICD-10-CM

## 2023-01-12 DIAGNOSIS — S72.002A CLOSED FRACTURE OF LEFT HIP, INITIAL ENCOUNTER: Primary | ICD-10-CM
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
## 2023-01-12 DIAGNOSIS — Z01.818 PREOP EXAMINATION: ICD-10-CM

## 2023-01-12 PROBLEM — I50.42 CHRONIC COMBINED SYSTOLIC AND DIASTOLIC HEART FAILURE: Status: ACTIVE | Noted: 2023-01-12

## 2023-01-12 LAB
ALBUMIN SERPL BCP-MCNC: 3.7 G/DL (ref 3.5–5.2)
ALP SERPL-CCNC: 58 U/L (ref 55–135)
ALT SERPL W/O P-5'-P-CCNC: 15 U/L (ref 10–44)
ANION GAP SERPL CALC-SCNC: 9 MMOL/L (ref 8–16)
ANION GAP SERPL CALC-SCNC: 9 MMOL/L (ref 8–16)
AST SERPL-CCNC: 25 U/L (ref 10–40)
BASOPHILS # BLD AUTO: 0.03 K/UL (ref 0–0.2)
BASOPHILS NFR BLD: 0.2 % (ref 0–1.9)
BILIRUB SERPL-MCNC: 0.8 MG/DL (ref 0.1–1)
BUN SERPL-MCNC: 23 MG/DL (ref 8–23)
BUN SERPL-MCNC: 23 MG/DL (ref 8–23)
CALCIUM SERPL-MCNC: 9 MG/DL (ref 8.7–10.5)
CALCIUM SERPL-MCNC: 9 MG/DL (ref 8.7–10.5)
CHLORIDE SERPL-SCNC: 101 MMOL/L (ref 95–110)
CHLORIDE SERPL-SCNC: 101 MMOL/L (ref 95–110)
CO2 SERPL-SCNC: 24 MMOL/L (ref 23–29)
CO2 SERPL-SCNC: 24 MMOL/L (ref 23–29)
CREAT SERPL-MCNC: 1.1 MG/DL (ref 0.5–1.4)
CREAT SERPL-MCNC: 1.1 MG/DL (ref 0.5–1.4)
DIFFERENTIAL METHOD: ABNORMAL
EOSINOPHIL # BLD AUTO: 0.1 K/UL (ref 0–0.5)
EOSINOPHIL NFR BLD: 0.4 % (ref 0–8)
ERYTHROCYTE [DISTWIDTH] IN BLOOD BY AUTOMATED COUNT: 12.2 % (ref 11.5–14.5)
EST. GFR  (NO RACE VARIABLE): 48.6 ML/MIN/1.73 M^2
EST. GFR  (NO RACE VARIABLE): 48.6 ML/MIN/1.73 M^2
ESTIMATED AVG GLUCOSE: 105 MG/DL (ref 68–131)
GLUCOSE SERPL-MCNC: 110 MG/DL (ref 70–110)
GLUCOSE SERPL-MCNC: 110 MG/DL (ref 70–110)
HBA1C MFR BLD: 5.3 % (ref 4–5.6)
HCT VFR BLD AUTO: 40.2 % (ref 37–48.5)
HCV AB SERPL QL IA: NORMAL
HGB BLD-MCNC: 13.2 G/DL (ref 12–16)
HIV 1+2 AB+HIV1 P24 AG SERPL QL IA: NORMAL
IMM GRANULOCYTES # BLD AUTO: 0.1 K/UL (ref 0–0.04)
IMM GRANULOCYTES NFR BLD AUTO: 0.6 % (ref 0–0.5)
INR PPP: 1 (ref 0.8–1.2)
LYMPHOCYTES # BLD AUTO: 0.5 K/UL (ref 1–4.8)
LYMPHOCYTES NFR BLD: 3.3 % (ref 18–48)
MAGNESIUM SERPL-MCNC: 1.9 MG/DL (ref 1.6–2.6)
MCH RBC QN AUTO: 32.1 PG (ref 27–31)
MCHC RBC AUTO-ENTMCNC: 32.8 G/DL (ref 32–36)
MCV RBC AUTO: 98 FL (ref 82–98)
MONOCYTES # BLD AUTO: 0.9 K/UL (ref 0.3–1)
MONOCYTES NFR BLD: 5.7 % (ref 4–15)
NEUTROPHILS # BLD AUTO: 14.6 K/UL (ref 1.8–7.7)
NEUTROPHILS NFR BLD: 89.8 % (ref 38–73)
NRBC BLD-RTO: 0 /100 WBC
PHOSPHATE SERPL-MCNC: 2.3 MG/DL (ref 2.7–4.5)
PLATELET # BLD AUTO: 129 K/UL (ref 150–450)
PMV BLD AUTO: 8.8 FL (ref 9.2–12.9)
POTASSIUM SERPL-SCNC: 3.7 MMOL/L (ref 3.5–5.1)
POTASSIUM SERPL-SCNC: 3.7 MMOL/L (ref 3.5–5.1)
PROT SERPL-MCNC: 6.7 G/DL (ref 6–8.4)
PROTHROMBIN TIME: 10.9 SEC (ref 9–12.5)
RBC # BLD AUTO: 4.11 M/UL (ref 4–5.4)
SODIUM SERPL-SCNC: 134 MMOL/L (ref 136–145)
SODIUM SERPL-SCNC: 134 MMOL/L (ref 136–145)
TRANSFERRIN SERPL-MCNC: 251 MG/DL (ref 200–375)
WBC # BLD AUTO: 16.22 K/UL (ref 3.9–12.7)

## 2023-01-12 PROCEDURE — 86803 HEPATITIS C AB TEST: CPT | Performed by: PHYSICIAN ASSISTANT

## 2023-01-12 PROCEDURE — 81001 URINALYSIS AUTO W/SCOPE: CPT

## 2023-01-12 PROCEDURE — 93005 ELECTROCARDIOGRAM TRACING: CPT

## 2023-01-12 PROCEDURE — 96374 THER/PROPH/DIAG INJ IV PUSH: CPT

## 2023-01-12 PROCEDURE — 83036 HEMOGLOBIN GLYCOSYLATED A1C: CPT | Performed by: EMERGENCY MEDICINE

## 2023-01-12 PROCEDURE — 99285 EMERGENCY DEPT VISIT HI MDM: CPT | Mod: ,,, | Performed by: EMERGENCY MEDICINE

## 2023-01-12 PROCEDURE — 84100 ASSAY OF PHOSPHORUS: CPT | Performed by: EMERGENCY MEDICINE

## 2023-01-12 PROCEDURE — 99223 1ST HOSP IP/OBS HIGH 75: CPT | Mod: ,,, | Performed by: FAMILY MEDICINE

## 2023-01-12 PROCEDURE — 83735 ASSAY OF MAGNESIUM: CPT | Performed by: EMERGENCY MEDICINE

## 2023-01-12 PROCEDURE — 84134 ASSAY OF PREALBUMIN: CPT

## 2023-01-12 PROCEDURE — 82306 VITAMIN D 25 HYDROXY: CPT

## 2023-01-12 PROCEDURE — 12000002 HC ACUTE/MED SURGE SEMI-PRIVATE ROOM

## 2023-01-12 PROCEDURE — 86900 BLOOD TYPING SEROLOGIC ABO: CPT

## 2023-01-12 PROCEDURE — 27000221 HC OXYGEN, UP TO 24 HOURS

## 2023-01-12 PROCEDURE — 84466 ASSAY OF TRANSFERRIN: CPT | Performed by: EMERGENCY MEDICINE

## 2023-01-12 PROCEDURE — 99285 PR EMERGENCY DEPT VISIT,LEVEL V: ICD-10-PCS | Mod: ,,, | Performed by: EMERGENCY MEDICINE

## 2023-01-12 PROCEDURE — 85025 COMPLETE CBC W/AUTO DIFF WBC: CPT | Performed by: EMERGENCY MEDICINE

## 2023-01-12 PROCEDURE — 87389 HIV-1 AG W/HIV-1&-2 AB AG IA: CPT | Performed by: PHYSICIAN ASSISTANT

## 2023-01-12 PROCEDURE — 80053 COMPREHEN METABOLIC PANEL: CPT | Performed by: EMERGENCY MEDICINE

## 2023-01-12 PROCEDURE — 63600175 PHARM REV CODE 636 W HCPCS: Performed by: EMERGENCY MEDICINE

## 2023-01-12 PROCEDURE — 85610 PROTHROMBIN TIME: CPT | Performed by: EMERGENCY MEDICINE

## 2023-01-12 PROCEDURE — 99223 PR INITIAL HOSPITAL CARE,LEVL III: ICD-10-PCS | Mod: ,,, | Performed by: FAMILY MEDICINE

## 2023-01-12 PROCEDURE — 93010 ELECTROCARDIOGRAM REPORT: CPT | Mod: ,,, | Performed by: INTERNAL MEDICINE

## 2023-01-12 PROCEDURE — 25000003 PHARM REV CODE 250: Performed by: FAMILY MEDICINE

## 2023-01-12 PROCEDURE — 93010 EKG 12-LEAD: ICD-10-PCS | Mod: ,,, | Performed by: INTERNAL MEDICINE

## 2023-01-12 PROCEDURE — 99285 EMERGENCY DEPT VISIT HI MDM: CPT | Mod: 25

## 2023-01-12 PROCEDURE — 94761 N-INVAS EAR/PLS OXIMETRY MLT: CPT

## 2023-01-12 RX ORDER — HYDROXYZINE HYDROCHLORIDE 25 MG/1
25 TABLET, FILM COATED ORAL 3 TIMES DAILY PRN
Status: DISCONTINUED | OUTPATIENT
Start: 2023-01-12 | End: 2023-01-19 | Stop reason: HOSPADM

## 2023-01-12 RX ORDER — LOSARTAN POTASSIUM 50 MG/1
50 TABLET ORAL DAILY
Status: DISCONTINUED | OUTPATIENT
Start: 2023-01-13 | End: 2023-01-19 | Stop reason: HOSPADM

## 2023-01-12 RX ORDER — TALC
6 POWDER (GRAM) TOPICAL NIGHTLY PRN
Status: DISCONTINUED | OUTPATIENT
Start: 2023-01-12 | End: 2023-01-12

## 2023-01-12 RX ORDER — ACETAMINOPHEN 500 MG
1000 TABLET ORAL
Status: DISCONTINUED | OUTPATIENT
Start: 2023-01-12 | End: 2023-01-12

## 2023-01-12 RX ORDER — MEMANTINE HYDROCHLORIDE 5 MG/1
5 TABLET ORAL 2 TIMES DAILY
Status: DISCONTINUED | OUTPATIENT
Start: 2023-01-12 | End: 2023-01-19 | Stop reason: HOSPADM

## 2023-01-12 RX ORDER — AMLODIPINE BESYLATE 10 MG/1
10 TABLET ORAL DAILY
Status: DISCONTINUED | OUTPATIENT
Start: 2023-01-13 | End: 2023-01-19 | Stop reason: HOSPADM

## 2023-01-12 RX ORDER — TALC
6 POWDER (GRAM) TOPICAL NIGHTLY PRN
Status: DISCONTINUED | OUTPATIENT
Start: 2023-01-12 | End: 2023-01-19 | Stop reason: HOSPADM

## 2023-01-12 RX ORDER — OXYCODONE HYDROCHLORIDE 5 MG/1
5 TABLET ORAL
Status: DISCONTINUED | OUTPATIENT
Start: 2023-01-12 | End: 2023-01-13 | Stop reason: HOSPADM

## 2023-01-12 RX ORDER — ACETAMINOPHEN 500 MG
1000 TABLET ORAL EVERY 8 HOURS
Status: COMPLETED | OUTPATIENT
Start: 2023-01-12 | End: 2023-01-13

## 2023-01-12 RX ORDER — MORPHINE SULFATE 4 MG/ML
4 INJECTION, SOLUTION INTRAMUSCULAR; INTRAVENOUS
Status: COMPLETED | OUTPATIENT
Start: 2023-01-12 | End: 2023-01-12

## 2023-01-12 RX ORDER — ONDANSETRON 2 MG/ML
4 INJECTION INTRAMUSCULAR; INTRAVENOUS EVERY 12 HOURS PRN
Status: DISCONTINUED | OUTPATIENT
Start: 2023-01-12 | End: 2023-01-19 | Stop reason: HOSPADM

## 2023-01-12 RX ORDER — PREGABALIN 75 MG/1
75 CAPSULE ORAL NIGHTLY
Status: DISCONTINUED | OUTPATIENT
Start: 2023-01-12 | End: 2023-01-13 | Stop reason: HOSPADM

## 2023-01-12 RX ORDER — GABAPENTIN 300 MG/1
300 CAPSULE ORAL ONCE
Status: DISCONTINUED | OUTPATIENT
Start: 2023-01-12 | End: 2023-01-12

## 2023-01-12 RX ORDER — MORPHINE SULFATE 4 MG/ML
4 INJECTION, SOLUTION INTRAMUSCULAR; INTRAVENOUS
Status: DISCONTINUED | OUTPATIENT
Start: 2023-01-12 | End: 2023-01-13

## 2023-01-12 RX ORDER — LEVOTHYROXINE SODIUM 125 UG/1
125 TABLET ORAL
Status: DISCONTINUED | OUTPATIENT
Start: 2023-01-13 | End: 2023-01-19 | Stop reason: HOSPADM

## 2023-01-12 RX ORDER — MORPHINE SULFATE 4 MG/ML
2 INJECTION, SOLUTION INTRAMUSCULAR; INTRAVENOUS
Status: DISCONTINUED | OUTPATIENT
Start: 2023-01-12 | End: 2023-01-13 | Stop reason: HOSPADM

## 2023-01-12 RX ORDER — SODIUM CHLORIDE 0.9 % (FLUSH) 0.9 %
10 SYRINGE (ML) INJECTION
Status: DISCONTINUED | OUTPATIENT
Start: 2023-01-12 | End: 2023-01-19 | Stop reason: HOSPADM

## 2023-01-12 RX ORDER — SODIUM CHLORIDE 9 MG/ML
INJECTION, SOLUTION INTRAVENOUS CONTINUOUS
Status: ACTIVE | OUTPATIENT
Start: 2023-01-12 | End: 2023-01-13

## 2023-01-12 RX ORDER — LABETALOL 100 MG/1
200 TABLET, FILM COATED ORAL 2 TIMES DAILY
Status: DISCONTINUED | OUTPATIENT
Start: 2023-01-12 | End: 2023-01-19 | Stop reason: HOSPADM

## 2023-01-12 RX ORDER — BISACODYL 10 MG
10 SUPPOSITORY, RECTAL RECTAL DAILY PRN
Status: DISCONTINUED | OUTPATIENT
Start: 2023-01-12 | End: 2023-01-19 | Stop reason: HOSPADM

## 2023-01-12 RX ORDER — SODIUM CHLORIDE 0.9 % (FLUSH) 0.9 %
10 SYRINGE (ML) INJECTION
Status: DISCONTINUED | OUTPATIENT
Start: 2023-01-12 | End: 2023-01-15

## 2023-01-12 RX ADMIN — ACETAMINOPHEN 1000 MG: 500 TABLET ORAL at 10:01

## 2023-01-12 RX ADMIN — MORPHINE SULFATE 4 MG: 4 INJECTION INTRAVENOUS at 09:01

## 2023-01-12 RX ADMIN — LABETALOL HYDROCHLORIDE 200 MG: 100 TABLET, FILM COATED ORAL at 10:01

## 2023-01-12 RX ADMIN — MEMANTINE HYDROCHLORIDE 5 MG: 5 TABLET ORAL at 10:01

## 2023-01-12 RX ADMIN — SODIUM CHLORIDE: 9 INJECTION, SOLUTION INTRAVENOUS at 11:01

## 2023-01-12 RX ADMIN — PREGABALIN 75 MG: 75 CAPSULE ORAL at 10:01

## 2023-01-13 ENCOUNTER — TELEPHONE (OUTPATIENT)
Dept: INTERNAL MEDICINE | Facility: CLINIC | Age: 88
End: 2023-01-13
Payer: MEDICARE

## 2023-01-13 ENCOUNTER — DOCUMENTATION ONLY (OUTPATIENT)
Dept: CARDIOLOGY | Facility: HOSPITAL | Age: 88
End: 2023-01-13
Payer: MEDICARE

## 2023-01-13 ENCOUNTER — ANESTHESIA (OUTPATIENT)
Dept: SURGERY | Facility: HOSPITAL | Age: 88
DRG: 522 | End: 2023-01-13
Payer: MEDICARE

## 2023-01-13 ENCOUNTER — ANESTHESIA EVENT (OUTPATIENT)
Dept: SURGERY | Facility: HOSPITAL | Age: 88
DRG: 522 | End: 2023-01-13
Payer: MEDICARE

## 2023-01-13 PROBLEM — M80.00XA AGE-RELATED OSTEOPOROSIS WITH CURRENT PATHOLOGICAL FRACTURE: Status: ACTIVE | Noted: 2023-01-13

## 2023-01-13 PROBLEM — M80.00XD AGE-RELATED OSTEOPOROSIS WITH CURRENT PATHOLOGICAL FRACTURE WITH ROUTINE HEALING: Status: ACTIVE | Noted: 2023-01-13

## 2023-01-13 LAB
25(OH)D3+25(OH)D2 SERPL-MCNC: 32 NG/ML (ref 30–96)
ABO + RH BLD: NORMAL
ANION GAP SERPL CALC-SCNC: 8 MMOL/L (ref 8–16)
BACTERIA #/AREA URNS AUTO: NORMAL /HPF
BASOPHILS # BLD AUTO: 0.01 K/UL (ref 0–0.2)
BASOPHILS # BLD AUTO: 0.02 K/UL (ref 0–0.2)
BASOPHILS NFR BLD: 0.1 % (ref 0–1.9)
BASOPHILS NFR BLD: 0.2 % (ref 0–1.9)
BILIRUB UR QL STRIP: NEGATIVE
BLD GP AB SCN CELLS X3 SERPL QL: NORMAL
BUN SERPL-MCNC: 23 MG/DL (ref 8–23)
CALCIUM SERPL-MCNC: 8.3 MG/DL (ref 8.7–10.5)
CHLORIDE SERPL-SCNC: 104 MMOL/L (ref 95–110)
CLARITY UR REFRACT.AUTO: CLEAR
CO2 SERPL-SCNC: 22 MMOL/L (ref 23–29)
COLOR UR AUTO: YELLOW
CREAT SERPL-MCNC: 1 MG/DL (ref 0.5–1.4)
DIFFERENTIAL METHOD: ABNORMAL
DIFFERENTIAL METHOD: ABNORMAL
EOSINOPHIL # BLD AUTO: 0 K/UL (ref 0–0.5)
EOSINOPHIL # BLD AUTO: 0.1 K/UL (ref 0–0.5)
EOSINOPHIL NFR BLD: 0.4 % (ref 0–8)
EOSINOPHIL NFR BLD: 1 % (ref 0–8)
ERYTHROCYTE [DISTWIDTH] IN BLOOD BY AUTOMATED COUNT: 12 % (ref 11.5–14.5)
ERYTHROCYTE [DISTWIDTH] IN BLOOD BY AUTOMATED COUNT: 12.2 % (ref 11.5–14.5)
EST. GFR  (NO RACE VARIABLE): 54.5 ML/MIN/1.73 M^2
GLUCOSE SERPL-MCNC: 118 MG/DL (ref 70–110)
GLUCOSE UR QL STRIP: NEGATIVE
HCT VFR BLD AUTO: 35.8 % (ref 37–48.5)
HCT VFR BLD AUTO: 36.3 % (ref 37–48.5)
HGB BLD-MCNC: 11.8 G/DL (ref 12–16)
HGB BLD-MCNC: 11.9 G/DL (ref 12–16)
HGB UR QL STRIP: NEGATIVE
IMM GRANULOCYTES # BLD AUTO: 0.03 K/UL (ref 0–0.04)
IMM GRANULOCYTES # BLD AUTO: 0.04 K/UL (ref 0–0.04)
IMM GRANULOCYTES NFR BLD AUTO: 0.3 % (ref 0–0.5)
IMM GRANULOCYTES NFR BLD AUTO: 0.4 % (ref 0–0.5)
KETONES UR QL STRIP: ABNORMAL
LEUKOCYTE ESTERASE UR QL STRIP: NEGATIVE
LYMPHOCYTES # BLD AUTO: 0.3 K/UL (ref 1–4.8)
LYMPHOCYTES # BLD AUTO: 0.4 K/UL (ref 1–4.8)
LYMPHOCYTES NFR BLD: 2.5 % (ref 18–48)
LYMPHOCYTES NFR BLD: 3.9 % (ref 18–48)
MAGNESIUM SERPL-MCNC: 1.9 MG/DL (ref 1.6–2.6)
MCH RBC QN AUTO: 32 PG (ref 27–31)
MCH RBC QN AUTO: 33 PG (ref 27–31)
MCHC RBC AUTO-ENTMCNC: 32.5 G/DL (ref 32–36)
MCHC RBC AUTO-ENTMCNC: 33.2 G/DL (ref 32–36)
MCV RBC AUTO: 98 FL (ref 82–98)
MCV RBC AUTO: 99 FL (ref 82–98)
MICROSCOPIC COMMENT: NORMAL
MONOCYTES # BLD AUTO: 0.4 K/UL (ref 0.3–1)
MONOCYTES # BLD AUTO: 0.5 K/UL (ref 0.3–1)
MONOCYTES NFR BLD: 4.2 % (ref 4–15)
MONOCYTES NFR BLD: 4.8 % (ref 4–15)
NEUTROPHILS # BLD AUTO: 10 K/UL (ref 1.8–7.7)
NEUTROPHILS # BLD AUTO: 9.7 K/UL (ref 1.8–7.7)
NEUTROPHILS NFR BLD: 90.4 % (ref 38–73)
NEUTROPHILS NFR BLD: 91.8 % (ref 38–73)
NITRITE UR QL STRIP: NEGATIVE
NRBC BLD-RTO: 0 /100 WBC
NRBC BLD-RTO: 0 /100 WBC
PH UR STRIP: 7 [PH] (ref 5–8)
PHOSPHATE SERPL-MCNC: 3.1 MG/DL (ref 2.7–4.5)
PLATELET # BLD AUTO: 117 K/UL (ref 150–450)
PLATELET # BLD AUTO: 120 K/UL (ref 150–450)
PMV BLD AUTO: 9 FL (ref 9.2–12.9)
PMV BLD AUTO: 9.1 FL (ref 9.2–12.9)
POTASSIUM SERPL-SCNC: 3.9 MMOL/L (ref 3.5–5.1)
PREALB SERPL-MCNC: 22 MG/DL (ref 20–43)
PROT UR QL STRIP: NEGATIVE
RBC # BLD AUTO: 3.61 M/UL (ref 4–5.4)
RBC # BLD AUTO: 3.69 M/UL (ref 4–5.4)
RBC #/AREA URNS AUTO: 1 /HPF (ref 0–4)
SODIUM SERPL-SCNC: 134 MMOL/L (ref 136–145)
SP GR UR STRIP: 1.01 (ref 1–1.03)
SQUAMOUS #/AREA URNS AUTO: 0 /HPF
URN SPEC COLLECT METH UR: ABNORMAL
WBC # BLD AUTO: 10.51 K/UL (ref 3.9–12.7)
WBC # BLD AUTO: 11.08 K/UL (ref 3.9–12.7)
WBC #/AREA URNS AUTO: 0 /HPF (ref 0–5)

## 2023-01-13 PROCEDURE — 71000016 HC POSTOP RECOV ADDL HR: Performed by: ORTHOPAEDIC SURGERY

## 2023-01-13 PROCEDURE — D9220A PRA ANESTHESIA: ICD-10-PCS | Mod: ANES,,, | Performed by: ANESTHESIOLOGY

## 2023-01-13 PROCEDURE — 25000003 PHARM REV CODE 250: Performed by: FAMILY MEDICINE

## 2023-01-13 PROCEDURE — 63600175 PHARM REV CODE 636 W HCPCS

## 2023-01-13 PROCEDURE — 36620 ARTERIAL: ICD-10-PCS | Mod: 59,,, | Performed by: ANESTHESIOLOGY

## 2023-01-13 PROCEDURE — 99223 1ST HOSP IP/OBS HIGH 75: CPT | Mod: 57,,, | Performed by: ORTHOPAEDIC SURGERY

## 2023-01-13 PROCEDURE — 64999 UNLISTED PX NERVOUS SYSTEM: CPT | Mod: ,,, | Performed by: ANESTHESIOLOGY

## 2023-01-13 PROCEDURE — D9220A PRA ANESTHESIA: Mod: CRNA,,, | Performed by: NURSE ANESTHETIST, CERTIFIED REGISTERED

## 2023-01-13 PROCEDURE — 99223 PR INITIAL HOSPITAL CARE,LEVL III: ICD-10-PCS | Mod: 57,,, | Performed by: ORTHOPAEDIC SURGERY

## 2023-01-13 PROCEDURE — 11000001 HC ACUTE MED/SURG PRIVATE ROOM

## 2023-01-13 PROCEDURE — 94761 N-INVAS EAR/PLS OXIMETRY MLT: CPT

## 2023-01-13 PROCEDURE — 99233 SBSQ HOSP IP/OBS HIGH 50: CPT | Mod: ,,, | Performed by: INTERNAL MEDICINE

## 2023-01-13 PROCEDURE — 25000003 PHARM REV CODE 250

## 2023-01-13 PROCEDURE — 80048 BASIC METABOLIC PNL TOTAL CA: CPT | Performed by: FAMILY MEDICINE

## 2023-01-13 PROCEDURE — 71000015 HC POSTOP RECOV 1ST HR: Performed by: ORTHOPAEDIC SURGERY

## 2023-01-13 PROCEDURE — 99233 PR SUBSEQUENT HOSPITAL CARE,LEVL III: ICD-10-PCS | Mod: ,,, | Performed by: INTERNAL MEDICINE

## 2023-01-13 PROCEDURE — 63600175 PHARM REV CODE 636 W HCPCS: Performed by: ORTHOPAEDIC SURGERY

## 2023-01-13 PROCEDURE — D9220A PRA ANESTHESIA: Mod: ANES,,, | Performed by: ANESTHESIOLOGY

## 2023-01-13 PROCEDURE — 76942 ECHO GUIDE FOR BIOPSY: CPT | Mod: 26,,, | Performed by: ANESTHESIOLOGY

## 2023-01-13 PROCEDURE — 63600175 PHARM REV CODE 636 W HCPCS: Performed by: NURSE ANESTHETIST, CERTIFIED REGISTERED

## 2023-01-13 PROCEDURE — 84100 ASSAY OF PHOSPHORUS: CPT | Performed by: FAMILY MEDICINE

## 2023-01-13 PROCEDURE — 27236 TREAT THIGH FRACTURE: CPT | Mod: LT,,, | Performed by: ORTHOPAEDIC SURGERY

## 2023-01-13 PROCEDURE — 37000008 HC ANESTHESIA 1ST 15 MINUTES: Performed by: ORTHOPAEDIC SURGERY

## 2023-01-13 PROCEDURE — 71000033 HC RECOVERY, INTIAL HOUR: Performed by: ORTHOPAEDIC SURGERY

## 2023-01-13 PROCEDURE — 63600175 PHARM REV CODE 636 W HCPCS: Performed by: FAMILY MEDICINE

## 2023-01-13 PROCEDURE — 25000003 PHARM REV CODE 250: Performed by: STUDENT IN AN ORGANIZED HEALTH CARE EDUCATION/TRAINING PROGRAM

## 2023-01-13 PROCEDURE — 63600175 PHARM REV CODE 636 W HCPCS: Performed by: ANESTHESIOLOGY

## 2023-01-13 PROCEDURE — 76942 LEFT SIFI CATHETER: ICD-10-PCS | Mod: 26,,, | Performed by: ANESTHESIOLOGY

## 2023-01-13 PROCEDURE — 36000711: Performed by: ORTHOPAEDIC SURGERY

## 2023-01-13 PROCEDURE — 37000009 HC ANESTHESIA EA ADD 15 MINS: Performed by: ORTHOPAEDIC SURGERY

## 2023-01-13 PROCEDURE — 76942 ECHO GUIDE FOR BIOPSY: CPT | Performed by: STUDENT IN AN ORGANIZED HEALTH CARE EDUCATION/TRAINING PROGRAM

## 2023-01-13 PROCEDURE — 27236 PR FEMORAL FX, OPEN TX: ICD-10-PCS | Mod: LT,,, | Performed by: ORTHOPAEDIC SURGERY

## 2023-01-13 PROCEDURE — 25000003 PHARM REV CODE 250: Performed by: NURSE ANESTHETIST, CERTIFIED REGISTERED

## 2023-01-13 PROCEDURE — C1713 ANCHOR/SCREW BN/BN,TIS/BN: HCPCS | Performed by: ORTHOPAEDIC SURGERY

## 2023-01-13 PROCEDURE — 27000221 HC OXYGEN, UP TO 24 HOURS

## 2023-01-13 PROCEDURE — 85025 COMPLETE CBC W/AUTO DIFF WBC: CPT | Mod: 91 | Performed by: FAMILY MEDICINE

## 2023-01-13 PROCEDURE — D9220A PRA ANESTHESIA: ICD-10-PCS | Mod: CRNA,,, | Performed by: NURSE ANESTHETIST, CERTIFIED REGISTERED

## 2023-01-13 PROCEDURE — 64999 PR INJ, ANES AGENT, FASCIA ILICA, CONT: ICD-10-PCS | Mod: ,,, | Performed by: ANESTHESIOLOGY

## 2023-01-13 PROCEDURE — C1776 JOINT DEVICE (IMPLANTABLE): HCPCS | Performed by: ORTHOPAEDIC SURGERY

## 2023-01-13 PROCEDURE — 63600175 PHARM REV CODE 636 W HCPCS: Performed by: STUDENT IN AN ORGANIZED HEALTH CARE EDUCATION/TRAINING PROGRAM

## 2023-01-13 PROCEDURE — 36620 INSERTION CATHETER ARTERY: CPT | Mod: 59,,, | Performed by: ANESTHESIOLOGY

## 2023-01-13 PROCEDURE — 36000710: Performed by: ORTHOPAEDIC SURGERY

## 2023-01-13 PROCEDURE — 27201423 OPTIME MED/SURG SUP & DEVICES STERILE SUPPLY: Performed by: ORTHOPAEDIC SURGERY

## 2023-01-13 PROCEDURE — 83735 ASSAY OF MAGNESIUM: CPT | Performed by: FAMILY MEDICINE

## 2023-01-13 PROCEDURE — 27800903 OPTIME MED/SURG SUP & DEVICES OTHER IMPLANTS: Performed by: ORTHOPAEDIC SURGERY

## 2023-01-13 DEVICE — HEAD UNITRAX MODULAR ENDO 46MM: Type: IMPLANTABLE DEVICE | Site: HIP | Status: FUNCTIONAL

## 2023-01-13 DEVICE — PLUG BONE: Type: IMPLANTABLE DEVICE | Site: HIP | Status: FUNCTIONAL

## 2023-01-13 DEVICE — STEM OMNIFIT EON 132DEG SZ6: Type: IMPLANTABLE DEVICE | Site: HIP | Status: FUNCTIONAL

## 2023-01-13 DEVICE — SPACER CEMENT DISTAL 10MM: Type: IMPLANTABLE DEVICE | Site: HIP | Status: FUNCTIONAL

## 2023-01-13 DEVICE — SLEEVE FEMORAL UNITRAX +10MM: Type: IMPLANTABLE DEVICE | Site: HIP | Status: FUNCTIONAL

## 2023-01-13 DEVICE — CEMENT BONE WHOLE BATCH: Type: IMPLANTABLE DEVICE | Site: HIP | Status: FUNCTIONAL

## 2023-01-13 RX ORDER — POLYETHYLENE GLYCOL 3350 17 G/17G
17 POWDER, FOR SOLUTION ORAL DAILY
Status: DISCONTINUED | OUTPATIENT
Start: 2023-01-13 | End: 2023-01-19 | Stop reason: HOSPADM

## 2023-01-13 RX ORDER — FENTANYL CITRATE 50 UG/ML
INJECTION, SOLUTION INTRAMUSCULAR; INTRAVENOUS
Status: DISCONTINUED | OUTPATIENT
Start: 2023-01-13 | End: 2023-01-13

## 2023-01-13 RX ORDER — METHOCARBAMOL 500 MG/1
500 TABLET, FILM COATED ORAL 4 TIMES DAILY
Status: DISCONTINUED | OUTPATIENT
Start: 2023-01-13 | End: 2023-01-17

## 2023-01-13 RX ORDER — PROCHLORPERAZINE EDISYLATE 5 MG/ML
5 INJECTION INTRAMUSCULAR; INTRAVENOUS EVERY 30 MIN PRN
Status: DISCONTINUED | OUTPATIENT
Start: 2023-01-13 | End: 2023-01-13 | Stop reason: HOSPADM

## 2023-01-13 RX ORDER — ROPIVACAINE HYDROCHLORIDE 2 MG/ML
0.1 INJECTION, SOLUTION EPIDURAL; INFILTRATION; PERINEURAL CONTINUOUS
Status: DISCONTINUED | OUTPATIENT
Start: 2023-01-13 | End: 2023-01-17

## 2023-01-13 RX ORDER — LIDOCAINE HYDROCHLORIDE 20 MG/ML
INJECTION INTRAVENOUS
Status: DISCONTINUED | OUTPATIENT
Start: 2023-01-13 | End: 2023-01-13

## 2023-01-13 RX ORDER — ACETAMINOPHEN 500 MG
1000 TABLET ORAL EVERY 6 HOURS
Status: COMPLETED | OUTPATIENT
Start: 2023-01-13 | End: 2023-01-15

## 2023-01-13 RX ORDER — FENTANYL CITRATE 50 UG/ML
25 INJECTION, SOLUTION INTRAMUSCULAR; INTRAVENOUS EVERY 5 MIN PRN
Status: DISCONTINUED | OUTPATIENT
Start: 2023-01-13 | End: 2023-01-13 | Stop reason: HOSPADM

## 2023-01-13 RX ORDER — MUPIROCIN 20 MG/G
1 OINTMENT TOPICAL 2 TIMES DAILY
Status: DISPENSED | OUTPATIENT
Start: 2023-01-13 | End: 2023-01-18

## 2023-01-13 RX ORDER — TRANEXAMIC ACID 100 MG/ML
INJECTION, SOLUTION INTRAVENOUS
Status: DISCONTINUED | OUTPATIENT
Start: 2023-01-13 | End: 2023-01-13

## 2023-01-13 RX ORDER — CELECOXIB 100 MG/1
100 CAPSULE ORAL DAILY
Status: DISCONTINUED | OUTPATIENT
Start: 2023-01-13 | End: 2023-01-17

## 2023-01-13 RX ORDER — ROPIVACAINE HYDROCHLORIDE 5 MG/ML
INJECTION, SOLUTION EPIDURAL; INFILTRATION; PERINEURAL
Status: COMPLETED | OUTPATIENT
Start: 2023-01-13 | End: 2023-01-13

## 2023-01-13 RX ORDER — FENTANYL CITRATE 50 UG/ML
25 INJECTION, SOLUTION INTRAMUSCULAR; INTRAVENOUS
Status: DISCONTINUED | OUTPATIENT
Start: 2023-01-13 | End: 2023-01-13 | Stop reason: HOSPADM

## 2023-01-13 RX ORDER — ETOMIDATE 2 MG/ML
INJECTION INTRAVENOUS
Status: DISCONTINUED | OUTPATIENT
Start: 2023-01-13 | End: 2023-01-13

## 2023-01-13 RX ORDER — DEXAMETHASONE SODIUM PHOSPHATE 4 MG/ML
INJECTION, SOLUTION INTRA-ARTICULAR; INTRALESIONAL; INTRAMUSCULAR; INTRAVENOUS; SOFT TISSUE
Status: DISCONTINUED | OUTPATIENT
Start: 2023-01-13 | End: 2023-01-13

## 2023-01-13 RX ORDER — MIDAZOLAM HYDROCHLORIDE 1 MG/ML
.5-4 INJECTION INTRAMUSCULAR; INTRAVENOUS
Status: DISCONTINUED | OUTPATIENT
Start: 2023-01-13 | End: 2023-01-13 | Stop reason: HOSPADM

## 2023-01-13 RX ORDER — PHENYLEPHRINE HYDROCHLORIDE 10 MG/ML
INJECTION INTRAVENOUS
Status: DISCONTINUED | OUTPATIENT
Start: 2023-01-13 | End: 2023-01-13

## 2023-01-13 RX ORDER — SODIUM CHLORIDE 0.9 % (FLUSH) 0.9 %
3 SYRINGE (ML) INJECTION
Status: DISCONTINUED | OUTPATIENT
Start: 2023-01-13 | End: 2023-01-13 | Stop reason: HOSPADM

## 2023-01-13 RX ORDER — AMOXICILLIN 250 MG
1 CAPSULE ORAL 2 TIMES DAILY
Status: DISCONTINUED | OUTPATIENT
Start: 2023-01-13 | End: 2023-01-19 | Stop reason: HOSPADM

## 2023-01-13 RX ORDER — SODIUM CHLORIDE 0.9 % (FLUSH) 0.9 %
10 SYRINGE (ML) INJECTION
Status: DISCONTINUED | OUTPATIENT
Start: 2023-01-13 | End: 2023-01-15

## 2023-01-13 RX ORDER — MIDAZOLAM HYDROCHLORIDE 1 MG/ML
1 INJECTION INTRAMUSCULAR; INTRAVENOUS
Status: DISCONTINUED | OUTPATIENT
Start: 2023-01-13 | End: 2023-01-13 | Stop reason: HOSPADM

## 2023-01-13 RX ORDER — ROCURONIUM BROMIDE 10 MG/ML
INJECTION, SOLUTION INTRAVENOUS
Status: DISCONTINUED | OUTPATIENT
Start: 2023-01-13 | End: 2023-01-13

## 2023-01-13 RX ORDER — ONDANSETRON 2 MG/ML
INJECTION INTRAMUSCULAR; INTRAVENOUS
Status: DISCONTINUED | OUTPATIENT
Start: 2023-01-13 | End: 2023-01-13

## 2023-01-13 RX ORDER — VASOPRESSIN 20 [USP'U]/ML
INJECTION, SOLUTION INTRAMUSCULAR; SUBCUTANEOUS
Status: DISCONTINUED | OUTPATIENT
Start: 2023-01-13 | End: 2023-01-13

## 2023-01-13 RX ORDER — MUPIROCIN 20 MG/G
1 OINTMENT TOPICAL
Status: COMPLETED | OUTPATIENT
Start: 2023-01-13 | End: 2023-01-13

## 2023-01-13 RX ORDER — FENTANYL CITRATE 50 UG/ML
25-200 INJECTION, SOLUTION INTRAMUSCULAR; INTRAVENOUS
Status: DISCONTINUED | OUTPATIENT
Start: 2023-01-13 | End: 2023-01-13 | Stop reason: HOSPADM

## 2023-01-13 RX ORDER — PROPOFOL 10 MG/ML
VIAL (ML) INTRAVENOUS
Status: DISCONTINUED | OUTPATIENT
Start: 2023-01-13 | End: 2023-01-13

## 2023-01-13 RX ORDER — MUPIROCIN 20 MG/G
OINTMENT TOPICAL
Status: DISCONTINUED | OUTPATIENT
Start: 2023-01-13 | End: 2023-01-13

## 2023-01-13 RX ORDER — METHOCARBAMOL 500 MG/1
500 TABLET, FILM COATED ORAL EVERY 6 HOURS PRN
Status: DISCONTINUED | OUTPATIENT
Start: 2023-01-13 | End: 2023-01-19 | Stop reason: HOSPADM

## 2023-01-13 RX ORDER — VANCOMYCIN HYDROCHLORIDE 1 G/20ML
INJECTION, POWDER, LYOPHILIZED, FOR SOLUTION INTRAVENOUS
Status: DISCONTINUED | OUTPATIENT
Start: 2023-01-13 | End: 2023-01-13 | Stop reason: HOSPADM

## 2023-01-13 RX ORDER — LIDOCAINE HYDROCHLORIDE 10 MG/ML
1 INJECTION, SOLUTION EPIDURAL; INFILTRATION; INTRACAUDAL; PERINEURAL
Status: DISCONTINUED | OUTPATIENT
Start: 2023-01-13 | End: 2023-01-13 | Stop reason: HOSPADM

## 2023-01-13 RX ADMIN — PHENYLEPHRINE HYDROCHLORIDE 200 MCG: 10 INJECTION INTRAVENOUS at 12:01

## 2023-01-13 RX ADMIN — ACETAMINOPHEN 1000 MG: 500 TABLET ORAL at 09:01

## 2023-01-13 RX ADMIN — PROPOFOL 40 MG: 10 INJECTION, EMULSION INTRAVENOUS at 12:01

## 2023-01-13 RX ADMIN — OXYCODONE HYDROCHLORIDE 5 MG: 5 TABLET ORAL at 03:01

## 2023-01-13 RX ADMIN — TRANEXAMIC ACID 1000 MG: 100 INJECTION, SOLUTION INTRAVENOUS at 12:01

## 2023-01-13 RX ADMIN — ROPIVACAINE HYDROCHLORIDE 20 ML: 5 INJECTION EPIDURAL; INFILTRATION; PERINEURAL at 10:01

## 2023-01-13 RX ADMIN — SODIUM CHLORIDE, SODIUM GLUCONATE, SODIUM ACETATE, POTASSIUM CHLORIDE, MAGNESIUM CHLORIDE, SODIUM PHOSPHATE, DIBASIC, AND POTASSIUM PHOSPHATE: .53; .5; .37; .037; .03; .012; .00082 INJECTION, SOLUTION INTRAVENOUS at 12:01

## 2023-01-13 RX ADMIN — LIDOCAINE HYDROCHLORIDE 100 MG: 20 INJECTION INTRAVENOUS at 12:01

## 2023-01-13 RX ADMIN — ACETAMINOPHEN 1000 MG: 500 TABLET ORAL at 03:01

## 2023-01-13 RX ADMIN — SUGAMMADEX 200 MG: 100 INJECTION, SOLUTION INTRAVENOUS at 02:01

## 2023-01-13 RX ADMIN — ROPIVACAINE HYDROCHLORIDE 0.1 ML/HR: 2 INJECTION, SOLUTION EPIDURAL; INFILTRATION at 03:01

## 2023-01-13 RX ADMIN — PHENYLEPHRINE HYDROCHLORIDE 400 MCG: 10 INJECTION INTRAVENOUS at 12:01

## 2023-01-13 RX ADMIN — SODIUM CHLORIDE: 9 INJECTION, SOLUTION INTRAVENOUS at 12:01

## 2023-01-13 RX ADMIN — ONDANSETRON 4 MG: 2 INJECTION INTRAMUSCULAR; INTRAVENOUS at 12:01

## 2023-01-13 RX ADMIN — APIXABAN 2.5 MG: 2.5 TABLET, FILM COATED ORAL at 09:01

## 2023-01-13 RX ADMIN — ETOMIDATE 10 MG: 2 INJECTION INTRAVENOUS at 12:01

## 2023-01-13 RX ADMIN — VASOPRESSIN 1 UNITS: 20 INJECTION INTRAVENOUS at 01:01

## 2023-01-13 RX ADMIN — METHOCARBAMOL 500 MG: 500 TABLET ORAL at 10:01

## 2023-01-13 RX ADMIN — VASOPRESSIN 2 UNITS: 20 INJECTION INTRAVENOUS at 12:01

## 2023-01-13 RX ADMIN — CEFAZOLIN 2 G: 2 INJECTION, POWDER, FOR SOLUTION INTRAMUSCULAR; INTRAVENOUS at 12:01

## 2023-01-13 RX ADMIN — FENTANYL CITRATE 25 MCG: 50 INJECTION INTRAMUSCULAR; INTRAVENOUS at 10:01

## 2023-01-13 RX ADMIN — TRANEXAMIC ACID 1000 MG: 100 INJECTION, SOLUTION INTRAVENOUS at 02:01

## 2023-01-13 RX ADMIN — PHENYLEPHRINE HYDROCHLORIDE 200 MCG: 10 INJECTION INTRAVENOUS at 01:01

## 2023-01-13 RX ADMIN — ACETAMINOPHEN 1000 MG: 500 TABLET ORAL at 05:01

## 2023-01-13 RX ADMIN — CEFAZOLIN 2 G: 2 INJECTION, POWDER, FOR SOLUTION INTRAMUSCULAR; INTRAVENOUS at 09:01

## 2023-01-13 RX ADMIN — MEMANTINE HYDROCHLORIDE 5 MG: 5 TABLET ORAL at 09:01

## 2023-01-13 RX ADMIN — FENTANYL CITRATE 100 MCG: 50 INJECTION, SOLUTION INTRAMUSCULAR; INTRAVENOUS at 12:01

## 2023-01-13 RX ADMIN — OXYCODONE 5 MG: 5 TABLET ORAL at 05:01

## 2023-01-13 RX ADMIN — CELECOXIB 100 MG: 100 CAPSULE ORAL at 04:01

## 2023-01-13 RX ADMIN — LABETALOL HYDROCHLORIDE 200 MG: 100 TABLET, FILM COATED ORAL at 09:01

## 2023-01-13 RX ADMIN — ROCURONIUM BROMIDE 50 MG: 10 INJECTION INTRAVENOUS at 12:01

## 2023-01-13 RX ADMIN — DEXAMETHASONE SODIUM PHOSPHATE 4 MG: 4 INJECTION, SOLUTION INTRAMUSCULAR; INTRAVENOUS at 12:01

## 2023-01-13 RX ADMIN — METHOCARBAMOL 500 MG: 500 TABLET ORAL at 04:01

## 2023-01-13 RX ADMIN — MUPIROCIN 1 G: 20 OINTMENT TOPICAL at 09:01

## 2023-01-13 RX ADMIN — VASOPRESSIN 1 UNITS: 20 INJECTION INTRAVENOUS at 02:01

## 2023-01-13 RX ADMIN — VANCOMYCIN HYDROCHLORIDE 1000 MG: 1 INJECTION, POWDER, LYOPHILIZED, FOR SOLUTION INTRAVENOUS at 09:01

## 2023-01-13 RX ADMIN — LEVOTHYROXINE SODIUM 125 MCG: 125 TABLET ORAL at 05:01

## 2023-01-13 RX ADMIN — SENNOSIDES AND DOCUSATE SODIUM 1 TABLET: 50; 8.6 TABLET ORAL at 09:01

## 2023-01-13 RX ADMIN — METHOCARBAMOL 500 MG: 500 TABLET ORAL at 05:01

## 2023-01-13 NOTE — ASSESSMENT & PLAN NOTE
- patient allergic to formularly alternatives to home pravastatin  - resume home statin at discharge

## 2023-01-13 NOTE — ANESTHESIA PREPROCEDURE EVALUATION
[facility]  Anesthesia Pre-Operative Evaluation         Patient Name: Lynn Mckinney  YOB: 1935  MRN: [id number]    SUBJECTIVE:     Pre-operative evaluation for Procedure(s) (LRB):  HEMIARTHROPLASTY, HIP, LEFT, Peg Board, Joy, Ancef/Vanc, (Left)     01/13/2023    Lynn Mckinney is a 87 y.o. female w/ a significant PMHx of CAD s/p CABG, PAD, combined systolic/diastolic heart failure, heart block s/p biventricular ICD, HTN, HLD, and hypothyroidism that presented to the emergency department after a ground level fall. X-rays in the emergency department showed a left femoral neck fracture. Orthopedics was consulted for evaluation of the left femoral neck fracture.        Patient now presents for the above procedure(s).      TTE: 12/5/22   The estimated ejection fraction is 35%.   The quantitatively derived ejection fraction is 35%.   The left ventricle is normal in size with moderately decreased systolic function.   There is abnormal septal wall motion consistent with right ventricular pacemaker.   There is moderate left ventricular global hypokinesis.   Grade I left ventricular diastolic dysfunction.   Normal right ventricular size with normal right ventricular systolic function.   Severe left atrial enlargement.   Mild-to-moderate aortic regurgitation.   The estimated PA systolic pressure is 27 mmHg.   Normal central venous pressure (3 mmHg).   Trivial circumferential pericardial effusion.        LDA:        Peripheral IV - Single Lumen 01/12/23 2200 20 G Anterior;Distal;Left Forearm (Active)   Site Assessment Clean;Dry;Intact;No redness;No swelling 01/12/23 2218   Extremity Assessment Distal to IV No abnormal discoloration;No redness;No swelling;No warmth 01/12/23 2218   Line Status Blood return noted;Flushed;Saline locked 01/12/23 2218   Dressing Status Clean;Dry;Intact 01/12/23 2218   Dressing Intervention First dressing 01/12/23 2218   Number of days: 0       Prev  "airway: None documented.    Drips: None documented.    Patient Active Problem List   Diagnosis    Hypertension    Hyperlipidemia    Peripheral arterial disease    Coronary artery disease (CAD)    Hypothyroidism    Incontinence    CKD (chronic kidney disease) stage 3, GFR 30-59 ml/min    Vitamin D deficiency    CHB (complete heart block)    ICD (implantable cardioverter-defibrillator), dual, in situ    Left-sided carotid artery disease    Claudication    Right hip pain    Lumbar stenosis    Thoracic or lumbosacral neuritis or radiculitis    Hip pain    Bilateral carotid artery stenosis    Lumbar radiculitis    Age-related osteoporosis without current pathological fracture    History of dental problems    VF (ventricular fibrillation)    VT (ventricular tachycardia)    S/P CABG x 3    Macular subretinal hemorrhage, right    Memory difficulties    MRI contraindicated due to metal implant    Aortic atherosclerosis    Left displaced femoral neck fracture    Chronic combined systolic and diastolic heart failure       Review of patient's allergies indicates:   Allergen Reactions    Lipitor [atorvastatin] Itching    Fosamax [alendronate]      Felt "strange"    Iodinated contrast media      Dye is only to be used if absolutely needed because of kidney function    Prolia [denosumab]      Reaction after the Prolia    Sulfa (sulfonamide antibiotics)        Current Outpatient Medications:    Current Facility-Administered Medications:     0.9%  NaCl infusion, , Intravenous, Continuous, Abdiel Perez MD, Last Rate: 100 mL/hr at 01/12/23 2324, New Bag at 01/12/23 2324    acetaminophen tablet 1,000 mg, 1,000 mg, Oral, Q8H, Abdiel Perez MD, 1,000 mg at 01/12/23 2257    amLODIPine tablet 10 mg, 10 mg, Oral, Daily, Abdiel Perez MD    bisacodyL suppository 10 mg, 10 mg, Rectal, Daily PRN, Abdiel Perez MD    hydrOXYzine HCL tablet 25 mg, 25 mg, Oral, TID PRN, Abdiel HERNANDEZ" MD Chris    labetaloL tablet 200 mg, 200 mg, Oral, BID, Abdiel Perez MD, 200 mg at 01/12/23 2257    levothyroxine tablet 125 mcg, 125 mcg, Oral, Before breakfast, Abdiel Perez MD    losartan tablet 50 mg, 50 mg, Oral, Daily, Abdiel Perez MD    melatonin tablet 6 mg, 6 mg, Oral, Nightly PRN, Abdiel Perez MD    memantine tablet 5 mg, 5 mg, Oral, BID, Abdiel Perez MD, 5 mg at 01/12/23 2257    morphine injection 2 mg, 2 mg, Intravenous, Q3H PRN, Abdiel Perez MD    morphine injection 2 mg, 2 mg, Intravenous, Q3H PRN, Abdiel Perez MD    morphine injection 4 mg, 4 mg, Intravenous, ED 1 Time, Geoffrey Fernández MD    ondansetron injection 4 mg, 4 mg, Intravenous, Q12H PRN, Abdiel Peerz MD    oxyCODONE immediate release tablet 5 mg, 5 mg, Oral, Q3H PRN, Abdiel Perez MD    oxyCODONE immediate release tablet 5 mg, 5 mg, Oral, Q3H PRN, Abdiel Perez MD    pregabalin capsule 75 mg, 75 mg, Oral, QHS, Abdiel Perez MD, 75 mg at 01/12/23 2257    sodium chloride 0.9% flush 10 mL, 10 mL, Intravenous, PRN, Geoffrey Fernández MD    sodium chloride 0.9% flush 10 mL, 10 mL, Intravenous, PRN, Abdiel Perez MD    sodium chloride 0.9% flush 10 mL, 10 mL, Intravenous, PRN, Tolu Meneses MD    Current Outpatient Medications:     acetaminophen (TYLENOL) 325 MG tablet, Take 1 tablet (325 mg total) by mouth every 6 (six) hours as needed for Pain. (Patient not taking: Reported on 1/5/2023), Disp: , Rfl: 0    amLODIPine (NORVASC) 10 MG tablet, Take 1 tablet (10 mg total) by mouth once daily., Disp: 90 tablet, Rfl: 3    aspirin 81 MG Chew, Take 81 mg by mouth every evening. 1 Tablet, Chewable Oral Every day, Disp: , Rfl:     benzonatate (TESSALON) 200 MG capsule, Take 1 capsule (200 mg total) by mouth 3 (three) times daily as needed for Cough., Disp: 90 capsule, Rfl: 0    CALCIUM CARBONATE/VITAMIN D3 (CALTRATE 600 + D ORAL), Take 1 tablet by mouth once daily., Disp: ,  Rfl:     cetirizine (ZYRTEC) 10 MG tablet, Take 1 tablet (10 mg total) by mouth once daily., Disp: 30 tablet, Rfl: 0    cholecalciferol, vitamin D3, (VITAMIN D3) 50 mcg (2,000 unit) Cap capsule, Take 2,000 Units by mouth once daily., Disp: , Rfl:     coenzyme Q10 200 mg capsule, Take 200 mg by mouth once daily., Disp: , Rfl:     hydrOXYzine HCL (ATARAX) 25 MG tablet, Take 1 tablet (25 mg total) by mouth 3 (three) times daily as needed for Anxiety., Disp: 15 tablet, Rfl: 0    irbesartan (AVAPRO) 300 MG tablet, TAKE 1 TABLET (300 MG TOTAL) BY MOUTH EVERY EVENING. CANCEL IRBESARTAN/HCTZ COMBO, Disp: 90 tablet, Rfl: 3    labetaloL (NORMODYNE) 200 MG tablet, Take 1 tablet (200 mg total) by mouth 2 (two) times daily., Disp: 180 tablet, Rfl: 3    LACTOSE-FREE FOOD (BOOST ORAL), Take by mouth. 2-3 days per week, Disp: , Rfl:     levothyroxine (SYNTHROID) 125 MCG tablet, Take 1 tablet (125 mcg total) by mouth before breakfast., Disp: 30 tablet, Rfl: 3    memantine (NAMENDA) 5 MG Tab, Take 1 tablet (5 mg total) by mouth 2 (two) times daily., Disp: 60 tablet, Rfl: 11    multivitamin (THERAGRAN) per tablet, Take by mouth. Pt taking one pill three times a week., Disp: , Rfl:     nitroGLYCERIN (NITROSTAT) 0.4 MG SL tablet, PLACE 1 TABLET UNDER THE TONGUE EVERY 5 MIN AS NEEDED FOR CHEST PAIN. MAX:3 DOSES, Disp: 30 tablet, Rfl: 3    promethazine-dextromethorphan (PROMETHAZINE-DM) 6.25-15 mg/5 mL Syrp, Take 5 mLs by mouth nightly as needed (cough). May cause sedation., Disp: 180 mL, Rfl: 0    rosuvastatin (CRESTOR) 10 MG tablet, Take 1 tablet (10 mg total) by mouth every evening., Disp: 90 tablet, Rfl: 3    SALMON OIL-OMEGA-3 FATTY ACIDS ORAL, Take 1 capsule by mouth once daily., Disp: , Rfl:     vitamins  A,C,E-zinc-copper (PRESERVISION AREDS) 14,320-226-200 unit-mg-unit Cap, Take by mouth Daily., Disp: , Rfl:     Past Surgical History:   Procedure Laterality Date    ANGIOPLASTY      APPENDECTOMY      BREAST CYST  ASPIRATION Bilateral     CARDIAC CATHETERIZATION      CARDIAC PACEMAKER PLACEMENT  10/10/13    by Dr. Coleman    CAROTID STENT Left     by Dr. Bowen    CATARACT EXTRACTION W/  INTRAOCULAR LENS IMPLANT Bilateral 2012    Dr Sharp    CORONARY ARTERY BYPASS GRAFT  2001     x 3 LIMA-Ramus, SVG-OM1 & SVG-PDA    CORONARY BYPASS GRAFT ANGIOGRAPHY  3/11/2021    Procedure: Bypass graft study;  Surgeon: Gil Garcia MD;  Location: [facility] CATH LAB;  Service: Cardiology;;    CYST REMOVAL      wrist    EYE SURGERY      HYSTERECTOMY      LEFT HEART CATHETERIZATION Left 3/11/2021    Procedure: Left heart cath;  Surgeon: Gil Garcia MD;  Location: [facility] CATH LAB;  Service: Cardiology;  Laterality: Left;    OOPHORECTOMY      THYROIDECTOMY      VITRECTOMY BY PARS PLANA APPROACH Right 10/12/2021    Procedure: VITRECTOMY, PARS PLANA APPROACH;  Surgeon: Flako Carver MD;  Location: [facility] OR [address] [place] [address];  Service: Ophthalmology;  Laterality: Right;       Social History     Socioeconomic History    Marital status: Single   Tobacco Use    Smoking status: Former     Types: Cigarettes     Quit date: 1984     Years since quittin.4    Smokeless tobacco: Never   Substance and Sexual Activity    Alcohol use: Yes     Alcohol/week: 1.0 standard drink     Types: 1 Shots of liquor per week     Comment: old fashion  with dinner every 6 mo     Drug use: No   Other Topics Concern    Are you pregnant or think you may be? No    Breast-feeding No   Social History Narrative    Minimal physical activity. Does housework.         Drives a little.        OBJECTIVE:     Vital Signs Range (Last 24H):  Temp:  [36.6 °C (97.9 °F)-36.9 °C (98.5 °F)]   Pulse:  [60-78]   Resp:  [13-24]   BP: (112-188)/()   SpO2:  [92 %-100 %]       Significant Labs:  Lab Results   Component Value Date    WBC 16.22 (H) 2023    HGB 13.2 2023    HCT 40.2 2023     (L) 2023    CHOL 166 2022    TRIG 95  05/19/2022    HDL 57 05/19/2022    ALT 15 01/12/2023    AST 25 01/12/2023     (L) 01/12/2023     (L) 01/12/2023    K 3.7 01/12/2023    K 3.7 01/12/2023     01/12/2023     01/12/2023    CREATININE 1.1 01/12/2023    CREATININE 1.1 01/12/2023    BUN 23 01/12/2023    BUN 23 01/12/2023    CO2 24 01/12/2023    CO2 24 01/12/2023    TSH 96.744 (H) 08/31/2022    INR 1.0 01/12/2023    HGBA1C 5.3 01/12/2023       Diagnostic Studies: No relevant studies.    EKG:   Results for orders placed or performed in visit on 06/07/22   IN OFFICE EKG 12-LEAD (to [place])    Collection Time: 06/07/22  1:15 PM    Narrative    Test Reason : I25.110,    Vent. Rate : 064 BPM     Atrial Rate : 064 BPM     P-R Int : 186 ms          QRS Dur : 174 ms      QT Int : 512 ms       P-R-T Axes : 026 -79 097 degrees     QTc Int : 528 ms    Atrial-sensed ventricular-paced rhythm  Abnormal ECG  When compared with ECG of 10-ROLA-2021 09:34,  Vent. rate has increased BY   4 BPM  Confirmed by Moi Mendoza MD ([id number]) on 6/7/2022 4:23:17 PM    Referred By:             Confirmed By:Moi Mendoza MD       2D ECHO:  TTE:  Results for orders placed or performed during the hospital encounter of 12/05/22   Echo   Result Value Ref Range    Ascending aorta 3.33 cm    STJ 3.02 cm    AV mean gradient 3 mmHg    Ao peak katty 1.28 m/s    Ao VTI 22.97 cm    IVS 1.08 0.6 - 1.1 cm    LA size 5.34 cm    Left Atrium Major Axis 6.83 cm    Left Atrium Minor Axis 6.76 cm    LVIDd 4.72 3.5 - 6.0 cm    LVIDs 3.59 2.1 - 4.0 cm    LVOT diameter 2.04 cm    LVOT peak VTI 15.63 cm    Posterior Wall 0.80 0.6 - 1.1 cm    MV Peak A Katty 1.16 m/s    E wave deceleration time 285.77 msec    MV Peak E Katty 0.93 m/s    RA Major Axis 5.09 cm    RA Width 3.11 cm    RVDD 2.91 cm    Sinus 3.22 cm    TAPSE 1.32 cm    TR Max Katty 2.43 m/s    TDI LATERAL 0.06 m/s    TDI SEPTAL 0.04 m/s    LA WIDTH 3.54 cm    MV stenosis pressure 1/2 time 82.87 ms    LV Diastolic Volume 103.32 mL    LV  Systolic Volume 54.04 mL    RV S' 6.17 cm/s    LVOT peak katty 0.74 m/s    LA volume (mod) 59.54 cm3    LV LATERAL E/E' RATIO 15.50 m/s    LV SEPTAL E/E' RATIO 23.25 m/s    FS 24 %    LA volume 109.18 cm3    LV mass 152.31 g    Left Ventricle Relative Wall Thickness 0.34 cm    AV valve area 2.22 cm2    AV Velocity Ratio 0.58     AV index (prosthetic) 0.68     MV valve area p 1/2 method 2.65 cm2    E/A ratio 0.80     Mean e' 0.05 m/s    LVOT area 3.3 cm2    LVOT stroke volume 51.06 cm3    AV peak gradient 7 mmHg    E/E' ratio 18.60 m/s    Triscuspid Valve Regurgitation Peak Gradient 24 mmHg    BSA 1.66 m2    LV Systolic Volume Index 33.0 mL/m2    LV Diastolic Volume Index 63.00 mL/m2    LA Volume Index 66.6 mL/m2    LV Mass Index 93 g/m2    LA Volume Index (Mod) 36.3 mL/m2    Right Atrial Pressure (from IVC) 3 mmHg    QEF 35 %    EF 35 %    TV rest pulmonary artery pressure 27 mmHg    Narrative    · The estimated ejection fraction is 35%.  · The quantitatively derived ejection fraction is 35%.  · The left ventricle is normal in size with moderately decreased systolic   function.  · There is abnormal septal wall motion consistent with right ventricular   pacemaker.  · There is moderate left ventricular global hypokinesis.  · Grade I left ventricular diastolic dysfunction.  · Normal right ventricular size with normal right ventricular systolic   function.  · Severe left atrial enlargement.  · Mild-to-moderate aortic regurgitation.  · The estimated PA systolic pressure is 27 mmHg.  · Normal central venous pressure (3 mmHg).  · Trivial circumferential pericardial effusion.          ENRIQUE:  No results found for this or any previous visit.    ASSESSMENT/PLAN:                                                                                                                 01/13/2023  Lynn Mckinney is a 87 y.o., female.      Pre-op Assessment    I have reviewed the Patient Summary Reports.     I have reviewed the Nursing Notes. I  have reviewed the NPO Status.   I have reviewed the Medications.     Review of Systems  Anesthesia Hx:  No problems with previous Anesthesia  History of prior surgery of interest to airway management or planning: Denies Family Hx of Anesthesia complications.   Denies Personal Hx of Anesthesia complications.   Cardiovascular:   Pacemaker Hypertension CAD  CABG/stent Dysrhythmias  Angina CHF PVD hyperlipidemia    Renal/:   Chronic Renal Disease, CKD    Hepatic/GI:  Hepatic/GI Normal    Neurological:   Neuromuscular Disease,    Endocrine:   Hypothyroidism    Psych:  Psychiatric Normal           Physical Exam  General: Well nourished, Cooperative, Alert and Oriented    Airway:  Mallampati: II / I  Mouth Opening: Small, but > 3cm  TM Distance: Normal  Neck ROM: Normal ROM    Dental:  Periodontal disease    Chest/Lungs:  Clear to auscultation, Normal Respiratory Rate    Heart:  Rate: Normal  Rhythm: Regular Rhythm        Anesthesia Plan  Type of Anesthesia, risks & benefits discussed:    Anesthesia Type: Gen ETT, Regional, CSE  Intra-op Monitoring Plan: Standard ASA Monitors  Post Op Pain Control Plan: multimodal analgesia and IV/PO Opioids PRN  Induction:  IV  Airway Plan: Direct, Post-Induction  Informed Consent: Informed consent signed with the Patient and all parties understand the risks and agree with anesthesia plan.  All questions answered.   ASA Score: 3  Day of Surgery Review of History & Physical: H&P Update referred to the surgeon/provider.    Ready For Surgery From Anesthesia Perspective.     .

## 2023-01-13 NOTE — ED NOTES
Duong catheter declined at this time due to pain with movement and difficulty visualizing perineum since patient cannot move/rotate LLE.

## 2023-01-13 NOTE — ASSESSMENT & PLAN NOTE
· Prior CABG in 2001 with  of mid LAD.   · Chronic and controlled. Patient asymptomatic.   · Continue home Labetalol to treat. Hold Aspirin for surgery and restart post-op. Patient on Crestor at home but not on formulary here in hospital and allergic to alternative statins so will hold statin in hospital.

## 2023-01-13 NOTE — HOSPITAL COURSE
Patient admitted to [facility] Medicine Team H: Hip Fracture team and started on Hip Fracture Pathway with Orthopedic surgery consult for hip fracture. Patient was seen and evaluated by Orthopedic surgery who recommended operative repair of hip fracture. Patient was medically optimized prior to surgery and to be taken to OR after optimization on 1/13/2023. Patient underwent left hip hemiarthroplasty by Dr. Alan Vieyra. Post-op patient WBAT with posterior hip precautions to the left lower extremity as per Orthopedics recommendation. Patient placed on Apixiban 2.5 mg po BID and JOSSUE/SCD's for DVT prophylaxis post-op and will need for total of 35 days. Perineural pain catheter placed by Anesthesia Pain Service with continuous infusion of Ropivacaine to help with pain control post-op and Anesthesia Pain Service managing while patient in the hospital. Patient placed on multimodal pain management post-op with Tylenol 1000 mg po every 6 hours, Robaxin 500 mg po 4 times daily, and Celebrex 100 mg po daily post-op and will continue. PT/OT consulted post-op and evaluated patient. Patient progressing well with PT and OT and recommended skilled nursing facility placement and awaiting SNF placement for discharge. Patient doing well and pain controlled. PNC has been removed. Patient accepted to [facility] SNF and awaiting auth for discharge. Surgical bandages to remain in place until Orthopedic clinic follow-up.

## 2023-01-13 NOTE — OP NOTE
OP NOTE    Date of Sx: 01/13/2023    Preop Dx: Left femoral neck fracture    Postop Dx: Left femoral neck fracture    Procedure: Left hip hemiarthroplasty for femoral neck fracture    Surgeon: Alan Vieyra M.D.    Asst:  Rj Contreras M.D    Anesthesia: GETA    EBL:  200cc    IVF:  1000cc crystalloid    Implants: Joy Santana cement 6 strem, 132°, 46 mm Unitrax head, +10    Specimens: Femoral head    Findings: Good leg lengths and stability    Dispo:  To PACU extubated/stable      Indications for Procedure:    The patient is an 87-year-old female who sustained a ground level fall, resulting in a left femoral neck fracture.  We are proceeding to the operating room for left hip hemiarthroplasty.  The risks, benefits and alternatives to surgery were discussed with the patient and family at great length prior to going to the operating room.  All questions were answered and informed consent was obtained.    Procedure in Detail:    Patient was marked in the preoperative holding area and brought to the operating room.  General endotracheal anesthesia was inducted on the patient's hospital bed.  Preoperative antibiotics were administered.  The patient was moved to the operating table the placed in the lateral decubitus position with the left hip up and all bony prominences well padded.  The left hip and lower extremity were prepped and draped in sterile fashion.  A timeout was undertaken to confirm patient, side, site, procedure, and the administration of preoperative antibiotics.  All agreed and we proceeded.    A standard posterior approach was made.  The gluteal fascia and iliotibial band were incised in line with the skin incision.  The gluteus valerie was split bluntly and the Charnley retractor placed.  The short external rotators were identified and the piriformis and conjoint tendons incised at their insertions and tagged with number 2 Ethibond suture.  A posterior capsulotomy was performed and the corners tagged  with number 1 vicryl suture.  The femoral head was removed with a power corkscrew and measured.  A 46 mm head was trialled and found to be the appropriate size.  Attention was then turned to the femur.    The femoral neck cut was made at the appropriate height.  The box osteotome was used, followed by the canal finder, then the lateralizer, then sequential canal reaming.  The femur was broached to size cement 6, then trial implants were placed and ranged with good leg lengths and stability.    The cement plug was placed, the canal brushed and irrigated and the final stem cemented in place.  Trialling was again undertaken and a 46 mm head with +10 sleeve were placed.  The hip had good clinical leg lengths and stability with the final implants.      A 17/500cc betadine soak was undertaken.  The hip was again irrigated with normal saline via pulse lavage.  The posterior capsule and short external rotators were repaired to the greater trochanter through drill holes.  The gluteal fascia and iliotibial band were repaired with number 1 vicryl in interrupted fashion.  The next layer of fascia repaired with 0 vicryl suture, the subcutaneous tissue with 2-0 vicryl suture, and the skin with Stratafix and Dermabond..   An Aquacel dressing and knee immobilizer were placed.      Instrument and sponge counts were reported correct at the end of the case.  There were no complications.  The patient was returned to the supine position on the hospital bed, extubated and taken to the recover room in stable condition.      Plan for the patient:  Immediate PT and OT with weight bearing as tolerated and posterior hip precautions.  Multimodal pain management limiting narcotics.  Anticoagulation/DVT prophylaxis.    Alan Vieyra MD

## 2023-01-13 NOTE — SUBJECTIVE & OBJECTIVE
"Past Medical History:   Diagnosis Date    Allergy     seasonal    Blood clotting tendency     Carotid artery disease 5/29/2014    Coronary artery disease     Heart block     Hyperlipidemia     Hypertension     Hypothyroid     Obesity     Pacemaker     PAD (peripheral artery disease)     Spinal stenosis     Thoracic or lumbosacral neuritis or radiculitis 11/25/2014    Tubular adenoma        Past Surgical History:   Procedure Laterality Date    ANGIOPLASTY      APPENDECTOMY      BREAST CYST ASPIRATION Bilateral     CARDIAC CATHETERIZATION      CARDIAC PACEMAKER PLACEMENT  10/10/13    by Dr. Coleman    CAROTID STENT Left 2007    by Dr. Bowen    CATARACT EXTRACTION W/  INTRAOCULAR LENS IMPLANT Bilateral 2012    Dr Sharp    CORONARY ARTERY BYPASS GRAFT  2001     x 3 LIMA-Ramus, SVG-OM1 & SVG-PDA    CORONARY BYPASS GRAFT ANGIOGRAPHY  3/11/2021    Procedure: Bypass graft study;  Surgeon: Gil Garcia MD;  Location: [facility] CATH LAB;  Service: Cardiology;;    CYST REMOVAL      wrist    EYE SURGERY      HYSTERECTOMY      LEFT HEART CATHETERIZATION Left 3/11/2021    Procedure: Left heart cath;  Surgeon: Gil Garcia MD;  Location: [facility] CATH LAB;  Service: Cardiology;  Laterality: Left;    OOPHORECTOMY      THYROIDECTOMY      VITRECTOMY BY PARS PLANA APPROACH Right 10/12/2021    Procedure: VITRECTOMY, PARS PLANA APPROACH;  Surgeon: Flako Carver MD;  Location: [facility] OR [address] [place] [address];  Service: Ophthalmology;  Laterality: Right;       Review of patient's allergies indicates:   Allergen Reactions    Lipitor [atorvastatin] Itching    Fosamax [alendronate]      Felt "strange"    Iodinated contrast media      Dye is only to be used if absolutely needed because of kidney function    Prolia [denosumab]      Reaction after the Prolia    Sulfa (sulfonamide antibiotics)        No current facility-administered medications on file prior to encounter.     Current Outpatient Medications on File Prior to Encounter   Medication Sig    " acetaminophen (TYLENOL) 325 MG tablet Take 1 tablet (325 mg total) by mouth every 6 (six) hours as needed for Pain. (Patient not taking: Reported on 1/5/2023)    amLODIPine (NORVASC) 10 MG tablet Take 1 tablet (10 mg total) by mouth once daily.    aspirin 81 MG Chew Take 81 mg by mouth every evening. 1 Tablet, Chewable Oral Every day    benzonatate (TESSALON) 200 MG capsule Take 1 capsule (200 mg total) by mouth 3 (three) times daily as needed for Cough.    CALCIUM CARBONATE/VITAMIN D3 (CALTRATE 600 + D ORAL) Take 1 tablet by mouth once daily.    cetirizine (ZYRTEC) 10 MG tablet Take 1 tablet (10 mg total) by mouth once daily.    cholecalciferol, vitamin D3, (VITAMIN D3) 50 mcg (2,000 unit) Cap capsule Take 2,000 Units by mouth once daily.    coenzyme Q10 200 mg capsule Take 200 mg by mouth once daily.    hydrOXYzine HCL (ATARAX) 25 MG tablet Take 1 tablet (25 mg total) by mouth 3 (three) times daily as needed for Anxiety.    irbesartan (AVAPRO) 300 MG tablet TAKE 1 TABLET (300 MG TOTAL) BY MOUTH EVERY EVENING. CANCEL IRBESARTAN/HCTZ COMBO    labetaloL (NORMODYNE) 200 MG tablet Take 1 tablet (200 mg total) by mouth 2 (two) times daily.    LACTOSE-FREE FOOD (BOOST ORAL) Take by mouth. 2-3 days per week    levothyroxine (SYNTHROID) 125 MCG tablet Take 1 tablet (125 mcg total) by mouth before breakfast.    memantine (NAMENDA) 5 MG Tab Take 1 tablet (5 mg total) by mouth 2 (two) times daily.    multivitamin (THERAGRAN) per tablet Take by mouth. Pt taking one pill three times a week.    nitroGLYCERIN (NITROSTAT) 0.4 MG SL tablet PLACE 1 TABLET UNDER THE TONGUE EVERY 5 MIN AS NEEDED FOR CHEST PAIN. MAX:3 DOSES    promethazine-dextromethorphan (PROMETHAZINE-DM) 6.25-15 mg/5 mL Syrp Take 5 mLs by mouth nightly as needed (cough). May cause sedation.    rosuvastatin (CRESTOR) 10 MG tablet Take 1 tablet (10 mg total) by mouth every evening.    SALMON OIL-OMEGA-3 FATTY ACIDS ORAL Take 1 capsule by mouth once daily.    vitamins   A,C,E-zinc-copper (PRESERVISION AREDS) 14,320-226-200 unit-mg-unit Cap Take by mouth Daily.     Family History       Problem Relation (Age of Onset)    Alzheimer's disease Mother    Diabetes Brother    Heart disease Father, Mother, Brother    Hypertension Father, Mother    No Known Problems Sister, Maternal Grandmother, Maternal Grandfather, Paternal Grandmother, Paternal Grandfather, Maternal Aunt, Maternal Uncle, Paternal Aunt, Paternal Uncle          Tobacco Use    Smoking status: Former     Types: Cigarettes     Quit date: 1984     Years since quittin.4    Smokeless tobacco: Never   Substance and Sexual Activity    Alcohol use: Yes     Alcohol/week: 1.0 standard drink     Types: 1 Shots of liquor per week     Comment: old fashion  with dinner every 6 mo     Drug use: No    Sexual activity: Not on file     Review of Systems   Constitutional:  Negative for chills and fever.   HENT:  Negative for sore throat and trouble swallowing.    Eyes:  Negative for photophobia and visual disturbance.   Respiratory:  Negative for cough, shortness of breath and wheezing.    Cardiovascular:  Negative for chest pain, palpitations and leg swelling.   Gastrointestinal:  Negative for abdominal distention, abdominal pain, diarrhea, nausea and vomiting.   Genitourinary:  Negative for dysuria and hematuria.   Musculoskeletal:  Positive for arthralgias and gait problem. Negative for neck pain and neck stiffness.   Skin:  Negative for rash and wound.   Neurological:  Negative for seizures, syncope, weakness, light-headedness and headaches.   Psychiatric/Behavioral:  Negative for confusion and decreased concentration.    Objective:     Vital Signs (Most Recent):  Temp: 97.9 °F (36.6 °C) (23)  Pulse: 68 (23)  Resp: 20 (23 2100)  BP: (!) 159/122 (23)  SpO2: (!) 93 % (23)   Vital Signs (24h Range):  Temp:  [97.9 °F (36.6 °C)] 97.9 °F (36.6 °C)  Pulse:  [68] 68  Resp:  [15-20]  20  SpO2:  [93 %] 93 %  BP: (159-168)/() 159/122        There is no height or weight on file to calculate BMI.    Physical Exam  Constitutional:       General: She is not in acute distress.     Appearance: She is not toxic-appearing or diaphoretic.   HENT:      Head: Normocephalic and atraumatic.      Nose: Nose normal.   Eyes:      General: No scleral icterus.     Extraocular Movements: Extraocular movements intact.      Pupils: Pupils are equal, round, and reactive to light.   Cardiovascular:      Rate and Rhythm: Normal rate and regular rhythm.   Pulmonary:      Effort: Pulmonary effort is normal. No respiratory distress.      Breath sounds: No wheezing or rales.   Abdominal:      General: Abdomen is flat. There is no distension.      Palpations: Abdomen is soft.      Tenderness: There is no abdominal tenderness. There is no guarding.   Musculoskeletal:      Cervical back: Normal range of motion and neck supple. No rigidity.      Right lower leg: No edema.      Left lower leg: No edema.      Comments: LLE shortened and rotated tender to movement and palpation   Skin:     General: Skin is warm and dry.      Coloration: Skin is not jaundiced.   Neurological:      General: No focal deficit present.      Mental Status: She is alert and oriented to person, place, and time.      Cranial Nerves: No cranial nerve deficit.   Psychiatric:         Mood and Affect: Mood normal.         Behavior: Behavior normal.         CRANIAL NERVES     CN III, IV, VI   Pupils are equal, round, and reactive to light.     Significant Labs: All pertinent labs within the past 24 hours have been reviewed.  CBC:   Recent Labs   Lab 01/12/23 2114   WBC 16.22*   HGB 13.2   HCT 40.2   *     CMP:   Recent Labs   Lab 01/12/23 2114   *  134*   K 3.7  3.7     101   CO2 24  24     110   BUN 23  23   CREATININE 1.1  1.1   CALCIUM 9.0  9.0   PROT 6.7   ALBUMIN 3.7   BILITOT 0.8   ALKPHOS 58   AST 25   ALT 15    ANIONGAP 9  9       Significant Imaging: I have reviewed all pertinent imaging results/findings within the past 24 hours.

## 2023-01-13 NOTE — ED TRIAGE NOTES
Patient arrived by EMS c/o L hip and L knee pain s/p mechanical fall to ground from sitting position while trying to stand up. Shortening and rotation noted. +2 pedal pulses bilaterally. Denies numbness/tingling to extremities. Denies head injury, LOC, headache, dizziness, or vision changes. Denies chest pain, SOB, or dizziness prior to falling. States it was mechanical in nature.

## 2023-01-13 NOTE — ASSESSMENT & PLAN NOTE
Patient is identified as having Combined Systolic and Diastolic heart failure that is Chronic. CHF is currently controlled. Latest ECHO performed and demonstrates- Results for orders placed during the hospital encounter of 12/05/22    Echo    Interpretation Summary  · The estimated ejection fraction is 35%.  · The quantitatively derived ejection fraction is 35%.  · The left ventricle is normal in size with moderately decreased systolic function.  · There is abnormal septal wall motion consistent with right ventricular pacemaker.  · There is moderate left ventricular global hypokinesis.  · Grade I left ventricular diastolic dysfunction.  · Normal right ventricular size with normal right ventricular systolic function.  · Severe left atrial enlargement.  · Mild-to-moderate aortic regurgitation.  · The estimated PA systolic pressure is 27 mmHg.  · Normal central venous pressure (3 mmHg).  · Trivial circumferential pericardial effusion.  Continue home Labetalol to treat and continue Losartan (substitute for home Irbesartan not on formulary) to treat and monitor clinical status closely. Monitor on telemetry. Patient is off CHF pathway.  Monitor strict Is&Os and daily weights.  Place on fluid restriction of 1.5 L. Continue to stress to patient importance of self efficacy and  on diet for CHF.

## 2023-01-13 NOTE — SUBJECTIVE & OBJECTIVE
Interval History: Patient seen in holding area awaiting surgery on her left femoral neck fracture. Perineural catheter has been placed By Anesthesia to be used post-op for pain management. Patient in good spirits. She stated she wants to drink some milk after surgery and I explained once surgery complete and she is awake and alert enough from the anesthesia then will order a diet for her. Patient also wants to be able to move her left leg as very limited mobility at this time. I explained she will have better mobility and definitely less pain post-op to her left leg. Plan is OR today and left hip hemiarthroplasty by Ortho and to start PT/OT tomorrow.     Review of Systems   Constitutional:  Negative for fever.   Respiratory:  Negative for cough and shortness of breath.    Cardiovascular:  Negative for leg swelling.   Gastrointestinal:  Negative for abdominal pain, nausea and vomiting.   Musculoskeletal:  Positive for arthralgias (Left hip).   Neurological:  Negative for dizziness.   Psychiatric/Behavioral:  Negative for agitation and confusion.    Objective:     Vital Signs (Most Recent):  Temp: 97.9 °F (36.6 °C) (01/13/23 0911)  Pulse: 60 (01/13/23 1135)  Resp: 16 (01/13/23 1135)  BP: 145/66 (01/13/23 1135)  SpO2: 99 % (01/13/23 1135) on room air   Vital Signs (24h Range):  Temp:  [97.1 °F (36.2 °C)-98.5 °F (36.9 °C)] 97.9 °F (36.6 °C)  Pulse:  [60-78] 60  Resp:  [13-24] 16  SpO2:  [90 %-100 %] 99 %  BP: (112-188)/() 145/66     Weight: 63.5 kg (140 lb)  Body mass index is 24.03 kg/m².    Intake/Output Summary (Last 24 hours) at 1/13/2023 1213  Last data filed at 1/13/2023 0546  Gross per 24 hour   Intake 626.73 ml   Output 500 ml   Net 126.73 ml      Physical Exam  Vitals and nursing note reviewed.   Constitutional:       General: She is awake. She is not in acute distress.     Appearance: Normal appearance. She is normal weight. She is not ill-appearing.   Cardiovascular:      Rate and Rhythm: Normal rate  and regular rhythm.      Heart sounds: Normal heart sounds. No murmur heard.    No friction rub. No gallop.   Pulmonary:      Effort: Pulmonary effort is normal. No respiratory distress.      Breath sounds: Normal breath sounds. No wheezing.   Abdominal:      General: Abdomen is flat. Bowel sounds are normal. There is no distension.      Palpations: Abdomen is soft.      Tenderness: There is no abdominal tenderness.   Musculoskeletal:      Right lower leg: No edema.      Left lower leg: No edema.   Skin:     General: Skin is warm.      Findings: No erythema.   Neurological:      Mental Status: She is alert and oriented to person, place, and time.   Psychiatric:         Mood and Affect: Mood normal.         Behavior: Behavior normal. Behavior is cooperative.         Thought Content: Thought content normal.         Judgment: Judgment normal.       Significant Labs: CBC:   Recent Labs   Lab 01/12/23 2114 01/13/23 0218   WBC 16.22* 11.08   HGB 13.2 11.8*   HCT 40.2 36.3*   * 117*     CMP:   Recent Labs   Lab 01/12/23 2114 01/13/23 0218   *  134* 134*   K 3.7  3.7 3.9     101 104   CO2 24  24 22*     110 118*   BUN 23 23 23   CREATININE 1.1  1.1 1.0   CALCIUM 9.0  9.0 8.3*   PROT 6.7  --    ALBUMIN 3.7  --    BILITOT 0.8  --    ALKPHOS 58  --    AST 25  --    ALT 15  --    ANIONGAP 9  9 8       Significant Imaging: I have reviewed all pertinent imaging results/findings within the past 24 hours.

## 2023-01-13 NOTE — ANESTHESIA PROCEDURE NOTES
Left SIFI catheter    Patient location during procedure: pre-op   Block not for primary anesthetic.  Reason for block: at surgeon's request and post-op pain management   Post-op Pain Location: Left hip pain   Start time: 1/13/2023 10:01 AM  Timeout: 1/13/2023 10:00 AM   End time: 1/13/2023 10:15 AM    Staffing  Authorizing Provider: Kenyatta Pineda MD  Performing Provider: Raymon Madrigal MD    Preanesthetic Checklist  Completed: patient identified, IV checked, site marked, risks and benefits discussed, surgical consent, monitors and equipment checked, pre-op evaluation and timeout performed  Peripheral Block  Patient position: supine  Prep: ChloraPrep and site prepped and draped  Patient monitoring: heart rate, cardiac monitor, continuous pulse ox, continuous capnometry and frequent blood pressure checks  Block type: fascia iliaca  Laterality: left  Injection technique: continuous  Needle  Needle type: Tuohy   Needle gauge: 17 G  Needle length: 3.5 in  Needle localization: anatomical landmarks and ultrasound guidance  Catheter type: spring wound  Catheter size: 19 G  Test dose: lidocaine 1.5% with Epi 1-to-200,000 and negative   -ultrasound image captured on disc.  Assessment  Injection assessment: negative aspiration, negative parasthesia and local visualized surrounding nerve  Paresthesia pain: none  Heart rate change: no  Slow fractionated injection: yes  Pain Tolerance: comfortable throughout block and no complaints  Medications:    Medications: ropivacaine (NAROPIN) injection 0.5% - Perineural   20 mL - 1/13/2023 10:10:00 AM    Additional Notes  VSS.  [facility] RN monitoring vitals throughout procedure.  Patient tolerated procedure well.

## 2023-01-13 NOTE — HPI
Lynn Mckinney is a 87 y.o. female with a past medical history of CAD s/p CABG, PAD, combined systolic/diastolic heart failure, heart block s/p biventricular ICD, HTN, HLD, and hypothyroidism the emergency department after a ground level fall. Patient reports feeling at her baseline health and going out to eat with friends this afternoon. She came back and took a nap on her chair. States that she woke up from napping and got up to move around. States that she was in just socks on her hardwood floor and one of her feet slipped from under her and she fell on her left side.  She would immediate left hip pain and inability to bear weight.  She presented to the emergency department via EMS for further evaluation.  X-rays in the emergency department showed a left femoral neck fracture.      Orthopedics was consulted for evaluation of the left femoral neck fracture.  The patient reports taking baby aspirin daily for anticoagulation.  She ambulates with a cane.  She denies pain outside of the left hip.  She denies head trauma and loss of consciousness.

## 2023-01-13 NOTE — ASSESSMENT & PLAN NOTE
- Chronic and controlled. Patient allergic to formularly alternatives to her home Crestor.   - Resume home Crestor at discharge.

## 2023-01-13 NOTE — TELEPHONE ENCOUNTER
Faxed forms to [facility]     Called Brother to inform; brother answered    Informed brother that forms had been faxed  Brother verbalized understanding

## 2023-01-13 NOTE — ED PROVIDER NOTES
"Encounter Date: 1/12/2023       History     Chief Complaint   Patient presents with    Fall     From home per EMS for fall at home. Per EMS, "slid out of a chair, now c/o L hip to L knee pain". L leg appears mildly shortened and rotated, distal pulses present, pt denies numbness or tingling.     87-year-old female with history of CAD status post CABG, peripheral artery disease, heart block status post pacemaker, hypertension, hyperlipidemia, presents to ED for a fall.  Patient reports that she took an happened her chair, she attempted to stand up, but she has socks on and the floor was slippery, patient fell down onto her right side.  States that she unable to get up by herself, she had to scoop to the back door to call for help.  Patient denies head injury, loss consciousness, no headache, no vision change, no nausea vomiting.  Patient denies chest pain, palpitation, shortness of breath, lightheadedness prior to the fall.  She complained left hip and right knee pain.  No sensory deficits.      Review of patient's allergies indicates:   Allergen Reactions    Lipitor [atorvastatin] Itching    Fosamax [alendronate]      Felt "strange"    Iodinated contrast media      Dye is only to be used if absolutely needed because of kidney function    Prolia [denosumab]      Reaction after the Prolia    Sulfa (sulfonamide antibiotics)      Past Medical History:   Diagnosis Date    Allergy     seasonal    Blood clotting tendency     Carotid artery disease 5/29/2014    Coronary artery disease     Heart block     Hyperlipidemia     Hypertension     Hypothyroid     Obesity     Pacemaker     PAD (peripheral artery disease)     Spinal stenosis     Thoracic or lumbosacral neuritis or radiculitis 11/25/2014    Tubular adenoma      Past Surgical History:   Procedure Laterality Date    ANGIOPLASTY      APPENDECTOMY      BREAST CYST ASPIRATION Bilateral     CARDIAC CATHETERIZATION      CARDIAC PACEMAKER PLACEMENT  10/10/13    by Dr. Coleman "    CAROTID STENT Left 2007    by Dr. Bowen    CATARACT EXTRACTION W/  INTRAOCULAR LENS IMPLANT Bilateral 2012    Dr Sharp    CORONARY ARTERY BYPASS GRAFT  2001     x 3 LIMA-R[place], SVG-OM1 & SVG-PDA    CORONARY BYPASS GRAFT ANGIOGRAPHY  3/11/2021    Procedure: Bypass graft study;  Surgeon: Gil Gacria MD;  Location: [facility] CATH LAB;  Service: Cardiology;;    CYST REMOVAL      wrist    EYE SURGERY      HYSTERECTOMY      LEFT HEART CATHETERIZATION Left 3/11/2021    Procedure: Left heart cath;  Surgeon: Gil Garcia MD;  Location: [facility] CATH LAB;  Service: Cardiology;  Laterality: Left;    OOPHORECTOMY      THYROIDECTOMY      VITRECTOMY BY PARS PLANA APPROACH Right 10/12/2021    Procedure: VITRECTOMY, PARS PLANA APPROACH;  Surgeon: Flako Carver MD;  Location: [address];  Service: Ophthalmology;  Laterality: Right;     Family History   Problem Relation Age of Onset    Heart disease Father         coronary thrombosis    Hypertension Father     Alzheimer's disease Mother     Heart disease Mother         pacemaker    Hypertension Mother     Diabetes Brother     No Known Problems Sister     No Known Problems Maternal Grandmother     No Known Problems Maternal Grandfather     No Known Problems Paternal Grandmother     No Known Problems Paternal Grandfather     Heart disease Brother     No Known Problems Maternal Aunt     No Known Problems Maternal Uncle     No Known Problems Paternal Aunt     No Known Problems Paternal Uncle     Amblyopia Neg Hx     Blindness Neg Hx     Cancer Neg Hx     Cataracts Neg Hx     Glaucoma Neg Hx     Macular degeneration Neg Hx     Retinal detachment Neg Hx     Strabismus Neg Hx     Stroke Neg Hx     Thyroid disease Neg Hx     Melanoma Neg Hx      Social History     Tobacco Use    Smoking status: Former     Types: Cigarettes     Quit date: 1984     Years since quittin.4    Smokeless tobacco: Never   Substance Use Topics    Alcohol use: Yes     Alcohol/week: 1.0  standard drink     Types: 1 Shots of liquor per week     Comment: old fashion  with dinner every 6 mo     Drug use: No     Review of Systems   Constitutional:  Negative for chills and fever.   HENT:  Negative for congestion and sore throat.    Eyes:  Negative for pain and visual disturbance.   Respiratory:  Negative for cough and shortness of breath.    Cardiovascular:  Negative for chest pain and leg swelling.   Gastrointestinal:  Negative for abdominal pain, nausea and vomiting.   Genitourinary:  Negative for dysuria and flank pain.   Musculoskeletal:  Positive for arthralgias (Left hip and left knee). Negative for back pain and neck pain.   Skin:  Negative for rash.   Neurological:  Negative for weakness, numbness and headaches.   Psychiatric/Behavioral:  Negative for behavioral problems and confusion.      Physical Exam     Initial Vitals [01/12/23 1824]   BP Pulse Resp Temp SpO2   (!) 168/66 68 15 97.9 °F (36.6 °C) (!) 93 %      MAP       --         Physical Exam    Nursing note and vitals reviewed.  Constitutional: She appears well-developed. No distress.   HENT:   Head: Normocephalic and atraumatic.   Mouth/Throat: Oropharynx is clear and moist.   Eyes: Conjunctivae and EOM are normal.   Neck: No JVD present.   Normal range of motion.  Cardiovascular:  Normal rate, regular rhythm, normal heart sounds and intact distal pulses.           Pulmonary/Chest: Breath sounds normal. No respiratory distress.   Abdominal: Abdomen is soft. She exhibits no distension. There is no abdominal tenderness.   Musculoskeletal:         General: No tenderness or edema. Normal range of motion.      Cervical back: Normal range of motion.      Comments: MSK:  BUE:  - small skin tear to her left posterior elbow.  - No swelling  - No ecchymosis, erythema, or signs of cellulitis  - NonTTP throughout  - AROM and PROM of the shoulder, elbow, wrist, and hand intact without pain  - Axillary/AIN/PIN/Radial/Median/Ulnar Nerves assessed in  isolation without deficit  - SILT throughout  - Compartments soft  - Radial artery palpated   - Capillary Refill <3s    BLE:  - Skin intact throughout, no open wounds  - No swelling  - No ecchymosis, erythema, or signs of cellulitis  - NonTTP throughout  - AROM and PROM of the left hip and left knee hip positive for pain, ankle, and foot intact without pain  - TA/EHL/Gastroc/FHL assessed in isolation without deficit  - SILT throughout  - Compartments soft  - DP and PT palpated  - Capillary Refill <3s  - positive log roll test on the left hip  - Negative [facility]    Spine/pelvis/axial body:  No tenderness to palpation of cervical, thoracic, or lumbar spine  No pain with compression of pelvis  No chest wall or abdominal tenderness  No decubitus ulcers        Neurological: She is alert and oriented to person, place, and time. She has normal strength. No cranial nerve deficit or sensory deficit.   Skin: Skin is warm and dry. Capillary refill takes less than 2 seconds.       ED Course   Procedures  Labs Reviewed   CBC W/ AUTO DIFFERENTIAL - Abnormal; Notable for the following components:       Result Value    WBC 16.22 (*)     MCH 32.1 (*)     Platelets 129 (*)     MPV 8.8 (*)     Immature Granulocytes 0.6 (*)     Gran # (ANC) 14.6 (*)     Immature Grans (Abs) 0.10 (*)     Lymph # 0.5 (*)     Gran % 89.8 (*)     Lymph % 3.3 (*)     All other components within normal limits   BASIC METABOLIC PANEL - Abnormal; Notable for the following components:    Sodium 134 (*)     eGFR 48.6 (*)     All other components within normal limits   COMPREHENSIVE METABOLIC PANEL - Abnormal; Notable for the following components:    Sodium 134 (*)     eGFR 48.6 (*)     All other components within normal limits    Narrative:     add on mag,cmp,transferrin, and phos per dr lorene rosenbaum/order#[id number] @22;27 01/12/2023    ADD ON [facility] [id number] PER DR. LORENE ROSENBAUM 01/12/2023  22:32    PHOSPHORUS -  Abnormal; Notable for the following components:    Phosphorus 2.3 (*)     All other components within normal limits    Narrative:     add on mag,cmp,transferrin, and phos per dr lorene rosenbaum/order#[id number] @22;27 01/12/2023    ADD ON [facility] [id number] PER DR. LORENE ROSENBAUM 01/12/2023  22:32    HIV 1 / 2 ANTIBODY    Narrative:     Release to patient->Immediate   HEPATITIS C ANTIBODY    Narrative:     Release to patient->Immediate   PROTIME-INR   COMPREHENSIVE METABOLIC PANEL   HEMOGLOBIN A1C   MAGNESIUM   PHOSPHORUS   TRANSFERRIN   MAGNESIUM    Narrative:     add on mag,cmp,transferrin, and phos per dr lorene rosenbaum/order#[id number] @22;27 01/12/2023    ADD ON [facility] [id number] PER DR. LORENE ROSENBAUM 01/12/2023  22:32    HEMOGLOBIN A1C    Narrative:     add on mag,cmp,transferrin, and phos per dr lorene rosenbaum/order#[id number] @22;27 01/12/2023    ADD ON [facility] [id number] PER DR. LORENE ROSENBAUM 01/12/2023  22:32    URINALYSIS, REFLEX TO URINE CULTURE   PREALBUMIN   VITAMIN D   TRANSFERRIN   TYPE & SCREEN     EKG Readings: (Independently Interpreted)   Initial Reading: No STEMI. Rhythm: Paced Rhythm. Heart Rate: 65. Ectopy: No Ectopy. Conduction: Normal. ST Segments: Normal ST Segments. T Waves: Normal. Axis: Normal. Clinical Impression: Paced Rhythm     Imaging Results              X-Ray Chest 1 View (Final result)  Result time 01/12/23 22:18:02      Final result by Melvin Lua MD (01/12/23 22:18:02)                   Impression:      See above comments.  Follow-up is recommended.      Electronically signed by: Melvin Lua  Date:    01/12/2023  Time:    22:18               Narrative:    EXAMINATION:  XR CHEST 1 VIEW    CLINICAL HISTORY:  Encounter for other preprocedural examination    TECHNIQUE:  Single frontal view of the chest was performed.    COMPARISON:  01/05/2023    FINDINGS:  Suboptimal inspiration limits  characterization.    Cardiac silhouette is mildly enlarged, similar to the prior study.    Sternotomy wires are present.  Cardiac defibrillator device on the left.    Mild diffuse interstitial changes are nonspecific.  Bilateral atelectasis.  Mild pulmonary edema or pneumonitis are not excluded.    Mild blunting of the costophrenic angle suggesting small pleural effusions.                                       X-Ray Pelvis Routine AP (Final result)  Result time 01/12/23 21:42:02   Procedure changed from X-Ray Hip 2 or 3 views Left (with Pelvis when performed)     Final result by Konstantin Alfred MD (01/12/23 21:42:02)                   Impression:      Acute mildly displaced left femoral neck fracture.      Electronically signed by: Konstantin Alfred MD  Date:    01/12/2023  Time:    21:42               Narrative:    EXAMINATION:  XR PELVIS ROUTINE AP; XR FEMUR 2 VIEW LEFT    CLINICAL HISTORY:  Trauma;  Injury, unspecified, initial encounter    TECHNIQUE:  AP view of the pelvis was performed.  AP and lateral views of the left humeral performed.    COMPARISON:  03/05/2018    FINDINGS:  There is an acute mildly displaced fracture of the left femoral neck.  There is mild cranial migration of more distal left femur.  The left femoral head appears appropriately seated within the acetabulum.  There is a stable remote appearing deformity of the left pubic body.  The right hip appears intact with associated moderate osteoarthrosis.  Sacrum is obscured by overlying bowel gas.  There is an aorta bi-iliac stent present.    The more distal left femur appears intact without evidence of fracture.                                       X-Ray Knee 3 View Left (Final result)  Result time 01/12/23 21:41:34      Final result by Konstantin Alfred MD (01/12/23 21:41:34)                   Impression:      No radiographic evidence of acute displaced fracture or dislocation of the left knee.      Electronically signed by: Konstantin Alfred  MD  Date:    01/12/2023  Time:    21:41               Narrative:    EXAMINATION:  XR KNEE 3 VIEW LEFT    CLINICAL HISTORY:  Injury, unspecified, initial encounter    TECHNIQUE:  AP, lateral, and Merchant views of the left knee were performed.    COMPARISON:  None    FINDINGS:  There is no radiographic evidence of acute displaced fracture or dislocation.  There is tricompartmental osteoarthrosis, most pronounced involving the medial tibiofemoral compartment.  No large suprapatellar joint effusion appreciated.  There are prominent vascular calcifications present.                                       X-Ray Femur Ap/Lat Left (Final result)  Result time 01/12/23 21:42:02      Final result by Konstantin Alfred MD (01/12/23 21:42:02)                   Impression:      Acute mildly displaced left femoral neck fracture.      Electronically signed by: Konstantin Alfred MD  Date:    01/12/2023  Time:    21:42               Narrative:    EXAMINATION:  XR PELVIS ROUTINE AP; XR FEMUR 2 VIEW LEFT    CLINICAL HISTORY:  Trauma;  Injury, unspecified, initial encounter    TECHNIQUE:  AP view of the pelvis was performed.  AP and lateral views of the left humeral performed.    COMPARISON:  03/05/2018    FINDINGS:  There is an acute mildly displaced fracture of the left femoral neck.  There is mild cranial migration of more distal left femur.  The left femoral head appears appropriately seated within the acetabulum.  There is a stable remote appearing deformity of the left pubic body.  The right hip appears intact with associated moderate osteoarthrosis.  Sacrum is obscured by overlying bowel gas.  There is an aorta bi-iliac stent present.    The more distal left femur appears intact without evidence of fracture.                                       Medications   morphine injection 4 mg (has no administration in time range)   sodium chloride 0.9% flush 10 mL (has no administration in time range)   amLODIPine tablet 10 mg (has no  administration in time range)   hydrOXYzine HCL tablet 25 mg (has no administration in time range)   losartan tablet 50 mg (has no administration in time range)   labetaloL tablet 200 mg (200 mg Oral Given 1/12/23 2257)   levothyroxine tablet 125 mcg (has no administration in time range)   memantine tablet 5 mg (5 mg Oral Given 1/12/23 2257)   0.9%  NaCl infusion (has no administration in time range)   sodium chloride 0.9% flush 10 mL (has no administration in time range)   pregabalin capsule 75 mg (75 mg Oral Given 1/12/23 2257)   acetaminophen tablet 1,000 mg (1,000 mg Oral Given 1/12/23 2257)   melatonin tablet 6 mg (has no administration in time range)   bisacodyL suppository 10 mg (has no administration in time range)   ondansetron injection 4 mg (has no administration in time range)   oxyCODONE immediate release tablet 5 mg (has no administration in time range)   oxyCODONE immediate release tablet 5 mg (has no administration in time range)   morphine injection 2 mg (has no administration in time range)   morphine injection 2 mg (has no administration in time range)   morphine injection 4 mg (4 mg Intravenous Given 1/12/23 2100)     Medical Decision Making:   History:   Old Medical Records: I decided to obtain old medical records.  Initial Assessment:   87-year-old female with history of CAD status post CABG, peripheral artery disease, heart block status post pacemaker, hypertension, hyperlipidemia, presents to ED for a fall.  Left hip pain with active and passive range of motion.  Compartment is soft, distal neurovascular intact.  Vital signs reviewed, stable  Differential Diagnosis:   Left hip fracture, pelvic fracture, knee fracture, doubt dislocation, doubt polytrauma.  Clinical Tests:   Lab Tests: Ordered and Reviewed  Radiological Study: Ordered and Reviewed  Medical Tests: Ordered and Reviewed           ED Course as of 01/12/23 2303   Thu Jan 12, 2023 2124 WBC(!): 16.22 [NC]   2124 Hemoglobin: 13.2  [NC]   2124 Platelets(!): 129 [NC]   2149 Sodium(!): 134 [NC]   2151 Potassium: 3.7 [NC]   2151 BUN: 23 [NC]   2151 Creatinine: 1.1 [NC]   2201 X-ray is indicated for femoral neck fracture.   Consulted ortho, will evaluate patient at bedside [NC]   2303 Discussed with Hospital Medicine, agreed to admit patient for orthopedic follow-up potential ORIF [NC]      ED Course User Index  [NC] Geoffrey Fernández MD                 Clinical Impression:   Final diagnoses:  [T14.90XA] Trauma  [W19.XXXA] Fall  [Z01.818] Preop examination  [S72.002A] Closed fracture of left hip, initial encounter (Primary)        ED Disposition Condition    Admit Stable                Geoffrey Fernández MD  Resident  01/12/23 8309

## 2023-01-13 NOTE — ASSESSMENT & PLAN NOTE
- recent ECHO 12/2022  EF 35% with Grade I LVDD  - patient euvolemic on exam  ;  Does not take diuretics at home  - strict I/Os  - further management pending clinical course and future study review

## 2023-01-13 NOTE — PROVIDER PROGRESS NOTES - EMERGENCY DEPT.
Encounter Date: 1/12/2023    ED Physician Progress Notes          EKG paced rhythm with no ischemic ST/T-wave changes

## 2023-01-13 NOTE — ASSESSMENT & PLAN NOTE
- IP Hip Fracture Pathway initiated  - Orthopedic surgery consulted ; appreciate recs  - npo midnight  - pain control  - insert olivera  - PT/Ot consulted  - Revised Cardiac Risk Index for Pre-Operative Risk Score 2 ; Class III Risk  - Patient medically appropriate for OR in am

## 2023-01-13 NOTE — ASSESSMENT & PLAN NOTE
Lynn Mckinney is a 87 y.o. female with a left femoral neck fracture. They are closed and neurovascularly intact. They take baby aspirin for anticoagulation at home. They required a cane as an ambulatory assistive device prior to this injury.     The patient was explained in detail the severity of the injury that was suffered. The patient was explained the risks/benefits/and alternatives to operative management in detail including infection, bleeding, pain, nerve and vascular damage, heterotopic ossification, leg length discrepancies, rotational deformities and they express full understanding.  We discussed the 30% national first year mortality rate with hip fractures, the need for early mobilization, and the expected rehab course.  They express full understanding of the condition and express that they want to proceed with surgery. The patient is admitted to the [facility] Hip Fracture service for perioperative optimization and management. Will plan for OR 1/13/23. No guarantees were made. Informed consent was obtained. All questions were answered to patient's and family's satisfaction.     Plan:  - Admitted to [facility] Hip Fracture service for perioperative optimization and management  - To OR 1/13/23 for left hip hemiarthroplasty  - Patient marked, booked, and consented for surgery  - DVT PPx: Hold anticoagulation  - Abx: Pre-op antibiotics ordered  - Labs: Hemoglobin 13.2, Platelets 129, WBC 16.22, INR 1.0, Albumin 3.7, A1c 5.3, UA with no UTI  - PT/OT: Bed rest  - Duong: placed. No UTI  - NPO at midnight  - IV: ordered for contralateral arm

## 2023-01-13 NOTE — ANESTHESIA POSTPROCEDURE EVALUATION
Anesthesia Post Evaluation    Patient: Lynn Mckinney    Procedure(s) Performed: Procedure(s) (LRB):  HEMIARTHROPLASTY, HIP, LEFT (Left)    Final Anesthesia Type: general      Patient location during evaluation: PACU  Patient participation: Yes- Able to Participate  Level of consciousness: awake and alert  Post-procedure vital signs: reviewed and stable  Pain management: adequate  Airway patency: patent    PONV status at discharge: No PONV  Anesthetic complications: no      Cardiovascular status: blood pressure returned to baseline  Respiratory status: unassisted  Hydration status: euvolemic  Follow-up not needed.          Vitals Value Taken Time   /86 01/13/23 1500   Temp 36.2 °C (97.2 °F) 01/13/23 1453   Pulse 60 01/13/23 1519   Resp 20 01/13/23 1519   SpO2 79 % 01/13/23 1519   Vitals shown include unvalidated device data.      No case tracking events are documented in the log.      Pain/Dillan Score: Pain Rating Prior to Med Admin: 8 (1/13/2023  3:11 PM)  Pain Rating Post Med Admin: 0 (1/13/2023 10:15 AM)  Dillan Score: 8 (1/13/2023 11:35 AM)

## 2023-01-13 NOTE — PROGRESS NOTES
Patient has been identified as having an implanted cardiac rhythm device (CRD); the implanted device is a [employer] defibrillator.      No noted pacer dependency.        Patient is going for a Left Hip Arthroplasty.    DEFIBRILLATORS/NON-DEPENDENT ANY LOCATION    Per protocol,a magnet should be applied directly over the implanted device during entire surgical procedure when electrocautery will be used.    If no electrocautery is planned, magnet application is not needed.      For additional questions, please contact the Arrhythmia Department at Ext [phone].

## 2023-01-13 NOTE — HPI
This is an 88yo female with a past medical history of CAD sp CABG, PAD, combined systolic diastolic heart failure, heart block sp pacemaker, HTN, HLD, and hypothyroidism who presents to the ED with chief complaint of fall and left leg pain. Patient reports feeling at her baseline health and going out to eat with friends this afternoon. She came back and took a nap on her chair. States that she woke up from napping and got up to move around. States that she was in just socks on her hardwood floor and one of her feet slipped from under her and she fell on her side. Was not able to stand or ambulate after fall and with severe left hip pain. Brought to the ED by EMS.    In the ED patient afebrile and hemodynamically stable saturating well on room air. Xray with acute Lt femoral neck fracture. Orthopedic surgery consulted and recommended admission to medicine with plan for OR in am.

## 2023-01-13 NOTE — ASSESSMENT & PLAN NOTE
Lynn Mckinney is a 87 y.o. female with a left femoral neck fracture. They are closed and neurovascularly intact. They take baby aspirin for anticoagulation at home. They required a cane as an ambulatory assistive device prior to this injury.     Optimized for surgery today per Hospital Medicine. Will discuss possible Cardiac Anesthesia prior to rolling to the OR. Patient doing well this morning and ready for surgery.    Plan:  - Admitted to Hospital Medicine Hip Fracture service for perioperative optimization and management  - To OR 1/13/23 for left hip hemiarthroplasty  - Patient marked, booked, and consented for surgery  - DVT PPx: Hold anticoagulation  - Abx: Pre-op antibiotics ordered  - Labs: Hemoglobin 11.8, platelets 117 (chronically low)    Dispo: to OR 1/13/23

## 2023-01-13 NOTE — ANESTHESIA PROCEDURE NOTES
Arterial    Diagnosis: left hip fracture    Patient location during procedure: done in OR  Procedure start time: 1/13/2023 12:28 PM  Timeout: 1/13/2023 12:27 PM  Procedure end time: 1/13/2023 12:34 PM    Staffing  Authorizing Provider: Amarilys Love MD  Performing Provider: Amarilys Love MD    Anesthesiologist was present at the time of the procedure.    Preanesthetic Checklist  Completed: patient identified, IV checked, site marked, risks and benefits discussed, surgical consent, monitors and equipment checked, pre-op evaluation, timeout performed and anesthesia consent givenArterial  Skin Prep: chlorhexidine gluconate  Local Infiltration: none  Orientation: right  Location: radial    Catheter Size: 20 G  Catheter placement by Anatomical landmarks. Heme positive aspiration all ports. Insertion Attempts: 1  Assessment  Dressing: secured with tape and tegaderm  Patient: Tolerated well

## 2023-01-13 NOTE — PLAN OF CARE
Vital signs stable. Afebrile. Alert, oriented and following commands. Pain controlled with PRN meds. Denies nausea. Dressing to left hip remains CDI. Immobilizer remains in place. Duong to drainage. Ropivicaine per md order. Post op labs and xrays complete. POC reviewed with pt and understanding verbalized.

## 2023-01-13 NOTE — ANESTHESIA PROCEDURE NOTES
Intubation    Date/Time: 1/13/2023 12:29 PM  Performed by: Sydney Man CRNA  Authorized by: Amarilys Love MD     Intubation:     Induction:  Intravenous    Intubated:  Postinduction    Mask Ventilation:  Easy mask    Attempts:  1    Attempted By:  CRNA    Method of Intubation:  Direct    Blade:  Heller 2    Laryngeal View Grade: Grade I - full view of cords      Difficult Airway Encountered?: No      Complications:  None    Airway Device:  Oral endotracheal tube    Airway Device Size:  7.5    Style/Cuff Inflation:  Cuffed (inflated to minimal occlusive pressure)    Inflation Amount (mL):  8    Tube secured:  21    Secured at:  The lips    Placement Verified By:  Capnometry    Complicating Factors:  None    Findings Post-Intubation:  BS equal bilateral and atraumatic/condition of teeth unchanged

## 2023-01-13 NOTE — TRANSFER OF CARE
"Anesthesia Transfer of Care Note    Patient: Lynn Mckinney    Procedure(s) Performed: Procedure(s) (LRB):  HEMIARTHROPLASTY, HIP, LEFT (Left)    Patient location: PACU    Anesthesia Type: general    Transport from OR: Transported from OR on 6-10 L/min O2 by face mask with adequate spontaneous ventilation    Post pain: adequate analgesia    Post assessment: no apparent anesthetic complications    Post vital signs: stable    Level of consciousness: awake    Nausea/Vomiting: no nausea/vomiting    Complications: none    Transfer of care protocol was followed      Last vitals:   Visit Vitals  /61 (BP Location: Right arm, Patient Position: Lying)   Pulse 60   Temp 36.6 °C (97.9 °F) (Temporal)   Resp 15   Ht 5' 4" (1.626 m)   Wt 63.5 kg (140 lb)   SpO2 99%   Breastfeeding No   BMI 24.03 kg/m²     "

## 2023-01-13 NOTE — FIRST PROVIDER EVALUATION
Medical screening examination initiated.  I have conducted a focused provider triage encounter, findings are as follows:    Brief history of present illness:  Patient explains that she had a mechanical fall from her chair to the ground landing on her left side with immediate left hip pain pain radiating along the left femur and to the left knee.     Vitals:    01/12/23 1824   BP: (!) 168/66   Pulse: 68   Resp: 15   Temp: 97.9 °F (36.6 °C)   TempSrc: Oral   SpO2: (!) 93%       Pertinent physical exam:  + TTP along L lateral hip, mid-thigh and L knee    Brief workup plan:  XR L hip, pelvis, L knee    Preliminary workup initiated; this workup will be continued and followed by the physician or advanced practice provider that is assigned to the patient when roomed.

## 2023-01-13 NOTE — PROGRESS NOTES
"[facility] - Emergency Dept  Orthopedics  Progress Note    Patient Name: Lynn Mckinney  MRN: [id number]  Admission Date: 1/12/2023  Hospital Length of Stay: 1 days  Attending Provider: Pauline Wiley MD  Primary Care Provider: Brie Fagan MD  Follow-up For: Procedure(s) (LRB):  HEMIARTHROPLASTY, HIP, LEFT, Peg Board, Joy, Ancef/Vanc, (Left)    Post-Operative Day: Day of Surgery  Subjective:     Principal Problem:Left displaced femoral neck fracture    Principal Orthopedic Problem: same    Interval History: No acute events overnight. Vitals with some hypertension, but otherwise within normal limits. Pain controlled in the right hip. NPO since midnight. Optimized for surgery per Hospital Medicine.    Review of patient's allergies indicates:   Allergen Reactions    Lipitor [atorvastatin] Itching    Fosamax [alendronate]      Felt "strange"    Iodinated contrast media      Dye is only to be used if absolutely needed because of kidney function    Prolia [denosumab]      Reaction after the Prolia    Sulfa (sulfonamide antibiotics)        Current Facility-Administered Medications   Medication    0.9%  NaCl infusion    acetaminophen tablet 1,000 mg    amLODIPine tablet 10 mg    bisacodyL suppository 10 mg    hydrOXYzine HCL tablet 25 mg    labetaloL tablet 200 mg    levothyroxine tablet 125 mcg    losartan tablet 50 mg    melatonin tablet 6 mg    memantine tablet 5 mg    morphine injection 2 mg    morphine injection 2 mg    morphine injection 4 mg    ondansetron injection 4 mg    oxyCODONE immediate release tablet 5 mg    oxyCODONE immediate release tablet 5 mg    pregabalin capsule 75 mg    sodium chloride 0.9% flush 10 mL    sodium chloride 0.9% flush 10 mL    sodium chloride 0.9% flush 10 mL     Current Outpatient Medications   Medication Sig    acetaminophen (TYLENOL) 325 MG tablet Take 1 tablet (325 mg total) by mouth every 6 (six) hours as needed for Pain. (Patient not taking: " Reported on 1/5/2023)    amLODIPine (NORVASC) 10 MG tablet Take 1 tablet (10 mg total) by mouth once daily.    aspirin 81 MG Chew Take 81 mg by mouth every evening. 1 Tablet, Chewable Oral Every day    benzonatate (TESSALON) 200 MG capsule Take 1 capsule (200 mg total) by mouth 3 (three) times daily as needed for Cough.    CALCIUM CARBONATE/VITAMIN D3 (CALTRATE 600 + D ORAL) Take 1 tablet by mouth once daily.    cetirizine (ZYRTEC) 10 MG tablet Take 1 tablet (10 mg total) by mouth once daily.    cholecalciferol, vitamin D3, (VITAMIN D3) 50 mcg (2,000 unit) Cap capsule Take 2,000 Units by mouth once daily.    coenzyme Q10 200 mg capsule Take 200 mg by mouth once daily.    hydrOXYzine HCL (ATARAX) 25 MG tablet Take 1 tablet (25 mg total) by mouth 3 (three) times daily as needed for Anxiety.    irbesartan (AVAPRO) 300 MG tablet TAKE 1 TABLET (300 MG TOTAL) BY MOUTH EVERY EVENING. CANCEL IRBESARTAN/HCTZ COMBO    labetaloL (NORMODYNE) 200 MG tablet Take 1 tablet (200 mg total) by mouth 2 (two) times daily.    LACTOSE-FREE FOOD (BOOST ORAL) Take by mouth. 2-3 days per week    levothyroxine (SYNTHROID) 125 MCG tablet Take 1 tablet (125 mcg total) by mouth before breakfast.    memantine (NAMENDA) 5 MG Tab Take 1 tablet (5 mg total) by mouth 2 (two) times daily.    multivitamin (THERAGRAN) per tablet Take by mouth. Pt taking one pill three times a week.    nitroGLYCERIN (NITROSTAT) 0.4 MG SL tablet PLACE 1 TABLET UNDER THE TONGUE EVERY 5 MIN AS NEEDED FOR CHEST PAIN. MAX:3 DOSES    promethazine-dextromethorphan (PROMETHAZINE-DM) 6.25-15 mg/5 mL Syrp Take 5 mLs by mouth nightly as needed (cough). May cause sedation.    rosuvastatin (CRESTOR) 10 MG tablet Take 1 tablet (10 mg total) by mouth every evening.    SALMON OIL-OMEGA-3 FATTY ACIDS ORAL Take 1 capsule by mouth once daily.    vitamins  A,C,E-zinc-copper (PRESERVISION AREDS) 14,320-226-200 unit-mg-unit Cap Take by mouth Daily.     Objective:     Vital  Signs (Most Recent):  Temp: 97.1 °F (36.2 °C) (01/13/23 0511)  Pulse: 60 (01/13/23 0400)  Resp: 16 (01/13/23 0506)  BP: (!) 144/66 (01/13/23 0400)  SpO2: 99 % (01/13/23 0400)   Vital Signs (24h Range):  Temp:  [97.1 °F (36.2 °C)-98.5 °F (36.9 °C)] 97.1 °F (36.2 °C)  Pulse:  [60-78] 60  Resp:  [13-24] 16  SpO2:  [92 %-100 %] 99 %  BP: (112-188)/() 144/66           There is no height or weight on file to calculate BMI.      Intake/Output Summary (Last 24 hours) at 1/13/2023 0611  Last data filed at 1/13/2023 0546  Gross per 24 hour   Intake 626.73 ml   Output 500 ml   Net 126.73 ml       Ortho/SPM Exam  Gen: NAD, sitting comfortably in bed  CV: regular rate  Resp: non-labored respirations    LLE:  Skin intact, no open wounds  TTP over hip  Positive log roll  SILT Sa/Arcos/DP/SP/T  Motor intact EHL/FHL/TA/Gastroc/Quad  2+ DP, 2+ PT    Significant Labs: All pertinent labs within the past 24 hours have been reviewed.    Significant Imaging: I have reviewed and interpreted all pertinent imaging results/findings.    Assessment/Plan:     * Left displaced femoral neck fracture  Lynn Mckinney is a 87 y.o. female with a left femoral neck fracture. They are closed and neurovascularly intact. They take baby aspirin for anticoagulation at home. They required a cane as an ambulatory assistive device prior to this injury.     Optimized for surgery today per Hospital Medicine. Will discuss possible Cardiac Anesthesia prior to rolling to the OR. Patient doing well this morning and ready for surgery.    Plan:  - Admitted to Hospital Medicine Hip Fracture service for perioperative optimization and management  - To OR 1/13/23 for left hip hemiarthroplasty  - Patient marked, booked, and consented for surgery  - DVT PPx: Hold anticoagulation  - Abx: Pre-op antibiotics ordered  - Labs: Hemoglobin 11.8, platelets 117 (chronically low)    Dispo: to OR 1/13/23          Tolu Meneses MD  Orthopedics  [facility] - Emergency Dept

## 2023-01-13 NOTE — ASSESSMENT & PLAN NOTE
· Chronic and controlled. Continue home Amlodipine, ARB (Irbesartan to Losartan for formulary), and home Labetalol to treat.   · Target BP < 150/90.

## 2023-01-13 NOTE — SUBJECTIVE & OBJECTIVE
"Principal Problem:Left displaced femoral neck fracture    Principal Orthopedic Problem: same    Interval History: No acute events overnight. Vitals with some hypertension, but otherwise within normal limits. Pain controlled in the right hip. NPO since midnight. Optimized for surgery per Hospital Medicine.    Review of patient's allergies indicates:   Allergen Reactions    Lipitor [atorvastatin] Itching    Fosamax [alendronate]      Felt "strange"    Iodinated contrast media      Dye is only to be used if absolutely needed because of kidney function    Prolia [denosumab]      Reaction after the Prolia    Sulfa (sulfonamide antibiotics)        Current Facility-Administered Medications   Medication    0.9%  NaCl infusion    acetaminophen tablet 1,000 mg    amLODIPine tablet 10 mg    bisacodyL suppository 10 mg    hydrOXYzine HCL tablet 25 mg    labetaloL tablet 200 mg    levothyroxine tablet 125 mcg    losartan tablet 50 mg    melatonin tablet 6 mg    memantine tablet 5 mg    morphine injection 2 mg    morphine injection 2 mg    morphine injection 4 mg    ondansetron injection 4 mg    oxyCODONE immediate release tablet 5 mg    oxyCODONE immediate release tablet 5 mg    pregabalin capsule 75 mg    sodium chloride 0.9% flush 10 mL    sodium chloride 0.9% flush 10 mL    sodium chloride 0.9% flush 10 mL     Current Outpatient Medications   Medication Sig    acetaminophen (TYLENOL) 325 MG tablet Take 1 tablet (325 mg total) by mouth every 6 (six) hours as needed for Pain. (Patient not taking: Reported on 1/5/2023)    amLODIPine (NORVASC) 10 MG tablet Take 1 tablet (10 mg total) by mouth once daily.    aspirin 81 MG Chew Take 81 mg by mouth every evening. 1 Tablet, Chewable Oral Every day    benzonatate (TESSALON) 200 MG capsule Take 1 capsule (200 mg total) by mouth 3 (three) times daily as needed for Cough.    CALCIUM CARBONATE/VITAMIN D3 (CALTRATE 600 + D ORAL) Take 1 tablet by mouth once daily.    cetirizine (ZYRTEC) 10 " MG tablet Take 1 tablet (10 mg total) by mouth once daily.    cholecalciferol, vitamin D3, (VITAMIN D3) 50 mcg (2,000 unit) Cap capsule Take 2,000 Units by mouth once daily.    coenzyme Q10 200 mg capsule Take 200 mg by mouth once daily.    hydrOXYzine HCL (ATARAX) 25 MG tablet Take 1 tablet (25 mg total) by mouth 3 (three) times daily as needed for Anxiety.    irbesartan (AVAPRO) 300 MG tablet TAKE 1 TABLET (300 MG TOTAL) BY MOUTH EVERY EVENING. CANCEL IRBESARTAN/HCTZ COMBO    labetaloL (NORMODYNE) 200 MG tablet Take 1 tablet (200 mg total) by mouth 2 (two) times daily.    LACTOSE-FREE FOOD (BOOST ORAL) Take by mouth. 2-3 days per week    levothyroxine (SYNTHROID) 125 MCG tablet Take 1 tablet (125 mcg total) by mouth before breakfast.    memantine (NAMENDA) 5 MG Tab Take 1 tablet (5 mg total) by mouth 2 (two) times daily.    multivitamin (THERAGRAN) per tablet Take by mouth. Pt taking one pill three times a week.    nitroGLYCERIN (NITROSTAT) 0.4 MG SL tablet PLACE 1 TABLET UNDER THE TONGUE EVERY 5 MIN AS NEEDED FOR CHEST PAIN. MAX:3 DOSES    promethazine-dextromethorphan (PROMETHAZINE-DM) 6.25-15 mg/5 mL Syrp Take 5 mLs by mouth nightly as needed (cough). May cause sedation.    rosuvastatin (CRESTOR) 10 MG tablet Take 1 tablet (10 mg total) by mouth every evening.    SALMON OIL-OMEGA-3 FATTY ACIDS ORAL Take 1 capsule by mouth once daily.    vitamins  A,C,E-zinc-copper (PRESERVISION AREDS) 14,320-226-200 unit-mg-unit Cap Take by mouth Daily.     Objective:     Vital Signs (Most Recent):  Temp: 97.1 °F (36.2 °C) (01/13/23 0511)  Pulse: 60 (01/13/23 0400)  Resp: 16 (01/13/23 0506)  BP: (!) 144/66 (01/13/23 0400)  SpO2: 99 % (01/13/23 0400)   Vital Signs (24h Range):  Temp:  [97.1 °F (36.2 °C)-98.5 °F (36.9 °C)] 97.1 °F (36.2 °C)  Pulse:  [60-78] 60  Resp:  [13-24] 16  SpO2:  [92 %-100 %] 99 %  BP: (112-188)/() 144/66           There is no height or weight on file to calculate BMI.      Intake/Output Summary  (Last 24 hours) at 1/13/2023 0611  Last data filed at 1/13/2023 0546  Gross per 24 hour   Intake 626.73 ml   Output 500 ml   Net 126.73 ml       Ortho/SPM Exam  Gen: NAD, sitting comfortably in bed  CV: regular rate  Resp: non-labored respirations    LLE:  Skin intact, no open wounds  TTP over hip  Positive log roll  SILT Sa/Arcos/DP/SP/T  Motor intact EHL/FHL/TA/Gastroc/Quad  2+ DP, 2+ PT    Significant Labs: All pertinent labs within the past 24 hours have been reviewed.    Significant Imaging: I have reviewed and interpreted all pertinent imaging results/findings.

## 2023-01-13 NOTE — ASSESSMENT & PLAN NOTE
Age-related osteoporosis with current pathological fracture with routine healing  · Patient is day of surgery for surgical repair of left hip fracture by Ortho with left hip hemiarthroplasty. Patient reports minimal pain in left hip. Patient to go to OR today by Orthopedics for operative repair of hip fracture.   · Patient will start PT/OT in the am tomorrow for gait training and strengthening and restoration of ADL's.   · Patient is NWB: left lower extremity as per Orthopedic recommendations for now and Ortho to determine post-op weight bearing status.    · Plan is to start Apixiban 2.5 mg po BID post-op and continue JOSSUE/SCDs for DVT prophylaxis for now.   · Perineural pain catheter placed by Anesthesia pre-op.   · Patient started on multimodal pain management post-op with Tylenol 1000 mg po every 8 hours post-op and will continue.  · Continue Duong to gravity and remove on POD #1.

## 2023-01-13 NOTE — H&P
"[facility]  [facility] Medicine  History & Physical    Patient Name: Lynn Mckinney  MRN: [id number]  Patient Class: IP- Inpatient  Admission Date: 1/12/2023  Attending Physician: Pauline Wiley MD   Primary Care Provider: Brie Fagan MD         Patient information was obtained from patient, past medical records and ER records.     Subjective:     Principal Problem:Closed left hip fracture, initial encounter    Chief Complaint:   Chief Complaint   Patient presents with    Fall     From home per EMS for fall at home. Per EMS, "slid out of a chair, now c/o L hip to L knee pain". L leg appears mildly shortened and rotated, distal pulses present, pt denies numbness or tingling.        HPI: This is an 86yo female with a past medical history of CAD sp CABG, PAD, combined systolic diastolic heart failure, heart block sp pacemaker, HTN, HLD, and hypothyroidism who presents to the ED with chief complaint of fall and left leg pain. Patient reports feeling at her baseline health and going out to eat with friends this afternoon. She came back and took a nap on her chair. States that she woke up from napping and got up to move around. States that she was in just socks on her hardwood floor and one of her feet slipped from under her and she fell on her side. Was not able to stand or ambulate after fall and with severe left hip pain. Brought to the ED by EMS.    In the ED patient afebrile and hemodynamically stable saturating well on room air. Xray with acute Lt femoral neck fracture. Orthopedic surgery consulted and recommended admission to medicine with plan for OR in am.        Past Medical History:   Diagnosis Date    Allergy     seasonal    Blood clotting tendency     Carotid artery disease 5/29/2014    Coronary artery disease     Heart block     Hyperlipidemia     Hypertension     Hypothyroid     Obesity     Pacemaker     PAD (peripheral artery disease)     Spinal stenosis     Thoracic or " "lumbosacral neuritis or radiculitis 11/25/2014    Tubular adenoma        Past Surgical History:   Procedure Laterality Date    ANGIOPLASTY      APPENDECTOMY      BREAST CYST ASPIRATION Bilateral     CARDIAC CATHETERIZATION      CARDIAC PACEMAKER PLACEMENT  10/10/13    by Dr. Coleman    CAROTID STENT Left 2007    by Dr. Bowen    CATARACT EXTRACTION W/  INTRAOCULAR LENS IMPLANT Bilateral 2012    Dr Sharp    CORONARY ARTERY BYPASS GRAFT  2001     x 3 LIMA-Ramus, SVG-OM1 & SVG-PDA    CORONARY BYPASS GRAFT ANGIOGRAPHY  3/11/2021    Procedure: Bypass graft study;  Surgeon: Gil Garcia MD;  Location: [facility] CATH LAB;  Service: Cardiology;;    CYST REMOVAL      wrist    EYE SURGERY      HYSTERECTOMY      LEFT HEART CATHETERIZATION Left 3/11/2021    Procedure: Left heart cath;  Surgeon: Gil Garcia MD;  Location: [facility] CATH LAB;  Service: Cardiology;  Laterality: Left;    OOPHORECTOMY      THYROIDECTOMY      VITRECTOMY BY PARS PLANA APPROACH Right 10/12/2021    Procedure: VITRECTOMY, PARS PLANA APPROACH;  Surgeon: Flako Carver MD;  Location: [facility] OR [address] [place] [address];  Service: Ophthalmology;  Laterality: Right;       Review of patient's allergies indicates:   Allergen Reactions    Lipitor [atorvastatin] Itching    Fosamax [alendronate]      Felt "strange"    Iodinated contrast media      Dye is only to be used if absolutely needed because of kidney function    Prolia [denosumab]      Reaction after the Prolia    Sulfa (sulfonamide antibiotics)        No current facility-administered medications on file prior to encounter.     Current Outpatient Medications on File Prior to Encounter   Medication Sig    acetaminophen (TYLENOL) 325 MG tablet Take 1 tablet (325 mg total) by mouth every 6 (six) hours as needed for Pain. (Patient not taking: Reported on 1/5/2023)    amLODIPine (NORVASC) 10 MG tablet Take 1 tablet (10 mg total) by mouth once daily.    aspirin 81 MG Chew Take 81 mg by mouth every " evening. 1 Tablet, Chewable Oral Every day    benzonatate (TESSALON) 200 MG capsule Take 1 capsule (200 mg total) by mouth 3 (three) times daily as needed for Cough.    CALCIUM CARBONATE/VITAMIN D3 (CALTRATE 600 + D ORAL) Take 1 tablet by mouth once daily.    cetirizine (ZYRTEC) 10 MG tablet Take 1 tablet (10 mg total) by mouth once daily.    cholecalciferol, vitamin D3, (VITAMIN D3) 50 mcg (2,000 unit) Cap capsule Take 2,000 Units by mouth once daily.    coenzyme Q10 200 mg capsule Take 200 mg by mouth once daily.    hydrOXYzine HCL (ATARAX) 25 MG tablet Take 1 tablet (25 mg total) by mouth 3 (three) times daily as needed for Anxiety.    irbesartan (AVAPRO) 300 MG tablet TAKE 1 TABLET (300 MG TOTAL) BY MOUTH EVERY EVENING. CANCEL IRBESARTAN/HCTZ COMBO    labetaloL (NORMODYNE) 200 MG tablet Take 1 tablet (200 mg total) by mouth 2 (two) times daily.    LACTOSE-FREE FOOD (BOOST ORAL) Take by mouth. 2-3 days per week    levothyroxine (SYNTHROID) 125 MCG tablet Take 1 tablet (125 mcg total) by mouth before breakfast.    memantine (NAMENDA) 5 MG Tab Take 1 tablet (5 mg total) by mouth 2 (two) times daily.    multivitamin (THERAGRAN) per tablet Take by mouth. Pt taking one pill three times a week.    nitroGLYCERIN (NITROSTAT) 0.4 MG SL tablet PLACE 1 TABLET UNDER THE TONGUE EVERY 5 MIN AS NEEDED FOR CHEST PAIN. MAX:3 DOSES    promethazine-dextromethorphan (PROMETHAZINE-DM) 6.25-15 mg/5 mL Syrp Take 5 mLs by mouth nightly as needed (cough). May cause sedation.    rosuvastatin (CRESTOR) 10 MG tablet Take 1 tablet (10 mg total) by mouth every evening.    SALMON OIL-OMEGA-3 FATTY ACIDS ORAL Take 1 capsule by mouth once daily.    vitamins  A,C,E-zinc-copper (PRESERVISION AREDS) 14,320-226-200 unit-mg-unit Cap Take by mouth Daily.     Family History       Problem Relation (Age of Onset)    Alzheimer's disease Mother    Diabetes Brother    Heart disease Father, Mother, Brother    Hypertension Father, Mother     No Known Problems Sister, Maternal Grandmother, Maternal Grandfather, Paternal Grandmother, Paternal Grandfather, Maternal Aunt, Maternal Uncle, Paternal Aunt, Paternal Uncle          Tobacco Use    Smoking status: Former     Types: Cigarettes     Quit date: 1984     Years since quittin.4    Smokeless tobacco: Never   Substance and Sexual Activity    Alcohol use: Yes     Alcohol/week: 1.0 standard drink     Types: 1 Shots of liquor per week     Comment: old fashion  with dinner every 6 mo     Drug use: No    Sexual activity: Not on file     Review of Systems   Constitutional:  Negative for chills and fever.   HENT:  Negative for sore throat and trouble swallowing.    Eyes:  Negative for photophobia and visual disturbance.   Respiratory:  Negative for cough, shortness of breath and wheezing.    Cardiovascular:  Negative for chest pain, palpitations and leg swelling.   Gastrointestinal:  Negative for abdominal distention, abdominal pain, diarrhea, nausea and vomiting.   Genitourinary:  Negative for dysuria and hematuria.   Musculoskeletal:  Positive for arthralgias and gait problem. Negative for neck pain and neck stiffness.   Skin:  Negative for rash and wound.   Neurological:  Negative for seizures, syncope, weakness, light-headedness and headaches.   Psychiatric/Behavioral:  Negative for confusion and decreased concentration.    Objective:     Vital Signs (Most Recent):  Temp: 97.9 °F (36.6 °C) (23)  Pulse: 68 (237)  Resp: 20 (23 2100)  BP: (!) 159/122 (23)  SpO2: (!) 93 % (23)   Vital Signs (24h Range):  Temp:  [97.9 °F (36.6 °C)] 97.9 °F (36.6 °C)  Pulse:  [68] 68  Resp:  [15-20] 20  SpO2:  [93 %] 93 %  BP: (159-168)/() 159/122        There is no height or weight on file to calculate BMI.    Physical Exam  Constitutional:       General: She is not in acute distress.     Appearance: She is not toxic-appearing or diaphoretic.   HENT:      Head:  Normocephalic and atraumatic.      Nose: Nose normal.   Eyes:      General: No scleral icterus.     Extraocular Movements: Extraocular movements intact.      Pupils: Pupils are equal, round, and reactive to light.   Cardiovascular:      Rate and Rhythm: Normal rate and regular rhythm.   Pulmonary:      Effort: Pulmonary effort is normal. No respiratory distress.      Breath sounds: No wheezing or rales.   Abdominal:      General: Abdomen is flat. There is no distension.      Palpations: Abdomen is soft.      Tenderness: There is no abdominal tenderness. There is no guarding.   Musculoskeletal:      Cervical back: Normal range of motion and neck supple. No rigidity.      Right lower leg: No edema.      Left lower leg: No edema.      Comments: LLE shortened and rotated tender to movement and palpation   Skin:     General: Skin is warm and dry.      Coloration: Skin is not jaundiced.   Neurological:      General: No focal deficit present.      Mental Status: She is alert and oriented to person, place, and time.      Cranial Nerves: No cranial nerve deficit.   Psychiatric:         Mood and Affect: Mood normal.         Behavior: Behavior normal.         CRANIAL NERVES     CN III, IV, VI   Pupils are equal, round, and reactive to light.     Significant Labs: All pertinent labs within the past 24 hours have been reviewed.  CBC:   Recent Labs   Lab 01/12/23 2114   WBC 16.22*   HGB 13.2   HCT 40.2   *     CMP:   Recent Labs   Lab 01/12/23 2114   *  134*   K 3.7  3.7     101   CO2 24  24     110   BUN 23  23   CREATININE 1.1  1.1   CALCIUM 9.0  9.0   PROT 6.7   ALBUMIN 3.7   BILITOT 0.8   ALKPHOS 58   AST 25   ALT 15   ANIONGAP 9  9       Significant Imaging: I have reviewed all pertinent imaging results/findings within the past 24 hours.    Assessment/Plan:     * Closed left hip fracture, initial encounter  - IP Hip Fracture Pathway initiated  - Orthopedic surgery consulted ; appreciate  recs  - npo midnight  - pain control  - insert olivera  - PT/Ot consulted  - Revised Cardiac Risk Index for Pre-Operative Risk Score 2 ; Class III Risk  - Patient medically appropriate for OR in am      Chronic combined systolic and diastolic heart failure  - recent ECHO 12/2022  EF 35% with Grade I LVDD  - patient euvolemic on exam  ;  Does not take diuretics at home  - strict I/Os  - further management pending clinical course and future study review      Memory difficulties  - continue home namenda      CHB (complete heart block)  - sp Pacemaker      CKD (chronic kidney disease) stage 3, GFR 30-59 ml/min  - Creatinine 1.1 on presentation ; baseline  - avoid nephrotoxic agents as appropriate  - continue to monitor      Hypothyroidism  - continue home synthroid      Coronary artery disease (CAD)  - sp CABG  - holding home ASA in anticipation of OR  ;  Resume as appropriate      Hyperlipidemia  - patient allergic to formularly alternatives to home pravastatin  - resume home statin at discharge      Hypertension  - continue home amlodipine, ARB (irbesartan to losartan for formularly), and labetalol        VTE Risk Mitigation (From admission, onward)         Ordered     IP VTE HIGH RISK PATIENT  Once         01/12/23 2224     Place sequential compression device  Until discontinued         01/12/23 2224     Place sequential compression device  Until discontinued         01/12/23 2210                   Abdiel Perez MD  Department of Hospital Medicine   [facility]

## 2023-01-13 NOTE — CONSULTS
"[facility]  Orthopedics  Consult Note    Patient Name: Lynn Mckinney  MRN: [id number]  Admission Date: 1/12/2023  Hospital Length of Stay: 1 days  Attending Provider: Pauline Wiley MD  Primary Care Provider: Brie Fagan MD    Patient information was obtained from patient, past medical records and ER records.     Inpatient consult to Orthopedic Surgery  Consult performed by: Tolu Meneses MD  Consult ordered by: Geoffrey Fernández MD        Subjective:     Principal Problem:Left displaced femoral neck fracture    Chief Complaint:   Chief Complaint   Patient presents with    Fall     From home per EMS for fall at home. Per EMS, "slid out of a chair, now c/o L hip to L knee pain". L leg appears mildly shortened and rotated, distal pulses present, pt denies numbness or tingling.        HPI: Lynn Mckinney is a 87 y.o. female with a past medical history of CAD s/p CABG, PAD, combined systolic/diastolic heart failure, heart block s/p biventricular ICD, HTN, HLD, and hypothyroidism the emergency department after a ground level fall. Patient reports feeling at her baseline health and going out to eat with friends this afternoon. She came back and took a nap on her chair. States that she woke up from napping and got up to move around. States that she was in just socks on her hardwood floor and one of her feet slipped from under her and she fell on her left side.  She would immediate left hip pain and inability to bear weight.  She presented to the emergency department via EMS for further evaluation.  X-rays in the emergency department showed a left femoral neck fracture.      Orthopedics was consulted for evaluation of the left femoral neck fracture.  The patient reports taking baby aspirin daily for anticoagulation.  She ambulates with a cane.  She denies pain outside of the left hip.  She denies head trauma and loss of consciousness.      Past Medical History:   Diagnosis Date    Allergy     seasonal " "   Blood clotting tendency     Carotid artery disease 5/29/2014    Coronary artery disease     Heart block     Hyperlipidemia     Hypertension     Hypothyroid     Obesity     Pacemaker     PAD (peripheral artery disease)     Spinal stenosis     Thoracic or lumbosacral neuritis or radiculitis 11/25/2014    Tubular adenoma        Past Surgical History:   Procedure Laterality Date    ANGIOPLASTY      APPENDECTOMY      BREAST CYST ASPIRATION Bilateral     CARDIAC CATHETERIZATION      CARDIAC PACEMAKER PLACEMENT  10/10/13    by Dr. Coleman    CAROTID STENT Left 2007    by Dr. Bowen    CATARACT EXTRACTION W/  INTRAOCULAR LENS IMPLANT Bilateral 2012    Dr Sharp    CORONARY ARTERY BYPASS GRAFT  2001     x 3 LIMA-Ramus, SVG-OM1 & SVG-PDA    CORONARY BYPASS GRAFT ANGIOGRAPHY  3/11/2021    Procedure: Bypass graft study;  Surgeon: Gil Garcia MD;  Location: [facility] CATH LAB;  Service: Cardiology;;    CYST REMOVAL      wrist    EYE SURGERY      HYSTERECTOMY      LEFT HEART CATHETERIZATION Left 3/11/2021    Procedure: Left heart cath;  Surgeon: Gil Garcia MD;  Location: [facility] CATH LAB;  Service: Cardiology;  Laterality: Left;    OOPHORECTOMY      THYROIDECTOMY      VITRECTOMY BY PARS PLANA APPROACH Right 10/12/2021    Procedure: VITRECTOMY, PARS PLANA APPROACH;  Surgeon: Flako Carver MD;  Location: [facility] OR [address] [place] [address];  Service: Ophthalmology;  Laterality: Right;       Review of patient's allergies indicates:   Allergen Reactions    Lipitor [atorvastatin] Itching    Fosamax [alendronate]      Felt "strange"    Iodinated contrast media      Dye is only to be used if absolutely needed because of kidney function    Prolia [denosumab]      Reaction after the Prolia    Sulfa (sulfonamide antibiotics)        Current Facility-Administered Medications   Medication    0.9%  NaCl infusion    acetaminophen tablet 1,000 mg    amLODIPine tablet 10 mg    bisacodyL suppository 10 mg    " hydrOXYzine HCL tablet 25 mg    labetaloL tablet 200 mg    levothyroxine tablet 125 mcg    losartan tablet 50 mg    melatonin tablet 6 mg    memantine tablet 5 mg    morphine injection 2 mg    morphine injection 2 mg    morphine injection 4 mg    ondansetron injection 4 mg    oxyCODONE immediate release tablet 5 mg    oxyCODONE immediate release tablet 5 mg    pregabalin capsule 75 mg    sodium chloride 0.9% flush 10 mL    sodium chloride 0.9% flush 10 mL    sodium chloride 0.9% flush 10 mL     Current Outpatient Medications   Medication Sig    acetaminophen (TYLENOL) 325 MG tablet Take 1 tablet (325 mg total) by mouth every 6 (six) hours as needed for Pain. (Patient not taking: Reported on 1/5/2023)    amLODIPine (NORVASC) 10 MG tablet Take 1 tablet (10 mg total) by mouth once daily.    aspirin 81 MG Chew Take 81 mg by mouth every evening. 1 Tablet, Chewable Oral Every day    benzonatate (TESSALON) 200 MG capsule Take 1 capsule (200 mg total) by mouth 3 (three) times daily as needed for Cough.    CALCIUM CARBONATE/VITAMIN D3 (CALTRATE 600 + D ORAL) Take 1 tablet by mouth once daily.    cetirizine (ZYRTEC) 10 MG tablet Take 1 tablet (10 mg total) by mouth once daily.    cholecalciferol, vitamin D3, (VITAMIN D3) 50 mcg (2,000 unit) Cap capsule Take 2,000 Units by mouth once daily.    coenzyme Q10 200 mg capsule Take 200 mg by mouth once daily.    hydrOXYzine HCL (ATARAX) 25 MG tablet Take 1 tablet (25 mg total) by mouth 3 (three) times daily as needed for Anxiety.    irbesartan (AVAPRO) 300 MG tablet TAKE 1 TABLET (300 MG TOTAL) BY MOUTH EVERY EVENING. CANCEL IRBESARTAN/HCTZ COMBO    labetaloL (NORMODYNE) 200 MG tablet Take 1 tablet (200 mg total) by mouth 2 (two) times daily.    LACTOSE-FREE FOOD (BOOST ORAL) Take by mouth. 2-3 days per week    levothyroxine (SYNTHROID) 125 MCG tablet Take 1 tablet (125 mcg total) by mouth before breakfast.    memantine (NAMENDA) 5 MG Tab Take 1 tablet  (5 mg total) by mouth 2 (two) times daily.    multivitamin (THERAGRAN) per tablet Take by mouth. Pt taking one pill three times a week.    nitroGLYCERIN (NITROSTAT) 0.4 MG SL tablet PLACE 1 TABLET UNDER THE TONGUE EVERY 5 MIN AS NEEDED FOR CHEST PAIN. MAX:3 DOSES    promethazine-dextromethorphan (PROMETHAZINE-DM) 6.25-15 mg/5 mL Syrp Take 5 mLs by mouth nightly as needed (cough). May cause sedation.    rosuvastatin (CRESTOR) 10 MG tablet Take 1 tablet (10 mg total) by mouth every evening.    SALMON OIL-OMEGA-3 FATTY ACIDS ORAL Take 1 capsule by mouth once daily.    vitamins  A,C,E-zinc-copper (PRESERVISION AREDS) 14,320-226-200 unit-mg-unit Cap Take by mouth Daily.     Family History       Problem Relation (Age of Onset)    Alzheimer's disease Mother    Diabetes Brother    Heart disease Father, Mother, Brother    Hypertension Father, Mother    No Known Problems Sister, Maternal Grandmother, Maternal Grandfather, Paternal Grandmother, Paternal Grandfather, Maternal Aunt, Maternal Uncle, Paternal Aunt, Paternal Uncle          Tobacco Use    Smoking status: Former     Types: Cigarettes     Quit date: 1984     Years since quittin.4    Smokeless tobacco: Never   Substance and Sexual Activity    Alcohol use: Yes     Alcohol/week: 1.0 standard drink     Types: 1 Shots of liquor per week     Comment: old fashion  with dinner every 6 mo     Drug use: No    Sexual activity: Not on file     ROS  Constitutional: Denies fever/chills   Neurological: Denies numbness/tingling (any exceptions noted in orthopaedic exam)    Psychiatric/Behavioral: Denies change in normal mood   Eyes: Denies change in vision   Cardiovascular: Denies chest pain   Respiratory: Denies shortness of breath   Hematologic/Lymphatic: Denies easy bleeding/bruising    Skin: Denies new rash or skin lesions    Gastrointestinal: Denies nausea/vomitting/diarrhea, change in bowel habits, abdominal pain    Allergic/Immunologic: Denies adverse  reactions to current medications   Musculoskeletal: see HPI     Objective:     Vital Signs (Most Recent):  Temp: 98.5 °F (36.9 °C) (01/13/23 0000)  Pulse: 61 (01/13/23 0000)  Resp: 16 (01/13/23 0000)  BP: 139/65 (01/13/23 0000)  SpO2: 100 % (01/13/23 0000) Vital Signs (24h Range):  Temp:  [97.9 °F (36.6 °C)-98.5 °F (36.9 °C)] 98.5 °F (36.9 °C)  Pulse:  [61-78] 61  Resp:  [15-24] 16  SpO2:  [92 %-100 %] 100 %  BP: (139-188)/() 139/65     Ortho/SPM Exam  General: no acute distress, appears stated age     Neuro: alert and oriented x3   Psych: normal mood   Head: normocephalic, atraumatic.    Eyes: no scleral icterus   Mouth: moist mucous membranes   Cardiovascular: extremities warm and well perfused   Lungs: breathing comfortably, equal chest rise bilat   Skin: clean, dry, intact (any exceptions noted in below musculoskeletal exam)    Musculoskeletal:  LUE:  - Skin intact throughout, no open wounds  - No swelling  - No erythema, or signs of cellulitis  - NonTTP throughout  - AROM and PROM of the shoulder, elbow, wrist, and hand intact without pain  - Axillary/AIN/PIN/Radial/Median/Ulnar nerves assessed in isolation and are without deficit  - SILT throughout  - Compartments soft  - 2+ radial artery pulse  - Capillary Refill < 2 sec    RUE:  - Skin intact throughout, no open wounds  - No swelling  - No erythema, or signs of cellulitis  - NonTTP throughout  - AROM and PROM of the shoulder, elbow, wrist, and hand intact without pain  - Axillary/AIN/PIN/Radial/Median/Ulnar nerves assessed in isolation and are without deficit  - SILT throughout  - Compartments soft  - 2+ radial artery pulse  - Capillary Refill < 2 sec    LLE:  - Skin intact throughout, no open wounds  - TTP over hip and proximal thigh  - NonTTP throughout lower extremity distal to proximal thigh  - ROM hip not tested due to known hip fracture  - ROM knee, ankle, and foot intact and painless  - TA/EHL/Gastroc/FHL assessed in isolation and are without  deficit  - SILT throughout  - Compartments soft  - 2+ DP and PT pulses  - Capillary Refill < 2 sec  - Positive Log roll    RLE:  - Skin intact throughout, no open wounds  - No swelling  - No erythema, or signs of cellulitis  - NonTTP throughout  - AROM and PROM of the hip, knee, ankle, and foot intact without pain  - TA/EHL/Gastroc/FHL assessed in isolation and are without deficit  - SILT throughout  - Compartments soft  - 2+ DP and PT pulses  - Capillary Refill < 2 sec  - Negative Log roll    Spine/pelvis/axial body:  No tenderness to palpation of cervical, thoracic, or lumbar spine  No pain with compression of pelvis  No decubitus ulcers    Significant Labs: All pertinent labs within the past 24 hours have been reviewed.    Significant Imaging: I have reviewed and interpreted all pertinent imaging results/findings.  X-ray left femur with a transcervical femoral neck fracture that is shortened and in varus    Assessment/Plan:     * Left displaced femoral neck fracture  Lynn Mckinney is a 87 y.o. female with a left femoral neck fracture. They are closed and neurovascularly intact. They take baby aspirin for anticoagulation at home. They required a cane as an ambulatory assistive device prior to this injury.     The patient was explained in detail the severity of the injury that was suffered. The patient was explained the risks/benefits/and alternatives to operative management in detail including infection, bleeding, pain, nerve and vascular damage, heterotopic ossification, leg length discrepancies, rotational deformities and they express full understanding.  We discussed the 30% national first year mortality rate with hip fractures, the need for early mobilization, and the expected rehab course.  They express full understanding of the condition and express that they want to proceed with surgery. The patient is admitted to the [facility] Medicine Hip Fracture service for perioperative optimization and management.  Will plan for OR 1/13/23. No guarantees were made. Informed consent was obtained. All questions were answered to patient's and family's satisfaction.     Plan:  - Admitted to Hospital Medicine Hip Fracture service for perioperative optimization and management  - To OR 1/13/23 for left hip hemiarthroplasty  - Patient marked, booked, and consented for surgery  - DVT PPx: Hold anticoagulation  - Abx: Pre-op antibiotics ordered  - Labs: Hemoglobin 13.2, Platelets 129, WBC 16.22, INR 1.0, Albumin 3.7, A1c 5.3, UA with no UTI  - PT/OT: Bed rest  - Duong: placed. No UTI  - NPO at midnight  - IV: ordered for contralateral arm      Tolu Meneses MD  Orthopedics  [facility]

## 2023-01-13 NOTE — ED NOTES
Assume care of pt. Pt resting quietly in bed. Bedside monitor in use. Purewick in place. NAD noticed

## 2023-01-13 NOTE — SUBJECTIVE & OBJECTIVE
"Past Medical History:   Diagnosis Date    Allergy     seasonal    Blood clotting tendency     Carotid artery disease 5/29/2014    Coronary artery disease     Heart block     Hyperlipidemia     Hypertension     Hypothyroid     Obesity     Pacemaker     PAD (peripheral artery disease)     Spinal stenosis     Thoracic or lumbosacral neuritis or radiculitis 11/25/2014    Tubular adenoma        Past Surgical History:   Procedure Laterality Date    ANGIOPLASTY      APPENDECTOMY      BREAST CYST ASPIRATION Bilateral     CARDIAC CATHETERIZATION      CARDIAC PACEMAKER PLACEMENT  10/10/13    by Dr. Coleman    CAROTID STENT Left 2007    by Dr. Bowen    CATARACT EXTRACTION W/  INTRAOCULAR LENS IMPLANT Bilateral 2012    Dr Sharp    CORONARY ARTERY BYPASS GRAFT  2001     x 3 LIMA-Ramus, SVG-OM1 & SVG-PDA    CORONARY BYPASS GRAFT ANGIOGRAPHY  3/11/2021    Procedure: Bypass graft study;  Surgeon: Gil Garcia MD;  Location: [facility] CATH LAB;  Service: Cardiology;;    CYST REMOVAL      wrist    EYE SURGERY      HYSTERECTOMY      LEFT HEART CATHETERIZATION Left 3/11/2021    Procedure: Left heart cath;  Surgeon: Gil Garcia MD;  Location: [facility] CATH LAB;  Service: Cardiology;  Laterality: Left;    OOPHORECTOMY      THYROIDECTOMY      VITRECTOMY BY PARS PLANA APPROACH Right 10/12/2021    Procedure: VITRECTOMY, PARS PLANA APPROACH;  Surgeon: Flako Carver MD;  Location: [facility] OR [address] [place] [address];  Service: Ophthalmology;  Laterality: Right;       Review of patient's allergies indicates:   Allergen Reactions    Lipitor [atorvastatin] Itching    Fosamax [alendronate]      Felt "strange"    Iodinated contrast media      Dye is only to be used if absolutely needed because of kidney function    Prolia [denosumab]      Reaction after the Prolia    Sulfa (sulfonamide antibiotics)        Current Facility-Administered Medications   Medication    0.9%  NaCl infusion    acetaminophen tablet 1,000 mg    amLODIPine tablet 10 mg    bisacodyL " suppository 10 mg    hydrOXYzine HCL tablet 25 mg    labetaloL tablet 200 mg    levothyroxine tablet 125 mcg    losartan tablet 50 mg    melatonin tablet 6 mg    memantine tablet 5 mg    morphine injection 2 mg    morphine injection 2 mg    morphine injection 4 mg    ondansetron injection 4 mg    oxyCODONE immediate release tablet 5 mg    oxyCODONE immediate release tablet 5 mg    pregabalin capsule 75 mg    sodium chloride 0.9% flush 10 mL    sodium chloride 0.9% flush 10 mL    sodium chloride 0.9% flush 10 mL     Current Outpatient Medications   Medication Sig    acetaminophen (TYLENOL) 325 MG tablet Take 1 tablet (325 mg total) by mouth every 6 (six) hours as needed for Pain. (Patient not taking: Reported on 1/5/2023)    amLODIPine (NORVASC) 10 MG tablet Take 1 tablet (10 mg total) by mouth once daily.    aspirin 81 MG Chew Take 81 mg by mouth every evening. 1 Tablet, Chewable Oral Every day    benzonatate (TESSALON) 200 MG capsule Take 1 capsule (200 mg total) by mouth 3 (three) times daily as needed for Cough.    CALCIUM CARBONATE/VITAMIN D3 (CALTRATE 600 + D ORAL) Take 1 tablet by mouth once daily.    cetirizine (ZYRTEC) 10 MG tablet Take 1 tablet (10 mg total) by mouth once daily.    cholecalciferol, vitamin D3, (VITAMIN D3) 50 mcg (2,000 unit) Cap capsule Take 2,000 Units by mouth once daily.    coenzyme Q10 200 mg capsule Take 200 mg by mouth once daily.    hydrOXYzine HCL (ATARAX) 25 MG tablet Take 1 tablet (25 mg total) by mouth 3 (three) times daily as needed for Anxiety.    irbesartan (AVAPRO) 300 MG tablet TAKE 1 TABLET (300 MG TOTAL) BY MOUTH EVERY EVENING. CANCEL IRBESARTAN/HCTZ COMBO    labetaloL (NORMODYNE) 200 MG tablet Take 1 tablet (200 mg total) by mouth 2 (two) times daily.    LACTOSE-FREE FOOD (BOOST ORAL) Take by mouth. 2-3 days per week    levothyroxine (SYNTHROID) 125 MCG tablet Take 1 tablet (125 mcg total) by mouth before breakfast.    memantine (NAMENDA) 5 MG Tab Take 1 tablet (5 mg  total) by mouth 2 (two) times daily.    multivitamin (THERAGRAN) per tablet Take by mouth. Pt taking one pill three times a week.    nitroGLYCERIN (NITROSTAT) 0.4 MG SL tablet PLACE 1 TABLET UNDER THE TONGUE EVERY 5 MIN AS NEEDED FOR CHEST PAIN. MAX:3 DOSES    promethazine-dextromethorphan (PROMETHAZINE-DM) 6.25-15 mg/5 mL Syrp Take 5 mLs by mouth nightly as needed (cough). May cause sedation.    rosuvastatin (CRESTOR) 10 MG tablet Take 1 tablet (10 mg total) by mouth every evening.    SALMON OIL-OMEGA-3 FATTY ACIDS ORAL Take 1 capsule by mouth once daily.    vitamins  A,C,E-zinc-copper (PRESERVISION AREDS) 14,[phone] unit-mg-unit Cap Take by mouth Daily.     Family History       Problem Relation (Age of Onset)    Alzheimer's disease Mother    Diabetes Brother    Heart disease Father, Mother, Brother    Hypertension Father, Mother    No Known Problems Sister, Maternal Grandmother, Maternal Grandfather, Paternal Grandmother, Paternal Grandfather, Maternal Aunt, Maternal Uncle, Paternal Aunt, Paternal Uncle          Tobacco Use    Smoking status: Former     Types: Cigarettes     Quit date: 1984     Years since quittin.4    Smokeless tobacco: Never   Substance and Sexual Activity    Alcohol use: Yes     Alcohol/week: 1.0 standard drink     Types: 1 Shots of liquor per week     Comment: old fashion  with dinner every 6 mo     Drug use: No    Sexual activity: Not on file     ROS  Constitutional: Denies fever/chills   Neurological: Denies numbness/tingling (any exceptions noted in orthopaedic exam)    Psychiatric/Behavioral: Denies change in normal mood   Eyes: Denies change in vision   Cardiovascular: Denies chest pain   Respiratory: Denies shortness of breath   Hematologic/Lymphatic: Denies easy bleeding/bruising    Skin: Denies new rash or skin lesions    Gastrointestinal: Denies nausea/vomitting/diarrhea, change in bowel habits, abdominal pain    Allergic/Immunologic: Denies adverse reactions to current  medications   Musculoskeletal: see HPI     Objective:     Vital Signs (Most Recent):  Temp: 98.5 °F (36.9 °C) (01/13/23 0000)  Pulse: 61 (01/13/23 0000)  Resp: 16 (01/13/23 0000)  BP: 139/65 (01/13/23 0000)  SpO2: 100 % (01/13/23 0000) Vital Signs (24h Range):  Temp:  [97.9 °F (36.6 °C)-98.5 °F (36.9 °C)] 98.5 °F (36.9 °C)  Pulse:  [61-78] 61  Resp:  [15-24] 16  SpO2:  [92 %-100 %] 100 %  BP: (139-188)/() 139/65     Ortho/SPM Exam  General: no acute distress, appears stated age     Neuro: alert and oriented x3   Psych: normal mood   Head: normocephalic, atraumatic.    Eyes: no scleral icterus   Mouth: moist mucous membranes   Cardiovascular: extremities warm and well perfused   Lungs: breathing comfortably, equal chest rise bilat   Skin: clean, dry, intact (any exceptions noted in below musculoskeletal exam)    Musculoskeletal:  LUE:  - Skin intact throughout, no open wounds  - No swelling  - No erythema, or signs of cellulitis  - NonTTP throughout  - AROM and PROM of the shoulder, elbow, wrist, and hand intact without pain  - Axillary/AIN/PIN/Radial/Median/Ulnar nerves assessed in isolation and are without deficit  - SILT throughout  - Compartments soft  - 2+ radial artery pulse  - Capillary Refill < 2 sec    RUE:  - Skin intact throughout, no open wounds  - No swelling  - No erythema, or signs of cellulitis  - NonTTP throughout  - AROM and PROM of the shoulder, elbow, wrist, and hand intact without pain  - Axillary/AIN/PIN/Radial/Median/Ulnar nerves assessed in isolation and are without deficit  - SILT throughout  - Compartments soft  - 2+ radial artery pulse  - Capillary Refill < 2 sec    LLE:  - Skin intact throughout, no open wounds  - TTP over hip and proximal thigh  - NonTTP throughout lower extremity distal to proximal thigh  - ROM hip not tested due to known hip fracture  - ROM knee, ankle, and foot intact and painless  - TA/EHL/Gastroc/FHL assessed in isolation and are without deficit  - SILT  throughout  - Compartments soft  - 2+ DP and PT pulses  - Capillary Refill < 2 sec  - Positive Log roll    RLE:  - Skin intact throughout, no open wounds  - No swelling  - No erythema, or signs of cellulitis  - NonTTP throughout  - AROM and PROM of the hip, knee, ankle, and foot intact without pain  - TA/EHL/Gastroc/FHL assessed in isolation and are without deficit  - SILT throughout  - Compartments soft  - 2+ DP and PT pulses  - Capillary Refill < 2 sec  - Negative Log roll    Spine/pelvis/axial body:  No tenderness to palpation of cervical, thoracic, or lumbar spine  No pain with compression of pelvis  No decubitus ulcers    Significant Labs: All pertinent labs within the past 24 hours have been reviewed.    Significant Imaging: I have reviewed and interpreted all pertinent imaging results/findings.  X-ray left femur with a transcervical femoral neck fracture that is shortened and in varus

## 2023-01-13 NOTE — ASSESSMENT & PLAN NOTE
- Chronic and controlled. Creatinine 1.1 on presentation and at baseline.  - Avoid nephrotoxic agents as appropriate.  - Renally dose meds for her GFR.   - Continue to monitor renal function with daily BMP in hospital.

## 2023-01-13 NOTE — PROGRESS NOTES
[facility] ([facility])  [facility] Medicine  Progress Note    Patient Name: Lynn cMkinney  MRN: [id number]  Patient Class: IP- Inpatient   Admission Date: 1/12/2023  Length of Stay: 1 days  Attending Physician: Pauline Wiley MD  Primary Care Provider: Brie Fagan MD        Subjective:     Principal Problem:Closed displaced fracture of left femoral neck        HPI:  This is an 86yo female with a past medical history of CAD sp CABG, PAD, combined systolic diastolic heart failure, heart block sp pacemaker, HTN, HLD, and hypothyroidism who presents to the ED with chief complaint of fall and left leg pain. Patient reports feeling at her baseline health and going out to eat with friends this afternoon. She came back and took a nap on her chair. States that she woke up from napping and got up to move around. States that she was in just socks on her hardwood floor and one of her feet slipped from under her and she fell on her side. Was not able to stand or ambulate after fall and with severe left hip pain. Brought to the ED by EMS.    In the ED patient afebrile and hemodynamically stable saturating well on room air. Xray with acute Lt femoral neck fracture. Orthopedic surgery consulted and recommended admission to medicine with plan for OR in am.        Overview/Hospital Course:  Patient admitted to [facility] Medicine Team H: Hip Fracture team and started on Hip Fracture Pathway with Orthopedic surgery consult for hip fracture. Patient was seen and evaluated by Orthopedic surgery who recommended operative repair of hip fracture. Patient was medically optimized prior to surgery and to be taken to OR after optimization on 1/13/2023.         Interval History: Patient seen in holding area awaiting surgery on her left femoral neck fracture. Perineural catheter has been placed By Anesthesia to be used post-op for pain management. Patient in good spirits. She stated she wants to drink some milk after surgery and I  explained once surgery complete and she is awake and alert enough from the anesthesia then will order a diet for her. Patient also wants to be able to move her left leg as very limited mobility at this time. I explained she will have better mobility and definitely less pain post-op to her left leg. Plan is OR today and left hip hemiarthroplasty by Ortho and to start PT/OT tomorrow.     Review of Systems   Constitutional:  Negative for fever.   Respiratory:  Negative for cough and shortness of breath.    Cardiovascular:  Negative for leg swelling.   Gastrointestinal:  Negative for abdominal pain, nausea and vomiting.   Musculoskeletal:  Positive for arthralgias (Left hip).   Neurological:  Negative for dizziness.   Psychiatric/Behavioral:  Negative for agitation and confusion.    Objective:     Vital Signs (Most Recent):  Temp: 97.9 °F (36.6 °C) (01/13/23 0911)  Pulse: 60 (01/13/23 1135)  Resp: 16 (01/13/23 1135)  BP: 145/66 (01/13/23 1135)  SpO2: 99 % (01/13/23 1135) on room air   Vital Signs (24h Range):  Temp:  [97.1 °F (36.2 °C)-98.5 °F (36.9 °C)] 97.9 °F (36.6 °C)  Pulse:  [60-78] 60  Resp:  [13-24] 16  SpO2:  [90 %-100 %] 99 %  BP: (112-188)/() 145/66     Weight: 63.5 kg (140 lb)  Body mass index is 24.03 kg/m².    Intake/Output Summary (Last 24 hours) at 1/13/2023 1213  Last data filed at 1/13/2023 0546  Gross per 24 hour   Intake 626.73 ml   Output 500 ml   Net 126.73 ml      Physical Exam  Vitals and nursing note reviewed.   Constitutional:       General: She is awake. She is not in acute distress.     Appearance: Normal appearance. She is normal weight. She is not ill-appearing.   Cardiovascular:      Rate and Rhythm: Normal rate and regular rhythm.      Heart sounds: Normal heart sounds. No murmur heard.    No friction rub. No gallop.   Pulmonary:      Effort: Pulmonary effort is normal. No respiratory distress.      Breath sounds: Normal breath sounds. No wheezing.   Abdominal:      General: Abdomen  is flat. Bowel sounds are normal. There is no distension.      Palpations: Abdomen is soft.      Tenderness: There is no abdominal tenderness.   Musculoskeletal:      Right lower leg: No edema.      Left lower leg: No edema.   Skin:     General: Skin is warm.      Findings: No erythema.   Neurological:      Mental Status: She is alert and oriented to person, place, and time.   Psychiatric:         Mood and Affect: Mood normal.         Behavior: Behavior normal. Behavior is cooperative.         Thought Content: Thought content normal.         Judgment: Judgment normal.       Significant Labs: CBC:   Recent Labs   Lab 01/12/23 2114 01/13/23 0218   WBC 16.22* 11.08   HGB 13.2 11.8*   HCT 40.2 36.3*   * 117*     CMP:   Recent Labs   Lab 01/12/23 2114 01/13/23 0218   *  134* 134*   K 3.7  3.7 3.9     101 104   CO2 24  24 22*     110 118*   BUN 23  23 23   CREATININE 1.1  1.1 1.0   CALCIUM 9.0  9.0 8.3*   PROT 6.7  --    ALBUMIN 3.7  --    BILITOT 0.8  --    ALKPHOS 58  --    AST 25  --    ALT 15  --    ANIONGAP 9  9 8       Significant Imaging: I have reviewed all pertinent imaging results/findings within the past 24 hours.      Assessment/Plan:      * Closed displaced fracture of left femoral neck  Age-related osteoporosis with current pathological fracture with routine healing  · Patient is day of surgery for surgical repair of left hip fracture by Ortho with left hip hemiarthroplasty. Patient reports minimal pain in left hip. Patient to go to OR today by Orthopedics for operative repair of hip fracture.   · Patient will start PT/OT in the am tomorrow for gait training and strengthening and restoration of ADL's.   · Patient is NWB: left lower extremity as per Orthopedic recommendations for now and Ortho to determine post-op weight bearing status.    · Plan is to start Apixiban 2.5 mg po BID post-op and continue JOSSUE/SCDs for DVT prophylaxis for now.   · Perineural pain catheter  placed by Anesthesia pre-op.   · Patient started on multimodal pain management post-op with Tylenol 1000 mg po every 8 hours post-op and will continue.  · Continue Duong to gravity and remove on POD #1.     Chronic combined systolic and diastolic heart failure  Patient is identified as having Combined Systolic and Diastolic heart failure that is Chronic. CHF is currently controlled. Latest ECHO performed and demonstrates- Results for orders placed during the hospital encounter of 12/05/22    Echo    Interpretation Summary  · The estimated ejection fraction is 35%.  · The quantitatively derived ejection fraction is 35%.  · The left ventricle is normal in size with moderately decreased systolic function.  · There is abnormal septal wall motion consistent with right ventricular pacemaker.  · There is moderate left ventricular global hypokinesis.  · Grade I left ventricular diastolic dysfunction.  · Normal right ventricular size with normal right ventricular systolic function.  · Severe left atrial enlargement.  · Mild-to-moderate aortic regurgitation.  · The estimated PA systolic pressure is 27 mmHg.  · Normal central venous pressure (3 mmHg).  · Trivial circumferential pericardial effusion.  Continue home Labetalol to treat and continue Losartan (substitute for home Irbesartan not on formulary) to treat and monitor clinical status closely. Monitor on telemetry. Patient is off CHF pathway.  Monitor strict Is&Os and daily weights.  Place on fluid restriction of 1.5 L. Continue to stress to patient importance of self efficacy and  on diet for CHF.     CHB (complete heart block)  Patient with recent BiVID pacer placed by Cardiology on 2/2022. Controlled.       Coronary artery disease involving native coronary artery of native heart without angina pectoris  · Prior CABG in 2001 with  of mid LAD.   · Chronic and controlled. Patient asymptomatic.   · Continue home Labetalol to treat. Hold Aspirin for surgery and  restart post-op. Patient on Crestor at home but not on formulary here in hospital and allergic to alternative statins so will hold statin in hospital.       Primary hypertension  · Chronic and controlled. Continue home Amlodipine, ARB (Irbesartan to Losartan for formulary), and home Labetalol to treat.   · Target BP < 150/90.       Stage 3a chronic kidney disease  - Chronic and controlled. Creatinine 1.1 on presentation and at baseline.  - Avoid nephrotoxic agents as appropriate.  - Renally dose meds for her GFR.   - Continue to monitor renal function with daily BMP in hospital.     Memory difficulties  Very mild. Continue home Namenda to treat.       Pure hypercholesterolemia  - Chronic and controlled. Patient allergic to formularly alternatives to her home Crestor.   - Resume home Crestor at discharge.      Acquired hypothyroidism  Chronic and controlled. Continue home Levothyroxine to treat.         VTE Risk Mitigation (From admission, onward)         Ordered     IP VTE HIGH RISK PATIENT  Once         01/13/23 0035     Place sequential compression device  Until discontinued         01/13/23 0035     Place sequential compression device  Until discontinued         01/12/23 2224     Place sequential compression device  Until discontinued         01/12/23 2210                Discharge Planning   AIDEE: 1/17/2023     Code Status: Full Code   Is the patient medically ready for discharge?: No    Reason for patient still in hospital (select all that apply): Patient trending condition             Pauline Wiley MD  Department of Hospital Medicine   [facility] - Surgery ([facility])

## 2023-01-13 NOTE — ASSESSMENT & PLAN NOTE
- Creatinine 1.1 on presentation ; baseline  - avoid nephrotoxic agents as appropriate  - continue to monitor

## 2023-01-14 LAB
ANION GAP SERPL CALC-SCNC: 7 MMOL/L (ref 8–16)
BASOPHILS # BLD AUTO: 0.02 K/UL (ref 0–0.2)
BASOPHILS NFR BLD: 0.2 % (ref 0–1.9)
BUN SERPL-MCNC: 23 MG/DL (ref 8–23)
CALCIUM SERPL-MCNC: 8 MG/DL (ref 8.7–10.5)
CHLORIDE SERPL-SCNC: 105 MMOL/L (ref 95–110)
CO2 SERPL-SCNC: 23 MMOL/L (ref 23–29)
CREAT SERPL-MCNC: 1 MG/DL (ref 0.5–1.4)
DIFFERENTIAL METHOD: ABNORMAL
EOSINOPHIL # BLD AUTO: 0.1 K/UL (ref 0–0.5)
EOSINOPHIL NFR BLD: 1 % (ref 0–8)
ERYTHROCYTE [DISTWIDTH] IN BLOOD BY AUTOMATED COUNT: 12.3 % (ref 11.5–14.5)
EST. GFR  (NO RACE VARIABLE): 54.5 ML/MIN/1.73 M^2
GLUCOSE SERPL-MCNC: 95 MG/DL (ref 70–110)
HCT VFR BLD AUTO: 33.6 % (ref 37–48.5)
HGB BLD-MCNC: 10.9 G/DL (ref 12–16)
IMM GRANULOCYTES # BLD AUTO: 0.09 K/UL (ref 0–0.04)
IMM GRANULOCYTES NFR BLD AUTO: 0.9 % (ref 0–0.5)
LYMPHOCYTES # BLD AUTO: 0.4 K/UL (ref 1–4.8)
LYMPHOCYTES NFR BLD: 4.2 % (ref 18–48)
MAGNESIUM SERPL-MCNC: 2 MG/DL (ref 1.6–2.6)
MCH RBC QN AUTO: 32.2 PG (ref 27–31)
MCHC RBC AUTO-ENTMCNC: 32.4 G/DL (ref 32–36)
MCV RBC AUTO: 99 FL (ref 82–98)
MONOCYTES # BLD AUTO: 0.8 K/UL (ref 0.3–1)
MONOCYTES NFR BLD: 8 % (ref 4–15)
NEUTROPHILS # BLD AUTO: 8.7 K/UL (ref 1.8–7.7)
NEUTROPHILS NFR BLD: 85.7 % (ref 38–73)
NRBC BLD-RTO: 0 /100 WBC
PHOSPHATE SERPL-MCNC: 3.3 MG/DL (ref 2.7–4.5)
PLATELET # BLD AUTO: 102 K/UL (ref 150–450)
PMV BLD AUTO: 9.5 FL (ref 9.2–12.9)
POTASSIUM SERPL-SCNC: 4 MMOL/L (ref 3.5–5.1)
RBC # BLD AUTO: 3.39 M/UL (ref 4–5.4)
SODIUM SERPL-SCNC: 135 MMOL/L (ref 136–145)
WBC # BLD AUTO: 10.12 K/UL (ref 3.9–12.7)

## 2023-01-14 PROCEDURE — 99231 PR SUBSEQUENT HOSPITAL CARE,LEVL I: ICD-10-PCS | Mod: ,,, | Performed by: ANESTHESIOLOGY

## 2023-01-14 PROCEDURE — 85025 COMPLETE CBC W/AUTO DIFF WBC: CPT | Performed by: FAMILY MEDICINE

## 2023-01-14 PROCEDURE — 99231 SBSQ HOSP IP/OBS SF/LOW 25: CPT | Mod: ,,, | Performed by: ANESTHESIOLOGY

## 2023-01-14 PROCEDURE — 11000001 HC ACUTE MED/SURG PRIVATE ROOM

## 2023-01-14 PROCEDURE — 83735 ASSAY OF MAGNESIUM: CPT | Performed by: FAMILY MEDICINE

## 2023-01-14 PROCEDURE — 80048 BASIC METABOLIC PNL TOTAL CA: CPT | Performed by: FAMILY MEDICINE

## 2023-01-14 PROCEDURE — 97166 OT EVAL MOD COMPLEX 45 MIN: CPT

## 2023-01-14 PROCEDURE — 97530 THERAPEUTIC ACTIVITIES: CPT

## 2023-01-14 PROCEDURE — 99233 SBSQ HOSP IP/OBS HIGH 50: CPT | Mod: ,,, | Performed by: INTERNAL MEDICINE

## 2023-01-14 PROCEDURE — 97162 PT EVAL MOD COMPLEX 30 MIN: CPT

## 2023-01-14 PROCEDURE — 25000003 PHARM REV CODE 250: Performed by: STUDENT IN AN ORGANIZED HEALTH CARE EDUCATION/TRAINING PROGRAM

## 2023-01-14 PROCEDURE — 94761 N-INVAS EAR/PLS OXIMETRY MLT: CPT

## 2023-01-14 PROCEDURE — 97535 SELF CARE MNGMENT TRAINING: CPT

## 2023-01-14 PROCEDURE — 63600175 PHARM REV CODE 636 W HCPCS: Performed by: FAMILY MEDICINE

## 2023-01-14 PROCEDURE — 36415 COLL VENOUS BLD VENIPUNCTURE: CPT | Performed by: FAMILY MEDICINE

## 2023-01-14 PROCEDURE — 84100 ASSAY OF PHOSPHORUS: CPT | Performed by: FAMILY MEDICINE

## 2023-01-14 PROCEDURE — 25000003 PHARM REV CODE 250: Performed by: FAMILY MEDICINE

## 2023-01-14 PROCEDURE — 99233 PR SUBSEQUENT HOSPITAL CARE,LEVL III: ICD-10-PCS | Mod: ,,, | Performed by: INTERNAL MEDICINE

## 2023-01-14 RX ADMIN — MEMANTINE HYDROCHLORIDE 5 MG: 5 TABLET ORAL at 10:01

## 2023-01-14 RX ADMIN — POLYETHYLENE GLYCOL 3350 17 G: 17 POWDER, FOR SOLUTION ORAL at 10:01

## 2023-01-14 RX ADMIN — LABETALOL HYDROCHLORIDE 200 MG: 100 TABLET, FILM COATED ORAL at 10:01

## 2023-01-14 RX ADMIN — LABETALOL HYDROCHLORIDE 200 MG: 100 TABLET, FILM COATED ORAL at 09:01

## 2023-01-14 RX ADMIN — SENNOSIDES AND DOCUSATE SODIUM 1 TABLET: 50; 8.6 TABLET ORAL at 10:01

## 2023-01-14 RX ADMIN — AMLODIPINE BESYLATE 10 MG: 10 TABLET ORAL at 10:01

## 2023-01-14 RX ADMIN — METHOCARBAMOL 500 MG: 500 TABLET ORAL at 01:01

## 2023-01-14 RX ADMIN — MEMANTINE HYDROCHLORIDE 5 MG: 5 TABLET ORAL at 09:01

## 2023-01-14 RX ADMIN — METHOCARBAMOL 500 MG: 500 TABLET ORAL at 05:01

## 2023-01-14 RX ADMIN — METHOCARBAMOL 500 MG: 500 TABLET ORAL at 10:01

## 2023-01-14 RX ADMIN — LOSARTAN POTASSIUM 50 MG: 50 TABLET, FILM COATED ORAL at 10:01

## 2023-01-14 RX ADMIN — MUPIROCIN 1 G: 20 OINTMENT TOPICAL at 10:01

## 2023-01-14 RX ADMIN — ACETAMINOPHEN 1000 MG: 500 TABLET ORAL at 10:01

## 2023-01-14 RX ADMIN — CELECOXIB 100 MG: 100 CAPSULE ORAL at 10:01

## 2023-01-14 RX ADMIN — APIXABAN 2.5 MG: 2.5 TABLET, FILM COATED ORAL at 09:01

## 2023-01-14 RX ADMIN — ACETAMINOPHEN 1000 MG: 500 TABLET ORAL at 02:01

## 2023-01-14 RX ADMIN — LEVOTHYROXINE SODIUM 125 MCG: 125 TABLET ORAL at 05:01

## 2023-01-14 RX ADMIN — CEFAZOLIN 2 G: 2 INJECTION, POWDER, FOR SOLUTION INTRAMUSCULAR; INTRAVENOUS at 05:01

## 2023-01-14 RX ADMIN — ACETAMINOPHEN 1000 MG: 500 TABLET ORAL at 09:01

## 2023-01-14 RX ADMIN — ACETAMINOPHEN 1000 MG: 500 TABLET ORAL at 04:01

## 2023-01-14 RX ADMIN — METHOCARBAMOL 500 MG: 500 TABLET ORAL at 09:01

## 2023-01-14 RX ADMIN — APIXABAN 2.5 MG: 2.5 TABLET, FILM COATED ORAL at 10:01

## 2023-01-14 NOTE — ASSESSMENT & PLAN NOTE
Lynn Mckinney is a 87 y.o. female with a left femoral neck fracture s/p left hemiarthroplasty 01/13/23    Plan:  - Admitted to Hospital Medicine Hip Fracture service for medical management  - DVT PPx: eliquis 2.5mg BID  - Abx: complete 24hr course of ancef  - Labs: Hemoglobin 11.8, platelets 117 yesterday, f/u labs this morning  - Needs PT/OT eval today, recs appreciated

## 2023-01-14 NOTE — PLAN OF CARE
Problem: Occupational Therapy  Goal: Occupational Therapy Goal  Outcome: Ongoing, Progressing     Problem: Occupational Therapy  Goal: Occupational Therapy Goal  Description: Goals to be met by: 1/28/2023    Patient will increase functional independence with ADLs by performing:    Feeding with [place].  UE Dressing with Supervision.  LE Dressing with Supervision.  Grooming while standing at sink with Stand-by Assistance.  Toileting from toilet with Minimal Assistance for hygiene and clothing management.   Rolling to Right with Stand-by Assistance.   Supine to sit with Stand-by Assistance.  Stand pivot transfers with Stand-by Assistance.  Step transfer with Stand-by Assistance    Outcome: Ongoing, Progressing

## 2023-01-14 NOTE — PLAN OF CARE
Problem: Physical Therapy  Goal: Physical Therapy Goal  Description: Goals to met by 1/28/2023    1. Supine to sit with Modified [place]  2. Sit to supine with Modified [place]  3. Rolling to Left and Right with Modified [place].  4. Sit to stand transfer with Supervision  5. Bed to chair transfer with Stand-by Assistance using LRAD  6. Gait  x 50 feet with Stand-by Assistance using LRAD   7. Lower extremity exercise program x15 reps per Instruction, with assistance as needed in order to facilitate improved strength, improved postural control, and improvement in functional independence    PT Eval: 1/14/2023

## 2023-01-14 NOTE — SUBJECTIVE & OBJECTIVE
"Principal Problem:Closed displaced fracture of left femoral neck    Principal Orthopedic Problem: s/p left hemiarthroplasty 01/13/23    Interval History:   Overnight events: LETICIA, examined at bedisde this morning. Ropivacaine pump in place, SCDs functioning. Pt states her pain is well controlled.     Vitals: Hypertensive overnight range 11//70, otherwise VS WNL    PT/OT update: Needs to be evaluated by PT/OT today, recs appreciated     Need to know:   - Ancef 24 hours  - WBAT LLE with walker      Review of patient's allergies indicates:   Allergen Reactions    Lipitor [atorvastatin] Itching    Fosamax [alendronate]      Felt "strange"    Iodinated contrast media      Dye is only to be used if absolutely needed because of kidney function    Prolia [denosumab]      Reaction after the Prolia    Sulfa (sulfonamide antibiotics)        Current Facility-Administered Medications   Medication    acetaminophen tablet 1,000 mg    amLODIPine tablet 10 mg    apixaban tablet 2.5 mg    bisacodyL suppository 10 mg    celecoxib capsule 100 mg    hydrOXYzine HCL tablet 25 mg    labetaloL tablet 200 mg    levothyroxine tablet 125 mcg    losartan tablet 50 mg    melatonin tablet 6 mg    memantine tablet 5 mg    methocarbamoL tablet 500 mg    methocarbamoL tablet 500 mg    mupirocin 2 % ointment 1 g    ondansetron injection 4 mg    polyethylene glycol packet 17 g    ROPIvacaine (PF) 2 mg/ml (0.2%) solution    ROPIvacaine (PF) 2 mg/ml (0.2%) solution    senna-docusate 8.6-50 mg per tablet 1 tablet    sodium chloride 0.9% flush 10 mL    sodium chloride 0.9% flush 10 mL    sodium chloride 0.9% flush 10 mL     Objective:     Vital Signs (Most Recent):  Temp: 98.1 °F (36.7 °C) (01/14/23 0447)  Pulse: 60 (01/14/23 0650)  Resp: 18 (01/14/23 0447)  BP: (!) 159/70 (01/14/23 0447)  SpO2: 95 % (01/14/23 0447)   Vital Signs (24h Range):  Temp:  [97.2 °F (36.2 °C)-98.1 °F (36.7 °C)] 98.1 °F (36.7 °C)  Pulse:  [59-68] 60  Resp:  [11-28] " "18  SpO2:  [78 %-100 %] 95 %  BP: (119-186)/(57-86) 159/70     Weight: 63.5 kg (140 lb)  Height: 5' 4" (162.6 cm)  Body mass index is 24.03 kg/m².      Intake/Output Summary (Last 24 hours) at 1/14/2023 0765  Last data filed at 1/14/2023 0608  Gross per 24 hour   Intake 1650 ml   Output 1700 ml   Net -50 ml         Ortho/SPM Exam  Gen: NAD, sitting comfortably in bed  CV: regular rate  Resp: non-labored respirations    LLE:  Dressing over lateral hip and lateral thigh CDI, without erythema, swelling, or bruising  No TTP   SILT Sa/Arcos/DP/SP/T  Motor intact EHL/FHL/TA/Gastroc/Quad  2+ DP, 2+ PT    Significant Labs: All pertinent labs within the past 24 hours have been reviewed.    Significant Imaging: I have reviewed and interpreted all pertinent imaging results/findings.  "

## 2023-01-14 NOTE — PT/OT/SLP EVAL
Occupational Therapy   Evaluation/Treatment with PT    Name: Lynn Mckinney  MRN: [id number]  Admitting Diagnosis: Closed displaced fracture of left femoral neck  Recent Surgery: Procedure(s) (LRB):  HEMIARTHROPLASTY, HIP, LEFT (Left) 1 Day Post-Op    Recommendations:     Discharge Recommendations: nursing facility, skilled  Discharge Equipment Recommendations:  bedside commode, walker, rolling, bath bench  Barriers to discharge:   (decreased mobility and self-care skills)    Assessment:     Lynn Mckinney is a 87 y.o. female with a medical diagnosis of Closed displaced fracture of left femoral neck.  She presents with limitation in functional mobility 2' to orthopedic precautions on LLE . Pt A&Ox3, however demonstrated moments of forgetfulness and required cuing to adhere to L LE posterior hip precautions. Pt tolerated the session well with no c/o pain or dizziness. Performance deficits affecting function: weakness, impaired endurance, impaired self care skills, impaired functional mobility, gait instability, impaired balance, impaired cognition, decreased coordination, decreased safety awareness, orthopedic precautions. Pt would benefit from skilled OT services in order to maximize independence with ADLs and facilitate safe discharge. Pt would benefit from SNF upon discharge to return to OF and decrease burden of care.     Rehab Prognosis: Good; patient would benefit from acute skilled OT services to address these deficits and reach maximum level of function.       Plan:     Patient to be seen daily to address the above listed problems via self-care/home management, therapeutic activities, therapeutic exercises  Plan of Care Expires: 02/13/23  Plan of Care Reviewed with: patient, family    Subjective     Chief Complaint: difficulty moving  Patient/Family Comments/goals: safe discharge to SNF    Occupational Profile:  Living Environment: Pt lives alone in a [facility] with 1 ALVA. A walk-in shower and bed-side commode is  available as well as cane and rolling walker for ambulation assist. Pt's family at bedside reports she will return to a memory care unit following post acute rehab  Previous level of function: independent with ADLs and mod (I) with mobility using canes  Roles and Routines: retired  Equipment Used at Home: cane, straight, cane, quad, bedside commode, rollator, walker, rolling  Assistance upon Discharge: Upon discharge, pt will have assist from memory care unit staff and family. However, unclear of amount of assistance that can be provided.     Pain/Comfort:  Pain Rating 1: 0/10    Patients cultural, spiritual, Sikh conflicts given the current situation: no    Objective:     Communicated with: PT, RN prior to session.  Patient found HOB elevated with perineural catheter, telemetry, PureWick, FCD upon OT entry to room.    General Precautions: Standard, fall  Orthopedic Precautions: LLE weight bearing as tolerated  Braces: Knee immobilizer  Respiratory Status: Room air    Occupational Performance:    Bed Mobility:    Patient completed Rolling/Turning to Right with moderate assistance  Patient completed Scooting/Bridging with moderate assistance  Patient completed Supine to Sit with moderate assistance    Functional Mobility/Transfers:  Patient completed Sit <> Stand Transfer with moderate assistance  with  hand-held assist   Patient completed Bed <> Chair Transfer using Stand Pivot technique with moderate assistance with RW  Functional Mobility: Pt ambulated a few steps with Mod A with RW in order to maximize functional activity tolerance and standing balance required for engagement in occupations of choice.      Activities of Daily Living:  Feeding:  set-up assistance to eat lunch seated the bedside chair  Grooming: supervision to wash face with wet towel sitting on the bedside chair  Dressing: moderate A to don posterior hospital gown      Cognitive/Visual Perceptual:  Cognitive/Psychosocial Skills:     -        Oriented to: Person, Place, and Situation   -       Follows Commands/attention:Follows one-step commands  -       Communication: clear/fluent  -       Memory: Poor immediate recall, mild memory impairment, difficulty remembering year  -       Safety awareness/insight to disability: impaired   -       Mood/Affect/Coping skills/emotional control: Pleasant, a little confused     Physical Exam:  Balance:   Sitting EOB: SBA  Standing (static and dynamic): mod A  Postural examination/scapula alignment:    -       No postural abnormalities identified  Skin integrity: Visible skin intact  Edema:  None noted  Sensation:    -       Intact  Motor Planning: WFL  Upper Extremity Range of Motion:     -       Right Upper Extremity: WFL  -       Left Upper Extremity: WFL  Upper Extremity Strength:    -       Right Upper Extremity: WFL  -       Left Upper Extremity: WFL   Strength:    -       Right Upper Extremity: WFL  -       Left Upper Extremity: WFL  Fine Motor Coordination:    -       Intact    AMPAC 6 Click ADL:  AMPAC Total Score: 18    Treatment & Education:  -Therapist provided facilitation and instruction of proper body mechanics, energy conservation, and fall prevention strategies during tasks listed above.  -Pt educated on role of OT, POC and goals for therapy  -Pt on WB status and posterior hip precautions. Pt required cuing to adhere to precautions. Will require further education.   -Pt educated on importance of OOB activities with staff member assistance and sitting OOB majority of the day.   -Pt verbalized understanding. Pt expressed no further concerns/questions  -Whiteboard updated   - Co-tx with PT performed due to need for education and assistance from two skilled therapy disciplines at pt's current functional level.      Patient left up in chair with all lines intact, call button in reach, RN notified, and PCT, family members present    GOALS:   Multidisciplinary Problems       Occupational Therapy Goals           Problem: Occupational Therapy    Goal Priority Disciplines Outcome Interventions   Occupational Therapy Goal     OT, PT/OT Ongoing, Progressing    Description: Goals to be met by: 1/28/2023    Patient will increase functional independence with ADLs by performing:    Feeding with [employer].  UE Dressing with Supervision.  LE Dressing with Supervision.  Grooming while standing at sink with Stand-by Assistance.  Toileting from toilet with Minimal Assistance for hygiene and clothing management.   Rolling to Right with Stand-by Assistance.   Supine to sit with Stand-by Assistance.  Stand pivot transfers with Stand-by Assistance.  Step transfer with Stand-by Assistance                         History:     Past Medical History:   Diagnosis Date    Allergy     seasonal    Blood clotting tendency     Carotid artery disease 5/29/2014    Coronary artery disease     Heart block     Hyperlipidemia     Hypertension     Hypothyroid     Left displaced femoral neck fracture 1/12/2023    Obesity     Pacemaker     PAD (peripheral artery disease)     Spinal stenosis     Thoracic or lumbosacral neuritis or radiculitis 11/25/2014    Tubular adenoma          Past Surgical History:   Procedure Laterality Date    ANGIOPLASTY      APPENDECTOMY      BREAST CYST ASPIRATION Bilateral     CARDIAC CATHETERIZATION      CARDIAC PACEMAKER PLACEMENT  10/10/13    by Dr. Coleman    CAROTID STENT Left 2007    by Dr. Bowen    CATARACT EXTRACTION W/  INTRAOCULAR LENS IMPLANT Bilateral 2012    Dr Sharp    CORONARY ARTERY BYPASS GRAFT  2001     x 3 LIMA-Ramus, SVG-OM1 & SVG-PDA    CORONARY BYPASS GRAFT ANGIOGRAPHY  3/11/2021    Procedure: Bypass graft study;  Surgeon: Gil Garcia MD;  Location: [facility] CATH LAB;  Service: Cardiology;;    CYST REMOVAL      wrist    EYE SURGERY      HYSTERECTOMY      LEFT HEART CATHETERIZATION Left 3/11/2021    Procedure: Left heart cath;  Surgeon: Gil Garcia MD;  Location: [facility] CATH LAB;  Service:  Cardiology;  Laterality: Left;    OOPHORECTOMY      THYROIDECTOMY      VITRECTOMY BY PARS PLANA APPROACH Right 10/12/2021    Procedure: VITRECTOMY, PARS PLANA APPROACH;  Surgeon: Flako Carver MD;  Location: [address];  Service: Ophthalmology;  Laterality: Right;       Time Tracking:     OT Date of Treatment: 01/14/23  OT Start Time: 1140  OT Stop Time: 1205  OT Total Time (min): 25 min    Billable Minutes:Evaluation 15  Self Care/Home Management 10    1/14/2023

## 2023-01-14 NOTE — ASSESSMENT & PLAN NOTE
Age-related osteoporosis with current pathological fracture with routine healing  · Patient underwent left hip hemiarthroplasty on 1/13/2023 for femoral neck fracture.   · Patient reports minimal pain in left hip.   · Plan is to start PT/OT for gait training and strengthening and restoration of ADL's. Patient is FWB/WBAT: left lower extremity, posterior hip precautions as per Orthopedic recommendations.   · Plan is to start Apixiban 2.5 mg po BID and will need a total of 35 days post-op after hip fracture surgery and JOSSUE/SCDs for DVT prophylaxis.   · Orthopedics is following and managing hip fracture and surgical site.   · Perineural pain catheter in place with continuous Ropivacaine infusion for pain control and being managed by Anesthesia Pain Service.   · Patient on multimodal pain management post-op with Tylenol 1000 mg po every 6 hours, Robaxin 500 mg po 4 times daily and Celebrex 100 mg po daily post-op and will continue.  · Discontinue Duong to gravity.

## 2023-01-14 NOTE — PLAN OF CARE
Problem: Infection  Goal: Absence of Infection Signs and Symptoms  Outcome: Ongoing, Progressing     Problem: Adjustment to Injury (Hip Fracture Medical Management)  Goal: Optimal Coping with Change in Health Status  Outcome: Ongoing, Progressing     Problem: Bleeding (Hip Fracture Medical Management)  Goal: Absence of Bleeding  Outcome: Ongoing, Progressing     Problem: Bowel Elimination Impaired (Hip Fracture Medical Management)  Goal: Effective Bowel Elimination  Outcome: Ongoing, Progressing     Problem: Cognitive Decline Risk (Hip Fracture Medical Management)  Goal: Baseline Cognitive Function Maintained  Outcome: Ongoing, Progressing     Problem: Embolism (Hip Fracture Medical Management)  Goal: Absence of Embolism  Outcome: Ongoing, Progressing     Problem: Fracture Stabilization and Management (Hip Fracture Medical Management)  Goal: Fracture Stability  Outcome: Ongoing, Progressing     Problem: Functional Ability Impaired (Hip Fracture Medical Management)  Goal: Optimal Functional Performance  Outcome: Ongoing, Progressing     Problem: Pain (Hip Fracture Medical Management)  Goal: Acceptable Pain Level  Outcome: Ongoing, Progressing     Problem: Urinary Elimination Impaired (Hip Fracture Medical Management)  Goal: Effective Urinary Elimination  Outcome: Ongoing, Progressing     Problem: Bleeding (Surgery Nonspecified)  Goal: Absence of Bleeding  Outcome: Ongoing, Progressing     Problem: Bowel Motility Impaired (Surgery Nonspecified)  Goal: Effective Bowel Elimination  Outcome: Ongoing, Progressing     Problem: Fluid and Electrolyte Imbalance (Surgery Nonspecified)  Goal: Fluid and Electrolyte Balance  Outcome: Ongoing, Progressing     Problem: Glycemic Control Impaired (Surgery Nonspecified)  Goal: Blood Glucose Level Within Targeted Range  Outcome: Ongoing, Progressing     Problem: Infection (Surgery Nonspecified)  Goal: Absence of Infection Signs and Symptoms  Outcome: Ongoing, Progressing     Problem:  Ongoing Anesthesia Effects (Surgery Nonspecified)  Goal: Anesthesia/Sedation Recovery  Outcome: Ongoing, Progressing     Problem: Pain (Surgery Nonspecified)  Goal: Acceptable Pain Control  Outcome: Ongoing, Progressing     Problem: Postoperative Nausea and Vomiting (Surgery Nonspecified)  Goal: Nausea and Vomiting Relief  Outcome: Ongoing, Progressing     Problem: Postoperative Urinary Retention (Surgery Nonspecified)  Goal: Effective Urinary Elimination  Outcome: Ongoing, Progressing     Problem: Respiratory Compromise (Surgery Nonspecified)  Goal: Effective Oxygenation and Ventilation  Outcome: Ongoing, Progressing     Problem: Device-Related Complication Risk (Anesthesia/Analgesia, Neuraxial)  Goal: Safe Infusion Delivery Completion  Outcome: Ongoing, Progressing     Problem: Infection (Anesthesia/Analgesia, Neuraxial)  Goal: Absence of Infection Signs and Symptoms  Outcome: Ongoing, Progressing     Problem: Nausea and Vomiting (Anesthesia/Analgesia, Neuraxial)  Goal: Nausea and Vomiting Relief  Outcome: Ongoing, Progressing     Problem: Pain (Anesthesia/Analgesia, Neuraxial)  Goal: Effective Pain Control  Outcome: Ongoing, Progressing     Problem: Respiratory Compromise (Anesthesia/Analgesia, Neuraxial)  Goal: Effective Oxygenation and Ventilation  Outcome: Ongoing, Progressing     Problem: Sensorimotor Impairment (Anesthesia/Analgesia, Neuraxial)  Goal: Baseline Motor Function  Outcome: Ongoing, Progressing     Problem: Urinary Retention (Anesthesia/Analgesia, Neuraxial)  Goal: Effective Urinary Elimination  Outcome: Ongoing, Progressing     Problem: Fall Injury Risk  Goal: Absence of Fall and Fall-Related Injury  Outcome: Ongoing, Progressing     Problem: Adult Inpatient Plan of Care  Goal: Plan of Care Review  Outcome: Ongoing, Progressing  Goal: Patient-Specific Goal (Individualized)  Outcome: Ongoing, Progressing  Goal: Absence of Hospital-Acquired Illness or Injury  Outcome: Ongoing, Progressing  Goal:  Optimal Comfort and Wellbeing  Outcome: Ongoing, Progressing  Goal: Readiness for Transition of Care  Outcome: Ongoing, Progressing     Problem: Skin Injury Risk Increased  Goal: Skin Health and Integrity  Outcome: Ongoing, Progressing     Problem: Pain Acute  Goal: Acceptable Pain Control and Functional Ability  Outcome: Ongoing, Progressing

## 2023-01-14 NOTE — PROGRESS NOTES
[facility] Medicine  Progress Note    Patient Name: Lynn Mckinney  MRN: [id number]  Patient Class: IP- Inpatient   Admission Date: 1/12/2023  Length of Stay: 2 days  Attending Physician: Pauline Wiley MD  Primary Care Provider: Brie Fagan MD        Subjective:     Principal Problem:Closed displaced fracture of left femoral neck        HPI:  This is an 88yo female with a past medical history of CAD sp CABG, PAD, combined systolic diastolic heart failure, heart block sp pacemaker, HTN, HLD, and hypothyroidism who presents to the ED with chief complaint of fall and left leg pain. Patient reports feeling at her baseline health and going out to eat with friends this afternoon. She came back and took a nap on her chair. States that she woke up from napping and got up to move around. States that she was in just socks on her hardwood floor and one of her feet slipped from under her and she fell on her side. Was not able to stand or ambulate after fall and with severe left hip pain. Brought to the ED by EMS.    In the ED patient afebrile and hemodynamically stable saturating well on room air. Xray with acute Lt femoral neck fracture. Orthopedic surgery consulted and recommended admission to medicine with plan for OR in am.        Overview/Hospital Course:  Patient admitted to [facility] Medicine Team H: Hip Fracture team and started on Hip Fracture Pathway with Orthopedic surgery consult for hip fracture. Patient was seen and evaluated by Orthopedic surgery who recommended operative repair of hip fracture. Patient was medically optimized prior to surgery and to be taken to OR after optimization on 1/13/2023.         Interval History: Patient underwent left hip hemiarthroplasty in OR yesterday by Ortho. Patient in very good spirits this am and her nieces and nephew I room this am. Patient joking with her family when I entered. Patient reports minimal pain to left hip and states leg feels much  better and now able to move unlike yesterday when could not move it at all. Patient to work with PT and OT today. Patient has PNC in place for pain management and started on oral Apixiban for DVT prophylaxis.     Review of Systems   Constitutional:  Negative for fever.   Respiratory:  Negative for cough and shortness of breath.    Cardiovascular:  Negative for leg swelling.   Gastrointestinal:  Negative for abdominal pain, nausea and vomiting.   Musculoskeletal:  Positive for arthralgias (Left hip).   Neurological:  Negative for dizziness.   Psychiatric/Behavioral:  Negative for agitation and confusion.    Objective:     Vital Signs (Most Recent):  Temp: 97.9 °F (36.6 °C) (01/14/23 1212)  Pulse: 66 (01/14/23 1428)  Resp: 18 (01/14/23 1212)  BP: 137/63 (01/14/23 1212)  SpO2: 97 % (01/14/23 1212) on room air Vital Signs (24h Range):  Temp:  [97.7 °F (36.5 °C)-98.1 °F (36.7 °C)] 97.9 °F (36.6 °C)  Pulse:  [59-78] 66  Resp:  [11-28] 18  SpO2:  [78 %-100 %] 97 %  BP: (119-166)/(58-86) 137/63     Weight: 63.5 kg (140 lb)  Body mass index is 24.03 kg/m².    Intake/Output Summary (Last 24 hours) at 1/14/2023 1453  Last data filed at 1/14/2023 0608  Gross per 24 hour   Intake 600 ml   Output 700 ml   Net -100 ml      Physical Exam  Vitals and nursing note reviewed.   Constitutional:       General: She is awake. She is not in acute distress.     Appearance: Normal appearance. She is normal weight. She is not ill-appearing.   Cardiovascular:      Rate and Rhythm: Normal rate and regular rhythm.      Heart sounds: Normal heart sounds. No murmur heard.    No friction rub. No gallop.   Pulmonary:      Effort: Pulmonary effort is normal. No respiratory distress.      Breath sounds: Normal breath sounds. No wheezing.   Abdominal:      General: Abdomen is flat. Bowel sounds are normal. There is no distension.      Palpations: Abdomen is soft.      Tenderness: There is no abdominal tenderness.   Musculoskeletal:      Right lower leg:  No edema.      Left lower leg: No edema.   Skin:     General: Skin is warm.      Findings: No erythema.   Neurological:      Mental Status: She is alert and oriented to person, place, and time.   Psychiatric:         Mood and Affect: Mood normal.         Behavior: Behavior normal. Behavior is cooperative.         Thought Content: Thought content normal.         Judgment: Judgment normal.       Significant Labs: CBC:   Recent Labs   Lab 01/13/23  0218 01/13/23  1515 01/14/23  0654   WBC 11.08 10.51 10.12   HGB 11.8* 11.9* 10.9*   HCT 36.3* 35.8* 33.6*   * 120* 102*     CMP:   Recent Labs   Lab 01/12/23  2114 01/13/23  0218 01/14/23  0654   *  134* 134* 135*   K 3.7  3.7 3.9 4.0     101 104 105   CO2 24  24 22* 23     110 118* 95   BUN 23  23 23 23   CREATININE 1.1  1.1 1.0 1.0   CALCIUM 9.0  9.0 8.3* 8.0*   PROT 6.7  --   --    ALBUMIN 3.7  --   --    BILITOT 0.8  --   --    ALKPHOS 58  --   --    AST 25  --   --    ALT 15  --   --    ANIONGAP 9  9 8 7*       Significant Imaging: I have reviewed all pertinent imaging results/findings within the past 24 hours.      Assessment/Plan:      * Closed displaced fracture of left femoral neck sp Left hip hemiarthroplasty 1.13.23  Age-related osteoporosis with current pathological fracture with routine healing  · Patient underwent left hip hemiarthroplasty on 1/13/2023 for femoral neck fracture.   · Patient reports minimal pain in left hip.   · Plan is to start PT/OT for gait training and strengthening and restoration of ADL's. Patient is FWB/WBAT: left lower extremity, posterior hip precautions as per Orthopedic recommendations.   · Plan is to start Apixiban 2.5 mg po BID and will need a total of 35 days post-op after hip fracture surgery and JOSSUE/SCDs for DVT prophylaxis.   · Orthopedics is following and managing hip fracture and surgical site.   · Perineural pain catheter in place with continuous Ropivacaine infusion for pain control and  being managed by Anesthesia Pain Service.   · Patient on multimodal pain management post-op with Tylenol 1000 mg po every 6 hours, Robaxin 500 mg po 4 times daily and Celebrex 100 mg po daily post-op and will continue.  · Discontinue Duong to gravity.     Chronic combined systolic and diastolic heart failure  Patient is identified as having Combined Systolic and Diastolic heart failure that is Chronic. CHF is currently controlled. Latest ECHO performed and demonstrates- Results for orders placed during the hospital encounter of 12/05/22    Echo    Interpretation Summary  · The estimated ejection fraction is 35%.  · The quantitatively derived ejection fraction is 35%.  · The left ventricle is normal in size with moderately decreased systolic function.  · There is abnormal septal wall motion consistent with right ventricular pacemaker.  · There is moderate left ventricular global hypokinesis.  · Grade I left ventricular diastolic dysfunction.  · Normal right ventricular size with normal right ventricular systolic function.  · Severe left atrial enlargement.  · Mild-to-moderate aortic regurgitation.  · The estimated PA systolic pressure is 27 mmHg.  · Normal central venous pressure (3 mmHg).  · Trivial circumferential pericardial effusion.  Continue home Labetalol to treat and continue Losartan (substitute for home Irbesartan not on formulary) to treat and monitor clinical status closely. Monitor on telemetry. Patient is off CHF pathway.  Monitor strict Is&Os and daily weights.  Place on fluid restriction of 1.5 L. Continue to stress to patient importance of self efficacy and  on diet for CHF.     CHB (complete heart block)  Patient with recent BiVID pacer placed by Cardiology on 2/2022. Controlled.       Coronary artery disease involving native coronary artery of native heart without angina pectoris  · Prior CABG in 2001 with  of mid LAD.   · Chronic and controlled. Patient asymptomatic.   · Continue home  Labetalol to treat. Hold Aspirin for surgery and restart post-op. Patient on Crestor at home but not on formulary here in hospital and allergic to alternative statins so will hold statin in hospital.       Primary hypertension  · Chronic and controlled. Continue home Amlodipine, ARB (Irbesartan to Losartan for formulary), and home Labetalol to treat.   · Target BP < 150/90.       Stage 3a chronic kidney disease  - Chronic and controlled. Creatinine 1.1 on presentation and at baseline.  - Avoid nephrotoxic agents as appropriate.  - Renally dose meds for her GFR.   - Continue to monitor renal function with daily BMP in hospital.     Memory difficulties  Very mild. Continue home Namenda to treat.       Pure hypercholesterolemia  - Chronic and controlled. Patient allergic to formularly alternatives to her home Crestor.   - Resume home Crestor at discharge.      Acquired hypothyroidism  Chronic and controlled. Continue home Levothyroxine to treat.         VTE Risk Mitigation (From admission, onward)         Ordered     apixaban tablet 2.5 mg  2 times daily         01/13/23 1450     IP VTE HIGH RISK PATIENT  Once         01/13/23 0035     Place sequential compression device  Until discontinued         01/13/23 0035     Place sequential compression device  Until discontinued         01/12/23 2224     Place sequential compression device  Until discontinued         01/12/23 2210                Discharge Planning   AIDEE: 1/17/2023     Code Status: Full Code   Is the patient medically ready for discharge?: No    Reason for patient still in hospital (select all that apply): Patient trending condition             Pauline Wiley MD  Department of Hospital Medicine   [facility] - Surgery

## 2023-01-14 NOTE — PT/OT/SLP EVAL
Physical Therapy Co-Evaluation and Co-Treatment  Co-evaluation with OT for maximal pt participation, safety, and activity tolerance    Patient Name:  Lynn Mckinney   MRN:  [id number]  Admit Date: 1/12/2023  Admitting Diagnosis:  Closed displaced fracture of left femoral neck   Length of Stay: 2 days  Recent Surgery: Procedure(s) (LRB):  HEMIARTHROPLASTY, HIP, LEFT (Left) 1 Day Post-Op    Recommendations:     Discharge Recommendations:  nursing facility, skilled   Discharge Equipment Recommendations: walker, rolling, bedside commode, bath bench   Barriers to discharge: Decreased Caregiver support and Evolving Clinical Presentation    Assessment:     Lynn Mckinney is a 87 y.o. female admitted with a medical diagnosis of Closed displaced fracture of left femoral neck.  She presents with the following impairments/functional limitations: weakness, impaired functional mobility, impaired cognition, decreased safety awareness, impaired endurance, orthopedic precautions, decreased lower extremity function, impaired balance.     Pt agreeable to therapy, insistent on wearing more clothes before she attempts therapy. Pt pleasantly confused, very cheerful, cooperative. She sits EOB from supine with mod A, additional time taken to explain log roll technique, pt stands from EOB with mod A and HHA, takes small steps to turn and back up to bedside chair with HHA and mod A. Continue to maximize mobility to facilitate return to PLOF    Rehab Prognosis: Good; patient would benefit from acute skilled PT services to address these deficits and reach maximum level of function.      Treatment Tolerated: Well    Highest level of mobility achieved this visit: ambulates few steps to bedside chair    Activity with RN/PCT: transfers with 1 person assist    Plan:     During this hospitalization, patient to be seen daily to address the identified rehab impairments via gait training, therapeutic activities, therapeutic exercises, neuromuscular  "re-education and progress towards the established goals.    Plan of Care Expires:  02/13/23    Subjective     RN Tram notified prior to session. Pt's family present upon PT entrance into room.    Chief Complaint: not enough clothes  Patient/Family Comments/goals: "I stopped counting what year it is a long time ago"  Pain/Comfort:  Pain Rating 1: 0/10    Social History:  Residence: lives alone 1-story house with threshold [facility]. (Family reports pt may move to memory care after SNF)  Support available: family  Equipment Used: cane, straight  Equipment Owned (not using): Cane, Type: Narrow Base Quad Cane, Walkers, Type: Rolling Walker, and Hygiene Aides, Type: Commode  Prior level of function: independent  Work: Retired.       Objective:     Additional staff present: JO ANN Patino and JIMMY Chavarria    Patient found HOB elevated with: peripheral IV, perineural catheter, PureWick, FCD     General Precautions: Standard, Cardiac fall   Orthopedic Precautions:LLE weight bearing as tolerated, LLE posterior precautions   Braces: Knee immobilizer   Body mass index is 24.03 kg/m².  Oxygen Device: Nasal Cannula 2L      Vitals: /63   Pulse (!) 59   Temp 98.5 °F (36.9 °C)   Resp 18   Ht 5' 4" (1.626 m)   Wt 63.5 kg (140 lb)   SpO2 96%   Breastfeeding No   BMI 24.03 kg/m²     Exams:  Cognition:   Alert, Cooperative, and Tangential  Command following: Follows one-step verbal commands  Fluency: clear/fluent  Hearing: Impaired: mildly [place]  Vision:  Intact  Skin Integrity: Visible skin intact    Physical Exam:   Edema - None noted  ROM - RLE WFL, LLE hip flexion tested to 90, ankle WFL  Strength - RLE grossly 4/5, LLE ankle WFL, otherwise untested   Sensation - Intact to light touch  Coordination - No deficits noted    Outcome Measures:    AM-PAC 6 CLICK MOBILITY  Turning over in bed (including adjusting bedclothes, sheets and blankets)?: 2  Sitting down on and standing up from a chair with arms (e.g., wheelchair, bedside commode, " etc.): 2  Moving from lying on back to sitting on the side of the bed?: 2  Moving to and from a bed to a chair (including a wheelchair)?: 2  Need to walk in hospital room?: 2  Climbing 3-5 steps with a railing?: 1  Basic Mobility Total Score: 11     Functional Mobility:    Bed Mobility:   Supine to Sit: moderate assistance; from R side of bed  Scooting anteriorly to EOB to have both feet planted on floor: moderate assistance  Pt left in bedside chair    Sitting Balance at Edge of Bed:  Assistance Level Required: Stand-by Assistance to Contact Guard Assistance  Time: 15 min  Postural deviations noted: mild posterior lean  Encouraged: upright sitting    Transfers:   Sit <> Stand Transfer: moderate assistance with hand-held assist  Stand <> Sit Transfer: moderate assistance with hand-held assist  [id number] from EOB  Bed <> Chair Transfer: Step Transfer technique with moderate assistance with hand-held assist  Chair on patient's R    Standing Balance:  Assistance Level Required: Moderate Assistance  Patient used: hand-held assist  Time: 1 min  Postural deviations noted: no deviations noted      Gait:   Patient ambulated: 4-5 steps forward then backward to bedside chair    Patient required: moderate assist  Patient used:  hand-held assist  Gait Pattern observed: swing-to gait  Gait Deviation(s): decreased step length, decreased weight shift, decreased sarahy, and increased time in stance phase on unaffected leg  Impairments due to: decreased endurance and medical condition  Gait belt utilized    Education:  Time provided for education, counseling and discussion of health disposition in regards to patient's current status  All questions answered within PT scope of practice and to patient's satisfaction  PT role in POC to address current functional deficits  Pt educated on proper body mechanics, safety techniques, and energy conservation with PT facilitation and cueing throughout session  Posterior precautions and how to  maintain them with mobility    Patient left up in chair with all lines intact, call button in reach, RN Tram notified, and family present.    GOALS:   Multidisciplinary Problems       Physical Therapy Goals          Problem: Physical Therapy    Goal Priority Disciplines Outcome Goal Variances Interventions   Physical Therapy Goal     PT, PT/OT Ongoing, Progressing     Description: Goals to met by 1/28/2023    1. Supine to sit with Modified [place]  2. Sit to supine with Modified [place]  3. Rolling to Left and Right with Modified [place].  4. Sit to stand transfer with Supervision  5. Bed to chair transfer with Stand-by Assistance using LRAD  6. Gait  x 50 feet with Stand-by Assistance using LRAD   7. Lower extremity exercise program x15 reps per Instruction, with assistance as needed in order to facilitate improved strength, improved postural control, and improvement in functional independence                         History:     Past Medical History:   Diagnosis Date    Allergy     seasonal    Blood clotting tendency     Carotid artery disease 5/29/2014    Coronary artery disease     Heart block     Hyperlipidemia     Hypertension     Hypothyroid     Left displaced femoral neck fracture 1/12/2023    Obesity     Pacemaker     PAD (peripheral artery disease)     Spinal stenosis     Thoracic or lumbosacral neuritis or radiculitis 11/25/2014    Tubular adenoma        Past Surgical History:   Procedure Laterality Date    ANGIOPLASTY      APPENDECTOMY      BREAST CYST ASPIRATION Bilateral     CARDIAC CATHETERIZATION      CARDIAC PACEMAKER PLACEMENT  10/10/13    by Dr. Coleman    CAROTID STENT Left 2007    by Dr. Bowen    CATARACT EXTRACTION W/  INTRAOCULAR LENS IMPLANT Bilateral 2012    Dr Sharp    CORONARY ARTERY BYPASS GRAFT  2001     x 3 LIMA-[place], SVG-OM1 & SVG-PDA    CORONARY BYPASS GRAFT ANGIOGRAPHY  3/11/2021    Procedure: Bypass graft study;  Surgeon: Gil Garcia MD;  Location: [facility] CATH  LAB;  Service: Cardiology;;    CYST REMOVAL      wrist    EYE SURGERY      HYSTERECTOMY      LEFT HEART CATHETERIZATION Left 3/11/2021    Procedure: Left heart cath;  Surgeon: Gil Garcia MD;  Location: [facility] CATH LAB;  Service: Cardiology;  Laterality: Left;    OOPHORECTOMY      THYROIDECTOMY      VITRECTOMY BY PARS PLANA APPROACH Right 10/12/2021    Procedure: VITRECTOMY, PARS PLANA APPROACH;  Surgeon: Flako Carver MD;  Location: [facility] OR [address] [place] [address];  Service: Ophthalmology;  Laterality: Right;       Time Tracking:     PT Received On: 01/14/23  PT Start Time: 1140     PT Stop Time: 1206  PT Total Time (min): 26 min     Billable Minutes: Evaluation 1 procedure and Therapeutic Activity 15 min    Kyle Elias, PT, DPT  1/14/2023

## 2023-01-14 NOTE — PROGRESS NOTES
"[facility] - [facility]  Orthopedics  Progress Note    Patient Name: Lynn Mckinney  MRN: [id number]  Admission Date: 1/12/2023  Hospital Length of Stay: 2 days  Attending Provider: Pauline Wiley MD  Primary Care Provider: Brie Fagan MD  Follow-up For: Procedure(s) (LRB):  HEMIARTHROPLASTY, HIP, LEFT (Left)    Post-Operative Day: 1 Day Post-Op  Subjective:     Principal Problem:Closed displaced fracture of left femoral neck    Principal Orthopedic Problem: s/p left hemiarthroplasty 01/13/23    Interval History:   Over[facility] events[facility] Ropivacaine pump in place, SCDs functioning. Pt states her pain is well controlled.     Vitals: Hypertensive overnight range 11//70, otherwise VS WNL    PT/OT update: Needs to be evaluated by PT/OT today, recs appreciated     Need to know:   - Ancef 24 hours  - WBAT LLE with walker      Review of patient's allergies indicates:   Allergen Reactions    Lipitor [atorvastatin] Itching    Fosamax [alendronate]      Felt "strange"    Iodinated contrast media      Dye is only to be used if absolutely needed because of kidney function    Prolia [denosumab]      Reaction after the Prolia    Sulfa (sulfonamide antibiotics)        Current Facility-Administered Medications   Medication    acetaminophen tablet 1,000 mg    amLODIPine tablet 10 mg    apixaban tablet 2.5 mg    bisacodyL suppository 10 mg    celecoxib capsule 100 mg    hydrOXYzine HCL tablet 25 mg    labetaloL tablet 200 mg    levothyroxine tablet 125 mcg    losartan tablet 50 mg    melatonin tablet 6 mg    memantine tablet 5 mg    methocarbamoL tablet 500 mg    methocarbamoL tablet 500 mg    mupirocin 2 % ointment 1 g    ondansetron injection 4 mg    polyethylene glycol packet 17 g    ROPIvacaine (PF) 2 mg/ml (0.2%) solution    ROPIvacaine (PF) 2 mg/ml (0.2%) solution    senna-docusate 8.6-50 mg per tablet 1 tablet    sodium chloride 0.9% flush 10 mL    sodium " "chloride 0.9% flush 10 mL    sodium chloride 0.9% flush 10 mL     Objective:     Vital Signs (Most Recent):  Temp: 98.1 °F (36.7 °C) (01/14/23 0447)  Pulse: 60 (01/14/23 0650)  Resp: 18 (01/14/23 0447)  BP: (!) 159/70 (01/14/23 0447)  SpO2: 95 % (01/14/23 0447)   Vital Signs (24h Range):  Temp:  [97.2 °F (36.2 °C)-98.1 °F (36.7 °C)] 98.1 °F (36.7 °C)  Pulse:  [59-68] 60  Resp:  [11-28] 18  SpO2:  [78 %-100 %] 95 %  BP: (119-186)/(57-86) 159/70     Weight: 63.5 kg (140 lb)  Height: 5' 4" (162.6 cm)  Body mass index is 24.03 kg/m².      Intake/Output Summary (Last 24 hours) at 1/14/2023 0733  Last data filed at 1/14/2023 0608  Gross per 24 hour   Intake 1650 ml   Output 1700 ml   Net -50 ml         Ortho/SPM Exam  Gen: NAD, sitting comfortably in bed  CV: regular rate  Resp: non-labored respirations    LLE:  Dressing over lateral hip and lateral thigh CDI, without erythema, swelling, or bruising  No TTP   SILT Sa/Arcos/DP/SP/T  Motor intact EHL/FHL/TA/Gastroc/Quad  2+ DP, 2+ PT    Significant Labs: All pertinent labs within the past 24 hours have been reviewed.    Significant Imaging: I have reviewed and interpreted all pertinent imaging results/findings.    Assessment/Plan:     * Closed displaced fracture of left femoral neck sp Left hip hemiarthroplasty 1.13.23  Lynn Mckinney is a 87 y.o. female with a left femoral neck fracture s/p left hemiarthroplasty 01/13/23    Plan:  - Admitted to Hospital Medicine Hip Fracture service for medical management  - DVT PPx: eliquis 2.5mg BID  - Abx: complete 24hr course of ancef  - Labs: Hemoglobin 11.8, platelets 117 yesterday, f/u labs this morning  - Needs PT/OT eval today, recs appreciated          STUART PABLO MD  Orthopedics  [facility] - Surgery  "

## 2023-01-14 NOTE — ANESTHESIA POST-OP PAIN MANAGEMENT
Acute Pain Service Progress Note    Lynn Mckinney is a 87 y.o., female, [id number].    Surgery:  HEMIARTHROPLASTY, HIP, LEFT (Left: Hip)    Post Op Day #: 1    Catheter type: perineural  SIFI    Infusion type: Ropivacaine 0.2%  .1ml/hr basal    Problem List:    Active Hospital Problems    Diagnosis  POA    *Closed displaced fracture of left femoral neck sp Left hip hemiarthroplasty 1.13.23 [S72.002A]  Yes    Age-related osteoporosis with current pathological fracture with routine healing [M80.00XD]  Not Applicable    Chronic combined systolic and diastolic heart failure [I50.42]  Yes    Memory difficulties [R41.3]  Yes    CHB (complete heart block) [I44.2]  Yes    Stage 3a chronic kidney disease [N18.31]  Yes    Acquired hypothyroidism [E03.9]  Yes    Coronary artery disease involving native coronary artery of native heart without angina pectoris [I25.10]  Yes     CAD S/P CABG  2001                                                          mid LAD       Primary hypertension [I10]  Yes    Pure hypercholesterolemia [E78.00]  Yes     reported side effects from atorvastatin.  pain in both hands, both shoulders and her left ankle 3/15 while on 80 mg of atorvastatin   Switched to rosuvastatin 20 in 3/15          Resolved Hospital Problems   No resolved problems to display.       Subjective:     General appearance of alert, oriented, no complaints   Pain with rest: 3    Numbers   Pain with movement: 6    Numbers   Side Effects    1. Pruritis No    2. Nausea No    3. Motor Blockade No, 1=Ability to bend knees and ankles    4. Sedation No, 1=awake and alert    Objective:        Catheter site clean, dry, intact              Vitals   Vitals:    01/14/23 1428   BP:    Pulse: 66   Resp:    Temp:         Labs    Admission on 01/12/2023   Component Date Value Ref Range Status    HIV 1/2 Ag/Ab 01/12/2023 Non-reactive  Non-reactive Final    Hepatitis C Ab 01/12/2023 Non-reactive  Non-reactive Final    WBC 01/12/2023  16.22 (H)  3.90 - 12.70 K/uL Final    RBC 01/12/2023 4.11  4.00 - 5.40 M/uL Final    Hemoglobin 01/12/2023 13.2  12.0 - 16.0 g/dL Final    Hematocrit 01/12/2023 40.2  37.0 - 48.5 % Final    MCV 01/12/2023 98  82 - 98 fL Final    MCH 01/12/2023 32.1 (H)  27.0 - 31.0 pg Final    MCHC 01/12/2023 32.8  32.0 - 36.0 g/dL Final    RDW 01/12/2023 12.2  11.5 - 14.5 % Final    Platelets 01/12/2023 129 (L)  150 - 450 K/uL Final    MPV 01/12/2023 8.8 (L)  9.2 - 12.9 fL Final    Immature Granulocytes 01/12/2023 0.6 (H)  0.0 - 0.5 % Final    Gran # (ANC) 01/12/2023 14.6 (H)  1.8 - 7.7 K/uL Final    Immature Grans (Abs) 01/12/2023 0.10 (H)  0.00 - 0.04 K/uL Final    Lymph # 01/12/2023 0.5 (L)  1.0 - 4.8 K/uL Final    Mono # 01/12/2023 0.9  0.3 - 1.0 K/uL Final    Eos # 01/12/2023 0.1  0.0 - 0.5 K/uL Final    Baso # 01/12/2023 0.03  0.00 - 0.20 K/uL Final    nRBC 01/12/2023 0  0 /100 WBC Final    Gran % 01/12/2023 89.8 (H)  38.0 - 73.0 % Final    Lymph % 01/12/2023 3.3 (L)  18.0 - 48.0 % Final    Mono % 01/12/2023 5.7  4.0 - 15.0 % Final    Eosinophil % 01/12/2023 0.4  0.0 - 8.0 % Final    Basophil % 01/12/2023 0.2  0.0 - 1.9 % Final    Differential Method 01/12/2023 Automated   Final    Prothrombin Time 01/12/2023 10.9  9.0 - 12.5 sec Final    INR 01/12/2023 1.0  0.8 - 1.2 Final    Sodium 01/12/2023 134 (L)  136 - 145 mmol/L Final    Potassium 01/12/2023 3.7  3.5 - 5.1 mmol/L Final    Chloride 01/12/2023 101  95 - 110 mmol/L Final    CO2 01/12/2023 24  23 - 29 mmol/L Final    Glucose 01/12/2023 110  70 - 110 mg/dL Final    BUN 01/12/2023 23  8 - 23 mg/dL Final    Creatinine 01/12/2023 1.1  0.5 - 1.4 mg/dL Final    Calcium 01/12/2023 9.0  8.7 - 10.5 mg/dL Final    Anion Gap 01/12/2023 9  8 - 16 mmol/L Final    eGFR 01/12/2023 48.6 (A)  >60 mL/min/1.73 m^2 Final    Specimen UA 01/12/2023 Urine, Clean Catch   Final    Color, UA 01/12/2023 Yellow  Yellow, Straw, Beena Final    Appearance, UA  01/12/2023 Clear  Clear Final    pH, UA 01/12/2023 7.0  5.0 - 8.0 Final    Specific Gravity, UA 01/12/2023 1.015  1.005 - 1.030 Final    Protein, UA 01/12/2023 Negative  Negative Final    Glucose, UA 01/12/2023 Negative  Negative Final    Ketones, UA 01/12/2023 Trace (A)  Negative Final    Bilirubin (UA) 01/12/2023 Negative  Negative Final    Occult Blood UA 01/12/2023 Negative  Negative Final    Nitrite, UA 01/12/2023 Negative  Negative Final    Leukocytes, UA 01/12/2023 Negative  Negative Final    Prealbumin 01/12/2023 22  20 - 43 mg/dL Final    Group & Rh 01/12/2023 O POS   Final    Indirect Brissa 01/12/2023 NEG   Final    Vit D, 25-Hydroxy 01/12/2023 32  30 - 96 ng/mL Final    Sodium 01/12/2023 134 (L)  136 - 145 mmol/L Final    Potassium 01/12/2023 3.7  3.5 - 5.1 mmol/L Final    Chloride 01/12/2023 101  95 - 110 mmol/L Final    CO2 01/12/2023 24  23 - 29 mmol/L Final    Glucose 01/12/2023 110  70 - 110 mg/dL Final    BUN 01/12/2023 23  8 - 23 mg/dL Final    Creatinine 01/12/2023 1.1  0.5 - 1.4 mg/dL Final    Calcium 01/12/2023 9.0  8.7 - 10.5 mg/dL Final    Total Protein 01/12/2023 6.7  6.0 - 8.4 g/dL Final    Albumin 01/12/2023 3.7  3.5 - 5.2 g/dL Final    Total Bilirubin 01/12/2023 0.8  0.1 - 1.0 mg/dL Final    Alkaline Phosphatase 01/12/2023 58  55 - 135 U/L Final    AST 01/12/2023 25  10 - 40 U/L Final    ALT 01/12/2023 15  10 - 44 U/L Final    Anion Gap 01/12/2023 9  8 - 16 mmol/L Final    eGFR 01/12/2023 48.6 (A)  >60 mL/min/1.73 m^2 Final    Magnesium 01/12/2023 1.9  1.6 - 2.6 mg/dL Final    Transferrin 01/12/2023 251  200 - 375 mg/dL Final    Phosphorus 01/12/2023 2.3 (L)  2.7 - 4.5 mg/dL Final    Hemoglobin A1C 01/12/2023 5.3  4.0 - 5.6 % Final    Estimated Avg Glucose 01/12/2023 105  68 - 131 mg/dL Final    RBC, UA 01/12/2023 1  0 - 4 /hpf Final    WBC, UA 01/12/2023 0  0 - 5 /hpf Final    Bacteria 01/12/2023 Rare  None-Occ /hpf Final    Squam Epithel, UA  01/12/2023 0  /hpf Final    Microscopic Comment 01/12/2023 SEE COMMENT   Final    WBC 01/13/2023 11.08  3.90 - 12.70 K/uL Final    RBC 01/13/2023 3.69 (L)  4.00 - 5.40 M/uL Final    Hemoglobin 01/13/2023 11.8 (L)  12.0 - 16.0 g/dL Final    Hematocrit 01/13/2023 36.3 (L)  37.0 - 48.5 % Final    MCV 01/13/2023 98  82 - 98 fL Final    MCH 01/13/2023 32.0 (H)  27.0 - 31.0 pg Final    MCHC 01/13/2023 32.5  32.0 - 36.0 g/dL Final    RDW 01/13/2023 12.2  11.5 - 14.5 % Final    Platelets 01/13/2023 117 (L)  150 - 450 K/uL Final    MPV 01/13/2023 9.0 (L)  9.2 - 12.9 fL Final    Immature Granulocytes 01/13/2023 0.3  0.0 - 0.5 % Final    Gran # (ANC) 01/13/2023 10.0 (H)  1.8 - 7.7 K/uL Final    Immature Grans (Abs) 01/13/2023 0.03  0.00 - 0.04 K/uL Final    Lymph # 01/13/2023 0.4 (L)  1.0 - 4.8 K/uL Final    Mono # 01/13/2023 0.5  0.3 - 1.0 K/uL Final    Eos # 01/13/2023 0.0  0.0 - 0.5 K/uL Final    Baso # 01/13/2023 0.02  0.00 - 0.20 K/uL Final    nRBC 01/13/2023 0  0 /100 WBC Final    Gran % 01/13/2023 90.4 (H)  38.0 - 73.0 % Final    Lymph % 01/13/2023 3.9 (L)  18.0 - 48.0 % Final    Mono % 01/13/2023 4.8  4.0 - 15.0 % Final    Eosinophil % 01/13/2023 0.4  0.0 - 8.0 % Final    Basophil % 01/13/2023 0.2  0.0 - 1.9 % Final    Differential Method 01/13/2023 Automated   Final    Sodium 01/13/2023 134 (L)  136 - 145 mmol/L Final    Potassium 01/13/2023 3.9  3.5 - 5.1 mmol/L Final    Chloride 01/13/2023 104  95 - 110 mmol/L Final    CO2 01/13/2023 22 (L)  23 - 29 mmol/L Final    Glucose 01/13/2023 118 (H)  70 - 110 mg/dL Final    BUN 01/13/2023 23  8 - 23 mg/dL Final    Creatinine 01/13/2023 1.0  0.5 - 1.4 mg/dL Final    Calcium 01/13/2023 8.3 (L)  8.7 - 10.5 mg/dL Final    Anion Gap 01/13/2023 8  8 - 16 mmol/L Final    eGFR 01/13/2023 54.5 (A)  >60 mL/min/1.73 m^2 Final    Magnesium 01/13/2023 1.9  1.6 - 2.6 mg/dL Final    Phosphorus 01/13/2023 3.1  2.7 - 4.5 mg/dL Final    WBC 01/13/2023  10.51  3.90 - 12.70 K/uL Final    RBC 01/13/2023 3.61 (L)  4.00 - 5.40 M/uL Final    Hemoglobin 01/13/2023 11.9 (L)  12.0 - 16.0 g/dL Final    Hematocrit 01/13/2023 35.8 (L)  37.0 - 48.5 % Final    MCV 01/13/2023 99 (H)  82 - 98 fL Final    MCH 01/13/2023 33.0 (H)  27.0 - 31.0 pg Final    MCHC 01/13/2023 33.2  32.0 - 36.0 g/dL Final    RDW 01/13/2023 12.0  11.5 - 14.5 % Final    Platelets 01/13/2023 120 (L)  150 - 450 K/uL Final    MPV 01/13/2023 9.1 (L)  9.2 - 12.9 fL Final    Immature Granulocytes 01/13/2023 0.4  0.0 - 0.5 % Final    Gran # (ANC) 01/13/2023 9.7 (H)  1.8 - 7.7 K/uL Final    Immature Grans (Abs) 01/13/2023 0.04  0.00 - 0.04 K/uL Final    Lymph # 01/13/2023 0.3 (L)  1.0 - 4.8 K/uL Final    Mono # 01/13/2023 0.4  0.3 - 1.0 K/uL Final    Eos # 01/13/2023 0.1  0.0 - 0.5 K/uL Final    Baso # 01/13/2023 0.01  0.00 - 0.20 K/uL Final    nRBC 01/13/2023 0  0 /100 WBC Final    Gran % 01/13/2023 91.8 (H)  38.0 - 73.0 % Final    Lymph % 01/13/2023 2.5 (L)  18.0 - 48.0 % Final    Mono % 01/13/2023 4.2  4.0 - 15.0 % Final    Eosinophil % 01/13/2023 1.0  0.0 - 8.0 % Final    Basophil % 01/13/2023 0.1  0.0 - 1.9 % Final    Differential Method 01/13/2023 Automated   Final    WBC 01/14/2023 10.12  3.90 - 12.70 K/uL Final    RBC 01/14/2023 3.39 (L)  4.00 - 5.40 M/uL Final    Hemoglobin 01/14/2023 10.9 (L)  12.0 - 16.0 g/dL Final    Hematocrit 01/14/2023 33.6 (L)  37.0 - 48.5 % Final    MCV 01/14/2023 99 (H)  82 - 98 fL Final    MCH 01/14/2023 32.2 (H)  27.0 - 31.0 pg Final    MCHC 01/14/2023 32.4  32.0 - 36.0 g/dL Final    RDW 01/14/2023 12.3  11.5 - 14.5 % Final    Platelets 01/14/2023 102 (L)  150 - 450 K/uL Final    MPV 01/14/2023 9.5  9.2 - 12.9 fL Final    Immature Granulocytes 01/14/2023 0.9 (H)  0.0 - 0.5 % Final    Gran # (ANC) 01/14/2023 8.7 (H)  1.8 - 7.7 K/uL Final    Immature Grans (Abs) 01/14/2023 0.09 (H)  0.00 - 0.04 K/uL Final    Lymph # 01/14/2023 0.4 (L)  1.0 -  4.8 K/uL Final    Mono # 01/14/2023 0.8  0.3 - 1.0 K/uL Final    Eos # 01/14/2023 0.1  0.0 - 0.5 K/uL Final    Baso # 01/14/2023 0.02  0.00 - 0.20 K/uL Final    nRBC 01/14/2023 0  0 /100 WBC Final    Gran % 01/14/2023 85.7 (H)  38.0 - 73.0 % Final    Lymph % 01/14/2023 4.2 (L)  18.0 - 48.0 % Final    Mono % 01/14/2023 8.0  4.0 - 15.0 % Final    Eosinophil % 01/14/2023 1.0  0.0 - 8.0 % Final    Basophil % 01/14/2023 0.2  0.0 - 1.9 % Final    Differential Method 01/14/2023 Automated   Final    Sodium 01/14/2023 135 (L)  136 - 145 mmol/L Final    Potassium 01/14/2023 4.0  3.5 - 5.1 mmol/L Final    Chloride 01/14/2023 105  95 - 110 mmol/L Final    CO2 01/14/2023 23  23 - 29 mmol/L Final    Glucose 01/14/2023 95  70 - 110 mg/dL Final    BUN 01/14/2023 23  8 - 23 mg/dL Final    Creatinine 01/14/2023 1.0  0.5 - 1.4 mg/dL Final    Calcium 01/14/2023 8.0 (L)  8.7 - 10.5 mg/dL Final    Anion Gap 01/14/2023 7 (L)  8 - 16 mmol/L Final    eGFR 01/14/2023 54.5 (A)  >60 mL/min/1.73 m^2 Final    Magnesium 01/14/2023 2.0  1.6 - 2.6 mg/dL Final    Phosphorus 01/14/2023 3.3  2.7 - 4.5 mg/dL Final        Meds   Current Facility-Administered Medications   Medication Dose Route Frequency Provider Last Rate Last Admin    acetaminophen tablet 1,000 mg  1,000 mg Oral Q6H Abdiel Perez MD   1,000 mg at 01/14/23 1050    amLODIPine tablet 10 mg  10 mg Oral Daily Abdiel Perez MD   10 mg at 01/14/23 1050    apixaban tablet 2.5 mg  2.5 mg Oral BID Rj Contreras MD   2.5 mg at 01/14/23 1050    bisacodyL suppository 10 mg  10 mg Rectal Daily PRN Abdiel Perez MD        celecoxib capsule 100 mg  100 mg Oral Daily Rj Contreras MD   100 mg at 01/14/23 1050    hydrOXYzine HCL tablet 25 mg  25 mg Oral TID PRN Abdiel Perez MD        labetaloL tablet 200 mg  200 mg Oral BID Abdiel Perez MD   200 mg at 01/14/23 1050    levothyroxine tablet 125 mcg  125 mcg Oral Before breakfast  Abdiel Perez MD   125 mcg at 01/14/23 0527    losartan tablet 50 mg  50 mg Oral Daily Abdiel Perez MD   50 mg at 01/14/23 1050    melatonin tablet 6 mg  6 mg Oral Nightly PRN Abdiel Perez MD        memantine tablet 5 mg  5 mg Oral BID Abdiel Perez MD   5 mg at 01/14/23 1050    methocarbamoL tablet 500 mg  500 mg Oral Q6H PRN Abdiel Perez MD   500 mg at 01/13/23 1625    methocarbamoL tablet 500 mg  500 mg Oral QID Rj Contreras MD   500 mg at 01/14/23 1329    mupirocin 2 % ointment 1 g  1 g Nasal BID Abdiel Perez MD   1 g at 01/14/23 1051    ondansetron injection 4 mg  4 mg Intravenous Q12H PRN Abdiel Perez MD        polyethylene glycol packet 17 g  17 g Oral Daily Abdiel Perez MD   17 g at 01/14/23 1050    ROPIvacaine (PF) 2 mg/ml (0.2%) solution  0.1 mL/hr Perineural Continuous Abdiel Perez MD        ROPIvacaine (PF) 2 mg/ml (0.2%) solution  0.1 mL/hr Perineural Continuous Raymon Madrigal MD 0.1 mL/hr at 01/13/23 1507 0.1 mL/hr at 01/13/23 1507    senna-docusate 8.6-50 mg per tablet 1 tablet  1 tablet Oral BID Abdiel Perez MD   1 tablet at 01/14/23 1050    sodium chloride 0.9% flush 10 mL  10 mL Intravenous PRN Geoffrey Fernández MD        sodium chloride 0.9% flush 10 mL  10 mL Intravenous PRN Abdiel Perez MD        sodium chloride 0.9% flush 10 mL  10 mL Intravenous PRN Tolu Meneses MD                Assessment:    POD 1 L Hip hemiarthroplasty. L sifi. Pain control adequate. Did not use any prn robaxin overnight.    Plan:    -- Continue L SIFI PNC at current rate  -- Continue multimodal pain regimen: tylenol 1g q8h, celebrex 100 mg qd, robaxin 500mg q6h prn         Case discussed with staff, final recommendations per attestation above.     Thank you for the consult and allowing us to participate in the care of this patient. We will continue to follow along. Please call Acute Pain Service at [phone] or Anesthesia at [phone] if you have  any further questions or concerns.      Felisa Duran MD   Anesthesia PGY-2

## 2023-01-14 NOTE — SUBJECTIVE & OBJECTIVE
Interval History: Patient underwent left hip hemiarthroplasty in OR yesterday by Ortho. Patient in very good spirits this am and her nieces and nephew I room this am. Patient joking with her family when I entered. Patient reports minimal pain to left hip and states leg feels much better and now able to move unlike yesterday when could not move it at all. Patient to work with PT and OT today. Patient has PNC in place for pain management and started on oral Apixiban for DVT prophylaxis.     Review of Systems   Constitutional:  Negative for fever.   Respiratory:  Negative for cough and shortness of breath.    Cardiovascular:  Negative for leg swelling.   Gastrointestinal:  Negative for abdominal pain, nausea and vomiting.   Musculoskeletal:  Positive for arthralgias (Left hip).   Neurological:  Negative for dizziness.   Psychiatric/Behavioral:  Negative for agitation and confusion.    Objective:     Vital Signs (Most Recent):  Temp: 97.9 °F (36.6 °C) (01/14/23 1212)  Pulse: 66 (01/14/23 1428)  Resp: 18 (01/14/23 1212)  BP: 137/63 (01/14/23 1212)  SpO2: 97 % (01/14/23 1212) on room air Vital Signs (24h Range):  Temp:  [97.7 °F (36.5 °C)-98.1 °F (36.7 °C)] 97.9 °F (36.6 °C)  Pulse:  [59-78] 66  Resp:  [11-28] 18  SpO2:  [78 %-100 %] 97 %  BP: (119-166)/(58-86) 137/63     Weight: 63.5 kg (140 lb)  Body mass index is 24.03 kg/m².    Intake/Output Summary (Last 24 hours) at 1/14/2023 1453  Last data filed at 1/14/2023 0608  Gross per 24 hour   Intake 600 ml   Output 700 ml   Net -100 ml      Physical Exam  Vitals and nursing note reviewed.   Constitutional:       General: She is awake. She is not in acute distress.     Appearance: Normal appearance. She is normal weight. She is not ill-appearing.   Cardiovascular:      Rate and Rhythm: Normal rate and regular rhythm.      Heart sounds: Normal heart sounds. No murmur heard.    No friction rub. No gallop.   Pulmonary:      Effort: Pulmonary effort is normal. No respiratory  distress.      Breath sounds: Normal breath sounds. No wheezing.   Abdominal:      General: Abdomen is flat. Bowel sounds are normal. There is no distension.      Palpations: Abdomen is soft.      Tenderness: There is no abdominal tenderness.   Musculoskeletal:      Right lower leg: No edema.      Left lower leg: No edema.   Skin:     General: Skin is warm.      Findings: No erythema.   Neurological:      Mental Status: She is alert and oriented to person, place, and time.   Psychiatric:         Mood and Affect: Mood normal.         Behavior: Behavior normal. Behavior is cooperative.         Thought Content: Thought content normal.         Judgment: Judgment normal.       Significant Labs: CBC:   Recent Labs   Lab 01/13/23  0218 01/13/23  1515 01/14/23  0654   WBC 11.08 10.51 10.12   HGB 11.8* 11.9* 10.9*   HCT 36.3* 35.8* 33.6*   * 120* 102*     CMP:   Recent Labs   Lab 01/12/23  2114 01/13/23  0218 01/14/23  0654   *  134* 134* 135*   K 3.7  3.7 3.9 4.0     101 104 105   CO2 24  24 22* 23     110 118* 95   BUN 23  23 23 23   CREATININE 1.1  1.1 1.0 1.0   CALCIUM 9.0  9.0 8.3* 8.0*   PROT 6.7  --   --    ALBUMIN 3.7  --   --    BILITOT 0.8  --   --    ALKPHOS 58  --   --    AST 25  --   --    ALT 15  --   --    ANIONGAP 9  9 8 7*       Significant Imaging: I have reviewed all pertinent imaging results/findings within the past 24 hours.

## 2023-01-15 LAB
ANION GAP SERPL CALC-SCNC: 6 MMOL/L (ref 8–16)
BASOPHILS # BLD AUTO: 0.02 K/UL (ref 0–0.2)
BASOPHILS NFR BLD: 0.3 % (ref 0–1.9)
BUN SERPL-MCNC: 26 MG/DL (ref 8–23)
CALCIUM SERPL-MCNC: 8.2 MG/DL (ref 8.7–10.5)
CHLORIDE SERPL-SCNC: 107 MMOL/L (ref 95–110)
CO2 SERPL-SCNC: 23 MMOL/L (ref 23–29)
CREAT SERPL-MCNC: 1.3 MG/DL (ref 0.5–1.4)
DIFFERENTIAL METHOD: ABNORMAL
EOSINOPHIL # BLD AUTO: 0.3 K/UL (ref 0–0.5)
EOSINOPHIL NFR BLD: 4.7 % (ref 0–8)
ERYTHROCYTE [DISTWIDTH] IN BLOOD BY AUTOMATED COUNT: 12.4 % (ref 11.5–14.5)
EST. GFR  (NO RACE VARIABLE): 39.8 ML/MIN/1.73 M^2
GLUCOSE SERPL-MCNC: 106 MG/DL (ref 70–110)
HCT VFR BLD AUTO: 31.4 % (ref 37–48.5)
HGB BLD-MCNC: 10.1 G/DL (ref 12–16)
IMM GRANULOCYTES # BLD AUTO: 0.04 K/UL (ref 0–0.04)
IMM GRANULOCYTES NFR BLD AUTO: 0.6 % (ref 0–0.5)
LYMPHOCYTES # BLD AUTO: 0.7 K/UL (ref 1–4.8)
LYMPHOCYTES NFR BLD: 9.6 % (ref 18–48)
MAGNESIUM SERPL-MCNC: 2 MG/DL (ref 1.6–2.6)
MCH RBC QN AUTO: 32.7 PG (ref 27–31)
MCHC RBC AUTO-ENTMCNC: 32.2 G/DL (ref 32–36)
MCV RBC AUTO: 102 FL (ref 82–98)
MONOCYTES # BLD AUTO: 0.8 K/UL (ref 0.3–1)
MONOCYTES NFR BLD: 11 % (ref 4–15)
NEUTROPHILS # BLD AUTO: 5.4 K/UL (ref 1.8–7.7)
NEUTROPHILS NFR BLD: 73.8 % (ref 38–73)
NRBC BLD-RTO: 0 /100 WBC
PHOSPHATE SERPL-MCNC: 2.8 MG/DL (ref 2.7–4.5)
PLATELET # BLD AUTO: 99 K/UL (ref 150–450)
PMV BLD AUTO: 9.8 FL (ref 9.2–12.9)
POTASSIUM SERPL-SCNC: 3.9 MMOL/L (ref 3.5–5.1)
RBC # BLD AUTO: 3.09 M/UL (ref 4–5.4)
SODIUM SERPL-SCNC: 136 MMOL/L (ref 136–145)
WBC # BLD AUTO: 7.27 K/UL (ref 3.9–12.7)

## 2023-01-15 PROCEDURE — 99233 PR SUBSEQUENT HOSPITAL CARE,LEVL III: ICD-10-PCS | Mod: ,,, | Performed by: INTERNAL MEDICINE

## 2023-01-15 PROCEDURE — 25000003 PHARM REV CODE 250: Performed by: STUDENT IN AN ORGANIZED HEALTH CARE EDUCATION/TRAINING PROGRAM

## 2023-01-15 PROCEDURE — 94761 N-INVAS EAR/PLS OXIMETRY MLT: CPT

## 2023-01-15 PROCEDURE — 25000003 PHARM REV CODE 250: Performed by: FAMILY MEDICINE

## 2023-01-15 PROCEDURE — 97110 THERAPEUTIC EXERCISES: CPT | Mod: CQ

## 2023-01-15 PROCEDURE — 63600175 PHARM REV CODE 636 W HCPCS: Performed by: STUDENT IN AN ORGANIZED HEALTH CARE EDUCATION/TRAINING PROGRAM

## 2023-01-15 PROCEDURE — 80048 BASIC METABOLIC PNL TOTAL CA: CPT | Performed by: FAMILY MEDICINE

## 2023-01-15 PROCEDURE — 94799 UNLISTED PULMONARY SVC/PX: CPT

## 2023-01-15 PROCEDURE — 99233 SBSQ HOSP IP/OBS HIGH 50: CPT | Mod: ,,, | Performed by: INTERNAL MEDICINE

## 2023-01-15 PROCEDURE — 11000001 HC ACUTE MED/SURG PRIVATE ROOM

## 2023-01-15 PROCEDURE — 83735 ASSAY OF MAGNESIUM: CPT | Performed by: FAMILY MEDICINE

## 2023-01-15 PROCEDURE — 36415 COLL VENOUS BLD VENIPUNCTURE: CPT | Performed by: FAMILY MEDICINE

## 2023-01-15 PROCEDURE — 99231 SBSQ HOSP IP/OBS SF/LOW 25: CPT | Mod: ,,, | Performed by: ANESTHESIOLOGY

## 2023-01-15 PROCEDURE — 97535 SELF CARE MNGMENT TRAINING: CPT

## 2023-01-15 PROCEDURE — 99231 PR SUBSEQUENT HOSPITAL CARE,LEVL I: ICD-10-PCS | Mod: ,,, | Performed by: ANESTHESIOLOGY

## 2023-01-15 PROCEDURE — 27000221 HC OXYGEN, UP TO 24 HOURS

## 2023-01-15 PROCEDURE — 85025 COMPLETE CBC W/AUTO DIFF WBC: CPT | Performed by: FAMILY MEDICINE

## 2023-01-15 PROCEDURE — 84100 ASSAY OF PHOSPHORUS: CPT | Performed by: FAMILY MEDICINE

## 2023-01-15 RX ADMIN — METHOCARBAMOL 500 MG: 500 TABLET ORAL at 08:01

## 2023-01-15 RX ADMIN — SENNOSIDES AND DOCUSATE SODIUM 1 TABLET: 50; 8.6 TABLET ORAL at 08:01

## 2023-01-15 RX ADMIN — LEVOTHYROXINE SODIUM 125 MCG: 125 TABLET ORAL at 06:01

## 2023-01-15 RX ADMIN — LOSARTAN POTASSIUM 50 MG: 50 TABLET, FILM COATED ORAL at 08:01

## 2023-01-15 RX ADMIN — ACETAMINOPHEN 1000 MG: 500 TABLET ORAL at 03:01

## 2023-01-15 RX ADMIN — LABETALOL HYDROCHLORIDE 200 MG: 100 TABLET, FILM COATED ORAL at 08:01

## 2023-01-15 RX ADMIN — ROPIVACAINE HYDROCHLORIDE 0.1 ML/HR: 2 INJECTION, SOLUTION EPIDURAL; INFILTRATION at 12:01

## 2023-01-15 RX ADMIN — METHOCARBAMOL 500 MG: 500 TABLET ORAL at 10:01

## 2023-01-15 RX ADMIN — AMLODIPINE BESYLATE 10 MG: 10 TABLET ORAL at 08:01

## 2023-01-15 RX ADMIN — POLYETHYLENE GLYCOL 3350 17 G: 17 POWDER, FOR SOLUTION ORAL at 08:01

## 2023-01-15 RX ADMIN — MEMANTINE HYDROCHLORIDE 5 MG: 5 TABLET ORAL at 10:01

## 2023-01-15 RX ADMIN — ACETAMINOPHEN 1000 MG: 500 TABLET ORAL at 08:01

## 2023-01-15 RX ADMIN — CELECOXIB 100 MG: 100 CAPSULE ORAL at 08:01

## 2023-01-15 RX ADMIN — APIXABAN 2.5 MG: 2.5 TABLET, FILM COATED ORAL at 08:01

## 2023-01-15 RX ADMIN — MEMANTINE HYDROCHLORIDE 5 MG: 5 TABLET ORAL at 08:01

## 2023-01-15 RX ADMIN — MUPIROCIN 1 G: 20 OINTMENT TOPICAL at 08:01

## 2023-01-15 RX ADMIN — LABETALOL HYDROCHLORIDE 200 MG: 100 TABLET, FILM COATED ORAL at 10:01

## 2023-01-15 RX ADMIN — APIXABAN 2.5 MG: 2.5 TABLET, FILM COATED ORAL at 10:01

## 2023-01-15 RX ADMIN — METHOCARBAMOL 500 MG: 500 TABLET ORAL at 04:01

## 2023-01-15 RX ADMIN — MUPIROCIN 1 G: 20 OINTMENT TOPICAL at 10:01

## 2023-01-15 RX ADMIN — METHOCARBAMOL 500 MG: 500 TABLET ORAL at 12:01

## 2023-01-15 RX ADMIN — SENNOSIDES AND DOCUSATE SODIUM 1 TABLET: 50; 8.6 TABLET ORAL at 10:01

## 2023-01-15 NOTE — ASSESSMENT & PLAN NOTE
Age-related osteoporosis with current pathological fracture with routine healing  · Patient underwent left hip hemiarthroplasty on 1/13/2023 for femoral neck fracture.   · Patient reports minimal pain in left hip.   · Plan is to continue PT/OT for gait training and strengthening and restoration of ADL's. Patient is FWB/WBAT: left lower extremity, posterior hip precautions as per Orthopedic recommendations.   · Plan is to continue Apixiban 2.5 mg po BID and will need a total of 30 days post-op after hip fracture surgery and JOSSUE/SCDs for DVT prophylaxis.   · Orthopedics is following and managing hip fracture and surgical site.   · Perineural pain catheter in place with continuous Ropivacaine infusion for pain control and being managed by Anesthesia Pain Service.   · Patient on multimodal pain management post-op with Tylenol 1000 mg po every 6 hours, Robaxin 500 mg po 4 times daily and Celebrex 100 mg po daily post-op and will continue.  · Plan is SNF on discharge.

## 2023-01-15 NOTE — SUBJECTIVE & OBJECTIVE
Interval History: Patient's brother at bedside. Patient reports pain in left hip is well controlled. Patient worked with therapy yesterday and they are recommending SNF on discharge and patient agreeable. Patient in good spirits. Labs are stable.     Review of Systems   Constitutional:  Negative for fever.   Respiratory:  Negative for cough and shortness of breath.    Cardiovascular:  Negative for leg swelling.   Gastrointestinal:  Negative for abdominal pain, nausea and vomiting.   Musculoskeletal:  Positive for arthralgias (Left hip).   Neurological:  Negative for dizziness.   Psychiatric/Behavioral:  Negative for agitation and confusion.    Objective:     Vital Signs (Most Recent):  Temp: 98.1 °F (36.7 °C) (01/15/23 1200)  Pulse: 69 (01/15/23 1200)  Resp: 16 (01/15/23 1200)  BP: 119/56 (01/15/23 1200)  SpO2: 93 % (01/15/23 1200) on room air   Vital Signs (24h Range):  Temp:  [96.8 °F (36 °C)-98.5 °F (36.9 °C)] 98.1 °F (36.7 °C)  Pulse:  [59-69] 69  Resp:  [14-18] 16  SpO2:  [93 %-98 %] 93 %  BP: (118-146)/(53-65) 119/56     Weight: 63.5 kg (140 lb)  Body mass index is 24.03 kg/m².    Intake/Output Summary (Last 24 hours) at 1/15/2023 1412  Last data filed at 1/15/2023 1259  Gross per 24 hour   Intake 714 ml   Output 600 ml   Net 114 ml      Physical Exam  Vitals and nursing note reviewed.   Constitutional:       General: She is awake. She is not in acute distress.     Appearance: Normal appearance. She is normal weight. She is not ill-appearing.   Cardiovascular:      Rate and Rhythm: Normal rate and regular rhythm.      Heart sounds: Normal heart sounds. No murmur heard.    No friction rub. No gallop.   Pulmonary:      Effort: Pulmonary effort is normal. No respiratory distress.      Breath sounds: Normal breath sounds. No wheezing.   Abdominal:      General: Abdomen is flat. Bowel sounds are normal. There is no distension.      Palpations: Abdomen is soft.      Tenderness: There is no abdominal tenderness.    Musculoskeletal:      Right lower leg: No edema.      Left lower leg: No edema.   Skin:     General: Skin is warm.      Findings: No erythema.   Neurological:      Mental Status: She is alert and oriented to person, place, and time.   Psychiatric:         Mood and Affect: Mood normal.         Behavior: Behavior normal. Behavior is cooperative.         Thought Content: Thought content normal.         Judgment: Judgment normal.       Significant Labs: CBC:   Recent Labs   Lab 01/13/23  1515 01/14/23  0654 01/15/23  0314   WBC 10.51 10.12 7.27   HGB 11.9* 10.9* 10.1*   HCT 35.8* 33.6* 31.4*   * 102* 99*     CMP:   Recent Labs   Lab 01/14/23  0654 01/15/23  0314   * 136   K 4.0 3.9    107   CO2 23 23   GLU 95 106   BUN 23 26*   CREATININE 1.0 1.3   CALCIUM 8.0* 8.2*   ANIONGAP 7* 6*     Magnesium:   Recent Labs   Lab 01/14/23  0654 01/15/23  0314   MG 2.0 2.0       Significant Imaging: I have reviewed all pertinent imaging results/findings within the past 24 hours.

## 2023-01-15 NOTE — PROGRESS NOTES
[facility] Medicine  Progress Note    Patient Name: Lynn Mckinney  MRN: [id number]  Patient Class: IP- Inpatient   Admission Date: 1/12/2023  Length of Stay: 3 days  Attending Physician: Pauline Wiley MD  Primary Care Provider: Brie Fagan MD        Subjective:     Principal Problem:Closed displaced fracture of left femoral neck        HPI:  This is an 88yo female with a past medical history of CAD sp CABG, PAD, combined systolic diastolic heart failure, heart block sp pacemaker, HTN, HLD, and hypothyroidism who presents to the ED with chief complaint of fall and left leg pain. Patient reports feeling at her baseline health and going out to eat with friends this afternoon. She came back and took a nap on her chair. States that she woke up from napping and got up to move around. States that she was in just socks on her hardwood floor and one of her feet slipped from under her and she fell on her side. Was not able to stand or ambulate after fall and with severe left hip pain. Brought to the ED by EMS.    In the ED patient afebrile and hemodynamically stable saturating well on room air. Xray with acute Lt femoral neck fracture. Orthopedic surgery consulted and recommended admission to medicine with plan for OR in am.        Overview/Hospital Course:  Patient admitted to [facility] Medicine Team H: Hip Fracture team and started on Hip Fracture Pathway with Orthopedic surgery consult for hip fracture. Patient was seen and evaluated by Orthopedic surgery who recommended operative repair of hip fracture. Patient was medically optimized prior to surgery and to be taken to OR after optimization on 1/13/2023. Patient underwent left hip hemiarthroplasty by Dr. Alan Vieyra. Post-op patient WBAT with posterior hip precautions to the left lower extremity as per Orthopedics recommendation. Patient placed on Apixiban 2.5 mg po BID and JOSSUE/SCD's for DVT prophylaxis post-op and will need for total of  35 days. Perineural pain catheter placed by Anesthesia Pain Service with continuous infusion of Ropivacaine to help with pain control post-op and Anesthesia Pain Service managing while patient in the hospital. Patient placed on multimodal pain management post-op with Tylenol 1000 mg po every 6 hours, Robaxin 500 mg po 4 times daily, and Celebrex 100 mg po daily post-op and will continue. PT/OT consulted post-op and evaluated patient. Patient progressing well with PT and OT and recommended skilled nursing facility placement when medically ready for hospital discharge.        Interval History: Patient's brother at bedside. Patient reports pain in left hip is well controlled. Patient worked with therapy yesterday and they are recommending SNF on discharge and patient agreeable. Patient in good spirits. Labs are stable.     Review of Systems   Constitutional:  Negative for fever.   Respiratory:  Negative for cough and shortness of breath.    Cardiovascular:  Negative for leg swelling.   Gastrointestinal:  Negative for abdominal pain, nausea and vomiting.   Musculoskeletal:  Positive for arthralgias (Left hip).   Neurological:  Negative for dizziness.   Psychiatric/Behavioral:  Negative for agitation and confusion.    Objective:     Vital Signs (Most Recent):  Temp: 98.1 °F (36.7 °C) (01/15/23 1200)  Pulse: 69 (01/15/23 1200)  Resp: 16 (01/15/23 1200)  BP: 119/56 (01/15/23 1200)  SpO2: 93 % (01/15/23 1200) on room air   Vital Signs (24h Range):  Temp:  [96.8 °F (36 °C)-98.5 °F (36.9 °C)] 98.1 °F (36.7 °C)  Pulse:  [59-69] 69  Resp:  [14-18] 16  SpO2:  [93 %-98 %] 93 %  BP: (118-146)/(53-65) 119/56     Weight: 63.5 kg (140 lb)  Body mass index is 24.03 kg/m².    Intake/Output Summary (Last 24 hours) at 1/15/2023 1412  Last data filed at 1/15/2023 1259  Gross per 24 hour   Intake 714 ml   Output 600 ml   Net 114 ml      Physical Exam  Vitals and nursing note reviewed.   Constitutional:       General: She is awake. She is  not in acute distress.     Appearance: Normal appearance. She is normal weight. She is not ill-appearing.   Cardiovascular:      Rate and Rhythm: Normal rate and regular rhythm.      Heart sounds: Normal heart sounds. No murmur heard.    No friction rub. No gallop.   Pulmonary:      Effort: Pulmonary effort is normal. No respiratory distress.      Breath sounds: Normal breath sounds. No wheezing.   Abdominal:      General: Abdomen is flat. Bowel sounds are normal. There is no distension.      Palpations: Abdomen is soft.      Tenderness: There is no abdominal tenderness.   Musculoskeletal:      Right lower leg: No edema.      Left lower leg: No edema.   Skin:     General: Skin is warm.      Findings: No erythema.   Neurological:      Mental Status: She is alert and oriented to person, place, and time.   Psychiatric:         Mood and Affect: Mood normal.         Behavior: Behavior normal. Behavior is cooperative.         Thought Content: Thought content normal.         Judgment: Judgment normal.       Significant Labs: CBC:   Recent Labs   Lab 01/13/23  1515 01/14/23  0654 01/15/23  0314   WBC 10.51 10.12 7.27   HGB 11.9* 10.9* 10.1*   HCT 35.8* 33.6* 31.4*   * 102* 99*     CMP:   Recent Labs   Lab 01/14/23  0654 01/15/23  0314   * 136   K 4.0 3.9    107   CO2 23 23   GLU 95 106   BUN 23 26*   CREATININE 1.0 1.3   CALCIUM 8.0* 8.2*   ANIONGAP 7* 6*     Magnesium:   Recent Labs   Lab 01/14/23  0654 01/15/23  0314   MG 2.0 2.0       Significant Imaging: I have reviewed all pertinent imaging results/findings within the past 24 hours.      Assessment/Plan:      * Closed displaced fracture of left femoral neck sp Left hip hemiarthroplasty 1.13.23  Age-related osteoporosis with current pathological fracture with routine healing  · Patient underwent left hip hemiarthroplasty on 1/13/2023 for femoral neck fracture.   · Patient reports minimal pain in left hip.   · Plan is to continue PT/OT for gait  training and strengthening and restoration of ADL's. Patient is FWB/WBAT: left lower extremity, posterior hip precautions as per Orthopedic recommendations.   · Plan is to continue Apixiban 2.5 mg po BID and will need a total of 30 days post-op after hip fracture surgery and JOSSUE/SCDs for DVT prophylaxis.   · Orthopedics is following and managing hip fracture and surgical site.   · Perineural pain catheter in place with continuous Ropivacaine infusion for pain control and being managed by Anesthesia Pain Service.   · Patient on multimodal pain management post-op with Tylenol 1000 mg po every 6 hours, Robaxin 500 mg po 4 times daily and Celebrex 100 mg po daily post-op and will continue.  · Plan is SNF on discharge.     Chronic combined systolic and diastolic heart failure  Patient is identified as having Combined Systolic and Diastolic heart failure that is Chronic. CHF is currently controlled. Latest ECHO performed and demonstrates- Results for orders placed during the hospital encounter of 12/05/22    Echo    Interpretation Summary  · The estimated ejection fraction is 35%.  · The quantitatively derived ejection fraction is 35%.  · The left ventricle is normal in size with moderately decreased systolic function.  · There is abnormal septal wall motion consistent with right ventricular pacemaker.  · There is moderate left ventricular global hypokinesis.  · Grade I left ventricular diastolic dysfunction.  · Normal right ventricular size with normal right ventricular systolic function.  · Severe left atrial enlargement.  · Mild-to-moderate aortic regurgitation.  · The estimated PA systolic pressure is 27 mmHg.  · Normal central venous pressure (3 mmHg).  · Trivial circumferential pericardial effusion.  Continue home Labetalol to treat and continue Losartan (substitute for home Irbesartan not on formulary) to treat and monitor clinical status closely. Monitor on telemetry. Patient is off CHF pathway.  Monitor strict  Is&Os and daily weights.  Place on fluid restriction of 1.5 L. Continue to stress to patient importance of self efficacy and  on diet for CHF.     CHB (complete heart block)  Patient with recent BiVID pacer placed by Cardiology on 2/2022. Controlled.       Coronary artery disease involving native coronary artery of native heart without angina pectoris  · Prior CABG in 2001 with  of mid LAD.   · Chronic and controlled. Patient asymptomatic.   · Continue home Labetalol to treat. Hold Aspirin for surgery and restart post-op. Patient on Crestor at home but not on formulary here in hospital and allergic to alternative statins so will hold statin in hospital.       Primary hypertension  · Chronic and controlled. Continue home Amlodipine, ARB (Irbesartan to Losartan for formulary), and home Labetalol to treat.   · Target BP < 150/90.       Stage 3a chronic kidney disease  - Chronic and controlled. Creatinine 1.1 on presentation and at baseline.  - Avoid nephrotoxic agents as appropriate.  - Renally dose meds for her GFR.   - Continue to monitor renal function with daily BMP in hospital.     Memory difficulties  Very mild. Continue home Namenda to treat.       Pure hypercholesterolemia  - Chronic and controlled. Patient allergic to formularly alternatives to her home Crestor.   - Resume home Crestor at discharge.      Acquired hypothyroidism  Chronic and controlled. Continue home Levothyroxine to treat.         VTE Risk Mitigation (From admission, onward)         Ordered     apixaban tablet 2.5 mg  2 times daily         01/13/23 1450     IP VTE HIGH RISK PATIENT  Once         01/13/23 0035     Place sequential compression device  Until discontinued         01/13/23 0035     Place sequential compression device  Until discontinued         01/12/23 2224     Place sequential compression device  Until discontinued         01/12/23 2210                Discharge Planning   AIDEE: 1/17/2023     Code Status: Full Code   Is  the patient medically ready for discharge?: No    Reason for patient still in hospital (select all that apply): Patient trending condition           Pauline Wiley MD  Department of Hospital Medicine   [facility] - Surgery

## 2023-01-15 NOTE — PT/OT/SLP PROGRESS
"Physical Therapy Treatment    Patient Name:  Lynn Mckinney   MRN:  [id number]    Recommendations:     Discharge Recommendations: nursing facility, skilled  Discharge Equipment Recommendations: walker, rolling, bedside commode, bath bench  Barriers to discharge:   Decreased Caregiver support and Evolving Clinical Presentation    Assessment:     Lynn Mckinney is a 87 y.o. female admitted with a medical diagnosis of Closed displaced fracture of left femoral neck.  She presents with the following impairments/functional limitations: weakness, impaired functional mobility, impaired cognition, impaired endurance, decreased safety awareness, decreased lower extremity function, orthopedic precautions. Upon first attempt pt was up in bedside chair having lunch and request for therapy after lunch. Upon second attempt, pt was in bed and agreed to perform exercises in bed. Pt was able to actively complete exercises without increase pain to LLE. Pt will cont to benefit from skilled acute PT to improve mobility and strength to LLE.    Rehab Prognosis: Good; patient would benefit from acute skilled PT services to address these deficits and reach maximum level of function.    Recent Surgery: Procedure(s) (LRB):  HEMIARTHROPLASTY, HIP, LEFT (Left) 2 Days Post-Op    Plan:     During this hospitalization, patient to be seen daily to address the identified rehab impairments via gait training, therapeutic activities, therapeutic exercises, neuromuscular re-education and progress toward the following goals:    Plan of Care Expires:  02/13/23    Subjective     Chief Complaint: None  Patient/Family Comments/goals: "Thank you for coming"  Pain/Comfort:  Pain Rating 1: 0/10      Objective:     Communicated with nurse prior to session.  Patient found HOB elevated with pulse ox (continuous), FCD upon PT entry to room.     General Precautions: Standard, fall  Orthopedic Precautions: LLE weight bearing as tolerated, LLE posterior " precautions  Braces: Knee immobilizer  Respiratory Status: Room air     Functional Mobility:  Pt was t/f bedside chair to bed by nurse and only agreed to therapeutic exercises.    AM-PAC 6 CLICK MOBILITY  Turning over in bed (including adjusting bedclothes, sheets and blankets)?: 2  Sitting down on and standing up from a chair with arms (e.g., wheelchair, bedside commode, etc.): 2  Moving from lying on back to sitting on the side of the bed?: 2  Moving to and from a bed to a chair (including a wheelchair)?: 2  Need to walk in hospital room?: 2  Climbing 3-5 steps with a railing?: 1  Basic Mobility Total Score: 11       Treatment & Education:  Therapeutic exercises: hip abd/add, quad set, glute set, and AP's x 15 reps    Patient left HOB elevated with all lines intact, call button in reach, bed alarm on, nurse notified, and neice present..    GOALS:   Multidisciplinary Problems       Physical Therapy Goals          Problem: Physical Therapy    Goal Priority Disciplines Outcome Goal Variances Interventions   Physical Therapy Goal     PT, PT/OT Ongoing, Progressing     Description: Goals to met by 1/28/2023    1. Supine to sit with Modified [place]  2. Sit to supine with Modified [place]  3. Rolling to Left and Right with Modified [place].  4. Sit to stand transfer with Supervision  5. Bed to chair transfer with Stand-by Assistance using LRAD  6. Gait  x 50 feet with Stand-by Assistance using LRAD   7. Lower extremity exercise program x15 reps per Instruction, with assistance as needed in order to facilitate improved strength, improved postural control, and improvement in functional independence                         Time Tracking:     PT Received On: 01/15/23  PT Start Time: 1400     PT Stop Time: 1412  PT Total Time (min): 12 min     Billable Minutes: Therapeutic Exercise 12    Treatment Type: Treatment  PT/PTA: PTA     PTA Visit Number: 1     01/15/2023

## 2023-01-15 NOTE — ADDENDUM NOTE
Addendum  created 01/15/23 0845 by Kenyatta Pineda MD    Charge Capture section accepted, Cosign clinical note with attestation

## 2023-01-15 NOTE — PROGRESS NOTES
"[facility]  Orthopedics  Progress Note    Patient Name: Lynn Mckinney  MRN: [id number]  Admission Date: 1/12/2023  Hospital Length of Stay: 3 days  Attending Provider: Pauline Wiley MD  Primary Care Provider: Brie Fagan MD  Follow-up For: Procedure(s) (LRB):  HEMIARTHROPLASTY, HIP, LEFT (Left)    Post-Operative Day: 2 Days Post-Op  Subjective:     Principal Problem:Closed displaced fracture of left femoral neck    Principal Orthopedic Problem: s/p left hemiarthroplasty 01/13/23    Interval History:   Overnight events: NAEON, examined at [facility] this morning. Ropivacaine pump in place, SCDs functioning. Pt states her pain is well controlled.     Vitals: Hypertensive overnight range 118//65, otherwise VS WNL    PT/OT update: Ambulated 5 steps forward then backwards to bedside chair    Need to know:   - Ancef 24 hours  - WBAT LLE with walker      Review of patient's allergies indicates:   Allergen Reactions    Lipitor [atorvastatin] Itching    Fosamax [alendronate]      Felt "strange"    Iodinated contrast media      Dye is only to be used if absolutely needed because of kidney function    Prolia [denosumab]      Reaction after the Prolia    Sulfa (sulfonamide antibiotics)        Current Facility-Administered Medications   Medication    acetaminophen tablet 1,000 mg    amLODIPine tablet 10 mg    apixaban tablet 2.5 mg    bisacodyL suppository 10 mg    celecoxib capsule 100 mg    hydrOXYzine HCL tablet 25 mg    labetaloL tablet 200 mg    levothyroxine tablet 125 mcg    losartan tablet 50 mg    melatonin tablet 6 mg    memantine tablet 5 mg    methocarbamoL tablet 500 mg    methocarbamoL tablet 500 mg    mupirocin 2 % ointment 1 g    ondansetron injection 4 mg    polyethylene glycol packet 17 g    ROPIvacaine (PF) 2 mg/ml (0.2%) solution    ROPIvacaine (PF) 2 mg/ml (0.2%) solution    senna-docusate 8.6-50 mg per tablet 1 tablet    sodium chloride 0.9% flush 10 mL    " "sodium chloride 0.9% flush 10 mL    sodium chloride 0.9% flush 10 mL     Objective:     Vital Signs (Most Recent):  Temp: 97.9 °F (36.6 °C) (01/15/23 0556)  Pulse: 63 (01/15/23 0556)  Resp: 16 (01/15/23 0556)  BP: (!) 146/65 (01/15/23 0556)  SpO2: 98 % (01/15/23 0556)   Vital Signs (24h Range):  Temp:  [97.6 °F (36.4 °C)-98.5 °F (36.9 °C)] 97.9 °F (36.6 °C)  Pulse:  [59-78] 63  Resp:  [16-18] 16  SpO2:  [95 %-98 %] 98 %  BP: (118-166)/(53-65) 146/65     Weight: 63.5 kg (140 lb)  Height: 5' 4" (162.6 cm)  Body mass index is 24.03 kg/m².      Intake/Output Summary (Last 24 hours) at 1/15/2023 0709  Last data filed at 1/15/2023 0028  Gross per 24 hour   Intake 477 ml   Output --   Net 477 ml         Ortho/SPM Exam  Gen: NAD, sitting comfortably in bed  CV: regular rate  Resp: non-labored respirations    LLE:  Dressing over lateral hip and lateral thigh CDI, without erythema, swelling, or bruising  No TTP   SILT Sa/Racos/DP/SP/T  Motor intact EHL/FHL/TA/Gastroc/Quad  2+ DP, 2+ PT    Significant Labs:   Recent Lab Results         01/15/23  0314        Anion Gap 6       Baso # 0.02       Basophil % 0.3       BUN 26       Calcium 8.2       Chloride 107       CO2 23       Creatinine 1.3       Differential Method Automated       eGFR 39.8       Eos # 0.3       Eosinophil % 4.7       Glucose 106       Gran # (ANC) 5.4       Gran % 73.8       Hematocrit 31.4       Hemoglobin 10.1       Immature Grans (Abs) 0.04  Comment: Mild elevation in immature granulocytes is non specific and   can be seen in a variety of conditions including stress response,   acute inflammation, trauma and pregnancy. Correlation with other   laboratory and clinical findings is essential.         Immature Granulocytes 0.6       Lymph # 0.7       Lymph % 9.6       Magnesium 2.0       MCH 32.7       MCHC 32.2              Mono # 0.8       Mono % 11.0       MPV 9.8       nRBC 0       Phosphorus 2.8       Platelets 99       Potassium 3.9       RBC 3.09   "     RDW 12.4       Sodium 136       WBC 7.27             All pertinent labs within the past 24 hours have been reviewed.    Significant Imaging: I have reviewed and interpreted all pertinent imaging results/findings.    Assessment/Plan:     * Closed displaced fracture of left femoral neck sp Left hip hemiarthroplasty 1.13.23  Lynn Mckinney is a 87 y.o. female with a left femoral neck fracture s/p left hemiarthroplasty 01/13/23    Plan:  - Admitted to Hospital Medicine Hip Fracture service for medical management  - DVT PPx: eliquis 2.5mg BID  - Abx: completed post op 24hr course of ancef  - Labs: Hgb 10.1 this AM  - Continue PT/OT work          STUART PABLO MD  Orthopedics  [facility] - Surgery

## 2023-01-15 NOTE — PT/OT/SLP PROGRESS
Occupational Therapy   Treatment    Name: Lynn Mckinney  MRN: [id number]  Admitting Diagnosis:  Closed displaced fracture of left femoral neck  2 Days Post-Op    Recommendations:     Discharge Recommendations: nursing facility, skilled  Discharge Equipment Recommendations:  walker, rolling, bedside commode, bath bench  Barriers to discharge:   (increased level of assistance needed at this time.)    Assessment:     Lynn Mckinney is a 87 y.o. female with a medical diagnosis of Closed displaced fracture of left femoral neck. Performance deficits affecting function are weakness, impaired endurance, impaired self care skills, impaired functional mobility, gait instability, impaired balance, decreased safety awareness, decreased lower extremity function, orthopedic precautions, impaired cognition. Patient would benefit from continued skilled acute OT services to improve functional mobility, increase independence with ADLs, and address established goals. Recommending SNF once medically appropriate for discharge to increase maximal independence, reduce burden of care, and ensure safety.     Rehab Prognosis:  Good; patient would benefit from acute skilled OT services to address these deficits and reach maximum level of function.       Plan:     Patient to be seen daily to address the above listed problems via self-care/home management, therapeutic activities, therapeutic exercises  Plan of Care Expires: 02/13/23  Plan of Care Reviewed with: patient, family    Subjective     Pain/Comfort:  Pain Rating 1: 0/10  Pain Rating Post-Intervention 1: 0/10    Objective:     Communicated with: NSNORMA prior to session.  Patient found HOB elevated with perineural catheter, telemetry, FCD, oxygen upon OT entry to room.    General Precautions: Standard, fall    Orthopedic Precautions:LLE weight bearing as tolerated, LLE posterior precautions  Braces: Knee immobilizer  Respiratory Status: Nasal cannula     Occupational Performance:     Bed  Mobility:    Patient completed Scooting/Bridging with moderate assistance anteriorly to EOB sitting EOB  Patient completed Supine to Sit with moderate assistance with handrail    Functional Mobility/Transfers:  Patient completed Sit <> Stand Transfer with minimum assistance  with  rolling walker   Patient completed Bed > Chair Transfer using Stand Pivot technique with minimum assistance with rolling walker    Activities of Daily Living:  Grooming: stand by assistance oral care, washing face, and combing hair sitting up in chair.     [facility] 6 Click ADL: 18    Treatment & Education:  Role of OT and POC  ADL retraining  Functional mobility training  Safety  Importance of OOB activity  Importance of calling for assistance     Patient left up in chair with all lines intact, call button in reach, family present, and all needs met. FCDs reapplied at end of OT session.     GOALS:   Multidisciplinary Problems       Occupational Therapy Goals          Problem: Occupational Therapy    Goal Priority Disciplines Outcome Interventions   Occupational Therapy Goal     OT, PT/OT Ongoing, Progressing    Description: Goals to be met by: 1/28/2023    Patient will increase functional independence with ADLs by performing:    Feeding with [place].  UE Dressing with Supervision.  LE Dressing with Supervision.  Grooming while standing at sink with Stand-by Assistance.  Toileting from toilet with Minimal Assistance for hygiene and clothing management.   Rolling to Right with Stand-by Assistance.   Supine to sit with Stand-by Assistance.  Stand pivot transfers with Stand-by Assistance.  Step transfer with Stand-by Assistance                         Time Tracking:     OT Date of Treatment: 01/15/23  OT Start Time: 1109  OT Stop Time: 1140  OT Total Time (min): 31 min    Billable Minutes:Self Care/Home Management 31          hospitals Visit Number: 0    1/15/2023

## 2023-01-15 NOTE — ANESTHESIA POST-OP PAIN MANAGEMENT
Acute Pain Service Progress Note    Lynn Mckinney is a 87 y.o., female, [id number].    Surgery:  HEMIARTHROPLASTY, HIP, LEFT (Left: Hip)    Post Op Day #: 2    Catheter type: perineural  SIFI     Infusion type: Ropivacaine 0.2%  .1ml/hr basal    Problem List:    Active Hospital Problems    Diagnosis  POA    *Closed displaced fracture of left femoral neck sp Left hip hemiarthroplasty 1.13.23 [S72.002A]  Yes    Age-related osteoporosis with current pathological fracture with routine healing [M80.00XD]  Not Applicable    Chronic combined systolic and diastolic heart failure [I50.42]  Yes    Memory difficulties [R41.3]  Yes    CHB (complete heart block) [I44.2]  Yes    Stage 3a chronic kidney disease [N18.31]  Yes    Acquired hypothyroidism [E03.9]  Yes    Coronary artery disease involving native coronary artery of native heart without angina pectoris [I25.10]  Yes     CAD S/P CABG  2001                                                          mid LAD       Primary hypertension [I10]  Yes    Pure hypercholesterolemia [E78.00]  Yes     reported side effects from atorvastatin.  pain in both hands, both shoulders and her left ankle 3/15 while on 80 mg of atorvastatin   Switched to rosuvastatin 20 in 3/15          Resolved Hospital Problems   No resolved problems to display.       Subjective:     General appearance of alert, oriented, no complaints   Pain with rest: 1    Numbers   Pain with movement: 4    Numbers   Side Effects    1. Pruritis No    2. Nausea No    3. Motor Blockade No, 0=Ability to raise lower extremities off bed    4. Sedation No, 1=awake and alert    Objective:        Catheter site clean, dry, intact         Vitals   Vitals:    01/15/23 0845   BP: (!) 144/65   Pulse: 62   Resp: 14   Temp: 36 °C (96.8 °F)        Labs    No results displayed because visit has over 200 results.           Meds   Current Facility-Administered Medications   Medication Dose Route Frequency Provider Last Rate Last Admin     acetaminophen tablet 1,000 mg  1,000 mg Oral Q6H Abdiel Perez MD   1,000 mg at 01/15/23 0849    amLODIPine tablet 10 mg  10 mg Oral Daily Abdiel Perez MD   10 mg at 01/15/23 0849    apixaban tablet 2.5 mg  2.5 mg Oral BID Rj Contreras MD   2.5 mg at 01/15/23 0848    bisacodyL suppository 10 mg  10 mg Rectal Daily PRN Abdiel Perez MD        celecoxib capsule 100 mg  100 mg Oral Daily Rj Contreras MD   100 mg at 01/15/23 0848    hydrOXYzine HCL tablet 25 mg  25 mg Oral TID PRN Abdiel Perez MD        labetaloL tablet 200 mg  200 mg Oral BID Abdiel Perez MD   200 mg at 01/15/23 0849    levothyroxine tablet 125 mcg  125 mcg Oral Before breakfast Abdiel Perez MD   125 mcg at 01/15/23 0606    losartan tablet 50 mg  50 mg Oral Daily Abdiel Perez MD   50 mg at 01/15/23 0848    melatonin tablet 6 mg  6 mg Oral Nightly PRN Abdiel Perez MD        memantine tablet 5 mg  5 mg Oral BID Abdiel Perez MD   5 mg at 01/15/23 0848    methocarbamoL tablet 500 mg  500 mg Oral Q6H PRN Abdiel Perez MD   500 mg at 01/13/23 1625    methocarbamoL tablet 500 mg  500 mg Oral QID Rj Contreras MD   500 mg at 01/15/23 0848    mupirocin 2 % ointment 1 g  1 g Nasal BID Abdiel Perez MD   1 g at 01/15/23 0849    ondansetron injection 4 mg  4 mg Intravenous Q12H PRN Abdiel Perez MD        polyethylene glycol packet 17 g  17 g Oral Daily Abdiel Perez MD   17 g at 01/15/23 0847    ROPIvacaine (PF) 2 mg/ml (0.2%) solution  0.1 mL/hr Perineural Continuous Abdiel Perez MD        ROPIvacaine (PF) 2 mg/ml (0.2%) solution  0.1 mL/hr Perineural Continuous Raymon Madrigal MD 0.1 mL/hr at 01/15/23 0028 0.1 mL/hr at 01/15/23 0028    senna-docusate 8.6-50 mg per tablet 1 tablet  1 tablet Oral BID Abdiel Perez MD   1 tablet at 01/15/23 0876    sodium chloride 0.9% flush 10 mL  10 mL Intravenous PRN Abdiel Perez MD                 Assessment:     Pain control adequate    Plan:  -- Continue L SIFI PNC at current rate  -- Continue multimodal pain regimen: tylenol 1g q8h, celebrex 100 mg qd, robaxin 500mg q6h prn         Case discussed with staff, final recommendations per attestation above.     Thank you for the consult and allowing us to participate in the care of this patient. We will continue to follow along. Please call Acute Pain Service at [phone] or Anesthesia at [phone] if you have any further questions or concerns.

## 2023-01-15 NOTE — SUBJECTIVE & OBJECTIVE
"Principal Problem:Closed displaced fracture of left femoral neck    Principal Orthopedic Problem: s/p left hemiarthroplasty 01/13/23    Interval History:   [facility] Ropivacaine pump in place, SCDs functioning. Pt states her pain is well controlled.     Vitals: Hypertensive overnight range 118//65, otherwise VS WNL    PT/OT update: Ambulated 5 steps forward then backwards to bedside chair    Need to know:   - Ancef 24 hours  - WBAT LLE with walker      Review of patient's allergies indicates:   Allergen Reactions    Lipitor [atorvastatin] Itching    Fosamax [alendronate]      Felt "strange"    Iodinated contrast media      Dye is only to be used if absolutely needed because of kidney function    Prolia [denosumab]      Reaction after the Prolia    Sulfa (sulfonamide antibiotics)        Current Facility-Administered Medications   Medication    acetaminophen tablet 1,000 mg    amLODIPine tablet 10 mg    apixaban tablet 2.5 mg    bisacodyL suppository 10 mg    celecoxib capsule 100 mg    hydrOXYzine HCL tablet 25 mg    labetaloL tablet 200 mg    levothyroxine tablet 125 mcg    losartan tablet 50 mg    melatonin tablet 6 mg    memantine tablet 5 mg    methocarbamoL tablet 500 mg    methocarbamoL tablet 500 mg    mupirocin 2 % ointment 1 g    ondansetron injection 4 mg    polyethylene glycol packet 17 g    ROPIvacaine (PF) 2 mg/ml (0.2%) solution    ROPIvacaine (PF) 2 mg/ml (0.2%) solution    senna-docusate 8.6-50 mg per tablet 1 tablet    sodium chloride 0.9% flush 10 mL    sodium chloride 0.9% flush 10 mL    sodium chloride 0.9% flush 10 mL     Objective:     Vital Signs (Most Recent):  Temp: 97.9 °F (36.6 °C) (01/15/23 0556)  Pulse: 63 (01/15/23 0556)  Resp: 16 (01/15/23 0556)  BP: (!) 146/65 (01/15/23 0556)  SpO2: 98 % (01/15/23 0556)   Vital Signs (24h Range):  Temp:  [97.6 °F (36.4 °C)-98.5 °F (36.9 °C)] 97.9 °F (36.6 °C)  Pulse:  [59-78] 63  Resp:  [16-18] " "16  SpO2:  [95 %-98 %] 98 %  BP: (118-166)/(53-65) 146/65     Weight: 63.5 kg (140 lb)  Height: 5' 4" (162.6 cm)  Body mass index is 24.03 kg/m².      Intake/Output Summary (Last 24 hours) at 1/15/2023 0709  Last data filed at 1/15/2023 0028  Gross per 24 hour   Intake 477 ml   Output --   Net 477 ml         Ortho/SPM Exam  Gen: NAD, sitting comfortably in bed  CV: regular rate  Resp: non-labored respirations    LLE:  Dressing over lateral hip and lateral thigh CDI, without erythema, swelling, or bruising  No TTP   SILT Sa/Arcos/DP/SP/T  Motor intact EHL/FHL/TA/Gastroc/Quad  2+ DP, 2+ PT    Significant Labs:   Recent Lab Results         01/15/23  0314        Anion Gap 6       Baso # 0.02       Basophil % 0.3       BUN 26       Calcium 8.2       Chloride 107       CO2 23       Creatinine 1.3       Differential Method Automated       eGFR 39.8       Eos # 0.3       Eosinophil % 4.7       Glucose 106       Gran # (ANC) 5.4       Gran % 73.8       Hematocrit 31.4       Hemoglobin 10.1       Immature Grans (Abs) 0.04  Comment: Mild elevation in immature granulocytes is non specific and   can be seen in a variety of conditions including stress response,   acute inflammation, trauma and pregnancy. Correlation with other   laboratory and clinical findings is essential.         Immature Granulocytes 0.6       Lymph # 0.7       Lymph % 9.6       Magnesium 2.0       MCH 32.7       MCHC 32.2              Mono # 0.8       Mono % 11.0       MPV 9.8       nRBC 0       Phosphorus 2.8       Platelets 99       Potassium 3.9       RBC 3.09       RDW 12.4       Sodium 136       WBC 7.27             All pertinent labs within the past 24 hours have been reviewed.    Significant Imaging: I have reviewed and interpreted all pertinent imaging results/findings.  "

## 2023-01-15 NOTE — ASSESSMENT & PLAN NOTE
Lynn Mckinney is a 87 y.o. female with a left femoral neck fracture s/p left hemiarthroplasty 01/13/23    Plan:  - Admitted to Hospital Medicine Hip Fracture service for medical management  - DVT PPx: eliquis 2.5mg BID  - Abx: completed post op 24hr course of ancef  - Labs: Hgb 10.1 this AM  - Continue PT/OT work

## 2023-01-16 LAB
ANION GAP SERPL CALC-SCNC: 6 MMOL/L (ref 8–16)
BASOPHILS # BLD AUTO: 0.03 K/UL (ref 0–0.2)
BASOPHILS NFR BLD: 0.4 % (ref 0–1.9)
BUN SERPL-MCNC: 22 MG/DL (ref 8–23)
CALCIUM SERPL-MCNC: 8.6 MG/DL (ref 8.7–10.5)
CHLORIDE SERPL-SCNC: 106 MMOL/L (ref 95–110)
CO2 SERPL-SCNC: 24 MMOL/L (ref 23–29)
CREAT SERPL-MCNC: 1.1 MG/DL (ref 0.5–1.4)
DIFFERENTIAL METHOD: ABNORMAL
EOSINOPHIL # BLD AUTO: 0.4 K/UL (ref 0–0.5)
EOSINOPHIL NFR BLD: 5 % (ref 0–8)
ERYTHROCYTE [DISTWIDTH] IN BLOOD BY AUTOMATED COUNT: 12.3 % (ref 11.5–14.5)
EST. GFR  (NO RACE VARIABLE): 48.6 ML/MIN/1.73 M^2
GLUCOSE SERPL-MCNC: 88 MG/DL (ref 70–110)
HCT VFR BLD AUTO: 34.4 % (ref 37–48.5)
HGB BLD-MCNC: 10.9 G/DL (ref 12–16)
IMM GRANULOCYTES # BLD AUTO: 0.03 K/UL (ref 0–0.04)
IMM GRANULOCYTES NFR BLD AUTO: 0.4 % (ref 0–0.5)
LYMPHOCYTES # BLD AUTO: 0.6 K/UL (ref 1–4.8)
LYMPHOCYTES NFR BLD: 7.6 % (ref 18–48)
MCH RBC QN AUTO: 32.4 PG (ref 27–31)
MCHC RBC AUTO-ENTMCNC: 31.7 G/DL (ref 32–36)
MCV RBC AUTO: 102 FL (ref 82–98)
MONOCYTES # BLD AUTO: 0.8 K/UL (ref 0.3–1)
MONOCYTES NFR BLD: 10.8 % (ref 4–15)
NEUTROPHILS # BLD AUTO: 5.8 K/UL (ref 1.8–7.7)
NEUTROPHILS NFR BLD: 75.8 % (ref 38–73)
NRBC BLD-RTO: 0 /100 WBC
PLATELET # BLD AUTO: 111 K/UL (ref 150–450)
PMV BLD AUTO: 9.7 FL (ref 9.2–12.9)
POTASSIUM SERPL-SCNC: 4.2 MMOL/L (ref 3.5–5.1)
RBC # BLD AUTO: 3.36 M/UL (ref 4–5.4)
SODIUM SERPL-SCNC: 136 MMOL/L (ref 136–145)
WBC # BLD AUTO: 7.65 K/UL (ref 3.9–12.7)

## 2023-01-16 PROCEDURE — 85025 COMPLETE CBC W/AUTO DIFF WBC: CPT | Performed by: FAMILY MEDICINE

## 2023-01-16 PROCEDURE — 80048 BASIC METABOLIC PNL TOTAL CA: CPT | Performed by: FAMILY MEDICINE

## 2023-01-16 PROCEDURE — 97116 GAIT TRAINING THERAPY: CPT | Mod: CQ

## 2023-01-16 PROCEDURE — 63600175 PHARM REV CODE 636 W HCPCS: Performed by: STUDENT IN AN ORGANIZED HEALTH CARE EDUCATION/TRAINING PROGRAM

## 2023-01-16 PROCEDURE — 97530 THERAPEUTIC ACTIVITIES: CPT

## 2023-01-16 PROCEDURE — 36415 COLL VENOUS BLD VENIPUNCTURE: CPT | Performed by: FAMILY MEDICINE

## 2023-01-16 PROCEDURE — 97535 SELF CARE MNGMENT TRAINING: CPT

## 2023-01-16 PROCEDURE — 25000003 PHARM REV CODE 250: Performed by: STUDENT IN AN ORGANIZED HEALTH CARE EDUCATION/TRAINING PROGRAM

## 2023-01-16 PROCEDURE — 99232 SBSQ HOSP IP/OBS MODERATE 35: CPT | Mod: ,,, | Performed by: INTERNAL MEDICINE

## 2023-01-16 PROCEDURE — 99232 PR SUBSEQUENT HOSPITAL CARE,LEVL II: ICD-10-PCS | Mod: ,,, | Performed by: INTERNAL MEDICINE

## 2023-01-16 PROCEDURE — 97530 THERAPEUTIC ACTIVITIES: CPT | Mod: CQ

## 2023-01-16 PROCEDURE — 11000001 HC ACUTE MED/SURG PRIVATE ROOM

## 2023-01-16 PROCEDURE — 25000003 PHARM REV CODE 250: Performed by: FAMILY MEDICINE

## 2023-01-16 RX ORDER — ACETAMINOPHEN 325 MG/1
650 TABLET ORAL EVERY 6 HOURS PRN
Refills: 0 | COMMUNITY
Start: 2023-01-16 | End: 2023-05-19

## 2023-01-16 RX ORDER — CELECOXIB 100 MG/1
100 CAPSULE ORAL DAILY
Start: 2023-01-17 | End: 2023-01-27

## 2023-01-16 RX ORDER — MULTIVITAMIN
1 TABLET ORAL DAILY
Start: 2023-01-16 | End: 2023-05-30 | Stop reason: SDUPTHER

## 2023-01-16 RX ORDER — POLYETHYLENE GLYCOL 3350 17 G/17G
17 POWDER, FOR SOLUTION ORAL DAILY
Refills: 0
Start: 2023-01-17 | End: 2023-04-25

## 2023-01-16 RX ORDER — METHOCARBAMOL 500 MG/1
500 TABLET, FILM COATED ORAL 4 TIMES DAILY
Start: 2023-01-16 | End: 2023-01-26

## 2023-01-16 RX ADMIN — MEMANTINE HYDROCHLORIDE 5 MG: 5 TABLET ORAL at 08:01

## 2023-01-16 RX ADMIN — METHOCARBAMOL 500 MG: 500 TABLET ORAL at 08:01

## 2023-01-16 RX ADMIN — ROPIVACAINE HYDROCHLORIDE 0.1 ML/HR: 2 INJECTION, SOLUTION EPIDURAL; INFILTRATION at 12:01

## 2023-01-16 RX ADMIN — LEVOTHYROXINE SODIUM 125 MCG: 125 TABLET ORAL at 06:01

## 2023-01-16 RX ADMIN — AMLODIPINE BESYLATE 10 MG: 10 TABLET ORAL at 09:01

## 2023-01-16 RX ADMIN — CELECOXIB 100 MG: 100 CAPSULE ORAL at 09:01

## 2023-01-16 RX ADMIN — LABETALOL HYDROCHLORIDE 200 MG: 100 TABLET, FILM COATED ORAL at 08:01

## 2023-01-16 RX ADMIN — POLYETHYLENE GLYCOL 3350 17 G: 17 POWDER, FOR SOLUTION ORAL at 09:01

## 2023-01-16 RX ADMIN — SENNOSIDES AND DOCUSATE SODIUM 1 TABLET: 50; 8.6 TABLET ORAL at 09:01

## 2023-01-16 RX ADMIN — APIXABAN 2.5 MG: 2.5 TABLET, FILM COATED ORAL at 09:01

## 2023-01-16 RX ADMIN — LABETALOL HYDROCHLORIDE 200 MG: 100 TABLET, FILM COATED ORAL at 09:01

## 2023-01-16 RX ADMIN — MUPIROCIN 1 G: 20 OINTMENT TOPICAL at 09:01

## 2023-01-16 RX ADMIN — LOSARTAN POTASSIUM 50 MG: 50 TABLET, FILM COATED ORAL at 09:01

## 2023-01-16 RX ADMIN — METHOCARBAMOL 500 MG: 500 TABLET ORAL at 06:01

## 2023-01-16 RX ADMIN — METHOCARBAMOL 500 MG: 500 TABLET ORAL at 09:01

## 2023-01-16 RX ADMIN — MUPIROCIN 1 G: 20 OINTMENT TOPICAL at 08:01

## 2023-01-16 RX ADMIN — SENNOSIDES AND DOCUSATE SODIUM 1 TABLET: 50; 8.6 TABLET ORAL at 08:01

## 2023-01-16 RX ADMIN — METHOCARBAMOL 500 MG: 500 TABLET ORAL at 12:01

## 2023-01-16 RX ADMIN — MEMANTINE HYDROCHLORIDE 5 MG: 5 TABLET ORAL at 09:01

## 2023-01-16 RX ADMIN — APIXABAN 2.5 MG: 2.5 TABLET, FILM COATED ORAL at 08:01

## 2023-01-16 RX ADMIN — METHOCARBAMOL 500 MG: 500 TABLET ORAL at 04:01

## 2023-01-16 NOTE — SUBJECTIVE & OBJECTIVE
Interval History: Patient sitting up in bedside chair when I entered room with her brother and niece at bedside. Patient reports soreness to left buttock area and I explained that is where her surgical incision is and to be expected. Patient denies any other pain to left hip or thigh area. Left knee immobilizer in place. Patient medically ready for discharge and awaiting SNF placement.     Review of Systems   Respiratory:  Negative for cough and shortness of breath.    Cardiovascular:  Negative for leg swelling.   Gastrointestinal:  Negative for abdominal pain, nausea and vomiting.   Musculoskeletal:  Positive for arthralgias (Left hip).   Neurological:  Negative for dizziness.   Psychiatric/Behavioral:  Negative for agitation and confusion.    Objective:     Vital Signs (Most Recent):  Temp: 98.5 °F (36.9 °C) (01/16/23 1210)  Pulse: 57 (01/16/23 1210)  Resp: 18 (01/16/23 1210)  BP: 152/68 (01/16/23 1210)  SpO2: 96 % (01/16/23 1210) on 2 liters of oxygen   Vital Signs (24h Range):  Temp:  [97.5 °F (36.4 °C)-98.5 °F (36.9 °C)] 98.5 °F (36.9 °C)  Pulse:  [57-68] 57  Resp:  [16-18] 18  SpO2:  [90 %-96 %] 96 %  BP: (133-188)/(62-77) 152/68     Weight: 63.5 kg (140 lb)  Body mass index is 24.03 kg/m².    Intake/Output Summary (Last 24 hours) at 1/16/2023 1348  Last data filed at 1/16/2023 1200  Gross per 24 hour   Intake 610 ml   Output 750 ml   Net -140 ml      Physical Exam  Vitals and nursing note reviewed.   Constitutional:       General: She is awake. She is not in acute distress.     Appearance: Normal appearance. She is normal weight. She is not ill-appearing.   Cardiovascular:      Rate and Rhythm: Normal rate and regular rhythm.      Heart sounds: Normal heart sounds. No murmur heard.    No friction rub. No gallop.   Pulmonary:      Effort: Pulmonary effort is normal. No respiratory distress.      Breath sounds: Normal breath sounds. No wheezing.   Abdominal:      General: Abdomen is flat. Bowel sounds are  normal. There is no distension.      Palpations: Abdomen is soft.      Tenderness: There is no abdominal tenderness.   Musculoskeletal:      Right lower leg: No edema.      Left lower leg: No edema.   Skin:     General: Skin is warm.      Findings: No erythema.      Comments: Surgical dressing to left buttock area intact with no drainage.    Neurological:      Mental Status: She is alert and oriented to person, place, and time.   Psychiatric:         Mood and Affect: Mood normal.         Behavior: Behavior normal. Behavior is cooperative.         Thought Content: Thought content normal.         Judgment: Judgment normal.       Significant Labs: CBC:   Recent Labs   Lab 01/15/23  0314 01/16/23  0350   WBC 7.27 7.65   HGB 10.1* 10.9*   HCT 31.4* 34.4*   PLT 99* 111*     CMP:   Recent Labs   Lab 01/15/23  0314 01/16/23  0350    136   K 3.9 4.2    106   CO2 23 24    88   BUN 26* 22   CREATININE 1.3 1.1   CALCIUM 8.2* 8.6*   ANIONGAP 6* 6*       Significant Imaging: I have reviewed all pertinent imaging results/findings within the past 24 hours.

## 2023-01-16 NOTE — ASSESSMENT & PLAN NOTE
Lynn Mckinney is a 87 y.o. female with a left femoral neck fracture s/p left hemiarthroplasty 01/13/23    Plan:  - Admitted to Hospital Medicine Hip Fracture service for medical management  - DVT PPx: eliquis 2.5mg BID  - Abx: completed post op 24hr course of ancef  - Labs: Hgb 10.1 yesterday, Morning labs pending will f/u  - Continue PT/OT work    Dispo: Pt recommends SNF placement

## 2023-01-16 NOTE — PT/OT/SLP PROGRESS
Occupational Therapy   Treatment    Name: Lynn Mckinney  MRN: [id number]  Admitting Diagnosis:  Closed displaced fracture of left femoral neck  3 Days Post-Op    Recommendations:     Discharge Recommendations: nursing facility, skilled  Discharge Equipment Recommendations:  walker, rolling, bath bench, bedside commode  Barriers to discharge:  Other (Comment) (increased level of assistance needed at this time)    Assessment:     Lynn Mckinney is a 87 y.o. female with a medical diagnosis of Closed displaced fracture of left femoral neck.  She presents with with the following performance deficits affecting function are weakness, impaired endurance, impaired functional mobility, impaired self care skills, gait instability, impaired balance, decreased safety awareness, decreased lower extremity function, orthopedic precautions, impaired cognition, decreased ROM, impaired cardiopulmonary response to activity. Pt would benefit from continued skilled acute OT services in order to maximize independence and safety with ADLs and functional mobility to ensure safe return to PLOF in the least restrictive environment. OT recommending SNF once pt is medically appropriate for d/c.      Rehab Prognosis:  Good; patient would benefit from acute skilled OT services to address these deficits and reach maximum level of function.       Plan:     Patient to be seen daily to address the above listed problems via self-care/home management, therapeutic activities, therapeutic exercises  Plan of Care Expires: 02/13/23  Plan of Care Reviewed with: patient, family    Subjective     Pain/Comfort:  Pain Rating 1: 0/10  Pain Rating Post-Intervention 1: 0/10    Objective:     Communicated with: RN prior to session.  Patient found supine with PureWick, FCD, telemetry, oxygen, perineural catheter and family present upon OT entry to room.    General Precautions: Standard, fall    Orthopedic Precautions:LLE weight bearing as tolerated, LLE posterior  precautions  Braces: Knee immobilizer  Respiratory Status: Nasal cannula, flow 2 L/min     Occupational Performance:     Bed Mobility:    Patient completed Supine to Sit with minimum assistance to advance LLE    Functional Mobility/Transfers:  Patient completed Sit <> Stand Transfer x1 from EOB and x1 from chair with minimum assistance  with  rolling walker   Verbal cues and min A for safe hand placement  Patient completed Bed <> Chair Transfer using Stand Pivot technique with minimum assistance with rolling walker    Activities of Daily Living:  Grooming: supervision and set-up assistance to perform oral hygiene while sitting in chair  Lower Body Dressing: total assistance to adjust B socks      [facility] 6 Click ADL: 17    Treatment & Education:  Educated pt on benefits of OOB activity to maximize recovery.   Encouraged pt to sit up in chair for all meals.   Educated pt on need to call for assist for all transfers.   Pt unable to recall posterior hip precautions; reeducation provided.   Educated pt on safe hand placement during STS with RW.   Educated pt and family on OT role and POC.    Patient left up in chair with all lines intact, call button in reach, RN notified, and family present    GOALS:   Multidisciplinary Problems       Occupational Therapy Goals          Problem: Occupational Therapy    Goal Priority Disciplines Outcome Interventions   Occupational Therapy Goal     OT, PT/OT Ongoing, Progressing    Description: Goals to be met by: 1/28/2023    Patient will increase functional independence with ADLs by performing:    Feeding with [place].  UE Dressing with Supervision.  LE Dressing with Supervision.  Grooming while standing at sink with Stand-by Assistance.  Toileting from toilet with Minimal Assistance for hygiene and clothing management.   Rolling to Right with Stand-by Assistance.   Supine to sit with Stand-by Assistance.  Stand pivot transfers with Stand-by Assistance.  Step transfer with Stand-by  Assistance                         Time Tracking:     OT Date of Treatment: 01/16/23  OT Start Time: 0834  OT Stop Time: 0903  OT Total Time (min): 29 min    Billable Minutes:Self Care/Home Management 8  Therapeutic Activity 21    OT/EUGENE: OT     EUGENE Visit Number: 0    1/16/2023

## 2023-01-16 NOTE — PLAN OF CARE
Pt resting in bed comfortably. Ambulated in room with PT/OT and sat up in chair for several hours. PIV line intact and free of infection and irritation. Fall precautions maintained, no falls noted. Call light within reach, bed locked and in lowest position. Non-skid socks on while out of bed. Patient instructed to call for assistance. Skin integrity maintained as patient is independent with frequent repositioning. PNC in place, infusing Ropivacaine. C/o pain, managed with PRN and scheduled meds, no other complaints or concerns. Progressing towards goals. Will continue to monitor and follow plan of care.

## 2023-01-16 NOTE — ANESTHESIA POST-OP PAIN MANAGEMENT
Acute Pain Service Progress Note    Lynn Mckinney is a 87 y.o., female, [id number].    Surgery:  HEMIARTHROPLASTY, HIP, LEFT (Left: Hip)    Post Op Day #: 3    Catheter type: perineural  SIFI     Infusion type: Ropivacaine 0.2%  .1ml/hr basal    Problem List:    Active Hospital Problems    Diagnosis  POA    *Closed displaced fracture of left femoral neck sp Left hip hemiarthroplasty 1.13.23 [S72.002A]  Yes    Age-related osteoporosis with current pathological fracture with routine healing [M80.00XD]  Not Applicable    Chronic combined systolic and diastolic heart failure [I50.42]  Yes    Memory difficulties [R41.3]  Yes    CHB (complete heart block) [I44.2]  Yes    Stage 3a chronic kidney disease [N18.31]  Yes    Acquired hypothyroidism [E03.9]  Yes    Coronary artery disease involving native coronary artery of native heart without angina pectoris [I25.10]  Yes     CAD S/P CABG  2001                                                          mid LAD       Primary hypertension [I10]  Yes    Pure hypercholesterolemia [E78.00]  Yes     reported side effects from atorvastatin.  pain in both hands, both shoulders and her left ankle 3/15 while on 80 mg of atorvastatin   Switched to rosuvastatin 20 in 3/15          Resolved Hospital Problems   No resolved problems to display.       Subjective:     General appearance of alert, oriented, no complaints   Pain with rest: 1    Numbers   Pain with movement: 4    Numbers   Side Effects    1. Pruritis No    2. Nausea No    3. Motor Blockade No, 0=Ability to raise lower extremities off bed    4. Sedation No, 1=awake and alert    Objective:        Catheter site clean, dry, intact         Vitals   Vitals:    01/16/23 0740   BP: (!) 188/77   Pulse: 68   Resp: 18   Temp: 36.4 °C (97.5 °F)        Labs    No results displayed because visit has over 200 results.           Meds   Current Facility-Administered Medications   Medication Dose Route Frequency Provider Last Rate Last  Admin    amLODIPine tablet 10 mg  10 mg Oral Daily Abdiel Perez MD   10 mg at 01/16/23 0908    apixaban tablet 2.5 mg  2.5 mg Oral BID Rj Contreras MD   2.5 mg at 01/16/23 0908    bisacodyL suppository 10 mg  10 mg Rectal Daily PRN Abdiel Perez MD        celecoxib capsule 100 mg  100 mg Oral Daily Rj Contreras MD   100 mg at 01/16/23 0909    hydrOXYzine HCL tablet 25 mg  25 mg Oral TID PRN Abdiel Perez MD        labetaloL tablet 200 mg  200 mg Oral BID Abdiel Perez MD   200 mg at 01/16/23 0908    levothyroxine tablet 125 mcg  125 mcg Oral Before breakfast Abdiel Perez MD   125 mcg at 01/16/23 0615    losartan tablet 50 mg  50 mg Oral Daily Abdiel Perez MD   50 mg at 01/16/23 0909    melatonin tablet 6 mg  6 mg Oral Nightly PRN Abdiel Perez MD        memantine tablet 5 mg  5 mg Oral BID Abdiel Perez MD   5 mg at 01/16/23 0908    methocarbamoL tablet 500 mg  500 mg Oral Q6H PRN Abdiel Perez MD   500 mg at 01/16/23 0615    methocarbamoL tablet 500 mg  500 mg Oral QID Rj Contreras MD   500 mg at 01/16/23 0909    mupirocin 2 % ointment 1 g  1 g Nasal BID Abdiel Perez MD   1 g at 01/16/23 0909    ondansetron injection 4 mg  4 mg Intravenous Q12H PRN Abdiel Perez MD        polyethylene glycol packet 17 g  17 g Oral Daily Abdiel Perez MD   17 g at 01/16/23 0908    ROPIvacaine (PF) 2 mg/ml (0.2%) solution  0.1 mL/hr Perineural Continuous Abdiel Perez MD        ROPIvacaine (PF) 2 mg/ml (0.2%) solution  0.1 mL/hr Perineural Continuous Raymon Madrigal MD 0.1 mL/hr at 01/15/23 0028 0.1 mL/hr at 01/15/23 0028    senna-docusate 8.6-50 mg per tablet 1 tablet  1 tablet Oral BID Abdiel Perez MD   1 tablet at 01/16/23 0908    sodium chloride 0.9% flush 10 mL  10 mL Intravenous PRN Abdiel Perez MD                Assessment:     Pain control adequate    Plan:  -- Continue L SIFI PNC at current rate  --  Continue multimodal pain regimen: tylenol 1g q8h, celebrex 100 mg qd, robaxin 500mg q6h prn   -- Continue current pain regimen        Thank you for the consult and allowing us to participate in the care of this patient. We will continue to follow along. Please call Acute Pain Service at [phone] or Anesthesia at [phone] if you have any further questions or concerns.

## 2023-01-16 NOTE — PROGRESS NOTES
[facility] Medicine  Progress Note    Patient Name: Lynn Mckinney  MRN: [id number]  Patient Class: IP- Inpatient   Admission Date: 1/12/2023  Length of Stay: 4 days  Attending Physician: Pauline Wiley MD  Primary Care Provider: Brie Fagan MD        Subjective:     Principal Problem:Closed displaced fracture of left femoral neck        HPI:  This is an 86yo female with a past medical history of CAD sp CABG, PAD, combined systolic diastolic heart failure, heart block sp pacemaker, HTN, HLD, and hypothyroidism who presents to the ED with chief complaint of fall and left leg pain. Patient reports feeling at her baseline health and going out to eat with friends this afternoon. She came back and took a nap on her chair. States that she woke up from napping and got up to move around. States that she was in just socks on her hardwood floor and one of her feet slipped from under her and she fell on her side. Was not able to stand or ambulate after fall and with severe left hip pain. Brought to the ED by EMS.    In the ED patient afebrile and hemodynamically stable saturating well on room air. Xray with acute Lt femoral neck fracture. Orthopedic surgery consulted and recommended admission to medicine with plan for OR in am.        Overview/Hospital Course:  Patient admitted to [facility] Medicine Team H: Hip Fracture team and started on Hip Fracture Pathway with Orthopedic surgery consult for hip fracture. Patient was seen and evaluated by Orthopedic surgery who recommended operative repair of hip fracture. Patient was medically optimized prior to surgery and to be taken to OR after optimization on 1/13/2023. Patient underwent left hip hemiarthroplasty by Dr. Alan Vieyra. Post-op patient WBAT with posterior hip precautions to the left lower extremity as per Orthopedics recommendation. Patient placed on Apixiban 2.5 mg po BID and JOSSUE/SCD's for DVT prophylaxis post-op and will need for total of  35 days. Perineural pain catheter placed by Anesthesia Pain Service with continuous infusion of Ropivacaine to help with pain control post-op and Anesthesia Pain Service managing while patient in the hospital. Patient placed on multimodal pain management post-op with Tylenol 1000 mg po every 6 hours, Robaxin 500 mg po 4 times daily, and Celebrex 100 mg po daily post-op and will continue. PT/OT consulted post-op and evaluated patient. Patient progressing well with PT and OT and recommended skilled nursing facility placement and awaiting SNF placement for discharge. Pain well controlled to left hip area.         Interval History: Patient sitting up in bedside chair when I entered room with her brother and niece at bedside. Patient reports soreness to left buttock area and I explained that is where her surgical incision is and to be expected. Patient denies any other pain to left hip or thigh area. Left knee immobilizer in place. Patient medically ready for discharge and awaiting SNF placement.     Review of Systems   Respiratory:  Negative for cough and shortness of breath.    Cardiovascular:  Negative for leg swelling.   Gastrointestinal:  Negative for abdominal pain, nausea and vomiting.   Musculoskeletal:  Positive for arthralgias (Left hip).   Neurological:  Negative for dizziness.   Psychiatric/Behavioral:  Negative for agitation and confusion.    Objective:     Vital Signs (Most Recent):  Temp: 98.5 °F (36.9 °C) (01/16/23 1210)  Pulse: 57 (01/16/23 1210)  Resp: 18 (01/16/23 1210)  BP: 152/68 (01/16/23 1210)  SpO2: 96 % (01/16/23 1210) on 2 liters of oxygen   Vital Signs (24h Range):  Temp:  [97.5 °F (36.4 °C)-98.5 °F (36.9 °C)] 98.5 °F (36.9 °C)  Pulse:  [57-68] 57  Resp:  [16-18] 18  SpO2:  [90 %-96 %] 96 %  BP: (133-188)/(62-77) 152/68     Weight: 63.5 kg (140 lb)  Body mass index is 24.03 kg/m².    Intake/Output Summary (Last 24 hours) at 1/16/2023 1348  Last data filed at 1/16/2023 1200  Gross per 24 hour    Intake 610 ml   Output 750 ml   Net -140 ml      Physical Exam  Vitals and nursing note reviewed.   Constitutional:       General: She is awake. She is not in acute distress.     Appearance: Normal appearance. She is normal weight. She is not ill-appearing.   Cardiovascular:      Rate and Rhythm: Normal rate and regular rhythm.      Heart sounds: Normal heart sounds. No murmur heard.    No friction rub. No gallop.   Pulmonary:      Effort: Pulmonary effort is normal. No respiratory distress.      Breath sounds: Normal breath sounds. No wheezing.   Abdominal:      General: Abdomen is flat. Bowel sounds are normal. There is no distension.      Palpations: Abdomen is soft.      Tenderness: There is no abdominal tenderness.   Musculoskeletal:      Right lower leg: No edema.      Left lower leg: No edema.   Skin:     General: Skin is warm.      Findings: No erythema.      Comments: Surgical dressing to left buttock area intact with no drainage.    Neurological:      Mental Status: She is alert and oriented to person, place, and time.   Psychiatric:         Mood and Affect: Mood normal.         Behavior: Behavior normal. Behavior is cooperative.         Thought Content: Thought content normal.         Judgment: Judgment normal.       Significant Labs: CBC:   Recent Labs   Lab 01/15/23  0314 01/16/23  0350   WBC 7.27 7.65   HGB 10.1* 10.9*   HCT 31.4* 34.4*   PLT 99* 111*     CMP:   Recent Labs   Lab 01/15/23  0314 01/16/23  0350    136   K 3.9 4.2    106   CO2 23 24    88   BUN 26* 22   CREATININE 1.3 1.1   CALCIUM 8.2* 8.6*   ANIONGAP 6* 6*       Significant Imaging: I have reviewed all pertinent imaging results/findings within the past 24 hours.      Assessment/Plan:      * Closed displaced fracture of left femoral neck s/p left hip hemiarthroplasty on 1/13/2023  Age-related osteoporosis with current pathological fracture with routine healing  · Patient underwent left hip hemiarthroplasty on  1/13/2023 for femoral neck fracture.   · Patient reports minimal pain in left hip.   · Plan is to continue PT/OT for gait training and strengthening and restoration of ADL's. Patient is FWB/WBAT: left lower extremity, posterior hip precautions as per Orthopedic recommendations.   · Plan is to continue Apixiban 2.5 mg po BID and will need a total of 30 days post-op after hip fracture surgery and JSOSUE/SCDs for DVT prophylaxis.   · Orthopedics is following and managing hip fracture and surgical site.   · Perineural pain catheter in place with continuous Ropivacaine infusion for pain control and being managed by Anesthesia Pain Service.   · Pain controlled. Patient on multimodal pain management post-op with Tylenol 1000 mg po every 6 hours, Robaxin 500 mg po 4 times daily and Celebrex 100 mg po daily post-op and will continue.  · Plan is SNF on discharge. Medically ready and awaiting placement,     Chronic combined systolic and diastolic heart failure  Patient is identified as having Combined Systolic and Diastolic heart failure that is Chronic. CHF is currently controlled. Latest ECHO performed and demonstrates- Results for orders placed during the hospital encounter of 12/05/22    Echo    Interpretation Summary  · The estimated ejection fraction is 35%.  · The quantitatively derived ejection fraction is 35%.  · The left ventricle is normal in size with moderately decreased systolic function.  · There is abnormal septal wall motion consistent with right ventricular pacemaker.  · There is moderate left ventricular global hypokinesis.  · Grade I left ventricular diastolic dysfunction.  · Normal right ventricular size with normal right ventricular systolic function.  · Severe left atrial enlargement.  · Mild-to-moderate aortic regurgitation.  · The estimated PA systolic pressure is 27 mmHg.  · Normal central venous pressure (3 mmHg).  · Trivial circumferential pericardial effusion.  Continue home Labetalol to treat and  continue Losartan (substitute for home Irbesartan not on formulary) to treat and monitor clinical status closely. Monitor on telemetry. Patient is off CHF pathway.  Monitor strict Is&Os and daily weights.  Place on fluid restriction of 1.5 L. Continue to stress to patient importance of self efficacy and  on diet for CHF.     CHB (complete heart block)  Patient with recent BiVID pacer placed by Cardiology on 2/2022. Controlled.       Coronary artery disease involving native coronary artery of native heart without angina pectoris  · Prior CABG in 2001 with  of mid LAD.   · Chronic and controlled. Patient asymptomatic.   · Continue home Labetalol to treat. Hold Aspirin for surgery and restart post-op. Patient on Crestor at home but not on formulary here in hospital and allergic to alternative statins so will hold statin in hospital.       Primary hypertension  · Chronic and controlled. Continue home Amlodipine, ARB (Irbesartan to Losartan for formulary), and home Labetalol to treat.   · Target BP < 150/90.       Stage 3a chronic kidney disease  - Chronic and controlled. Creatinine 1.1 on presentation and at baseline.  - Avoid nephrotoxic agents as appropriate.  - Renally dose meds for her GFR.   - Continue to monitor renal function with daily BMP in hospital.     Memory difficulties  Very mild. Continue home Namenda to treat.       Pure hypercholesterolemia  - Chronic and controlled. Patient allergic to formularly alternatives to her home Crestor.   - Resume home Crestor at discharge.      Acquired hypothyroidism  Chronic and controlled. Continue home Levothyroxine to treat.         VTE Risk Mitigation (From admission, onward)         Ordered     apixaban tablet 2.5 mg  2 times daily         01/13/23 1450     IP VTE HIGH RISK PATIENT  Once         01/13/23 0035     Place sequential compression device  Until discontinued         01/13/23 0035     Place sequential compression device  Until discontinued          01/12/23 2224     Place sequential compression device  Until discontinued         01/12/23 2210                Discharge Planning   AIDEE: 1/17/2023     Code Status: Full Code   Is the patient medically ready for discharge?: Yes    Reason for patient still in hospital (select all that apply): Patient trending condition and Pending disposition  Discharge Plan A: Skilled Nursing Facility   Discharge Delays: (!) Post-Acute Set-up      Pauline Wiley MD  Department of [facility] Medicine   [facility] - Surgery

## 2023-01-16 NOTE — PLAN OF CARE
"   [facility]     Department of Hospital Medicine     [address] [place] [address]     [phone] after hours  [phone] fax       NURSING HOME ORDERS    1/19/2023    Admit to [facility]                                            Diagnoses:  Active Hospital Problems    Diagnosis  POA    *Closed displaced fracture of left femoral neck s/p left hip hemiarthroplasty on 1/13/2023 [S72.002A]  Yes     Priority: 1 - High    Chronic combined systolic and diastolic heart failure [I50.42]  Yes     Priority: 2     CHB (complete heart block) [I44.2]  Yes     Priority: 3     Coronary artery disease involving native coronary artery of native heart without angina pectoris [I25.10]  Yes     Priority: 4      CAD S/P CABG  2001                                                          mid LAD       Primary hypertension [I10]  Yes     Priority: 5     Stage 3a chronic kidney disease [N18.31]  Yes     Priority: 6     Memory difficulties [R41.3]  Yes     Priority: 7     Pure hypercholesterolemia [E78.00]  Yes     Priority: 8      reported side effects from atorvastatin.  pain in both hands, both shoulders and her left ankle 3/15 while on 80 mg of atorvastatin   Switched to rosuvastatin 20 in 3/15        Acquired hypothyroidism [E03.9]  Yes     Priority: 9     Age-related osteoporosis with current pathological fracture with routine healing [M80.00XD]  Not Applicable      Resolved Hospital Problems   No resolved problems to display.       Allergies:  Review of patient's allergies indicates:   Allergen Reactions    Lipitor [atorvastatin] Itching    Fosamax [alendronate]      Felt "strange"    Iodinated contrast media      Dye is only to be used if absolutely needed because of kidney function    Prolia [denosumab]      Reaction after the Prolia    Sulfa (sulfonamide antibiotics)        Vitals: Every shift (Skilled Nursing patients)        Code Status: Full Code     Diet: regular " diet      Supplement: House supplement, one can every by mouth with meals           Activities:   - Up in a chair each morning as tolerated   - Ambulate with assistance to bathroom   - May use walker, cane, or self-propelled wheelchair   - Weight bearing: FWB/WBAT: left lower extremity, Posterior hip precautions    LABS: Per facility protocol      Nursing: Out of bed BID, Up with assistance    Nursing Precautions:            - Fall precautions per nursing home protocol      CONSULTS:       Physical Therapy to evaluate and treat 5 times a week     Occupational Therapy to evaluate and treat 5 times a week         MISCELLANEOUS CARE:  Routine Skin for Bedridden Patients: Instruct patient/caregiver to apply moisture barrier cream to all skin folds and wet areas in perineal area daily and after baths and all bowel movements.      WOUND CARE ORDERS  Keep surgical dressing in place that was applied post-op to left hip and do not remove until Orthopedic clinic follow-up. Assess surgical dressing with each treatment. Call MD if any drainage reaches border to border of dressing horizontally, signs or symptoms of infection, temp >101 F, induration, swelling or redness.                Medications:        Medication List        START taking these medications      apixaban 2.5 mg Tab  Commonly known as: ELIQUIS  Take 1 tablet (2.5 mg total) by mouth 2 (two) times daily.End date 2/13/2023. DVT prophylaxis after hip fracture surgery.      celecoxib 100 MG capsule  Commonly known as: CeleBREX  Take 1 capsule (100 mg total) by mouth once daily. for 10 days     methocarbamoL 500 MG Tab  Commonly known as: ROBAXIN  Take 1 tablet (500 mg total) by mouth 4 (four) times daily. for 10 days     polyethylene glycol 17 gram Pwpk  Commonly known as: GLYCOLAX  Take 17 g by mouth once daily.            CHANGE how you take these medications      acetaminophen 325 MG tablet  Commonly known as: TYLENOL  Take 2 tablets (650 mg total) by mouth every  6 (six) hours as needed (Mild to moderate pain).     multivitamin per tablet  Commonly known as: THERAGRAN  Take 1 tablet by mouth once daily.            CONTINUE taking these medications      amLODIPine 10 MG tablet  Commonly known as: NORVASC  Take 1 tablet (10 mg total) by mouth once daily.     aspirin 81 MG Chew  Take 81 mg by mouth every evening.      benzonatate 200 MG capsule  Commonly known as: TESSALON  Take 1 capsule (200 mg total) by mouth 3 (three) times daily as needed for Cough.     cetirizine 10 MG tablet  Commonly known as: ZYRTEC  Take 1 tablet (10 mg total) by mouth once daily.     cholecalciferol (vitamin D3) 50 mcg (2,000 unit) Cap capsule  Commonly known as: VITAMIN D3  Take 2,000 Units by mouth once daily.     hydrOXYzine HCL 25 MG tablet  Commonly known as: ATARAX  Take 1 tablet (25 mg total) by mouth 3 (three) times daily as needed for Anxiety.     irbesartan 300 MG tablet  Commonly known as: AVAPRO  TAKE 1 TABLET (300 MG TOTAL) BY MOUTH EVERY EVENING.      labetaloL 200 MG tablet  Commonly known as: NORMODYNE  Take 1 tablet (200 mg total) by mouth 2 (two) times daily.     levothyroxine 125 MCG tablet  Commonly known as: SYNTHROID  Take 1 tablet (125 mcg total) by mouth before breakfast.     memantine 5 MG Tab  Commonly known as: NAMENDA  Take 1 tablet (5 mg total) by mouth 2 (two) times daily.     nitroGLYCERIN 0.4 MG SL tablet  Commonly known as: NITROSTAT  PLACE 1 TABLET UNDER THE TONGUE EVERY 5 MIN AS NEEDED FOR CHEST PAIN. MAX:3 DOSES.     rosuvastatin 10 MG tablet  Commonly known as: CRESTOR  Take 1 tablet (10 mg total) by mouth every evening.       STOP taking these medications      BOOST ORAL     CALTRATE 600 + D ORAL                Follow-up:   Future Appointments   Date Time Provider Department Center   1/27/2023  2:00 PM LILIANE Perkins   1/30/2023 10:30 AM LAB, R[facility] LAB [facility]   2/6/2023  9:00 AM KATRIN AGUILAR [facility]    2/13/2023 11:20 AM PACEMAKER, ICD [facility]   2/22/2023 10:00 AM HOME MONITOR DEVICE CHECK, [facility]   2/28/2023 10:00 AM Nadira Hoyos PA-C [facility] BREONNA [facility]   4/3/2023  2:00 PM Flako Carver MD [facility]       _________________________________  Iram Hoyos MD  1/19/2023

## 2023-01-16 NOTE — PLAN OF CARE
[facility] - Surgery  Discharge Assessment    Primary Care Provider: Brie Fagan MD     Discharge Assessment (most recent)       BRIEF DISCHARGE ASSESSMENT - 01/16/23 0928          Discharge Planning    Assessment Type Discharge Planning Brief Assessment     Resource/Environmental Concerns none     Support Systems Family members     Equipment Currently Used at Home cane, straight     Current Living Arrangements home     Patient/Family Anticipates Transition to inpatient rehabilitation facility     Patient/Family Anticipated Services at Transition skilled nursing;rehabilitation services     DME Needed Upon Discharge  --   TBD    Discharge Plan A Skilled Nursing Facility     Discharge Plan B Skilled Nursing Facility                   Pt lives alone in a one story home. Family reports she may need memory care placement after SNF. Pt used a straight cane for ambulation, but has a rolling walker and a commode. There is no home health listed, she's not on dialysis or coumadin.     Pt's niece, Susanna, discussed discharge planning with CATHY. SW will check Pt's [facility] medicare's website for in network SNF's and send out referrals. Susanna stated they family is not aware of the in network SNF's, so they want referrals sent, to hopefully get more than one accepting. The family plans to move the Pt to [facility] memory care in [place] after she finishes rehab.     SW will follow.    NICOLE Ramirez, LMSW [facility]  [id number]

## 2023-01-16 NOTE — PT/OT/SLP PROGRESS
Physical Therapy Treatment    Patient Name:  Lynn Mckinney   MRN:  [id number]    Recommendations:     Discharge Recommendations: nursing facility, skilled  Discharge Equipment Recommendations: walker, rolling, bedside commode, bath bench  Barriers to discharge: (increased level of assistance needed at this time    Assessment:     Lynn Mckinney is a 87 y.o. female admitted with a medical diagnosis of Closed displaced fracture of left femoral neck.  She presents with the following impairments/functional limitations: weakness, impaired endurance, impaired functional mobility, gait instability, decreased lower extremity function, decreased coordination, decreased safety awareness, decreased ROM, impaired cognition, impaired balance, impaired cardiopulmonary response to activity  presents with  improved overall functional mobility requiring decreased assistance and increased activity tolerance to perform functional mobility.Pt would continue to benefit from skilled PT to address overall functional mobility, to return to functional baseline and goals. Goals remain appropriate.      Rehab Prognosis: Good; patient would benefit from acute skilled PT services to address these deficits and reach maximum level of function.    Recent Surgery: Procedure(s) (LRB):  HEMIARTHROPLASTY, HIP, LEFT (Left) 3 Days Post-Op    Plan:     During this hospitalization, patient to be seen daily to address the identified rehab impairments via therapeutic activities, gait training, therapeutic exercises, neuromuscular re-education and progress toward the following goals:    Plan of Care Expires:  02/13/23    Subjective     Patient/Family Comments/goals: I am ready to get back in the bed  Pain/Comfort:  Pain Rating 1: 0/10  Pain Rating Post-Intervention 1: 0/10      Objective:     Communicated with RN prior to session.  Patient found up in chair with PureWick, telemetry, oxygen, perineural catheter upon PT entry to room.     General Precautions:  Standard, fall  Orthopedic Precautions: LLE weight bearing as tolerated, LLE posterior precautions  Braces: Knee immobilizer  Respiratory Status: Nasal cannula, flow 2 L/min     Functional Mobility:  Bed Mobility:     Sit to Supine: minimum assistance  Transfers:     Sit to Stand:  minimum assistance with rolling walker  Gait: pt amb with RW 10 ft with Radames with vcs for safety and upright posture      AM-PAC 6 CLICK MOBILITY  Turning over in bed (including adjusting bedclothes, sheets and blankets)?: 2  Sitting down on and standing up from a chair with arms (e.g., wheelchair, bedside commode, etc.): 2  Moving from lying on back to sitting on the side of the bed?: 2  Moving to and from a bed to a chair (including a wheelchair)?: 2  Need to walk in hospital room?: 3  Climbing 3-5 steps with a railing?: 2  Basic Mobility Total Score: 13       Treatment & Education:  Therapist provided instruction and educated of  patient on progress, safety,d/c,PT POC,   proper body mechanics, energy conservation, and fall prevention strategies during tasks listed above, on the effects of prolonged immobility and the importance of performing OOB activity and exercises to promote healing and reduce recovery time    Updated white board with appropriate PT mobility information for medical team notification     Donned an extra gown     Call nursing/pct to transfer to chair/use bathroom. Pt stated understanding      Bedside table in front of patient and area set up for function, convenience, and safety. RN aware of patient's mobility needs and status. Questions/concerns addressed within PTA scope of practice; patient  with no further questions. Time was provided for active listening, discussion of health disposition, and discussion of safe discharge. Pt?verbalized?agreement .    Patient left HOB elevated with all lines intact, call button in reach, and nsg and family present..    GOALS:   Multidisciplinary Problems       Physical Therapy  Goals          Problem: Physical Therapy    Goal Priority Disciplines Outcome Goal Variances Interventions   Physical Therapy Goal     PT, PT/OT Ongoing, Progressing     Description: Goals to met by 1/28/2023    1. Supine to sit with Modified Reynolds  2. Sit to supine with Modified Reynolds  3. Rolling to Left and Right with Modified Reynolds.  4. Sit to stand transfer with Supervision  5. Bed to chair transfer with Stand-by Assistance using LRAD  6. Gait  x 50 feet with Stand-by Assistance using LRAD   7. Lower extremity exercise program x15 reps per Instruction, with assistance as needed in order to facilitate improved strength, improved postural control, and improvement in functional independence                         Time Tracking:     PT Received On: 01/16/23  PT Start Time: 0946     PT Stop Time: 1010  PT Total Time (min): 24 min     Billable Minutes: Gait Training 12 and Therapeutic Activity 12    Treatment Type: Treatment  PT/PTA: PTA     PTA Visit Number: 2     01/16/2023

## 2023-01-16 NOTE — PLAN OF CARE
01/16/23 0949   Post-Acute Status   Post-Acute Authorization Placement   Post-Acute Placement Status Referrals Sent   Discharge Delays (!) Post-Acute Set-up   Discharge Plan   Discharge Plan A Skilled Nursing Facility   Discharge Plan B Skilled Nursing Facility     Met with Julio Mullins's niece, to review discharge recommendation of SNF and is agreeable to plan    Provided list of facilities in-network with patient's payor plan. Notified that blast referral sent to below listed facilities from list based on proximity to home/family support:   [facility]    Preferred Facility: (if more than 1, listed in order of descending preference)  Family stated they want an in network facility, they wanted [facility], however, it is not in network.     If an additional preferred facility not listed above is identified, additional referral to be sent. If above facilities unable to accept, will send additional referrals to in-network providers.     NICOLE Ramirez, LMSW [facility]  [id number]

## 2023-01-16 NOTE — PROGRESS NOTES
"[facility]  Orthopedics  Progress Note    Patient Name: Lynn Mckinney  MRN: [id number]  Admission Date: 1/12/2023  Hospital Length of Stay: 4 days  Attending Provider: Pauline Wiley MD  Primary Care Provider: Brie Fagan MD  Follow-up For: Procedure(s) (LRB):  HEMIARTHROPLASTY, HIP, LEFT (Left)    Post-Operative Day: 3 Days Post-Op  Subjective:     Principal Problem:Closed displaced fracture of left femoral neck    Principal Orthopedic Problem: s/p left hemiarthroplasty 01/13/23    Interval History:   [facility] place, SCDs functioning. Pt states her pain is well controlled.     Vitals: Hypertensive overnight range 118//70, otherwise VS WNL    PT/OT update:   - PT recommends SNF  - Pt was t/f bedside chair to bed by nurse and only agreed to therapeutic exercises.    Need to know:   - WBAT LLE with walker      Review of patient's allergies indicates:   Allergen Reactions    Lipitor [atorvastatin] Itching    Fosamax [alendronate]      Felt "strange"    Iodinated contrast media      Dye is only to be used if absolutely needed because of kidney function    Prolia [denosumab]      Reaction after the Prolia    Sulfa (sulfonamide antibiotics)        Current Facility-Administered Medications   Medication    acetaminophen tablet 1,000 mg    amLODIPine tablet 10 mg    apixaban tablet 2.5 mg    bisacodyL suppository 10 mg    celecoxib capsule 100 mg    hydrOXYzine HCL tablet 25 mg    labetaloL tablet 200 mg    levothyroxine tablet 125 mcg    losartan tablet 50 mg    melatonin tablet 6 mg    memantine tablet 5 mg    methocarbamoL tablet 500 mg    methocarbamoL tablet 500 mg    mupirocin 2 % ointment 1 g    ondansetron injection 4 mg    polyethylene glycol packet 17 g    ROPIvacaine (PF) 2 mg/ml (0.2%) solution    ROPIvacaine (PF) 2 mg/ml (0.2%) solution    senna-docusate 8.6-50 mg per tablet 1 tablet    sodium chloride 0.9% " "flush 10 mL    sodium chloride 0.9% flush 10 mL    sodium chloride 0.9% flush 10 mL     Objective:     Vital Signs (Most Recent):  Temp: 97.9 °F (36.6 °C) (01/15/23 0556)  Pulse: 63 (01/15/23 0556)  Resp: 16 (01/15/23 0556)  BP: (!) 146/65 (01/15/23 0556)  SpO2: 98 % (01/15/23 0556)   Vital Signs (24h Range):  Temp:  [97.6 °F (36.4 °C)-98.5 °F (36.9 °C)] 97.9 °F (36.6 °C)  Pulse:  [59-78] 63  Resp:  [16-18] 16  SpO2:  [95 %-98 %] 98 %  BP: (118-166)/(53-65) 146/65     Weight: 63.5 kg (140 lb)  Height: 5' 4" (162.6 cm)  Body mass index is 24.03 kg/m².      Intake/Output Summary (Last 24 hours) at 1/15/2023 0709  Last data filed at 1/15/2023 0028  Gross per 24 hour   Intake 477 ml   Output --   Net 477 ml         Ortho/SPM Exam  Gen: NAD, sitting comfortably in bed  CV: regular rate  Resp: non-labored respirations    LLE:  Dressing over lateral hip and lateral thigh CDI, without erythema, swelling, or bruising  No TTP   SILT Sa/Arcos/DP/SP/T  Motor intact EHL/FHL/TA/Gastroc/Quad  2+ DP, 2+ PT    Significant Labs:   Recent Lab Results         01/15/23  0314        Anion Gap 6       Baso # 0.02       Basophil % 0.3       BUN 26       Calcium 8.2       Chloride 107       CO2 23       Creatinine 1.3       Differential Method Automated       eGFR 39.8       Eos # 0.3       Eosinophil % 4.7       Glucose 106       Gran # (ANC) 5.4       Gran % 73.8       Hematocrit 31.4       Hemoglobin 10.1       Immature Grans (Abs) 0.04  Comment: Mild elevation in immature granulocytes is non specific and   can be seen in a variety of conditions including stress response,   acute inflammation, trauma and pregnancy. Correlation with other   laboratory and clinical findings is essential.         Immature Granulocytes 0.6       Lymph # 0.7       Lymph % 9.6       Magnesium 2.0       MCH 32.7       MCHC 32.2              Mono # 0.8       Mono % 11.0       MPV 9.8       nRBC 0       Phosphorus 2.8       Platelets 99       Potassium " 3.9       RBC 3.09       RDW 12.4       Sodium 136       WBC 7.27             All pertinent labs within the past 24 hours have been reviewed.    Significant Imaging: I have reviewed and interpreted all pertinent imaging results/findings.Principal Problem:Closed displaced fracture of left femoral neck        Assessment/Plan:     * Closed displaced fracture of left femoral neck sp Left hip hemiarthroplasty 1.13.23  Lynn Mckinney is a 87 y.o. female with a left femoral neck fracture s/p left hemiarthroplasty 01/13/23    Plan:  - Admitted to [facility] Medicine Hip Fracture service for medical management  - DVT PPx: eliquis 2.5mg BID  - Abx: completed post op 24hr course of ancef  - Labs: Hgb 10.1 yesterday, Morning labs pending will f/u  - Continue PT/OT work    Dispo: Pt recommends SNF placement          STUART PABLO MD  Orthopedics  [facility]

## 2023-01-16 NOTE — SUBJECTIVE & OBJECTIVE
"Principal Problem:Closed displaced fracture of left femoral neck    Principal Orthopedic Problem: s/p left hemiarthroplasty 01/13/23    Interval History:   [facility] place, SCDs functioning. Pt states her pain is well controlled.     Vitals: Hypertensive overnight range 118//70, otherwise VS WNL    PT/OT update:   - PT recommends SNF  - Pt was t/f bedside chair to bed by nurse and only agreed to therapeutic exercises.    Need to know:   - WBAT LLE with walker      Review of patient's allergies indicates:   Allergen Reactions    Lipitor [atorvastatin] Itching    Fosamax [alendronate]      Felt "strange"    Iodinated contrast media      Dye is only to be used if absolutely needed because of kidney function    Prolia [denosumab]      Reaction after the Prolia    Sulfa (sulfonamide antibiotics)        Current Facility-Administered Medications   Medication    acetaminophen tablet 1,000 mg    amLODIPine tablet 10 mg    apixaban tablet 2.5 mg    bisacodyL suppository 10 mg    celecoxib capsule 100 mg    hydrOXYzine HCL tablet 25 mg    labetaloL tablet 200 mg    levothyroxine tablet 125 mcg    losartan tablet 50 mg    melatonin tablet 6 mg    memantine tablet 5 mg    methocarbamoL tablet 500 mg    methocarbamoL tablet 500 mg    mupirocin 2 % ointment 1 g    ondansetron injection 4 mg    polyethylene glycol packet 17 g    ROPIvacaine (PF) 2 mg/ml (0.2%) solution    ROPIvacaine (PF) 2 mg/ml (0.2%) solution    senna-docusate 8.6-50 mg per tablet 1 tablet    sodium chloride 0.9% flush 10 mL    sodium chloride 0.9% flush 10 mL    sodium chloride 0.9% flush 10 mL     Objective:     Vital Signs (Most Recent):  Temp: 97.9 °F (36.6 °C) (01/15/23 0556)  Pulse: 63 (01/15/23 0556)  Resp: 16 (01/15/23 0556)  BP: (!) 146/65 (01/15/23 0556)  SpO2: 98 % (01/15/23 0556)   Vital Signs (24h Range):  Temp:  [97.6 °F (36.4 °C)-98.5 °F (36.9 °C)] 97.9 °F (36.6 °C)  Pulse:  [59-78] " "63  Resp:  [16-18] 16  SpO2:  [95 %-98 %] 98 %  BP: (118-166)/(53-65) 146/65     Weight: 63.5 kg (140 lb)  Height: 5' 4" (162.6 cm)  Body mass index is 24.03 kg/m².      Intake/Output Summary (Last 24 hours) at 1/15/2023 0709  Last data filed at 1/15/2023 0028  Gross per 24 hour   Intake 477 ml   Output --   Net 477 ml         Ortho/SPM Exam  Gen: NAD, sitting comfortably in bed  CV: regular rate  Resp: non-labored respirations    LLE:  Dressing over lateral hip and lateral thigh CDI, without erythema, swelling, or bruising  No TTP   SILT Sa/Arcos/DP/SP/T  Motor intact EHL/FHL/TA/Gastroc/Quad  2+ DP, 2+ PT    Significant Labs:   Recent Lab Results         01/15/23  0314        Anion Gap 6       Baso # 0.02       Basophil % 0.3       BUN 26       Calcium 8.2       Chloride 107       CO2 23       Creatinine 1.3       Differential Method Automated       eGFR 39.8       Eos # 0.3       Eosinophil % 4.7       Glucose 106       Gran # (ANC) 5.4       Gran % 73.8       Hematocrit 31.4       Hemoglobin 10.1       Immature Grans (Abs) 0.04  Comment: Mild elevation in immature granulocytes is non specific and   can be seen in a variety of conditions including stress response,   acute inflammation, trauma and pregnancy. Correlation with other   laboratory and clinical findings is essential.         Immature Granulocytes 0.6       Lymph # 0.7       Lymph % 9.6       Magnesium 2.0       MCH 32.7       MCHC 32.2              Mono # 0.8       Mono % 11.0       MPV 9.8       nRBC 0       Phosphorus 2.8       Platelets 99       Potassium 3.9       RBC 3.09       RDW 12.4       Sodium 136       WBC 7.27             All pertinent labs within the past 24 hours have been reviewed.    Significant Imaging: I have reviewed and interpreted all pertinent imaging results/findings.Principal Problem:Closed displaced fracture of left femoral neck      "

## 2023-01-16 NOTE — ASSESSMENT & PLAN NOTE
Age-related osteoporosis with current pathological fracture with routine healing  · Patient underwent left hip hemiarthroplasty on 1/13/2023 for femoral neck fracture.   · Patient reports minimal pain in left hip.   · Plan is to continue PT/OT for gait training and strengthening and restoration of ADL's. Patient is FWB/WBAT: left lower extremity, posterior hip precautions as per Orthopedic recommendations.   · Plan is to continue Apixiban 2.5 mg po BID and will need a total of 30 days post-op after hip fracture surgery and JOSSUE/SCDs for DVT prophylaxis.   · Orthopedics is following and managing hip fracture and surgical site.   · Perineural pain catheter in place with continuous Ropivacaine infusion for pain control and being managed by Anesthesia Pain Service.   · Pain controlled. Patient on multimodal pain management post-op with Tylenol 1000 mg po every 6 hours, Robaxin 500 mg po 4 times daily and Celebrex 100 mg po daily post-op and will continue.  · Plan is SNF on discharge. Medically ready and awaiting placement,

## 2023-01-17 LAB
ANION GAP SERPL CALC-SCNC: 8 MMOL/L (ref 8–16)
BUN SERPL-MCNC: 17 MG/DL (ref 8–23)
CALCIUM SERPL-MCNC: 8.8 MG/DL (ref 8.7–10.5)
CHLORIDE SERPL-SCNC: 107 MMOL/L (ref 95–110)
CO2 SERPL-SCNC: 22 MMOL/L (ref 23–29)
CREAT SERPL-MCNC: 1 MG/DL (ref 0.5–1.4)
EST. GFR  (NO RACE VARIABLE): 54.5 ML/MIN/1.73 M^2
GLUCOSE SERPL-MCNC: 116 MG/DL (ref 70–110)
POTASSIUM SERPL-SCNC: 4.4 MMOL/L (ref 3.5–5.1)
SARS-COV-2 RNA RESP QL NAA+PROBE: NOT DETECTED
SODIUM SERPL-SCNC: 137 MMOL/L (ref 136–145)

## 2023-01-17 PROCEDURE — 99232 SBSQ HOSP IP/OBS MODERATE 35: CPT | Mod: ,,, | Performed by: INTERNAL MEDICINE

## 2023-01-17 PROCEDURE — 11000001 HC ACUTE MED/SURG PRIVATE ROOM

## 2023-01-17 PROCEDURE — 97535 SELF CARE MNGMENT TRAINING: CPT

## 2023-01-17 PROCEDURE — 25000003 PHARM REV CODE 250

## 2023-01-17 PROCEDURE — U0005 INFEC AGEN DETEC AMPLI PROBE: HCPCS | Performed by: INTERNAL MEDICINE

## 2023-01-17 PROCEDURE — 97530 THERAPEUTIC ACTIVITIES: CPT | Mod: CQ

## 2023-01-17 PROCEDURE — 25000003 PHARM REV CODE 250: Performed by: FAMILY MEDICINE

## 2023-01-17 PROCEDURE — 30200315 PPD INTRADERMAL TEST REV CODE 302: Performed by: INTERNAL MEDICINE

## 2023-01-17 PROCEDURE — 80048 BASIC METABOLIC PNL TOTAL CA: CPT | Performed by: FAMILY MEDICINE

## 2023-01-17 PROCEDURE — 36415 COLL VENOUS BLD VENIPUNCTURE: CPT | Performed by: FAMILY MEDICINE

## 2023-01-17 PROCEDURE — 25000003 PHARM REV CODE 250: Performed by: STUDENT IN AN ORGANIZED HEALTH CARE EDUCATION/TRAINING PROGRAM

## 2023-01-17 PROCEDURE — 86580 TB INTRADERMAL TEST: CPT | Performed by: INTERNAL MEDICINE

## 2023-01-17 PROCEDURE — 99232 PR SUBSEQUENT HOSPITAL CARE,LEVL II: ICD-10-PCS | Mod: ,,, | Performed by: INTERNAL MEDICINE

## 2023-01-17 PROCEDURE — 97116 GAIT TRAINING THERAPY: CPT | Mod: CQ

## 2023-01-17 PROCEDURE — 97530 THERAPEUTIC ACTIVITIES: CPT

## 2023-01-17 RX ORDER — METHOCARBAMOL 500 MG/1
500 TABLET, FILM COATED ORAL EVERY 8 HOURS
Status: DISCONTINUED | OUTPATIENT
Start: 2023-01-17 | End: 2023-01-19 | Stop reason: HOSPADM

## 2023-01-17 RX ORDER — ACETAMINOPHEN 325 MG/1
650 TABLET ORAL EVERY 8 HOURS
Status: DISCONTINUED | OUTPATIENT
Start: 2023-01-17 | End: 2023-01-19 | Stop reason: HOSPADM

## 2023-01-17 RX ADMIN — LABETALOL HYDROCHLORIDE 200 MG: 100 TABLET, FILM COATED ORAL at 08:01

## 2023-01-17 RX ADMIN — CELECOXIB 100 MG: 100 CAPSULE ORAL at 08:01

## 2023-01-17 RX ADMIN — ACETAMINOPHEN 650 MG: 325 TABLET ORAL at 10:01

## 2023-01-17 RX ADMIN — POLYETHYLENE GLYCOL 3350 17 G: 17 POWDER, FOR SOLUTION ORAL at 08:01

## 2023-01-17 RX ADMIN — METHOCARBAMOL 500 MG: 500 TABLET ORAL at 08:01

## 2023-01-17 RX ADMIN — MEMANTINE HYDROCHLORIDE 5 MG: 5 TABLET ORAL at 08:01

## 2023-01-17 RX ADMIN — METHOCARBAMOL 500 MG: 750 TABLET ORAL at 10:01

## 2023-01-17 RX ADMIN — APIXABAN 2.5 MG: 2.5 TABLET, FILM COATED ORAL at 08:01

## 2023-01-17 RX ADMIN — AMLODIPINE BESYLATE 10 MG: 10 TABLET ORAL at 08:01

## 2023-01-17 RX ADMIN — ACETAMINOPHEN 650 MG: 325 TABLET ORAL at 02:01

## 2023-01-17 RX ADMIN — METHOCARBAMOL 500 MG: 750 TABLET ORAL at 02:01

## 2023-01-17 RX ADMIN — MUPIROCIN 1 G: 20 OINTMENT TOPICAL at 08:01

## 2023-01-17 RX ADMIN — METHOCARBAMOL 500 MG: 500 TABLET ORAL at 05:01

## 2023-01-17 RX ADMIN — SENNOSIDES AND DOCUSATE SODIUM 1 TABLET: 50; 8.6 TABLET ORAL at 08:01

## 2023-01-17 RX ADMIN — LABETALOL HYDROCHLORIDE 200 MG: 100 TABLET, FILM COATED ORAL at 11:01

## 2023-01-17 RX ADMIN — LEVOTHYROXINE SODIUM 125 MCG: 125 TABLET ORAL at 05:01

## 2023-01-17 RX ADMIN — MEMANTINE HYDROCHLORIDE 5 MG: 5 TABLET ORAL at 11:01

## 2023-01-17 RX ADMIN — SENNOSIDES AND DOCUSATE SODIUM 1 TABLET: 50; 8.6 TABLET ORAL at 11:01

## 2023-01-17 RX ADMIN — APIXABAN 2.5 MG: 2.5 TABLET, FILM COATED ORAL at 11:01

## 2023-01-17 RX ADMIN — LOSARTAN POTASSIUM 50 MG: 50 TABLET, FILM COATED ORAL at 08:01

## 2023-01-17 RX ADMIN — TUBERCULIN PURIFIED PROTEIN DERIVATIVE 5 UNITS: 5 INJECTION, SOLUTION INTRADERMAL at 12:01

## 2023-01-17 NOTE — ANESTHESIA POST-OP PAIN MANAGEMENT
Acute Pain Service Progress Note    Lynn Mckinney is a 87 y.o., female, [id number].    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  pain in both hands, both shoulders and her left ankle 3/15 while on 80 mg of atorvastatin   Switched to rosuvastatin 20 in 3/15          Resolved Hospital Problems   No resolved problems to display.       Subjective:     General appearance of alert, oriented, no complaints   Pain with rest: 1    Numbers   Pain with movement: 4    Numbers   Side Effects    1. Pruritis No    2. Nausea No    3. Motor Blockade No, 0=Ability to raise lower extremities off bed    4. Sedation No, 1=awake and alert    Objective:        Catheter site clean, dry, intact         Vitals   Vitals:    01/17/23 0807   BP: (!) 156/72   Pulse: 75   Resp: 16   Temp: 36.8 °C (98.2 °F)        Labs    No results displayed because visit has over 200 results.           Meds   Current Facility-Administered Medications   Medication Dose Route Frequency Provider Last Rate  Last Admin    acetaminophen tablet 650 mg  650 mg Oral Q8H Felisa Duran MD        amLODIPine tablet 10 mg  10 mg Oral Daily Abdiel Perez MD   10 mg at 01/17/23 0834    apixaban tablet 2.5 mg  2.5 mg Oral BID Rj Contreras MD   2.5 mg at 01/17/23 0834    bisacodyL suppository 10 mg  10 mg Rectal Daily PRN Abdiel Perez MD        hydrOXYzine HCL tablet 25 mg  25 mg Oral TID PRN Abdiel Perez MD        labetaloL tablet 200 mg  200 mg Oral BID Abdiel Perez MD   200 mg at 01/17/23 0834    levothyroxine tablet 125 mcg  125 mcg Oral Before breakfast Abdiel Perez MD   125 mcg at 01/17/23 0528    losartan tablet 50 mg  50 mg Oral Daily Abdiel Perez MD   50 mg at 01/17/23 0834    melatonin tablet 6 mg  6 mg Oral Nightly PRN Abdiel Perez MD        memantine tablet 5 mg  5 mg Oral BID Abdiel Perez MD   5 mg at 01/17/23 0834    methocarbamoL tablet 500 mg  500 mg Oral Q6H PRN Abdiel Perez MD   500 mg at 01/16/23 0615    methocarbamoL tablet 500 mg  500 mg Oral Q8H Felisa Duran MD        mupirocin 2 % ointment 1 g  1 g Nasal BID Abdiel Perez MD   1 g at 01/17/23 0835    ondansetron injection 4 mg  4 mg Intravenous Q12H PRN Abdiel Perez MD        polyethylene glycol packet 17 g  17 g Oral Daily Abdiel Perez MD   17 g at 01/17/23 0835    senna-docusate 8.6-50 mg per tablet 1 tablet  1 tablet Oral BID Abdiel Perez MD   1 tablet at 01/17/23 0834    sodium chloride 0.9% flush 10 mL  10 mL Intravenous PRN Abdiel Perez MD                Assessment:     Pain control adequate    Plan:  -- Discontinue L SIFI PNC   -- Continue multimodal pain regimen: tylenol 1g q8h, celebrex 100 mg qd, robaxin 500mg q6h prn           Thank you for the consult and allowing us to participate in the care of this patient. We will sign off. Please call Acute Pain Service at [phone] or Anesthesia at [phone] if you have any further questions or  concerns.

## 2023-01-17 NOTE — ASSESSMENT & PLAN NOTE
Age-related osteoporosis with current pathological fracture with routine healing  · Patient underwent left hip hemiarthroplasty on 1/13/2023 for femoral neck fracture.   · Patient reports minimal pain in left hip.   · Plan is to continue PT/OT for gait training and strengthening and restoration of ADL's. Patient is FWB/WBAT: left lower extremity, posterior hip precautions as per Orthopedic recommendations.   · Plan is to continue Apixiban 2.5 mg po BID and will need a total of 30 days post-op after hip fracture surgery and JOSSUE/SCDs for DVT prophylaxis.   · Orthopedics is following and managing hip fracture and surgical site.   · Perineural pain catheter removed by Anesthesia.   · Pain controlled. Patient on multimodal pain management post-op with Tylenol 1000 mg po every 6 hours, Robaxin 500 mg po 4 times daily and Celebrex 100 mg po daily post-op and will continue.  · Plan is SNF on discharge. Medically ready and awaiting placement at [facility] where she has been accepted and awaiting insurance auth.

## 2023-01-17 NOTE — ADDENDUM NOTE
Addendum  created 01/17/23 0845 by Estefanía Carolina RN    Allergies reviewed, Clinical Note Signed, LDA removed, Order list changed, Pharmacy for encounter modified

## 2023-01-17 NOTE — PLAN OF CARE
Team reports patient is medically stable for discharge to Skilled today. Patient accepted by [facility]. Patient and brother agreeable to placement and [facility]. Care port message sent to facility requesting brother be contacted @ [phone] to sign paper work as well as auth be submitted to [facility]. Call placed to facility and message left for Amber in admitting. Will continue to follow for needs.      1215PM----Second call placed to admissions department at facility regarding pending admission.  reports Amber with a patient and to call back in 30mins to an hour. Will continue to follow.

## 2023-01-17 NOTE — PT/OT/SLP PROGRESS
Physical Therapy co-Treatment    Patient Name:  Lynn Mckinney   MRN:  [id number]    Recommendations:     Discharge Recommendations: nursing facility, skilled  Discharge Equipment Recommendations: walker, rolling, bath bench, bedside commode  Barriers to discharge: increased level of assistance needed at this time    Assessment:     Lynn Mckinney is a 87 y.o. female admitted with a medical diagnosis of Closed displaced fracture of left femoral neck.  She presents with the following impairments/functional limitations: weakness, impaired endurance, impaired functional mobility, gait instability, decreased lower extremity function, decreased coordination, decreased safety awareness, decreased ROM, impaired cognition, impaired balance, impaired cardiopulmonary response to activity .pt limited due to fatigue. Pt tolerated treatment fairly well and progressing slowly with mobility. Pt would continue to benefit from skilled PT to address overall functional mobility, to return to functional baseline and goals. Goals remain appropriate.    Rehab Prognosis: Good; patient would benefit from acute skilled PT services to address these deficits and reach maximum level of function.    Recent Surgery: Procedure(s) (LRB):  HEMIARTHROPLASTY, HIP, LEFT (Left) 4 Days Post-Op    Plan:     During this hospitalization, patient to be seen daily to address the identified rehab impairments via gait training, therapeutic activities, therapeutic exercises, neuromuscular re-education and progress toward the following goals:    Plan of Care Expires:  02/13/23    Subjective     Chief Complaint: I am tired, I don't feel like doing much walking  Pain/Comfort:  Pain Rating 1: 0/10  Pain Rating Post-Intervention 1: 0/10      Objective:     Communicated with RN prior to session.  Patient found HOB elevated with  (PureWick; telemetry; oxygen; perineural catheter) upon PT entry to room.     General Precautions: Standard, fall  Orthopedic Precautions: LLE  weight bearing as tolerated  Braces: Knee immobilizer  Respiratory Status: Nasal cannula, flow 2 L/min     Functional Mobility:  Bed Mobility:     Rolling/Turning to Right with minimum assistance   Scooting/Bridging with minimum assistance   Supine to Sit with minimum assistance   Transfers:     Sit to Stand:  minimum assistance with rolling walker  Bed <> Chair Transfer using Step Transfer technique with minimum assistance with rolling walker  Gait: pt amb with RW 12 ft with Radames with vcs for safety and upright posture and safety    AM-PAC 6 CLICK MOBILITY  Turning over in bed (including adjusting bedclothes, sheets and blankets)?: 2  Sitting down on and standing up from a chair with arms (e.g., wheelchair, bedside commode, etc.): 2  Moving from lying on back to sitting on the side of the bed?: 2  Moving to and from a bed to a chair (including a wheelchair)?: 2  Need to walk in hospital room?: 3  Climbing 3-5 steps with a railing?: 2  Basic Mobility Total Score: 13       Treatment & Education:  Therapist provided instruction and educated of  patient on progress, safety,d/c,PT POC,   proper body mechanics, energy conservation, and fall prevention strategies during tasks listed above, on the effects of prolonged immobility and the importance of performing OOB activity and exercises to promote healing and reduce recovery time     Updated white board with appropriate PT mobility information for medical team notification      Donned an extra gown      Call nursing/pct to transfer to chair/use bathroom. Pt stated understanding        Bedside table in front of patient and area set up for function, convenience, and safety. RN aware of patient's mobility needs and status. Questions/concerns addressed within PTA scope of practice; patient  with no further questions. Time was provided for active listening, discussion of health disposition, and discussion of safe discharge. Pt?verbalized?agreement .     Patient left [facility] with  all lines intact, call button in reach, and nsg and family present..    GOALS:   Multidisciplinary Problems       Physical Therapy Goals          Problem: Physical Therapy    Goal Priority Disciplines Outcome Goal Variances Interventions   Physical Therapy Goal     PT, PT/OT Ongoing, Progressing     Description: Goals to met by 1/28/2023    1. Supine to sit with Modified Bennett  2. Sit to supine with Modified Bennett  3. Rolling to Left and Right with Modified Bennett.  4. Sit to stand transfer with Supervision  5. Bed to chair transfer with Stand-by Assistance using LRAD  6. Gait  x 50 feet with Stand-by Assistance using LRAD   7. Lower extremity exercise program x15 reps per Instruction, with assistance as needed in order to facilitate improved strength, improved postural control, and improvement in functional independence                         Time Tracking:     PT Received On: 01/17/23  PT Start Time: 0841     PT Stop Time: 0909  PT Total Time (min): 28 min     Billable Minutes: Gait Training 15 and Therapeutic Activity 13    Treatment Type: Treatment  PT/PTA: PTA     PTA Visit Number: 3     01/17/2023

## 2023-01-17 NOTE — SUBJECTIVE & OBJECTIVE
"Principal Problem:Closed displaced fracture of left femoral neck    Principal Orthopedic Problem: s/p left hemiarthroplasty 01/13/23    Interval History:   Patient seen and examined at bedside.  No acute events overnight.  Pain controlled.  Cruised 10 ft with therapy. O2 sats in low 90s this am.         Review of patient's allergies indicates:   Allergen Reactions    Lipitor [atorvastatin] Itching    Fosamax [alendronate]      Felt "strange"    Iodinated contrast media      Dye is only to be used if absolutely needed because of kidney function    Prolia [denosumab]      Reaction after the Prolia    Sulfa (sulfonamide antibiotics)        Current Facility-Administered Medications   Medication    acetaminophen tablet 1,000 mg    amLODIPine tablet 10 mg    apixaban tablet 2.5 mg    bisacodyL suppository 10 mg    celecoxib capsule 100 mg    hydrOXYzine HCL tablet 25 mg    labetaloL tablet 200 mg    levothyroxine tablet 125 mcg    losartan tablet 50 mg    melatonin tablet 6 mg    memantine tablet 5 mg    methocarbamoL tablet 500 mg    methocarbamoL tablet 500 mg    mupirocin 2 % ointment 1 g    ondansetron injection 4 mg    polyethylene glycol packet 17 g    ROPIvacaine (PF) 2 mg/ml (0.2%) solution    ROPIvacaine (PF) 2 mg/ml (0.2%) solution    senna-docusate 8.6-50 mg per tablet 1 tablet    sodium chloride 0.9% flush 10 mL    sodium chloride 0.9% flush 10 mL    sodium chloride 0.9% flush 10 mL     Objective:     Vital Signs (Most Recent):  Temp: 97.9 °F (36.6 °C) (01/15/23 0556)  Pulse: 63 (01/15/23 0556)  Resp: 16 (01/15/23 0556)  BP: (!) 146/65 (01/15/23 0556)  SpO2: 98 % (01/15/23 0556)   Vital Signs (24h Range):  Temp:  [97.6 °F (36.4 °C)-98.5 °F (36.9 °C)] 97.9 °F (36.6 °C)  Pulse:  [59-78] 63  Resp:  [16-18] 16  SpO2:  [95 %-98 %] 98 %  BP: (118-166)/(53-65) 146/65     Weight: 63.5 kg (140 lb)  Height: 5' 4" (162.6 cm)  Body mass index is 24.03 kg/m².      Intake/Output Summary (Last 24 hours) at 1/15/2023 " 0709  Last data filed at 1/15/2023 0028  Gross per 24 hour   Intake 477 ml   Output --   Net 477 ml         Ortho/SPM Exam  Gen: NAD, sitting comfortably in bed  CV: regular rate  Resp: non-labored respirations    LLE:  Dressing over lateral hip and lateral thigh peeled up, dressing augmented. No erythema, swelling, or bruising  No TTP   SILT Sa/Arcos/DP/SP/T  Motor intact EHL/FHL/TA/Gastroc/Quad  2+ DP, 2+ PT    Significant Labs:   Recent Lab Results         01/15/23  0314        Anion Gap 6       Baso # 0.02       Basophil % 0.3       BUN 26       Calcium 8.2       Chloride 107       CO2 23       Creatinine 1.3       Differential Method Automated       eGFR 39.8       Eos # 0.3       Eosinophil % 4.7       Glucose 106       Gran # (ANC) 5.4       Gran % 73.8       Hematocrit 31.4       Hemoglobin 10.1       Immature Grans (Abs) 0.04  Comment: Mild elevation in immature granulocytes is non specific and   can be seen in a variety of conditions including stress response,   acute inflammation, trauma and pregnancy. Correlation with other   laboratory and clinical findings is essential.         Immature Granulocytes 0.6       Lymph # 0.7       Lymph % 9.6       Magnesium 2.0       MCH 32.7       MCHC 32.2              Mono # 0.8       Mono % 11.0       MPV 9.8       nRBC 0       Phosphorus 2.8       Platelets 99       Potassium 3.9       RBC 3.09       RDW 12.4       Sodium 136       WBC 7.27             All pertinent labs within the past 24 hours have been reviewed.    Significant Imaging: I have reviewed and interpreted all pertinent imaging results/findings.  None

## 2023-01-17 NOTE — PT/OT/SLP PROGRESS
Occupational Therapy   Co-Treatment  Co-treatment with PT for maximal pt participation, safety, and activity tolerance     Name: Lynn Mckinney  MRN: [id number]  Admitting Diagnosis:  Closed displaced fracture of left femoral neck  4 Days Post-Op    Recommendations:     Discharge Recommendations: nursing facility, skilled  Discharge Equipment Recommendations:  walker, rolling, bedside commode, bath bench  Barriers to discharge:       Assessment:     Lynn Mckinney is a 87 y.o. female with a medical diagnosis of Closed displaced fracture of left femoral neck.  She presents with impaired ADL and mobility performance deficits. Pt found upright in bed and agreeable for therapy. Pt reported 10/10 pain consistent throughout with LLE during movement. Pt required min A for bed mobility and min A using RW for short distance gait training in room. Pt currently is a high fall risk and not safe to return home. Pt would benefit from continued OT skilled services 4x/wk to improve daily living skills to optimize QOL.  Pt is recommended to discharge to SNF at this time.   Performance deficits affecting function are weakness, impaired functional mobility, impaired endurance, gait instability, impaired balance, impaired self care skills, decreased safety awareness, impaired cognition.     Rehab Prognosis:  Good; patient would benefit from acute skilled OT services to address these deficits and reach maximum level of function.       Plan:     Patient to be seen 4 x/week to address the above listed problems via self-care/home management, therapeutic activities, therapeutic exercises  Plan of Care Expires: 02/13/23  Plan of Care Reviewed with: patient, family    Subjective     Pain/Comfort:  Pain Rating 1: 0/10  Pain Rating Post-Intervention 1: 0/10    Objective:     Communicated with: RN prior to session.  Patient found HOB elevated with PureWick, telemetry, oxygen, perineural catheter upon OT entry to room.    General Precautions:  Standard, fall    Orthopedic Precautions:LLE weight bearing as tolerated  Braces: Knee immobilizer  Respiratory Status: Nasal cannula, flow 2 L/min     Occupational Performance:     Bed Mobility:    Patient completed Rolling/Turning to Right with minimum assistance  Patient completed Scooting/Bridging with minimum assistance  Patient completed Supine to Sit with minimum assistance     Functional Mobility/Transfers:  Patient completed Sit <> Stand Transfer with minimum assistance  with  hand-held assist   Patient completed Bed <> Chair Transfer using Step Transfer technique with minimum assistance with rolling walker  Functional Mobility: Pt stood with min-HHA from bed with pivot to chair for ADL setup. From chair pt took ~10 steps forward and backwards using RW with min A. Pt with poor environmental scanning     Activities of Daily Living:  Grooming: stand by assistance washing face and brushing teeth in chair   Upper Body Dressing: minimum assistance donning gown       [facility] 6 Click ADL: 16    Treatment & Education:  Pt educated on role of occupational therapy, POC, and safety during ADLs and functional mobility. Pt and OT discussed importance of safe, continued mobility to optimize daily living skills. Pt verbalized understanding.     White board updated during session. Pt given instruction to call for medical staff/nurse for assistance.       Patient left up in chair with all lines intact, call button in reach, and RN notified    GOALS:   Multidisciplinary Problems       Occupational Therapy Goals          Problem: Occupational Therapy    Goal Priority Disciplines Outcome Interventions   Occupational Therapy Goal     OT, PT/OT Ongoing, Progressing    Description: Goals to be met by: 1/28/2023    Patient will increase functional independence with ADLs by performing:    Feeding with [place].  UE Dressing with Supervision.  LE Dressing with Supervision.  Grooming while standing at sink with Stand-by  Assistance.  Toileting from toilet with Minimal Assistance for hygiene and clothing management.   Rolling to Right with Stand-by Assistance.   Supine to sit with Stand-by Assistance.  Stand pivot transfers with Stand-by Assistance.  Step transfer with Stand-by Assistance                         Time Tracking:     OT Date of Treatment: 01/17/23  OT Start Time: 0841  OT Stop Time: 0910  OT Total Time (min): 29 min    Billable Minutes:Self Care/Home Management 15 min  Therapeutic Activity 14 min    OT/EUGENE: OT     EUGENE Visit Number: 0    1/17/2023

## 2023-01-17 NOTE — PROGRESS NOTES
[facility] Medicine  Progress Note    Patient Name: Lynn Mckinney  MRN: [id number]  Patient Class: IP- Inpatient   Admission Date: 1/12/2023  Length of Stay: 5 days  Attending Physician: Pauline Wiley MD  Primary Care Provider: Brie Fagan MD        Subjective:     Principal Problem:Closed displaced fracture of left femoral neck        HPI:  This is an 88yo female with a past medical history of CAD sp CABG, PAD, combined systolic diastolic heart failure, heart block sp pacemaker, HTN, HLD, and hypothyroidism who presents to the ED with chief complaint of fall and left leg pain. Patient reports feeling at her baseline health and going out to eat with friends this afternoon. She came back and took a nap on her chair. States that she woke up from napping and got up to move around. States that she was in just socks on her hardwood floor and one of her feet slipped from under her and she fell on her side. Was not able to stand or ambulate after fall and with severe left hip pain. Brought to the ED by EMS.    In the ED patient afebrile and hemodynamically stable saturating well on room air. Xray with acute Lt femoral neck fracture. Orthopedic surgery consulted and recommended admission to medicine with plan for OR in am.        Overview/Hospital Course:  Patient admitted to [facility] Medicine Team H: Hip Fracture team and started on Hip Fracture Pathway with Orthopedic surgery consult for hip fracture. Patient was seen and evaluated by Orthopedic surgery who recommended operative repair of hip fracture. Patient was medically optimized prior to surgery and to be taken to OR after optimization on 1/13/2023. Patient underwent left hip hemiarthroplasty by Dr. Alan Vieyra. Post-op patient WBAT with posterior hip precautions to the left lower extremity as per Orthopedics recommendation. Patient placed on Apixiban 2.5 mg po BID and JOSSUE/SCD's for DVT prophylaxis post-op and will need for total of  35 days. Perineural pain catheter placed by Anesthesia Pain Service with continuous infusion of Ropivacaine to help with pain control post-op and Anesthesia Pain Service managing while patient in the hospital. Patient placed on multimodal pain management post-op with Tylenol 1000 mg po every 6 hours, Robaxin 500 mg po 4 times daily, and Celebrex 100 mg po daily post-op and will continue. PT/OT consulted post-op and evaluated patient. Patient progressing well with PT and OT and recommended skilled nursing facility placement and awaiting SNF placement for discharge. Patient accepted to [facility] and awaiting insurance auth on 1/17. Patient doing well and pain controlled. PNC has been removed.         Interval History: Patient in good spirits today. Patient has been accepted to [facility] and awaiting insurance authorization. Patient reports pain in left hip is controlled. Hgb stable at 10.9. Patient medically ready for discharge and just awaiting insurance auth.     Review of Systems   Respiratory:  Negative for cough and shortness of breath.    Cardiovascular:  Negative for leg swelling.   Gastrointestinal:  Negative for abdominal pain, nausea and vomiting.   Musculoskeletal:  Positive for arthralgias (Left hip).   Neurological:  Negative for dizziness.   Psychiatric/Behavioral:  Negative for agitation and confusion.    Objective:     Vital Signs (Most Recent):  Temp: 96.5 °F (35.8 °C) (01/17/23 1145)  Pulse: 73 (01/17/23 1424)  Resp: 17 (01/17/23 1145)  BP: 137/65 (01/17/23 1145)  SpO2: 93 % (01/17/23 1146) on 1 liter of oxygen   Vital Signs (24h Range):  Temp:  [96.5 °F (35.8 °C)-98.6 °F (37 °C)] 96.5 °F (35.8 °C)  Pulse:  [60-76] 73  Resp:  [16-18] 17  SpO2:  [91 %-98 %] 93 %  BP: (137-161)/(65-80) 137/65     Weight: 63.5 kg (140 lb)  Body mass index is 24.03 kg/m².    Intake/Output Summary (Last 24 hours) at 1/17/2023 1428  Last data filed at 1/17/2023 0948  Gross per 24 hour   Intake 400 ml   Output 1275 ml    Net -875 ml      Physical Exam  Vitals and nursing note reviewed.   Constitutional:       General: She is awake. She is not in acute distress.     Appearance: Normal appearance. She is normal weight. She is not ill-appearing.   Cardiovascular:      Rate and Rhythm: Normal rate and regular rhythm.      Heart sounds: Normal heart sounds. No murmur heard.    No friction rub. No gallop.   Pulmonary:      Effort: Pulmonary effort is normal. No respiratory distress.      Breath sounds: Normal breath sounds. No wheezing.   Abdominal:      General: Abdomen is flat. Bowel sounds are normal. There is no distension.      Palpations: Abdomen is soft.      Tenderness: There is no abdominal tenderness.   Musculoskeletal:      Right lower leg: No edema.      Left lower leg: No edema.   Skin:     General: Skin is warm.      Findings: No erythema.      Comments: Surgical dressing to left buttock area intact with no drainage.    Neurological:      Mental Status: She is alert and oriented to person, place, and time.   Psychiatric:         Mood and Affect: Mood normal.         Behavior: Behavior normal. Behavior is cooperative.         Thought Content: Thought content normal.         Judgment: Judgment normal.       Significant Labs: CBC:   Recent Labs   Lab 01/16/23  0350   WBC 7.65   HGB 10.9*   HCT 34.4*   *     CMP:   Recent Labs   Lab 01/16/23  0350 01/17/23  0210    137   K 4.2 4.4    107   CO2 24 22*   GLU 88 116*   BUN 22 17   CREATININE 1.1 1.0   CALCIUM 8.6* 8.8   ANIONGAP 6* 8       Significant Imaging: I have reviewed all pertinent imaging results/findings within the past 24 hours.      Assessment/Plan:      * Closed displaced fracture of left femoral neck s/p left hip hemiarthroplasty on 1/13/2023  Age-related osteoporosis with current pathological fracture with routine healing  · Patient underwent left hip hemiarthroplasty on 1/13/2023 for femoral neck fracture.   · Patient reports minimal pain in  left hip.   · Plan is to continue PT/OT for gait training and strengthening and restoration of ADL's. Patient is FWB/WBAT: left lower extremity, posterior hip precautions as per Orthopedic recommendations.   · Plan is to continue Apixiban 2.5 mg po BID and will need a total of 30 days post-op after hip fracture surgery and JOSSUE/SCDs for DVT prophylaxis.   · Orthopedics is following and managing hip fracture and surgical site.   · Perineural pain catheter removed by Anesthesia.   · Pain controlled. Patient on multimodal pain management post-op with Tylenol 1000 mg po every 6 hours, Robaxin 500 mg po 4 times daily and Celebrex 100 mg po daily post-op and will continue.  · Plan is SNF on discharge. Medically ready and awaiting placement at [facility] where she has been accepted and awaiting insurance auth.     Chronic combined systolic and diastolic heart failure  Patient is identified as having Combined Systolic and Diastolic heart failure that is Chronic. CHF is currently controlled. Latest ECHO performed and demonstrates- Results for orders placed during the hospital encounter of 12/05/22    Echo    Interpretation Summary  · The estimated ejection fraction is 35%.  · The quantitatively derived ejection fraction is 35%.  · The left ventricle is normal in size with moderately decreased systolic function.  · There is abnormal septal wall motion consistent with right ventricular pacemaker.  · There is moderate left ventricular global hypokinesis.  · Grade I left ventricular diastolic dysfunction.  · Normal right ventricular size with normal right ventricular systolic function.  · Severe left atrial enlargement.  · Mild-to-moderate aortic regurgitation.  · The estimated PA systolic pressure is 27 mmHg.  · Normal central venous pressure (3 mmHg).  · Trivial circumferential pericardial effusion.  Continue home Labetalol to treat and continue Losartan (substitute for home Irbesartan not on formulary) to treat and monitor  clinical status closely. Monitor on telemetry. Patient is off CHF pathway.  Monitor strict Is&Os and daily weights.  Place on fluid restriction of 1.5 L. Continue to stress to patient importance of self efficacy and  on diet for CHF.     CHB (complete heart block)  Patient with recent BiVID pacer placed by Cardiology on 2/2022. Controlled.       Coronary artery disease involving native coronary artery of native heart without angina pectoris  · Prior CABG in 2001 with  of mid LAD.   · Chronic and controlled. Patient asymptomatic.   · Continue home Labetalol to treat. Hold Aspirin for surgery and restart post-op. Patient on Crestor at home but not on formulary here in hospital and allergic to alternative statins so will hold statin in hospital.       Primary hypertension  · Chronic and controlled. Continue home Amlodipine, ARB (Irbesartan to Losartan for formulary), and home Labetalol to treat.   · Target BP < 150/90.       Stage 3a chronic kidney disease  - Chronic and controlled. Creatinine 1.1 on presentation and at baseline.  - Avoid nephrotoxic agents as appropriate.  - Renally dose meds for her GFR.   - Continue to monitor renal function with daily BMP in hospital.     Memory difficulties  Very mild. Continue home Namenda to treat.       Pure hypercholesterolemia  - Chronic and controlled. Patient allergic to formularly alternatives to her home Crestor.   - Resume home Crestor at discharge.      Acquired hypothyroidism  Chronic and controlled. Continue home Levothyroxine to treat.         VTE Risk Mitigation (From admission, onward)         Ordered     apixaban tablet 2.5 mg  2 times daily         01/13/23 1450     IP VTE HIGH RISK PATIENT  Once         01/13/23 0035     Place sequential compression device  Until discontinued         01/13/23 0035     Place sequential compression device  Until discontinued         01/12/23 2224     Place sequential compression device  Until discontinued          01/12/23 2210                Discharge Planning   AIDEE: 1/18/2023     Code Status: Full Code   Is the patient medically ready for discharge?: Yes    Reason for patient still in hospital (select all that apply): Patient trending condition  Discharge Plan A: Skilled Nursing Facility   Discharge Delays: (!) Post-Acute Set-up        Pauline Wiley MD  Department of Hospital Medicine   [facility] - Surgery

## 2023-01-17 NOTE — SUBJECTIVE & OBJECTIVE
Interval History: Patient in good spirits today. Patient has been accepted to [facility] and awaiting insurance authorization. Patient reports pain in left hip is controlled. Hgb stable at 10.9. Patient medically ready for discharge and just awaiting insurance auth.     Review of Systems   Respiratory:  Negative for cough and shortness of breath.    Cardiovascular:  Negative for leg swelling.   Gastrointestinal:  Negative for abdominal pain, nausea and vomiting.   Musculoskeletal:  Positive for arthralgias (Left hip).   Neurological:  Negative for dizziness.   Psychiatric/Behavioral:  Negative for agitation and confusion.    Objective:     Vital Signs (Most Recent):  Temp: 96.5 °F (35.8 °C) (01/17/23 1145)  Pulse: 73 (01/17/23 1424)  Resp: 17 (01/17/23 1145)  BP: 137/65 (01/17/23 1145)  SpO2: 93 % (01/17/23 1146) on 1 liter of oxygen   Vital Signs (24h Range):  Temp:  [96.5 °F (35.8 °C)-98.6 °F (37 °C)] 96.5 °F (35.8 °C)  Pulse:  [60-76] 73  Resp:  [16-18] 17  SpO2:  [91 %-98 %] 93 %  BP: (137-161)/(65-80) 137/65     Weight: 63.5 kg (140 lb)  Body mass index is 24.03 kg/m².    Intake/Output Summary (Last 24 hours) at 1/17/2023 1428  Last data filed at 1/17/2023 0948  Gross per 24 hour   Intake 400 ml   Output 1275 ml   Net -875 ml      Physical Exam  Vitals and nursing note reviewed.   Constitutional:       General: She is awake. She is not in acute distress.     Appearance: Normal appearance. She is normal weight. She is not ill-appearing.   Cardiovascular:      Rate and Rhythm: Normal rate and regular rhythm.      Heart sounds: Normal heart sounds. No murmur heard.    No friction rub. No gallop.   Pulmonary:      Effort: Pulmonary effort is normal. No respiratory distress.      Breath sounds: Normal breath sounds. No wheezing.   Abdominal:      General: Abdomen is flat. Bowel sounds are normal. There is no distension.      Palpations: Abdomen is soft.      Tenderness: There is no abdominal tenderness.    Musculoskeletal:      Right lower leg: No edema.      Left lower leg: No edema.   Skin:     General: Skin is warm.      Findings: No erythema.      Comments: Surgical dressing to left buttock area intact with no drainage.    Neurological:      Mental Status: She is alert and oriented to person, place, and time.   Psychiatric:         Mood and Affect: Mood normal.         Behavior: Behavior normal. Behavior is cooperative.         Thought Content: Thought content normal.         Judgment: Judgment normal.       Significant Labs: CBC:   Recent Labs   Lab 01/16/23  0350   WBC 7.65   HGB 10.9*   HCT 34.4*   *     CMP:   Recent Labs   Lab 01/16/23  0350 01/17/23  0210    137   K 4.2 4.4    107   CO2 24 22*   GLU 88 116*   BUN 22 17   CREATININE 1.1 1.0   CALCIUM 8.6* 8.8   ANIONGAP 6* 8       Significant Imaging: I have reviewed all pertinent imaging results/findings within the past 24 hours.

## 2023-01-17 NOTE — PROGRESS NOTES
"Jose A [facility] - Surgery  Orthopedics  Progress Note    Patient Name: Lynn Mckinney  MRN: [id number]  Admission Date: 1/12/2023  Hospital Length of Stay: 5 days  Attending Provider: Pauline Wiley MD  Primary Care Provider: Brie Fagan MD  Follow-up For: Procedure(s) (LRB):  HEMIARTHROPLASTY, HIP, LEFT (Left)    Post-Operative Day: 4 Days Post-Op  Subjective:     Principal Problem:Closed displaced fracture of left femoral neck    Principal Orthopedic Problem: s/p left hemiarthroplasty 01/13/23    Interval History:   Patient seen and examined at bedside.  No acute events overnight.  Pain controlled.  Cruised 10 ft with therapy. O2 sats in low 90s this am.         Review of patient's allergies indicates:   Allergen Reactions    Lipitor [atorvastatin] Itching    Fosamax [alendronate]      Felt "strange"    Iodinated contrast media      Dye is only to be used if absolutely needed because of kidney function    Prolia [denosumab]      Reaction after the Prolia    Sulfa (sulfonamide antibiotics)        Current Facility-Administered Medications   Medication    acetaminophen tablet 1,000 mg    amLODIPine tablet 10 mg    apixaban tablet 2.5 mg    bisacodyL suppository 10 mg    celecoxib capsule 100 mg    hydrOXYzine HCL tablet 25 mg    labetaloL tablet 200 mg    levothyroxine tablet 125 mcg    losartan tablet 50 mg    melatonin tablet 6 mg    memantine tablet 5 mg    methocarbamoL tablet 500 mg    methocarbamoL tablet 500 mg    mupirocin 2 % ointment 1 g    ondansetron injection 4 mg    polyethylene glycol packet 17 g    ROPIvacaine (PF) 2 mg/ml (0.2%) solution    ROPIvacaine (PF) 2 mg/ml (0.2%) solution    senna-docusate 8.6-50 mg per tablet 1 tablet    sodium chloride 0.9% flush 10 mL    sodium chloride 0.9% flush 10 mL    sodium chloride 0.9% flush 10 mL     Objective:     Vital Signs (Most Recent):  Temp: 97.9 °F (36.6 °C) (01/15/23 0556)  Pulse: 63 (01/15/23 0556)  Resp: 16 (01/15/23 " "0556)  BP: (!) 146/65 (01/15/23 0556)  SpO2: 98 % (01/15/23 0556)   Vital Signs (24h Range):  Temp:  [97.6 °F (36.4 °C)-98.5 °F (36.9 °C)] 97.9 °F (36.6 °C)  Pulse:  [59-78] 63  Resp:  [16-18] 16  SpO2:  [95 %-98 %] 98 %  BP: (118-166)/(53-65) 146/65     Weight: 63.5 kg (140 lb)  Height: 5' 4" (162.6 cm)  Body mass index is 24.03 kg/m².      Intake/Output Summary (Last 24 hours) at 1/15/2023 0709  Last data filed at 1/15/2023 0028  Gross per 24 hour   Intake 477 ml   Output --   Net 477 ml         Ortho/SPM Exam  Gen: NAD, sitting comfortably in bed  CV: regular rate  Resp: non-labored respirations    LLE:  Dressing over lateral hip and lateral thigh peeled up, dressing augmented. No erythema, swelling, or bruising  No TTP   SILT Sa/Arcos/DP/SP/T  Motor intact EHL/FHL/TA/Gastroc/Quad  2+ DP, 2+ PT    Significant Labs:   Recent Lab Results         01/15/23  0314        Anion Gap 6       Baso # 0.02       Basophil % 0.3       BUN 26       Calcium 8.2       Chloride 107       CO2 23       Creatinine 1.3       Differential Method Automated       eGFR 39.8       Eos # 0.3       Eosinophil % 4.7       Glucose 106       Gran # (ANC) 5.4       Gran % 73.8       Hematocrit 31.4       Hemoglobin 10.1       Immature Grans (Abs) 0.04  Comment: Mild elevation in immature granulocytes is non specific and   can be seen in a variety of conditions including stress response,   acute inflammation, trauma and pregnancy. Correlation with other   laboratory and clinical findings is essential.         Immature Granulocytes 0.6       Lymph # 0.7       Lymph % 9.6       Magnesium 2.0       MCH 32.7       MCHC 32.2              Mono # 0.8       Mono % 11.0       MPV 9.8       nRBC 0       Phosphorus 2.8       Platelets 99       Potassium 3.9       RBC 3.09       RDW 12.4       Sodium 136       WBC 7.27             All pertinent labs within the past 24 hours have been reviewed.    Significant Imaging: I have reviewed and interpreted all " pertinent imaging results/findings.  None    Assessment/Plan:     * Closed displaced fracture of left femoral neck s/p left hip hemiarthroplasty on 1/13/2023  Lynn Mckinney is a 87 y.o. female with a left femoral neck fracture s/p left hemiarthroplasty 01/13/23    Pain control: multimodal  PT/OT: WBAT LLE, posterior hip precautions  DVT PPx:Eliquis 2.5 mg bid FCDs at all times when not ambulating  Abx:None   Labs: Hgb pending this am  Drain: None  Duong: Pure wick    Dispo: Waiting on SNF placement. Hopeful for DC 1.18.23            Rj Contreras MD  Orthopedics  [facility] - Surgery

## 2023-01-17 NOTE — ASSESSMENT & PLAN NOTE
Lynn Mckinney is a 87 y.o. female with a left femoral neck fracture s/p left hemiarthroplasty 01/13/23    Pain control: multimodal  PT/OT: WBAT LLE, posterior hip precautions  DVT PPx:Eliquis 2.5 mg bid FCDs at all times when not ambulating  Abx:None   Labs: Hgb pending this am  Drain: None  Duong: Pure wick    Dispo: Waiting on SNF placement. Hopeful for DC 1.18.23

## 2023-01-17 NOTE — ADDENDUM NOTE
Addendum  created 01/17/23 1545 by Kenyatta Pineda MD    Charge Capture section accepted, Cosign clinical note with attestation

## 2023-01-18 LAB
ANION GAP SERPL CALC-SCNC: 9 MMOL/L (ref 8–16)
BUN SERPL-MCNC: 18 MG/DL (ref 8–23)
CALCIUM SERPL-MCNC: 8.7 MG/DL (ref 8.7–10.5)
CHLORIDE SERPL-SCNC: 104 MMOL/L (ref 95–110)
CO2 SERPL-SCNC: 23 MMOL/L (ref 23–29)
CREAT SERPL-MCNC: 0.9 MG/DL (ref 0.5–1.4)
EST. GFR  (NO RACE VARIABLE): >60 ML/MIN/1.73 M^2
GLUCOSE SERPL-MCNC: 92 MG/DL (ref 70–110)
POTASSIUM SERPL-SCNC: 3.9 MMOL/L (ref 3.5–5.1)
SODIUM SERPL-SCNC: 136 MMOL/L (ref 136–145)

## 2023-01-18 PROCEDURE — 25000003 PHARM REV CODE 250: Performed by: STUDENT IN AN ORGANIZED HEALTH CARE EDUCATION/TRAINING PROGRAM

## 2023-01-18 PROCEDURE — 11000001 HC ACUTE MED/SURG PRIVATE ROOM

## 2023-01-18 PROCEDURE — 97530 THERAPEUTIC ACTIVITIES: CPT | Mod: CQ

## 2023-01-18 PROCEDURE — 99233 PR SUBSEQUENT HOSPITAL CARE,LEVL III: ICD-10-PCS | Mod: ,,, | Performed by: INTERNAL MEDICINE

## 2023-01-18 PROCEDURE — 25000003 PHARM REV CODE 250: Performed by: FAMILY MEDICINE

## 2023-01-18 PROCEDURE — 25000003 PHARM REV CODE 250

## 2023-01-18 PROCEDURE — 36415 COLL VENOUS BLD VENIPUNCTURE: CPT | Performed by: FAMILY MEDICINE

## 2023-01-18 PROCEDURE — 99233 SBSQ HOSP IP/OBS HIGH 50: CPT | Mod: ,,, | Performed by: INTERNAL MEDICINE

## 2023-01-18 PROCEDURE — 80048 BASIC METABOLIC PNL TOTAL CA: CPT | Performed by: FAMILY MEDICINE

## 2023-01-18 PROCEDURE — 97116 GAIT TRAINING THERAPY: CPT | Mod: CQ

## 2023-01-18 RX ORDER — ROSUVASTATIN CALCIUM 10 MG/1
TABLET, COATED ORAL
Qty: 90 TABLET | Refills: 3 | Status: SHIPPED | OUTPATIENT
Start: 2023-01-18 | End: 2023-04-05

## 2023-01-18 RX ADMIN — ACETAMINOPHEN 650 MG: 325 TABLET ORAL at 02:01

## 2023-01-18 RX ADMIN — ACETAMINOPHEN 650 MG: 325 TABLET ORAL at 06:01

## 2023-01-18 RX ADMIN — MUPIROCIN 1 G: 20 OINTMENT TOPICAL at 08:01

## 2023-01-18 RX ADMIN — AMLODIPINE BESYLATE 10 MG: 10 TABLET ORAL at 08:01

## 2023-01-18 RX ADMIN — METHOCARBAMOL 500 MG: 750 TABLET ORAL at 10:01

## 2023-01-18 RX ADMIN — METHOCARBAMOL 500 MG: 750 TABLET ORAL at 06:01

## 2023-01-18 RX ADMIN — MEMANTINE HYDROCHLORIDE 5 MG: 5 TABLET ORAL at 09:01

## 2023-01-18 RX ADMIN — LEVOTHYROXINE SODIUM 125 MCG: 125 TABLET ORAL at 06:01

## 2023-01-18 RX ADMIN — SENNOSIDES AND DOCUSATE SODIUM 1 TABLET: 50; 8.6 TABLET ORAL at 08:01

## 2023-01-18 RX ADMIN — LOSARTAN POTASSIUM 50 MG: 50 TABLET, FILM COATED ORAL at 08:01

## 2023-01-18 RX ADMIN — SENNOSIDES AND DOCUSATE SODIUM 1 TABLET: 50; 8.6 TABLET ORAL at 09:01

## 2023-01-18 RX ADMIN — APIXABAN 2.5 MG: 2.5 TABLET, FILM COATED ORAL at 08:01

## 2023-01-18 RX ADMIN — LABETALOL HYDROCHLORIDE 200 MG: 100 TABLET, FILM COATED ORAL at 08:01

## 2023-01-18 RX ADMIN — ACETAMINOPHEN 650 MG: 325 TABLET ORAL at 10:01

## 2023-01-18 RX ADMIN — LABETALOL HYDROCHLORIDE 200 MG: 100 TABLET, FILM COATED ORAL at 09:01

## 2023-01-18 RX ADMIN — APIXABAN 2.5 MG: 2.5 TABLET, FILM COATED ORAL at 09:01

## 2023-01-18 RX ADMIN — MEMANTINE HYDROCHLORIDE 5 MG: 5 TABLET ORAL at 08:01

## 2023-01-18 RX ADMIN — METHOCARBAMOL 500 MG: 750 TABLET ORAL at 02:01

## 2023-01-18 NOTE — ASSESSMENT & PLAN NOTE
Lynn Mckinney is a 87 y.o. female with a left femoral neck fracture s/p left hemiarthroplasty 01/13/23    Pain control: multimodal  PT/OT: WBAT LLE, posterior hip precautions  DVT PPx:Eliquis 2.5 mg bid FCDs at all times when not ambulating  Abx: None   Labs: Hgb pending this am, BMP WNL  Drain: None  Duong: Pure wick    Dispo: per case management note, Will move forward with admission to patient's other accepting facility @ [facility] in [facility]. Doing well from orthopedic perspective.

## 2023-01-18 NOTE — PLAN OF CARE
01/18/23 0919   Post-Acute Status   Post-Acute Authorization Placement   Post-Acute Placement Status   (Pending SNF Facility)   Discharge Plan   Discharge Plan A Skilled Nursing Facility     Patient pending SNF acceptance.   CATHY informed by CM Denise, [facility], unable to accept (not in network). SW spoke to [facility], reported admission's is now closed due to Covid-19 exposure. CATHY sent a referral blast to reported in-network SNF facilities.     [facility] [place] Accepted, spoke to Rachel # [phone]. CATHY met with the patient and pt's relative at bedside to inform of accepting facility.    2:43 PM  Patient accepted to additional [facility]. GLORIA Robertson contacted the pt's brother to inform, pt's son confirmed  [facility] remains preference out of accepted facilities.     Pending Authorization    Maria Luisa Phillips LMSW  Case Management   [facility]   Ext. [phone]

## 2023-01-18 NOTE — SUBJECTIVE & OBJECTIVE
"Principal Problem:Closed displaced fracture of left femoral neck    Principal Orthopedic Problem: same    Interval History: Principal Problem:Closed displaced fracture of left femoral neck    Principal Orthopedic Problem: s/p left hemiarthroplasty 01/13/23    Interval History:   Patient seen and examined at bedside.  No acute events overnight.  Pain controlled.  Cruised 12 ft with therapy. O2 sats in low 90s this am. BP elevated to 160s systolic.         Review of patient's allergies indicates:   Allergen Reactions    Lipitor [atorvastatin] Itching    Fosamax [alendronate]      Felt "strange"    Iodinated contrast media      Dye is only to be used if absolutely needed because of kidney function    Prolia [denosumab]      Reaction after the Prolia    Sulfa (sulfonamide antibiotics)        Current Facility-Administered Medications   Medication    acetaminophen tablet 1,000 mg    amLODIPine tablet 10 mg    apixaban tablet 2.5 mg    bisacodyL suppository 10 mg    celecoxib capsule 100 mg    hydrOXYzine HCL tablet 25 mg    labetaloL tablet 200 mg    levothyroxine tablet 125 mcg    losartan tablet 50 mg    melatonin tablet 6 mg    memantine tablet 5 mg    methocarbamoL tablet 500 mg    methocarbamoL tablet 500 mg    mupirocin 2 % ointment 1 g    ondansetron injection 4 mg    polyethylene glycol packet 17 g    ROPIvacaine (PF) 2 mg/ml (0.2%) solution    ROPIvacaine (PF) 2 mg/ml (0.2%) solution    senna-docusate 8.6-50 mg per tablet 1 tablet    sodium chloride 0.9% flush 10 mL    sodium chloride 0.9% flush 10 mL    sodium chloride 0.9% flush 10 mL     Objective:     Vital Signs (Most Recent):  Temp: 97.9 °F (36.6 °C) (01/15/23 0556)  Pulse: 63 (01/15/23 0556)  Resp: 16 (01/15/23 0556)  BP: (!) 146/65 (01/15/23 0556)  SpO2: 98 % (01/15/23 0556)   Vital Signs (24h Range):  Temp:  [97.6 °F (36.4 °C)-98.5 °F (36.9 °C)] 97.9 °F (36.6 °C)  Pulse:  [59-78] 63  Resp:  [16-18] 16  SpO2:  [95 %-98 %] 98 %  BP: (118-166)/(53-65) " "146/65     Weight: 63.5 kg (140 lb)  Height: 5' 4" (162.6 cm)  Body mass index is 24.03 kg/m².      Intake/Output Summary (Last 24 hours) at 1/15/2023 0709  Last data filed at 1/15/2023 0028  Gross per 24 hour   Intake 477 ml   Output --   Net 477 ml         Ortho/SPM Exam  Gen: NAD, sitting comfortably in bed  CV: regular rate  Resp: non-labored respirations    LLE:  Dressing over lateral hip and lateral thigh c.d.I  No erythema, swelling, or bruising  No TTP   SILT Sa/Arcos/DP/SP/T  Motor intact EHL/FHL/TA/Gastroc/Quad  2+ DP, 2+ PT    Significant Labs:    Hgb 9.7    All pertinent labs within the past 24 hours have been reviewed.    Significant Imaging: I have reviewed and interpreted all pertinent imaging results/findings.  None  "

## 2023-01-18 NOTE — PROGRESS NOTES
"[facility] - Surgery  Orthopedics  Progress Note    Patient Name: Lynn Mckinney  MRN: [id number]  Admission Date: 1/12/2023  Hospital Length of Stay: 6 days  Attending Provider: Pauline Wiley MD  Primary Care Provider: Brie Fagan MD  Follow-up For: Procedure(s) (LRB):  HEMIARTHROPLASTY, HIP, LEFT (Left)    Post-Operative Day: 5 Days Post-Op  Subjective:     Principal Problem:Closed displaced fracture of left femoral neck    Principal Orthopedic Problem: same    Interval History: Principal Problem:Closed displaced fracture of left femoral neck    Principal Orthopedic Problem: s/p left hemiarthroplasty 01/13/23    Interval History:   Patient seen and examined at bedside.  No acute events overnight.  Pain controlled.  Cruised 12 ft with therapy. O2 sats in low 90s this am. BP elevated to 160s systolic.         Review of patient's allergies indicates:   Allergen Reactions    Lipitor [atorvastatin] Itching    Fosamax [alendronate]      Felt "strange"    Iodinated contrast media      Dye is only to be used if absolutely needed because of kidney function    Prolia [denosumab]      Reaction after the Prolia    Sulfa (sulfonamide antibiotics)        Current Facility-Administered Medications   Medication    acetaminophen tablet 1,000 mg    amLODIPine tablet 10 mg    apixaban tablet 2.5 mg    bisacodyL suppository 10 mg    celecoxib capsule 100 mg    hydrOXYzine HCL tablet 25 mg    labetaloL tablet 200 mg    levothyroxine tablet 125 mcg    losartan tablet 50 mg    melatonin tablet 6 mg    memantine tablet 5 mg    methocarbamoL tablet 500 mg    methocarbamoL tablet 500 mg    mupirocin 2 % ointment 1 g    ondansetron injection 4 mg    polyethylene glycol packet 17 g    ROPIvacaine (PF) 2 mg/ml (0.2%) solution    ROPIvacaine (PF) 2 mg/ml (0.2%) solution    senna-docusate 8.6-50 mg per tablet 1 tablet    sodium chloride 0.9% flush 10 mL    sodium chloride 0.9% flush 10 mL    sodium chloride 0.9% " "flush 10 mL     Objective:     Vital Signs (Most Recent):  Temp: 97.9 °F (36.6 °C) (01/15/23 0556)  Pulse: 63 (01/15/23 0556)  Resp: 16 (01/15/23 0556)  BP: (!) 146/65 (01/15/23 0556)  SpO2: 98 % (01/15/23 0556)   Vital Signs (24h Range):  Temp:  [97.6 °F (36.4 °C)-98.5 °F (36.9 °C)] 97.9 °F (36.6 °C)  Pulse:  [59-78] 63  Resp:  [16-18] 16  SpO2:  [95 %-98 %] 98 %  BP: (118-166)/(53-65) 146/65     Weight: 63.5 kg (140 lb)  Height: 5' 4" (162.6 cm)  Body mass index is 24.03 kg/m².      Intake/Output Summary (Last 24 hours) at 1/15/2023 0709  Last data filed at 1/15/2023 0028  Gross per 24 hour   Intake 477 ml   Output --   Net 477 ml         Ortho/SPM Exam  Gen: NAD, sitting comfortably in bed  CV: regular rate  Resp: non-labored respirations    LLE:  Dressing over lateral hip and lateral thigh c.d.I  No erythema, swelling, or bruising  No TTP   SILT Sa/Arcos/DP/SP/T  Motor intact EHL/FHL/TA/Gastroc/Quad  2+ DP, 2+ PT    Significant Labs:    hgb pending this am    All pertinent labs within the past 24 hours have been reviewed.    Significant Imaging: I have reviewed and interpreted all pertinent imaging results/findings.  None    Assessment/Plan:     * Closed displaced fracture of left femoral neck s/p left hip hemiarthroplasty on 1/13/2023  Lynn Mckinney is a 87 y.o. female with a left femoral neck fracture s/p left hemiarthroplasty 01/13/23    Pain control: multimodal  PT/OT: WBAT LLE, posterior hip precautions  DVT PPx:Eliquis 2.5 mg bid FCDs at all times when not ambulating  Abx: None   Labs: Hgb pending this am, BMP WNL  Drain: None  Duong: Pure wick    Dispo: per case management note, Will move forward with admission to patient's other accepting facility @ [facility] in [facility]. Doing well from orthopedic perspective.             Rj Contreras MD  Orthopedics  [facility] - Surgery  "

## 2023-01-18 NOTE — PROGRESS NOTES
Jose A [facility] Medicine  Progress Note    Patient Name: Lynn Mckinney  MRN: [id number]  Patient Class: IP- Inpatient   Admission Date: 1/12/2023  Length of Stay: 6 days  Attending Physician: Pauline Wiley MD  Primary Care Provider: Brie Fagan MD        Subjective:     Principal Problem:Closed displaced fracture of left femoral neck        HPI:  This is an 88yo female with a past medical history of CAD sp CABG, PAD, combined systolic diastolic heart failure, heart block sp pacemaker, HTN, HLD, and hypothyroidism who presents to the ED with chief complaint of fall and left leg pain. Patient reports feeling at her baseline health and going out to eat with friends this afternoon. She came back and took a nap on her chair. States that she woke up from napping and got up to move around. States that she was in just socks on her hardwood floor and one of her feet slipped from under her and she fell on her side. Was not able to stand or ambulate after fall and with severe left hip pain. Brought to the ED by EMS.    In the ED patient afebrile and hemodynamically stable saturating well on room air. Xray with acute Lt femoral neck fracture. Orthopedic surgery consulted and recommended admission to medicine with plan for OR in am.        Overview/Hospital Course:  Patient admitted to [facility] Medicine Team H: Hip Fracture team and started on Hip Fracture Pathway with Orthopedic surgery consult for hip fracture. Patient was seen and evaluated by Orthopedic surgery who recommended operative repair of hip fracture. Patient was medically optimized prior to surgery and to be taken to OR after optimization on 1/13/2023. Patient underwent left hip hemiarthroplasty by Dr. Alan Vieyra. Post-op patient WBAT with posterior hip precautions to the left lower extremity as per Orthopedics recommendation. Patient placed on Apixiban 2.5 mg po BID and JOSSUE/SCD's for DVT prophylaxis post-op and will need for total of  35 days. Perineural pain catheter placed by Anesthesia Pain Service with continuous infusion of Ropivacaine to help with pain control post-op and Anesthesia Pain Service managing while patient in the hospital. Patient placed on multimodal pain management post-op with Tylenol 1000 mg po every 6 hours, Robaxin 500 mg po 4 times daily, and Celebrex 100 mg po daily post-op and will continue. PT/OT consulted post-op and evaluated patient. Patient progressing well with PT and OT and recommended skilled nursing facility placement and awaiting SNF placement for discharge. Patient doing well and pain controlled. PNC has been removed. Patient accepted to [facility] and awaiting auth for discharge. Surgical bandages to remain in place until Orthopedic clinic follow-up.         Interval History: Case management found out this am that [facility] that had accepted patient yesterday is out of network so [facility] referrals sent and patient accepted to [facility] this afternoon. Family not happy as wanted something closer but agreeable. Facility working on obtaining auth and hopeful discharge tomorrow. Pain well controlled to left hip. Patient working well and progressing with therapy. Patient medically ready for discharge.     Review of Systems   Respiratory:  Negative for cough and shortness of breath.    Cardiovascular:  Negative for leg swelling.   Gastrointestinal:  Negative for abdominal pain, nausea and vomiting.   Musculoskeletal:  Positive for arthralgias (Left hip).   Neurological:  Negative for dizziness.   Psychiatric/Behavioral:  Negative for agitation and confusion.    Objective:     Vital Signs (Most Recent):  Temp: 98.6 °F (37 °C) (01/18/23 1523)  Pulse: 72 (01/18/23 1523)  Resp: 16 (01/18/23 1523)  BP: 145/65 (01/18/23 1523)  SpO2: 95 % (01/18/23 1523) on1 liter of oxygen   Vital Signs (24h Range):  Temp:  [98 °F (36.7 °C)-98.6 °F (37 °C)] 98.6 °F (37 °C)  Pulse:  [63-77] 72  Resp:  [16-18] 16  SpO2:   [90 %-98 %] 95 %  BP: (109-174)/(52-74) 145/65     Weight: 63.5 kg (140 lb)  Body mass index is 24.03 kg/m².  No intake or output data in the 24 hours ending 01/18/23 1745   Physical Exam  Vitals and nursing note reviewed.   Constitutional:       General: She is awake. She is not in acute distress.     Appearance: Normal appearance. She is normal weight. She is not ill-appearing.   Cardiovascular:      Rate and Rhythm: Normal rate and regular rhythm.      Heart sounds: Normal heart sounds. No murmur heard.    No friction rub. No gallop.   Pulmonary:      Effort: Pulmonary effort is normal. No respiratory distress.      Breath sounds: Normal breath sounds. No wheezing.   Abdominal:      General: Abdomen is flat. Bowel sounds are normal. There is no distension.      Palpations: Abdomen is soft.      Tenderness: There is no abdominal tenderness.   Musculoskeletal:      Right lower leg: No edema.      Left lower leg: No edema.   Skin:     General: Skin is warm.      Findings: No erythema.      Comments: Surgical dressing to left buttock area intact with no drainage.    Neurological:      Mental Status: She is alert and oriented to person, place, and time.   Psychiatric:         Mood and Affect: Mood normal.         Behavior: Behavior normal. Behavior is cooperative.         Thought Content: Thought content normal.         Judgment: Judgment normal.       Significant Labs: All pertinent labs within the past 24 hours have been reviewed.    Significant Imaging: I have reviewed all pertinent imaging results/findings within the past 24 hours.      Assessment/Plan:      * Closed displaced fracture of left femoral neck s/p left hip hemiarthroplasty on 1/13/2023  Age-related osteoporosis with current pathological fracture with routine healing  · Patient underwent left hip hemiarthroplasty on 1/13/2023 for femoral neck fracture.   · Patient reports minimal pain in left hip.   · Plan is to continue PT/OT for gait training and  strengthening and restoration of ADL's. Patient is FWB/WBAT: left lower extremity, posterior hip precautions as per Orthopedic recommendations.   · Plan is to continue Apixiban 2.5 mg po BID and will need a total of 30 days post-op after hip fracture surgery and JOSSUE/SCDs for DVT prophylaxis.   · Orthopedics is following and managing hip fracture and surgical site.   · Perineural pain catheter removed by Anesthesia.   · Pain controlled. Patient on multimodal pain management post-op with Tylenol 1000 mg po every 6 hours, Robaxin 500 mg po 4 times daily and Celebrex 100 mg po daily post-op and will continue.  · Plan is SNF on discharge. Medically ready and awaiting placement at [facility] where she has been accepted and awaiting insurance auth.     Chronic combined systolic and diastolic heart failure  Patient is identified as having Combined Systolic and Diastolic heart failure that is Chronic. CHF is currently controlled. Latest ECHO performed and demonstrates- Results for orders placed during the hospital encounter of 12/05/22    Echo    Interpretation Summary  · The estimated ejection fraction is 35%.  · The quantitatively derived ejection fraction is 35%.  · The left ventricle is normal in size with moderately decreased systolic function.  · There is abnormal septal wall motion consistent with right ventricular pacemaker.  · There is moderate left ventricular global hypokinesis.  · Grade I left ventricular diastolic dysfunction.  · Normal right ventricular size with normal right ventricular systolic function.  · Severe left atrial enlargement.  · Mild-to-moderate aortic regurgitation.  · The estimated PA systolic pressure is 27 mmHg.  · Normal central venous pressure (3 mmHg).  · Trivial circumferential pericardial effusion.  Continue home Labetalol to treat and continue Losartan (substitute for home Irbesartan not on formulary) to treat and monitor clinical status closely. Monitor on telemetry. Patient is  off CHF pathway.  Monitor strict Is&Os and daily weights.  Place on fluid restriction of 1.5 L. Continue to stress to patient importance of self efficacy and  on diet for CHF.     CHB (complete heart block)  Patient with recent BiVID pacer placed by Cardiology on 2/2022. Controlled.       Coronary artery disease involving native coronary artery of native heart without angina pectoris  · Prior CABG in 2001 with  of mid LAD.   · Chronic and controlled. Patient asymptomatic.   · Continue home Labetalol to treat. Hold Aspirin for surgery and restart post-op. Patient on Crestor at home but not on formulary here in hospital and allergic to alternative statins so will hold statin in hospital.       Primary hypertension  · Chronic and controlled. Continue home Amlodipine, ARB (Irbesartan to Losartan for formulary), and home Labetalol to treat.   · Target BP < 150/90.       Stage 3a chronic kidney disease  - Chronic and controlled. Creatinine 1.1 on presentation and at baseline.  - Avoid nephrotoxic agents as appropriate.  - Renally dose meds for her GFR.   - Continue to monitor renal function with daily BMP in hospital.     Memory difficulties  Very mild. Continue home Namenda to treat.       Pure hypercholesterolemia  - Chronic and controlled. Patient allergic to formularly alternatives to her home Crestor.   - Resume home Crestor at discharge.      Acquired hypothyroidism  Chronic and controlled. Continue home Levothyroxine to treat.         VTE Risk Mitigation (From admission, onward)         Ordered     apixaban tablet 2.5 mg  2 times daily         01/13/23 1450     IP VTE HIGH RISK PATIENT  Once         01/13/23 0035     Place sequential compression device  Until discontinued         01/13/23 0035     Place sequential compression device  Until discontinued         01/12/23 2224     Place sequential compression device  Until discontinued         01/12/23 2210                Discharge Planning   AIDEE: 1/19/2023      Code Status: Full Code   Is the patient medically ready for discharge?: Yes    Reason for patient still in hospital (select all that apply): Pending disposition  Discharge Plan A: Skilled Nursing Facility ([facility]) awaiting insurance auth.   Discharge Delays: (!) Post-Acute Set-up          Pauline Wliey MD  Department of Hospital Medicine   [facility] - Surgery

## 2023-01-18 NOTE — PT/OT/SLP PROGRESS
Physical Therapy Treatment    Patient Name:  Lynn Mckinney   MRN:  [id number]    Recommendations:     Discharge Recommendations: nursing facility, skilled  Discharge Equipment Recommendations: walker, rolling, bedside commode, bath bench  Barriers to discharge: increased level of assistance needed at this time       Assessment:     Lynn Mckinney is a 87 y.o. female admitted with a medical diagnosis of Closed displaced fracture of left femoral neck.  She presents with the following impairments/functional limitations:  (weakness; impaired functional mobility; impaired endurance; gait instability; impaired balance; impaired self care skills; decreased safety awareness; impaired cognition) . Pt continues to remain motivated and cooperative with treatment session. Pt Progressing with PT Intervention. Pt Progressing with improving gait distance. Pt would continue to benefit from skilled PT to address overall functional mobility, goals , and to return to functional baseline.  Goals remain appropriate.      Rehab Prognosis: Good; patient would benefit from acute skilled PT services to address these deficits and reach maximum level of function.    Recent Surgery: Procedure(s) (LRB):  HEMIARTHROPLASTY, HIP, LEFT (Left) 5 Days Post-Op    Plan:     During this hospitalization, patient to be seen daily to address the identified rehab impairments via gait training, therapeutic activities, therapeutic exercises, neuromuscular re-education and progress toward the following goals:    Plan of Care Expires:  02/13/23    Subjective     Patient/Family Comments/goals: I will try to walk more today  Pain/Comfort:  Pain Rating 1: 0/10  Pain Rating Post-Intervention 1:  (not rated)  Location - Side 2: Left  Location 2: hip  Pain Addressed 2: Reposition, Distraction      Objective:     Communicated with RN prior to session.  Patient found HOB elevated with PureWick, telemetry, oxygen upon PT entry to room.     General Precautions: Standard,  fall  Orthopedic Precautions: LLE weight bearing as tolerated LLE posterior precautions   Braces: Knee immobilizer  Respiratory Status: Nasal cannula, flow 2 L/min     Functional Mobility:  Bed Mobility:     Rolling/Turning to Right with minimum assistance   Scooting/Bridging with minimum assistance   Supine to Sit with minimum assistance   Transfers:     Sit to Stand:  minimum assistance with rolling walker  Bed <> Chair Transfer using Step Transfer technique with minimum assistance with rolling walker  Gait: pt amb with RW 62 ft with CGA/Radames with vcs for safety and upright posture and safety    AM-PAC 6 CLICK MOBILITY  Turning over in bed (including adjusting bedclothes, sheets and blankets)?: 3  Sitting down on and standing up from a chair with arms (e.g., wheelchair, bedside commode, etc.): 3  Moving from lying on back to sitting on the side of the bed?: 3  Moving to and from a bed to a chair (including a wheelchair)?: 3  Need to walk in hospital room?: 3  Climbing 3-5 steps with a railing?: 2  Basic Mobility Total Score: 17       Treatment & Education:  Therapist provided instruction and educated of  patient on progress, safety,d/c,PT POC,   proper body mechanics, energy conservation, and fall prevention strategies during tasks listed above, on the effects of prolonged immobility and the importance of performing OOB activity and exercises to promote healing and reduce recovery time     Updated white board with appropriate PT mobility information for medical team notification      Donned an extra gown      Call nursing/pct to transfer to chair/use bathroom. Pt stated understanding        Bedside table in front of patient and area set up for function, convenience, and safety. RN aware of patient's mobility needs and status. Questions/concerns addressed within PTA scope of practice; patient  with no further questions. Time was provided for active listening, discussion of health disposition, and discussion of safe  discharge. Pt?verbalized?agreement .     Patient left [facility]IC with all lines intact, call button in reach, and nsg and family present..  GOALS:   Multidisciplinary Problems       Physical Therapy Goals          Problem: Physical Therapy    Goal Priority Disciplines Outcome Goal Variances Interventions   Physical Therapy Goal     PT, PT/OT Ongoing, Progressing     Description: Goals to met by 1/28/2023    1. Supine to sit with Modified Kennedy  2. Sit to supine with Modified Kennedy  3. Rolling to Left and Right with Modified Kennedy.  4. Sit to stand transfer with Supervision  5. Bed to chair transfer with Stand-by Assistance using LRAD  6. Gait  x 50 feet with Stand-by Assistance using LRAD   7. Lower extremity exercise program x15 reps per Instruction, with assistance as needed in order to facilitate improved strength, improved postural control, and improvement in functional independence                         Time Tracking:     PT Received On: 01/18/23  PT Start Time: 1128     PT Stop Time: 1153  PT Total Time (min): 25 min     Billable Minutes: Gait Training 15 and Therapeutic Activity 10    Treatment Type: Treatment  PT/PTA: PTA     PTA Visit Number: 4     01/18/2023

## 2023-01-18 NOTE — ASSESSMENT & PLAN NOTE
Age-related osteoporosis with current pathological fracture with routine healing  · Patient underwent left hip hemiarthroplasty on 1/13/2023 for femoral neck fracture.   · Patient reports minimal pain in left hip.   · Plan is to continue PT/OT for gait training and strengthening and restoration of ADL's. Patient is FWB/WBAT: left lower extremity, posterior hip precautions as per Orthopedic recommendations.   · Plan is to continue Apixiban 2.5 mg po BID and will need a total of 30 days post-op after hip fracture surgery and JOSSUE/SCDs for DVT prophylaxis.   · Orthopedics is following and managing hip fracture and surgical site.   · Perineural pain catheter removed by Anesthesia.   · Pain controlled. Patient on multimodal pain management post-op with Tylenol 1000 mg po every 6 hours, Robaxin 500 mg po 4 times daily and Celebrex 100 mg po daily post-op and will continue.  · Plan is SNF on discharge. Medically ready and awaiting placement at [facility] where she has been accepted and awaiting insurance auth.

## 2023-01-18 NOTE — PLAN OF CARE
CM received Voicemail from [facility] reporting that facility does not accept patient's insurance. Will move forward with admission to patient's other accepting facility @ [facility] in [facility].  Updated Patient and her niece Airam @ bedside. Will continue to follow.        915AM- CATHY reports [facility] not accepting an admissions at this time. CATHY Will send additional in network referrals as patient has been medically stable for d/c since 1/17/23. Will continue to follow.

## 2023-01-18 NOTE — SUBJECTIVE & OBJECTIVE
Interval History: Case management found out this am that [facility] that had accepted patient yesterday is out of network so [facility] referrals sent and patient accepted to [facility] this afternoon. Family not happy as wanted something closer but agreeable. Facility working on obtaining auth and hopeful discharge tomorrow. Pain well controlled to left hip. Patient working well and progressing with therapy. Patient medically ready for discharge.     Review of Systems   Respiratory:  Negative for cough and shortness of breath.    Cardiovascular:  Negative for leg swelling.   Gastrointestinal:  Negative for abdominal pain, nausea and vomiting.   Musculoskeletal:  Positive for arthralgias (Left hip).   Neurological:  Negative for dizziness.   Psychiatric/Behavioral:  Negative for agitation and confusion.    Objective:     Vital Signs (Most Recent):  Temp: 98.6 °F (37 °C) (01/18/23 1523)  Pulse: 72 (01/18/23 1523)  Resp: 16 (01/18/23 1523)  BP: 145/65 (01/18/23 1523)  SpO2: 95 % (01/18/23 1523) on1 liter of oxygen   Vital Signs (24h Range):  Temp:  [98 °F (36.7 °C)-98.6 °F (37 °C)] 98.6 °F (37 °C)  Pulse:  [63-77] 72  Resp:  [16-18] 16  SpO2:  [90 %-98 %] 95 %  BP: (109-174)/(52-74) 145/65     Weight: 63.5 kg (140 lb)  Body mass index is 24.03 kg/m².  No intake or output data in the 24 hours ending 01/18/23 1745   Physical Exam  Vitals and nursing note reviewed.   Constitutional:       General: She is awake. She is not in acute distress.     Appearance: Normal appearance. She is normal weight. She is not ill-appearing.   Cardiovascular:      Rate and Rhythm: Normal rate and regular rhythm.      Heart sounds: Normal heart sounds. No murmur heard.    No friction rub. No gallop.   Pulmonary:      Effort: Pulmonary effort is normal. No respiratory distress.      Breath sounds: Normal breath sounds. No wheezing.   Abdominal:      General: Abdomen is flat. Bowel sounds are normal. There is no distension.       Palpations: Abdomen is soft.      Tenderness: There is no abdominal tenderness.   Musculoskeletal:      Right lower leg: No edema.      Left lower leg: No edema.   Skin:     General: Skin is warm.      Findings: No erythema.      Comments: Surgical dressing to left buttock area intact with no drainage.    Neurological:      Mental Status: She is alert and oriented to person, place, and time.   Psychiatric:         Mood and Affect: Mood normal.         Behavior: Behavior normal. Behavior is cooperative.         Thought Content: Thought content normal.         Judgment: Judgment normal.       Significant Labs: All pertinent labs within the past 24 hours have been reviewed.    Significant Imaging: I have reviewed all pertinent imaging results/findings within the past 24 hours.

## 2023-01-19 VITALS
HEART RATE: 70 BPM | RESPIRATION RATE: 16 BRPM | BODY MASS INDEX: 23.9 KG/M2 | DIASTOLIC BLOOD PRESSURE: 60 MMHG | SYSTOLIC BLOOD PRESSURE: 132 MMHG | OXYGEN SATURATION: 95 % | HEIGHT: 64 IN | WEIGHT: 140 LBS | TEMPERATURE: 98 F

## 2023-01-19 LAB
BASOPHILS # BLD AUTO: 0.02 K/UL (ref 0–0.2)
BASOPHILS NFR BLD: 0.4 % (ref 0–1.9)
DIFFERENTIAL METHOD: ABNORMAL
EOSINOPHIL # BLD AUTO: 0.3 K/UL (ref 0–0.5)
EOSINOPHIL NFR BLD: 5.1 % (ref 0–8)
ERYTHROCYTE [DISTWIDTH] IN BLOOD BY AUTOMATED COUNT: 12.2 % (ref 11.5–14.5)
HCT VFR BLD AUTO: 32.3 % (ref 37–48.5)
HGB BLD-MCNC: 10.6 G/DL (ref 12–16)
IMM GRANULOCYTES # BLD AUTO: 0.01 K/UL (ref 0–0.04)
IMM GRANULOCYTES NFR BLD AUTO: 0.2 % (ref 0–0.5)
LYMPHOCYTES # BLD AUTO: 0.8 K/UL (ref 1–4.8)
LYMPHOCYTES NFR BLD: 15.7 % (ref 18–48)
MCH RBC QN AUTO: 32.4 PG (ref 27–31)
MCHC RBC AUTO-ENTMCNC: 32.8 G/DL (ref 32–36)
MCV RBC AUTO: 99 FL (ref 82–98)
MONOCYTES # BLD AUTO: 0.6 K/UL (ref 0.3–1)
MONOCYTES NFR BLD: 12.4 % (ref 4–15)
NEUTROPHILS # BLD AUTO: 3.4 K/UL (ref 1.8–7.7)
NEUTROPHILS NFR BLD: 66.2 % (ref 38–73)
NRBC BLD-RTO: 0 /100 WBC
PLATELET # BLD AUTO: 137 K/UL (ref 150–450)
PMV BLD AUTO: 9.4 FL (ref 9.2–12.9)
RBC # BLD AUTO: 3.27 M/UL (ref 4–5.4)
SARS-COV-2 RNA RESP QL NAA+PROBE: NOT DETECTED
TB INDURATION 48 - 72 HR READ: 0 MM
WBC # BLD AUTO: 5.08 K/UL (ref 3.9–12.7)

## 2023-01-19 PROCEDURE — 94799 UNLISTED PULMONARY SVC/PX: CPT

## 2023-01-19 PROCEDURE — 97535 SELF CARE MNGMENT TRAINING: CPT

## 2023-01-19 PROCEDURE — 85025 COMPLETE CBC W/AUTO DIFF WBC: CPT | Performed by: HOSPITALIST

## 2023-01-19 PROCEDURE — 25000003 PHARM REV CODE 250: Performed by: STUDENT IN AN ORGANIZED HEALTH CARE EDUCATION/TRAINING PROGRAM

## 2023-01-19 PROCEDURE — 99239 HOSP IP/OBS DSCHRG MGMT >30: CPT | Mod: ,,, | Performed by: HOSPITALIST

## 2023-01-19 PROCEDURE — U0005 INFEC AGEN DETEC AMPLI PROBE: HCPCS | Performed by: HOSPITALIST

## 2023-01-19 PROCEDURE — 97116 GAIT TRAINING THERAPY: CPT | Mod: CQ

## 2023-01-19 PROCEDURE — 97110 THERAPEUTIC EXERCISES: CPT | Mod: CQ

## 2023-01-19 PROCEDURE — 99239 PR HOSPITAL DISCHARGE DAY,>30 MIN: ICD-10-PCS | Mod: ,,, | Performed by: HOSPITALIST

## 2023-01-19 PROCEDURE — 27000221 HC OXYGEN, UP TO 24 HOURS

## 2023-01-19 PROCEDURE — 36415 COLL VENOUS BLD VENIPUNCTURE: CPT | Performed by: HOSPITALIST

## 2023-01-19 PROCEDURE — 25000003 PHARM REV CODE 250: Performed by: FAMILY MEDICINE

## 2023-01-19 PROCEDURE — 25000003 PHARM REV CODE 250

## 2023-01-19 PROCEDURE — 94761 N-INVAS EAR/PLS OXIMETRY MLT: CPT

## 2023-01-19 RX ADMIN — MEMANTINE HYDROCHLORIDE 5 MG: 5 TABLET ORAL at 08:01

## 2023-01-19 RX ADMIN — METHOCARBAMOL 500 MG: 750 TABLET ORAL at 02:01

## 2023-01-19 RX ADMIN — ACETAMINOPHEN 650 MG: 325 TABLET ORAL at 06:01

## 2023-01-19 RX ADMIN — POLYETHYLENE GLYCOL 3350 17 G: 17 POWDER, FOR SOLUTION ORAL at 08:01

## 2023-01-19 RX ADMIN — APIXABAN 2.5 MG: 2.5 TABLET, FILM COATED ORAL at 08:01

## 2023-01-19 RX ADMIN — AMLODIPINE BESYLATE 10 MG: 10 TABLET ORAL at 08:01

## 2023-01-19 RX ADMIN — ACETAMINOPHEN 650 MG: 325 TABLET ORAL at 02:01

## 2023-01-19 RX ADMIN — LABETALOL HYDROCHLORIDE 200 MG: 100 TABLET, FILM COATED ORAL at 08:01

## 2023-01-19 RX ADMIN — LEVOTHYROXINE SODIUM 125 MCG: 125 TABLET ORAL at 06:01

## 2023-01-19 RX ADMIN — LOSARTAN POTASSIUM 50 MG: 50 TABLET, FILM COATED ORAL at 08:01

## 2023-01-19 RX ADMIN — METHOCARBAMOL 500 MG: 750 TABLET ORAL at 06:01

## 2023-01-19 NOTE — PLAN OF CARE
01/19/23 1322   Post-Acute Status   Post-Acute Authorization Placement   Post-Acute Placement Status Pending payor review/awaiting authorization (if required)   Discharge Plan   Discharge Plan A Skilled Nursing Facility     Patient to admit to [facility], stephanie jaramillo/ [facility] #[phone], reports authorization remains pending. Staff reports intake paperwork remains pending. CATHY called pt's brother  requesting him to return call to ([facility] #[phone]). SW to follow.    1:29 PM  Demarco jaramillo/ [facility] called and informed authorization has been approved.    Pt's admit to [facility], brother completing intake paperwork, Covid-19 results.     1:54 PM  CATHY informed pt was accepted to [facility] and will plan for discharge today. CATHY contacted [facility] to inform. CATHY requested Auth #[id number].    2:38 PM  Sent updated clinicals.     Maria Luisa Phillips LMSW  Case Management   [facility]   Ext. [phone]       Final Anesthesia Post-op Assessment    Patient: Nina Abel  Procedure(s) Performed: EGD    Anesthesia type: Monitor Anesthesia Care    Vital Last Value   Temperature 36.6 °C (97.9 °F) (03/06/18 0952)   Pulse 87 (03/06/18 1132)   Respiratory Rate 16 (03/06/18 1132)   Non-Invasive   Blood Pressure 120/61 (03/06/18 1132)   Arterial  Blood Pressure     Pulse Oximetry 97 % (03/06/18 1132)     Last 24 I/O:   Intake/Output Summary (Last 24 hours) at 03/06/18 1203  Last data filed at 03/06/18 1128   Gross per 24 hour   Intake              800 ml   Output                0 ml   Net              800 ml       PATIENT LOCATION: Phase II  POST-OP VITAL SIGNS: stable  LEVEL OF CONSCIOUSNESS: participates in exam, answers questions appropriately, alert and awake  RESPIRATORY STATUS: spontaneous ventilation  CARDIOVASCULAR: blood pressure returned to baseline  HYDRATION: euvolemic    PAIN MANAGEMENT: well controlled  NAUSEA: None  AIRWAY PATENCY: patent  POST-OP ASSESSMENT: no complications, patient tolerated procedure well with no complications, no evidence of recall and sufficiently recovered from acute administration of anesthesia effects and able to participate in evaluation  COMPLICATIONS: none

## 2023-01-19 NOTE — DISCHARGE SUMMARY
DISCHARGE SUMMARY  Hospital Medicine    Team: [facility] HOSP MED H    Patient Name: Lynn Mckinney  YOB: 1935    Admit Date: 1/12/2023    Discharge Date: 01/19/2023    Discharge Attending Physician: Iram Hoyos MD     Principal Diagnoses:  Active Hospital Problems    Diagnosis  POA    *Closed displaced fracture of left femoral neck s/p left hip hemiarthroplasty on 1/13/2023 [S72.002A]  Yes    Age-related osteoporosis with current pathological fracture with routine healing [M80.00XD]  Not Applicable    Chronic combined systolic and diastolic heart failure [I50.42]  Yes    Memory difficulties [R41.3]  Yes    CHB (complete heart block) [I44.2]  Yes    Stage 3a chronic kidney disease [N18.31]  Yes    Acquired hypothyroidism [E03.9]  Yes    Coronary artery disease involving native coronary artery of native heart without angina pectoris [I25.10]  Yes     CAD S/P CABG  2001                                                          mid LAD       Primary hypertension [I10]  Yes    Pure hypercholesterolemia [E78.00]  Yes     reported side effects from atorvastatin.  pain in both hands, both shoulders and her left ankle 3/15 while on 80 mg of atorvastatin   Switched to rosuvastatin 20 in 3/15          Resolved Hospital Problems   No resolved problems to display.       Discharged Condition:  improved    HOSPITAL COURSE:      Initial Presentation:    This is an 88yo female with a past medical history of CAD sp CABG, PAD, combined systolic diastolic heart failure, heart block sp pacemaker, HTN, HLD, and hypothyroidism who presents to the ED with chief complaint of fall and left leg pain. Patient reports feeling at her baseline health and going out to eat with friends this afternoon. She came back and took a nap on her chair. States that she woke up from napping and got up to move around. States that she was in just socks on her hardwood floor and one of her feet slipped from under her and she fell on her  side. Was not able to stand or ambulate after fall and with severe left hip pain. Brought to the ED by EMS.     In the ED patient afebrile and hemodynamically stable saturating well on room air. Xray with acute Lt femoral neck fracture. Orthopedic surgery consulted and recommended admission to medicine with plan for OR in am.    Course of Principle Problem for Admission:    Closed displaced fracture of left femoral neck s/p left hip hemiarthroplasty on 1/13/2023  Age-related osteoporosis with current pathological fracture with routine healing  Patient underwent left hip hemiarthroplasty on 1/13/2023 for femoral neck fracture.   Patient reports minimal pain in left hip. Well controlled 1/19  Plan is to continue PT/OT for gait training and strengthening and restoration of ADL's. Patient is FWB/WBAT: left lower extremity, posterior hip precautions as per Orthopedic recommendations.   Plan is to continue Apixiban 2.5 mg po BID and will need a total of 30 days post-op after hip fracture surgery and JOSSUE/SCDs for DVT prophylaxis.   Orthopedics is following and managing hip fracture and surgical site.   Perineural pain catheter removed by Anesthesia.   Pain controlled. Patient on multimodal pain management post-op with Tylenol 1000 mg po every 6 hours, Robaxin 500 mg po 4 times daily and Celebrex 100 mg po daily post-op and will continue.  Plan is SNF on discharge. Medically ready and placed at snf     Chronic combined systolic and diastolic heart failure  Patient is identified as having Combined Systolic and Diastolic heart failure that is Chronic. CHF is currently controlled. Latest ECHO performed and demonstrates- Results for orders placed during the hospital encounter of 12/05/22     Echo     Interpretation Summary  · The estimated ejection fraction is 35%.  · The quantitatively derived ejection fraction is 35%.  · The left ventricle is normal in size with moderately decreased systolic function.  · There is abnormal  septal wall motion consistent with right ventricular pacemaker.  · There is moderate left ventricular global hypokinesis.  · Grade I left ventricular diastolic dysfunction.  · Normal right ventricular size with normal right ventricular systolic function.  · Severe left atrial enlargement.  · Mild-to-moderate aortic regurgitation.  · The estimated PA systolic pressure is 27 mmHg.  · Normal central venous pressure (3 mmHg).  · Trivial circumferential pericardial effusion.  Continue home Labetalol to treat and continue Losartan (substitute for home Irbesartan not on formulary) to treat and monitor clinical status closely. Monitor on telemetry. Patient is off CHF pathway.  Monitor strict Is&Os and daily weights.  Place on fluid restriction of 1.5 L. Continue to stress to patient importance of self efficacy and  on diet for CHF.      Memory difficulties  Very mild. Continue home Namenda to treat.         CHB (complete heart block)  Patient with recent BiVID pacer placed by Cardiology on 2/2022. Controlled.         Stage 3a chronic kidney disease  - Chronic and controlled. Creatinine 1.1 on presentation and at baseline.  - Avoid nephrotoxic agents as appropriate.  - Renally dose meds for her GFR.   - Continue to monitor renal function with daily BMP in hospital.      Acquired hypothyroidism  Chronic and controlled. Continue home Levothyroxine to treat.         Coronary artery disease involving native coronary artery of native heart without angina pectoris  Prior CABG in 2001 with  of mid LAD.   Chronic and controlled. Patient asymptomatic.   Continue home Labetalol to treat. Hold Aspirin for surgery and restart post-op. Patient on Crestor at home but not on formulary here in hospital and allergic to alternative statins so will hold statin in hospital and resume on dc        Pure hypercholesterolemia  - Chronic and controlled. Patient allergic to formularly alternatives to her home Crestor.   - Resume home  Crestor at discharge.        Primary hypertension  Chronic and controlled. Continue home Amlodipine, ARB (Irbesartan to Losartan for formulary), and home Labetalol to treat.   Target BP < 150/90.             Consults: ortho    Last CBC/BMP:    CBC/Anemia Labs: Coags:    Recent Labs   Lab 01/15/23  0314 01/16/23  0350 01/19/23  0337   WBC 7.27 7.65 5.08   HGB 10.1* 10.9* 10.6*   HCT 31.4* 34.4* 32.3*   PLT 99* 111* 137*   * 102* 99*   RDW 12.4 12.3 12.2    Recent Labs   Lab 01/12/23 2114   INR 1.0        Chemistries:   Recent Labs   Lab 01/12/23 2114 01/13/23  0218 01/14/23  0654 01/15/23  0314 01/16/23  0350 01/17/23  0210 01/18/23  0214   *  134* 134* 135* 136 136 137 136   K 3.7  3.7 3.9 4.0 3.9 4.2 4.4 3.9     101 104 105 107 106 107 104   CO2 24  24 22* 23 23 24 22* 23   BUN 23  23 23 23 26* 22 17 18   CREATININE 1.1  1.1 1.0 1.0 1.3 1.1 1.0 0.9   CALCIUM 9.0  9.0 8.3* 8.0* 8.2* 8.6* 8.8 8.7   PROT 6.7  --   --   --   --   --   --    BILITOT 0.8  --   --   --   --   --   --    ALKPHOS 58  --   --   --   --   --   --    ALT 15  --   --   --   --   --   --    AST 25  --   --   --   --   --   --    MG 1.9 1.9 2.0 2.0  --   --   --    PHOS 2.3* 3.1 3.3 2.8  --   --   --               Special Treatments/Procedures:   Procedure(s) (LRB):  HEMIARTHROPLASTY, HIP, LEFT (Left)     Disposition: Skilled Nursing Facility      Future Scheduled Appointments:  Future Appointments   Date Time Provider Department Center   1/27/2023  2:00 PM LILIANE Perkins carmela   1/30/2023 10:30 AM LAB, [facility] LAB [facility]   2/6/2023  9:00 AM 3PREP, KATRIN CARMELA ERNANDEZ [facility]   2/13/2023 11:20 AM PACEMAKER, ICD AWAIS ASHLEY James   2/22/2023 10:00 AM HOME MONITOR DEVICE CHECK, AWAIS MATILDE [facility]   2/28/2023 10:00 AM LILIANE Perkins car[facility]   4/3/2023  2:00 PM Flako Carver MD [facility] NAHID [facility]         Discharge Medication List:     Medication  List        START taking these medications      apixaban 2.5 mg Tab  Commonly known as: ELIQUIS  Take 1 tablet (2.5 mg total) by mouth 2 (two) times daily.     celecoxib 100 MG capsule  Commonly known as: CeleBREX  Take 1 capsule (100 mg total) by mouth once daily. for 10 days     methocarbamoL 500 MG Tab  Commonly known as: ROBAXIN  Take 1 tablet (500 mg total) by mouth 4 (four) times daily. for 10 days     polyethylene glycol 17 gram Pwpk  Commonly known as: GLYCOLAX  Take 17 g by mouth once daily.            CHANGE how you take these medications      acetaminophen 325 MG tablet  Commonly known as: TYLENOL  Take 2 tablets (650 mg total) by mouth every 6 (six) hours as needed (Mild to moderate pain).  What changed:   how much to take  reasons to take this     multivitamin per tablet  Commonly known as: THERAGRAN  Take 1 tablet by mouth once daily.  What changed:   how much to take  when to take this  additional instructions            CONTINUE taking these medications      amLODIPine 10 MG tablet  Commonly known as: NORVASC  Take 1 tablet (10 mg total) by mouth once daily.     aspirin 81 MG Chew     benzonatate 200 MG capsule  Commonly known as: TESSALON  Take 1 capsule (200 mg total) by mouth 3 (three) times daily as needed for Cough.     cetirizine 10 MG tablet  Commonly known as: ZYRTEC  Take 1 tablet (10 mg total) by mouth once daily.     cholecalciferol (vitamin D3) 50 mcg (2,000 unit) Cap capsule  Commonly known as: VITAMIN D3     coenzyme Q10 200 mg capsule     hydrOXYzine HCL 25 MG tablet  Commonly known as: ATARAX  Take 1 tablet (25 mg total) by mouth 3 (three) times daily as needed for Anxiety.     irbesartan 300 MG tablet  Commonly known as: AVAPRO  TAKE 1 TABLET (300 MG TOTAL) BY MOUTH EVERY EVENING. CANCEL IRBESARTAN/HCTZ COMBO     labetaloL 200 MG tablet  Commonly known as: NORMODYNE  Take 1 tablet (200 mg total) by mouth 2 (two) times daily.     levothyroxine 125 MCG tablet  Commonly known as:  SYNTHROID  Take 1 tablet (125 mcg total) by mouth before breakfast.     memantine 5 MG Tab  Commonly known as: NAMENDA  Take 1 tablet (5 mg total) by mouth 2 (two) times daily.     nitroGLYCERIN 0.4 MG SL tablet  Commonly known as: NITROSTAT  PLACE 1 TABLET UNDER THE TONGUE EVERY 5 MIN AS NEEDED FOR CHEST PAIN. MAX:3 DOSES     PRESERVISION AREDS [id number] unit-mg-unit Cap  Generic drug: vitamins A,C,E-zinc-copper     promethazine-dextromethorphan 6.25-15 mg/5 mL Syrp  Commonly known as: PROMETHAZINE-DM  Take 5 mLs by mouth nightly as needed (cough). May cause sedation.     SALMON OIL-OMEGA-3 FATTY ACIDS ORAL            STOP taking these medications      BOOST ORAL     CALTRATE 600 + D ORAL            ASK your doctor about these medications      rosuvastatin 10 MG tablet  Commonly known as: CRESTOR  TAKE 1 TABLET BY MOUTH EVERY DAY IN THE EVENING  Ask about: Which instructions should I use?               Where to Get Your Medications        These medications were sent to [facility]/pharmacy #[id number] - [address] AT CORNER OF [address] [address]      Phone: [phone]   rosuvastatin 10 MG tablet       You can get these medications from any pharmacy    You don't need a prescription for these medications  acetaminophen 325 MG tablet       Information about where to get these medications is not yet available    Ask your nurse or doctor about these medications  apixaban 2.5 mg Tab  celecoxib 100 MG capsule  methocarbamoL 500 MG Tab  multivitamin per tablet  polyethylene glycol 17 gram Pwpk        @DISCHARGEMEDSLIST(<NOROUTINE> error)@    Patient Instructions:  Discharge Procedure Orders   Ambulatory referral/consult to Orthopedics   Standing Status: Future   Referral Priority: Routine Referral Type: Consultation   Requested Specialty: Orthopedic Surgery   Number of Visits Requested: 1     Ambulatory referral/consult to Orthopedics Fracture Care   Standing  Status: Future   Referral Priority: Routine Referral Type: Consultation   Requested Specialty: Orthopedic Surgery   Number of Visits Requested: 1     Notify your health care provider if you experience any of the following:  persistent nausea and vomiting or diarrhea     Notify your health care provider if you experience any of the following:  persistent dizziness, light-headedness, or visual disturbances     Activity as tolerated       At the time of discharge patient was told to take all medications as prescribed, to keep all followup appointments, and to call their primary care physician or return to the emergency room if they have any worsening or concerning symptoms.    Signing Physician:  Iram Hoyos MD

## 2023-01-19 NOTE — PT/OT/SLP PROGRESS
Physical Therapy Treatment    Patient Name:  Lynn Mckinney   MRN:  [id number]    Recommendations:     Discharge Recommendations: nursing facility, skilled  Discharge Equipment Recommendations: walker, rolling, bedside commode, bath bench  Barriers to discharge: Decreased caregiver support    Assessment:     Lynn Mckinney is a 87 y.o. female admitted with a medical diagnosis of Closed displaced fracture of left femoral neck.  She presents with the following impairments/functional limitations:  (weakness; impaired functional mobility; impaired endurance; gait instability; impaired balance; impaired self care skills; decreased safety awareness; impaired cognition) . presents with  improved overall functional mobility requiring decreased assistance and increased activity tolerance to perform functional mobility.Pt would continue to benefit from skilled PT to address overall functional mobility, to return to functional baseline and goals. Goals remain appropriate.      Rehab Prognosis: Good; patient would benefit from acute skilled PT services to address these deficits and reach maximum level of function.    Recent Surgery: Procedure(s) (LRB):  HEMIARTHROPLASTY, HIP, LEFT (Left) 6 Days Post-Op    Plan:     During this hospitalization, patient to be seen daily to address the identified rehab impairments via therapeutic activities, therapeutic exercises, gait training, neuromuscular re-education and progress toward the following goals:    Plan of Care Expires:  02/13/23    Subjective     Patient/Family Comments/goals: I might leave today  Pain/Comfort:  Pain Rating 1: 0/10  Pain Rating Post-Intervention 1: 0/10      Objective:     Communicated with RN prior to session.  Patient found up in chair with  (P[employer]; telemetry; oxygen) upon PT entry to room.     General Precautions: Standard, fall  Orthopedic Precautions: LLE weight bearing as tolerated LLE posterior precautions   Braces: Knee immobilizer  Respiratory Status:  Nasal cannula, flow 1 L/min     Functional Mobility:  Bed Mobility:     Scooting: contact guard assistance  Supine to Sit: contact guard assistance  Transfers:     Sit to Stand:  contact guard assistance with rolling walker  Toilet Transfer: minimum assistance with  rolling walker  using  Step Transfer  Gait: pt amb with RW with CGA x 100 ft with O2 in tow      AM-PAC 6 CLICK MOBILITY  Turning over in bed (including adjusting bedclothes, sheets and blankets)?: 3  Sitting down on and standing up from a chair with arms (e.g., wheelchair, bedside commode, etc.): 3  Moving from lying on back to sitting on the side of the bed?: 3  Moving to and from a bed to a chair (including a wheelchair)?: 3  Need to walk in hospital room?: 3  Climbing 3-5 steps with a railing?: 3  Basic Mobility Total Score: 18       Treatment & Education:  Therapist provided instruction and educated of  patient on progress, safety,d/c,PT POC,   proper body mechanics, energy conservation, and fall prevention strategies during tasks listed above, on the effects of prolonged immobility and the importance of performing OOB activity and exercises to promote healing and reduce recovery time   seated B LE therex AP,QS,GS,LAQ  Updated white board with appropriate PT mobility information for medical team notification      Donned an extra gown      Call nursing/pct to transfer to chair/use bathroom. Pt stated understanding        Bedside table in front of patient and area set up for function, convenience, and safety. RN aware of patient's mobility needs and status. Questions/concerns addressed within PTA scope of practice; patient  with no further questions. Time was provided for active listening, discussion of health disposition, and discussion of safe discharge. Pt?verbalized?agreement .     Patient left [facility]IC with all lines intact, call button in reach, and nsg and family present..  GOALS:   Multidisciplinary Problems       Physical Therapy Goals           Problem: Physical Therapy    Goal Priority Disciplines Outcome Goal Variances Interventions   Physical Therapy Goal     PT, PT/OT Ongoing, Progressing     Description: Goals to met by 1/28/2023    1. Supine to sit with Modified [place]  2. Sit to supine with Modified [place]  3. Rolling to Left and Right with Modified [place].  4. Sit to stand transfer with Supervision  5. Bed to chair transfer with Stand-by Assistance using LRAD  6. Gait  x 50 feet with Stand-by Assistance using LRAD   7. Lower extremity exercise program x15 reps per Instruction, with assistance as needed in order to facilitate improved strength, improved postural control, and improvement in functional independence                         Time Tracking:     PT Received On: 01/19/23  PT Start Time: 0931     PT Stop Time: 0956  PT Total Time (min): 25 min     Billable Minutes: Gait Training 15 and Therapeutic Exercise 10    Treatment Type: Treatment  PT/PTA: PTA     PTA Visit Number: 4     01/19/2023

## 2023-01-19 NOTE — PROGRESS NOTES
[facility] Medicine  Progress Note    Patient Name: Lynn Mckinney  MRN: [id number]  Patient Class: IP- Inpatient   Admission Date: 1/12/2023  Length of Stay: 7 days  Attending Physician: Iram Hoyos MD  Primary Care Provider: Brie Fagan MD        Subjective:     Principal Problem:Closed displaced fracture of left femoral neck        HPI:  This is an 88yo female with a past medical history of CAD sp CABG, PAD, combined systolic diastolic heart failure, heart block sp pacemaker, HTN, HLD, and hypothyroidism who presents to the ED with chief complaint of fall and left leg pain. Patient reports feeling at her baseline health and going out to eat with friends this afternoon. She came back and took a nap on her chair. States that she woke up from napping and got up to move around. States that she was in just socks on her hardwood floor and one of her feet slipped from under her and she fell on her side. Was not able to stand or ambulate after fall and with severe left hip pain. Brought to the ED by EMS.    In the ED patient afebrile and hemodynamically stable saturating well on room air. Xray with acute Lt femoral neck fracture. Orthopedic surgery consulted and recommended admission to medicine with plan for OR in am.        Overview/Hospital Course:  Patient admitted to [facility] Medicine Team H: Hip Fracture team and started on Hip Fracture Pathway with Orthopedic surgery consult for hip fracture. Patient was seen and evaluated by Orthopedic surgery who recommended operative repair of hip fracture. Patient was medically optimized prior to surgery and to be taken to OR after optimization on 1/13/2023. Patient underwent left hip hemiarthroplasty by Dr. Alan Vieyra. Post-op patient WBAT with posterior hip precautions to the left lower extremity as per Orthopedics recommendation. Patient placed on Apixiban 2.5 mg po BID and JOSSUE/SCD's for DVT prophylaxis post-op and will need for total of  35 days. Perineural pain catheter placed by Anesthesia Pain Service with continuous infusion of Ropivacaine to help with pain control post-op and Anesthesia Pain Service managing while patient in the hospital. Patient placed on multimodal pain management post-op with Tylenol 1000 mg po every 6 hours, Robaxin 500 mg po 4 times daily, and Celebrex 100 mg po daily post-op and will continue. PT/OT consulted post-op and evaluated patient. Patient progressing well with PT and OT and recommended skilled nursing facility placement and awaiting SNF placement for discharge. Patient doing well and pain controlled. PNC has been removed. Patient accepted to [facility] and awaiting auth for discharge. Surgical bandages to remain in place until Orthopedic clinic follow-up.         Interval History: feeling very well today, no pain per se but knows her leg isnt normal either but working very well with PT very motivated to do therapy today to walk into hallway with them. Family at bedside, and patient very good spirits, alked to her and family for awhile today. They report being very sad pcp dr silverman just retired as was pcp for 50 years. They havebeen [facility] patients for a very long time and happy with care here. Reports motivated for SNF and awaiting snf and auth from [facility]. No new concerns this morning so far. Hg is stable      Review of Systems   Respiratory:  Negative for cough and shortness of breath.    Cardiovascular:  Negative for leg swelling.   Gastrointestinal:  Negative for abdominal pain, nausea and vomiting.   Musculoskeletal:  Positive for arthralgias (Left hip).   Neurological:  Negative for dizziness.   Psychiatric/Behavioral:  Negative for agitation and confusion.    Objective:     Vital Signs (Most Recent):  Temp: 98.6 °F (37 °C) (01/18/23 1523)  Pulse: 72 (01/18/23 1523)  Resp: 16 (01/18/23 1523)  BP: 145/65 (01/18/23 1523)  SpO2: 95 % (01/18/23 1523) on1 liter of oxygen   Vital Signs (24h  Range):  Temp:  [96.9 °F (36.1 °C)-98.6 °F (37 °C)] 98.6 °F (37 °C)  Pulse:  [64-85] 69  Resp:  [16-18] 16  SpO2:  [92 %-97 %] 93 %  BP: (120-161)/(59-71) 120/59     Weight: 63.5 kg (140 lb)  Body mass index is 24.03 kg/m².  No intake or output data in the 24 hours ending 01/19/23 1258   Physical Exam  Vitals and nursing note reviewed.   Constitutional:       General: She is awake. She is not in acute distress.     Appearance: Normal appearance. She is normal weight. She is not ill-appearing.   Cardiovascular:      Rate and Rhythm: Normal rate and regular rhythm.      Heart sounds: Normal heart sounds. No murmur heard.    No friction rub. No gallop.   Pulmonary:      Effort: Pulmonary effort is normal. No respiratory distress.      Breath sounds: Normal breath sounds. No wheezing.   Abdominal:      General: Abdomen is flat. Bowel sounds are normal. There is no distension.      Palpations: Abdomen is soft.      Tenderness: There is no abdominal tenderness.   Musculoskeletal:      Right lower leg: No edema.      Left lower leg: No edema.   Skin:     General: Skin is warm.      Findings: No erythema.      Comments: Surgical dressing to left buttock area intact with no drainage.    Neurological:      Mental Status: She is alert and oriented to person, place, and time.   Psychiatric:         Mood and Affect: Mood normal.         Behavior: Behavior normal. Behavior is cooperative.         Thought Content: Thought content normal.         Judgment: Judgment normal.       Significant Labs: All pertinent labs within the past 24 hours have been reviewed.    Significant Imaging: I have reviewed all pertinent imaging results/findings within the past 24 hours.      Assessment/Plan:      * Closed displaced fracture of left femoral neck s/p left hip hemiarthroplasty on 1/13/2023  Age-related osteoporosis with current pathological fracture with routine healing  · Patient underwent left hip hemiarthroplasty on 1/13/2023 for femoral  neck fracture.   · Patient reports minimal pain in left hip. Well controlled 1/19  · Plan is to continue PT/OT for gait training and strengthening and restoration of ADL's. Patient is FWB/WBAT: left lower extremity, posterior hip precautions as per Orthopedic recommendations.   · Plan is to continue Apixiban 2.5 mg po BID and will need a total of 30 days post-op after hip fracture surgery and JOSSUE/SCDs for DVT prophylaxis.   · Orthopedics is following and managing hip fracture and surgical site.   · Perineural pain catheter removed by Anesthesia.   · Pain controlled. Patient on multimodal pain management post-op with Tylenol 1000 mg po every 6 hours, Robaxin 500 mg po 4 times daily and Celebrex 100 mg po daily post-op and will continue.  · Plan is SNF on discharge. Medically ready and awaiting placement at [facility] where she has been accepted and awaiting insurance auth.     Chronic combined systolic and diastolic heart failure  Patient is identified as having Combined Systolic and Diastolic heart failure that is Chronic. CHF is currently controlled. Latest ECHO performed and demonstrates- Results for orders placed during the hospital encounter of 12/05/22    Echo    Interpretation Summary  · The estimated ejection fraction is 35%.  · The quantitatively derived ejection fraction is 35%.  · The left ventricle is normal in size with moderately decreased systolic function.  · There is abnormal septal wall motion consistent with right ventricular pacemaker.  · There is moderate left ventricular global hypokinesis.  · Grade I left ventricular diastolic dysfunction.  · Normal right ventricular size with normal right ventricular systolic function.  · Severe left atrial enlargement.  · Mild-to-moderate aortic regurgitation.  · The estimated PA systolic pressure is 27 mmHg.  · Normal central venous pressure (3 mmHg).  · Trivial circumferential pericardial effusion.  Continue home Labetalol to treat and continue  Losartan (substitute for home Irbesartan not on formulary) to treat and monitor clinical status closely. Monitor on telemetry. Patient is off CHF pathway.  Monitor strict Is&Os and daily weights.  Place on fluid restriction of 1.5 L. Continue to stress to patient importance of self efficacy and  on diet for CHF.     Memory difficulties  Very mild. Continue home Namenda to treat.       CHB (complete heart block)  Patient with recent BiVID pacer placed by Cardiology on 2/2022. Controlled.       Stage 3a chronic kidney disease  - Chronic and controlled. Creatinine 1.1 on presentation and at baseline.  - Avoid nephrotoxic agents as appropriate.  - Renally dose meds for her GFR.   - Continue to monitor renal function with daily BMP in hospital.     Acquired hypothyroidism  Chronic and controlled. Continue home Levothyroxine to treat.       Coronary artery disease involving native coronary artery of native heart without angina pectoris  · Prior CABG in 2001 with  of mid LAD.   · Chronic and controlled. Patient asymptomatic.   · Continue home Labetalol to treat. Hold Aspirin for surgery and restart post-op. Patient on Crestor at home but not on formulary here in hospital and allergic to alternative statins so will hold statin in hospital.       Pure hypercholesterolemia  - Chronic and controlled. Patient allergic to formularly alternatives to her home Crestor.   - Resume home Crestor at discharge.      Primary hypertension  · Chronic and controlled. Continue home Amlodipine, ARB (Irbesartan to Losartan for formulary), and home Labetalol to treat.   · Target BP < 150/90.         VTE Risk Mitigation (From admission, onward)         Ordered     apixaban tablet 2.5 mg  2 times daily         01/13/23 1450     IP VTE HIGH RISK PATIENT  Once         01/13/23 0035     Place sequential compression device  Until discontinued         01/13/23 0035     Place sequential compression device  Until discontinued         01/12/23  2224     Place sequential compression device  Until discontinued         01/12/23 2210                Discharge Planning   AIDEE: 1/19/2023     Code Status: Full Code   Is the patient medically ready for discharge?: Yes    Reason for patient still in hospital (select all that apply): Patient trending condition  Discharge Plan A: Skilled Nursing Facility   Discharge Delays: (!) Post-Acute Set-up              Iram Hoyos MD  Department of Hospital Medicine   [facility] - Surgery

## 2023-01-19 NOTE — PLAN OF CARE
01/19/23 1513   Post-Acute Status   Post-Acute Authorization Placement   Post-Acute Placement Status Set-up Complete/Auth obtained     Patient's set-up has been completed.CATHY scheduled d/c transportation to [facility] through [facility]. Patient is scheduled to be picked up at 4:30 pm. CATHY provided patient's nurse with report number #[phone]; ask for the nurse for room #4. Requested  time does not guarantee arrival time.          Maria Luisa Phillips LMSW  Case Management   [facility]   Ext. [phone]       30-May-2022 03:39

## 2023-01-19 NOTE — PLAN OF CARE
Patient 's auth still pending to d/c to [facility]. Received call from Fish GUDINO @ [facility] bed [phone]. Family would like patient to d/c to this facility if possible. Clinicals faxed to [phone] for review. Will follow-up with RNCM later to determine if patient accepted. Family updated by phone, spoke with brother Kristina.      Eva GUDINO  Case Management  [facility]  [phone]

## 2023-01-19 NOTE — ASSESSMENT & PLAN NOTE
Age-related osteoporosis with current pathological fracture with routine healing  · Patient underwent left hip hemiarthroplasty on 1/13/2023 for femoral neck fracture.   · Patient reports minimal pain in left hip. Well controlled 1/19  · Plan is to continue PT/OT for gait training and strengthening and restoration of ADL's. Patient is FWB/WBAT: left lower extremity, posterior hip precautions as per Orthopedic recommendations.   · Plan is to continue Apixiban 2.5 mg po BID and will need a total of 30 days post-op after hip fracture surgery and JOSSUE/SCDs for DVT prophylaxis.   · Orthopedics is following and managing hip fracture and surgical site.   · Perineural pain catheter removed by Anesthesia.   · Pain controlled. Patient on multimodal pain management post-op with Tylenol 1000 mg po every 6 hours, Robaxin 500 mg po 4 times daily and Celebrex 100 mg po daily post-op and will continue.  · Plan is SNF on discharge. Medically ready and awaiting placement at [facility] where she has been accepted and awaiting insurance auth.

## 2023-01-19 NOTE — PLAN OF CARE
Spoke with Fish @ [facility] bed who reports facility can accept patient and has an available bed for today. Auth given to [facility], will have auth switched to new facility. Orders updated and faxed to facility @ [phone]. Updated patient and her brother at bedside.

## 2023-01-19 NOTE — PT/OT/SLP PROGRESS
Occupational Therapy   Treatment    Name: Lynn Mckinney  MRN: [id number]  Admitting Diagnosis:  Closed displaced fracture of left femoral neck  6 Days Post-Op    Recommendations:     Discharge Recommendations: nursing facility, skilled  Discharge Equipment Recommendations:  walker, rolling, bedside commode, bath bench  Barriers to discharge:  Decreased caregiver support    Assessment:     Lynn Mckinney is a 87 y.o. female with a medical diagnosis of Closed displaced fracture of left femoral neck.  She presents with impairments listed below. Pt did well to tolerate and participate in the session. Pt is functioning below baseline, but did great to complete all mobility and ADLs in the session. Pt is progressing towards goals at this time. Pt displayed global deconditioning requiring increased assist for ADLs and mobility at this time. Pt would benefit from skilled OT services to improve independence and overall occupational functioning.     Performance deficits affecting function are weakness, impaired endurance, impaired self care skills, impaired functional mobility, gait instability, decreased lower extremity function, decreased ROM, impaired skin.     Rehab Prognosis:  Good; patient would benefit from acute skilled OT services to address these deficits and reach maximum level of function.       Plan:     Patient to be seen 4 x/week to address the above listed problems via self-care/home management, therapeutic activities, therapeutic exercises, neuromuscular re-education  Plan of Care Expires: 02/13/23  Plan of Care Reviewed with: patient, sibling    Subjective     Pain/Comfort:  Pain Rating 1: 0/10  Pain Rating Post-Intervention 1: 0/10    Objective:     Communicated with: RN prior to session.  Patient found HOB elevated with PureWick, telemetry, oxygen upon OT entry to room.    General Precautions: Standard, fall    Orthopedic Precautions:LLE weight bearing as tolerated  Braces: Knee immobilizer  Respiratory  Status: Room air     Occupational Performance:     Bed Mobility:    Patient completed Scooting/Bridging with contact guard assistance  Patient completed Supine to Sit with contact guard assistance     Functional Mobility/Transfers:  Patient completed Sit <> Stand Transfer with contact guard assistance  with  rolling walker   Patient completed Bed <> Chair Transfer using Step Transfer technique with contact guard assistance with rolling walker  Patient completed Toilet Transfer Step Transfer technique with minimum assistance with  rolling walker and grab bars  Functional Mobility: Pt ambulated ~100 ft w/ PTA at CA w/ RW.     Activities of Daily Living:  Grooming: contact guard assistance hand hygiene while standing a the sink   Upper Body Dressing: minimum assistance donned gown as robe  Lower Body Dressing: stand by assistance donned and doffed socks using a reacher and sock aid  Toileting: stand by assistance simulated while seated on the toilet       [facility] 6 Click ADL: 18    Treatment & Education:  Pt was educated on:  Hip precautions   Safety w/ oob activities  LBD techniques   DME for ADLs  D/C recs  Importance of oob activities     Patient left up in chair with all lines intact, call button in reach, and brother present    GOALS:   Multidisciplinary Problems       Occupational Therapy Goals          Problem: Occupational Therapy    Goal Priority Disciplines Outcome Interventions   Occupational Therapy Goal     OT, PT/OT Ongoing, Progressing    Description: Goals to be met by: 1/28/2023    Patient will increase functional independence with ADLs by performing:    Feeding with [place].  UE Dressing with Supervision.  LE Dressing with Supervision.  Grooming while standing at sink with Stand-by Assistance.  Toileting from toilet with Minimal Assistance for hygiene and clothing management.   Rolling to Right with Stand-by Assistance.   Supine to sit with Stand-by Assistance.  Stand pivot transfers with Stand-by  Assistance.  Step transfer with Stand-by Assistance                         Time Tracking:     OT Date of Treatment: 01/19/23  OT Start Time: 0932  OT Stop Time: 0955  OT Total Time (min): 23 min    Billable Minutes:Self Care/Home Management 23 minutes    OT/EUGENE: OT     EUGENE Visit Number: 0    1/19/2023

## 2023-01-19 NOTE — SUBJECTIVE & OBJECTIVE
Interval History: feeling very well today, no pain per se but knows her leg isnt normal either but working very well with PT very motivated to do therapy today to walk into hallway with them. Family at bedside, and patient very good spirits, alked to her and family for awhile today. They report being very sad pcp dr silverman just retired as was pcp for 50 years. They havebeen [facility] patients for a very long time and happy with care here. Reports motivated for SNF and awaiting snf and auth from [facility]. No new concerns this morning so far. Hg is stable      Review of Systems   Respiratory:  Negative for cough and shortness of breath.    Cardiovascular:  Negative for leg swelling.   Gastrointestinal:  Negative for abdominal pain, nausea and vomiting.   Musculoskeletal:  Positive for arthralgias (Left hip).   Neurological:  Negative for dizziness.   Psychiatric/Behavioral:  Negative for agitation and confusion.    Objective:     Vital Signs (Most Recent):  Temp: 98.6 °F (37 °C) (01/18/23 1523)  Pulse: 72 (01/18/23 1523)  Resp: 16 (01/18/23 1523)  BP: 145/65 (01/18/23 1523)  SpO2: 95 % (01/18/23 1523) on1 liter of oxygen   Vital Signs (24h Range):  Temp:  [96.9 °F (36.1 °C)-98.6 °F (37 °C)] 98.6 °F (37 °C)  Pulse:  [64-85] 69  Resp:  [16-18] 16  SpO2:  [92 %-97 %] 93 %  BP: (120-161)/(59-71) 120/59     Weight: 63.5 kg (140 lb)  Body mass index is 24.03 kg/m².  No intake or output data in the 24 hours ending 01/19/23 1258   Physical Exam  Vitals and nursing note reviewed.   Constitutional:       General: She is awake. She is not in acute distress.     Appearance: Normal appearance. She is normal weight. She is not ill-appearing.   Cardiovascular:      Rate and Rhythm: Normal rate and regular rhythm.      Heart sounds: Normal heart sounds. No murmur heard.    No friction rub. No gallop.   Pulmonary:      Effort: Pulmonary effort is normal. No respiratory distress.      Breath sounds: Normal breath sounds. No wheezing.    Abdominal:      General: Abdomen is flat. Bowel sounds are normal. There is no distension.      Palpations: Abdomen is soft.      Tenderness: There is no abdominal tenderness.   Musculoskeletal:      Right lower leg: No edema.      Left lower leg: No edema.   Skin:     General: Skin is warm.      Findings: No erythema.      Comments: Surgical dressing to left buttock area intact with no drainage.    Neurological:      Mental Status: She is alert and oriented to person, place, and time.   Psychiatric:         Mood and Affect: Mood normal.         Behavior: Behavior normal. Behavior is cooperative.         Thought Content: Thought content normal.         Judgment: Judgment normal.       Significant Labs: All pertinent labs within the past 24 hours have been reviewed.    Significant Imaging: I have reviewed all pertinent imaging results/findings within the past 24 hours.

## 2023-01-20 NOTE — PLAN OF CARE
[facility]  Discharge Final Note    Primary Care Provider: Brie Fagan MD    Expected Discharge Date: 1/19/2023    Final Discharge Note (most recent)       Final Note - 01/20/23 1306          Final Note    Assessment Type Final Discharge Note     Anticipated Discharge Disposition Skilled Nursing Facility     What phone number can be called within the next 1-3 days to see how you are doing after discharge? --   [phone]    Hospital Resources/Appts/Education Provided Appointments scheduled and added to AVS        Post-Acute Status    Post-Acute Authorization Placement     Post-Acute Placement Status Set-up Complete/Auth obtained                     Important Message from Medicare  Important Message from Medicare regarding Discharge Appeal Rights: Given to patient/caregiver, Explained to patient/caregiver, Signed/date by patient/caregiver     Date IMM was signed: 01/19/23  Time IMM was signed: 1513    Contact Info       [facility] 5th [address]   Specialty: Orthopedics    [address], [address]  [facility] [address]   Phone: [phone]       Next Steps: Follow up    Instructions: 2 week follow up for bandage removal              Future Appointments   Date Time Provider Department Center   1/27/2023  2:00 PM LILIANE Perkins [facility]   1/30/2023 10:30 AM LAB, [facility] RVPH LAB [facility]   2/6/2023  9:00 AM 3PREP, KATRIN CLAYTON SSCU [facility]   2/13/2023 11:20 AM PACEMAKER, ICD MATILDE Lantigua   2/22/2023 10:00 AM HOME MONITOR DEVICE CHECK, AWAIS MATILDE Naranjo [facility]   2/28/2023 10:00 AM [facility]   4/3/2023  2:00 PM Flako Carver MD [facility] OPHTHAL [facility]     Patient discharged to [facility] SNF on 1/19/23.        Eva Licea RNCM  Case Management  [facility]  [phone]

## 2023-01-20 NOTE — NURSING
Pt discharged per md order- iv dc d cath intact- telemetry removed returned to nurses station- surgical dressing cdi- no change in neuro status-pain 1/10- off floor via wc/ transport  brother

## 2023-01-23 ENCOUNTER — PATIENT MESSAGE (OUTPATIENT)
Dept: MEDSURG UNIT | Facility: HOSPITAL | Age: 88
End: 2023-01-23
Payer: MEDICARE

## 2023-01-28 PROCEDURE — G0180 PR HOME HEALTH MD CERTIFICATION: ICD-10-PCS | Mod: ,,, | Performed by: ORTHOPAEDIC SURGERY

## 2023-01-28 PROCEDURE — G0180 MD CERTIFICATION HHA PATIENT: HCPCS | Mod: ,,, | Performed by: ORTHOPAEDIC SURGERY

## 2023-01-31 ENCOUNTER — PATIENT MESSAGE (OUTPATIENT)
Dept: INTERNAL MEDICINE | Facility: CLINIC | Age: 88
End: 2023-01-31
Payer: MEDICARE

## 2023-01-31 ENCOUNTER — OFFICE VISIT (OUTPATIENT)
Dept: FAMILY MEDICINE | Facility: CLINIC | Age: 88
End: 2023-01-31
Payer: MEDICARE

## 2023-01-31 ENCOUNTER — HOSPITAL ENCOUNTER (OUTPATIENT)
Dept: RADIOLOGY | Facility: HOSPITAL | Age: 88
Discharge: HOME | End: 2023-01-31
Attending: NURSE PRACTITIONER
Payer: MEDICARE

## 2023-01-31 VITALS
WEIGHT: 129.44 LBS | SYSTOLIC BLOOD PRESSURE: 118 MMHG | BODY MASS INDEX: 22.1 KG/M2 | HEART RATE: 94 BPM | DIASTOLIC BLOOD PRESSURE: 69 MMHG | RESPIRATION RATE: 18 BRPM | TEMPERATURE: 98 F | HEIGHT: 64 IN

## 2023-01-31 DIAGNOSIS — R09.89 ABNORMAL LUNG SOUNDS: Primary | ICD-10-CM

## 2023-01-31 DIAGNOSIS — S99.912A ANKLE INJURY, LEFT, INITIAL ENCOUNTER: ICD-10-CM

## 2023-01-31 DIAGNOSIS — R09.89 ABNORMAL LUNG SOUNDS: ICD-10-CM

## 2023-01-31 DIAGNOSIS — Z86.79 HISTORY OF CHF (CONGESTIVE HEART FAILURE): ICD-10-CM

## 2023-01-31 DIAGNOSIS — L60.9 NAIL PROBLEM: ICD-10-CM

## 2023-01-31 PROCEDURE — 1126F PR PAIN SEVERITY QUANTIFIED, NO PAIN PRESENT: ICD-10-PCS | Mod: CPTII,S$GLB,, | Performed by: NURSE PRACTITIONER

## 2023-01-31 PROCEDURE — 3288F FALL RISK ASSESSMENT DOCD: CPT | Mod: CPTII,S$GLB,, | Performed by: NURSE PRACTITIONER

## 2023-01-31 PROCEDURE — 99213 PR OFFICE/OUTPT VISIT, EST, LEVL III, 20-29 MIN: ICD-10-PCS | Mod: S$GLB,,, | Performed by: NURSE PRACTITIONER

## 2023-01-31 PROCEDURE — 1100F PTFALLS ASSESS-DOCD GE2>/YR: CPT | Mod: CPTII,S$GLB,, | Performed by: NURSE PRACTITIONER

## 2023-01-31 PROCEDURE — 1126F AMNT PAIN NOTED NONE PRSNT: CPT | Mod: CPTII,S$GLB,, | Performed by: NURSE PRACTITIONER

## 2023-01-31 PROCEDURE — 71046 X-RAY EXAM CHEST 2 VIEWS: CPT | Mod: TC,PO

## 2023-01-31 PROCEDURE — 73610 X-RAY EXAM OF ANKLE: CPT | Mod: TC,PO,LT

## 2023-01-31 PROCEDURE — 3288F PR FALLS RISK ASSESSMENT DOCUMENTED: ICD-10-PCS | Mod: CPTII,S$GLB,, | Performed by: NURSE PRACTITIONER

## 2023-01-31 PROCEDURE — 1111F PR DISCHARGE MEDS RECONCILED W/ CURRENT OUTPATIENT MED LIST: ICD-10-PCS | Mod: CPTII,S$GLB,, | Performed by: NURSE PRACTITIONER

## 2023-01-31 PROCEDURE — 71046 XR CHEST PA AND LATERAL: ICD-10-PCS | Mod: 26,,, | Performed by: RADIOLOGY

## 2023-01-31 PROCEDURE — 73610 XR ANKLE COMPLETE 3 VIEW LEFT: ICD-10-PCS | Mod: 26,LT,, | Performed by: RADIOLOGY

## 2023-01-31 PROCEDURE — 99213 OFFICE O/P EST LOW 20 MIN: CPT | Mod: S$GLB,,, | Performed by: NURSE PRACTITIONER

## 2023-01-31 PROCEDURE — 1160F RVW MEDS BY RX/DR IN RCRD: CPT | Mod: CPTII,S$GLB,, | Performed by: NURSE PRACTITIONER

## 2023-01-31 PROCEDURE — 71046 X-RAY EXAM CHEST 2 VIEWS: CPT | Mod: 26,,, | Performed by: RADIOLOGY

## 2023-01-31 PROCEDURE — 1111F DSCHRG MED/CURRENT MED MERGE: CPT | Mod: CPTII,S$GLB,, | Performed by: NURSE PRACTITIONER

## 2023-01-31 PROCEDURE — 1159F PR MEDICATION LIST DOCUMENTED IN MEDICAL RECORD: ICD-10-PCS | Mod: CPTII,S$GLB,, | Performed by: NURSE PRACTITIONER

## 2023-01-31 PROCEDURE — 1159F MED LIST DOCD IN RCRD: CPT | Mod: CPTII,S$GLB,, | Performed by: NURSE PRACTITIONER

## 2023-01-31 PROCEDURE — 99999 PR PBB SHADOW E&M-EST. PATIENT-LVL V: CPT | Mod: PBBFAC,,, | Performed by: NURSE PRACTITIONER

## 2023-01-31 PROCEDURE — 1160F PR REVIEW ALL MEDS BY PRESCRIBER/CLIN PHARMACIST DOCUMENTED: ICD-10-PCS | Mod: CPTII,S$GLB,, | Performed by: NURSE PRACTITIONER

## 2023-01-31 PROCEDURE — 1100F PR PT FALLS ASSESS DOC 2+ FALLS/FALL W/INJURY/YR: ICD-10-PCS | Mod: CPTII,S$GLB,, | Performed by: NURSE PRACTITIONER

## 2023-01-31 PROCEDURE — 73610 X-RAY EXAM OF ANKLE: CPT | Mod: 26,LT,, | Performed by: RADIOLOGY

## 2023-01-31 PROCEDURE — 99999 PR PBB SHADOW E&M-EST. PATIENT-LVL V: ICD-10-PCS | Mod: PBBFAC,,, | Performed by: NURSE PRACTITIONER

## 2023-01-31 RX ORDER — AMLODIPINE BESYLATE 10 MG/1
10 TABLET ORAL DAILY
Qty: 90 TABLET | Refills: 3 | Status: SHIPPED | OUTPATIENT
Start: 2023-01-31 | End: 2023-02-01 | Stop reason: SDUPTHER

## 2023-01-31 NOTE — PROGRESS NOTES
"Subjective:       Patient ID: Lynn Mckinney is a 87 y.o. female.    Chief Complaint: Establish Care and Ankle Injury  Pt in today to establish care, lung crackles and left ankle injury. Pt caregiver states has been experiencing crackles to lower lungs > 1y; denies any cough, SOB; caregiver states, "the home health nurse said she heard crackles and want her to get it checked." Pt also requests to establish care with Dr. Robles.   Ankle Injury   The incident occurred 5 to 7 days ago. The incident occurred at a nursing home. The injury mechanism was a twisting injury. The pain is present in the left ankle. The quality of the pain is described as aching. The pain is at a severity of 1/10. The pain is mild. The pain has been Improving since onset. Pertinent negatives include no inability to bear weight, loss of motion, loss of sensation, muscle weakness, numbness or tingling. She reports no foreign bodies present. The symptoms are aggravated by movement. She has tried nothing for the symptoms. The treatment provided moderate relief.   Nail Problem  This is a chronic (Discoloration of toe nails; requests podiatry referral) problem. The current episode started more than 1 year ago. The problem occurs daily. The problem has been unchanged. Pertinent negatives include no abdominal pain, anorexia, arthralgias, change in bowel habit, chest pain, chills, congestion, coughing, diaphoresis, fatigue, fever, headaches, joint swelling, myalgias, nausea, neck pain, numbness, rash, sore throat, swollen glands, urinary symptoms, vertigo, visual change, vomiting or weakness. Nothing aggravates the symptoms. She has tried nothing for the symptoms. The treatment provided no relief.   Past Medical History:   Diagnosis Date    Allergy     seasonal    Blood clotting tendency     Carotid artery disease 5/29/2014    Coronary artery disease     Heart block     Hyperlipidemia     Hypertension     Hypothyroid     Left displaced femoral neck " fracture 2023    Obesity     Pacemaker     PAD (peripheral artery disease)     Spinal stenosis     Thoracic or lumbosacral neuritis or radiculitis 2014    Tubular adenoma      Social History     Socioeconomic History    Marital status: Single   Tobacco Use    Smoking status: Former     Types: Cigarettes     Quit date: 1984     Years since quittin.4    Smokeless tobacco: Never   Substance and Sexual Activity    Alcohol use: Yes     Alcohol/week: 1.0 standard drink     Types: 1 Shots of liquor per week     Comment: old fashion  with dinner every 6 mo     Drug use: No   Other Topics Concern    Are you pregnant or think you may be? No    Breast-feeding No   Social History Narrative    Minimal physical activity. Does housework.         Drives a little.      Past Surgical History:   Procedure Laterality Date    ANGIOPLASTY      APPENDECTOMY      BREAST CYST ASPIRATION Bilateral     CARDIAC CATHETERIZATION      CARDIAC PACEMAKER PLACEMENT  10/10/13    by Dr. Coleman    CAROTID STENT Left     by Dr. Bowen    CATARACT EXTRACTION W/  INTRAOCULAR LENS IMPLANT Bilateral     Dr Sharp    CORONARY ARTERY BYPASS GRAFT  2001     x 3 LIMA-Ramus, SVG-OM1 & SVG-PDA    CORONARY BYPASS GRAFT ANGIOGRAPHY  3/11/2021    Procedure: Bypass graft study;  Surgeon: Gil Garcia MD;  Location: [facility] CATH LAB;  Service: Cardiology;;    CYST REMOVAL      wrist    EYE SURGERY      HEMIARTHROPLASTY OF HIP Left 2023    Procedure: HEMIARTHROPLASTY, HIP, LEFT;  Surgeon: Alan Vieyra MD;  Location: [facility] OR [address] [place] [address];  Service: Orthopedics;  Laterality: Left;    HYSTERECTOMY      LEFT HEART CATHETERIZATION Left 3/11/2021    Procedure: Left heart cath;  Surgeon: Gil Garcia MD;  Location: [facility] CATH LAB;  Service: Cardiology;  Laterality: Left;    OOPHORECTOMY      THYROIDECTOMY      VITRECTOMY BY PARS PLANA APPROACH Right 10/12/2021    Procedure: VITRECTOMY, PARS PLANA APPROACH;  Surgeon: Flako Carver,  MD;  Location: [facility] OR [address] [place] [address];  Service: Ophthalmology;  Laterality: Right;       Review of Systems   Constitutional: Negative.  Negative for chills, diaphoresis, fatigue and fever.   HENT: Negative.  Negative for nasal congestion and sore throat.    Eyes: Negative.    Respiratory:  Negative for cough.         Abnormal lung sounds   Cardiovascular: Negative.  Negative for chest pain.   Gastrointestinal: Negative.  Negative for abdominal pain, anorexia, change in bowel habit, nausea, vomiting and change in bowel habit.   Endocrine: Negative.    Genitourinary: Negative.    Musculoskeletal:  Negative for arthralgias, joint swelling, myalgias and neck pain.        Left ankle injury   Integumentary:  Negative for rash. Negative.   Allergic/Immunologic: Negative.    Neurological: Negative.  Negative for vertigo, tingling, weakness, numbness and headaches.   Psychiatric/Behavioral: Negative.         Objective:      Physical Exam  Vitals and nursing note reviewed.   Constitutional:       Appearance: Normal appearance.   HENT:      Head: Normocephalic.      Right Ear: Tympanic membrane, ear canal and external ear normal.      Left Ear: Tympanic membrane, ear canal and external ear normal.      Nose: Nose normal.      Mouth/Throat:      Mouth: Mucous membranes are moist.      Pharynx: Oropharynx is clear.   Eyes:      Conjunctiva/sclera: Conjunctivae normal.      Pupils: Pupils are equal, round, and reactive to light.   Cardiovascular:      Rate and Rhythm: Normal rate and regular rhythm.      Pulses: Normal pulses.      Heart sounds: Normal heart sounds.   Pulmonary:      Effort: Pulmonary effort is normal.      Breath sounds: Normal air entry. Examination of the right-lower field reveals rales. Examination of the left-lower field reveals rales. Rales (very mild) present.   Abdominal:      General: Bowel sounds are normal.      Palpations: Abdomen is soft.   Musculoskeletal:         General: Swelling (left ankle) present.       Cervical back: Normal range of motion and neck supple.      Left ankle: Swelling present. No deformity, ecchymosis or lacerations. No tenderness. Normal range of motion. Anterior drawer test negative. Normal pulse.      Comments: Thick, discolored nails noted to nails of left foot   Skin:     General: Skin is warm and dry.      Capillary Refill: Capillary refill takes 2 to 3 seconds.   Neurological:      Mental Status: She is alert and oriented to person, place, and time.   Psychiatric:         Mood and Affect: Mood normal.         Behavior: Behavior normal.         Thought Content: Thought content normal.         Judgment: Judgment normal.       Assessment:       Problem List Items Addressed This Visit    None  Visit Diagnoses       Abnormal lung sounds    -  Primary    Relevant Orders    BNP    X-Ray Chest PA And Lateral    RENAL FUNCTION PANEL    Ankle injury, left, initial encounter        Relevant Orders    X-Ray Ankle Complete Left    Ambulatory referral/consult to Podiatry    Nail problem        Relevant Orders    Ambulatory referral/consult to Podiatry    History of CHF (congestive heart failure)                  Plan:           Lynn was seen today for establish care and ankle injury.    Diagnoses and all orders for this visit:    Abnormal lung sounds  History of CHF (congestive heart failure)  -     BNP; Future  -     X-Ray Chest PA And Lateral; Future  -     RENAL FUNCTION PANEL; Future    Ankle injury, left, initial encounter  -     X-Ray Ankle Complete Left; Future  -     Ambulatory referral/consult to Podiatry; Future    Nail problem  -     Ambulatory referral/consult to Podiatry; Future    Report to ER immediately if symptoms worsen or persist

## 2023-02-01 ENCOUNTER — OFFICE VISIT (OUTPATIENT)
Dept: ORTHOPEDICS | Facility: CLINIC | Age: 88
End: 2023-02-01
Payer: MEDICARE

## 2023-02-01 VITALS — BODY MASS INDEX: 22.1 KG/M2 | WEIGHT: 129.44 LBS | HEIGHT: 64 IN

## 2023-02-01 DIAGNOSIS — S72.002A CLOSED FRACTURE OF LEFT HIP, INITIAL ENCOUNTER: ICD-10-CM

## 2023-02-01 DIAGNOSIS — S72.002A CLOSED DISPLACED FRACTURE OF LEFT FEMORAL NECK: Primary | ICD-10-CM

## 2023-02-01 PROCEDURE — 1159F PR MEDICATION LIST DOCUMENTED IN MEDICAL RECORD: ICD-10-PCS | Mod: CPTII,S$GLB,, | Performed by: PHYSICIAN ASSISTANT

## 2023-02-01 PROCEDURE — 99999 PR PBB SHADOW E&M-EST. PATIENT-LVL III: ICD-10-PCS | Mod: PBBFAC,,, | Performed by: PHYSICIAN ASSISTANT

## 2023-02-01 PROCEDURE — 1159F MED LIST DOCD IN RCRD: CPT | Mod: CPTII,S$GLB,, | Performed by: PHYSICIAN ASSISTANT

## 2023-02-01 PROCEDURE — 99024 PR POST-OP FOLLOW-UP VISIT: ICD-10-PCS | Mod: S$GLB,,, | Performed by: PHYSICIAN ASSISTANT

## 2023-02-01 PROCEDURE — 99999 PR PBB SHADOW E&M-EST. PATIENT-LVL III: CPT | Mod: PBBFAC,,, | Performed by: PHYSICIAN ASSISTANT

## 2023-02-01 PROCEDURE — 99024 POSTOP FOLLOW-UP VISIT: CPT | Mod: S$GLB,,, | Performed by: PHYSICIAN ASSISTANT

## 2023-02-01 PROCEDURE — 1125F AMNT PAIN NOTED PAIN PRSNT: CPT | Mod: CPTII,S$GLB,, | Performed by: PHYSICIAN ASSISTANT

## 2023-02-01 PROCEDURE — 1125F PR PAIN SEVERITY QUANTIFIED, PAIN PRESENT: ICD-10-PCS | Mod: CPTII,S$GLB,, | Performed by: PHYSICIAN ASSISTANT

## 2023-02-01 RX ORDER — AMLODIPINE BESYLATE 10 MG/1
10 TABLET ORAL DAILY
Qty: 90 TABLET | Refills: 3 | Status: SHIPPED | OUTPATIENT
Start: 2023-02-01 | End: 2024-02-01

## 2023-02-01 NOTE — TELEPHONE ENCOUNTER
Refill Routing Note   Medication(s) are not appropriate for processing by [facility] for the following reason(s):      Non-participating provider    [facility] action(s):  Route      Medication Therapy Plan: Patient's caregiver requesting refill to alternate pharmacy      Appointments  past 12m or future 3m with PCP    Date Provider   Last Visit   1/5/2023 Brie Fagan MD   Next Visit   Visit date not found Brie Fagan MD   ED visits in past 90 days: 1        Note composed:11:45 AM 02/01/2023

## 2023-02-02 ENCOUNTER — PATIENT MESSAGE (OUTPATIENT)
Dept: ELECTROPHYSIOLOGY | Facility: CLINIC | Age: 88
End: 2023-02-02
Payer: MEDICARE

## 2023-02-03 NOTE — PROGRESS NOTES
Principal Orthopedic Problem:  Left femoral neck fracture     Relevant Medical History: according to chart    PMHX:  CAD s/p CABG, PAD, combined systolic/diastolic heart failure, heart block s/p biventricular ICD, HTN, HLD, and hypothyroidism     HPI: Ms. Mckinney is 87 year old female who presented to the ED on 01/13/23 with left hip pain after falling from standing.   RADS: transcervical femoral neck fracture that is shortened and in varus  01/13/23: :   Left hip hemiarthroplasty for femoral neck fracture    Ms. Mckinney is here today for a post-operative visit    Interval History:  she reports that she is doing ok.   . she is  participating in PT/OT. Home health doing very well.   Pain is controlled.  she is  taking pain medication.    she denies fever, chills, and sweats .     Complications since time of surgery: heart     Physical exam:    Patient arrives to exam room: wheelchair.  Patient is  accompanied    Dressing taken down.  Incision is clean, dry and intact.  Closed stratifix and dermabond .   Healing well no signs of breakdown or infection.    RADS: All pertinent images were reviewed by myself:   none done today    Assessment:  Post-op visit ( 2 weeks)    Plan:  Current care, treatment plan, precautions, activity level/ modifications, limitations, rehabilitation exercises and proposed future treatment were discussed with the patient. We discussed the need to monitor for changes in symptoms and condition and report them to the physician.  Discussed importance of compliance with all appointments and follow up examinations.     WOUND CARE ORDERS  - The patient was advised to keep the incision clean and dry for the next 24 hours after which she may wash the area with antibacterial soap in the shower. Will not submerge until the incision is completely healed  -Patient was advised to monitor wound closely and multiple times daily for any problems. Call clinic immediately or report to ED for immediate  medical attention for any complications including reopening of wound, drainage, purulence, redness, streaking, odor, pain out of proportion, fever, chills, etc.       ACTIVITY:   - as tolerated   -range of motion as tolerated posterior hip precautions   -  weight bearing as tolerated      -PT/OT, , Patient is responsible to establish and continue care      PAIN MEDICATION:   - Multimodal pain control  - Pain medication: refill was not needed  - Pain medication refill policy provided to patient for review, yes.    - Patient was informed a multi-modal approach is used to treat their pain. With the goal to get off of narcotic pain medication and discontinue as soon as possible.   - ice and elevation to reduce pain and swelling     DVT PROPHYLAXIS:   - Eliquis 2.5 mg bid      FOLLOW UP:   - Patient will follow up in the clinic in 4 weeks.  - X-ray of her hip is needed.       If there are any questions prior to scheduled follow up, the patient was instructed to contact the office

## 2023-02-17 ENCOUNTER — PATIENT MESSAGE (OUTPATIENT)
Dept: MEDSURG UNIT | Facility: HOSPITAL | Age: 88
End: 2023-02-17
Payer: MEDICARE

## 2023-02-22 ENCOUNTER — CLINICAL SUPPORT (OUTPATIENT)
Dept: CARDIOLOGY | Facility: HOSPITAL | Age: 88
End: 2023-02-22
Payer: MEDICARE

## 2023-02-22 DIAGNOSIS — I44.2 ATRIOVENTRICULAR BLOCK, COMPLETE: ICD-10-CM

## 2023-02-22 DIAGNOSIS — I48.91 UNSPECIFIED ATRIAL FIBRILLATION: ICD-10-CM

## 2023-02-22 DIAGNOSIS — Z95.810 PRESENCE OF AUTOMATIC (IMPLANTABLE) CARDIAC DEFIBRILLATOR: ICD-10-CM

## 2023-02-22 PROCEDURE — 93296 REM INTERROG EVL PM/IDS: CPT | Performed by: INTERNAL MEDICINE

## 2023-02-28 ENCOUNTER — OFFICE VISIT (OUTPATIENT)
Dept: ORTHOPEDICS | Facility: CLINIC | Age: 88
End: 2023-02-28
Payer: MEDICARE

## 2023-02-28 ENCOUNTER — HOSPITAL ENCOUNTER (OUTPATIENT)
Dept: RADIOLOGY | Facility: HOSPITAL | Age: 88
Discharge: HOME | End: 2023-02-28
Attending: PHYSICIAN ASSISTANT
Payer: MEDICARE

## 2023-02-28 VITALS — BODY MASS INDEX: 22.1 KG/M2 | WEIGHT: 129.44 LBS | HEIGHT: 64 IN

## 2023-02-28 DIAGNOSIS — S72.002A CLOSED DISPLACED FRACTURE OF LEFT FEMORAL NECK: Primary | ICD-10-CM

## 2023-02-28 DIAGNOSIS — S72.002A CLOSED DISPLACED FRACTURE OF LEFT FEMORAL NECK: ICD-10-CM

## 2023-02-28 PROCEDURE — 99024 POSTOP FOLLOW-UP VISIT: CPT | Mod: S$GLB,,, | Performed by: PHYSICIAN ASSISTANT

## 2023-02-28 PROCEDURE — 99024 PR POST-OP FOLLOW-UP VISIT: ICD-10-PCS | Mod: S$GLB,,, | Performed by: PHYSICIAN ASSISTANT

## 2023-02-28 PROCEDURE — 1125F PR PAIN SEVERITY QUANTIFIED, PAIN PRESENT: ICD-10-PCS | Mod: CPTII,S$GLB,, | Performed by: PHYSICIAN ASSISTANT

## 2023-02-28 PROCEDURE — 1125F AMNT PAIN NOTED PAIN PRSNT: CPT | Mod: CPTII,S$GLB,, | Performed by: PHYSICIAN ASSISTANT

## 2023-02-28 PROCEDURE — 73502 X-RAY EXAM HIP UNI 2-3 VIEWS: CPT | Mod: TC,LT

## 2023-02-28 PROCEDURE — 1159F MED LIST DOCD IN RCRD: CPT | Mod: CPTII,S$GLB,, | Performed by: PHYSICIAN ASSISTANT

## 2023-02-28 PROCEDURE — 99999 PR PBB SHADOW E&M-EST. PATIENT-LVL III: ICD-10-PCS | Mod: PBBFAC,,, | Performed by: PHYSICIAN ASSISTANT

## 2023-02-28 PROCEDURE — 73502 X-RAY EXAM HIP UNI 2-3 VIEWS: CPT | Mod: 26,LT,, | Performed by: RADIOLOGY

## 2023-02-28 PROCEDURE — 99999 PR PBB SHADOW E&M-EST. PATIENT-LVL III: CPT | Mod: PBBFAC,,, | Performed by: PHYSICIAN ASSISTANT

## 2023-02-28 PROCEDURE — 1159F PR MEDICATION LIST DOCUMENTED IN MEDICAL RECORD: ICD-10-PCS | Mod: CPTII,S$GLB,, | Performed by: PHYSICIAN ASSISTANT

## 2023-02-28 PROCEDURE — 73502 XR HIP WITH PELVIS WHEN PERFORMED, 2 OR 3 VIEWS LEFT: ICD-10-PCS | Mod: 26,LT,, | Performed by: RADIOLOGY

## 2023-02-28 NOTE — PROGRESS NOTES
Principal Orthopedic Problem:  Left femoral neck fracture     Relevant Medical History: according to chart    PMHX:  CAD s/p CABG, PAD, combined systolic/diastolic heart failure, heart block s/p biventricular ICD, HTN, HLD, and hypothyroidism     HPI: Ms. Mckinney is 87 year old female who presented to the ED on 01/13/23 with left hip pain after falling from standing.   RADS: transcervical femoral neck fracture that is shortened and in varus  01/13/23: :   Left hip hemiarthroplasty for femoral neck fracture    Ms. Mckinney is here today for a post-operative visit    Interval History:  she reports that she is doing very well.   . she is  participating in PT/OT. Home health doing very well. Just d/c form home health PT. Using walker intermittently.   Pain is controlled.  she is  taking pain medication.    she denies fever, chills, and sweats .     Complications since time of surgery: heart     Physical exam:    Patient arrives to exam room: wheelchair.  Patient is  accompanied      Incision is clean, dry and intact.  Closed stratifix and dermabond .   Healing well no signs of breakdown or infection.  Full range of motion knee and ankle similar to other leg.     RADS: All pertinent images were reviewed by myself:   Prothesis in place, no hardware failure, no new fracture no dislocation.     Assessment:  Post-op visit ( 6 weeks)    Plan:  Current care, treatment plan, precautions, activity level/ modifications, limitations, rehabilitation exercises and proposed future treatment were discussed with the patient. We discussed the need to monitor for changes in symptoms and condition and report them to the physician.  Discussed importance of compliance with all appointments and follow up examinations.     WOUND CARE ORDERS  - The patient was advised to keep the incision clean and dry for the next 24 hours after which she may wash the area with antibacterial soap in the shower. Will not submerge until the incision is  completely healed  -Patient was advised to monitor wound closely and multiple times daily for any problems. Call clinic immediately or report to ED for immediate medical attention for any complications including reopening of wound, drainage, purulence, redness, streaking, odor, pain out of proportion, fever, chills, etc.       ACTIVITY:   - as tolerated   -range of motion as tolerated posterior hip precautions   -  weight bearing as tolerated      -PT/OT, workign on own, Patient is responsible to establish and continue care      PAIN MEDICATION:   - Multimodal pain control  - Pain medication: refill was not needed  - Pain medication refill policy provided to patient for review, yes.    - Patient was informed a multi-modal approach is used to treat their pain. With the goal to get off of narcotic pain medication and discontinue as soon as possible.   - ice and elevation to reduce pain and swelling     DVT PROPHYLAXIS:   - Eliquis 2.5 mg bid: post op regime completed.       FOLLOW UP:   - Patient will follow up in the clinic in 6 weeks.  - X-ray of her hip is needed.       If there are any questions prior to scheduled follow up, the patient was instructed to contact the office

## 2023-03-03 ENCOUNTER — EXTERNAL HOME HEALTH (OUTPATIENT)
Dept: HOME HEALTH SERVICES | Facility: HOSPITAL | Age: 88
End: 2023-03-03
Payer: MEDICARE

## 2023-03-07 ENCOUNTER — DOCUMENT SCAN (OUTPATIENT)
Dept: HOME HEALTH SERVICES | Facility: HOSPITAL | Age: 88
End: 2023-03-07
Payer: MEDICARE

## 2023-03-12 ENCOUNTER — DOCUMENT SCAN (OUTPATIENT)
Dept: HOME HEALTH SERVICES | Facility: HOSPITAL | Age: 88
End: 2023-03-12
Payer: MEDICARE

## 2023-03-24 ENCOUNTER — DOCUMENT SCAN (OUTPATIENT)
Dept: HOME HEALTH SERVICES | Facility: HOSPITAL | Age: 88
End: 2023-03-24
Payer: MEDICARE

## 2023-04-12 ENCOUNTER — OFFICE VISIT (OUTPATIENT)
Dept: ORTHOPEDICS | Facility: CLINIC | Age: 88
End: 2023-04-12
Payer: MEDICARE

## 2023-04-12 ENCOUNTER — HOSPITAL ENCOUNTER (OUTPATIENT)
Dept: RADIOLOGY | Facility: HOSPITAL | Age: 88
Discharge: HOME | End: 2023-04-12
Attending: PHYSICIAN ASSISTANT
Payer: MEDICARE

## 2023-04-12 VITALS — HEIGHT: 64 IN | WEIGHT: 129.44 LBS | BODY MASS INDEX: 22.1 KG/M2

## 2023-04-12 DIAGNOSIS — S72.002A CLOSED DISPLACED FRACTURE OF LEFT FEMORAL NECK: Primary | ICD-10-CM
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
## 2023-04-12 DIAGNOSIS — S72.002A CLOSED DISPLACED FRACTURE OF LEFT FEMORAL NECK: ICD-10-CM

## 2023-04-12 PROCEDURE — 1125F PR PAIN SEVERITY QUANTIFIED, PAIN PRESENT: ICD-10-PCS | Mod: CPTII,S$GLB,, | Performed by: PHYSICIAN ASSISTANT

## 2023-04-12 PROCEDURE — 3288F FALL RISK ASSESSMENT DOCD: CPT | Mod: CPTII,S$GLB,, | Performed by: PHYSICIAN ASSISTANT

## 2023-04-12 PROCEDURE — 1101F PR PT FALLS ASSESS DOC 0-1 FALLS W/OUT INJ PAST YR: ICD-10-PCS | Mod: CPTII,S$GLB,, | Performed by: PHYSICIAN ASSISTANT

## 2023-04-12 PROCEDURE — 73552 X-RAY EXAM OF FEMUR 2/>: CPT | Mod: TC,LT

## 2023-04-12 PROCEDURE — 1125F AMNT PAIN NOTED PAIN PRSNT: CPT | Mod: CPTII,S$GLB,, | Performed by: PHYSICIAN ASSISTANT

## 2023-04-12 PROCEDURE — 73502 X-RAY EXAM HIP UNI 2-3 VIEWS: CPT | Mod: TC,LT

## 2023-04-12 PROCEDURE — 73552 X-RAY EXAM OF FEMUR 2/>: CPT | Mod: 26,LT,, | Performed by: RADIOLOGY

## 2023-04-12 PROCEDURE — 73502 X-RAY EXAM HIP UNI 2-3 VIEWS: CPT | Mod: 26,LT,, | Performed by: RADIOLOGY

## 2023-04-12 PROCEDURE — 73502 XR HIP WITH PELVIS WHEN PERFORMED, 2 OR 3 VIEWS LEFT: ICD-10-PCS | Mod: 26,LT,, | Performed by: RADIOLOGY

## 2023-04-12 PROCEDURE — 99024 POSTOP FOLLOW-UP VISIT: CPT | Mod: S$GLB,,, | Performed by: PHYSICIAN ASSISTANT

## 2023-04-12 PROCEDURE — 99024 PR POST-OP FOLLOW-UP VISIT: ICD-10-PCS | Mod: S$GLB,,, | Performed by: PHYSICIAN ASSISTANT

## 2023-04-12 PROCEDURE — 73552 XR FEMUR 2 VIEW LEFT: ICD-10-PCS | Mod: 26,LT,, | Performed by: RADIOLOGY

## 2023-04-12 PROCEDURE — 99999 PR PBB SHADOW E&M-EST. PATIENT-LVL III: CPT | Mod: PBBFAC,,, | Performed by: PHYSICIAN ASSISTANT

## 2023-04-12 PROCEDURE — 1101F PT FALLS ASSESS-DOCD LE1/YR: CPT | Mod: CPTII,S$GLB,, | Performed by: PHYSICIAN ASSISTANT

## 2023-04-12 PROCEDURE — 99999 PR PBB SHADOW E&M-EST. PATIENT-LVL III: ICD-10-PCS | Mod: PBBFAC,,, | Performed by: PHYSICIAN ASSISTANT

## 2023-04-12 PROCEDURE — 3288F PR FALLS RISK ASSESSMENT DOCUMENTED: ICD-10-PCS | Mod: CPTII,S$GLB,, | Performed by: PHYSICIAN ASSISTANT

## 2023-04-12 NOTE — PROGRESS NOTES
Principal Orthopedic Problem:  Left femoral neck fracture     Relevant Medical History: according to chart    PMHX:  CAD s/p CABG, PAD, combined systolic/diastolic heart failure, heart block s/p biventricular ICD, HTN, HLD, and hypothyroidism     HPI: Ms. Mckinney is 87 year old female who presented to the ED on 01/13/23 with left hip pain after falling from standing.   RADS: transcervical femoral neck fracture that is shortened and in varus  01/13/23: :   Left hip hemiarthroplasty for femoral neck fracture    Ms. Mckinney is here today for a post-operative visit    Interval History:  she reports that she is doing very well.   . she is  participating in PT/OT. .   Pain is controlled.  she is  taking pain medication.    she denies fever, chills, and sweats .     Complications since time of surgery: heart     Physical exam:    Patient arrives to exam room: Full range of motion knee and ankle similar to other leg.     RADS: All pertinent images were reviewed by myself:   Prothesis in place, no hardware failure, no new fracture no dislocation.     Assessment:  Post-op visit ( 12weeks)    Plan:  Current care, treatment plan, precautions, activity level/ modifications, limitations, rehabilitation exercises and proposed future treatment were discussed with the patient. We discussed the need to monitor for changes in symptoms and condition and report them to the physician.  Discussed importance of compliance with all appointments and follow up examinations.     WOUND CARE ORDERS  You may use vitamin E (break the capsule open), aloe, scar cream (mederma) or hand lotion to rub the scar.  You must rub across the scar and in the line of the scar.  The goal is to make the scar not tender and make the scar not adhere (stick to) the tissues below the scar.  There may be some mild discomfort with this initially.  That is okay.  Do not start this for 1 week after stitch or staple removal.       ACTIVITY:   - as tolerated    -range of motion as tolerated posterior hip precautions   -  weight bearing as tolerated      -PT/OT, working on own, Patient is responsible to establish and continue care      PAIN MEDICATION:   - Multimodal pain control  - Pain medication: refill was not needed  - Pain medication refill policy provided to patient for review, yes.    - Patient was informed a multi-modal approach is used to treat their pain. With the goal to get off of narcotic pain medication and discontinue as soon as possible.   - ice and elevation to reduce pain and swelling     DVT PROPHYLAXIS:   - Eliquis 2.5 mg bid: post op regime completed.       FOLLOW UP:   - Patient will follow up in the clinic 1 year .  - X-ray of her hip is needed.       If there are any questions prior to scheduled follow up, the patient was instructed to contact the office

## 2023-04-13 ENCOUNTER — OFFICE VISIT (OUTPATIENT)
Dept: OPHTHALMOLOGY | Facility: CLINIC | Age: 88
End: 2023-04-13
Payer: MEDICARE

## 2023-04-13 DIAGNOSIS — H35.3213 EXUDATIVE AGE-RELATED MACULAR DEGENERATION, RIGHT EYE, WITH INACTIVE SCAR: Primary | ICD-10-CM

## 2023-04-13 DIAGNOSIS — H35.3122 NONEXUDATIVE AGE-RELATED MACULAR DEGENERATION, LEFT EYE, INTERMEDIATE DRY STAGE: ICD-10-CM

## 2023-04-13 PROCEDURE — 92014 COMPRE OPH EXAM EST PT 1/>: CPT | Mod: S$GLB,,, | Performed by: OPHTHALMOLOGY

## 2023-04-13 PROCEDURE — 3288F PR FALLS RISK ASSESSMENT DOCUMENTED: ICD-10-PCS | Mod: CPTII,S$GLB,, | Performed by: OPHTHALMOLOGY

## 2023-04-13 PROCEDURE — 1101F PT FALLS ASSESS-DOCD LE1/YR: CPT | Mod: CPTII,S$GLB,, | Performed by: OPHTHALMOLOGY

## 2023-04-13 PROCEDURE — 1101F PR PT FALLS ASSESS DOC 0-1 FALLS W/OUT INJ PAST YR: ICD-10-PCS | Mod: CPTII,S$GLB,, | Performed by: OPHTHALMOLOGY

## 2023-04-13 PROCEDURE — 1160F PR REVIEW ALL MEDS BY PRESCRIBER/CLIN PHARMACIST DOCUMENTED: ICD-10-PCS | Mod: CPTII,S$GLB,, | Performed by: OPHTHALMOLOGY

## 2023-04-13 PROCEDURE — 1159F MED LIST DOCD IN RCRD: CPT | Mod: CPTII,S$GLB,, | Performed by: OPHTHALMOLOGY

## 2023-04-13 PROCEDURE — 99999 PR PBB SHADOW E&M-EST. PATIENT-LVL III: ICD-10-PCS | Mod: PBBFAC,,, | Performed by: OPHTHALMOLOGY

## 2023-04-13 PROCEDURE — 92134 POSTERIOR SEGMENT OCT RETINA (OCULAR COHERENCE TOMOGRAPHY)-BOTH EYES: ICD-10-PCS | Mod: S$GLB,,, | Performed by: OPHTHALMOLOGY

## 2023-04-13 PROCEDURE — 1126F PR PAIN SEVERITY QUANTIFIED, NO PAIN PRESENT: ICD-10-PCS | Mod: CPTII,S$GLB,, | Performed by: OPHTHALMOLOGY

## 2023-04-13 PROCEDURE — 92014 PR EYE EXAM, EST PATIENT,COMPREHESV: ICD-10-PCS | Mod: S$GLB,,, | Performed by: OPHTHALMOLOGY

## 2023-04-13 PROCEDURE — 92202 OPSCPY EXTND ON/MAC DRAW: CPT | Mod: S$GLB,,, | Performed by: OPHTHALMOLOGY

## 2023-04-13 PROCEDURE — 92202 PR OPHTHALMOSCOPY, EXT, W/DRAW OPTIC NERVE/MACULA, I&R, UNI/BI: ICD-10-PCS | Mod: S$GLB,,, | Performed by: OPHTHALMOLOGY

## 2023-04-13 PROCEDURE — 92134 CPTRZ OPH DX IMG PST SGM RTA: CPT | Mod: S$GLB,,, | Performed by: OPHTHALMOLOGY

## 2023-04-13 PROCEDURE — 3288F FALL RISK ASSESSMENT DOCD: CPT | Mod: CPTII,S$GLB,, | Performed by: OPHTHALMOLOGY

## 2023-04-13 PROCEDURE — 1126F AMNT PAIN NOTED NONE PRSNT: CPT | Mod: CPTII,S$GLB,, | Performed by: OPHTHALMOLOGY

## 2023-04-13 PROCEDURE — 1159F PR MEDICATION LIST DOCUMENTED IN MEDICAL RECORD: ICD-10-PCS | Mod: CPTII,S$GLB,, | Performed by: OPHTHALMOLOGY

## 2023-04-13 PROCEDURE — 1160F RVW MEDS BY RX/DR IN RCRD: CPT | Mod: CPTII,S$GLB,, | Performed by: OPHTHALMOLOGY

## 2023-04-13 PROCEDURE — 99999 PR PBB SHADOW E&M-EST. PATIENT-LVL III: CPT | Mod: PBBFAC,,, | Performed by: OPHTHALMOLOGY

## 2023-04-14 NOTE — PROGRESS NOTES
Subjective:       Patient ID: Lynn Mckinney is a 87 y.o. female      Chief Complaint   Patient presents with    Follow-up     F/u AMD as instructed     History of Present Illness  HPI     Follow-up     Additional comments: F/u AMD as instructed           Comments    Dls: 12/01/2022 Dr. Carver       Pt feels that today vision is worse OD.  Can't see details OD.  Pt   associates this with a dental procedure yesterday.  Va good OS      +Blurry Vision OD   +Watery OD   -Eye Pain   -Flashes/Floaters   -Glare   -Diplopia   -Headaches   -Veils/Curtains     Eye Meds:   Taking AREDS 2              Last edited by Flako Carver MD on 4/13/2023  9:53 PM.        Imaging:    See report    Assessment/Plan:     1. Exudative age-related macular degeneration, right eye, with active choroidal neovascularization    2. Macular scar right    S/p PPV for massive SRH and Avastin injections  No active heme today  Has large SR scar which will limit Va  Discussed in detail.  Pt reassured that no new ocular findings today  Rec obs and injection only if sig worsening  Now is off of blood thinners    - Posterior Segment OCT Retina-Both eyes    3. Nonexudative age-related macular degeneration, left eye, intermediate dry stage  Discussed Dry and Wet AMD in detail  Recommend AREDS 2 Vitamins--continue  Home [employer] Grid Testing- given and instructed today  RTC immediately PRN any changes in vision    - Posterior Segment OCT Retina-Both eyes    Follow up in about 3 months (around 7/13/2023), or if symptoms worsen or fail to improve, for Comprehensive Examination, OCT Mac.

## 2023-04-25 ENCOUNTER — OFFICE VISIT (OUTPATIENT)
Dept: FAMILY MEDICINE | Facility: CLINIC | Age: 88
End: 2023-04-25
Payer: MEDICARE

## 2023-04-25 VITALS
DIASTOLIC BLOOD PRESSURE: 74 MMHG | BODY MASS INDEX: 23.82 KG/M2 | WEIGHT: 139.5 LBS | HEART RATE: 81 BPM | SYSTOLIC BLOOD PRESSURE: 131 MMHG | HEIGHT: 64 IN

## 2023-04-25 DIAGNOSIS — F03.94 DEMENTIA WITH ANXIETY, UNSPECIFIED DEMENTIA SEVERITY, UNSPECIFIED DEMENTIA TYPE: ICD-10-CM

## 2023-04-25 DIAGNOSIS — Z79.899 ENCOUNTER FOR LONG-TERM (CURRENT) USE OF MEDICATIONS: ICD-10-CM

## 2023-04-25 DIAGNOSIS — R26.81 UNSTEADY GAIT: ICD-10-CM

## 2023-04-25 DIAGNOSIS — M25.552 LEFT HIP PAIN: ICD-10-CM

## 2023-04-25 DIAGNOSIS — Z13.6 ENCOUNTER FOR LIPID SCREENING FOR CARDIOVASCULAR DISEASE: ICD-10-CM

## 2023-04-25 DIAGNOSIS — Z00.00 ENCOUNTER FOR MEDICAL EXAMINATION TO ESTABLISH CARE: Primary | ICD-10-CM

## 2023-04-25 DIAGNOSIS — I70.0 AORTIC ATHEROSCLEROSIS: ICD-10-CM

## 2023-04-25 DIAGNOSIS — Z13.220 ENCOUNTER FOR LIPID SCREENING FOR CARDIOVASCULAR DISEASE: ICD-10-CM

## 2023-04-25 DIAGNOSIS — D69.6 THROMBOCYTOPENIA, UNSPECIFIED: ICD-10-CM

## 2023-04-25 DIAGNOSIS — E03.9 HYPOTHYROIDISM, UNSPECIFIED TYPE: ICD-10-CM

## 2023-04-25 PROCEDURE — 99215 OFFICE O/P EST HI 40 MIN: CPT | Mod: S$GLB,,, | Performed by: FAMILY MEDICINE

## 2023-04-25 PROCEDURE — 1126F PR PAIN SEVERITY QUANTIFIED, NO PAIN PRESENT: ICD-10-PCS | Mod: CPTII,S$GLB,, | Performed by: FAMILY MEDICINE

## 2023-04-25 PROCEDURE — 1126F AMNT PAIN NOTED NONE PRSNT: CPT | Mod: CPTII,S$GLB,, | Performed by: FAMILY MEDICINE

## 2023-04-25 PROCEDURE — 1100F PR PT FALLS ASSESS DOC 2+ FALLS/FALL W/INJURY/YR: ICD-10-PCS | Mod: CPTII,S$GLB,, | Performed by: FAMILY MEDICINE

## 2023-04-25 PROCEDURE — 1160F RVW MEDS BY RX/DR IN RCRD: CPT | Mod: CPTII,S$GLB,, | Performed by: FAMILY MEDICINE

## 2023-04-25 PROCEDURE — 99215 PR OFFICE/OUTPT VISIT, EST, LEVL V, 40-54 MIN: ICD-10-PCS | Mod: S$GLB,,, | Performed by: FAMILY MEDICINE

## 2023-04-25 PROCEDURE — 1100F PTFALLS ASSESS-DOCD GE2>/YR: CPT | Mod: CPTII,S$GLB,, | Performed by: FAMILY MEDICINE

## 2023-04-25 PROCEDURE — 3288F FALL RISK ASSESSMENT DOCD: CPT | Mod: CPTII,S$GLB,, | Performed by: FAMILY MEDICINE

## 2023-04-25 PROCEDURE — 3288F PR FALLS RISK ASSESSMENT DOCUMENTED: ICD-10-PCS | Mod: CPTII,S$GLB,, | Performed by: FAMILY MEDICINE

## 2023-04-25 PROCEDURE — 1159F MED LIST DOCD IN RCRD: CPT | Mod: CPTII,S$GLB,, | Performed by: FAMILY MEDICINE

## 2023-04-25 PROCEDURE — 99999 PR PBB SHADOW E&M-EST. PATIENT-LVL V: CPT | Mod: PBBFAC,,, | Performed by: FAMILY MEDICINE

## 2023-04-25 PROCEDURE — 1160F PR REVIEW ALL MEDS BY PRESCRIBER/CLIN PHARMACIST DOCUMENTED: ICD-10-PCS | Mod: CPTII,S$GLB,, | Performed by: FAMILY MEDICINE

## 2023-04-25 PROCEDURE — 1159F PR MEDICATION LIST DOCUMENTED IN MEDICAL RECORD: ICD-10-PCS | Mod: CPTII,S$GLB,, | Performed by: FAMILY MEDICINE

## 2023-04-25 PROCEDURE — 99999 PR PBB SHADOW E&M-EST. PATIENT-LVL V: ICD-10-PCS | Mod: PBBFAC,,, | Performed by: FAMILY MEDICINE

## 2023-04-25 RX ORDER — LEVOTHYROXINE SODIUM 125 UG/1
125 TABLET ORAL
Qty: 90 TABLET | Refills: 4 | Status: SHIPPED | OUTPATIENT
Start: 2023-04-25

## 2023-04-25 RX ORDER — ACETAMINOPHEN 500 MG
500 TABLET ORAL EVERY 6 HOURS PRN
Qty: 90 TABLET | Refills: 12 | Status: SHIPPED | OUTPATIENT
Start: 2023-04-25

## 2023-04-25 NOTE — PATIENT INSTRUCTIONS
Follow up in about 1 year (around 4/25/2024), or if symptoms worsen or fail to improve, for Annual Wellness Exam.     Dear patient,   As a result of recent federal legislation (The Federal Cures Act), you may receive lab or pathology results from your visit in your [facility] account before your physician is able to contact you. Your physician or their representative will relay the results to you with their recommendations at their soonest availability.     If no improvement in symptoms or symptoms worsen, please be advised to call MD, follow-up at clinic and/or go to ER if becomes severe.    Abdiel Robles M.D.        We Offer TELEHEALTH & Same Day Appointments!   Book your Telehealth appointment with me through my nurse or   Clinic appointments on [employer]!    [address] [place] [address]    Office: [phone]   FAX: [phone]    Check out my Facebook Page and Follow Me at: [email or web address]    Check out my website at [employer] by clicking on: [email or web address]    To Schedule appointments online, go to [employer]: [email or web address]

## 2023-04-25 NOTE — PROGRESS NOTES
PLAN:      Problem List Items Addressed This Visit       Hypothyroidism (Chronic)     Recheck labs.  Adjust dosage based off thyroid.           Relevant Medications    levothyroxine (SYNTHROID) 125 MCG tablet    Other Relevant Orders    CBC Without Differential    Comprehensive Metabolic Panel    TSH    Hemoglobin A1C    Lipid Panel    Left hip pain (Chronic)     Order for Tylenol as needed written.           Relevant Medications    acetaminophen (TYLENOL) 500 MG tablet    Unsteady gait (Chronic)     Fall precautions.  Order PT and OT eval.           Relevant Orders    Ambulatory referral/consult to Home Health    Aortic atherosclerosis (Chronic)     Continue current medications.  Follow-up with Cardiology.           Relevant Orders    CBC Without Differential    Comprehensive Metabolic Panel    TSH    Hemoglobin A1C    Lipid Panel    Ambulatory referral/consult to Home Health    Encounter for long-term (current) use of medications (Chronic)     Complete history and physical was completed today.  Complete and thorough medication reconciliation was performed.  Discussed risks and benefits of medications.  Advised patient on orders and health maintenance.  We discussed old records and old labs if available.  Will request any records not available through epic.  Continue current medications listed on your summary sheet.             Relevant Orders    CBC Without Differential    Comprehensive Metabolic Panel    TSH    Hemoglobin A1C    Lipid Panel    Thrombocytopenia, unspecified (Chronic)     Anemia with thrombocytopenia.  Will monitor with repeat blood test.           Relevant Orders    CBC Without Differential    Comprehensive Metabolic Panel    TSH    Hemoglobin A1C    Lipid Panel    Dementia with anxiety, unspecified dementia severity, unspecified dementia type (Chronic)     Continue Namenda until following up with Neurology to discuss in detail.           Relevant Orders    CBC Without Differential     Comprehensive Metabolic Panel    TSH    Hemoglobin A1C    Lipid Panel    Ambulatory referral/consult to Home Health    Encounter for medical examination to establish care - Primary     Complete history and physical was completed today.  Complete and thorough medication reconciliation was performed.  Discussed risks and benefits of medications.  Advised patient on orders and health maintenance.  We discussed old records and old labs if available.  Will request any records not available through epic.  Continue current medications listed on your summary sheet.             Relevant Orders    CBC Without Differential    Comprehensive Metabolic Panel    TSH    Hemoglobin A1C    Lipid Panel     Other Visit Diagnoses       Encounter for lipid screening for cardiovascular disease        Relevant Orders    CBC Without Differential    Comprehensive Metabolic Panel    TSH    Hemoglobin A1C    Lipid Panel          Future Appointments       Date Provider Specialty Appt Notes    5/2/2023  Lab pre procedure     5/9/2023  MED/SURG NICM, Sub Q ICD, BSCI, Gen, DM, 3 Prep    5/16/2023 Guanakito Alfredo DPM Podiatry Ankle injury, left, initial encounter [S99.912A]  Nail problem [L60.9]    5/17/2023  Cardiology wound check//SJM upgrade to CRTD//DM    5/23/2023  Cardiology 90 Day Remote Cardiac Device Check    7/14/2023 Flako Carver MD Ophthalmology  OCt/DFE           Medication Management for assessment above:   Medication List with Changes/Refills   New Medications    ACETAMINOPHEN (TYLENOL) 500 MG TABLET    Take 1 tablet (500 mg total) by mouth every 6 (six) hours as needed for Pain.   Current Medications    ACETAMINOPHEN (TYLENOL) 325 MG TABLET    Take 2 tablets (650 mg total) by mouth every 6 (six) hours as needed (Mild to moderate pain).    AMLODIPINE (NORVASC) 10 MG TABLET    Take 1 tablet (10 mg total) by mouth once daily.    ASPIRIN 81 MG CHEW    Take 81 mg by mouth every evening. 1 Tablet, Chewable Oral Every day     CHOLECALCIFEROL, VITAMIN D3, (VITAMIN D3) 50 MCG (2,000 UNIT) CAP CAPSULE    Take 2,000 Units by mouth once daily.    COENZYME Q10 200 MG CAPSULE    Take 200 mg by mouth once daily.    ELIQUIS 2.5 MG TAB    TAKE 1 TABLET BY MOUTH TWICE DAILY    HYDROXYZINE HCL (ATARAX) 25 MG TABLET    Take 1 tablet (25 mg total) by mouth 3 (three) times daily as needed for Anxiety.    IRBESARTAN (AVAPRO) 300 MG TABLET    TAKE 1 TABLET (300 MG TOTAL) BY MOUTH EVERY EVENING. CANCEL IRBESARTAN/HCTZ COMBO    LABETALOL (NORMODYNE) 200 MG TABLET    Take 1 tablet (200 mg total) by mouth 2 (two) times daily.    MEMANTINE (NAMENDA) 5 MG TAB    TAKE 1 TABLET BY MOUTH TWICE DAILY    MULTIVITAMIN (THERAGRAN) PER TABLET    Take 1 tablet by mouth once daily.    NITROGLYCERIN (NITROSTAT) 0.4 MG SL TABLET    PLACE 1 TABLET UNDER THE TONGUE EVERY 5 MIN AS NEEDED FOR CHEST PAIN. MAX:3 DOSES    ROSUVASTATIN (CRESTOR) 10 MG TABLET    Take 1 tablet (10 mg total) by mouth once daily.    SALMON OIL-OMEGA-3 FATTY ACIDS ORAL    Take 1 capsule by mouth once daily.    VITAMINS  A,C,E-ZINC-COPPER (PRESERVISION AREDS) 14,320-226-200 UNIT-MG-UNIT CAP    Take by mouth Daily.   Changed and/or Refilled Medications    Modified Medication Previous Medication    LEVOTHYROXINE (SYNTHROID) 125 MCG TABLET levothyroxine (SYNTHROID) 125 MCG tablet       Take 1 tablet (125 mcg total) by mouth before breakfast.    TAKE 1 TABLET BY MOUTH BEFORE BREAKFAST.   Discontinued Medications    BENZONATATE (TESSALON) 200 MG CAPSULE    Take 1 capsule (200 mg total) by mouth 3 (three) times daily as needed for Cough.    CETIRIZINE (ZYRTEC) 10 MG TABLET    Take 1 tablet (10 mg total) by mouth once daily.    POLYETHYLENE GLYCOL (GLYCOLAX) 17 GRAM PWPK    Take 17 g by mouth once daily.    PROMETHAZINE-DEXTROMETHORPHAN (PROMETHAZINE-DM) 6.25-15 MG/5 ML SYRP    Take 5 mLs by mouth nightly as needed (cough). May cause sedation.       Abdiel Robles,  M.D.  ==========================================================================  Subjective:   Patient ID: Lynn Mckinney is a 87 y.o. female.  has a past medical history of Allergy, Blood clotting tendency, Carotid artery disease (5/29/2014), Coronary artery disease, Heart block, Hyperlipidemia, Hypertension, Hypothyroid, Left displaced femoral neck fracture (1/12/2023), Obesity, Pacemaker, PAD (peripheral artery disease), Spinal stenosis, Thoracic or lumbosacral neuritis or radiculitis (11/25/2014), and Tubular adenoma.   Chief Complaint: Establish Care      Problem List Items Addressed This Visit       Hypothyroidism (Chronic)    Overview     Chronic.  Uncontrolled.  Patient on levothyroxine 125 micrograms daily.  Lab Results   Component Value Date    TSH 96.744 (H) 08/31/2022    FREET4 0.76 08/31/2022              Current Assessment & Plan     Recheck labs.  Adjust dosage based off thyroid.           Left hip pain (Chronic)    Overview     Chronic.  Uncontrolled.  Patient takes Tylenol as needed with some relief.  Patient needs an order to take Tylenol as needed.  No new injury or trauma noted.               Current Assessment & Plan     Order for Tylenol as needed written.           Unsteady gait (Chronic)    Overview     Chronic.  Uncontrolled.  Patient has been having unsteady gait and risk of falling.           Current Assessment & Plan     Fall precautions.  Order PT and OT eval.           Aortic atherosclerosis (Chronic)    Overview     Chronic.  Control is uncertain.  Patient follows with Cardiology.           Current Assessment & Plan     Continue current medications.  Follow-up with Cardiology.           Encounter for long-term (current) use of medications (Chronic)    Overview     CHRONIC. Stable. Compliant with medications for managed conditions. See medication list. No SE reported.   Routine lab analysis is being monitored. Refills were addressed.  Lab Results   Component Value Date    WBC 5.08  01/19/2023    HGB 10.6 (L) 01/19/2023    HCT 32.3 (L) 01/19/2023    MCV 99 (H) 01/19/2023     (L) 01/19/2023       Chemistry        Component Value Date/Time     01/31/2023 1024    K 4.2 01/31/2023 1024     01/31/2023 1024    CO2 27 01/31/2023 1024    BUN 17 01/31/2023 1024    CREATININE 1.1 01/31/2023 1024     01/31/2023 1024        Component Value Date/Time    CALCIUM 8.9 01/31/2023 1024    ALKPHOS 58 01/12/2023 2114    AST 25 01/12/2023 2114    ALT 15 01/12/2023 2114    BILITOT 0.8 01/12/2023 2114    ESTGFRAFRICA 56.9 (A) 05/19/2022 1011    EGFRNONAA 49.3 (A) 05/19/2022 1011          Lab Results   Component Value Date    TSH 96.744 (H) 08/31/2022    FREET4 0.76 08/31/2022              Current Assessment & Plan     Complete history and physical was completed today.  Complete and thorough medication reconciliation was performed.  Discussed risks and benefits of medications.  Advised patient on orders and health maintenance.  We discussed old records and old labs if available.  Will request any records not available through epic.  Continue current medications listed on your summary sheet.             Thrombocytopenia, unspecified (Chronic)    Overview     Lab Results   Component Value Date    TRANSFERRIN 251 01/12/2023      Lab Results   Component Value Date    PXZUHWDX74 215 08/31/2022   No results found for: FOLATE  Lab Results   Component Value Date    WBC 5.08 01/19/2023    HGB 10.6 (L) 01/19/2023    HCT 32.3 (L) 01/19/2023    MCV 99 (H) 01/19/2023     (L) 01/19/2023              Current Assessment & Plan     Anemia with thrombocytopenia.  Will monitor with repeat blood test.           Dementia with anxiety, unspecified dementia severity, unspecified dementia type (Chronic)    Overview     Chronic.  Control is uncertain.  Patient was seen by Neurology who started Namenda.  Patient recently moved here from [place].  Patient is staying at the [facility].  There has  "been no significant change while taking this medication.           Current Assessment & Plan     Continue Namenda until following up with Neurology to discuss in detail.           Encounter for medical examination to establish care - Primary    Overview     New problem.  Patient here to establish care.  All specialists are within [facility].               Current Assessment & Plan     Complete history and physical was completed today.  Complete and thorough medication reconciliation was performed.  Discussed risks and benefits of medications.  Advised patient on orders and health maintenance.  We discussed old records and old labs if available.  Will request any records not available through epic.  Continue current medications listed on your summary sheet.              Other Visit Diagnoses       Encounter for lipid screening for cardiovascular disease                 Review of patient's allergies indicates:   Allergen Reactions    Lipitor [atorvastatin] Itching    Fosamax [alendronate]      Felt "strange"    Iodinated contrast media      Dye is only to be used if absolutely needed because of kidney function    Prolia [denosumab]      Reaction after the Prolia    Sulfa (sulfonamide antibiotics)      Current Outpatient Medications   Medication Instructions    acetaminophen (TYLENOL) 650 mg, Oral, Every 6 hours PRN    acetaminophen (TYLENOL) 500 mg, Oral, Every 6 hours PRN    amLODIPine (NORVASC) 10 mg, Oral, Daily    aspirin 81 mg, Nightly    cholecalciferol (vitamin D3) (VITAMIN D3) 2,000 Units, Oral, Daily    coenzyme Q10 200 mg, Oral, Daily    ELIQUIS 2.5 mg Tab TAKE 1 TABLET BY MOUTH TWICE DAILY    hydrOXYzine HCL (ATARAX) 25 mg, Oral, 3 times daily PRN    irbesartan (AVAPRO) 300 MG tablet TAKE 1 TABLET (300 MG TOTAL) BY MOUTH EVERY EVENING. CANCEL IRBESARTAN/HCTZ COMBO    labetaloL (NORMODYNE) 200 mg, Oral, 2 times daily    levothyroxine (SYNTHROID) 125 mcg, Oral, Before breakfast    memantine (NAMENDA) 5 MG Tab " "TAKE 1 TABLET BY MOUTH TWICE DAILY    multivitamin (THERAGRAN) per tablet 1 tablet, Oral, Daily    nitroGLYCERIN (NITROSTAT) 0.4 MG SL tablet PLACE 1 TABLET UNDER THE TONGUE EVERY 5 MIN AS NEEDED FOR CHEST PAIN. MAX:3 DOSES    rosuvastatin (CRESTOR) 10 mg, Oral, Daily    SALMON OIL-OMEGA-3 FATTY ACIDS ORAL 1 capsule, Oral, Daily    vitamins  A,C,E-zinc-copper (PRESERVISION AREDS) 14,320-226-200 unit-mg-unit Cap Oral, Daily      I have reviewed the PMH, social history, FamilyHx, surgical history, allergies and medications documented / confirmed by the patient at the time of this visit.  Review of Systems   Constitutional:  Negative for chills, fatigue, fever and unexpected weight change.   HENT:  Negative for ear pain and sore throat.    Eyes:  Negative for redness and visual disturbance.   Respiratory:  Negative for cough and shortness of breath.    Cardiovascular:  Negative for chest pain and palpitations.   Gastrointestinal:  Negative for nausea and vomiting.   Genitourinary:  Negative for difficulty urinating and hematuria.   Musculoskeletal:  Positive for arthralgias and gait problem. Negative for myalgias.   Skin:  Negative for rash and wound.   Neurological:  Negative for weakness and headaches.   Psychiatric/Behavioral:  Negative for sleep disturbance. The patient is not nervous/anxious.    Objective:   /74   Pulse 81   Ht 5' 4" (1.626 m)   Wt 63.3 kg (139 lb 8 oz)   BMI 23.95 kg/m²   Physical Exam  Vitals and nursing note reviewed.   Constitutional:       General: She is not in acute distress.     Appearance: She is well-developed. She is not ill-appearing, toxic-appearing or diaphoretic.   HENT:      Head: Normocephalic and atraumatic.      Right Ear: Hearing and external ear normal.      Left Ear: Hearing and external ear normal.      Nose: Nose normal. No rhinorrhea.   Eyes:      General: Lids are normal.      Extraocular Movements: Extraocular movements intact.      Conjunctiva/sclera: " Conjunctivae normal.      Pupils: Pupils are equal, round, and reactive to light.   Cardiovascular:      Rate and Rhythm: Normal rate.      Pulses: Normal pulses.   Pulmonary:      Effort: Pulmonary effort is normal. No respiratory distress.      Breath sounds: Normal breath sounds.   Abdominal:      General: Bowel sounds are normal.      Palpations: Abdomen is soft.   Musculoskeletal:         General: Tenderness present. No deformity. Normal range of motion.      Cervical back: Normal range of motion and neck supple.   Skin:     General: Skin is warm and dry.      Capillary Refill: Capillary refill takes less than 2 seconds.      Coloration: Skin is not pale.   Neurological:      General: No focal deficit present.      Mental Status: She is alert and oriented to person, place, and time. She is not disoriented.   Psychiatric:         Attention and Perception: She is attentive.         Mood and Affect: Mood normal. Mood is not anxious or depressed.         Speech: Speech is not rapid and pressured or slurred.         Behavior: Behavior normal. Behavior is not agitated, aggressive or hyperactive. Behavior is cooperative.         Thought Content: Thought content normal. Thought content is not paranoid or delusional. Thought content does not include homicidal or suicidal ideation. Thought content does not include homicidal or suicidal plan.         Cognition and Memory: Memory is not impaired.         Judgment: Judgment normal.       Assessment:     1. Encounter for medical examination to establish care    2. Encounter for long-term (current) use of medications    3. Encounter for lipid screening for cardiovascular disease    4. Thrombocytopenia, unspecified    5. Dementia with anxiety, unspecified dementia severity, unspecified dementia type    6. Aortic atherosclerosis    7. Hypothyroidism, unspecified type    8. Unsteady gait    9. Left hip pain      MDM:   High medical complexity.  Moderate risk.  Total time: 46  minutes.  This includes total time spent on the encounter, which includes face to face time and non-face to face time preparing to see the patient (eg, review of previous medical records, tests), Obtaining and/or reviewing separately obtained history, documenting clinical information in the electronic or other health record, independently interpreting results (not separately reported)/communicating results to the patient/family/caregiver, and/or care coordination (not separately reported).    I have Reviewed and summarized old records.  I have performed thorough medication reconciliation today and discussed risk and benefits of medications.  I have reviewed labs and discussed with patient.  All questions were answered.  I am requesting old records and will review them once they are available.    I have signed for the following orders AND/OR meds.  Orders Placed This Encounter   Procedures    CBC Without Differential     Standing Status:   Future     Standing Expiration Date:   6/23/2024    Comprehensive Metabolic Panel     Standing Status:   Future     Standing Expiration Date:   6/23/2024    TSH     Standing Status:   Future     Standing Expiration Date:   6/23/2024    Hemoglobin A1C     Standing Status:   Future     Standing Expiration Date:   6/23/2024    Lipid Panel     Standing Status:   Future     Standing Expiration Date:   6/23/2024    Ambulatory referral/consult to Home Health     Standing Status:   Future     Standing Expiration Date:   5/25/2024     Referral Priority:   Routine     Referral Type:   Home Health     Referral Reason:   Specialty Services Required     Requested Specialty:   Home Health Services     Number of Visits Requested:   1     Medications Ordered This Encounter   Medications    acetaminophen (TYLENOL) 500 MG tablet     Sig: Take 1 tablet (500 mg total) by mouth every 6 (six) hours as needed for Pain.     Dispense:  90 tablet     Refill:  12    levothyroxine (SYNTHROID) 125 MCG tablet      Sig: Take 1 tablet (125 mcg total) by mouth before breakfast.     Dispense:  90 tablet     Refill:  4        Follow up in about 1 year (around 4/25/2024), or if symptoms worsen or fail to improve, for Annual Wellness Exam.  Future Appointments       Date Provider Specialty Appt Notes    5/2/2023  Lab pre procedure     5/9/2023  MED/SURG NICM, Sub Q ICD, BSCI, Gen, DM, 3 Prep    5/16/2023 Guanakito Alfredo DPM Podiatry Ankle injury, left, initial encounter [S99.912A]  Nail problem [L60.9]    5/17/2023  Cardiology wound check//SJM upgrade to CRTD//DM    5/23/2023  Cardiology 90 Day Remote Cardiac Device Check    7/14/2023 Flako Carver MD Ophthalmology  OCt/DFE          If no improvement in symptoms or symptoms worsen, advised to call/follow-up at clinic or go to ER. Patient voiced understanding and all questions/concerns were addressed.   DISCLAIMER: This note was compiled by using a speech recognition dictation system and therefore please be aware that typographical / speech recognition errors can and do occur.  Please contact me if you see any errors specifically.    Abdiel Robles M.D.       Office: [phone] [address] [place] [address]  FAX: [phone]

## 2023-04-26 PROCEDURE — G0180 PR HOME HEALTH MD CERTIFICATION: ICD-10-PCS | Mod: NTX,,, | Performed by: FAMILY MEDICINE

## 2023-04-26 PROCEDURE — G0180 MD CERTIFICATION HHA PATIENT: HCPCS | Mod: NTX,,, | Performed by: FAMILY MEDICINE

## 2023-04-26 NOTE — ASSESSMENT & PLAN NOTE
Continue Namenda until following up with Neurology to discuss in detail.   Patient/Caregiver provided printed discharge information.

## 2023-05-01 ENCOUNTER — PATIENT MESSAGE (OUTPATIENT)
Dept: FAMILY MEDICINE | Facility: CLINIC | Age: 88
End: 2023-05-01
Payer: MEDICARE

## 2023-05-01 DIAGNOSIS — R09.02 HYPOXIA: Primary | ICD-10-CM

## 2023-05-02 ENCOUNTER — PATIENT MESSAGE (OUTPATIENT)
Dept: FAMILY MEDICINE | Facility: CLINIC | Age: 88
End: 2023-05-02
Payer: MEDICARE

## 2023-05-02 ENCOUNTER — HOSPITAL ENCOUNTER (INPATIENT)
Facility: HOSPITAL | Age: 88
LOS: 3 days | Discharge: HOME | DRG: 189 | End: 2023-05-05
Attending: EMERGENCY MEDICINE | Admitting: HOSPITALIST
Payer: MEDICARE

## 2023-05-02 ENCOUNTER — TELEPHONE (OUTPATIENT)
Dept: PULMONOLOGY | Facility: CLINIC | Age: 88
End: 2023-05-02
Payer: MEDICARE

## 2023-05-02 DIAGNOSIS — R06.02 SHORTNESS OF BREATH: ICD-10-CM

## 2023-05-02 DIAGNOSIS — I49.01 VF (VENTRICULAR FIBRILLATION): ICD-10-CM

## 2023-05-02 DIAGNOSIS — J84.9 INTERSTITIAL LUNG DISEASE: Primary | ICD-10-CM

## 2023-05-02 DIAGNOSIS — R07.9 CHEST PAIN: ICD-10-CM

## 2023-05-02 PROBLEM — I48.91 A-FIB: Status: ACTIVE | Noted: 2023-05-02

## 2023-05-02 PROBLEM — J96.01 ACUTE HYPOXEMIC RESPIRATORY FAILURE: Status: ACTIVE | Noted: 2023-05-02

## 2023-05-02 PROBLEM — I50.43 ACUTE ON CHRONIC COMBINED SYSTOLIC AND DIASTOLIC HEART FAILURE: Status: ACTIVE | Noted: 2023-01-12

## 2023-05-02 LAB
ALBUMIN SERPL BCP-MCNC: 3.4 G/DL (ref 3.5–5.2)
ALP SERPL-CCNC: 77 U/L (ref 55–135)
ALT SERPL W/O P-5'-P-CCNC: 14 U/L (ref 10–44)
ANION GAP SERPL CALC-SCNC: 10 MMOL/L (ref 8–16)
AST SERPL-CCNC: 21 U/L (ref 10–40)
BACTERIA #/AREA URNS AUTO: ABNORMAL /HPF
BASOPHILS # BLD AUTO: 0.03 K/UL (ref 0–0.2)
BASOPHILS NFR BLD: 0.4 % (ref 0–1.9)
BILIRUB SERPL-MCNC: 0.7 MG/DL (ref 0.1–1)
BILIRUB UR QL STRIP: NEGATIVE
BNP SERPL-MCNC: 1258 PG/ML (ref 0–99)
BUN SERPL-MCNC: 15 MG/DL (ref 8–23)
CALCIUM SERPL-MCNC: 8.8 MG/DL (ref 8.7–10.5)
CHLORIDE SERPL-SCNC: 107 MMOL/L (ref 95–110)
CLARITY UR REFRACT.AUTO: ABNORMAL
CO2 SERPL-SCNC: 25 MMOL/L (ref 23–29)
COLOR UR AUTO: YELLOW
CREAT SERPL-MCNC: 1.2 MG/DL (ref 0.5–1.4)
DIFFERENTIAL METHOD: ABNORMAL
EOSINOPHIL # BLD AUTO: 0.1 K/UL (ref 0–0.5)
EOSINOPHIL NFR BLD: 0.9 % (ref 0–8)
ERYTHROCYTE [DISTWIDTH] IN BLOOD BY AUTOMATED COUNT: 13.3 % (ref 11.5–14.5)
EST. GFR  (NO RACE VARIABLE): 43.8 ML/MIN/1.73 M^2
ESTIMATED AVG GLUCOSE: 103 MG/DL (ref 68–131)
GLUCOSE SERPL-MCNC: 103 MG/DL (ref 70–110)
GLUCOSE UR QL STRIP: NEGATIVE
HBA1C MFR BLD: 5.2 % (ref 4–5.6)
HCT VFR BLD AUTO: 37.7 % (ref 37–48.5)
HGB BLD-MCNC: 12 G/DL (ref 12–16)
HGB UR QL STRIP: ABNORMAL
IMM GRANULOCYTES # BLD AUTO: 0.02 K/UL (ref 0–0.04)
IMM GRANULOCYTES NFR BLD AUTO: 0.3 % (ref 0–0.5)
INFLUENZA A, MOLECULAR: NOT DETECTED
INFLUENZA B, MOLECULAR: NOT DETECTED
INR PPP: 1 (ref 0.8–1.2)
KETONES UR QL STRIP: NEGATIVE
LEUKOCYTE ESTERASE UR QL STRIP: ABNORMAL
LYMPHOCYTES # BLD AUTO: 0.5 K/UL (ref 1–4.8)
LYMPHOCYTES NFR BLD: 6.9 % (ref 18–48)
MCH RBC QN AUTO: 31.8 PG (ref 27–31)
MCHC RBC AUTO-ENTMCNC: 31.8 G/DL (ref 32–36)
MCV RBC AUTO: 100 FL (ref 82–98)
MICROSCOPIC COMMENT: ABNORMAL
MONOCYTES # BLD AUTO: 0.6 K/UL (ref 0.3–1)
MONOCYTES NFR BLD: 8.1 % (ref 4–15)
NEUTROPHILS # BLD AUTO: 5.7 K/UL (ref 1.8–7.7)
NEUTROPHILS NFR BLD: 83.4 % (ref 38–73)
NITRITE UR QL STRIP: POSITIVE
NRBC BLD-RTO: 0 /100 WBC
PH UR STRIP: 6 [PH] (ref 5–8)
PLATELET # BLD AUTO: 145 K/UL (ref 150–450)
PMV BLD AUTO: 9 FL (ref 9.2–12.9)
POTASSIUM SERPL-SCNC: 4.1 MMOL/L (ref 3.5–5.1)
PROT SERPL-MCNC: 6.3 G/DL (ref 6–8.4)
PROT UR QL STRIP: NEGATIVE
PROTHROMBIN TIME: 11.1 SEC (ref 9–12.5)
RBC # BLD AUTO: 3.77 M/UL (ref 4–5.4)
RBC #/AREA URNS AUTO: 2 /HPF (ref 0–4)
RSV AG BY MOLECULAR METHOD: NOT DETECTED
SARS-COV-2 RNA RESP QL NAA+PROBE: NOT DETECTED
SODIUM SERPL-SCNC: 142 MMOL/L (ref 136–145)
SP GR UR STRIP: 1.01 (ref 1–1.03)
SQUAMOUS #/AREA URNS AUTO: 0 /HPF
T4 FREE SERPL-MCNC: 0.74 NG/DL (ref 0.71–1.51)
TROPONIN I SERPL DL<=0.01 NG/ML-MCNC: 0.05 NG/ML (ref 0–0.03)
TROPONIN I SERPL DL<=0.01 NG/ML-MCNC: 0.06 NG/ML (ref 0–0.03)
TSH SERPL DL<=0.005 MIU/L-ACNC: 64.45 UIU/ML (ref 0.4–4)
URN SPEC COLLECT METH UR: ABNORMAL
WBC # BLD AUTO: 6.78 K/UL (ref 3.9–12.7)
WBC #/AREA URNS AUTO: 2 /HPF (ref 0–5)

## 2023-05-02 PROCEDURE — 83036 HEMOGLOBIN GLYCOSYLATED A1C: CPT | Performed by: EMERGENCY MEDICINE

## 2023-05-02 PROCEDURE — 20600001 HC STEP DOWN PRIVATE ROOM

## 2023-05-02 PROCEDURE — 99285 EMERGENCY DEPT VISIT HI MDM: CPT | Mod: 25

## 2023-05-02 PROCEDURE — 63600175 PHARM REV CODE 636 W HCPCS

## 2023-05-02 PROCEDURE — 85610 PROTHROMBIN TIME: CPT | Performed by: EMERGENCY MEDICINE

## 2023-05-02 PROCEDURE — 25000003 PHARM REV CODE 250

## 2023-05-02 PROCEDURE — 84443 ASSAY THYROID STIM HORMONE: CPT | Performed by: EMERGENCY MEDICINE

## 2023-05-02 PROCEDURE — 84484 ASSAY OF TROPONIN QUANT: CPT | Mod: 91 | Performed by: EMERGENCY MEDICINE

## 2023-05-02 PROCEDURE — 93010 EKG 12-LEAD: ICD-10-PCS | Mod: ,,, | Performed by: INTERNAL MEDICINE

## 2023-05-02 PROCEDURE — 81001 URINALYSIS AUTO W/SCOPE: CPT | Performed by: EMERGENCY MEDICINE

## 2023-05-02 PROCEDURE — 99223 1ST HOSP IP/OBS HIGH 75: CPT | Mod: GC,,, | Performed by: HOSPITALIST

## 2023-05-02 PROCEDURE — 84439 ASSAY OF FREE THYROXINE: CPT | Performed by: EMERGENCY MEDICINE

## 2023-05-02 PROCEDURE — 63600175 PHARM REV CODE 636 W HCPCS: Performed by: EMERGENCY MEDICINE

## 2023-05-02 PROCEDURE — 84484 ASSAY OF TROPONIN QUANT: CPT

## 2023-05-02 PROCEDURE — 25000003 PHARM REV CODE 250: Performed by: EMERGENCY MEDICINE

## 2023-05-02 PROCEDURE — 99223 PR INITIAL HOSPITAL CARE,LEVL III: ICD-10-PCS | Mod: GC,,, | Performed by: HOSPITALIST

## 2023-05-02 PROCEDURE — 83880 ASSAY OF NATRIURETIC PEPTIDE: CPT | Performed by: EMERGENCY MEDICINE

## 2023-05-02 PROCEDURE — 93005 ELECTROCARDIOGRAM TRACING: CPT

## 2023-05-02 PROCEDURE — 0241U SARS-COV2 (COVID) WITH FLU/RSV BY PCR: CPT

## 2023-05-02 PROCEDURE — 96374 THER/PROPH/DIAG INJ IV PUSH: CPT

## 2023-05-02 PROCEDURE — 93010 ELECTROCARDIOGRAM REPORT: CPT | Mod: ,,, | Performed by: INTERNAL MEDICINE

## 2023-05-02 PROCEDURE — 99285 PR EMERGENCY DEPT VISIT,LEVEL V: ICD-10-PCS | Mod: CS,,, | Performed by: EMERGENCY MEDICINE

## 2023-05-02 PROCEDURE — 80053 COMPREHEN METABOLIC PANEL: CPT | Performed by: EMERGENCY MEDICINE

## 2023-05-02 PROCEDURE — 85025 COMPLETE CBC W/AUTO DIFF WBC: CPT | Performed by: EMERGENCY MEDICINE

## 2023-05-02 PROCEDURE — 99285 EMERGENCY DEPT VISIT HI MDM: CPT | Mod: CS,,, | Performed by: EMERGENCY MEDICINE

## 2023-05-02 RX ORDER — ASPIRIN 325 MG
325 TABLET ORAL
Status: COMPLETED | OUTPATIENT
Start: 2023-05-02 | End: 2023-05-02

## 2023-05-02 RX ORDER — DEXTROSE 40 %
15 GEL (GRAM) ORAL
Status: DISCONTINUED | OUTPATIENT
Start: 2023-05-02 | End: 2023-05-05 | Stop reason: HOSPADM

## 2023-05-02 RX ORDER — ONDANSETRON 2 MG/ML
4 INJECTION INTRAMUSCULAR; INTRAVENOUS EVERY 8 HOURS PRN
Status: DISCONTINUED | OUTPATIENT
Start: 2023-05-02 | End: 2023-05-05 | Stop reason: HOSPADM

## 2023-05-02 RX ORDER — DEXTROSE 40 %
30 GEL (GRAM) ORAL
Status: DISCONTINUED | OUTPATIENT
Start: 2023-05-02 | End: 2023-05-05 | Stop reason: HOSPADM

## 2023-05-02 RX ORDER — GLUCAGON 1 MG
1 KIT INJECTION
Status: DISCONTINUED | OUTPATIENT
Start: 2023-05-02 | End: 2023-05-05 | Stop reason: HOSPADM

## 2023-05-02 RX ORDER — LABETALOL 100 MG/1
200 TABLET, FILM COATED ORAL 2 TIMES DAILY
Status: DISCONTINUED | OUTPATIENT
Start: 2023-05-02 | End: 2023-05-05 | Stop reason: HOSPADM

## 2023-05-02 RX ORDER — ATORVASTATIN CALCIUM 40 MG/1
40 TABLET, FILM COATED ORAL DAILY
Status: DISCONTINUED | OUTPATIENT
Start: 2023-05-02 | End: 2023-05-02

## 2023-05-02 RX ORDER — POLYETHYLENE GLYCOL 3350 17 G/17G
17 POWDER, FOR SOLUTION ORAL 2 TIMES DAILY PRN
Status: DISCONTINUED | OUTPATIENT
Start: 2023-05-02 | End: 2023-05-05 | Stop reason: HOSPADM

## 2023-05-02 RX ORDER — LOSARTAN POTASSIUM 50 MG/1
100 TABLET ORAL DAILY
Status: DISCONTINUED | OUTPATIENT
Start: 2023-05-02 | End: 2023-05-05 | Stop reason: HOSPADM

## 2023-05-02 RX ORDER — FUROSEMIDE 10 MG/ML
20 INJECTION INTRAMUSCULAR; INTRAVENOUS
Status: COMPLETED | OUTPATIENT
Start: 2023-05-02 | End: 2023-05-02

## 2023-05-02 RX ORDER — TALC
6 POWDER (GRAM) TOPICAL NIGHTLY PRN
Status: DISCONTINUED | OUTPATIENT
Start: 2023-05-02 | End: 2023-05-05 | Stop reason: HOSPADM

## 2023-05-02 RX ORDER — HEPARIN SODIUM 5000 [USP'U]/ML
5000 INJECTION, SOLUTION INTRAVENOUS; SUBCUTANEOUS EVERY 8 HOURS
Status: DISCONTINUED | OUTPATIENT
Start: 2023-05-02 | End: 2023-05-02

## 2023-05-02 RX ORDER — NALOXONE HCL 0.4 MG/ML
0.02 VIAL (ML) INJECTION
Status: DISCONTINUED | OUTPATIENT
Start: 2023-05-02 | End: 2023-05-05 | Stop reason: HOSPADM

## 2023-05-02 RX ORDER — PRAVASTATIN SODIUM 80 MG/1
80 TABLET ORAL DAILY
Status: DISCONTINUED | OUTPATIENT
Start: 2023-05-02 | End: 2023-05-05 | Stop reason: HOSPADM

## 2023-05-02 RX ORDER — MEMANTINE HYDROCHLORIDE 5 MG/1
5 TABLET ORAL 2 TIMES DAILY
Status: DISCONTINUED | OUTPATIENT
Start: 2023-05-02 | End: 2023-05-05 | Stop reason: HOSPADM

## 2023-05-02 RX ORDER — AMOXICILLIN 250 MG
1 CAPSULE ORAL DAILY PRN
Status: DISCONTINUED | OUTPATIENT
Start: 2023-05-02 | End: 2023-05-05 | Stop reason: HOSPADM

## 2023-05-02 RX ORDER — NAPROXEN SODIUM 220 MG/1
81 TABLET, FILM COATED ORAL NIGHTLY
Status: DISCONTINUED | OUTPATIENT
Start: 2023-05-03 | End: 2023-05-05 | Stop reason: HOSPADM

## 2023-05-02 RX ORDER — SODIUM CHLORIDE 0.9 % (FLUSH) 0.9 %
10 SYRINGE (ML) INJECTION EVERY 12 HOURS PRN
Status: DISCONTINUED | OUTPATIENT
Start: 2023-05-02 | End: 2023-05-05 | Stop reason: HOSPADM

## 2023-05-02 RX ORDER — FUROSEMIDE 10 MG/ML
40 INJECTION INTRAMUSCULAR; INTRAVENOUS 2 TIMES DAILY
Status: DISCONTINUED | OUTPATIENT
Start: 2023-05-02 | End: 2023-05-03

## 2023-05-02 RX ORDER — NAPROXEN SODIUM 220 MG/1
81 TABLET, FILM COATED ORAL NIGHTLY
Status: DISCONTINUED | OUTPATIENT
Start: 2023-05-02 | End: 2023-05-02

## 2023-05-02 RX ORDER — ACETAMINOPHEN 325 MG/1
650 TABLET ORAL EVERY 4 HOURS PRN
Status: DISCONTINUED | OUTPATIENT
Start: 2023-05-02 | End: 2023-05-05 | Stop reason: HOSPADM

## 2023-05-02 RX ORDER — AMLODIPINE BESYLATE 10 MG/1
10 TABLET ORAL DAILY
Status: DISCONTINUED | OUTPATIENT
Start: 2023-05-02 | End: 2023-05-05 | Stop reason: HOSPADM

## 2023-05-02 RX ADMIN — PRAVASTATIN SODIUM 80 MG: 80 TABLET ORAL at 05:05

## 2023-05-02 RX ADMIN — LOSARTAN POTASSIUM 100 MG: 50 TABLET, FILM COATED ORAL at 03:05

## 2023-05-02 RX ADMIN — MEMANTINE HYDROCHLORIDE 5 MG: 5 TABLET ORAL at 08:05

## 2023-05-02 RX ADMIN — FUROSEMIDE 40 MG: 10 INJECTION, SOLUTION INTRAMUSCULAR; INTRAVENOUS at 05:05

## 2023-05-02 RX ADMIN — AMLODIPINE BESYLATE 10 MG: 10 TABLET ORAL at 03:05

## 2023-05-02 RX ADMIN — FUROSEMIDE 20 MG: 10 INJECTION, SOLUTION INTRAMUSCULAR; INTRAVENOUS at 11:05

## 2023-05-02 RX ADMIN — APIXABAN 2.5 MG: 2.5 TABLET, FILM COATED ORAL at 08:05

## 2023-05-02 RX ADMIN — LABETALOL HYDROCHLORIDE 200 MG: 100 TABLET, FILM COATED ORAL at 08:05

## 2023-05-02 RX ADMIN — ASPIRIN 325 MG: 325 TABLET ORAL at 11:05

## 2023-05-02 NOTE — ED PROVIDER NOTES
"Encounter Date: 5/2/2023       History     Chief Complaint   Patient presents with    Shortness of Breath     Low oxygen sats since last Wednesday. Home health. Did deep breathing and able to bring it back up. Daughter says pt in memory care facility and could "barely breath this am" . Not on home oxygen      HPI patient is an 87-year-old female with a previous history of coronary artery disease, hypertension, hyperlipidemia, left hip fracture in the past who presents emergency department with 1 week of worsening shortness of breath the patient describes his dyspnea on exertion as well as shortness of breath at rest and dry cough.  Patient denies any change in sputum, no recent fevers chills, but notes she has significant reduction her ability to walk any distance over the previous several days .  The patient notes she normally has some lower extremity edema increase in her left compared with the right since her previous hip fracture but does not feel like it is significantly increased from previous.  Patient notes chest pain she describes as tightness with deep exhalation.   Review of patient's allergies indicates:   Allergen Reactions    Lipitor [atorvastatin] Itching    Fosamax [alendronate]      Felt "strange"    Iodinated contrast media      Dye is only to be used if absolutely needed because of kidney function    Prolia [denosumab]      Reaction after the Prolia    Sulfa (sulfonamide antibiotics)      Past Medical History:   Diagnosis Date    Allergy     seasonal    Blood clotting tendency     Carotid artery disease 5/29/2014    Coronary artery disease     Heart block     Hyperlipidemia     Hypertension     Hypothyroid     Left displaced femoral neck fracture 1/12/2023    Obesity     Pacemaker     PAD (peripheral artery disease)     Spinal stenosis     Thoracic or lumbosacral neuritis or radiculitis 11/25/2014    Tubular adenoma      Past Surgical History:   Procedure Laterality Date    ANGIOPLASTY      " APPENDECTOMY      BREAST CYST ASPIRATION Bilateral     CARDIAC CATHETERIZATION      CARDIAC PACEMAKER PLACEMENT  10/10/13    by Dr. Coleman    CAROTID STENT Left 2007    by Dr. Bowen    CATARACT EXTRACTION W/  INTRAOCULAR LENS IMPLANT Bilateral 2012    Dr Sharp    CORONARY ARTERY BYPASS GRAFT  2001     x 3 LIMA-Ramus, SVG-OM1 & SVG-PDA    CORONARY BYPASS GRAFT ANGIOGRAPHY  3/11/2021    Procedure: Bypass graft study;  Surgeon: Gil Garcia MD;  Location: [facility] CATH LAB;  Service: Cardiology;;    CYST REMOVAL      wrist    EYE SURGERY      HEMIARTHROPLASTY OF HIP Left 1/13/2023    Procedure: HEMIARTHROPLASTY, HIP, LEFT;  Surgeon: Alan Vieyra MD;  Location: [facility] OR 2ND FLR;  Service: Orthopedics;  Laterality: Left;    HYSTERECTOMY      LEFT HEART CATHETERIZATION Left 3/11/2021    Procedure: Left heart cath;  Surgeon: Gil Garcia MD;  Location: [facility] CATH LAB;  Service: Cardiology;  Laterality: Left;    OOPHORECTOMY      THYROIDECTOMY      VITRECTOMY BY PARS PLANA APPROACH Right 10/12/2021    Procedure: VITRECTOMY, PARS PLANA APPROACH;  Surgeon: Flaok Carver MD;  Location: [facility] OR 1ST FLR;  Service: Ophthalmology;  Laterality: Right;     Family History   Problem Relation Age of Onset    Heart disease Father         coronary thrombosis    Hypertension Father     Alzheimer's disease Mother     Heart disease Mother         pacemaker    Hypertension Mother     Diabetes Brother     No Known Problems Sister     No Known Problems Maternal Grandmother     No Known Problems Maternal Grandfather     No Known Problems Paternal Grandmother     No Known Problems Paternal Grandfather     Heart disease Brother     No Known Problems Maternal Aunt     No Known Problems Maternal Uncle     No Known Problems Paternal Aunt     No Known Problems Paternal Uncle     Amblyopia Neg Hx     Blindness Neg Hx     Cancer Neg Hx     Cataracts Neg Hx     Glaucoma Neg Hx     Macular degeneration Neg Hx     Retinal detachment Neg  Hx     Strabismus Neg Hx     Stroke Neg Hx     Thyroid disease Neg Hx     Melanoma Neg Hx      Social History     Tobacco Use    Smoking status: Former     Types: Cigarettes     Quit date: 1984     Years since quittin.7    Smokeless tobacco: Never   Substance Use Topics    Alcohol use: Yes     Alcohol/week: 1.0 standard drink     Types: 1 Shots of liquor per week     Comment: old fashion  with dinner every 6 mo     Drug use: No     Review of Systems  10 point review of systems reviewed with patient otherwise negative.     Physical Exam     Initial Vitals [23 0833]   BP Pulse Resp Temp SpO2   (!) 148/67 86 (!) 23 98.6 °F (37 °C) (!) 84 %      MAP       --         Physical Exam     Nursing note and vitals reviewed.  Constitutional: Patient appears well-developed and well-nourished. No distress.   HENT:   Head: Normocephalic and atraumatic.   Eyes: Conjunctivae and EOM are normal. Pupils are equal, round, and reactive to light.   Neck: Neck supple.   Normal range of motion.  Cardiovascular: Normal rate, regular rhythm, normal heart sounds and intact distal pulses.   Pulmonary/Chest:  Lungs with scattered wheeze, crackles in bilateral bases   Abdominal: Abdomen is soft. Patient exhibits no distension. There is no abdominal tenderness.   Musculoskeletal:      Cervical back: Normal range of motion and neck supple.      1+ pitting edema RLE, 2+ L ankle - Patient notes she normally has increased lower extremity edema on her left side secondary to a previous fracture  Neurological: Patient is alert and oriented to person, place, and time. No cranial nerve deficit. Gait normal. GCS score is 15.    Skin: Skin is warm and dry.  Psych: Normal mood/affect    ED Course   Procedures  Labs Reviewed   HEMOGLOBIN A1C   TSH   CBC W/ AUTO DIFFERENTIAL   COMPREHENSIVE METABOLIC PANEL   TROPONIN I   B-TYPE NATRIURETIC PEPTIDE   PROTIME-INR   URINALYSIS, REFLEX TO URINE CULTURE   URINALYSIS, REFLEX TO URINE CULTURE         ECG Results              EKG 12-lead (Final result)  Result time 05/02/23 12:14:07      Final result by Interface, Lab In [facility] (05/02/23 12:14:07)                   Narrative:    Test Reason : R06.02,    Vent. Rate : 077 BPM     Atrial Rate : 077 BPM     P-R Int : 184 ms          QRS Dur : 170 ms      QT Int : 494 ms       P-R-T Axes : 029 -80 094 degrees     QTc Int : 559 ms    Atrial-sensed ventricular-paced rhythm with occasional Premature  ventricular complexes  Abnormal ECG  When compared with ECG of 12-JAN-2023 20:57,  Premature ventricular complexes are now Present  Premature atrial complexes are no longer Present  Vent. rate has increased BY  12 BPM  Confirmed by Haroldo CLINTON, Erum ([id number]) on 5/2/2023 12:13:54 PM    Referred By: HALEY   SELF           Confirmed By:Erum Zarco MD                                  Imaging Results              X-Ray Chest AP Portable (Final result)  Result time 05/02/23 10:42:12      Final result by Placido Samaniego MD (05/02/23 10:42:12)                   Impression:      1. Pulmonary findings suggest edema noting chronic interstitial change.  There is suspected small left pleural effusion, developing left lower lung zone consolidation not excluded.  Correlation and follow-up is advised.      Electronically signed by: Placido Samaniego MD  Date:    05/02/2023  Time:    10:42               Narrative:    EXAMINATION:  XR CHEST AP PORTABLE    CLINICAL HISTORY:  CHF;    TECHNIQUE:  Single frontal view of the chest was performed.    COMPARISON:  01/31/2023    FINDINGS:  Left chest wall pacer noted.  The cardiomediastinal silhouette is prominent, similar to the previous exam noting magnification by technique.  There is calcification of the aorta..  There is obscuration of the left costophrenic angle, may reflect small effusion..  The trachea is midline.  The lungs are symmetrically expanded bilaterally with coarse interstitial attenuation bilaterally.  Developing left  lower lung zone consolidation not excluded..  There is no pneumothorax.  The osseous structures are remarkable for degenerative changes noting osteopenia and dextroscoliotic curvature of the spine..                                       Medications   sodium chloride 0.9% flush 10 mL (has no administration in time range)   naloxone 0.4 mg/mL injection 0.02 mg (has no administration in time range)   dextrose 40 % gel 15,000 mg (has no administration in time range)   dextrose 40 % gel 30,000 mg (has no administration in time range)   dextrose 10% bolus 125 mL 125 mL (has no administration in time range)   dextrose 10% bolus 250 mL 250 mL (has no administration in time range)   glucagon (human recombinant) injection 1 mg (has no administration in time range)   acetaminophen tablet 650 mg (has no administration in time range)   polyethylene glycol packet 17 g (has no administration in time range)   senna-docusate 8.6-50 mg per tablet 1 tablet (has no administration in time range)   ondansetron injection 4 mg (has no administration in time range)   melatonin tablet 6 mg (has no administration in time range)   furosemide injection 40 mg (has no administration in time range)   apixaban tablet 2.5 mg (has no administration in time range)   levothyroxine tablet 125 mcg (has no administration in time range)   memantine tablet 5 mg (has no administration in time range)   losartan tablet 100 mg (100 mg Oral Given 5/2/23 1508)   labetaloL tablet 200 mg (has no administration in time range)   amLODIPine tablet 10 mg (10 mg Oral Given 5/2/23 1508)   aspirin chewable tablet 81 mg (has no administration in time range)   pravastatin tablet 80 mg (has no administration in time range)   aspirin tablet 325 mg (325 mg Oral Given 5/2/23 1148)   furosemide injection 20 mg (20 mg Intravenous Given 5/2/23 1148)                       I have personally reviewed and interpreted the patient's EKG:  Paced rhythm at 77 beats per minute  I have  personally reviewed and interpreted the patients laboratory studies: TSH WNL, troponin and BNP elevated  I have personally reviewed and interpreted imaging studies: CXR suggestive of pulmonary edema    I personally reviewed previous imaging studies:  Echocardiogram from 10/2022 with EF of 35%, diastolic dysfunction      The patient's presentation and hypoxia are most consistent with volume overload likely secondary to CHF exacerbation given the constellation of the patient's symptoms, laboratory findings and evidence of pulmonary edema on chest x-ray.  The patient does not appear to be on a diuretic as an outpatient and thus was treated with 20 mg of Lasix for diuresis.  The patient was admitted for further treatment and evaluation and diuresis.       Clinical Impression:   Final diagnoses:  [R06.02] Shortness of breath        ED Disposition Condition    Admit                 Mony Delcid MD  05/02/23 [id number]

## 2023-05-02 NOTE — ASSESSMENT & PLAN NOTE
Patient with Persistent (7 days or more) atrial fibrillation which is controlled currently with Beta Blocker and Calcium Channel Blocker. Patient is currently in sinus rhythm.[id number] Score: 4. HASBLED Score: 3. Anticoagulation indicated. Anticoagulation done with eliquis 2.5mg.

## 2023-05-02 NOTE — ASSESSMENT & PLAN NOTE
Reported reaction to atorvastatin. Rosuvastatin not available in the hospital. Start Pravastatin in patient.

## 2023-05-02 NOTE — ASSESSMENT & PLAN NOTE
Patient has EF 35% with PCI/CABG 2021. On presentation she is short of breath with a dry cough. BNP and trop elevated. CXR with pulmonary edema. Suspect presentation could be secondary to CHF exacerbation vs. AHRF.     - Resume diuresis with lasix 40 BID IV  - Cardiac diet  - Fluid restriction  - Strict I's/O's  - Low sodium  - Elevate HOB  - Order ECHO to assess interval changes from prior study

## 2023-05-02 NOTE — H&P
"[facility] Cardiology [facility] Medicine  History & Physical    Patient Name: Lynn Mckinney  MRN: [id number]  Patient Class: IP- Inpatient  Admission Date: 5/2/2023  Attending Physician: Bijal Wolfe*   Primary Care Provider: Abdiel Robles MD         Patient information was obtained from patient and ER records.     Subjective:     Principal Problem:Acute hypoxemic respiratory failure    Chief Complaint:   Chief Complaint   Patient presents with    Shortness of Breath     Low oxygen sats since last Wednesday. Home health. Did deep breathing and able to bring it back up. Daughter says pt in memory care facility and could "barely breath this am" . Not on home oxygen         HPI: 87F with history of HFrEF (EF 35%, Pap 27 mmHg), PCI/CABG 2021, Biventricular ICD, CKD 3a ,HTN, and S/p L Hip hemiarthroplasty 1/13/2023 presents with SOB and cough. Her symptoms started on Sunday on her way to [employer]. She was progressively more short of breath on exertion. Her cough persists without sputum production. She does not use oxygen at home. No history of COPD charted. She denies any chest pain, palpitations, nausea, vomiting, or diarrhea. She is not aware of her medications and if she is taking them as scheduled. She was transferred from assisted living. She denies any fever or night sweats. She has baseline dementia taking memantine. Obtaining further history was limited due to baseline neurocognitive deficit. In the ED work up was consistent with elevated BNP/trop, pulmonary edema, and severe hypothyroidism. Hospital medicine will admit for further workup.      Past Medical History:   Diagnosis Date    Allergy     seasonal    Blood clotting tendency     Carotid artery disease 5/29/2014    Coronary artery disease     Heart block     Hyperlipidemia     Hypertension     Hypothyroid     Left displaced femoral neck fracture 1/12/2023    Obesity     Pacemaker     PAD (peripheral artery disease)     " "Spinal stenosis     Thoracic or lumbosacral neuritis or radiculitis 11/25/2014    Tubular adenoma        Past Surgical History:   Procedure Laterality Date    ANGIOPLASTY      APPENDECTOMY      BREAST CYST ASPIRATION Bilateral     CARDIAC CATHETERIZATION      CARDIAC PACEMAKER PLACEMENT  10/10/13    by Dr. Coleman    CAROTID STENT Left 2007    by Dr. Bowen    CATARACT EXTRACTION W/  INTRAOCULAR LENS IMPLANT Bilateral 2012    Dr Sharp    CORONARY ARTERY BYPASS GRAFT  2001     x 3 LIMA-Ramus, SVG-OM1 & SVG-PDA    CORONARY BYPASS GRAFT ANGIOGRAPHY  3/11/2021    Procedure: Bypass graft study;  Surgeon: Gil Garcia MD;  Location: [facility] CATH LAB;  Service: Cardiology;;    CYST REMOVAL      wrist    EYE SURGERY      HEMIARTHROPLASTY OF HIP Left 1/13/2023    Procedure: HEMIARTHROPLASTY, HIP, LEFT;  Surgeon: Alan Vieyra MD;  Location: [facility] OR 2ND FLR;  Service: Orthopedics;  Laterality: Left;    HYSTERECTOMY      LEFT HEART CATHETERIZATION Left 3/11/2021    Procedure: Left heart cath;  Surgeon: Gil Garcia MD;  Location: [facility] CATH LAB;  Service: Cardiology;  Laterality: Left;    OOPHORECTOMY      THYROIDECTOMY      VITRECTOMY BY PARS PLANA APPROACH Right 10/12/2021    Procedure: VITRECTOMY, PARS PLANA APPROACH;  Surgeon: Flako Carver MD;  Location: [facility] OR 1ST FLR;  Service: Ophthalmology;  Laterality: Right;       Review of patient's allergies indicates:   Allergen Reactions    Lipitor [atorvastatin] Itching    Fosamax [alendronate]      Felt "strange"    Iodinated contrast media      Dye is only to be used if absolutely needed because of kidney function    Prolia [denosumab]      Reaction after the Prolia    Sulfa (sulfonamide antibiotics)        No current facility-administered medications on file prior to encounter.     Current Outpatient Medications on File Prior to Encounter   Medication Sig    acetaminophen (TYLENOL) 325 MG tablet Take 2 tablets (650 mg total) by " mouth every 6 (six) hours as needed (Mild to moderate pain).    acetaminophen (TYLENOL) 500 MG tablet Take 1 tablet (500 mg total) by mouth every 6 (six) hours as needed for Pain.    amLODIPine (NORVASC) 10 MG tablet Take 1 tablet (10 mg total) by mouth once daily.    aspirin 81 MG Chew Take 81 mg by mouth every evening. 1 Tablet, Chewable Oral Every day    cholecalciferol, vitamin D3, (VITAMIN D3) 50 mcg (2,000 unit) Cap capsule Take 2,000 Units by mouth once daily.    coenzyme Q10 200 mg capsule Take 200 mg by mouth once daily.    ELIQUIS 2.5 mg Tab TAKE 1 TABLET BY MOUTH TWICE DAILY    hydrOXYzine HCL (ATARAX) 25 MG tablet Take 1 tablet (25 mg total) by mouth 3 (three) times daily as needed for Anxiety. (Patient not taking: Reported on 4/25/2023)    irbesartan (AVAPRO) 300 MG tablet TAKE 1 TABLET (300 MG TOTAL) BY MOUTH EVERY EVENING. CANCEL IRBESARTAN/HCTZ COMBO    labetaloL (NORMODYNE) 200 MG tablet Take 1 tablet (200 mg total) by mouth 2 (two) times daily.    levothyroxine (SYNTHROID) 125 MCG tablet Take 1 tablet (125 mcg total) by mouth before breakfast.    memantine (NAMENDA) 5 MG Tab TAKE 1 TABLET BY MOUTH TWICE DAILY    multivitamin (THERAGRAN) per tablet Take 1 tablet by mouth once daily.    nitroGLYCERIN (NITROSTAT) 0.4 MG SL tablet PLACE 1 TABLET UNDER THE TONGUE EVERY 5 MIN AS NEEDED FOR CHEST PAIN. MAX:3 DOSES (Patient not taking: Reported on 4/25/2023)    rosuvastatin (CRESTOR) 10 MG tablet Take 1 tablet (10 mg total) by mouth once daily.    SALMON OIL-OMEGA-3 FATTY ACIDS ORAL Take 1 capsule by mouth once daily.    vitamins  A,C,E-zinc-copper (PRESERVISION AREDS) 14,320-226-200 unit-mg-unit Cap Take by mouth Daily.     Family History       Problem Relation (Age of Onset)    Alzheimer's disease Mother    Diabetes Brother    Heart disease Father, Mother, Brother    Hypertension Father, Mother    No Known Problems Sister, Maternal Grandmother, Maternal Grandfather, Paternal Grandmother,  Paternal Grandfather, Maternal Aunt, Maternal Uncle, Paternal Aunt, Paternal Uncle          Tobacco Use    Smoking status: Former     Types: Cigarettes     Quit date: 1984     Years since quittin.7    Smokeless tobacco: Never   Substance and Sexual Activity    Alcohol use: Yes     Alcohol/week: 1.0 standard drink     Types: 1 Shots of liquor per week     Comment: old fashion  with dinner every 6 mo     Drug use: No    Sexual activity: Not on file     Review of Systems   Constitutional:  Negative for chills and fever.   HENT:  Negative for rhinorrhea and trouble swallowing.    Eyes:  Negative for discharge.   Respiratory:  Positive for cough and shortness of breath.    Cardiovascular:  Negative for chest pain and leg swelling.   Gastrointestinal:  Negative for abdominal pain and nausea.   Genitourinary:  Negative for dysuria and hematuria.   Musculoskeletal:  Negative for arthralgias and myalgias.   Neurological:  Negative for tremors and headaches.   Psychiatric/Behavioral:  Positive for confusion. Negative for agitation.    Objective:     Vital Signs (Most Recent):  Temp: 98.6 °F (37 °C) (23 0833)  Pulse: 70 (23 1100)  Resp: 17 (23 1100)  BP: (!) 168/89 (23 1100)  SpO2: 95 % (23 1100)   Vital Signs (24h Range):  Temp:  [98.6 °F (37 °C)] 98.6 °F (37 °C)  Pulse:  [68-86] 70  Resp:  [15-23] 17  SpO2:  [84 %-97 %] 95 %  BP: (148-174)/(67-89) 168/89     Weight: 63.5 kg (140 lb)  Body mass index is 23.3 kg/m².    Physical Exam  Constitutional:       General: She is not in acute distress.     Appearance: Normal appearance.   HENT:      Head: Normocephalic and atraumatic.      Nose: Nose normal.      Mouth/Throat:      Pharynx: Oropharynx is clear.   Eyes:      Extraocular Movements: Extraocular movements intact.      Conjunctiva/sclera: Conjunctivae normal.   Cardiovascular:      Rate and Rhythm: Normal rate and regular rhythm.      Pulses: Normal pulses.      Comments: Distant  heart sounds with fluid collection appreciated on auscultation.   Pulmonary:      Effort: Pulmonary effort is normal.      Breath sounds: No stridor. Rales present. No wheezing or rhonchi.      Comments: Bilateral crackles heard on auscultation.   Abdominal:      General: Abdomen is flat. Bowel sounds are normal. There is no distension.      Palpations: Abdomen is soft. There is no mass.   Musculoskeletal:         General: No swelling. Normal range of motion.      Cervical back: Normal range of motion.      Right lower leg: No edema.   Skin:     General: Skin is dry.   Neurological:      General: No focal deficit present.      Mental Status: She is alert.   Psychiatric:         Mood and Affect: Mood normal.           Significant Labs: All pertinent labs within the past 24 hours have been reviewed.  CBC: No results for input(s): WBC, HGB, HCT, PLT in the last 48 hours.  CMP: No results for input(s): NA, K, CL, CO2, GLU, BUN, CREATININE, CALCIUM, PROT, ALBUMIN, BILITOT, ALKPHOS, AST, ALT, ANIONGAP, EGFRNONAA in the last 48 hours.    Invalid input(s): ESTGFAFRICA    Significant Imaging: I have reviewed all pertinent imaging results/findings within the past 24 hours.    Assessment/Plan:     * Acute hypoxemic respiratory failure  Patient with Hypoxic Respiratory failure which is Acute.  she is not on home oxygen. Supplemental oxygen was provided and noted-    Signs/symptoms of respiratory failure include- tachypnea and increased work of breathing. Contributing diagnoses includes - CHF, COPD and Pleural effusion Labs and images were reviewed. Patient Has not had a recent ABG. Will treat underlying causes and adjust management of respiratory failure as follows-    - Furosemide 20X1 in ED. Resume Furosemide 40 BID  - Telemetry  - Continuous pulse oximetry  - Oxygen per nasal cannula   - RSV, COVID, Influenza test          Acute on chronic combined systolic and diastolic heart failure  Patient has EF 35% with PCI/CABG 2021.  On presentation she is short of breath with a dry cough. BNP and trop elevated. CXR with pulmonary edema. Suspect presentation could be secondary to CHF exacerbation vs. AHRF.     - Resume diuresis with lasix 40 BID IV  - Cardiac diet  - Fluid restriction  - Strict I's/O's  - Low sodium  - Elevate HOB  - Order ECHO to assess interval changes from prior study        Hypothyroidism  TSH 64.45 with T4 0.74. Though her labs suggest more of a subacute presentation she is symptomatic. She appears weak, fatigued, and somnolent. Her eyes are kept closed during the interview. Most likely severe hypothyroidism is complicating her clinical presentation. The combination of hypoxemic respiratory failure, volume collection in the lungs, baseline dementia, and hypometabolism are contributing to hemodynamic instability.     - Resume levothyroxine  - Consider endocrinology during hospitalization        A-fib  Patient with Persistent (7 days or more) atrial fibrillation which is controlled currently with Beta Blocker and Calcium Channel Blocker. Patient is currently in sinus rhythm.[id number] Score: 4. HASBLED Score: 3. Anticoagulation indicated. Anticoagulation done with eliquis 2.5mg.        Dementia with anxiety, unspecified dementia severity, unspecified dementia type  The patient has baseline unspecified dementia on memantine. No acute changes in mentation.   Continue memantine and treat risk factors including hypertension and hyperlipidemia.      Stage 3a chronic kidney disease  No acute ROBBY superimposed on CKD. Labs stable at this time. Continue to monitor. Strict I's/O's.    Coronary artery disease involving native coronary artery of native heart without angina pectoris  ASA loading dose 325 in ED  Continue ASA 81  Pravastatin 80      Pure hypercholesterolemia  Reported reaction to atorvastatin. Rosuvastatin not available in the hospital. Start Pravastatin in patient.       Primary hypertension  Continue antihypertensives:  labetalol, amlodipine, losartan        VTE Risk Mitigation (From admission, onward)         Ordered     apixaban tablet 2.5 mg  2 times daily         05/02/23 1416     IP VTE HIGH RISK PATIENT  Once         05/02/23 1410     Place sequential compression device  Until discontinued         05/02/23 1410                           Sharath Cox MD  Department of Hospital Medicine  [facility] - Cardiology Stepdown

## 2023-05-02 NOTE — ASSESSMENT & PLAN NOTE
The patient has baseline unspecified dementia on memantine. No acute changes in mentation.   Continue memantine and treat risk factors including hypertension and hyperlipidemia.

## 2023-05-02 NOTE — ED NOTES
Telemetry Verification   Patient placed on Telemetry Box  Verified with War Room  Box # [id number]   Monitor Tech Mis   Rate 67   Rhythm

## 2023-05-02 NOTE — ASSESSMENT & PLAN NOTE
No acute ROBBY superimposed on CKD. Labs stable at this time. Continue to monitor. Strict I's/O's.

## 2023-05-02 NOTE — SUBJECTIVE & OBJECTIVE
"Past Medical History:   Diagnosis Date    Allergy     seasonal    Blood clotting tendency     Carotid artery disease 5/29/2014    Coronary artery disease     Heart block     Hyperlipidemia     Hypertension     Hypothyroid     Left displaced femoral neck fracture 1/12/2023    Obesity     Pacemaker     PAD (peripheral artery disease)     Spinal stenosis     Thoracic or lumbosacral neuritis or radiculitis 11/25/2014    Tubular adenoma        Past Surgical History:   Procedure Laterality Date    ANGIOPLASTY      APPENDECTOMY      BREAST CYST ASPIRATION Bilateral     CARDIAC CATHETERIZATION      CARDIAC PACEMAKER PLACEMENT  10/10/13    by Dr. Coleman    CAROTID STENT Left 2007    by Dr. Bowen    CATARACT EXTRACTION W/  INTRAOCULAR LENS IMPLANT Bilateral 2012    Dr Sharp    CORONARY ARTERY BYPASS GRAFT  2001     x 3 LIMA-Ramus, SVG-OM1 & SVG-PDA    CORONARY BYPASS GRAFT ANGIOGRAPHY  3/11/2021    Procedure: Bypass graft study;  Surgeon: Gil Garcia MD;  Location: [facility] CATH LAB;  Service: Cardiology;;    CYST REMOVAL      wrist    EYE SURGERY      HEMIARTHROPLASTY OF HIP Left 1/13/2023    Procedure: HEMIARTHROPLASTY, HIP, LEFT;  Surgeon: Alan Vieyra MD;  Location: [facility] OR 2ND FLR;  Service: Orthopedics;  Laterality: Left;    HYSTERECTOMY      LEFT HEART CATHETERIZATION Left 3/11/2021    Procedure: Left heart cath;  Surgeon: Gil Garcia MD;  Location: [facility] CATH LAB;  Service: Cardiology;  Laterality: Left;    OOPHORECTOMY      THYROIDECTOMY      VITRECTOMY BY PARS PLANA APPROACH Right 10/12/2021    Procedure: VITRECTOMY, PARS PLANA APPROACH;  Surgeon: Flako Carver MD;  Location: [facility] OR 1ST FLR;  Service: Ophthalmology;  Laterality: Right;       Review of patient's allergies indicates:   Allergen Reactions    Lipitor [atorvastatin] Itching    Fosamax [alendronate]      Felt "strange"    Iodinated contrast media      Dye is only to be used if absolutely needed because of kidney function    Prolia " [denosumab]      Reaction after the Prolia    Sulfa (sulfonamide antibiotics)        No current facility-administered medications on file prior to encounter.     Current Outpatient Medications on File Prior to Encounter   Medication Sig    acetaminophen (TYLENOL) 325 MG tablet Take 2 tablets (650 mg total) by mouth every 6 (six) hours as needed (Mild to moderate pain).    acetaminophen (TYLENOL) 500 MG tablet Take 1 tablet (500 mg total) by mouth every 6 (six) hours as needed for Pain.    amLODIPine (NORVASC) 10 MG tablet Take 1 tablet (10 mg total) by mouth once daily.    aspirin 81 MG Chew Take 81 mg by mouth every evening. 1 Tablet, Chewable Oral Every day    cholecalciferol, vitamin D3, (VITAMIN D3) 50 mcg (2,000 unit) Cap capsule Take 2,000 Units by mouth once daily.    coenzyme Q10 200 mg capsule Take 200 mg by mouth once daily.    ELIQUIS 2.5 mg Tab TAKE 1 TABLET BY MOUTH TWICE DAILY    hydrOXYzine HCL (ATARAX) 25 MG tablet Take 1 tablet (25 mg total) by mouth 3 (three) times daily as needed for Anxiety. (Patient not taking: Reported on 4/25/2023)    irbesartan (AVAPRO) 300 MG tablet TAKE 1 TABLET (300 MG TOTAL) BY MOUTH EVERY EVENING. CANCEL IRBESARTAN/HCTZ COMBO    labetaloL (NORMODYNE) 200 MG tablet Take 1 tablet (200 mg total) by mouth 2 (two) times daily.    levothyroxine (SYNTHROID) 125 MCG tablet Take 1 tablet (125 mcg total) by mouth before breakfast.    memantine (NAMENDA) 5 MG Tab TAKE 1 TABLET BY MOUTH TWICE DAILY    multivitamin (THERAGRAN) per tablet Take 1 tablet by mouth once daily.    nitroGLYCERIN (NITROSTAT) 0.4 MG SL tablet PLACE 1 TABLET UNDER THE TONGUE EVERY 5 MIN AS NEEDED FOR CHEST PAIN. MAX:3 DOSES (Patient not taking: Reported on 4/25/2023)    rosuvastatin (CRESTOR) 10 MG tablet Take 1 tablet (10 mg total) by mouth once daily.    SALMON OIL-OMEGA-3 FATTY ACIDS ORAL Take 1 capsule by mouth once daily.    vitamins  A,C,E-zinc-copper (PRESERVISION AREDS) [phone] unit-mg-unit Cap  Take by mouth Daily.     Family History       Problem Relation (Age of Onset)    Alzheimer's disease Mother    Diabetes Brother    Heart disease Father, Mother, Brother    Hypertension Father, Mother    No Known Problems Sister, Maternal Grandmother, Maternal Grandfather, Paternal Grandmother, Paternal Grandfather, Maternal Aunt, Maternal Uncle, Paternal Aunt, Paternal Uncle          Tobacco Use    Smoking status: Former     Types: Cigarettes     Quit date: 1984     Years since quittin.7    Smokeless tobacco: Never   Substance and Sexual Activity    Alcohol use: Yes     Alcohol/week: 1.0 standard drink     Types: 1 Shots of liquor per week     Comment: old fashion  with dinner every 6 mo     Drug use: No    Sexual activity: Not on file     Review of Systems   Constitutional:  Negative for chills and fever.   HENT:  Negative for rhinorrhea and trouble swallowing.    Eyes:  Negative for discharge.   Respiratory:  Positive for cough and shortness of breath.    Cardiovascular:  Negative for chest pain and leg swelling.   Gastrointestinal:  Negative for abdominal pain and nausea.   Genitourinary:  Negative for dysuria and hematuria.   Musculoskeletal:  Negative for arthralgias and myalgias.   Neurological:  Negative for tremors and headaches.   Psychiatric/Behavioral:  Positive for confusion. Negative for agitation.    Objective:     Vital Signs (Most Recent):  Temp: 98.6 °F (37 °C) (23 0833)  Pulse: 70 (23 1100)  Resp: 17 (23 1100)  BP: (!) 168/89 (23 1100)  SpO2: 95 % (23 1100)   Vital Signs (24h Range):  Temp:  [98.6 °F (37 °C)] 98.6 °F (37 °C)  Pulse:  [68-86] 70  Resp:  [15-23] 17  SpO2:  [84 %-97 %] 95 %  BP: (148-174)/(67-89) 168/89     Weight: 63.5 kg (140 lb)  Body mass index is 23.3 kg/m².    Physical Exam  Constitutional:       General: She is not in acute distress.     Appearance: Normal appearance.   HENT:      Head: Normocephalic and atraumatic.      Nose: Nose  normal.      Mouth/Throat:      Pharynx: Oropharynx is clear.   Eyes:      Extraocular Movements: Extraocular movements intact.      Conjunctiva/sclera: Conjunctivae normal.   Cardiovascular:      Rate and Rhythm: Normal rate and regular rhythm.      Pulses: Normal pulses.      Comments: Distant heart sounds with fluid collection appreciated on auscultation.   Pulmonary:      Effort: Pulmonary effort is normal.      Breath sounds: No stridor. Rales present. No wheezing or rhonchi.      Comments: Bilateral crackles heard on auscultation.   Abdominal:      General: Abdomen is flat. Bowel sounds are normal. There is no distension.      Palpations: Abdomen is soft. There is no mass.   Musculoskeletal:         General: No swelling. Normal range of motion.      Cervical back: Normal range of motion.      Right lower leg: No edema.   Skin:     General: Skin is dry.   Neurological:      General: No focal deficit present.      Mental Status: She is alert.   Psychiatric:         Mood and Affect: Mood normal.           Significant Labs: All pertinent labs within the past 24 hours have been reviewed.  CBC: No results for input(s): WBC, HGB, HCT, PLT in the last 48 hours.  CMP: No results for input(s): NA, K, CL, CO2, GLU, BUN, CREATININE, CALCIUM, PROT, ALBUMIN, BILITOT, ALKPHOS, AST, ALT, ANIONGAP, EGFRNONAA in the last 48 hours.    Invalid input(s): ESTGFAFRICA    Significant Imaging: I have reviewed all pertinent imaging results/findings within the past 24 hours.

## 2023-05-02 NOTE — ASSESSMENT & PLAN NOTE
Patient with Hypoxic Respiratory failure which is Acute.  she is not on home oxygen. Supplemental oxygen was provided and noted-    Signs/symptoms of respiratory failure include- tachypnea and increased work of breathing. Contributing diagnoses includes - CHF, COPD and Pleural effusion Labs and images were reviewed. Patient Has not had a recent ABG. Will treat underlying causes and adjust management of respiratory failure as follows-    - Furosemide 20X1 in ED. Resume Furosemide 40 BID  - Telemetry  - Continuous pulse oximetry  - Oxygen per nasal cannula   - RSV, COVID, Influenza test

## 2023-05-02 NOTE — TELEPHONE ENCOUNTER
Call returned to patient family, states patient is currently in the ED but will be admitted inpatient. Patient family advised to call once discharged to get rescheduled.

## 2023-05-02 NOTE — HPI
87F with history of HFrEF (EF 35%, Pap 27 mmHg), PCI/CABG 2021, Biventricular ICD, CKD 3a ,HTN, and S/p L Hip hemiarthroplasty 1/13/2023 presents with SOB and cough. Her symptoms started on Sunday on her way to [employer]. She was progressively more short of breath on exertion. Her cough persists without sputum production. She does not use oxygen at home. No history of COPD charted. She denies any chest pain, palpitations, nausea, vomiting, or diarrhea. She is not aware of her medications and if she is taking them as scheduled. She was transferred from assisted living. She denies any fever or night sweats. She has baseline dementia taking memantine. Obtaining further history was limited due to baseline neurocognitive deficit. In the ED work up was consistent with elevated BNP/trop, pulmonary edema, and severe hypothyroidism. Hospital medicine will admit for further workup.

## 2023-05-02 NOTE — TELEPHONE ENCOUNTER
----- Message from Agnes Marlow MA sent at 5/2/2023 11:01 AM CDT -----  Regarding: FW: Patient advice    ----- Message -----  From: Kristina Alfredo  Sent: 5/2/2023   9:34 AM CDT  To: [facility] Staff  Subject: Patient advice                                   Name of Caller: Susanna         Contact Preference: [phone]        Nature of Call: Patient niece called to give update on patient states she has been admitted to [facility] [place]

## 2023-05-02 NOTE — TELEPHONE ENCOUNTER
I have signed for the following orders AND/OR meds.  Please call the patient and ask the patient to schedule the testing AND/OR inform about any medications that were sent.      Orders Placed This Encounter   Procedures    Ambulatory referral/consult to Pulmonology     Standing Status:   Future     Standing Expiration Date:   6/2/2024     Referral Priority:   Urgent     Referral Type:   Consultation     Referral Reason:   Specialty Services Required     Requested Specialty:   Pulmonary Disease     Number of Visits Requested:   1

## 2023-05-02 NOTE — ASSESSMENT & PLAN NOTE
TSH 64.45 with T4 0.74. Though her labs suggest more of a subacute presentation she is symptomatic. She appears weak, fatigued, and somnolent. Her eyes are kept closed during the interview. Most likely severe hypothyroidism is complicating her clinical presentation. The combination of hypoxemic respiratory failure, volume collection in the lungs, baseline dementia, and hypometabolism are contributing to hemodynamic instability.     - Resume levothyroxine  - Consider endocrinology during hospitalization

## 2023-05-02 NOTE — MEDICAL/APP STUDENT
SUBJECTIVE:     HISTORY OF PRESENT ILLNESS:  Lynn Mckinney is a 87 y.o. female with Combined Diastolic and Systolic HF, HTN, HLD, CAD s/p CABG x 3, VF s/p ICD placement, Stake 3a CKD, hypothyroidism, and dementia who presents for acute shortness of breathe.    HPI:  Patient states that she first noticed the SOB on Sunday. She was walking with her brother to [employer] and noticed that she was not able to keep up and needed to sit down. Her SOB resolved with rest. At this time she also developed a dry cough. She states that this has never happened before. Her symptoms have not gotten better or worse since her initial exacerbation. At baseline she is able to ambulate with SOB from a distance equivalent from the ED to the [facility]. She states that she would have gone to the ED on Sunday but did not have transportation at the time. Today she attempted to ambulate and found she easily became short of breathe which prompted her to come to the ED. She states that she typically sleeps with one or two pillows at night, but this is for comfort as opposed to orthopnea.     ED Course:  Patient currently lives at [facility] and was brought to the ED by her brother today for acute SOB. Patient was placed on 3L nasal cannula. EKG showed intermittent PVC otherwise unremarkable. CXR showed suspected small left pleural effusion and developing left lower lung zone consolidation. Patient was given 20 mg Furosemide IV. Out put of 400 ml of urine over the course of 2 hours.     MEDICATIONS:  Home Medications:  No current facility-administered medications on file prior to encounter.     Current Outpatient Medications on File Prior to Encounter   Medication Sig Dispense Refill    acetaminophen (TYLENOL) 325 MG tablet Take 2 tablets (650 mg total) by mouth every 6 (six) hours as needed (Mild to moderate pain).  0    acetaminophen (TYLENOL) 500 MG tablet Take 1 tablet (500 mg total) by mouth every 6 (six) hours as needed for  "Pain. 90 tablet 12    amLODIPine (NORVASC) 10 MG tablet Take 1 tablet (10 mg total) by mouth once daily. 90 tablet 3    aspirin 81 MG Chew Take 81 mg by mouth every evening. 1 Tablet, Chewable Oral Every day      cholecalciferol, vitamin D3, (VITAMIN D3) 50 mcg (2,000 unit) Cap capsule Take 2,000 Units by mouth once daily.      coenzyme Q10 200 mg capsule Take 200 mg by mouth once daily.      ELIQUIS 2.5 mg Tab TAKE 1 TABLET BY MOUTH TWICE DAILY 60 tablet 11    hydrOXYzine HCL (ATARAX) 25 MG tablet Take 1 tablet (25 mg total) by mouth 3 (three) times daily as needed for Anxiety. (Patient not taking: Reported on 4/25/2023) 15 tablet 0    irbesartan (AVAPRO) 300 MG tablet TAKE 1 TABLET (300 MG TOTAL) BY MOUTH EVERY EVENING. CANCEL IRBESARTAN/HCTZ COMBO 90 tablet 3    labetaloL (NORMODYNE) 200 MG tablet Take 1 tablet (200 mg total) by mouth 2 (two) times daily. 180 tablet 3    levothyroxine (SYNTHROID) 125 MCG tablet Take 1 tablet (125 mcg total) by mouth before breakfast. 90 tablet 4    memantine (NAMENDA) 5 MG Tab TAKE 1 TABLET BY MOUTH TWICE DAILY 60 tablet 11    multivitamin (THERAGRAN) per tablet Take 1 tablet by mouth once daily.      nitroGLYCERIN (NITROSTAT) 0.4 MG SL tablet PLACE 1 TABLET UNDER THE TONGUE EVERY 5 MIN AS NEEDED FOR CHEST PAIN. MAX:3 DOSES (Patient not taking: Reported on 4/25/2023) 30 tablet 3    rosuvastatin (CRESTOR) 10 MG tablet Take 1 tablet (10 mg total) by mouth once daily. 90 tablet 3    SALMON OIL-OMEGA-3 FATTY ACIDS ORAL Take 1 capsule by mouth once daily.      vitamins  A,C,E-zinc-copper (PRESERVISION AREDS) 14,[id number] unit-mg-unit Cap Take by mouth Daily.         ALLERGIES:    Review of patient's allergies indicates:   Allergen Reactions    Lipitor [atorvastatin] Itching    Fosamax [alendronate]      Felt "strange"    Iodinated contrast media      Dye is only to be used if absolutely needed because of kidney function    Prolia [denosumab]      Reaction after the Prolia    Sulfa " (sulfonamide antibiotics)        PAST MEDICAL HISTORY:    Past Medical History:   Diagnosis Date    Allergy     seasonal    Blood clotting tendency     Carotid artery disease 5/29/2014    Coronary artery disease     Heart block     Hyperlipidemia     Hypertension     Hypothyroid     Left displaced femoral neck fracture 1/12/2023    Obesity     Pacemaker     PAD (peripheral artery disease)     Spinal stenosis     Thoracic or lumbosacral neuritis or radiculitis 11/25/2014    Tubular adenoma        SURGICAL HISTORY:  Past Surgical History:   Procedure Laterality Date    ANGIOPLASTY      APPENDECTOMY      BREAST CYST ASPIRATION Bilateral     CARDIAC CATHETERIZATION      CARDIAC PACEMAKER PLACEMENT  10/10/13    by Dr. Coleman    CAROTID STENT Left 2007    by Dr. Bowen    CATARACT EXTRACTION W/  INTRAOCULAR LENS IMPLANT Bilateral 2012    Dr Sharp    CORONARY ARTERY BYPASS GRAFT  2001     x 3 LIMA-Ramus, SVG-OM1 & SVG-PDA    CORONARY BYPASS GRAFT ANGIOGRAPHY  3/11/2021    Procedure: Bypass graft study;  Surgeon: Gli Garcia MD;  Location: [facility] CATH LAB;  Service: Cardiology;;    CYST REMOVAL      wrist    EYE SURGERY      HEMIARTHROPLASTY OF HIP Left 1/13/2023    Procedure: HEMIARTHROPLASTY, HIP, LEFT;  Surgeon: Alan Vieyra MD;  Location: [address];  Service: Orthopedics;  Laterality: Left;    HYSTERECTOMY      LEFT HEART CATHETERIZATION Left 3/11/2021    Procedure: Left heart cath;  Surgeon: Gil Garcia MD;  Location: [facility] CATH LAB;  Service: Cardiology;  Laterality: Left;    OOPHORECTOMY      THYROIDECTOMY      VITRECTOMY BY PARS PLANA APPROACH Right 10/12/2021    Procedure: VITRECTOMY, PARS PLANA APPROACH;  Surgeon: Flako Carver MD;  Location: [address];  Service: Ophthalmology;  Laterality: Right;       FAMILY HISTORY:  Family History   Problem Relation Age of Onset    Heart disease Father         coronary thrombosis    Hypertension Father     Alzheimer's disease Mother     Heart  disease Mother         pacemaker    Hypertension Mother     Diabetes Brother     No Known Problems Sister     No Known Problems Maternal Grandmother     No Known Problems Maternal Grandfather     No Known Problems Paternal Grandmother     No Known Problems Paternal Grandfather     Heart disease Brother     No Known Problems Maternal Aunt     No Known Problems Maternal Uncle     No Known Problems Paternal Aunt     No Known Problems Paternal Uncle     Amblyopia Neg Hx     Blindness Neg Hx     Cancer Neg Hx     Cataracts Neg Hx     Glaucoma Neg Hx     Macular degeneration Neg Hx     Retinal detachment Neg Hx     Strabismus Neg Hx     Stroke Neg Hx     Thyroid disease Neg Hx     Melanoma Neg Hx        SOCIAL HISTORY:  Social History     Tobacco Use    Smoking status: Former     Types: Cigarettes     Quit date: 1984     Years since quittin.7    Smokeless tobacco: Never   Substance Use Topics    Alcohol use: Yes     Alcohol/week: 1.0 standard drink     Types: 1 Shots of liquor per week     Comment: old fashion  with dinner every 6 mo     Drug use: No        REVIEW OF SYSTEMS:  Review of Systems   Constitutional:  Positive for fatigue. Negative for chills and fever.   HENT:  Positive for congestion.    Respiratory:  Positive for cough and shortness of breath. Negative for wheezing and stridor.         Dry cough 2-3 days    Cardiovascular:  Positive for leg swelling. Negative for chest pain.   Gastrointestinal:  Negative for abdominal distention, abdominal pain, constipation, diarrhea, nausea and vomiting.   Genitourinary:  Negative for dysuria.   Musculoskeletal:  Negative for back pain.   Neurological:  Negative for dizziness, weakness, light-headedness and headaches.       OBJECTIVE:     Most Recent Vitals:  Temp: 98.6 °F (37 °C) (23 0833)  Pulse: 70 (23 1100)  Resp: 17 (23 1100)  BP: (!) 168/89 (23 1100)  SpO2: 95 % (23 1100)      PHYSICAL EXAM:  Physical Exam  Constitutional:        Appearance: She is ill-appearing.   HENT:      Head: Normocephalic.   Eyes:      Extraocular Movements: Extraocular movements intact.   Cardiovascular:      Rate and Rhythm: Normal rate and regular rhythm.      Pulses: Normal pulses.      Heart sounds: Normal heart sounds. No murmur heard.    No friction rub. No gallop.   Pulmonary:      Effort: No respiratory distress.      Breath sounds: Wheezing and rales present.   Abdominal:      General: Abdomen is flat. Bowel sounds are normal. There is no distension.      Palpations: Abdomen is soft.      Tenderness: There is no abdominal tenderness.   Musculoskeletal:      Right lower leg: No edema.      Left lower leg: Edema present.   Skin:     General: Skin is warm.      Findings: Bruising present.   Neurological:      General: No focal deficit present.      Mental Status: She is alert. Mental status is at baseline.         LABORATORY VALUES:  Lab Results   Component Value Date    WBC 5.08 01/19/2023    HGB 10.6 (L) 01/19/2023    HCT 32.3 (L) 01/19/2023     (L) 01/19/2023    HGBA1C 5.3 01/12/2023     Lab Results   Component Value Date     01/31/2023    K 4.2 01/31/2023     01/31/2023    CO2 27 01/31/2023    BUN 17 01/31/2023    CREATININE 1.1 01/31/2023     01/31/2023    CALCIUM 8.9 01/31/2023    MG 2.0 01/15/2023    PHOS 3.6 01/31/2023    AST 25 01/12/2023    ALT 15 01/12/2023    ALKPHOS 58 01/12/2023    BILITOT 0.8 01/12/2023    PROT 6.7 01/12/2023    ALBUMIN 3.6 01/31/2023     Lab Results   Component Value Date    INR 1.0 01/12/2023     Lab Results   Component Value Date    TSH 96.744 (H) 08/31/2022    FREET4 0.76 08/31/2022    PTH 45.0 06/04/2018     No results for input(s): PH, PCO2, PO2, HCO3, POCSATURATED, BE in the last 168 hours.    DIAGNOSTIC STUDIES:  Labs: Reviewed  ECG: Reviewed  X-Ray: Reviewed    CXR  FINDINGS:  Left chest wall pacer noted.  The cardiomediastinal silhouette is prominent, similar to the previous exam noting  magnification by technique.  There is calcification of the aorta..  There is obscuration of the left costophrenic angle, may reflect small effusion..  The trachea is midline.  The lungs are symmetrically expanded bilaterally with coarse interstitial attenuation bilaterally.  Developing left lower lung zone consolidation not excluded..  There is no pneumothorax.  The osseous structures are remarkable for degenerative changes noting osteopenia and dextroscoliotic curvature of the spine..     Impression:  1. Pulmonary findings suggest edema noting chronic interstitial change.  There is suspected small left pleural effusion, developing left lower lung zone consolidation not excluded.  Correlation and follow-up is advised.         ASSESSMENT:       Active Hospital Problems    Diagnosis  POA    *Acute hypoxemic respiratory failure [J96.01]  Yes    A-fib [I48.91]  Unknown    Dementia with anxiety, unspecified dementia severity, unspecified dementia type [F03.94]  Yes     Chronic     Chronic.  Control is uncertain.  Patient was seen by Neurology who started Namenda.  Patient recently moved here from [place].  Patient is staying at the [facility].  There has been no significant change while taking this medication.        Acute on chronic combined systolic and diastolic heart failure [I50.43]  Yes    Stage 3a chronic kidney disease [N18.31]  Yes     Chronic    Coronary artery disease involving native coronary artery of native heart without angina pectoris [I25.10]  Yes     Chronic     CAD S/P CABG  2001                                                          mid LAD       Hypothyroidism [E03.9]  Yes     Chronic     Chronic.  Uncontrolled.  Patient on levothyroxine 125 micrograms daily.  Lab Results   Component Value Date    TSH 96.744 (H) 08/31/2022    FREET4 0.76 08/31/2022           Primary hypertension [I10]  Yes     Chronic    Pure hypercholesterolemia [E78.00]  Yes     Chronic     reported side  effects from atorvastatin.  pain in both hands, both shoulders and her left ankle 3/15 while on 80 mg of atorvastatin   Switched to rosuvastatin 20 in 3/15            Resolved Hospital Problems   No resolved problems to display.     Acute hypoxemic respiratory failure  Lynn Mckinney is a 87 y.o. female presents for Combined Diastolic and Systolic HF, HTN, HLD, CAD s/p CABG x 3, VF s/p ICD placement, Stake 3a CKD, hypothyroidism, and dementia. who presents for acute shortness of breathe. History and exam indicating potential heart failure. Ddx includes but is not limited to viral pneumonia, hypothyroidism, and paroxysmal arrhythmia (VT vs AF).      - BNP 1258 on admission  - Furosemide 40 mg BID IV  - Trend CMP  - monitor I&O   - 1500 ml fluid restriction    - Tele   - Echo    Acute on Chronic combined systolic and diastolic heart failure   - See Acute hypoxemic respiratory failure    Stage 3a Chronic Kidney Disease  Baseline Cr and BNP 1.1/17   1.2/15 on admission    - trend CMP  - monitor I&O's, pure wick in place     Coronary Artery Disease involving native coronary artery of native heart without angina pectoralis   S/p CABG x 3 2001  Home regiment: rosuvastatin 20 mg, Aspirin 81 mg      - Continue Home regiment     Afib   S/p ablation 2021   Home regiemt: Eliquis 2.5 BID    - Continue home regiment  - Consider full dose 5 mg BID Eliquis dosing. She meets age criteria, but does not meet sCr or weight criteria    HTN  Home regiment: Labetalol 200 mg BID, Losartan 100 mg     - continue home medication regiment     Pure Hypercholesterolemia  Home regiment: rosuvastatin 20 mg, itching reaction to atorvastatin  No rosuvastatin in hospital    - Start Pravastatin 80 mg qd     Hypothyroidism   Home regiment: 125 mcg qd   On admission TSH 64.446, free T4 0.74, subclinical picture   Per NCBI, subclinical hypothyroid patients with cardiovascular hx and TSH >10 benefit from treatment    - Continue home regiment    Dementia  with anxiety, unspecified dementia severity, unspecified type   Home regiment: Memantine 5 mg     - Continue home medicatiom regiment   - ensure good wake sleep cycle  - fall precautions  - aspiration precautions       Jinny Garcia  MS4 [employer]      n/a

## 2023-05-03 ENCOUNTER — DOCUMENTATION ONLY (OUTPATIENT)
Dept: ELECTROPHYSIOLOGY | Facility: CLINIC | Age: 88
End: 2023-05-03
Payer: MEDICARE

## 2023-05-03 PROBLEM — E87.6 HYPOKALEMIA: Status: ACTIVE | Noted: 2023-05-03

## 2023-05-03 LAB
ALBUMIN SERPL BCP-MCNC: 3.1 G/DL (ref 3.5–5.2)
ALP SERPL-CCNC: 73 U/L (ref 55–135)
ALT SERPL W/O P-5'-P-CCNC: 12 U/L (ref 10–44)
ANION GAP SERPL CALC-SCNC: 7 MMOL/L (ref 8–16)
ANION GAP SERPL CALC-SCNC: 8 MMOL/L (ref 8–16)
ASCENDING AORTA: 3.19 CM
AST SERPL-CCNC: 19 U/L (ref 10–40)
AV INDEX (PROSTH): 0.47
AV MEAN GRADIENT: 4 MMHG
AV PEAK GRADIENT: 10 MMHG
AV REGURGITATION PRESSURE HALF TIME: 665 MS
AV VALVE AREA: 1.59 CM2
AV VELOCITY RATIO: 0.54
BASOPHILS # BLD AUTO: 0.02 K/UL (ref 0–0.2)
BASOPHILS NFR BLD: 0.3 % (ref 0–1.9)
BILIRUB SERPL-MCNC: 0.6 MG/DL (ref 0.1–1)
BSA FOR ECHO PROCEDURE: 1.71 M2
BUN SERPL-MCNC: 17 MG/DL (ref 8–23)
BUN SERPL-MCNC: 24 MG/DL (ref 8–23)
CALCIUM SERPL-MCNC: 8.7 MG/DL (ref 8.7–10.5)
CALCIUM SERPL-MCNC: 8.9 MG/DL (ref 8.7–10.5)
CHLORIDE SERPL-SCNC: 103 MMOL/L (ref 95–110)
CHLORIDE SERPL-SCNC: 106 MMOL/L (ref 95–110)
CO2 SERPL-SCNC: 27 MMOL/L (ref 23–29)
CO2 SERPL-SCNC: 30 MMOL/L (ref 23–29)
CREAT SERPL-MCNC: 1.2 MG/DL (ref 0.5–1.4)
CREAT SERPL-MCNC: 1.5 MG/DL (ref 0.5–1.4)
CV ECHO LV RWT: 0.35 CM
DIFFERENTIAL METHOD: ABNORMAL
DOP CALC AO PEAK VEL: 1.57 M/S
DOP CALC AO VTI: 37.39 CM
DOP CALC LVOT AREA: 3.4 CM2
DOP CALC LVOT DIAMETER: 2.08 CM
DOP CALC LVOT PEAK VEL: 0.85 M/S
DOP CALC LVOT STROKE VOLUME: 59.47 CM3
DOP CALCLVOT PEAK VEL VTI: 17.51 CM
E WAVE DECELERATION TIME: 236.34 MSEC
E/A RATIO: 0.87
E/E' RATIO: 24 M/S
ECHO LV POSTERIOR WALL: 0.95 CM (ref 0.6–1.1)
EJECTION FRACTION: 40 %
EOSINOPHIL # BLD AUTO: 0.2 K/UL (ref 0–0.5)
EOSINOPHIL NFR BLD: 3.4 % (ref 0–8)
ERYTHROCYTE [DISTWIDTH] IN BLOOD BY AUTOMATED COUNT: 13.4 % (ref 11.5–14.5)
EST. GFR  (NO RACE VARIABLE): 33.5 ML/MIN/1.73 M^2
EST. GFR  (NO RACE VARIABLE): 43.8 ML/MIN/1.73 M^2
FRACTIONAL SHORTENING: 18 % (ref 28–44)
GLUCOSE SERPL-MCNC: 122 MG/DL (ref 70–110)
GLUCOSE SERPL-MCNC: 91 MG/DL (ref 70–110)
HCT VFR BLD AUTO: 36.5 % (ref 37–48.5)
HGB BLD-MCNC: 11.5 G/DL (ref 12–16)
IMM GRANULOCYTES # BLD AUTO: 0.01 K/UL (ref 0–0.04)
IMM GRANULOCYTES NFR BLD AUTO: 0.2 % (ref 0–0.5)
INTERVENTRICULAR SEPTUM: 1.13 CM (ref 0.6–1.1)
LA MAJOR: 7.33 CM
LA MINOR: 7.24 CM
LA WIDTH: 5.28 CM
LEFT ATRIUM SIZE: 4.56 CM
LEFT ATRIUM VOLUME INDEX MOD: 71.1 ML/M2
LEFT ATRIUM VOLUME INDEX: 87.7 ML/M2
LEFT ATRIUM VOLUME MOD: 120.87 CM3
LEFT ATRIUM VOLUME: 149.08 CM3
LEFT INTERNAL DIMENSION IN SYSTOLE: 4.44 CM (ref 2.1–4)
LEFT VENTRICLE DIASTOLIC VOLUME INDEX: 84.61 ML/M2
LEFT VENTRICLE DIASTOLIC VOLUME: 143.84 ML
LEFT VENTRICLE MASS INDEX: 130 G/M2
LEFT VENTRICLE SYSTOLIC VOLUME INDEX: 52.6 ML/M2
LEFT VENTRICLE SYSTOLIC VOLUME: 89.34 ML
LEFT VENTRICULAR INTERNAL DIMENSION IN DIASTOLE: 5.44 CM (ref 3.5–6)
LEFT VENTRICULAR MASS: 220.47 G
LV LATERAL E/E' RATIO: 21 M/S
LV SEPTAL E/E' RATIO: 28 M/S
LYMPHOCYTES # BLD AUTO: 0.8 K/UL (ref 1–4.8)
LYMPHOCYTES NFR BLD: 13 % (ref 18–48)
MAGNESIUM SERPL-MCNC: 1.9 MG/DL (ref 1.6–2.6)
MCH RBC QN AUTO: 31.7 PG (ref 27–31)
MCHC RBC AUTO-ENTMCNC: 31.5 G/DL (ref 32–36)
MCV RBC AUTO: 101 FL (ref 82–98)
MONOCYTES # BLD AUTO: 0.8 K/UL (ref 0.3–1)
MONOCYTES NFR BLD: 12.6 % (ref 4–15)
MV PEAK A VEL: 0.97 M/S
MV PEAK E VEL: 0.84 M/S
MV STENOSIS PRESSURE HALF TIME: 68.54 MS
MV VALVE AREA P 1/2 METHOD: 3.21 CM2
NEUTROPHILS # BLD AUTO: 4.4 K/UL (ref 1.8–7.7)
NEUTROPHILS NFR BLD: 70.5 % (ref 38–73)
NRBC BLD-RTO: 0 /100 WBC
PHOSPHATE SERPL-MCNC: 4.4 MG/DL (ref 2.7–4.5)
PISA TR MAX VEL: 2.47 M/S
PLATELET # BLD AUTO: 133 K/UL (ref 150–450)
PMV BLD AUTO: 9.2 FL (ref 9.2–12.9)
POTASSIUM SERPL-SCNC: 3.4 MMOL/L (ref 3.5–5.1)
POTASSIUM SERPL-SCNC: 3.9 MMOL/L (ref 3.5–5.1)
PROT SERPL-MCNC: 6 G/DL (ref 6–8.4)
RA MAJOR: 4.79 CM
RA PRESSURE: 3 MMHG
RA WIDTH: 2.47 CM
RBC # BLD AUTO: 3.63 M/UL (ref 4–5.4)
RIGHT VENTRICULAR END-DIASTOLIC DIMENSION: 3.54 CM
SINUS: 3.52 CM
SODIUM SERPL-SCNC: 140 MMOL/L (ref 136–145)
SODIUM SERPL-SCNC: 141 MMOL/L (ref 136–145)
STJ: 2.49 CM
TDI LATERAL: 0.04 M/S
TDI SEPTAL: 0.03 M/S
TDI: 0.04 M/S
TR MAX PG: 24 MMHG
TRICUSPID ANNULAR PLANE SYSTOLIC EXCURSION: 1.01 CM
TV REST PULMONARY ARTERY PRESSURE: 27 MMHG
WBC # BLD AUTO: 6.21 K/UL (ref 3.9–12.7)

## 2023-05-03 PROCEDURE — 25000003 PHARM REV CODE 250: Performed by: HOSPITALIST

## 2023-05-03 PROCEDURE — 63600175 PHARM REV CODE 636 W HCPCS

## 2023-05-03 PROCEDURE — 63600175 PHARM REV CODE 636 W HCPCS: Performed by: STUDENT IN AN ORGANIZED HEALTH CARE EDUCATION/TRAINING PROGRAM

## 2023-05-03 PROCEDURE — 20600001 HC STEP DOWN PRIVATE ROOM

## 2023-05-03 PROCEDURE — 99232 SBSQ HOSP IP/OBS MODERATE 35: CPT | Mod: GC,,, | Performed by: HOSPITALIST

## 2023-05-03 PROCEDURE — 25000003 PHARM REV CODE 250

## 2023-05-03 PROCEDURE — 84100 ASSAY OF PHOSPHORUS: CPT

## 2023-05-03 PROCEDURE — 36415 COLL VENOUS BLD VENIPUNCTURE: CPT

## 2023-05-03 PROCEDURE — 85025 COMPLETE CBC W/AUTO DIFF WBC: CPT

## 2023-05-03 PROCEDURE — 99232 PR SUBSEQUENT HOSPITAL CARE,LEVL II: ICD-10-PCS | Mod: GC,,, | Performed by: HOSPITALIST

## 2023-05-03 PROCEDURE — 80048 BASIC METABOLIC PNL TOTAL CA: CPT | Mod: XB

## 2023-05-03 PROCEDURE — 80053 COMPREHEN METABOLIC PANEL: CPT

## 2023-05-03 PROCEDURE — 83735 ASSAY OF MAGNESIUM: CPT

## 2023-05-03 RX ORDER — POTASSIUM CHLORIDE 750 MG/1
30 CAPSULE, EXTENDED RELEASE ORAL
Status: COMPLETED | OUTPATIENT
Start: 2023-05-03 | End: 2023-05-03

## 2023-05-03 RX ORDER — CETIRIZINE HYDROCHLORIDE 5 MG/1
5 TABLET ORAL ONCE AS NEEDED
Status: DISCONTINUED | OUTPATIENT
Start: 2023-05-03 | End: 2023-05-05 | Stop reason: HOSPADM

## 2023-05-03 RX ORDER — FUROSEMIDE 10 MG/ML
40 INJECTION INTRAMUSCULAR; INTRAVENOUS ONCE
Status: COMPLETED | OUTPATIENT
Start: 2023-05-03 | End: 2023-05-03

## 2023-05-03 RX ORDER — FUROSEMIDE 10 MG/ML
80 INJECTION INTRAMUSCULAR; INTRAVENOUS 2 TIMES DAILY
Status: DISCONTINUED | OUTPATIENT
Start: 2023-05-03 | End: 2023-05-03

## 2023-05-03 RX ADMIN — APIXABAN 2.5 MG: 2.5 TABLET, FILM COATED ORAL at 09:05

## 2023-05-03 RX ADMIN — FUROSEMIDE 80 MG: 10 INJECTION, SOLUTION INTRAMUSCULAR; INTRAVENOUS at 05:05

## 2023-05-03 RX ADMIN — FUROSEMIDE 40 MG: 10 INJECTION, SOLUTION INTRAMUSCULAR; INTRAVENOUS at 10:05

## 2023-05-03 RX ADMIN — LOSARTAN POTASSIUM 100 MG: 50 TABLET, FILM COATED ORAL at 08:05

## 2023-05-03 RX ADMIN — POTASSIUM CHLORIDE 30 MEQ: 10 CAPSULE, COATED, EXTENDED RELEASE ORAL at 10:05

## 2023-05-03 RX ADMIN — LEVOTHYROXINE SODIUM 125 MCG: 25 TABLET ORAL at 05:05

## 2023-05-03 RX ADMIN — POTASSIUM CHLORIDE 30 MEQ: 10 CAPSULE, COATED, EXTENDED RELEASE ORAL at 08:05

## 2023-05-03 RX ADMIN — ASPIRIN 81 MG CHEWABLE TABLET 81 MG: 81 TABLET CHEWABLE at 09:05

## 2023-05-03 RX ADMIN — PRAVASTATIN SODIUM 80 MG: 80 TABLET ORAL at 08:05

## 2023-05-03 RX ADMIN — MEMANTINE HYDROCHLORIDE 5 MG: 5 TABLET ORAL at 08:05

## 2023-05-03 RX ADMIN — AMLODIPINE BESYLATE 10 MG: 10 TABLET ORAL at 08:05

## 2023-05-03 RX ADMIN — LABETALOL HYDROCHLORIDE 200 MG: 100 TABLET, FILM COATED ORAL at 09:05

## 2023-05-03 RX ADMIN — LABETALOL HYDROCHLORIDE 200 MG: 100 TABLET, FILM COATED ORAL at 08:05

## 2023-05-03 RX ADMIN — FUROSEMIDE 40 MG: 10 INJECTION, SOLUTION INTRAMUSCULAR; INTRAVENOUS at 08:05

## 2023-05-03 RX ADMIN — APIXABAN 2.5 MG: 2.5 TABLET, FILM COATED ORAL at 08:05

## 2023-05-03 RX ADMIN — MEMANTINE HYDROCHLORIDE 5 MG: 5 TABLET ORAL at 09:05

## 2023-05-03 NOTE — HOSPITAL COURSE
Patient presented with AHRF and and CHF exacerbation. She was treated with IV diuretics. Continue strict I's and O's and fluid restriction. Fine rales in a dependent distribution, which remain despite adequate diuresis. 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 Pulmonology consulted for eval for high dose steroids and her risks outweigh benefits (PPM soon, osteoporosis). Evaluating for etiologies of her ILD with some autoimmune labs. She will follow up in pulmonology's ILD clinic. She is medically ready for discharge.

## 2023-05-03 NOTE — PROGRESS NOTES
Jose A James - Cardiology [facility] Medicine  Progress Note    Patient Name: Lynn Mckinney  MRN: [id number]  Patient Class: IP- Inpatient   Admission Date: 5/2/2023  Length of Stay: 1 days  Attending Physician: Bijal Wolfe*  Primary Care Provider: Abdiel Robles MD        Subjective:     Principal Problem:Acute hypoxemic respiratory failure        HPI:  87F with history of HFrEF (EF 35%, Pap 27 mmHg), PCI/CABG 2021, Biventricular ICD, CKD 3a ,HTN, and S/p L Hip hemiarthroplasty 1/13/2023 presents with SOB and cough. Her symptoms started on Sunday on her way to [employer]. She was progressively more short of breath on exertion. Her cough persists without sputum production. She does not use oxygen at home. No history of COPD charted. She denies any chest pain, palpitations, nausea, vomiting, or diarrhea. She is not aware of her medications and if she is taking them as scheduled. She was transferred from assisted living. She denies any fever or night sweats. She has baseline dementia taking memantine. Obtaining further history was limited due to baseline neurocognitive deficit. In the ED work up was consistent with elevated BNP/trop, pulmonary edema, and severe hypothyroidism. Hospital medicine will admit for further workup.      Overview/Hospital Course:  Patient presented with AHRF and and CHF exacerbation. She is being treated with IV diuretics. Plan to continue strict I's and O's and fluid restriction.       Interval History: Patient on IV diuretics. Gave additional 40mg this AM. NAEON. She is tolerating her diet. She is on 2L NC SpO2 94%.    Past Medical History:   Diagnosis Date    Allergy     seasonal    Blood clotting tendency     Carotid artery disease 5/29/2014    Coronary artery disease     Heart block     Hyperlipidemia     Hypertension     Hypothyroid     Left displaced femoral neck fracture 1/12/2023    Obesity     Pacemaker     PAD (peripheral artery disease)     Spinal  "stenosis     Thoracic or lumbosacral neuritis or radiculitis 11/25/2014    Tubular adenoma        Past Surgical History:   Procedure Laterality Date    ANGIOPLASTY      APPENDECTOMY      BREAST CYST ASPIRATION Bilateral     CARDIAC CATHETERIZATION      CARDIAC PACEMAKER PLACEMENT  10/10/13    by Dr. Coleman    CAROTID STENT Left 2007    by Dr. Bowen    CATARACT EXTRACTION W/  INTRAOCULAR LENS IMPLANT Bilateral 2012    Dr Sharp    CORONARY ARTERY BYPASS GRAFT  2001     x 3 LIMA-Ramus, SVG-OM1 & SVG-PDA    CORONARY BYPASS GRAFT ANGIOGRAPHY  3/11/2021    Procedure: Bypass graft study;  Surgeon: Gil Garcia MD;  Location: [facility] CATH [facility];  Service: Cardiology;;    CYST REMOVAL      wrist    EYE SURGERY      HEMIARTHROPLASTY OF HIP Left 1/13/2023    Procedure: HEMIARTHROPLASTY, HIP, LEFT;  Surgeon: Alan Vieyra MD;  Location: [facility] OR 2ND FLR;  Service: Orthopedics;  Laterality: Left;    HYSTERECTOMY      LEFT HEART CATHETERIZATION Left 3/11/2021    Procedure: Left heart cath;  Surgeon: Gil Garcia MD;  Location: [facility] CATH [facility];  Service: Cardiology;  Laterality: Left;    OOPHORECTOMY      THYROIDECTOMY      VITRECTOMY BY PARS PLANA APPROACH Right 10/12/2021    Procedure: VITRECTOMY, PARS PLANA APPROACH;  Surgeon: Flako Carver MD;  Location: [facility] OR 1ST FLR;  Service: Ophthalmology;  Laterality: Right;       Review of patient's allergies indicates:   Allergen Reactions    Lipitor [atorvastatin] Itching    Fosamax [alendronate]      Felt "strange"    Iodinated contrast media      Dye is only to be used if absolutely needed because of kidney function    Prolia [denosumab]      Reaction after the Prolia    Sulfa (sulfonamide antibiotics)        No current facility-administered medications on file prior to encounter.     Current Outpatient Medications on File Prior to Encounter   Medication Sig    acetaminophen (TYLENOL) 325 MG tablet Take 2 tablets (650 mg total) by mouth every " 6 (six) hours as needed (Mild to moderate pain).    acetaminophen (TYLENOL) 500 MG tablet Take 1 tablet (500 mg total) by mouth every 6 (six) hours as needed for Pain.    amLODIPine (NORVASC) 10 MG tablet Take 1 tablet (10 mg total) by mouth once daily.    aspirin 81 MG Chew Take 81 mg by mouth every evening. 1 Tablet, Chewable Oral Every day    cholecalciferol, vitamin D3, (VITAMIN D3) 50 mcg (2,000 unit) Cap capsule Take 2,000 Units by mouth once daily.    coenzyme Q10 200 mg capsule Take 200 mg by mouth once daily.    ELIQUIS 2.5 mg Tab TAKE 1 TABLET BY MOUTH TWICE DAILY    hydrOXYzine HCL (ATARAX) 25 MG tablet Take 1 tablet (25 mg total) by mouth 3 (three) times daily as needed for Anxiety. (Patient not taking: Reported on 4/25/2023)    irbesartan (AVAPRO) 300 MG tablet TAKE 1 TABLET (300 MG TOTAL) BY MOUTH EVERY EVENING. CANCEL IRBESARTAN/HCTZ COMBO    labetaloL (NORMODYNE) 200 MG tablet Take 1 tablet (200 mg total) by mouth 2 (two) times daily.    levothyroxine (SYNTHROID) 125 MCG tablet Take 1 tablet (125 mcg total) by mouth before breakfast.    memantine (NAMENDA) 5 MG Tab TAKE 1 TABLET BY MOUTH TWICE DAILY    multivitamin (THERAGRAN) per tablet Take 1 tablet by mouth once daily.    nitroGLYCERIN (NITROSTAT) 0.4 MG SL tablet PLACE 1 TABLET UNDER THE TONGUE EVERY 5 MIN AS NEEDED FOR CHEST PAIN. MAX:3 DOSES (Patient not taking: Reported on 4/25/2023)    rosuvastatin (CRESTOR) 10 MG tablet Take 1 tablet (10 mg total) by mouth once daily.    SALMON OIL-OMEGA-3 FATTY ACIDS ORAL Take 1 capsule by mouth once daily.    vitamins  A,C,E-zinc-copper (PRESERVISION AREDS) 14,320-226-200 unit-mg-unit Cap Take by mouth Daily.     Family History       Problem Relation (Age of Onset)    Alzheimer's disease Mother    Diabetes Brother    Heart disease Father, Mother, Brother    Hypertension Father, Mother    No Known Problems Sister, Maternal Grandmother, Maternal Grandfather, Paternal Grandmother, Paternal  Grandfather, Maternal Aunt, Maternal Uncle, Paternal Aunt, Paternal Uncle          Tobacco Use    Smoking status: Former     Types: Cigarettes     Quit date: 1984     Years since quittin.7    Smokeless tobacco: Never   Substance and Sexual Activity    Alcohol use: Yes     Alcohol/week: 1.0 standard drink     Types: 1 Shots of liquor per week     Comment: old fashion  with dinner every 6 mo     Drug use: No    Sexual activity: Not on file     Review of Systems   Constitutional:  Negative for chills and fever.   HENT:  Negative for rhinorrhea and trouble swallowing.    Eyes:  Negative for discharge.   Respiratory:  Positive for cough and shortness of breath.    Cardiovascular:  Negative for chest pain and leg swelling.   Gastrointestinal:  Negative for abdominal pain and nausea.   Genitourinary:  Negative for dysuria and hematuria.   Musculoskeletal:  Negative for arthralgias and myalgias.   Neurological:  Negative for tremors and headaches.   Psychiatric/Behavioral:  Positive for confusion. Negative for agitation.    Objective:     Vital Signs (Most Recent):  Temp: 98 °F (36.7 °C) (23 1117)  Pulse: 65 (23 1122)  Resp: 17 (23 1117)  BP: (!) 113/58 (23 1117)  SpO2: 95 % (23 1117)   Vital Signs (24h Range):  Temp:  [97.6 °F (36.4 °C)-98.3 °F (36.8 °C)] 98 °F (36.7 °C)  Pulse:  [61-78] 65  Resp:  [17-18] 17  SpO2:  [90 %-96 %] 95 %  BP: (112-159)/() 113/58     Weight: 63.5 kg (140 lb)  Body mass index is 23.3 kg/m².    Physical Exam  Constitutional:       General: She is not in acute distress.     Appearance: Normal appearance.   HENT:      Head: Normocephalic and atraumatic.      Nose: Nose normal.      Mouth/Throat:      Pharynx: Oropharynx is clear.   Eyes:      Extraocular Movements: Extraocular movements intact.      Conjunctiva/sclera: Conjunctivae normal.   Cardiovascular:      Rate and Rhythm: Normal rate and regular rhythm.      Pulses: Normal pulses.       Comments: Distant heart sounds with fluid collection appreciated on auscultation.   Pulmonary:      Effort: Pulmonary effort is normal.      Breath sounds: No stridor. Rales present. No wheezing or rhonchi.      Comments: Bilateral crackles heard on auscultation.   Abdominal:      General: Abdomen is flat. Bowel sounds are normal. There is no distension.      Palpations: Abdomen is soft. There is no mass.   Musculoskeletal:         General: No swelling. Normal range of motion.      Cervical back: Normal range of motion.      Right lower leg: No edema.   Skin:     General: Skin is dry.   Neurological:      General: No focal deficit present.      Mental Status: She is alert.   Psychiatric:         Mood and Affect: Mood normal.           Significant Labs: All pertinent labs within the past 24 hours have been reviewed.  CBC:   Recent Labs   Lab 05/02/23  0940 05/03/23  0453   WBC 6.78 6.21   HGB 12.0 11.5*   HCT 37.7 36.5*   * 133*     CMP:   Recent Labs   Lab 05/02/23  0940 05/03/23  0453    141   K 4.1 3.4*    106   CO2 25 27    91   BUN 15 17   CREATININE 1.2 1.2   CALCIUM 8.8 8.7   PROT 6.3 6.0   ALBUMIN 3.4* 3.1*   BILITOT 0.7 0.6   ALKPHOS 77 73   AST 21 19   ALT 14 12   ANIONGAP 10 8       Significant Imaging: I have reviewed all pertinent imaging results/findings within the past 24 hours.      Assessment/Plan:      * Acute hypoxemic respiratory failure  Patient with Hypoxic Respiratory failure which is Acute.  she is not on home oxygen. Supplemental oxygen was provided and noted-    Signs/symptoms of respiratory failure include- tachypnea and increased work of breathing. Contributing diagnoses includes - CHF, COPD and Pleural effusion Labs and images were reviewed. Patient Has not had a recent ABG. Will treat underlying causes and adjust management of respiratory failure as follows-  RSV, COVID, Influenza test negative       - Furosemide 20X1 in ED. Resume Furosemide 40 BID. Additional  40 this AM 5/3.   - Telemetry  - Continuous pulse oximetry  - Oxygen per nasal cannula   - Nasal spray          Acute on chronic combined systolic and diastolic heart failure  Patient has EF 35% with PCI/CABG 2021. On presentation she is short of breath with a dry cough. BNP and trop elevated. CXR with pulmonary edema. Suspect presentation could be secondary to CHF exacerbation vs. AHRF.     - Resume diuresis with lasix 40 BID IV. Additional 40 this AM 5/3.   - Cardiac diet  - Fluid restriction  - Strict I's/O's  - Low sodium  - Elevate HOB  - F/u ECHO to assess interval changes from prior study  - F/u cardiac device check        Hypothyroidism  TSH 64.45 with T4 0.74. Though her labs suggest more of a subacute presentation she is symptomatic. She appears weak, fatigued, and somnolent. Her eyes are kept closed during the interview. Most likely severe hypothyroidism is complicating her clinical presentation. The combination of hypoxemic respiratory failure, volume collection in the lungs, baseline dementia, and hypometabolism are contributing to hemodynamic instability.     - Resume levothyroxine  - Consider endocrinology during hospitalization        A-fib  Patient with Persistent (7 days or more) atrial fibrillation which is controlled currently with Beta Blocker and Calcium Channel Blocker. Patient is currently in sinus rhythm.[id number] Score: 4. HASBLED Score: 3. Anticoagulation indicated. Anticoagulation done with eliquis 2.5mg.        Dementia with anxiety, unspecified dementia severity, unspecified dementia type  The patient has baseline unspecified dementia on memantine. No acute changes in mentation.   Continue memantine and treat risk factors including hypertension and hyperlipidemia.      Stage 3a chronic kidney disease  No acute ROBBY superimposed on CKD. Labs stable at this time. Continue to monitor. Strict I's/O's.    Coronary artery disease involving native coronary artery of native heart without angina  pectoris  ASA loading dose 325 in ED  Continue ASA 81  Pravastatin 80      Pure hypercholesterolemia  Reported reaction to atorvastatin. Rosuvastatin not available in the hospital. Start Pravastatin in patient.       Primary hypertension  Continue antihypertensives: labetalol, amlodipine, losartan        VTE Risk Mitigation (From admission, onward)         Ordered     apixaban tablet 2.5 mg  2 times daily         05/02/23 1416     IP VTE HIGH RISK PATIENT  Once         05/02/23 1410     Place sequential compression device  Until discontinued         05/02/23 1410                Discharge Planning   AIDEE: 5/5/2023     Code Status: Full Code   Is the patient medically ready for discharge?: No    Reason for patient still in hospital (select all that apply): Treatment  Discharge Plan A: Assisted Living (lives in Memory Care)                  Shraath Cox MD  Department of Hospital Medicine   [facility] - Cardiology Stepdown

## 2023-05-03 NOTE — PLAN OF CARE
Jose A James - Cardiology Stepdown  Initial Discharge Assessment       Primary Care Provider: Abdiel Robles MD    Admission Diagnosis: Shortness of breath [R06.02]  Chest pain [R07.9]    Admission Date: 5/2/2023  Expected Discharge Date: 5/5/2023    Discharge Barriers Identified: None    Payor: [facility] MCARE / Plan: Plan Me Up GROUP MEDICARE ADVANTAGE / Product Type: Medicare Advantage /     Extended Emergency Contact Information  Primary Emergency Contact: Kristina Rosado  Address: [address] [place] [address] [facility]  Home Phone: [phone]  Work Phone: [phone]  Mobile Phone: [phone]  Relation: Brother  Secondary Emergency Contact: KHUSHBOO ROSADO  Mobile Phone: [phone]  Relation: Relative  Preferred language: English   needed? No    Discharge Plan A: Assisted Living (lives in Memory Care)  Discharge Plan B: Assisted Living      [facility]. [place] [id number] [address]  Phone: [phone] Fax: [phone]      Initial Assessment (most recent)       Adult Discharge Assessment - 05/03/23 1213          Discharge Assessment    Assessment Type Discharge Planning Assessment     Confirmed/corrected address, phone number and insurance Yes   see plan of care notes. ( Her mailing address is on facesheet)    Confirmed Demographics Correct on Facesheet     Source of Information family;patient     If unable to respond/provide information was family/caregiver contacted? Yes     Contact Name/Number khushboo Todd (Niece) [phone]     Communicated AIDEE with patient/caregiver Date not available/Unable to determine     Reason For Admission short of breath/ acute hypoxemic respiratory failure     People in Home alone     Facility Arrived From: [facility]  [address] [address] [place] [address] ([phone])     Do you expect to return to your current  living situation? Yes     Do you have help at home or someone to help you manage your care at home? Yes     Who are your caregiver(s) and their phone number(s)? attendents at the facility (she is in memory care area)     Prior to hospitilization cognitive status: Unable to Assess     Current cognitive status: Not Oriented to Time     Walking or Climbing Stairs ambulation difficulty, requires equipment     Mobility Management rolling walker and Rollator     Dressing/Bathing bathing difficulty, assistance 1 person     Dressing/Bathing Management staff at facility     Home Accessibility wheelchair accessible     Equipment Currently Used at Home walker, rolling;rollator;bedside commode;cane, straight     Readmission within 30 days? No     Patient currently being followed by outpatient case management? No     Do you currently have service(s) that help you manage your care at home? No     Do you take prescription medications? Yes     Do you have prescription coverage? Yes     Coverage [employer] Managed     Do you have any problems affording any of your prescribed medications? No     Is the patient taking medications as prescribed? yes     Who is going to help you get home at discharge? james Todd (Niece) [phone]     How do you get to doctors appointments? family or friend will provide     Are you on dialysis? No     Do you take coumadin? No     Discharge Plan A Assisted Living   lives in Memory Care    Discharge Plan B Assisted Living     DME Needed Upon Discharge  none     Discharge Plan discussed with: Patient   and Niece    Discharge Barriers Identified None        Physical Activity    On average, how many days per week do you engage in moderate to strenuous exercise (like a brisk walk)? 0 days     On average, how many minutes do you engage in exercise at this level? 0 min        Financial Resource Strain    How hard is it for you to pay for the very basics like food, housing, medical care, and  heating? Not hard at all        Housing Stability    In the last 12 months, was there a time when you were not able to pay the mortgage or rent on time? No     In the last 12 months, how many places have you lived? 2     In the last 12 months, was there a time when you did not have a steady place to sleep or slept in a shelter (including now)? No        Transportation Needs    In the past 12 months, has lack of transportation kept you from medical appointments or from getting medications? No     In the past 12 months, has lack of transportation kept you from meetings, work, or from getting things needed for daily living? No        Food Insecurity    Within the past 12 months, you worried that your food would run out before you got the money to buy more. Never true     Within the past 12 months, the food you bought just didn't last and you didn't have money to get more. Never true        Stress    Do you feel stress - tense, restless, nervous, or anxious, or unable to sleep at night because your mind is troubled all the time - these days? Not at all        Social Connections    In a typical week, how many times do you talk on the phone with family, friends, or neighbors? More than three times a week     How often do you get together with friends or relatives? More than three times a week     How often do you attend Methodist or Rastafarian services? Never     Do you belong to any clubs or organizations such as Methodist groups, unions, fraternal or athletic groups, or school groups? No     How often do you attend meetings of the clubs or organizations you belong to? Never        Alcohol Use    Q1: How often do you have a drink containing alcohol? Never     Q2: How many drinks containing alcohol do you have on a typical day when you are drinking? Patient does not drink     Q3: How often do you have six or more drinks on one occasion? Never                      CM met with patient and niece( Susanna [phone]) at bedside.  Discharge booklet and contact number given as well. Patient verified her Name and birthday. She lives in a [facility] unit [facility] at [address] [place] [address]. Phone number is [phone]. The information on her face sheet is her mailing address. Patient owns a walker with wheels and a rollator and BSC. She is taking her medication as prescribed. Will continue to monitor for discharge needs.     Pham Mercado RN    [phone]

## 2023-05-03 NOTE — PLAN OF CARE
Problem: Adult Inpatient Plan of Care  Goal: Plan of Care Review  Outcome: Ongoing, Progressing     Problem: Fall Injury Risk  Goal: Absence of Fall and Fall-Related Injury  Outcome: Ongoing, Progressing     POC reviewed at bedside. Questions and concerns addressed. VSS. Family member slept at bedside. Placed bed in low and locked position. Call light within reach. Side rails up x2. Instructed to call for any needs. Verbalized understanding of all instructions. Frequent rounds. Bed alarm in use. No falls/injuries this shift.

## 2023-05-03 NOTE — ASSESSMENT & PLAN NOTE
Patient has EF 35% with PCI/CABG 2021. On presentation she is short of breath with a dry cough. BNP and trop elevated. CXR with pulmonary edema. Suspect presentation could be secondary to CHF exacerbation vs. AHRF.     - Resume diuresis with lasix 40 BID IV. Additional 40 this AM 5/3.   - Cardiac diet  - Fluid restriction  - Strict I's/O's  - Low sodium  - Elevate HOB  - F/u ECHO to assess interval changes from prior study  - F/u cardiac device check

## 2023-05-03 NOTE — PROGRESS NOTES
Cardiac device interrogation and/or reprogramming completed by industry representative,  Blayne BEAR with [employer]; please refer to report located in the Media tab.

## 2023-05-03 NOTE — SUBJECTIVE & OBJECTIVE
Interval History: Patient on IV diuretics. Gave additional 40mg this AM. NAEON. She is tolerating her diet. She is on 2L NC SpO2 94%.    Past Medical History:   Diagnosis Date    Allergy     seasonal    Blood clotting tendency     Carotid artery disease 5/29/2014    Coronary artery disease     Heart block     Hyperlipidemia     Hypertension     Hypothyroid     Left displaced femoral neck fracture 1/12/2023    Obesity     Pacemaker     PAD (peripheral artery disease)     Spinal stenosis     Thoracic or lumbosacral neuritis or radiculitis 11/25/2014    Tubular adenoma        Past Surgical History:   Procedure Laterality Date    ANGIOPLASTY      APPENDECTOMY      BREAST CYST ASPIRATION Bilateral     CARDIAC CATHETERIZATION      CARDIAC PACEMAKER PLACEMENT  10/10/13    by Dr. Coleman    CAROTID STENT Left 2007    by Dr. Bowen    CATARACT EXTRACTION W/  INTRAOCULAR LENS IMPLANT Bilateral 2012    Dr Sharp    CORONARY ARTERY BYPASS GRAFT  2001     x 3 LIMA-Ramus, SVG-OM1 & SVG-PDA    CORONARY BYPASS GRAFT ANGIOGRAPHY  3/11/2021    Procedure: Bypass graft study;  Surgeon: Gil Garcia MD;  Location: [facility] CATH LAB;  Service: Cardiology;;    CYST REMOVAL      wrist    EYE SURGERY      HEMIARTHROPLASTY OF HIP Left 1/13/2023    Procedure: HEMIARTHROPLASTY, HIP, LEFT;  Surgeon: Alan Vieyra MD;  Location: [facility] OR 2ND FLR;  Service: Orthopedics;  Laterality: Left;    HYSTERECTOMY      LEFT HEART CATHETERIZATION Left 3/11/2021    Procedure: Left heart cath;  Surgeon: Gil Garcia MD;  Location: [facility] CATH LAB;  Service: Cardiology;  Laterality: Left;    OOPHORECTOMY      THYROIDECTOMY      VITRECTOMY BY PARS PLANA APPROACH Right 10/12/2021    Procedure: VITRECTOMY, PARS PLANA APPROACH;  Surgeon: Flako Carver MD;  Location: [facility] 1ST FLR;  Service: Ophthalmology;  Laterality: Right;       Review of patient's allergies indicates:   Allergen Reactions    Lipitor [atorvastatin] Itching    Fosamax [alendronate]  "     Felt "strange"    Iodinated contrast media      Dye is only to be used if absolutely needed because of kidney function    Prolia [denosumab]      Reaction after the Prolia    Sulfa (sulfonamide antibiotics)        No current facility-administered medications on file prior to encounter.     Current Outpatient Medications on File Prior to Encounter   Medication Sig    acetaminophen (TYLENOL) 325 MG tablet Take 2 tablets (650 mg total) by mouth every 6 (six) hours as needed (Mild to moderate pain).    acetaminophen (TYLENOL) 500 MG tablet Take 1 tablet (500 mg total) by mouth every 6 (six) hours as needed for Pain.    amLODIPine (NORVASC) 10 MG tablet Take 1 tablet (10 mg total) by mouth once daily.    aspirin 81 MG Chew Take 81 mg by mouth every evening. 1 Tablet, Chewable Oral Every day    cholecalciferol, vitamin D3, (VITAMIN D3) 50 mcg (2,000 unit) Cap capsule Take 2,000 Units by mouth once daily.    coenzyme Q10 200 mg capsule Take 200 mg by mouth once daily.    ELIQUIS 2.5 mg Tab TAKE 1 TABLET BY MOUTH TWICE DAILY    hydrOXYzine HCL (ATARAX) 25 MG tablet Take 1 tablet (25 mg total) by mouth 3 (three) times daily as needed for Anxiety. (Patient not taking: Reported on 4/25/2023)    irbesartan (AVAPRO) 300 MG tablet TAKE 1 TABLET (300 MG TOTAL) BY MOUTH EVERY EVENING. CANCEL IRBESARTAN/HCTZ COMBO    labetaloL (NORMODYNE) 200 MG tablet Take 1 tablet (200 mg total) by mouth 2 (two) times daily.    levothyroxine (SYNTHROID) 125 MCG tablet Take 1 tablet (125 mcg total) by mouth before breakfast.    memantine (NAMENDA) 5 MG Tab TAKE 1 TABLET BY MOUTH TWICE DAILY    multivitamin (THERAGRAN) per tablet Take 1 tablet by mouth once daily.    nitroGLYCERIN (NITROSTAT) 0.4 MG SL tablet PLACE 1 TABLET UNDER THE TONGUE EVERY 5 MIN AS NEEDED FOR CHEST PAIN. MAX:3 DOSES (Patient not taking: Reported on 4/25/2023)    rosuvastatin (CRESTOR) 10 MG tablet Take 1 tablet (10 mg total) by mouth once daily.    SALMON OIL-OMEGA-3 " FATTY ACIDS ORAL Take 1 capsule by mouth once daily.    vitamins  A,C,E-zinc-copper (PRESERVISION AREDS) [phone] unit-mg-unit Cap Take by mouth Daily.     Family History       Problem Relation (Age of Onset)    Alzheimer's disease Mother    Diabetes Brother    Heart disease Father, Mother, Brother    Hypertension Father, Mother    No Known Problems Sister, Maternal Grandmother, Maternal Grandfather, Paternal Grandmother, Paternal Grandfather, Maternal Aunt, Maternal Uncle, Paternal Aunt, Paternal Uncle          Tobacco Use    Smoking status: Former     Types: Cigarettes     Quit date: 1984     Years since quittin.7    Smokeless tobacco: Never   Substance and Sexual Activity    Alcohol use: Yes     Alcohol/week: 1.0 standard drink     Types: 1 Shots of liquor per week     Comment: old fashion  with dinner every 6 mo     Drug use: No    Sexual activity: Not on file     Review of Systems   Constitutional:  Negative for chills and fever.   HENT:  Negative for rhinorrhea and trouble swallowing.    Eyes:  Negative for discharge.   Respiratory:  Positive for cough and shortness of breath.    Cardiovascular:  Negative for chest pain and leg swelling.   Gastrointestinal:  Negative for abdominal pain and nausea.   Genitourinary:  Negative for dysuria and hematuria.   Musculoskeletal:  Negative for arthralgias and myalgias.   Neurological:  Negative for tremors and headaches.   Psychiatric/Behavioral:  Positive for confusion. Negative for agitation.    Objective:     Vital Signs (Most Recent):  Temp: 98 °F (36.7 °C) (23 1117)  Pulse: 65 (23 1122)  Resp: 17 (23 111)  BP: (!) 113/58 (23 1117)  SpO2: 95 % (23 111)   Vital Signs (24h Range):  Temp:  [97.6 °F (36.4 °C)-98.3 °F (36.8 °C)] 98 °F (36.7 °C)  Pulse:  [61-78] 65  Resp:  [17-18] 17  SpO2:  [90 %-96 %] 95 %  BP: (112-159)/() 113/58     Weight: 63.5 kg (140 lb)  Body mass index is 23.3 kg/m².    Physical  Exam  Constitutional:       General: She is not in acute distress.     Appearance: Normal appearance.   HENT:      Head: Normocephalic and atraumatic.      Nose: Nose normal.      Mouth/Throat:      Pharynx: Oropharynx is clear.   Eyes:      Extraocular Movements: Extraocular movements intact.      Conjunctiva/sclera: Conjunctivae normal.   Cardiovascular:      Rate and Rhythm: Normal rate and regular rhythm.      Pulses: Normal pulses.      Comments: Distant heart sounds with fluid collection appreciated on auscultation.   Pulmonary:      Effort: Pulmonary effort is normal.      Breath sounds: No stridor. Rales present. No wheezing or rhonchi.      Comments: Bilateral crackles heard on auscultation.   Abdominal:      General: Abdomen is flat. Bowel sounds are normal. There is no distension.      Palpations: Abdomen is soft. There is no mass.   Musculoskeletal:         General: No swelling. Normal range of motion.      Cervical back: Normal range of motion.      Right lower leg: No edema.   Skin:     General: Skin is dry.   Neurological:      General: No focal deficit present.      Mental Status: She is alert.   Psychiatric:         Mood and Affect: Mood normal.           Significant Labs: All pertinent labs within the past 24 hours have been reviewed.  CBC:   Recent Labs   Lab 05/02/23  0940 05/03/23  0453   WBC 6.78 6.21   HGB 12.0 11.5*   HCT 37.7 36.5*   * 133*     CMP:   Recent Labs   Lab 05/02/23  0940 05/03/23  0453    141   K 4.1 3.4*    106   CO2 25 27    91   BUN 15 17   CREATININE 1.2 1.2   CALCIUM 8.8 8.7   PROT 6.3 6.0   ALBUMIN 3.4* 3.1*   BILITOT 0.7 0.6   ALKPHOS 77 73   AST 21 19   ALT 14 12   ANIONGAP 10 8       Significant Imaging: I have reviewed all pertinent imaging results/findings within the past 24 hours.

## 2023-05-03 NOTE — MEDICAL/APP STUDENT
Progress Note  Hospital Medicine                                                              Team: [facility] HOSP MED 3    Patient Name: Lynn Mckinney  YOB: 1935    Admit Date: 5/2/2023                     LOS: 1    SUBJECTIVE:     Reason for Admission:  Acute hypoxemic respiratory failure  See H&P for detailed initial presentation history and ROS.      Interval history: No acute events overnight. Patient doing well. Reports feeling better and improved SOB.      OBJECTIVE:     Vital Signs Range (Last 24H):  Temp:  [97.6 °F (36.4 °C)-98.3 °F (36.8 °C)]   Pulse:  [61-78]   Resp:  [17-18]   BP: (112-159)/()   SpO2:  [90 %-96 %] Body mass index is 23.3 kg/m².  Wt Readings from Last 3 Encounters:   05/02/23 0833 63.5 kg (140 lb)   04/25/23 1457 63.3 kg (139 lb 8 oz)   04/12/23 0827 58.7 kg (129 lb 6.6 oz)       I & O (Last 24H):  Intake/Output Summary (Last 24 hours) at 5/3/2023 1146  Last data filed at 5/3/2023 1125  Gross per 24 hour   Intake 1720 ml   Output 2200 ml   Net -480 ml       Physical Exam:  Physical Exam  Constitutional:       General: She is not in acute distress.     Appearance: She is ill-appearing.   HENT:      Head: Normocephalic.   Eyes:      Extraocular Movements: Extraocular movements intact.   Cardiovascular:      Rate and Rhythm: Normal rate and regular rhythm.      Pulses: Normal pulses.      Heart sounds: Normal heart sounds. No murmur heard.    No gallop.   Pulmonary:      Effort: Pulmonary effort is normal. No respiratory distress.      Breath sounds: Rales present. No wheezing.      Comments: Bilateral lower to mid lung fields   Abdominal:      General: Abdomen is flat. Bowel sounds are normal. There is no distension.      Palpations: Abdomen is soft.      Tenderness: There is no abdominal tenderness.   Musculoskeletal:      Right lower leg: No edema.      Left lower leg: No edema.   Skin:     General: Skin is warm.   Neurological:      General: No focal deficit present.       Mental Status: She is alert. Mental status is at baseline.       Diagnostic Results:  Lab Results   Component Value Date    WBC 6.21 05/03/2023    HGB 11.5 (L) 05/03/2023    HCT 36.5 (L) 05/03/2023     (H) 05/03/2023     (L) 05/03/2023     Recent Labs   Lab 05/03/23  0453   GLU 91      K 3.4*      CO2 27   BUN 17   CREATININE 1.2   CALCIUM 8.7   MG 1.9     Lab Results   Component Value Date    INR 1.0 05/02/2023    INR 1.0 01/12/2023    INR 1.0 03/11/2021     Lab Results   Component Value Date    HGBA1C 5.2 05/02/2023     No results for input(s): POCTGLUCOSE in the last 72 hours.    Imaging:   Pending Echo results     ASSESSMENT/PLAN:     Current Problems List:  Active Hospital Problems    Diagnosis  POA    *Acute hypoxemic respiratory failure [J96.01]  Yes    Hypokalemia [E87.6]  No    A-fib [I48.91]  Yes    Dementia with anxiety, unspecified dementia severity, unspecified dementia type [F03.94]  Yes     Chronic     Chronic.  Control is uncertain.  Patient was seen by Neurology who started Namenda.  Patient recently moved here from [place].  Patient is staying at the [facility].  There has been no significant change while taking this medication.        Acute on chronic combined systolic and diastolic heart failure [I50.43]  Yes    Stage 3a chronic kidney disease [N18.31]  Yes     Chronic    Coronary artery disease involving native coronary artery of native heart without angina pectoris [I25.10]  Yes     Chronic     CAD S/P CABG  2001                                                          mid LAD       Hypothyroidism [E03.9]  Yes     Chronic     Chronic.  Uncontrolled.  Patient on levothyroxine 125 micrograms daily.  Lab Results   Component Value Date    TSH 96.744 (H) 08/31/2022    FREET4 0.76 08/31/2022           Primary hypertension [I10]  Yes     Chronic    Pure hypercholesterolemia [E78.00]  Yes     Chronic     reported side effects from atorvastatin.  pain in  both hands, both shoulders and her left ankle 3/15 while on 80 mg of atorvastatin   Switched to rosuvastatin 20 in 3/15            Resolved Hospital Problems   No resolved problems to display.       Active Problems, Status & Treatment Plan Addressed Today:  Acute hypoxemic respiratory failure  Lynn Mckinney is a 87 y.o. female presents for Combined Diastolic and Systolic HF, HTN, HLD, CAD s/p CABG x 3, VF s/p ICD placement, Stake 3a CKD, hypothyroidism, and dementia admitted for acute heart failure. Patient clinically improving now reports decreased respitory disrtress.      - BNP 1258 on admission  - Furosemide 40 mg BID IV, Give once 40 mg bolus lasix today  - Trend CMP  - monitor I&O   - 1500 ml fluid restriction    - Tele   - pending Echo     Acute on Chronic combined systolic and diastolic heart failure   - See Acute hypoxemic respiratory failure     Stage 3a Chronic Kidney Disease  Baseline Cr and BNP 1.1/17   1.2/15 on admission     - trend CMP  - monitor I&O's, pure wick in place      Coronary Artery Disease involving native coronary artery of native heart without angina pectoralis   S/p CABG x 3 2001  Home regiment: rosuvastatin 20 mg, Aspirin 81 mg       - Continue Home regiment      Afib   S/p ablation 2021   Home regiemt: Eliquis 2.5 BID  Recent device check showed no VT or VF since 2021     - Continue home regiment  - Consider full dose 5 mg BID Eliquis dosing. She meets age criteria, but does not meet sCr or weight criteria     HTN  Home regiment: Labetalol 200 mg BID, Losartan 100 mg      - continue home medication regiment      Pure Hypercholesterolemia  Home regiment: rosuvastatin 20 mg, itching reaction to atorvastatin  No rosuvastatin in hospital     - Continue Pravastatin 80 mg qd      Hypothyroidism   Home regiment: levothyroxine 125 mcg qd   On admission TSH 64.446, free T4 0.74, subclinical picture   Subclinical hypothyroid patients with cardiovascular hx and TSH >10 benefit from treatment      - Continue home regiment     Dementia with anxiety, unspecified dementia severity, unspecified type   Home regiment: Memantine 5 mg      - Continue home medicatiom regiment   - ensure good wake sleep cycle  - fall precautions  - aspiration precautions      Jinny Garcia  MS4 -[employer]

## 2023-05-03 NOTE — ASSESSMENT & PLAN NOTE
Patient with Hypoxic Respiratory failure which is Acute.  she is not on home oxygen. Supplemental oxygen was provided and noted-    Signs/symptoms of respiratory failure include- tachypnea and increased work of breathing. Contributing diagnoses includes - CHF, COPD and Pleural effusion Labs and images were reviewed. Patient Has not had a recent ABG. Will treat underlying causes and adjust management of respiratory failure as follows-  RSV, COVID, Influenza test negative       - Furosemide 20X1 in ED. Resume Furosemide 40 BID. Additional 40 this AM 5/3.   - Telemetry  - Continuous pulse oximetry  - Oxygen per nasal cannula   - Nasal spray

## 2023-05-04 LAB
ALBUMIN SERPL BCP-MCNC: 3.1 G/DL (ref 3.5–5.2)
ALP SERPL-CCNC: 72 U/L (ref 55–135)
ALT SERPL W/O P-5'-P-CCNC: 10 U/L (ref 10–44)
ANION GAP SERPL CALC-SCNC: 6 MMOL/L (ref 8–16)
AST SERPL-CCNC: 17 U/L (ref 10–40)
BASOPHILS # BLD AUTO: 0.03 K/UL (ref 0–0.2)
BASOPHILS NFR BLD: 0.6 % (ref 0–1.9)
BILIRUB SERPL-MCNC: 0.5 MG/DL (ref 0.1–1)
BUN SERPL-MCNC: 25 MG/DL (ref 8–23)
CALCIUM SERPL-MCNC: 8.7 MG/DL (ref 8.7–10.5)
CHLORIDE SERPL-SCNC: 103 MMOL/L (ref 95–110)
CO2 SERPL-SCNC: 31 MMOL/L (ref 23–29)
CREAT SERPL-MCNC: 1.5 MG/DL (ref 0.5–1.4)
DIFFERENTIAL METHOD: ABNORMAL
EOSINOPHIL # BLD AUTO: 0.3 K/UL (ref 0–0.5)
EOSINOPHIL NFR BLD: 5.9 % (ref 0–8)
ERYTHROCYTE [DISTWIDTH] IN BLOOD BY AUTOMATED COUNT: 13.2 % (ref 11.5–14.5)
EST. GFR  (NO RACE VARIABLE): 33.5 ML/MIN/1.73 M^2
GLUCOSE SERPL-MCNC: 91 MG/DL (ref 70–110)
HCT VFR BLD AUTO: 35.3 % (ref 37–48.5)
HGB BLD-MCNC: 11.2 G/DL (ref 12–16)
IMM GRANULOCYTES # BLD AUTO: 0.01 K/UL (ref 0–0.04)
IMM GRANULOCYTES NFR BLD AUTO: 0.2 % (ref 0–0.5)
LYMPHOCYTES # BLD AUTO: 0.7 K/UL (ref 1–4.8)
LYMPHOCYTES NFR BLD: 14 % (ref 18–48)
MAGNESIUM SERPL-MCNC: 1.9 MG/DL (ref 1.6–2.6)
MCH RBC QN AUTO: 32.1 PG (ref 27–31)
MCHC RBC AUTO-ENTMCNC: 31.7 G/DL (ref 32–36)
MCV RBC AUTO: 101 FL (ref 82–98)
MONOCYTES # BLD AUTO: 0.6 K/UL (ref 0.3–1)
MONOCYTES NFR BLD: 12.3 % (ref 4–15)
NEUTROPHILS # BLD AUTO: 3.2 K/UL (ref 1.8–7.7)
NEUTROPHILS NFR BLD: 67 % (ref 38–73)
NRBC BLD-RTO: 0 /100 WBC
PHOSPHATE SERPL-MCNC: 4.8 MG/DL (ref 2.7–4.5)
PLATELET # BLD AUTO: 144 K/UL (ref 150–450)
PMV BLD AUTO: 9.3 FL (ref 9.2–12.9)
POTASSIUM SERPL-SCNC: 3.7 MMOL/L (ref 3.5–5.1)
PROT SERPL-MCNC: 5.9 G/DL (ref 6–8.4)
RBC # BLD AUTO: 3.49 M/UL (ref 4–5.4)
SODIUM SERPL-SCNC: 140 MMOL/L (ref 136–145)
WBC # BLD AUTO: 4.78 K/UL (ref 3.9–12.7)

## 2023-05-04 PROCEDURE — 25000003 PHARM REV CODE 250

## 2023-05-04 PROCEDURE — 99233 SBSQ HOSP IP/OBS HIGH 50: CPT | Mod: GC,,, | Performed by: HOSPITALIST

## 2023-05-04 PROCEDURE — 36415 COLL VENOUS BLD VENIPUNCTURE: CPT

## 2023-05-04 PROCEDURE — 99233 PR SUBSEQUENT HOSPITAL CARE,LEVL III: ICD-10-PCS | Mod: GC,,, | Performed by: HOSPITALIST

## 2023-05-04 PROCEDURE — 85025 COMPLETE CBC W/AUTO DIFF WBC: CPT

## 2023-05-04 PROCEDURE — 80053 COMPREHEN METABOLIC PANEL: CPT

## 2023-05-04 PROCEDURE — 83735 ASSAY OF MAGNESIUM: CPT

## 2023-05-04 PROCEDURE — 84100 ASSAY OF PHOSPHORUS: CPT

## 2023-05-04 PROCEDURE — 20600001 HC STEP DOWN PRIVATE ROOM

## 2023-05-04 PROCEDURE — 25000003 PHARM REV CODE 250: Performed by: HOSPITALIST

## 2023-05-04 RX ADMIN — LOSARTAN POTASSIUM 100 MG: 50 TABLET, FILM COATED ORAL at 08:05

## 2023-05-04 RX ADMIN — Medication 6 MG: at 09:05

## 2023-05-04 RX ADMIN — LABETALOL HYDROCHLORIDE 200 MG: 100 TABLET, FILM COATED ORAL at 08:05

## 2023-05-04 RX ADMIN — MEMANTINE HYDROCHLORIDE 5 MG: 5 TABLET ORAL at 09:05

## 2023-05-04 RX ADMIN — LEVOTHYROXINE SODIUM 125 MCG: 25 TABLET ORAL at 06:05

## 2023-05-04 RX ADMIN — MEMANTINE HYDROCHLORIDE 5 MG: 5 TABLET ORAL at 08:05

## 2023-05-04 RX ADMIN — APIXABAN 2.5 MG: 2.5 TABLET, FILM COATED ORAL at 08:05

## 2023-05-04 RX ADMIN — LABETALOL HYDROCHLORIDE 200 MG: 100 TABLET, FILM COATED ORAL at 09:05

## 2023-05-04 RX ADMIN — ASPIRIN 81 MG CHEWABLE TABLET 81 MG: 81 TABLET CHEWABLE at 09:05

## 2023-05-04 RX ADMIN — AMLODIPINE BESYLATE 10 MG: 10 TABLET ORAL at 08:05

## 2023-05-04 RX ADMIN — APIXABAN 2.5 MG: 2.5 TABLET, FILM COATED ORAL at 09:05

## 2023-05-04 RX ADMIN — PRAVASTATIN SODIUM 80 MG: 80 TABLET ORAL at 08:05

## 2023-05-04 NOTE — ASSESSMENT & PLAN NOTE
88 y/o F pt with prior HF EF 40%, CAD, >30 year smoking history, osteopenia who presented to [facility] for evaluation of progressive dyspnea over the past month. Initially thought to be secondary to fluid overload given crackles on exam and BNP>1200. She was diuresed with IV lasix with net negative 1.5L cumulative output without much improvement in her oxygenation. A non-con CT chest was obtained showing features consistent with pulmonary fibrosis and ground glass opacities favoring Nonspecific Interstitial Pneumonia. Although her symptoms have been relatively recent, radiographic findings most likely due to longer progressive course.     Patient denies any recent exposures, Memantine is not know to cause pulmonary fibrosis, HIV previously negative, no history of connective tissue disease or other rheumatologic disorder (testing done in 2013 with DARRIAN/RF).     Recommendations  1. No acute indication for steroids given likely slowly progressive disease. Given her osteoporosis and PM replacement procedure next week, risks outweigh benefits at this time.   2. Follow-up with pulmonary clinic (referral to Dr. Ji Heller in [facility] clinic) for PFT and assessment for treatment  3. Repeat connective tissue disease labs. Recommend CK, aldolase, ANCA, DARRIAN, RF, anti-CCP, ESR/CRP, SCL-70, SSA, SSB. Will follow-up labs in pulmonology clinic.   4. No indication for biopsy or bronchoscopy at this time

## 2023-05-04 NOTE — ASSESSMENT & PLAN NOTE
Patient has EF 35% with PCI/CABG 2021. On presentation she is short of breath with a dry cough. BNP and trop elevated. CXR with pulmonary edema. Suspect presentation could be secondary to CHF exacerbation vs. AHRF.     - Resume diuresis with lasix 40 BID IV. Additional 40 AM 5/3. Dc'd 5/4.  - Cardiac diet  - Fluid restriction  - Strict I's/O's  - Low sodium  - Elevate HOB  - F/u ECHO to assess interval changes from prior study  - F/u cardiac device check

## 2023-05-04 NOTE — ASSESSMENT & PLAN NOTE
Patient with Hypoxic Respiratory failure which is Acute.  she is not on home oxygen. Supplemental oxygen was provided and noted-    Signs/symptoms of respiratory failure include- tachypnea and increased work of breathing. Contributing diagnoses includes - CHF, COPD and Pleural effusion Labs and images were reviewed. Patient Has not had a recent ABG. Will treat underlying causes and adjust management of respiratory failure as follows-  RSV, COVID, Influenza test negative       - Furosemide 20X1 in ED. Resumed Furosemide 40 BID. Additional 40 AM 5/3. Dc'd 5/4.  - CT chest without contrast   - Telemetry  - Continuous pulse oximetry  - Oxygen per nasal cannula   - Nasal spray

## 2023-05-04 NOTE — SUBJECTIVE & OBJECTIVE
"Past Medical History:   Diagnosis Date    Allergy     seasonal    Blood clotting tendency     Carotid artery disease 5/29/2014    Coronary artery disease     Heart block     Hyperlipidemia     Hypertension     Hypothyroid     Left displaced femoral neck fracture 1/12/2023    Obesity     Pacemaker     PAD (peripheral artery disease)     Spinal stenosis     Thoracic or lumbosacral neuritis or radiculitis 11/25/2014    Tubular adenoma        Past Surgical History:   Procedure Laterality Date    ANGIOPLASTY      APPENDECTOMY      BREAST CYST ASPIRATION Bilateral     CARDIAC CATHETERIZATION      CARDIAC PACEMAKER PLACEMENT  10/10/13    by Dr. Coleman    CAROTID STENT Left 2007    by Dr. Bowen    CATARACT EXTRACTION W/  INTRAOCULAR LENS IMPLANT Bilateral 2012    Dr Sharp    CORONARY ARTERY BYPASS GRAFT  2001     x 3 LIMA-Ramus, SVG-OM1 & SVG-PDA    CORONARY BYPASS GRAFT ANGIOGRAPHY  3/11/2021    Procedure: Bypass graft study;  Surgeon: Gil Garcia MD;  Location: [facility] CATH LAB;  Service: Cardiology;;    CYST REMOVAL      wrist    EYE SURGERY      HEMIARTHROPLASTY OF HIP Left 1/13/2023    Procedure: HEMIARTHROPLASTY, HIP, LEFT;  Surgeon: Alan Vieyra MD;  Location: [facility] OR 2ND FLR;  Service: Orthopedics;  Laterality: Left;    HYSTERECTOMY      LEFT HEART CATHETERIZATION Left 3/11/2021    Procedure: Left heart cath;  Surgeon: Gil Garcia MD;  Location: [facility] CATH LAB;  Service: Cardiology;  Laterality: Left;    OOPHORECTOMY      THYROIDECTOMY      VITRECTOMY BY PARS PLANA APPROACH Right 10/12/2021    Procedure: VITRECTOMY, PARS PLANA APPROACH;  Surgeon: Flako Carver MD;  Location: [facility] OR 1ST FLR;  Service: Ophthalmology;  Laterality: Right;       Review of patient's allergies indicates:   Allergen Reactions    Lipitor [atorvastatin] Itching    Fosamax [alendronate]      Felt "strange"    Iodinated contrast media      Dye is only to be used if absolutely needed because of kidney function    Prolia " [denosumab]      Reaction after the Prolia    Sulfa (sulfonamide antibiotics)        Family History       Problem Relation (Age of Onset)    Alzheimer's disease Mother    Diabetes Brother    Heart disease Father, Mother, Brother    Hypertension Father, Mother    No Known Problems Sister, Maternal Grandmother, Maternal Grandfather, Paternal Grandmother, Paternal Grandfather, Maternal Aunt, Maternal Uncle, Paternal Aunt, Paternal Uncle          Tobacco Use    Smoking status: Former     Types: Cigarettes     Quit date: 1984     Years since quittin.7    Smokeless tobacco: Never   Substance and Sexual Activity    Alcohol use: Yes     Alcohol/week: 1.0 standard drink     Types: 1 Shots of liquor per week     Comment: old fashion  with dinner every 6 mo     Drug use: No    Sexual activity: Not on file         Review of Systems   Constitutional:  Negative for chills and fatigue.   HENT:  Negative for sore throat.    Respiratory:  Positive for shortness of breath. Negative for cough.         Dyspnea on exertion   Cardiovascular:  Negative for chest pain and leg swelling.   Gastrointestinal:  Negative for abdominal pain, diarrhea, nausea and vomiting.   Genitourinary:  Negative for dysuria and flank pain.   Musculoskeletal:  Negative for arthralgias and joint swelling.   Neurological:  Negative for dizziness and headaches.   Psychiatric/Behavioral:  Negative for agitation and confusion.         Chronic decreased memory   Objective:     Vital Signs (Most Recent):  Temp: 97.8 °F (36.6 °C) (23 1456)  Pulse: 62 (23 1516)  Resp: 16 (23 1456)  BP: (!) 117/53 (23 1456)  SpO2: (!) 92 % (23 1456)   Vital Signs (24h Range):  Temp:  [97.7 °F (36.5 °C)-98 °F (36.7 °C)] 97.8 °F (36.6 °C)  Pulse:  [56-72] 62  Resp:  [16-18] 16  SpO2:  [92 %-98 %] 92 %  BP: ()/(51-62) 117/53     Weight: 63.5 kg (140 lb)  Body mass index is 23.3 kg/m².      Intake/Output Summary (Last 24 hours) at 2023  1520  Last data filed at 5/4/2023 1414  Gross per 24 hour   Intake 471 ml   Output 1700 ml   Net -1229 ml        Physical Exam  HENT:      Head: Normocephalic.      Nose: Nose normal.      Mouth/Throat:      Mouth: Mucous membranes are moist.   Eyes:      Pupils: Pupils are equal, round, and reactive to light.   Cardiovascular:      Rate and Rhythm: Normal rate.   Pulmonary:      Effort: Pulmonary effort is normal.      Comments: Bilateral LE crackles  Abdominal:      General: Abdomen is flat.      Tenderness: There is no abdominal tenderness.   Musculoskeletal:         General: Normal range of motion.      Cervical back: Normal range of motion.      Right lower leg: No edema.      Left lower leg: No edema.   Skin:     General: Skin is warm.   Neurological:      Mental Status: She is alert. Mental status is at baseline.   Psychiatric:         Mood and Affect: Mood normal.        Vents:       Lines/Drains/Airways       Drain  Duration             Female External Urinary Catheter 05/02/23 1145 2 days              Peripheral Intravenous Line  Duration                  Peripheral IV - Single Lumen 05/03/23 0420 20 G Posterior;Right Wrist 1 day                    Significant Labs:    CBC/Anemia Profile:  Recent Labs   Lab 05/03/23  0453 05/04/23  0346   WBC 6.21 4.78   HGB 11.5* 11.2*   HCT 36.5* 35.3*   * 144*   * 101*   RDW 13.4 13.2        Chemistries:  Recent Labs   Lab 05/03/23  0453 05/03/23  1821 05/04/23  0346    140 140   K 3.4* 3.9 3.7    103 103   CO2 27 30* 31*   BUN 17 24* 25*   CREATININE 1.2 1.5* 1.5*   CALCIUM 8.7 8.9 8.7   ALBUMIN 3.1*  --  3.1*   PROT 6.0  --  5.9*   BILITOT 0.6  --  0.5   ALKPHOS 73  --  72   ALT 12  --  10   AST 19  --  17   MG 1.9  --  1.9   PHOS 4.4  --  4.8*       All pertinent labs within the past 24 hours have been reviewed.    Significant Imaging:   I have reviewed all pertinent imaging results/findings within the past 24 hours.

## 2023-05-04 NOTE — ASSESSMENT & PLAN NOTE
Patient with Persistent (7 days or more) atrial fibrillation which is controlled currently with Beta Blocker and Calcium Channel Blocker. Patient is currently in sinus rhythm.[id number] Score: 4. HASBLED Score: 3. Anticoagulation indicated. Anticoagulation done with eliquis 2.5mg.

## 2023-05-04 NOTE — ASSESSMENT & PLAN NOTE
New ROBBY superimposed on CKD secondary to diuretics. OtherwiselLabs stable at this time. Continue to monitor. Strict I's/O's. Discontinue lasix. Avoid nephrotoxic medications.

## 2023-05-04 NOTE — HPI
"Ms. Mckinney is an 87 year old F with HFrEF (40% on most recent echo) w/ ICD, PCI/CABG, HTN, dementia who presented for worsening dyspnea and cough ongoing for the past week. Per daughter, patient was noted to be progressively dyspneic over the past month. Not associated with fevers, chills, coughs, or LE edema. Prior to last month, patient was ambulatory with walker with no PARMAR. Was previously not on oxygen but is now currently on 2L, >40 pack year smoking history (quit more than 30 years ago). Patient and daughter deny any new medication besides memantine, no exposures to molds or other environmental factors.    Pulm consulted for "Admitted with respiratory failure. Now new diagnosis of ILD/NSIP. Eval for 0.5-1mg/kg steroid with long taper?"  "

## 2023-05-04 NOTE — SUBJECTIVE & OBJECTIVE
Interval History: Patient feels better today. She is on 2LNC 96%. Net negative 890 cc. Dc'd lasix given new ROBBY. Rales still prominent bilaterally on auscultation. Plan for CT chest without contrast.     Review of Systems   Constitutional:  Negative for chills and fever.   HENT:  Negative for rhinorrhea and trouble swallowing.    Eyes:  Negative for discharge.   Respiratory:  Positive for cough and shortness of breath.    Cardiovascular:  Negative for chest pain and leg swelling.   Gastrointestinal:  Negative for abdominal pain and nausea.   Genitourinary:  Negative for dysuria and hematuria.   Musculoskeletal:  Negative for arthralgias and myalgias.   Neurological:  Negative for tremors and headaches.   Psychiatric/Behavioral:  Negative for agitation and confusion.    Objective:     Vital Signs (Most Recent):  Temp: 98 °F (36.7 °C) (05/04/23 1107)  Pulse: 72 (05/04/23 1123)  Resp: 17 (05/04/23 1107)  BP: (!) 97/53 (05/04/23 1107)  SpO2: 96 % (05/04/23 1107)   Vital Signs (24h Range):  Temp:  [97.7 °F (36.5 °C)-98 °F (36.7 °C)] 98 °F (36.7 °C)  Pulse:  [56-72] 72  Resp:  [17-18] 17  SpO2:  [92 %-98 %] 96 %  BP: ()/(51-62) 97/53     Weight: 63.5 kg (140 lb)  Body mass index is 23.3 kg/m².    Intake/Output Summary (Last 24 hours) at 5/4/2023 1331  Last data filed at 5/4/2023 0800  Gross per 24 hour   Intake 360 ml   Output 1250 ml   Net -890 ml         Physical Exam  Constitutional:       Appearance: Normal appearance.      Comments: Patient appears frail. She is in good spirits. No respiratory distress. O2 per nasal cannula.    HENT:      Head: Normocephalic and atraumatic.      Nose: Nose normal.      Mouth/Throat:      Pharynx: Oropharynx is clear.   Eyes:      Extraocular Movements: Extraocular movements intact.      Conjunctiva/sclera: Conjunctivae normal.   Cardiovascular:      Rate and Rhythm: Normal rate and regular rhythm.      Pulses: Normal pulses.   Pulmonary:      Effort: Pulmonary effort is normal.       Breath sounds: Rales present.      Comments: Rales auscultated bilaterally. Concerning for interstitial disease vs. pulmonary edema in the setting of heart failure exacerbation.   Abdominal:      General: Abdomen is flat. Bowel sounds are normal.      Palpations: Abdomen is soft.   Musculoskeletal:         General: Normal range of motion.      Cervical back: Normal range of motion.   Neurological:      General: No focal deficit present.      Mental Status: She is alert and oriented to person, place, and time.   Psychiatric:         Mood and Affect: Mood normal.           Significant Labs: All pertinent labs within the past 24 hours have been reviewed.  CBC:   Recent Labs   Lab 05/03/23 0453 05/04/23  0346   WBC 6.21 4.78   HGB 11.5* 11.2*   HCT 36.5* 35.3*   * 144*     CMP:   Recent Labs   Lab 05/03/23 0453 05/03/23  1821 05/04/23  0346    140 140   K 3.4* 3.9 3.7    103 103   CO2 27 30* 31*   GLU 91 122* 91   BUN 17 24* 25*   CREATININE 1.2 1.5* 1.5*   CALCIUM 8.7 8.9 8.7   PROT 6.0  --  5.9*   ALBUMIN 3.1*  --  3.1*   BILITOT 0.6  --  0.5   ALKPHOS 73  --  72   AST 19  --  17   ALT 12  --  10   ANIONGAP 8 7* 6*       Significant Imaging: I have reviewed all pertinent imaging results/findings within the past 24 hours.

## 2023-05-04 NOTE — PROGRESS NOTES
[facility] Cardiology [facility] Medicine  Progress Note    Patient Name: Lynn Mckinney  MRN: [id number]  Patient Class: IP- Inpatient   Admission Date: 5/2/2023  Length of Stay: 2 days  Attending Physician: Sharath Díaz MD  Primary Care Provider: Abdiel Robles MD        Subjective:     Principal Problem:Acute hypoxemic respiratory failure        HPI:  87F with history of HFrEF (EF 35%, Pap 27 mmHg), PCI/CABG 2021, Biventricular ICD, CKD 3a ,HTN, and S/p L Hip hemiarthroplasty 1/13/2023 presents with SOB and cough. Her symptoms started on Sunday on her way to [place]. She was progressively more short of breath on exertion. Her cough persists without sputum production. She does not use oxygen at home. No history of COPD charted. She denies any chest pain, palpitations, nausea, vomiting, or diarrhea. She is not aware of her medications and if she is taking them as scheduled. She was transferred from assisted living. She denies any fever or night sweats. She has baseline dementia taking memantine. Obtaining further history was limited due to baseline neurocognitive deficit. In the ED work up was consistent with elevated BNP/trop, pulmonary edema, and severe hypothyroidism. Hospital medicine will admit for further workup.      Overview/Hospital Course:  Patient presented with AHRF and and CHF exacerbation. She was treated with IV diuretics. Continue strict I's and O's and fluid restriction. Notable rales on auscultation persistent after course of diuretics. Ordering CT chest without contrast       Interval History: Patient feels better today. She is on 2LNC 96%. Net negative 890 cc. Dc'd lasix given new ROBBY. Rales still prominent bilaterally on auscultation. Plan for CT chest without contrast.     Review of Systems   Constitutional:  Negative for chills and fever.   HENT:  Negative for rhinorrhea and trouble swallowing.    Eyes:  Negative for discharge.   Respiratory:  Positive for cough and shortness of  breath.    Cardiovascular:  Negative for chest pain and leg swelling.   Gastrointestinal:  Negative for abdominal pain and nausea.   Genitourinary:  Negative for dysuria and hematuria.   Musculoskeletal:  Negative for arthralgias and myalgias.   Neurological:  Negative for tremors and headaches.   Psychiatric/Behavioral:  Negative for agitation and confusion.    Objective:     Vital Signs (Most Recent):  Temp: 98 °F (36.7 °C) (05/04/23 1107)  Pulse: 72 (05/04/23 1123)  Resp: 17 (05/04/23 1107)  BP: (!) 97/53 (05/04/23 1107)  SpO2: 96 % (05/04/23 1107)   Vital Signs (24h Range):  Temp:  [97.7 °F (36.5 °C)-98 °F (36.7 °C)] 98 °F (36.7 °C)  Pulse:  [56-72] 72  Resp:  [17-18] 17  SpO2:  [92 %-98 %] 96 %  BP: ()/(51-62) 97/53     Weight: 63.5 kg (140 lb)  Body mass index is 23.3 kg/m².    Intake/Output Summary (Last 24 hours) at 5/4/2023 1331  Last data filed at 5/4/2023 0800  Gross per 24 hour   Intake 360 ml   Output 1250 ml   Net -890 ml         Physical Exam  Constitutional:       Appearance: Normal appearance.      Comments: Patient appears frail. She is in good spirits. No respiratory distress. O2 per nasal cannula.    HENT:      Head: Normocephalic and atraumatic.      Nose: Nose normal.      Mouth/Throat:      Pharynx: Oropharynx is clear.   Eyes:      Extraocular Movements: Extraocular movements intact.      Conjunctiva/sclera: Conjunctivae normal.   Cardiovascular:      Rate and Rhythm: Normal rate and regular rhythm.      Pulses: Normal pulses.   Pulmonary:      Effort: Pulmonary effort is normal.      Breath sounds: Rales present.      Comments: Rales auscultated bilaterally. Concerning for interstitial disease vs. pulmonary edema in the setting of heart failure exacerbation.   Abdominal:      General: Abdomen is flat. Bowel sounds are normal.      Palpations: Abdomen is soft.   Musculoskeletal:         General: Normal range of motion.      Cervical back: Normal range of motion.   Neurological:       General: No focal deficit present.      Mental Status: She is alert and oriented to person, place, and time.   Psychiatric:         Mood and Affect: Mood normal.           Significant Labs: All pertinent labs within the past 24 hours have been reviewed.  CBC:   Recent Labs   Lab 05/03/23 0453 05/04/23  0346   WBC 6.21 4.78   HGB 11.5* 11.2*   HCT 36.5* 35.3*   * 144*     CMP:   Recent Labs   Lab 05/03/23 0453 05/03/23  1821 05/04/23  0346    140 140   K 3.4* 3.9 3.7    103 103   CO2 27 30* 31*   GLU 91 122* 91   BUN 17 24* 25*   CREATININE 1.2 1.5* 1.5*   CALCIUM 8.7 8.9 8.7   PROT 6.0  --  5.9*   ALBUMIN 3.1*  --  3.1*   BILITOT 0.6  --  0.5   ALKPHOS 73  --  72   AST 19  --  17   ALT 12  --  10   ANIONGAP 8 7* 6*       Significant Imaging: I have reviewed all pertinent imaging results/findings within the past 24 hours.      Assessment/Plan:      * Acute hypoxemic respiratory failure  Patient with Hypoxic Respiratory failure which is Acute.  she is not on home oxygen. Supplemental oxygen was provided and noted-    Signs/symptoms of respiratory failure include- tachypnea and increased work of breathing. Contributing diagnoses includes - CHF, COPD and Pleural effusion Labs and images were reviewed. Patient Has not had a recent ABG. Will treat underlying causes and adjust management of respiratory failure as follows-  RSV, COVID, Influenza test negative       - Furosemide 20X1 in ED. Resumed Furosemide 40 BID. Additional 40 AM 5/3. Dc'd 5/4.  - CT chest without contrast   - Telemetry  - Continuous pulse oximetry  - Oxygen per nasal cannula   - Nasal spray          Acute on chronic combined systolic and diastolic heart failure  Patient has EF 35% with PCI/CABG 2021. On presentation she is short of breath with a dry cough. BNP and trop elevated. CXR with pulmonary edema. Suspect presentation could be secondary to CHF exacerbation vs. AHRF.     - Resume diuresis with lasix 40 BID IV. Additional 40 AM  5/3. Dc'd 5/4.  - Cardiac diet  - Fluid restriction  - Strict I's/O's  - Low sodium  - Elevate HOB  - F/u ECHO to assess interval changes from prior study  - F/u cardiac device check        Hypothyroidism  TSH 64.45 with T4 0.74. Though her labs suggest more of a subacute presentation she is symptomatic. She appears weak, fatigued, and somnolent. Her eyes are kept closed during the interview. Most likely severe hypothyroidism is complicating her clinical presentation. The combination of hypoxemic respiratory failure, volume collection in the lungs, baseline dementia, and hypometabolism are contributing to hemodynamic instability.     - Resume levothyroxine  - Consider endocrinology during hospitalization        A-fib  Patient with Persistent (7 days or more) atrial fibrillation which is controlled currently with Beta Blocker and Calcium Channel Blocker. Patient is currently in sinus rhythm.[id number] Score: 4. HASBLED Score: 3. Anticoagulation indicated. Anticoagulation done with eliquis 2.5mg.        Dementia with anxiety, unspecified dementia severity, unspecified dementia type  The patient has baseline unspecified dementia on memantine. No acute changes in mentation.   Continue memantine and treat risk factors including hypertension and hyperlipidemia.      Stage 3a chronic kidney disease  New ROBBY superimposed on CKD secondary to diuretics. OtherwiselLabs stable at this time. Continue to monitor. Strict I's/O's. Discontinue lasix. Avoid nephrotoxic medications.    Coronary artery disease involving native coronary artery of native heart without angina pectoris  ASA loading dose 325 in ED  Continue ASA 81  Pravastatin 80      Pure hypercholesterolemia  Reported reaction to atorvastatin. Rosuvastatin not available in the hospital. Start Pravastatin in patient.       Primary hypertension  Continue antihypertensives: labetalol, amlodipine, losartan        VTE Risk Mitigation (From admission, onward)         Ordered      apixaban tablet 2.5 mg  2 times daily         05/02/23 1416     IP VTE HIGH RISK PATIENT  Once         05/02/23 1410     Place sequential compression device  Until discontinued         05/02/23 1410                Discharge Planning   AIDEE: 5/5/2023     Code Status: Full Code   Is the patient medically ready for discharge?: No    Reason for patient still in hospital (select all that apply): Treatment  Discharge Plan A: Assisted Living (lives in Memory Care)                  Sharath Cox MD  Department of Hospital Medicine   [facility] - Cardiology Stepdown

## 2023-05-04 NOTE — MEDICAL/APP STUDENT
Progress Note  Hospital Medicine                                                              Team: [facility] HOSP MED 3    Patient Name: Lynn Mckinney  YOB: 1935    Admit Date: 5/2/2023                     LOS: 2    SUBJECTIVE:     Reason for Admission:  Acute hypoxemic respiratory failure  See H&P for detailed initial presentation history and ROS.      Interval history: No acute events overnight. Patient reports improvement but continued SOB requiring supplemental O2.      OBJECTIVE:     Vital Signs Range (Last 24H):  Temp:  [97.7 °F (36.5 °C)-98 °F (36.7 °C)]   Pulse:  [56-72]   Resp:  [16-18]   BP: ()/(51-62)   SpO2:  [92 %-98 %] Body mass index is 23.3 kg/m².  Wt Readings from Last 3 Encounters:   05/03/23 1122 63.5 kg (140 lb)   05/02/23 0833 63.5 kg (140 lb)   04/25/23 1457 63.3 kg (139 lb 8 oz)   04/12/23 0827 58.7 kg (129 lb 6.6 oz)       I & O (Last 24H):  Intake/Output Summary (Last 24 hours) at 5/4/2023 1531  Last data filed at 5/4/2023 1414  Gross per 24 hour   Intake 471 ml   Output 1700 ml   Net -1229 ml       Physical Exam:  Physical Exam  Constitutional:       General: She is not in acute distress.     Appearance: She is not ill-appearing.   Eyes:      Extraocular Movements: Extraocular movements intact.   Cardiovascular:      Rate and Rhythm: Normal rate and regular rhythm.      Pulses: Normal pulses.      Heart sounds: Normal heart sounds. No murmur heard.    No gallop.   Pulmonary:      Effort: Pulmonary effort is normal.      Breath sounds: Rales present.      Comments: Bilateral middle to lower lobes  Abdominal:      General: Abdomen is flat. Bowel sounds are normal.      Palpations: Abdomen is soft.   Musculoskeletal:      Right lower leg: No edema.      Left lower leg: No edema.   Neurological:      General: No focal deficit present.      Mental Status: She is alert. Mental status is at baseline.   Psychiatric:         Mood and Affect: Mood normal.       Diagnostic  Results:  Lab Results   Component Value Date    WBC 4.78 05/04/2023    HGB 11.2 (L) 05/04/2023    HCT 35.3 (L) 05/04/2023     (H) 05/04/2023     (L) 05/04/2023     Recent Labs   Lab 05/04/23  0346   GLU 91      K 3.7      CO2 31*   BUN 25*   CREATININE 1.5*   CALCIUM 8.7   MG 1.9     Lab Results   Component Value Date    INR 1.0 05/02/2023    INR 1.0 01/12/2023    INR 1.0 03/11/2021     Lab Results   Component Value Date    HGBA1C 5.2 05/02/2023     No results for input(s): POCTGLUCOSE in the last 72 hours.    Imaging:   CT Chest  Impression:  1. Pulmonary fibrosis with above features.  The ground-glass components of disease would favor fibrosing nonspecific interstitial pneumonia.  Active areas of crazy paving may be indicative of an acute exacerbation.  However, superimposed pulmonary edema or pneumonia are not excluded.  2. Mildly enlarged right paratracheal lymph node.  This is presumed reactive.  3. Several hepatic low-density lesions, favor cysts.  4. Left renal calcification versus hemorrhagic cyst.  5.  Other findings are as above.         ASSESSMENT/PLAN:     Current Problems List:  Active Hospital Problems    Diagnosis  POA    *Acute hypoxemic respiratory failure [J96.01]  Yes    Hypokalemia [E87.6]  No    A-fib [I48.91]  Yes    Dementia with anxiety, unspecified dementia severity, unspecified dementia type [F03.94]  Yes     Chronic     Chronic.  Control is uncertain.  Patient was seen by Neurology who started Namenda.  Patient recently moved here from [place].  Patient is staying at the [facility] assisted living.  There has been no significant change while taking this medication.        Acute on chronic combined systolic and diastolic heart failure [I50.43]  Yes    Stage 3a chronic kidney disease [N18.31]  Yes     Chronic    Coronary artery disease involving native coronary artery of native heart without angina pectoris [I25.10]  Yes     Chronic     CAD S/P CABG  2001                                                           mid LAD       Hypothyroidism [E03.9]  Yes     Chronic     Chronic.  Uncontrolled.  Patient on levothyroxine 125 micrograms daily.  Lab Results   Component Value Date    TSH 96.744 (H) 08/31/2022    FREET4 0.76 08/31/2022           Primary hypertension [I10]  Yes     Chronic    Pure hypercholesterolemia [E78.00]  Yes     Chronic     reported side effects from atorvastatin.  pain in both hands, both shoulders and her left ankle 3/15 while on 80 mg of atorvastatin   Switched to rosuvastatin 20 in 3/15            Resolved Hospital Problems   No resolved problems to display.       Active Problems, Status & Treatment Plan Addressed Today:  Acute hypoxemic respiratory failure  Lynn Mckinney is a 87 y.o. female presents for Combined Diastolic and Systolic HF, HTN, HLD, CAD s/p CABG x 3, VF s/p ICD placement, Stake 3a CKD, hypothyroidism, and dementia admitted for acute heart failure. Patient clinically improving now reports decreased respitory disrtress.      - BNP 1258 on admission  - HOLD furosemide   - Trend CMP  - monitor I&O   - 1500 ml fluid restriction    - Tele   - Echo showed no changes from previous   - CT Chest showed bilateral pulmonary fibrosis   - Consulted Pulm for potential inpatient initiation pulmonary fibrosis treatment      Acute on Chronic combined systolic and diastolic heart failure   - See Acute hypoxemic respiratory failure     Stage 3a Chronic Kidney Disease  Baseline Cr and BNP 1.1/17   1.2/15 on admission  sCr today 1.5     - Hold furosemide due to contraction alkalosis and increase in sCr indicating ROBBY   - trend CMP  - monitor I&O's, pure wick in place      Coronary Artery Disease involving native coronary artery of native heart without angina pectoralis   S/p CABG x 3 2001  Home regiment: rosuvastatin 20 mg, Aspirin 81 mg       - Continue Home regiment      Afib   S/p ablation 2021   Home regiemt: Eliquis 2.5 BID  Recent device check showed no  VT or VF since 2021     - Continue home regiment  - Consider full dose 5 mg BID Eliquis dosing. She meets age criteria, but does not meet sCr or weight criteria     HTN  Home regiment: Labetalol 200 mg BID, Losartan 100 mg      - continue home medication regiment      Pure Hypercholesterolemia  Home regiment: rosuvastatin 20 mg, itching reaction to atorvastatin  No rosuvastatin in hospital     - Continue Pravastatin 80 mg qd      Hypothyroidism   Home regiment: levothyroxine 125 mcg qd   On admission TSH 64.446, free T4 0.74, subclinical picture   Subclinical hypothyroid patients with cardiovascular hx and TSH >10 benefit from treatment     - Continue home regiment     Dementia with anxiety, unspecified dementia severity, unspecified type   Home regiment: Memantine 5 mg      - Continue home medicatiom regiment   - ensure good wake sleep cycle  - fall precautions  - aspiration precautions                VTE Risk Mitigation (From admission, onward)           Ordered       apixaban tablet 2.5 mg  2 times daily         05/02/23 1416       IP VTE HIGH RISK PATIENT  Once         05/02/23 1410       Place sequential compression device  Until discontinued         05/02/23 1410                    Discharge Planning   AIDEE: 5/5/2023     Code Status: Full Code   Is the patient medically ready for discharge?: No    Reason for patient still in hospital (select all that apply): Treatment  Discharge Plan A: Assisted Living (lives in Memory Care)     Jinny Garcia  MS4 [employer] [facility]

## 2023-05-05 VITALS
DIASTOLIC BLOOD PRESSURE: 66 MMHG | TEMPERATURE: 98 F | BODY MASS INDEX: 23.32 KG/M2 | OXYGEN SATURATION: 95 % | HEART RATE: 73 BPM | SYSTOLIC BLOOD PRESSURE: 142 MMHG | HEIGHT: 65 IN | RESPIRATION RATE: 19 BRPM | WEIGHT: 140 LBS

## 2023-05-05 LAB
ALBUMIN SERPL BCP-MCNC: 3.3 G/DL (ref 3.5–5.2)
ALP SERPL-CCNC: 80 U/L (ref 55–135)
ALT SERPL W/O P-5'-P-CCNC: 10 U/L (ref 10–44)
ANION GAP SERPL CALC-SCNC: 8 MMOL/L (ref 8–16)
AST SERPL-CCNC: 18 U/L (ref 10–40)
BASOPHILS # BLD AUTO: 0.03 K/UL (ref 0–0.2)
BASOPHILS NFR BLD: 0.7 % (ref 0–1.9)
BILIRUB SERPL-MCNC: 0.5 MG/DL (ref 0.1–1)
BUN SERPL-MCNC: 27 MG/DL (ref 8–23)
CALCIUM SERPL-MCNC: 8.9 MG/DL (ref 8.7–10.5)
CCP AB SER IA-ACNC: <0.5 U/ML
CHLORIDE SERPL-SCNC: 103 MMOL/L (ref 95–110)
CK SERPL-CCNC: 103 U/L (ref 20–180)
CO2 SERPL-SCNC: 28 MMOL/L (ref 23–29)
CREAT SERPL-MCNC: 1.4 MG/DL (ref 0.5–1.4)
CRP SERPL-MCNC: 6.2 MG/L (ref 0–8.2)
DIFFERENTIAL METHOD: ABNORMAL
EOSINOPHIL # BLD AUTO: 0.3 K/UL (ref 0–0.5)
EOSINOPHIL NFR BLD: 6.3 % (ref 0–8)
ERYTHROCYTE [DISTWIDTH] IN BLOOD BY AUTOMATED COUNT: 13.1 % (ref 11.5–14.5)
ERYTHROCYTE [SEDIMENTATION RATE] IN BLOOD BY PHOTOMETRIC METHOD: 27 MM/HR (ref 0–36)
EST. GFR  (NO RACE VARIABLE): 36.4 ML/MIN/1.73 M^2
GLUCOSE SERPL-MCNC: 88 MG/DL (ref 70–110)
HCT VFR BLD AUTO: 39.2 % (ref 37–48.5)
HGB BLD-MCNC: 12.4 G/DL (ref 12–16)
IMM GRANULOCYTES # BLD AUTO: 0.01 K/UL (ref 0–0.04)
IMM GRANULOCYTES NFR BLD AUTO: 0.2 % (ref 0–0.5)
LYMPHOCYTES # BLD AUTO: 0.7 K/UL (ref 1–4.8)
LYMPHOCYTES NFR BLD: 16 % (ref 18–48)
MAGNESIUM SERPL-MCNC: 1.9 MG/DL (ref 1.6–2.6)
MCH RBC QN AUTO: 32 PG (ref 27–31)
MCHC RBC AUTO-ENTMCNC: 31.6 G/DL (ref 32–36)
MCV RBC AUTO: 101 FL (ref 82–98)
MONOCYTES # BLD AUTO: 0.5 K/UL (ref 0.3–1)
MONOCYTES NFR BLD: 10.9 % (ref 4–15)
NEUTROPHILS # BLD AUTO: 2.8 K/UL (ref 1.8–7.7)
NEUTROPHILS NFR BLD: 65.9 % (ref 38–73)
NRBC BLD-RTO: 0 /100 WBC
PHOSPHATE SERPL-MCNC: 4.1 MG/DL (ref 2.7–4.5)
PLATELET # BLD AUTO: 151 K/UL (ref 150–450)
PMV BLD AUTO: 9 FL (ref 9.2–12.9)
POTASSIUM SERPL-SCNC: 3.7 MMOL/L (ref 3.5–5.1)
PROT SERPL-MCNC: 6.5 G/DL (ref 6–8.4)
RBC # BLD AUTO: 3.87 M/UL (ref 4–5.4)
RHEUMATOID FACT SERPL-ACNC: <13 IU/ML (ref 0–15)
SODIUM SERPL-SCNC: 139 MMOL/L (ref 136–145)
WBC # BLD AUTO: 4.31 K/UL (ref 3.9–12.7)

## 2023-05-05 PROCEDURE — 97116 GAIT TRAINING THERAPY: CPT

## 2023-05-05 PROCEDURE — 99239 PR HOSPITAL DISCHARGE DAY,>30 MIN: ICD-10-PCS | Mod: GC,,, | Performed by: HOSPITALIST

## 2023-05-05 PROCEDURE — 85025 COMPLETE CBC W/AUTO DIFF WBC: CPT

## 2023-05-05 PROCEDURE — 86225 DNA ANTIBODY NATIVE: CPT | Performed by: STUDENT IN AN ORGANIZED HEALTH CARE EDUCATION/TRAINING PROGRAM

## 2023-05-05 PROCEDURE — 82085 ASSAY OF ALDOLASE: CPT | Performed by: STUDENT IN AN ORGANIZED HEALTH CARE EDUCATION/TRAINING PROGRAM

## 2023-05-05 PROCEDURE — 97535 SELF CARE MNGMENT TRAINING: CPT

## 2023-05-05 PROCEDURE — 86235 NUCLEAR ANTIGEN ANTIBODY: CPT | Mod: 59 | Performed by: STUDENT IN AN ORGANIZED HEALTH CARE EDUCATION/TRAINING PROGRAM

## 2023-05-05 PROCEDURE — 99223 PR INITIAL HOSPITAL CARE,LEVL III: ICD-10-PCS | Mod: GC,,, | Performed by: INTERNAL MEDICINE

## 2023-05-05 PROCEDURE — 83516 IMMUNOASSAY NONANTIBODY: CPT | Mod: 59 | Performed by: STUDENT IN AN ORGANIZED HEALTH CARE EDUCATION/TRAINING PROGRAM

## 2023-05-05 PROCEDURE — 99223 1ST HOSP IP/OBS HIGH 75: CPT | Mod: GC,,, | Performed by: INTERNAL MEDICINE

## 2023-05-05 PROCEDURE — 86140 C-REACTIVE PROTEIN: CPT | Performed by: STUDENT IN AN ORGANIZED HEALTH CARE EDUCATION/TRAINING PROGRAM

## 2023-05-05 PROCEDURE — 86039 ANTINUCLEAR ANTIBODIES (ANA): CPT | Performed by: STUDENT IN AN ORGANIZED HEALTH CARE EDUCATION/TRAINING PROGRAM

## 2023-05-05 PROCEDURE — 25000003 PHARM REV CODE 250

## 2023-05-05 PROCEDURE — 84100 ASSAY OF PHOSPHORUS: CPT

## 2023-05-05 PROCEDURE — 97165 OT EVAL LOW COMPLEX 30 MIN: CPT

## 2023-05-05 PROCEDURE — 85652 RBC SED RATE AUTOMATED: CPT | Performed by: STUDENT IN AN ORGANIZED HEALTH CARE EDUCATION/TRAINING PROGRAM

## 2023-05-05 PROCEDURE — 80053 COMPREHEN METABOLIC PANEL: CPT

## 2023-05-05 PROCEDURE — 86038 ANTINUCLEAR ANTIBODIES: CPT | Performed by: STUDENT IN AN ORGANIZED HEALTH CARE EDUCATION/TRAINING PROGRAM

## 2023-05-05 PROCEDURE — 99239 HOSP IP/OBS DSCHRG MGMT >30: CPT | Mod: GC,,, | Performed by: HOSPITALIST

## 2023-05-05 PROCEDURE — 97161 PT EVAL LOW COMPLEX 20 MIN: CPT

## 2023-05-05 PROCEDURE — 86200 CCP ANTIBODY: CPT | Performed by: STUDENT IN AN ORGANIZED HEALTH CARE EDUCATION/TRAINING PROGRAM

## 2023-05-05 PROCEDURE — 36415 COLL VENOUS BLD VENIPUNCTURE: CPT | Performed by: STUDENT IN AN ORGANIZED HEALTH CARE EDUCATION/TRAINING PROGRAM

## 2023-05-05 PROCEDURE — 86431 RHEUMATOID FACTOR QUANT: CPT | Performed by: STUDENT IN AN ORGANIZED HEALTH CARE EDUCATION/TRAINING PROGRAM

## 2023-05-05 PROCEDURE — 82550 ASSAY OF CK (CPK): CPT | Performed by: STUDENT IN AN ORGANIZED HEALTH CARE EDUCATION/TRAINING PROGRAM

## 2023-05-05 PROCEDURE — 36415 COLL VENOUS BLD VENIPUNCTURE: CPT

## 2023-05-05 PROCEDURE — 83735 ASSAY OF MAGNESIUM: CPT

## 2023-05-05 PROCEDURE — 97530 THERAPEUTIC ACTIVITIES: CPT

## 2023-05-05 RX ORDER — DIVALPROEX SODIUM 125 MG/1
125 CAPSULE, COATED PELLETS ORAL NIGHTLY
Qty: 30 CAPSULE | Refills: 2 | Status: SHIPPED | OUTPATIENT
Start: 2023-05-05 | End: 2023-05-19 | Stop reason: SDUPTHER

## 2023-05-05 RX ORDER — DIVALPROEX SODIUM 125 MG/1
125 CAPSULE, COATED PELLETS ORAL NIGHTLY
Qty: 30 CAPSULE | Refills: 2 | Status: SHIPPED | OUTPATIENT
Start: 2023-05-05 | End: 2023-08-03

## 2023-05-05 RX ORDER — QUETIAPINE FUMARATE 25 MG/1
25 TABLET, FILM COATED ORAL NIGHTLY
Status: DISCONTINUED | OUTPATIENT
Start: 2023-05-05 | End: 2023-05-05

## 2023-05-05 RX ORDER — DIVALPROEX SODIUM 125 MG/1
125 CAPSULE, COATED PELLETS ORAL NIGHTLY
Status: DISCONTINUED | OUTPATIENT
Start: 2023-05-05 | End: 2023-05-05 | Stop reason: HOSPADM

## 2023-05-05 RX ADMIN — PRAVASTATIN SODIUM 80 MG: 80 TABLET ORAL at 08:05

## 2023-05-05 RX ADMIN — MEMANTINE HYDROCHLORIDE 5 MG: 5 TABLET ORAL at 08:05

## 2023-05-05 RX ADMIN — LABETALOL HYDROCHLORIDE 200 MG: 100 TABLET, FILM COATED ORAL at 08:05

## 2023-05-05 RX ADMIN — APIXABAN 2.5 MG: 2.5 TABLET, FILM COATED ORAL at 08:05

## 2023-05-05 RX ADMIN — LEVOTHYROXINE SODIUM 125 MCG: 25 TABLET ORAL at 04:05

## 2023-05-05 RX ADMIN — AMLODIPINE BESYLATE 10 MG: 10 TABLET ORAL at 08:05

## 2023-05-05 RX ADMIN — LOSARTAN POTASSIUM 100 MG: 50 TABLET, FILM COATED ORAL at 08:05

## 2023-05-05 NOTE — PT/OT/SLP EVAL
Physical Therapy Co-Evaluation with OT and Treatment     Patient Name:  Lynn Mckinney  MRN: [id number]    Admit Date: 5/2/2023  Admitting Diagnosis:  Acute hypoxemic respiratory failure  Length of Stay: 3 days  Recent Surgery: * No surgery found *      Recommendations:     Discharge Recommendations: return to nursing facility   Equipment recommendations: none  Barriers to discharge: None Identified     Assessment:     Lynn Mckinney is a 87 y.o. female admitted to [facility] on 5/2/2023 with medical diagnosis of Acute hypoxemic respiratory failure. Pt presents with weakness, impaired endurance, impaired functional mobility, gait instability, impaired balance, impaired cardiopulmonary response to activity. These deficits effect their roles and responsibilities in which they were able to complete prior to admit. Trial of gait performed with pt on room air. Pt reported mild SOB upon exertion and SpO2 decreased to 70 % during gait trial- therapist re-applied nasal cannula at 2 L/min and O2 sats recovered after seated rest break and deep breathing technique with supplemental O2. RN notified.  Lynn Mckinney would benefit from acute PT intervention to improve quality of life, focus on recovery of impairments, provide patient/caregiver education, reduce fall risk, and maximize (I) and safety with functional mobility.     Rehab Prognosis: Good    Plan:     During this hospitalization, patient to be seen 3 x/week to address the identified rehab impairments via gait training, therapeutic activities, therapeutic exercises, neuromuscular re-education and progress towards stated goals.     Plan of Care Expires:  06/04/23  Plan of Care reviewed with: patient, daughter    This plan of care has been discussed with the patient/caregiver, who was included in its development and is in agreement with the identified goals and treatment plan.     Subjective     Communicated with RN prior to session.  Patient found HOB elevated upon PT entry to  "room, agreeable to evaluation. Pt's daughter present during session.    Chief Complaint: Shortness of Breath (Low oxygen sats since last Wednesday. Home health. Did deep breathing and able to bring it back up. Daughter says pt in [facility] care facility and could "barely breath this am" . Not on home oxygen )    Patient/Family Comments/goals: to return to PLOF, remain active     Pain/Comfort:  Pain Rating 1: 0/10  Pain Rating Post-Intervention 1: 0/10    Patients cultural, spiritual, Religion conflicts given the current situation: None identified     Patient History: information obtained from pt's daughter     Living Environment: Pt is a resident at [facility] (no stairs to navigate, facility is accessible)   Prior Level of Function:  ambulatory with RW, able to perform ADLs primarily with independence, but does have assistance for bathing   DME owned: rolling walker, bedside commode, and rollator  Support Available/Caregiver Assistance:  staff at memory care facility     Objective:     Patient found with: telemetry, oxygen, peripheral IV, PureWick    Recent Surgery: * No surgery found *    General Precautions: Standard, fall   Orthopedic Precautions:N/A   Braces: N/A   Oxygen Device: nasal cannula at 2 L     Exams:    Cognition:  Alert and Cooperative  Command following: Follows two-step verbal commands  Communication: clear/fluent    Sensation:   Light touch sensation: Intact BLEs    Edema/Skin Integrity: None noted; Visible skin intact    Postural examination/scapula alignment: Rounded shoulder and Head forward    Lower Extremity Range of Motion:  Right Lower Extremity: WFL  Left Lower Extremity: WFL    Lower Extremity Strength:  Right Lower Extremity: WFL  Left Lower Extremity: WFL    Functional Mobility:    Bed Mobility:  Supine > Sit with supervision    Transfers:   Sit <> Stand Transfer: Contact Guard Assistance x 1 trials from EOB, x 1 trial from toilet with RW              Gait:  Distance: ~10 ft. " To bathroom + ~40 ft. Trial   Assistance level: Contact Guard Assistance  Assistive Device: rolling walker  Gait Assessment: decreased step length  and decreased gait speed; no overt LOB. Trial of gait performed with pt on room air. Pt reported mild SOB upon exertion. SpO2 decreased to 70 % upon exertion, and therapist re-applied nasal cannula at  2 L/min. O2 sats recovered after seated rest break and deep breathing technique with supplemental O2. RN notified.     Balance:  Standing:  Static: FAIR: Maintains without assist but unable to take challenges   Dynamic: FAIR: Needs CONTACT GUARD during gait    Outcome Measure: AM-PAC 6 CLICK MOBILITY  Total Score:19     Patient/Caregiver Education:     Therapist educated pt/caregiver regarding:   PT POC and goals for therapy   Safety with mobility and fall risk   Seated therex  Gait with RW use, tips to improve RW management and reduce fall risk   Deep breathing and energy conservation techniques   Instruction on use of call button and importance of calling nursing staff for assistance with mobility   Time provided for therapeutic counseling and discussion of current health disposition. All questions answered to satisfaction, within scope of PT practice     Patient/caregiver able to verbalize understanding and expressed no further questions this visit; will follow-up with pt/caregiver during current admit for additional questions/concerns within scope of practice.     White board updated.     Patient left up in chair with all lines intact, call button in reach, RN notified, and daughter present.    GOALS:   Multidisciplinary Problems       Physical Therapy Goals          Problem: Physical Therapy    Goal Priority Disciplines Outcome Goal Variances Interventions   Physical Therapy Goal     PT, PT/OT Ongoing, Progressing     Description: Goals to be met by: 23     Patient will increase functional independence with mobility by performin. Supine <> sit with  Modified Anchor  2. Sit to stand transfer with Modified Anchor  3. Gait  x 150 feet with Supervision using Rolling Walker.   4. Stand for 5 minutes with Supervision using Rolling Walker  5. Lower extremity exercise program x20 reps per handout, with supervision                           History:     Past Medical History:   Diagnosis Date    Allergy     seasonal    Blood clotting tendency     Carotid artery disease 5/29/2014    Coronary artery disease     Heart block     Hyperlipidemia     Hypertension     Hypothyroid     Left displaced femoral neck fracture 1/12/2023    Obesity     Pacemaker     PAD (peripheral artery disease)     Spinal stenosis     Thoracic or lumbosacral neuritis or radiculitis 11/25/2014    Tubular adenoma        Past Surgical History:   Procedure Laterality Date    ANGIOPLASTY      APPENDECTOMY      BREAST CYST ASPIRATION Bilateral     CARDIAC CATHETERIZATION      CARDIAC PACEMAKER PLACEMENT  10/10/13    by Dr. Coleman    CAROTID STENT Left 2007    by Dr. Bowen    CATARACT EXTRACTION W/  INTRAOCULAR LENS IMPLANT Bilateral 2012    Dr Sharp    CORONARY ARTERY BYPASS GRAFT  2001     x 3 LIMA-Ramus, SVG-OM1 & SVG-PDA    CORONARY BYPASS GRAFT ANGIOGRAPHY  3/11/2021    Procedure: Bypass graft study;  Surgeon: Gil Garcia MD;  Location: [facility] CATH LAB;  Service: Cardiology;;    CYST REMOVAL      wrist    EYE SURGERY      HEMIARTHROPLASTY OF HIP Left 1/13/2023    Procedure: HEMIARTHROPLASTY, HIP, LEFT;  Surgeon: Alan Vieyra MD;  Location: [address];  Service: Orthopedics;  Laterality: Left;    HYSTERECTOMY      LEFT HEART CATHETERIZATION Left 3/11/2021    Procedure: Left heart cath;  Surgeon: Gil Garcia MD;  Location: [facility] CATH LAB;  Service: Cardiology;  Laterality: Left;    OOPHORECTOMY      THYROIDECTOMY      VITRECTOMY BY PARS PLANA APPROACH Right 10/12/2021    Procedure: VITRECTOMY, PARS PLANA APPROACH;  Surgeon: Flako Carver MD;  Location: [address]  [facility];  Service: Ophthalmology;  Laterality: Right;       Time Tracking:     PT Received On: 05/05/23  PT Start Time: 0853     PT Stop Time: 0925  PT Total Time (min): 32 min     Billable Minutes: Evaluation 8 min, Gait Training 10 min, and Therapeutic Activity 14 min    05/05/2023

## 2023-05-05 NOTE — ASSESSMENT & PLAN NOTE
Patient with Hypoxic Respiratory failure which is Acute.  she is not on home oxygen. Supplemental oxygen was provided and noted-    Signs/symptoms of respiratory failure include- tachypnea and increased work of breathing. Contributing diagnoses includes - CHF, COPD and Pleural effusion Labs and images were reviewed. Patient Has not had a recent ABG. Will treat underlying causes and adjust management of respiratory failure as follows-  RSV, COVID, Influenza test negative   CT chest non-con with evidence of ILD/fibrosis      - Hold off on lasix, euvolemic with creatinine coming back down to baseline  - Pulm consulted. Recommend evaluation of sub-clinical rheumatologic conditions that could be etiologies for her ILD. F/u in pulm clinic  - Still requiring 1-2L NC. Will need likely need 6MWT (or appropriate documentation of hypoxemia at rest) for home oxygen on discharge  - PT/OT eval for placement. Likely medically ready for discharge later today/tomorrow morning

## 2023-05-05 NOTE — DISCHARGE SUMMARY
[facility] - Cardiology [facility] Medicine  Discharge Summary      Patient Name: Lynn Mckinney  MRN: [id number]  AYSE: [id number]  Patient Class: IP- Inpatient  Admission Date: 5/2/2023  Hospital Length of Stay: 3 days  Discharge Date and Time:  05/05/2023 2:35 PM  Attending Physician: Sharath Díaz MD   Discharging Provider: Connor M Gillies, MD  Primary Care Provider: Abdiel Robles MD  Hospital Medicine Team: [facility] 3 Connor M Gillies, MD  Primary Care Team: [facility] 3    HPI:   87F with history of HFrEF (EF 35%, Pap 27 mmHg), PCI/CABG 2021, Biventricular ICD, CKD 3a ,HTN, and S/p L Hip hemiarthroplasty 1/13/2023 presents with SOB and cough. Her symptoms started on Sunday on her way to [employer]. She was progressively more short of breath on exertion. Her cough persists without sputum production. She does not use oxygen at home. No history of COPD charted. She denies any chest pain, palpitations, nausea, vomiting, or diarrhea. She is not aware of her medications and if she is taking them as scheduled. She was transferred from assisted living. She denies any fever or night sweats. She has baseline dementia taking memantine. Obtaining further history was limited due to baseline neurocognitive deficit. In the ED work up was consistent with elevated BNP/trop, pulmonary edema, and severe hypothyroidism. Hospital medicine will admit for further workup.      * No surgery found *      Hospital Course:   Patient presented with AHRF and and CHF exacerbation. She was treated with IV diuretics. Continue strict I's and O's and fluid restriction. Fine rales in a dependent distribution, which remain despite adequate diuresis. 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 Pulmonology consulted for eval for high dose steroids and her risks outweigh benefits (PPM soon, osteoporosis). Evaluating for etiologies of her ILD with  some autoimmune labs. She will follow up in pulmonology's ILD clinic. She is medically ready for discharge.        Goals of Care Treatment Preferences:  Code Status: Full Code      Consults:   Consults (From admission, onward)        Status Ordering Provider     Inpatient consult to Pulmonology  Once        Provider:  (Not yet assigned)    Completed GILLIES, CONNOR M          No new Assessment & Plan notes have been filed under this hospital service since the last note was generated.  Service: Hospital Medicine    Final Active Diagnoses:    Diagnosis Date Noted POA    PRINCIPAL PROBLEM:  Acute hypoxemic respiratory failure [J96.01] 05/02/2023 Yes    Chronic combined systolic and diastolic heart failure [I50.42] 01/12/2023 Yes    Hypokalemia [E87.6] 05/03/2023 No    A-fib [I48.91] 05/02/2023 Yes    Dementia with anxiety, unspecified dementia severity, unspecified dementia type [F03.94] 04/25/2023 Yes     Chronic    Stage 3a chronic kidney disease [N18.31] 09/27/2013 Yes     Chronic    Coronary artery disease involving native coronary artery of native heart without angina pectoris [I25.10] 03/07/2013 Yes     Chronic    Hypothyroidism [E03.9] 03/07/2013 Yes     Chronic    Primary hypertension [I10] 08/13/2012 Yes     Chronic    Pure hypercholesterolemia [E78.00] 08/13/2012 Yes     Chronic      Problems Resolved During this Admission:       Discharged Condition: stable    Disposition: Skilled Nursing Facility    Follow Up:   Follow-up Information     Abdiel Robles MD. Schedule an appointment as soon as possible for a visit.    Specialty: Family Medicine  Why: For hospital follow up  Contact information:  [address] [facility] [address] [place] [address] [phone]                       Patient Instructions:      OXYGEN FOR HOME USE     Order Specific Question Answer Comments   Liter Flow 3    Duration Continuous    Qualifying Test Performed at: Rest    Oxygen saturation: 85    Portable mode: continuous    Route  "nasal cannula    Device: home concentrator with portable concentrator    Length of need (in months): 99 mos    Patient condition with qualifying saturation Other - List qualifying diagnosis and code    Select a diagnosis & list the code in the comments Interstitial lung disease [945850]    Height: 5' 5" (1.651 m)    Weight: 63.5 kg (140 lb)    Alternative treatment measures have been tried or considered and deemed clinically ineffective. Yes      Ambulatory referral/consult to Pulmonology   Standing Status: Future   Referral Priority: Routine Referral Type: Consultation   Referral Reason: Specialty Services Required   Referred to Provider: BONNIE KEYS Requested Specialty: Pulmonary Disease   Number of Visits Requested: 1       Significant Diagnostic Studies: N/A    Pending Diagnostic Studies:     Procedure Component Value Units Date/Time    DARRIAN [[id number]] Collected: 05/05/23 1200    Order Status: Sent Lab Status: In process Updated: 05/05/23 1223    Specimen: Blood     Aldolase [[id number]] Collected: 05/05/23 1240    Order Status: Sent Lab Status: In process Updated: 05/05/23 1244    Specimen: Blood     Anti-DNA antibody, double-stranded [[id number]] Collected: 05/05/23 1200    Order Status: Sent Lab Status: In process Updated: 05/05/23 1223    Specimen: Blood     Anti-Haynes antibody [[id number]] Collected: 05/05/23 1159    Order Status: Sent Lab Status: In process Updated: 05/05/23 1223    Specimen: Blood     MyoMarker Panel 3 [[id number]] Collected: 05/05/23 1200    Order Status: Sent Lab Status: In process Updated: 05/05/23 1223    Specimen: Blood     Sjogrens syndrome-A extractable nuclear antibody [[id number]] Collected: 05/05/23 1159    Order Status: Sent Lab Status: In process Updated: 05/05/23 1223    Specimen: Blood     Sjogrens syndrome-B extractable nuclear antibody [[id number]] Collected: 05/05/23 1159    Order Status: Sent Lab Status: In process Updated: 05/05/23 1223    Specimen: Blood        "   Medications:  Reconciled Home Medications:      Medication List      START taking these medications    divalproex 125 mg capsule  Commonly known as: DEPAKOTE SPRINKLE  Take 1 capsule (125 mg total) by mouth every evening.        CHANGE how you take these medications    apixaban 5 mg Tab  Commonly known as: ELIQUIS  Take 1 tablet (5 mg total) by mouth 2 (two) times daily.  What changed:   · medication strength  · how much to take        CONTINUE taking these medications    * acetaminophen 325 MG tablet  Commonly known as: TYLENOL  Take 2 tablets (650 mg total) by mouth every 6 (six) hours as needed (Mild to moderate pain).     * acetaminophen 500 MG tablet  Commonly known as: TYLENOL  Take 1 tablet (500 mg total) by mouth every 6 (six) hours as needed for Pain.     amLODIPine 10 MG tablet  Commonly known as: NORVASC  Take 1 tablet (10 mg total) by mouth once daily.     aspirin 81 MG Chew  Take 81 mg by mouth every evening. 1 Tablet, Chewable Oral Every day     cholecalciferol (vitamin D3) 50 mcg (2,000 unit) Cap capsule  Commonly known as: VITAMIN D3  Take 2,000 Units by mouth once daily.     coenzyme Q10 200 mg capsule  Take 200 mg by mouth once daily.     irbesartan 300 MG tablet  Commonly known as: AVAPRO  TAKE 1 TABLET (300 MG TOTAL) BY MOUTH EVERY EVENING. CANCEL IRBESARTAN/HCTZ COMBO     labetaloL 200 MG tablet  Commonly known as: NORMODYNE  Take 1 tablet (200 mg total) by mouth 2 (two) times daily.     levothyroxine 125 MCG tablet  Commonly known as: SYNTHROID  Take 1 tablet (125 mcg total) by mouth before breakfast.     memantine 5 MG Tab  Commonly known as: NAMENDA  TAKE 1 TABLET BY MOUTH TWICE DAILY     multivitamin per tablet  Commonly known as: THERAGRAN  Take 1 tablet by mouth once daily.     PRESERVISION AREDS 4,296 mcg-226 mg-90 mg Cap  Generic drug: vitamins A,C,E-zinc-copper  Take by mouth Daily.     rosuvastatin 10 MG tablet  Commonly known as: CRESTOR  Take 1 tablet (10 mg total) by mouth once  daily.     SALMON OIL-OMEGA-3 FATTY ACIDS ORAL  Take 1 capsule by mouth once daily.         * This list has 2 medication(s) that are the same as other medications prescribed for you. Read the directions carefully, and ask your doctor or other care provider to review them with you.            STOP taking these medications    hydrOXYzine HCL 25 MG tablet  Commonly known as: ATARAX        ASK your doctor about these medications    nitroGLYCERIN 0.4 MG SL tablet  Commonly known as: NITROSTAT  PLACE 1 TABLET UNDER THE TONGUE EVERY 5 MIN AS NEEDED FOR CHEST PAIN. MAX:3 DOSES            Indwelling Lines/Drains at time of discharge:   Lines/Drains/Airways     Drain  Duration           Female External Urinary Catheter 05/02/23 1145 3 days                Time spent on the discharge of patient: 35 minutes         Connor M Gillies, MD  Department of Hospital Medicine  [facility] - Cardiology Stepdown

## 2023-05-05 NOTE — CONSULTS
"[facility]  Pulmonology  Consult Note    Patient Name: Lynn Mckinney  MRN: [id number]  Admission Date: 5/2/2023  Hospital Length of Stay: 3 days  Code Status: Full Code  Attending Physician: Sharath Díaz MD  Primary Care Provider: Abdiel Robles MD   Principal Problem: Acute hypoxemic respiratory failure    Inpatient consult to Pulmonology  Consult performed by: Adilene Matos DO  Consult ordered by: Connor M Gillies, MD        Subjective:     HPI:  Ms. Mckinney is an 87 year old F with HFrEF (40% on most recent echo) w/ ICD, PCI/CABG, HTN, dementia who presented for worsening dyspnea and cough ongoing for the past week. Per niece, patient was noted to be progressively dyspneic slowly over the past year, worsening at increased rate the past 2 weeks. Not associated with fevers, chills, coughs, or LE edema. Prior to last month, patient was ambulatory with walker with no PARMAR. Was previously not on oxygen but is now currently on 2L, >40 pack year smoking history (quit more than 30 years ago). Patient and niece deny any new medication besides memantine, no exposures to molds or other environmental factors.    Pulm consulted for "Admitted with respiratory failure. Now new diagnosis of ILD/NSIP. Eval for 0.5-1mg/kg steroid with long taper?"      Past Medical History:   Diagnosis Date    Allergy     seasonal    Blood clotting tendency     Carotid artery disease 5/29/2014    Coronary artery disease     Heart block     Hyperlipidemia     Hypertension     Hypothyroid     Left displaced femoral neck fracture 1/12/2023    Obesity     Pacemaker     PAD (peripheral artery disease)     Spinal stenosis     Thoracic or lumbosacral neuritis or radiculitis 11/25/2014    Tubular adenoma        Past Surgical History:   Procedure Laterality Date    ANGIOPLASTY      APPENDECTOMY      BREAST CYST ASPIRATION Bilateral     CARDIAC CATHETERIZATION      CARDIAC PACEMAKER PLACEMENT  10/10/13    by Dr." "Coleman    CAROTID STENT Left     by Dr. Bowen    CATARACT EXTRACTION W/  INTRAOCULAR LENS IMPLANT Bilateral 2012    Dr Sharp    CORONARY ARTERY BYPASS GRAFT  2001     x 3 LIMA-Ramus, SVG-OM1 & SVG-PDA    CORONARY BYPASS GRAFT ANGIOGRAPHY  3/11/2021    Procedure: Bypass graft study;  Surgeon: Gil aGrcia MD;  Location: [facility] CATH LAB;  Service: Cardiology;;    CYST REMOVAL      wrist    EYE SURGERY      HEMIARTHROPLASTY OF HIP Left 2023    Procedure: HEMIARTHROPLASTY, HIP, LEFT;  Surgeon: Alan Vieyra MD;  Location: [facility] OR 2ND FLR;  Service: Orthopedics;  Laterality: Left;    HYSTERECTOMY      LEFT HEART CATHETERIZATION Left 3/11/2021    Procedure: Left heart cath;  Surgeon: Gil Garcia MD;  Location: [facility] CATH LAB;  Service: Cardiology;  Laterality: Left;    OOPHORECTOMY      THYROIDECTOMY      VITRECTOMY BY PARS PLANA APPROACH Right 10/12/2021    Procedure: VITRECTOMY, PARS PLANA APPROACH;  Surgeon: Flako Carver MD;  Location: [facility] OR 1ST FLR;  Service: Ophthalmology;  Laterality: Right;       Review of patient's allergies indicates:   Allergen Reactions    Lipitor [atorvastatin] Itching    Fosamax [alendronate]      Felt "strange"    Iodinated contrast media      Dye is only to be used if absolutely needed because of kidney function    Prolia [denosumab]      Reaction after the Prolia    Sulfa (sulfonamide antibiotics)        Family History       Problem Relation (Age of Onset)    Alzheimer's disease Mother    Diabetes Brother    Heart disease Father, Mother, Brother    Hypertension Father, Mother    No Known Problems Sister, Maternal Grandmother, Maternal Grandfather, Paternal Grandmother, Paternal Grandfather, Maternal Aunt, Maternal Uncle, Paternal Aunt, Paternal Uncle          Tobacco Use    Smoking status: Former     Types: Cigarettes     Quit date: 1984     Years since quittin.7    Smokeless tobacco: Never   Substance and Sexual Activity    " Alcohol use: Yes     Alcohol/week: 1.0 standard drink     Types: 1 Shots of liquor per week     Comment: old fashion  with dinner every 6 mo     Drug use: No    Sexual activity: Not on file         Review of Systems   Constitutional:  Negative for chills and fatigue.   HENT:  Negative for sore throat.    Respiratory:  Positive for shortness of breath. Negative for cough.         Dyspnea on exertion   Cardiovascular:  Negative for chest pain and leg swelling.   Gastrointestinal:  Negative for abdominal pain, diarrhea, nausea and vomiting.   Genitourinary:  Negative for dysuria and flank pain.   Musculoskeletal:  Negative for arthralgias and joint swelling.   Neurological:  Negative for dizziness and headaches.   Psychiatric/Behavioral:  Negative for agitation and confusion.         Chronic decreased memory   Objective:     Vital Signs (Most Recent):  Temp: 97.8 °F (36.6 °C) (05/04/23 1456)  Pulse: 62 (05/04/23 1516)  Resp: 16 (05/04/23 1456)  BP: (!) 117/53 (05/04/23 1456)  SpO2: (!) 92 % (05/04/23 1456)   Vital Signs (24h Range):  Temp:  [97.7 °F (36.5 °C)-98 °F (36.7 °C)] 97.8 °F (36.6 °C)  Pulse:  [56-72] 62  Resp:  [16-18] 16  SpO2:  [92 %-98 %] 92 %  BP: ()/(51-62) 117/53     Weight: 63.5 kg (140 lb)  Body mass index is 23.3 kg/m².      Intake/Output Summary (Last 24 hours) at 5/4/2023 1520  Last data filed at 5/4/2023 1414  Gross per 24 hour   Intake 471 ml   Output 1700 ml   Net -1229 ml        Physical Exam  HENT:      Head: Normocephalic.      Nose: Nose normal.      Mouth/Throat:      Mouth: Mucous membranes are moist.   Eyes:      Pupils: Pupils are equal, round, and reactive to light.   Cardiovascular:      Rate and Rhythm: Normal rate.   Pulmonary:      Effort: Pulmonary effort is normal.      Comments: Bilateral LE crackles  Abdominal:      General: Abdomen is flat.      Tenderness: There is no abdominal tenderness.   Musculoskeletal:         General: Normal range of motion.      Cervical back:  Normal range of motion.      Right lower leg: No edema.      Left lower leg: No edema.   Skin:     General: Skin is warm.   Neurological:      Mental Status: She is alert. Mental status is at baseline.   Psychiatric:         Mood and Affect: Mood normal.        Vents:       Lines/Drains/Airways       Drain  Duration             Female External Urinary Catheter 05/02/23 1145 2 days              Peripheral Intravenous Line  Duration                  Peripheral IV - Single Lumen 05/03/23 0420 20 G Posterior;Right Wrist 1 day                    Significant Labs:    CBC/Anemia Profile:  Recent Labs   Lab 05/03/23 0453 05/04/23  0346   WBC 6.21 4.78   HGB 11.5* 11.2*   HCT 36.5* 35.3*   * 144*   * 101*   RDW 13.4 13.2        Chemistries:  Recent Labs   Lab 05/03/23 0453 05/03/23  1821 05/04/23  0346    140 140   K 3.4* 3.9 3.7    103 103   CO2 27 30* 31*   BUN 17 24* 25*   CREATININE 1.2 1.5* 1.5*   CALCIUM 8.7 8.9 8.7   ALBUMIN 3.1*  --  3.1*   PROT 6.0  --  5.9*   BILITOT 0.6  --  0.5   ALKPHOS 73  --  72   ALT 12  --  10   AST 19  --  17   MG 1.9  --  1.9   PHOS 4.4  --  4.8*       All pertinent labs within the past 24 hours have been reviewed.    Significant Imaging:   I have reviewed all pertinent imaging results/findings within the past 24 hours.    Assessment/Plan:     Pulmonary  * Acute hypoxemic respiratory failure  86 y/o F pt with prior HF EF 40%, CAD, >30 year smoking history, osteopenia who presented to [facility] for evaluation of progressive dyspnea over the past month. Initially thought to be secondary to fluid overload given crackles on exam and BNP>1200. She was diuresed with IV lasix with net negative 1.5L cumulative output without much improvement in her oxygenation. A non-con CT chest was obtained showing features consistent with pulmonary fibrosis and ground glass opacities favoring Nonspecific Interstitial Pneumonia. Although her symptoms have been relatively recent,  radiographic findings most likely due to longer progressive course.     Patient denies any recent exposures, Memantine is not know to cause pulmonary fibrosis, HIV previously negative, no history of connective tissue disease or other rheumatologic disorder (testing done in 2013 with DARRIAN/RF).     Recommendations  1. No acute indication for steroids given likely slowly progressive disease. Given her osteoporosis and PM replacement procedure next week, risks outweigh benefits at this time.   2. Follow-up with pulmonary clinic (referral to Dr. Ji Heller in ILD clinic) for PFT and assessment for treatment  3. Repeat connective tissue disease labs. Recommend CK, aldolase, ANCA, DARRIAN, RF, anti-CCP, ESR/CRP, SCL-70, SSA, SSB. Will follow-up labs in pulmonology clinic.   4. No indication for biopsy or bronchoscopy at this time            Thank you for your consult. I will sign off. Please contact us if you have any additional questions.     Adilene Matos, DO  Pulmonology  [facility]

## 2023-05-05 NOTE — PLAN OF CARE
NURSING HOME ORDERS    05/05/2023  [facility]  [facility] - CARDIOLOGY STEPDOWN  [address]  [facility] [address]  Dept: [phone]  Loc: [phone]     Admit to Nursing Home:  Assisted living memory unit    Diagnoses:  Active Hospital Problems    Diagnosis  POA    *Acute hypoxemic respiratory failure [J96.01]  Yes     Priority: 1 - High    Chronic combined systolic and diastolic heart failure [I50.42]  Yes     Priority: 2     Hypokalemia [E87.6]  No    A-fib [I48.91]  Yes    Dementia with anxiety, unspecified dementia severity, unspecified dementia type [F03.94]  Yes     Chronic     Chronic.  Control is uncertain.  Patient was seen by Neurology who started Namenda.  Patient recently moved here from [place].  Patient is staying at the [facility] assisted living.  There has been no significant change while taking this medication.        Stage 3a chronic kidney disease [N18.31]  Yes     Chronic    Coronary artery disease involving native coronary artery of native heart without angina pectoris [I25.10]  Yes     Chronic     CAD S/P CABG  2001                                                          mid LAD       Hypothyroidism [E03.9]  Yes     Chronic     Chronic.  Uncontrolled.  Patient on levothyroxine 125 micrograms daily.  Lab Results   Component Value Date    TSH 96.744 (H) 08/31/2022    FREET4 0.76 08/31/2022           Primary hypertension [I10]  Yes     Chronic    Pure hypercholesterolemia [E78.00]  Yes     Chronic     reported side effects from atorvastatin.  pain in both hands, both shoulders and her left ankle 3/15 while on 80 mg of atorvastatin   Switched to rosuvastatin 20 in 3/15            Resolved Hospital Problems   No resolved problems to display.       Patient is homebound due to:  Acute hypoxemic respiratory failure    Allergies:  Review of patient's allergies indicates:   Allergen Reactions    Lipitor [atorvastatin] Itching    Fosamax [alendronate]      Felt  ""strange"    Iodinated contrast media      Dye is only to be used if absolutely needed because of kidney function    Prolia [denosumab]      Reaction after the Prolia    Sulfa (sulfonamide antibiotics)        Vitals:  Routine    Diet: regular diet    Activities:   Activity as tolerated    Goals of Care Treatment Preferences:  Code Status: Full Code      Labs:  none    Nursing Precautions:  Per protocol, no specific precautions    Consults:   PT to evaluate and treat- 3 times a week and OT to evaluate and treat- 3 times a week     Miscellaneous Care: 3L oxygen by Nasal cannula     Primary doctor at facility to follow      Medications: Discontinue all previous medication orders, if any. See new list below.     Medication List        START taking these medications      divalproex 125 mg capsule  Commonly known as: DEPAKOTE SPRINKLE  Take 1 capsule (125 mg total) by mouth every evening.            CHANGE how you take these medications      apixaban 5 mg Tab  Commonly known as: ELIQUIS  Take 1 tablet (5 mg total) by mouth 2 (two) times daily.  What changed:   medication strength  how much to take            CONTINUE taking these medications      * acetaminophen 325 MG tablet  Commonly known as: TYLENOL  Take 2 tablets (650 mg total) by mouth every 6 (six) hours as needed (Mild to moderate pain).     * acetaminophen 500 MG tablet  Commonly known as: TYLENOL  Take 1 tablet (500 mg total) by mouth every 6 (six) hours as needed for Pain.     amLODIPine 10 MG tablet  Commonly known as: NORVASC  Take 1 tablet (10 mg total) by mouth once daily.     aspirin 81 MG Chew  Take 81 mg by mouth every evening. 1 Tablet, Chewable Oral Every day     cholecalciferol (vitamin D3) 50 mcg (2,000 unit) Cap capsule  Commonly known as: VITAMIN D3  Take 2,000 Units by mouth once daily.     coenzyme Q10 200 mg capsule  Take 200 mg by mouth once daily.     irbesartan 300 MG tablet  Commonly known as: AVAPRO  TAKE 1 TABLET (300 MG TOTAL) BY MOUTH " EVERY EVENING. CANCEL IRBESARTAN/HCTZ COMBO     labetaloL 200 MG tablet  Commonly known as: NORMODYNE  Take 1 tablet (200 mg total) by mouth 2 (two) times daily.     levothyroxine 125 MCG tablet  Commonly known as: SYNTHROID  Take 1 tablet (125 mcg total) by mouth before breakfast.     memantine 5 MG Tab  Commonly known as: NAMENDA  TAKE 1 TABLET BY MOUTH TWICE DAILY     multivitamin per tablet  Commonly known as: THERAGRAN  Take 1 tablet by mouth once daily.     PRESERVISION AREDS 4,296 mcg-226 mg-90 mg Cap  Generic drug: vitamins A,C,E-zinc-copper  Take by mouth Daily.     rosuvastatin 10 MG tablet  Commonly known as: CRESTOR  Take 1 tablet (10 mg total) by mouth once daily.     SALMON OIL-OMEGA-3 FATTY ACIDS ORAL  Take 1 capsule by mouth once daily.           * This list has 2 medication(s) that are the same as other medications prescribed for you. Read the directions carefully, and ask your doctor or other care provider to review them with you.                STOP taking these medications      hydrOXYzine HCL 25 MG tablet  Commonly known as: ATARAX            ASK your doctor about these medications      nitroGLYCERIN 0.4 MG SL tablet  Commonly known as: NITROSTAT  PLACE 1 TABLET UNDER THE TONGUE EVERY 5 MIN AS NEEDED FOR CHEST PAIN. MAX:3 DOSES                Immunizations Administered as of 5/5/2023       Name Date Dose VIS Date Route Exp Date    COVID-19, MRNA, LN-S, PF (Pfizer) (Purple Cap) 11/4/2021 12:28 PM 0.3 mL 12/12/2020 Intramuscular 1/31/2022    Site: Left deltoid     Given By: Rey Albarado MA     : [employer]     Lot: [id number]     COVID-19, MRNA, LN-S, PF (Pfizer) (Purple Cap) 1/31/2021  8:04 AM 0.3 mL 12/12/2020 Intramuscular 5/31/2021    Site: Right deltoid     Given By: Susanna Moura RN     : [employer]     Lot: [id number]     COVID-19, MRNA, LN-S, PF (Pfizer) (Purple Cap) 1/10/2021  8:13 AM 0.3 mL 12/12/2020 Intramuscular 4/30/2021    Site: Right deltoid     Given  By: Elizabeth Ball LPN     : [employer]     Lot: [id number]             This patient has had both covid vaccinations    Some patients may experience side effects after vaccination.  These may include fever, headache, muscle or joint aches.  Most symptoms resolve with 24-48 hours and do not require urgent medical evaluation unless they persist for more than 72 hours or symptoms are concerning for an unrelated medical condition.          _________________________________  Connor M Gillies, MD  05/05/2023

## 2023-05-05 NOTE — PLAN OF CARE
Problem: Occupational Therapy  Goal: Occupational Therapy Goal  Description: Goals to be met by: 5/26/2023      Patient will increase functional independence with ADLs by performing:    UE Dressing with Set-up Assistance.  Grooming while standing at sink with Modified Endeavor.  Toileting from toilet with Modified Endeavor for hygiene and clothing management.   Supine to sit with Modified Endeavor.  Stand pivot transfers with Modified Endeavor.  Toilet transfer to toilet with Modified Endeavor.    Outcome: Ongoing, Progressing   Patient's goals are set.

## 2023-05-05 NOTE — NURSING
Home Oxygen Evaluation    Date Performed: 5/5/2023    1) Patient's Home O2 Sat on room air, while at rest: 85  Pt sat recovered to 95% at 3 L.      If O2 sats on room air at rest are 88% or below, patient qualifies. No additional testing needed. Document N/A in steps 2 and 3. If 89% or above, complete steps 2.      2) Patient's O2 Sat on room air while exercising: N/A        If O2 sats on room air while exercising remain 89% or above patient does not qualify, no further testing needed Document N/A in step 3. If O2 sats on room air while exercising are 88% or below, continue to step 3.      3) Patient's O2 Sat while exercising on O2: N/A at N/A LPM         (Must show improvement from #2 for patients to qualify)    If O2 sats improve on oxygen, patient qualifies for portable oxygen. If not, the patient does not qualify.

## 2023-05-05 NOTE — DISCHARGE INSTRUCTIONS
You should continue taking your Synthroid (for the thyroid) and follow with your PCP. You should have thyroid function testing in about 4-6 weeks to re-check your thyroid levels and see if you need your Synthroid increased.

## 2023-05-05 NOTE — PROGRESS NOTES
[facility] A [facility] - Cardiology [facility] Medicine  Progress Note    Patient Name: Lynn Mckinney  MRN: [id number]  Patient Class: IP- Inpatient   Admission Date: 5/2/2023  Length of Stay: 3 days  Attending Physician: Sharath Díaz MD  Primary Care Provider: Abdiel Roblse MD        Subjective:     Principal Problem:Acute hypoxemic respiratory failure        HPI:  87F with history of HFrEF (EF 35%, Pap 27 mmHg), PCI/CABG 2021, Biventricular ICD, CKD 3a ,HTN, and S/p L Hip hemiarthroplasty 1/13/2023 presents with SOB and cough. Her symptoms started on Sunday on her way to [employer]. She was progressively more short of breath on exertion. Her cough persists without sputum production. She does not use oxygen at home. No history of COPD charted. She denies any chest pain, palpitations, nausea, vomiting, or diarrhea. She is not aware of her medications and if she is taking them as scheduled. She was transferred from assisted living. She denies any fever or night sweats. She has baseline dementia taking memantine. Obtaining further history was limited due to baseline neurocognitive deficit. In the ED work up was consistent with elevated BNP/trop, pulmonary edema, and severe hypothyroidism. Hospital medicine will admit for further workup.      Overview/Hospital Course:  Patient presented with AHRF and and CHF exacerbation. She was treated with IV diuretics. Continue strict I's and O's and fluid restriction. Fine rales in a dependent distribution, which remain despite adequate diuresis. 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 Pulmonology consulted for eval for high dose steroids and her risks outweigh benefits (PPM soon, osteoporosis). Evaluating for etiologies of her ILD.      Interval History: No events overnight. Patient requiring 1-2L NC at rest. No complaints this morning. Patient with waxing and waning mentation, memory loss  worse in the AM and evenings.     Review of Systems   Constitutional:  Negative for chills and fever.   HENT:  Negative for rhinorrhea and trouble swallowing.    Eyes:  Negative for discharge.   Respiratory:  Positive for cough and shortness of breath.    Cardiovascular:  Negative for chest pain and leg swelling.   Gastrointestinal:  Negative for abdominal pain and nausea.   Genitourinary:  Negative for dysuria and hematuria.   Musculoskeletal:  Negative for arthralgias and myalgias.   Neurological:  Negative for tremors and headaches.   Psychiatric/Behavioral:  Negative for agitation and confusion.    Objective:     Vital Signs (Most Recent):  Temp: 97.8 °F (36.6 °C) (05/05/23 0716)  Pulse: 60 (05/05/23 1156)  Resp: 19 (05/05/23 0716)  BP: (!) 142/66 (05/05/23 0716)  SpO2: 96 % (05/05/23 0716)   Vital Signs (24h Range):  Temp:  [97.5 °F (36.4 °C)-98.8 °F (37.1 °C)] 97.8 °F (36.6 °C)  Pulse:  [59-64] 60  Resp:  [16-19] 19  SpO2:  [92 %-96 %] 96 %  BP: (106-142)/(53-66) 142/66     Weight: 63.5 kg (140 lb)  Body mass index is 23.3 kg/m².    Intake/Output Summary (Last 24 hours) at 5/5/2023 1232  Last data filed at 5/5/2023 0534  Gross per 24 hour   Intake 333 ml   Output 950 ml   Net -617 ml           Physical Exam  Constitutional:       Appearance: Normal appearance.      Comments: Patient appears frail. She is in good spirits. No respiratory distress. O2 per nasal cannula.    HENT:      Head: Normocephalic and atraumatic.      Nose: Nose normal.      Mouth/Throat:      Pharynx: Oropharynx is clear.   Eyes:      Extraocular Movements: Extraocular movements intact.      Conjunctiva/sclera: Conjunctivae normal.   Cardiovascular:      Rate and Rhythm: Normal rate and regular rhythm.      Pulses: Normal pulses.   Pulmonary:      Effort: Pulmonary effort is normal.      Breath sounds: Rales present.      Comments: Rales auscultated bilaterally. Concerning for interstitial disease vs. pulmonary edema in the setting of  heart failure exacerbation.   Abdominal:      General: Abdomen is flat. Bowel sounds are normal.      Palpations: Abdomen is soft.   Musculoskeletal:         General: Normal range of motion.      Cervical back: Normal range of motion.   Neurological:      General: No focal deficit present.      Mental Status: She is alert and oriented to person, place, and time.   Psychiatric:         Mood and Affect: Mood normal.           Significant Labs: All pertinent labs within the past 24 hours have been reviewed.  CBC:   Recent Labs   Lab 05/04/23  0346 05/05/23  0536   WBC 4.78 4.31   HGB 11.2* 12.4   HCT 35.3* 39.2   * 151       CMP:   Recent Labs   Lab 05/03/23  1821 05/04/23  0346 05/05/23  0536    140 139   K 3.9 3.7 3.7    103 103   CO2 30* 31* 28   * 91 88   BUN 24* 25* 27*   CREATININE 1.5* 1.5* 1.4   CALCIUM 8.9 8.7 8.9   PROT  --  5.9* 6.5   ALBUMIN  --  3.1* 3.3*   BILITOT  --  0.5 0.5   ALKPHOS  --  72 80   AST  --  17 18   ALT  --  10 10   ANIONGAP 7* 6* 8         Significant Imaging: I have reviewed all pertinent imaging results/findings within the past 24 hours.      Assessment/Plan:      * Acute hypoxemic respiratory failure  Patient with Hypoxic Respiratory failure which is Acute.  she is not on home oxygen. Supplemental oxygen was provided and noted-    Signs/symptoms of respiratory failure include- tachypnea and increased work of breathing. Contributing diagnoses includes - CHF, COPD and Pleural effusion Labs and images were reviewed. Patient Has not had a recent ABG. Will treat underlying causes and adjust management of respiratory failure as follows-  RSV, COVID, Influenza test negative   CT chest non-con with evidence of ILD/fibrosis      - Hold off on lasix, euvolemic with creatinine coming back down to baseline  - Pulm consulted. Recommend evaluation of sub-clinical rheumatologic conditions that could be etiologies for her ILD. F/u in pulm clinic  - Still requiring 1-2L NC.  Will need likely need 6MWT (or appropriate documentation of hypoxemia at rest) for home oxygen on discharge  - PT/OT eval for placement. Likely medically ready for discharge later today/tomorrow morning    Chronic combined systolic and diastolic heart failure  Patient has EF 35% with PCI/CABG 2021. Initial presentation with hypoxemia, dependent on exam rales, BNP elevation >1000, PARMAR and orthopnea reported (despite lack of peripheral evidence of edema). Concerning initially for heart failure exacerbation. Treated with IV lasix until dry on exam and contraction alkalosis. Continued to have rales and CT more consistent with ILD. She did not have a heart failure exacerbation on this admission.    - Hold lasix for now  - Cardiac diet  - Fluid restriction  - Strict I's/O's  - Low sodium  - Elevate HOB  - TTE with stable reduced EF, 40%, CVP 3  - Cardiac device check without evidence of arrhythmia since Oct 2021    A-fib  Patient with Persistent (7 days or more) atrial fibrillation which is controlled currently with Beta Blocker and Calcium Channel Blocker. Patient is currently in sinus rhythm.[id number] Score: 4. HASBLED Score: 3. Anticoagulation indicated. Anticoagulation done with eliquis 2.5mg.        Dementia with anxiety, unspecified dementia severity, unspecified dementia type  The patient has baseline unspecified dementia on memantine. No acute changes in mentation.   Continue memantine and treat risk factors including hypertension and hyperlipidemia.      Stage 3a chronic kidney disease  New ROBBY superimposed on CKD secondary to diuretics. OtherwiselLabs stable at this time. Continue to monitor. Strict I's/O's. Discontinue lasix. Avoid nephrotoxic medications.    Hypothyroidism  TSH 64.45 with T4 0.74. Though her labs suggest more of a subacute presentation she is symptomatic. She appears weak, fatigued, and somnolent. Her eyes are kept closed during the interview. Most likely severe hypothyroidism is complicating  her clinical presentation. The combination of hypoxemic respiratory failure, volume collection in the lungs, baseline dementia, and hypometabolism are contributing to hemodynamic instability.     - Resume levothyroxine  - Consider endocrinology during hospitalization        Coronary artery disease involving native coronary artery of native heart without angina pectoris  ASA loading dose 325 in ED  Continue ASA 81  Pravastatin 80      Pure hypercholesterolemia  Reported reaction to atorvastatin. Rosuvastatin not available in the hospital. Start Pravastatin in patient.       Primary hypertension  Continue antihypertensives: labetalol, amlodipine, losartan        VTE Risk Mitigation (From admission, onward)         Ordered     apixaban tablet 5 mg  2 times daily         05/05/23 1012     IP VTE HIGH RISK PATIENT  Once         05/02/23 1410     Place sequential compression device  Until discontinued         05/02/23 1410                Discharge Planning   AIDEE: 5/5/2023     Code Status: Full Code   Is the patient medically ready for discharge?: No    Reason for patient still in hospital (select all that apply): Patient trending condition and Laboratory test  Discharge Plan A: Assisted Living (lives in Memory Care)                  Connor M Gillies, MD  Department of Hospital Medicine   [facility] - Cardiology Stepdown

## 2023-05-05 NOTE — SUBJECTIVE & OBJECTIVE
Interval History: No events overnight. Patient requiring 1-2L NC at rest. No complaints this morning. Patient with waxing and waning mentation, memory loss worse in the AM and evenings.     Review of Systems   Constitutional:  Negative for chills and fever.   HENT:  Negative for rhinorrhea and trouble swallowing.    Eyes:  Negative for discharge.   Respiratory:  Positive for cough and shortness of breath.    Cardiovascular:  Negative for chest pain and leg swelling.   Gastrointestinal:  Negative for abdominal pain and nausea.   Genitourinary:  Negative for dysuria and hematuria.   Musculoskeletal:  Negative for arthralgias and myalgias.   Neurological:  Negative for tremors and headaches.   Psychiatric/Behavioral:  Negative for agitation and confusion.    Objective:     Vital Signs (Most Recent):  Temp: 97.8 °F (36.6 °C) (05/05/23 0716)  Pulse: 60 (05/05/23 1156)  Resp: 19 (05/05/23 0716)  BP: (!) 142/66 (05/05/23 0716)  SpO2: 96 % (05/05/23 0716)   Vital Signs (24h Range):  Temp:  [97.5 °F (36.4 °C)-98.8 °F (37.1 °C)] 97.8 °F (36.6 °C)  Pulse:  [59-64] 60  Resp:  [16-19] 19  SpO2:  [92 %-96 %] 96 %  BP: (106-142)/(53-66) 142/66     Weight: 63.5 kg (140 lb)  Body mass index is 23.3 kg/m².    Intake/Output Summary (Last 24 hours) at 5/5/2023 1232  Last data filed at 5/5/2023 0534  Gross per 24 hour   Intake 333 ml   Output 950 ml   Net -617 ml           Physical Exam  Constitutional:       Appearance: Normal appearance.      Comments: Patient appears frail. She is in good spirits. No respiratory distress. O2 per nasal cannula.    HENT:      Head: Normocephalic and atraumatic.      Nose: Nose normal.      Mouth/Throat:      Pharynx: Oropharynx is clear.   Eyes:      Extraocular Movements: Extraocular movements intact.      Conjunctiva/sclera: Conjunctivae normal.   Cardiovascular:      Rate and Rhythm: Normal rate and regular rhythm.      Pulses: Normal pulses.   Pulmonary:      Effort: Pulmonary effort is normal.       Breath sounds: Rales present.      Comments: Rales auscultated bilaterally. Concerning for interstitial disease vs. pulmonary edema in the setting of heart failure exacerbation.   Abdominal:      General: Abdomen is flat. Bowel sounds are normal.      Palpations: Abdomen is soft.   Musculoskeletal:         General: Normal range of motion.      Cervical back: Normal range of motion.   Neurological:      General: No focal deficit present.      Mental Status: She is alert and oriented to person, place, and time.   Psychiatric:         Mood and Affect: Mood normal.           Significant Labs: All pertinent labs within the past 24 hours have been reviewed.  CBC:   Recent Labs   Lab 05/04/23  0346 05/05/23  0536   WBC 4.78 4.31   HGB 11.2* 12.4   HCT 35.3* 39.2   * 151       CMP:   Recent Labs   Lab 05/03/23  1821 05/04/23 0346 05/05/23  0536    140 139   K 3.9 3.7 3.7    103 103   CO2 30* 31* 28   * 91 88   BUN 24* 25* 27*   CREATININE 1.5* 1.5* 1.4   CALCIUM 8.9 8.7 8.9   PROT  --  5.9* 6.5   ALBUMIN  --  3.1* 3.3*   BILITOT  --  0.5 0.5   ALKPHOS  --  72 80   AST  --  17 18   ALT  --  10 10   ANIONGAP 7* 6* 8         Significant Imaging: I have reviewed all pertinent imaging results/findings within the past 24 hours.

## 2023-05-05 NOTE — PLAN OF CARE
05/05/23 1752   Final Note   Assessment Type Final Discharge Note   Anticipated Discharge Disposition Int Care Fac   What phone number can be called within the next 1-3 days to see how you are doing after discharge? [phone]   Hospital Resources/Appts/Education Provided Provided patient/caregiver with written discharge plan information;Appointments scheduled and added to AVS;Appointments scheduled by Navigator/Coordinator   Post-Acute Status   Discharge Delays None known at this time     Patient discharged to Assisted living facility . Left with with Niece at side per wheelchair and home oxygen. Niece will bring to her assisted living facility per personal vehicle.     Future Appointments   Date Time Provider Department Center   5/9/2023  8:00 AM [id number]   5/16/2023  9:45 AM Guanakito Alfredo DPM [facility]   5/17/2023  1:40 [facility]   [facility]23/2023 10:00 [facility]   7/14/2023  1:00 PM Flako Carver MD [facility] Katrin syed Mrecado RN CM   [phone]

## 2023-05-05 NOTE — MEDICAL/APP STUDENT
Progress Note  Hospital Medicine                                                              Team: [facility]    Patient Name: Lynn Mckinney  YOB: 1935    Admit Date: 5/2/2023                     LOS: 3    SUBJECTIVE:     Reason for Admission:  Acute hypoxemic respiratory failure  See H&P for detailed initial presentation history and ROS.      Interval history: No acute events overnight. Patient doing well on 2L NC. Daughter reports decrease in patient mentation. Feels she is more confused now since admission.       OBJECTIVE:     Vital Signs Range (Last 24H):  Temp:  [97.5 °F (36.4 °C)-98.8 °F (37.1 °C)]   Pulse:  [56-72]   Resp:  [16-19]   BP: ()/(53-66)   SpO2:  [92 %-96 %] Body mass index is 23.3 kg/m².  Wt Readings from Last 3 Encounters:   05/03/23 1122 63.5 kg (140 lb)   05/02/23 0833 63.5 kg (140 lb)   04/25/23 1457 63.3 kg (139 lb 8 oz)   04/12/23 0827 58.7 kg (129 lb 6.6 oz)       I & O (Last 24H):  Intake/Output Summary (Last 24 hours) at 5/5/2023 0830  Last data filed at 5/5/2023 0534  Gross per 24 hour   Intake 333 ml   Output 950 ml   Net -617 ml       Physical Exam:  Physical Exam  Constitutional:       General: She is not in acute distress.     Appearance: She is ill-appearing.   Eyes:      Extraocular Movements: Extraocular movements intact.   Cardiovascular:      Rate and Rhythm: Normal rate and regular rhythm.      Pulses: Normal pulses.      Heart sounds: Normal heart sounds. No murmur heard.    No gallop.   Pulmonary:      Effort: Pulmonary effort is normal. No respiratory distress.      Breath sounds: Rales present. No wheezing.   Abdominal:      General: Abdomen is flat. Bowel sounds are normal. There is no distension.      Palpations: Abdomen is soft.      Tenderness: There is no abdominal tenderness. There is no guarding.   Musculoskeletal:         General: Normal range of motion.      Right lower leg: No edema.      Left lower leg: No edema.   Skin:     General:  Skin is warm.   Neurological:      General: No focal deficit present.      Mental Status: She is alert. She is disoriented.       Diagnostic Results:  Lab Results   Component Value Date    WBC 4.31 05/05/2023    HGB 12.4 05/05/2023    HCT 39.2 05/05/2023     (H) 05/05/2023     05/05/2023     Recent Labs   Lab 05/05/23  0536   GLU 88      K 3.7      CO2 28   BUN 27*   CREATININE 1.4   CALCIUM 8.9   MG 1.9     Lab Results   Component Value Date    INR 1.0 05/02/2023    INR 1.0 01/12/2023    INR 1.0 03/11/2021     Lab Results   Component Value Date    HGBA1C 5.2 05/02/2023     No results for input(s): POCTGLUCOSE in the last 72 hours.      ASSESSMENT/PLAN:     Current Problems List:  Active Hospital Problems    Diagnosis  POA    *Acute hypoxemic respiratory failure [J96.01]  Yes    Hypokalemia [E87.6]  No    A-fib [I48.91]  Yes    Dementia with anxiety, unspecified dementia severity, unspecified dementia type [F03.94]  Yes     Chronic     Chronic.  Control is uncertain.  Patient was seen by Neurology who started Namenda.  Patient recently moved here from [place].  Patient is staying at the [facility] assisted living.  There has been no significant change while taking this medication.        Acute on chronic combined systolic and diastolic heart failure [I50.43]  Yes    Stage 3a chronic kidney disease [N18.31]  Yes     Chronic    Coronary artery disease involving native coronary artery of native heart without angina pectoris [I25.10]  Yes     Chronic     CAD S/P CABG  2001                                                          mid LAD       Hypothyroidism [E03.9]  Yes     Chronic     Chronic.  Uncontrolled.  Patient on levothyroxine 125 micrograms daily.  Lab Results   Component Value Date    TSH 96.744 (H) 08/31/2022    FREET4 0.76 08/31/2022           Primary hypertension [I10]  Yes     Chronic    Pure hypercholesterolemia [E78.00]  Yes     Chronic     reported side effects from  atorvastatin.  pain in both hands, both shoulders and her left ankle 3/15 while on 80 mg of atorvastatin   Switched to rosuvastatin 20 in 3/15            Resolved Hospital Problems   No resolved problems to display.       Active Problems, Status & Treatment Plan Addressed Today:  Acute hypoxemic respiratory failure  Lynn Mckinney is a 87 y.o. female presents for Combined Diastolic and Systolic HF, HTN, HLD, CAD s/p CABG x 3, VF s/p ICD placement, Stake 3a CKD, hypothyroidism, and dementia admitted for acute heart failure. Patient clinically improving now reports decreased respitory disrtress. Patient euvolemic on exam. Continues to require 2L NC, tolerating well. Current respiratory distress most likely to be due to newly diagnosed Pulmonary Fibrosis.      - BNP 1258 on admission  - d/c furosemide   - Trend CMP  - monitor I&O   - 1500 ml fluid restriction    - Tele   - Echo showed no changes from previous   - CT Chest showed bilateral pulmonary fibrosis   - Pulm Consulted. Recs Below  Recommendations  1. No acute indication for steroids given likely slowly progressive disease. Given her osteoporosis and PM replacement procedure next week, risks outweigh benefits at this time.   2. Follow-up with pulmonary clinic (referral to Dr. Ji Heller in ILD clinic) for PFT and assessment for treatment  3. Repeat connective tissue disease labs. Recommend CK, aldolase, ANCA, DARRIAN, RF, anti-CCP, ESR/CRP, SCL-70, SSA, SSB. Will follow-up labs in pulmonology clinic.   4. No indication for biopsy or bronchoscopy at this time     Acute on Chronic combined systolic and diastolic heart failure   - See Acute hypoxemic respiratory failure     Stage 3a Chronic Kidney Disease  Baseline Cr and BNP 1.1/17   1.2/15 on admission  sCr today 1.2, improved from 1.5      -d/c furosemide due to contraction alkalosis and increase in sCr indicating ROBBY   - trend CMP  - monitor I&O's, pure wick in place      Coronary Artery Disease involving  native coronary artery of native heart without angina pectoralis   S/p CABG x 3 2001  Home regiment: rosuvastatin 20 mg, Aspirin 81 mg       - Continue Home regiment      Afib   S/p ablation 2021   Home regiemt: Eliquis 2.5 BID  Recent device check showed no VT or VF since 2021     - Continue home regiment  - Consider full dose 5 mg BID Eliquis dosing. She meets age criteria, but does not meet sCr or weight criteria     HTN  Home regiment: Labetalol 200 mg BID, Losartan 100 mg      - continue home medication regiment      Pure Hypercholesterolemia  Home regiment: rosuvastatin 20 mg, itching reaction to atorvastatin  No rosuvastatin in hospital     - Continue Pravastatin 80 mg qd      Hypothyroidism   Home regiment: levothyroxine 125 mcg qd   On admission TSH 64.446, free T4 0.74, subclinical picture   Subclinical hypothyroid patients with cardiovascular hx and TSH >10 benefit from treatment     - Continue home regiment     Dementia with anxiety, unspecified dementia severity, unspecified type   Home regiment: Memantine 5 mg   Daughter reports increased confusion since admission. Patient currently not a baseline mentation. Most likely Hospital related delirium due to acute onset w/o underlying medical change       - Delirium precautions  - Scheduled Depakote 125 mg QHS   - Continue home medicatiom regiment   - fall precautions  - aspiration precautions                  VTE Risk Mitigation (From admission, onward)           Ordered       apixaban tablet 2.5 mg  2 times daily         05/02/23 1416       IP VTE HIGH RISK PATIENT  Once         05/02/23 1410       Place sequential compression device  Until discontinued         05/02/23 1410                       Discharge Planning   AIDEE: 5/5/2023     Code Status: Full Code   Is the patient medically ready for discharge?: Yes  Discharge Plan A: Assisted Living (lives in Memory Care)      Jinny Garcia  MS4 [employer]

## 2023-05-05 NOTE — PT/OT/SLP EVAL
Occupational Therapy   Evaluation/Treatment    Name: Lynn Mckinney  MRN: [id number]  Admitting Diagnosis: Acute hypoxemic respiratory failure  Recent Surgery: * No surgery found *      Recommendations:     Discharge Recommendations: return to nursing facility  Discharge Equipment Recommendations:  none  Barriers to discharge:  None    Assessment:     Lynn Mckinney is a 87 y.o. female with a medical diagnosis of Acute hypoxemic respiratory failure. Performance deficits affecting function: weakness, impaired endurance, impaired self care skills, impaired functional mobility, gait instability, impaired balance, impaired cardiopulmonary response to activity. Trial of therapy performed with pt on room air. Pt reported mild SOB upon exertion and SpO2 decreased to 70 % during gait trial- therapist re-applied nasal cannula at 2 L/min and O2 sats recovered after seated rest break and deep breathing technique with supplemental O2. RN notified. Patient would benefit from continued skilled acute OT 3x/wk to improve functional mobility, increase independence with ADLs, and address established goals prior to discharge.    Rehab Prognosis: Good; patient would benefit from acute skilled OT services to address these deficits and reach maximum level of function.       Plan:     Patient to be seen 3 x/week to address the above listed problems via self-care/home management, therapeutic activities, therapeutic exercises  Plan of Care Expires: 06/05/23  Plan of Care Reviewed with: patient, daughter    Subjective     Chief Complaint: SOB  Patient/Family Comments/goals: to return to PLOF    Occupational Profile:  Living Environment: Pt is a resident at Memory care facility (no stairs to navigate, facility is accessible)   Prior Level of Function:  ambulatory with RW, able to perform ADLs primarily with independence, but does have assistance for bathing   DME owned: rolling walker, bedside commode, and rollator  Support Available/Caregiver  Assistance:  staff at [facility]     Pain/Comfort:  Pain Rating 1: 0/10  Pain Rating Post-Intervention 1: 0/10    Patients cultural, spiritual, Druze conflicts given the current situation: no    Objective:     Communicated with: NSG prior to session.  Patient found HOB elevated with telemetry, oxygen, peripheral IV, PureWick upon OT entry to room.    General Precautions: Standard, fall  Orthopedic Precautions: N/A  Braces: N/A  Respiratory Status: Nasal cannula, flow 2 L/min    Occupational Performance:    Bed Mobility:    Patient completed Supine to Sit with supervision with HOB elevated    Functional Mobility/Transfers:  Patient completed Sit <> Stand Transfer with contact guard assistance  with  rolling walker   Patient completed Toilet Transfer bed>toilet; toilet>bedside chair technique with contact guard assistance with  rolling walker    Activities of Daily Living:  Lower Body Dressing: stand by assistance Roxie and donning socks  Toileting: SBA    Cognitive/Visual Perceptual:  Cognitive/Psychosocial Skills:     -       Oriented to: Person, Place, Time, and Situation   -       Follows Commands/attention:Follows two-step commands  -       Communication: clear/fluent  -       Safety awareness/insight to disability: intact   -       Mood/Affect/Coping skills/emotional control: Appropriate to situation  Visual/Perceptual:      -Intact      Physical Exam:  Upper Extremity Range of Motion:     -       Right Upper Extremity: WFL  -       Left Upper Extremity: WFL  Upper Extremity Strength:    -       Right Upper Extremity: WFL  -       Left Upper Extremity: WFL   Strength:    -       Right Upper Extremity: WFL  -       Left Upper Extremity: WFL  Fine Motor Coordination:    -       Intact  Gross motor coordination:   WFL    AMPAC 6 Click ADL:  AMPAC Total Score: 19    Treatment & Education:  Role of OT and POC  ADL retraining  Functional mobility training  Safety  Discharge planning  Importance  EOB/OOB activity    Co-treatment performed due to patient's multiple deficits requiring two skilled therapists to appropriately and safely assess patient's strength and endurance while facilitating functional tasks in addition to accommodating for patient's activity tolerance.     Patient left up in chair with all lines intact, call button in reach, and all needs met.     GOALS:   Multidisciplinary Problems       Occupational Therapy Goals          Problem: Occupational Therapy    Goal Priority Disciplines Outcome Interventions   Occupational Therapy Goal     OT, PT/OT Ongoing, Progressing    Description: Goals to be met by: 5/26/2023      Patient will increase functional independence with ADLs by performing:    UE Dressing with Set-up Assistance.  Grooming while standing at sink with Modified [place].  Toileting from toilet with Modified [place] for hygiene and clothing management.   Supine to sit with Modified [place].  Stand pivot transfers with Modified [place].  Toilet transfer to toilet with Modified [place].                         History:     Past Medical History:   Diagnosis Date    Allergy     seasonal    Blood clotting tendency     Carotid artery disease 5/29/2014    Coronary artery disease     Heart block     Hyperlipidemia     Hypertension     Hypothyroid     Left displaced femoral neck fracture 1/12/2023    Obesity     Pacemaker     PAD (peripheral artery disease)     Spinal stenosis     Thoracic or lumbosacral neuritis or radiculitis 11/25/2014    Tubular adenoma          Past Surgical History:   Procedure Laterality Date    ANGIOPLASTY      APPENDECTOMY      BREAST CYST ASPIRATION Bilateral     CARDIAC CATHETERIZATION      CARDIAC PACEMAKER PLACEMENT  10/10/13    by Dr. Coleman    CAROTID STENT Left 2007    by Dr. Bowen    CATARACT EXTRACTION W/  INTRAOCULAR LENS IMPLANT Bilateral 2012    Dr Sharp    CORONARY ARTERY BYPASS GRAFT  2001     x 3 LIMA-Ramus, SVG-OM1 & SVG-PDA     CORONARY BYPASS GRAFT ANGIOGRAPHY  3/11/2021    Procedure: Bypass graft study;  Surgeon: Gil Garcia MD;  Location: [facility] CATH LAB;  Service: Cardiology;;    CYST REMOVAL      wrist    EYE SURGERY      HEMIARTHROPLASTY OF HIP Left 1/13/2023    Procedure: HEMIARTHROPLASTY, HIP, LEFT;  Surgeon: Alan Vieyra MD;  Location: [facility] OR [address] [place] [address];  Service: Orthopedics;  Laterality: Left;    HYSTERECTOMY      LEFT HEART CATHETERIZATION Left 3/11/2021    Procedure: Left heart cath;  Surgeon: Gil Garcia MD;  Location: [facility] CATH LAB;  Service: Cardiology;  Laterality: Left;    OOPHORECTOMY      THYROIDECTOMY      VITRECTOMY BY PARS PLANA APPROACH Right 10/12/2021    Procedure: VITRECTOMY, PARS PLANA APPROACH;  Surgeon: Flako Carver MD;  Location: [address];  Service: Ophthalmology;  Laterality: Right;       Time Tracking:     OT Date of Treatment: 05/05/23  OT Start Time: 0853  OT Stop Time: 0925  OT Total Time (min): 32 min    Billable Minutes:Evaluation 9  Self Care/Home Management 23 5/5/2023

## 2023-05-05 NOTE — PLAN OF CARE
Plan of Care:  PT evaluation completed- see note for details. Goals and POC established.     2023    Physical Therapy Treatment Goals:  Multidisciplinary Problems       Physical Therapy Goals          Problem: Physical Therapy    Goal Priority Disciplines Outcome Goal Variances Interventions   Physical Therapy Goal     PT, PT/OT Ongoing, Progressing     Description: Goals to be met by: 23     Patient will increase functional independence with mobility by performin. Supine <> sit with Modified [place]  2. Sit to stand transfer with Modified [place]  3. Gait  x 150 feet with Supervision using Rolling Walker.   4. Stand for 5 minutes with Supervision using Rolling Walker  5. Lower extremity exercise program x20 reps per handout, with supervision

## 2023-05-05 NOTE — PLAN OF CARE
Just called and spoke with Neha Rose ([phone]) at the [facility] the patient is at and they do not provide the oxygen the patient would need. Was told she would need to get it from a DME provider. All orders , H&P, and current medication list sent to [facility] via fax and Patient awaiting oxygen so she can discharge.    Pham Mercado RN    [phone]

## 2023-05-05 NOTE — ASSESSMENT & PLAN NOTE
Patient has EF 35% with PCI/CABG 2021. Initial presentation with hypoxemia, dependent on exam rales, BNP elevation >1000, PARMAR and orthopnea reported (despite lack of peripheral evidence of edema). Concerning initially for heart failure exacerbation. Treated with IV lasix until dry on exam and contraction alkalosis. Continued to have rales and CT more consistent with ILD. She did not have a heart failure exacerbation on this admission.    - Hold lasix for now  - Cardiac diet  - Fluid restriction  - Strict I's/O's  - Low sodium  - Elevate HOB  - TTE with stable reduced EF, 40%, CVP 3  - Cardiac device check without evidence of arrhythmia since Oct 2021

## 2023-05-07 LAB — ALDOLASE SERPL-CCNC: 4.4 U/L (ref 1.2–7.6)

## 2023-05-08 ENCOUNTER — TELEPHONE (OUTPATIENT)
Dept: ELECTROPHYSIOLOGY | Facility: CLINIC | Age: 88
End: 2023-05-08
Payer: MEDICARE

## 2023-05-08 LAB
ANA PATTERN 1: NORMAL
ANA SER QL IF: POSITIVE
ANA TITR SER IF: NORMAL {TITER}
DSDNA AB SER-ACNC: NORMAL [IU]/ML

## 2023-05-08 NOTE — DISCHARGE INSTRUCTIONS
Medication instructions:  Complete your 5 days of antibiotics  If you are on a blood thinner (examples: apixiban [Eliquis], rivaroxaban [Xarelto], dabigatran [Pradaxa], or enoxaparin [Lovenox]), do not take your blood thinner for the next 5 days after your procedure. You can resume after 5 days post-procedure (i.e., when you complete your antibiotics). If you are on Coumadin you can continue to take it the day of your procedure  Pain control: you can take up to Tylenol 1000 mg every 6 hours as needed or (if you do not have kidney disease) ibuprofen 600 mg every 6 hours as needed as needed for pain control (if you do not have kidney disease). If unsure, please contact your primary care physician for recommendations.    Activity restrictions & precautions:  Wear you sling for 48 hours total, then nightly for the next 6 weeks  Surgical site dressing (see images below)  **Note: these are general rules to follow unless instructed otherwise**  If you have a brown rectangular gel bandage (A.K.A. Aquacel Ag dressing) over your surgical incision do not remove it. This will be removed at your device clinic follow up appointment in 1 week.  If you have a square light brown bandage (A.K.A Mepilex dressing) you should remove in 48 hours after your procedure.  You can shower after 24 hours post-procedure, but do not let the jet directly hit your pocket site for at least 2 weeks  Do not submerge your surgical incision site under water (swimming, bathing if you submerge the actual surgical site) for at least 6 week  No lifting over 5-10 pounds using your arm (on the side of your device) for the 2 weeks after your procedure   Do not lift your arm (on the side of your device) above shoulder height for 6 weeks  No driving for 1 week if you use the arm for driving that is on the opposite side as your device, and for 4 weeks if you use your arm for driving on the same side as your device  Follow up in device clinic in 1 week to check  your incision and device function  Please contact the electrophysiology clinic if you have any questions or if you experience: potential surgical/pocket site complications (pain, swelling, bleeding, drainage), fevers, chest pain/shortness of breath, or for any other concerns.

## 2023-05-08 NOTE — HPI
Ms. Mckinney is an 87 y.o. female with pAF (distant past), CAD (s/p CAB 2001), HTN, PVD, PPM (10/2013) here for CRT-D upgrade. S/p device upgrade from PPM to dual chamber ICD in 2021 with capped/abandoned RV pace/sense lead. She now has reduced EF to 35% with chronic RV pacing close to 100%. She has NYHA class III HF symptoms. She is on Eliquis 5 mg BID with last dose being this past Friday 5/5. She was recently discharged after being admitted with hypoxemic respiratory failure thought to be secondary to newly diagnosed NSIP +/- ADHF. ECG shows AS-.    From most recent EP visit:   pAF (in past? No sustained AF seen on device reports)  CAD  CABG 2001   HL on meds   HTN on meds   PVD carotidStent  holter showed 2:1 AV block, AVWB. Also has RBBB and LAFB.  PPM placed 10/13. Feels well since. C/o PARMAR at 2 flights (stable), but exertion is limited mostly by PAD.     3/10/2021: She was called today regarding a VF episode that occurred at 0413 this morning which lasted 25 seconds. Patient reports being awake during this time, but denies any symptoms.   Patient admitted to CCU after episode of asymptomatic VT on PPM. LHC completed on 3/12 with SHEREEN SVG to RCA. Patient evaluated by EP with AICD placement on 3/12.      She c/o PARMAR with long walks, consistent with NYHA III heart failure symptoms.     Echo 3/21 55% -> 8/22 45-50% -> 35%  ICD: 98% V-paced

## 2023-05-08 NOTE — SUBJECTIVE & OBJECTIVE
"Past Medical History:   Diagnosis Date    Allergy     seasonal    Blood clotting tendency     Carotid artery disease 5/29/2014    Coronary artery disease     Heart block     Hyperlipidemia     Hypertension     Hypothyroid     Left displaced femoral neck fracture 1/12/2023    Obesity     Pacemaker     PAD (peripheral artery disease)     Spinal stenosis     Thoracic or lumbosacral neuritis or radiculitis 11/25/2014    Tubular adenoma        Past Surgical History:   Procedure Laterality Date    ANGIOPLASTY      APPENDECTOMY      BREAST CYST ASPIRATION Bilateral     CARDIAC CATHETERIZATION      CARDIAC PACEMAKER PLACEMENT  10/10/13    by Dr. Coleman    CAROTID STENT Left 2007    by Dr. Bowen    CATARACT EXTRACTION W/  INTRAOCULAR LENS IMPLANT Bilateral 2012    Dr Sharp    CORONARY ARTERY BYPASS GRAFT  2001     x 3 LIMA-Ramus, SVG-OM1 & SVG-PDA    CORONARY BYPASS GRAFT ANGIOGRAPHY  3/11/2021    Procedure: Bypass graft study;  Surgeon: Gil Garcia MD;  Location: [facility] CATH LAB;  Service: Cardiology;;    CYST REMOVAL      wrist    EYE SURGERY      HEMIARTHROPLASTY OF HIP Left 1/13/2023    Procedure: HEMIARTHROPLASTY, HIP, LEFT;  Surgeon: Alan Vieyra MD;  Location: [facility] OR 2ND FLR;  Service: Orthopedics;  Laterality: Left;    HYSTERECTOMY      LEFT HEART CATHETERIZATION Left 3/11/2021    Procedure: Left heart cath;  Surgeon: Gil Garcia MD;  Location: [facility] CATH LAB;  Service: Cardiology;  Laterality: Left;    OOPHORECTOMY      THYROIDECTOMY      VITRECTOMY BY PARS PLANA APPROACH Right 10/12/2021    Procedure: VITRECTOMY, PARS PLANA APPROACH;  Surgeon: Flako Carver MD;  Location: [facility] OR 1ST FLR;  Service: Ophthalmology;  Laterality: Right;       Review of patient's allergies indicates:   Allergen Reactions    Lipitor [atorvastatin] Itching    Fosamax [alendronate]      Felt "strange"    Iodinated contrast media      Dye is only to be used if absolutely needed because of kidney function    Prolia " [denosumab]      Reaction after the Prolia    Sulfa (sulfonamide antibiotics)        No current facility-administered medications on file prior to encounter.     Current Outpatient Medications on File Prior to Encounter   Medication Sig    aspirin 81 MG Chew Take 81 mg by mouth every evening. 1 Tablet, Chewable Oral Every day    cholecalciferol, vitamin D3, (VITAMIN D3) 50 mcg (2,000 unit) Cap capsule Take 2,000 Units by mouth once daily.    coenzyme Q10 200 mg capsule Take 200 mg by mouth once daily.    irbesartan (AVAPRO) 300 MG tablet TAKE 1 TABLET (300 MG TOTAL) BY MOUTH EVERY EVENING. CANCEL IRBESARTAN/HCTZ COMBO    labetaloL (NORMODYNE) 200 MG tablet Take 1 tablet (200 mg total) by mouth 2 (two) times daily.    nitroGLYCERIN (NITROSTAT) 0.4 MG SL tablet PLACE 1 TABLET UNDER THE TONGUE EVERY 5 MIN AS NEEDED FOR CHEST PAIN. MAX:3 DOSES (Patient not taking: Reported on 2023)    SALMON OIL-OMEGA-3 FATTY ACIDS ORAL Take 1 capsule by mouth once daily.    vitamins  A,C,E-zinc-copper (PRESERVISION AREDS) 14,320-226-200 unit-mg-unit Cap Take by mouth Daily.     Family History       Problem Relation (Age of Onset)    Alzheimer's disease Mother    Diabetes Brother    Heart disease Father, Mother, Brother    Hypertension Father, Mother    No Known Problems Sister, Maternal Grandmother, Maternal Grandfather, Paternal Grandmother, Paternal Grandfather, Maternal Aunt, Maternal Uncle, Paternal Aunt, Paternal Uncle          Tobacco Use    Smoking status: Former     Types: Cigarettes     Quit date: 1984     Years since quittin.7    Smokeless tobacco: Never   Substance and Sexual Activity    Alcohol use: Yes     Alcohol/week: 1.0 standard drink     Types: 1 Shots of liquor per week     Comment: old fashion  with dinner every 6 mo     Drug use: No    Sexual activity: Not on file     Review of Systems   All other systems reviewed and are negative.  Objective:     Vital Signs (Most Recent):    Vital Signs (24h  Range):  BP: ()/()   Arterial Line BP: ()/()           There is no height or weight on file to calculate BMI.             Physical Exam  General: NAD. AAO  HENT: EOMI  Neck: supple. No JVD  CV: RRR. Normal S1/S2. No gallops, rubs, or murmurs. 2+ radial pulses  Resp: CTAB. No increased work of breathing  Ext: warm. No edema.         Significant Labs: All pertinent lab results from the last 24 hours have been reviewed.    Significant Imaging:  Reviewed

## 2023-05-08 NOTE — ASSESSMENT & PLAN NOTE
"Patient is here for CRT-D upgrade (dcICD in situ with capped/abandoned RV lead)  - CXR and ECG post-implant  - Device clinic follow up in 1 week  - Antibiotics x 5 days  - If patient is on oral anticoagulation, they will hold this for 5 days post-procedure    Indication: EF 40% with chronic RV pacing (CHB), NYHA class III symptoms  LVEF on most recent imagin%  Anticoagulation status: Eliquis 5 mg BID (held since )  Hgb: 12.4  Cr: 1.4  INR: 1.1  Constrast allergy: ?yes. Notes indicate "allergy" is related to reduced kidney function but not a true allergy.    The indication(s), risks, benefits and alternatives of the procedure were explained to the patient, patient's family and/or surrogate decision maker. Risks include (but not limited to) bleeding, pocket site hematoma, pocket and/or device infection which would often require extraction, pain, damage of vascular structures or surrounding structures, myocardial damage [perforation & tamponade requiring pericardiocentesis, valvular damage, injury to conduction system], pneumothorax, CVA, MI, and death. All questions were answered. Patient is understanding of these risks, and wishes to proceed. Consents signed.  "

## 2023-05-08 NOTE — TELEPHONE ENCOUNTER
Spoke to Susanna    CONFIRMED procedure arrival time of 8am    Reiterated instructions including:  -Directions to check in desk  -NPO after midnight night prior to procedure  -High importance of HOLDING eliquis, last dose Friday  -Pre-procedure LABS CBC done 5/5, CMP done 5/4, PT/INR done 5/2, reviewed no alerts noted, will do PTT upon arrival  -Confirmed absence or presence of implanted device/stimulator dcICD in place  -Confirmed no fever, cough, or shortness of breath in the past 30 days, recent hospitalization last week, Dr Coleman aware, he reviewed her notes and visited pt   -Do not wear mascara day of procedure  -Bathe night prior and morning prior to procedure with Hibiclens solution or an antibacterial soap  -Reviewed current visitor policy    Patient verbalized understanding of above and appreciated the call.

## 2023-05-09 ENCOUNTER — ANESTHESIA EVENT (OUTPATIENT)
Dept: MEDSURG UNIT | Facility: HOSPITAL | Age: 88
End: 2023-05-09
Payer: MEDICARE

## 2023-05-09 ENCOUNTER — ANESTHESIA (OUTPATIENT)
Dept: MEDSURG UNIT | Facility: HOSPITAL | Age: 88
End: 2023-05-09
Payer: MEDICARE

## 2023-05-09 ENCOUNTER — HOSPITAL ENCOUNTER (OUTPATIENT)
Facility: HOSPITAL | Age: 88
End: 2023-05-10
Attending: INTERNAL MEDICINE | Admitting: INTERNAL MEDICINE
Payer: MEDICARE

## 2023-05-09 DIAGNOSIS — I50.22 CHRONIC SYSTOLIC CONGESTIVE HEART FAILURE, NYHA CLASS 3: Primary | ICD-10-CM

## 2023-05-09 DIAGNOSIS — I49.01 VF (VENTRICULAR FIBRILLATION): ICD-10-CM

## 2023-05-09 DIAGNOSIS — Z95.810 ICD (IMPLANTABLE CARDIOVERTER-DEFIBRILLATOR), DUAL, IN SITU: ICD-10-CM

## 2023-05-09 DIAGNOSIS — I44.2 CHB (COMPLETE HEART BLOCK): ICD-10-CM

## 2023-05-09 DIAGNOSIS — I47.20 VT (VENTRICULAR TACHYCARDIA): ICD-10-CM

## 2023-05-09 DIAGNOSIS — I49.9 ARRHYTHMIA: ICD-10-CM

## 2023-05-09 DIAGNOSIS — I49.9 CARDIAC ARRHYTHMIA, UNSPECIFIED CARDIAC ARRHYTHMIA TYPE: ICD-10-CM

## 2023-05-09 LAB
ANTI SM ANTIBODY: 0.07 RATIO (ref 0–0.99)
ANTI SM ANTIBODY: 0.07 RATIO (ref 0–0.99)
ANTI SM/RNP ANTIBODY: 0.08 RATIO (ref 0–0.99)
ANTI-SM INTERPRETATION: NEGATIVE
ANTI-SM INTERPRETATION: NEGATIVE
ANTI-SM/RNP INTERPRETATION: NEGATIVE
ANTI-SSA ANTIBODY: 0.07 RATIO (ref 0–0.99)
ANTI-SSA ANTIBODY: 0.07 RATIO (ref 0–0.99)
ANTI-SSA INTERPRETATION: NEGATIVE
ANTI-SSA INTERPRETATION: NEGATIVE
ANTI-SSB ANTIBODY: 0.05 RATIO (ref 0–0.99)
ANTI-SSB ANTIBODY: 0.05 RATIO (ref 0–0.99)
ANTI-SSB INTERPRETATION: NEGATIVE
ANTI-SSB INTERPRETATION: NEGATIVE
APTT PPP: 25.6 SEC (ref 21–32)
DSDNA AB SER-ACNC: NORMAL [IU]/ML

## 2023-05-09 PROCEDURE — D9220A PRA ANESTHESIA: ICD-10-PCS | Mod: CRNA,,, | Performed by: NURSE ANESTHETIST, CERTIFIED REGISTERED

## 2023-05-09 PROCEDURE — 63600175 PHARM REV CODE 636 W HCPCS: Performed by: INTERNAL MEDICINE

## 2023-05-09 PROCEDURE — C1893 INTRO/SHEATH, FIXED,NON-PEEL: HCPCS | Performed by: INTERNAL MEDICINE

## 2023-05-09 PROCEDURE — D9220A PRA ANESTHESIA: ICD-10-PCS | Mod: ANES,,, | Performed by: ANESTHESIOLOGY

## 2023-05-09 PROCEDURE — 37000009 HC ANESTHESIA EA ADD 15 MINS: Performed by: INTERNAL MEDICINE

## 2023-05-09 PROCEDURE — D9220A PRA ANESTHESIA: Mod: ANES,,, | Performed by: ANESTHESIOLOGY

## 2023-05-09 PROCEDURE — C1769 GUIDE WIRE: HCPCS | Performed by: INTERNAL MEDICINE

## 2023-05-09 PROCEDURE — 93010 EKG 12-LEAD: ICD-10-PCS | Mod: ,,, | Performed by: INTERNAL MEDICINE

## 2023-05-09 PROCEDURE — 37000008 HC ANESTHESIA 1ST 15 MINUTES: Performed by: INTERNAL MEDICINE

## 2023-05-09 PROCEDURE — 85730 THROMBOPLASTIN TIME PARTIAL: CPT | Performed by: INTERNAL MEDICINE

## 2023-05-09 PROCEDURE — 25000003 PHARM REV CODE 250: Performed by: NURSE ANESTHETIST, CERTIFIED REGISTERED

## 2023-05-09 PROCEDURE — 93005 ELECTROCARDIOGRAM TRACING: CPT | Mod: 59

## 2023-05-09 PROCEDURE — C1882 AICD, OTHER THAN SING/DUAL: HCPCS | Performed by: INTERNAL MEDICINE

## 2023-05-09 PROCEDURE — 25000003 PHARM REV CODE 250: Performed by: INTERNAL MEDICINE

## 2023-05-09 PROCEDURE — C1730 CATH, EP, 19 OR FEW ELECT: HCPCS | Performed by: INTERNAL MEDICINE

## 2023-05-09 PROCEDURE — 33264 RMVL & RPLCMT DFB GEN MLT LD: CPT | Performed by: INTERNAL MEDICINE

## 2023-05-09 PROCEDURE — C1894 INTRO/SHEATH, NON-LASER: HCPCS | Performed by: INTERNAL MEDICINE

## 2023-05-09 PROCEDURE — 93010 ELECTROCARDIOGRAM REPORT: CPT | Mod: ,,, | Performed by: INTERNAL MEDICINE

## 2023-05-09 PROCEDURE — 25500020 PHARM REV CODE 255: Performed by: INTERNAL MEDICINE

## 2023-05-09 PROCEDURE — 93005 ELECTROCARDIOGRAM TRACING: CPT

## 2023-05-09 PROCEDURE — 63600175 PHARM REV CODE 636 W HCPCS: Performed by: NURSE ANESTHETIST, CERTIFIED REGISTERED

## 2023-05-09 PROCEDURE — 33264 PR REMV&REPLC CVD GEN MULT LEAD: ICD-10-PCS | Mod: ,,, | Performed by: INTERNAL MEDICINE

## 2023-05-09 PROCEDURE — C1725 CATH, TRANSLUMIN NON-LASER: HCPCS | Performed by: INTERNAL MEDICINE

## 2023-05-09 PROCEDURE — 99499 NO LOS: ICD-10-PCS | Mod: ,,, | Performed by: INTERNAL MEDICINE

## 2023-05-09 PROCEDURE — 33225 L VENTRIC PACING LEAD ADD-ON: CPT | Performed by: INTERNAL MEDICINE

## 2023-05-09 PROCEDURE — 27201423 OPTIME MED/SURG SUP & DEVICES STERILE SUPPLY: Performed by: INTERNAL MEDICINE

## 2023-05-09 PROCEDURE — 33225 L VENTRIC PACING LEAD ADD-ON: CPT | Mod: ,,, | Performed by: INTERNAL MEDICINE

## 2023-05-09 PROCEDURE — 33225 PR INSRT PACING ELECT,AT INSERT NEW DEVICE: ICD-10-PCS | Mod: ,,, | Performed by: INTERNAL MEDICINE

## 2023-05-09 PROCEDURE — D9220A PRA ANESTHESIA: Mod: CRNA,,, | Performed by: NURSE ANESTHETIST, CERTIFIED REGISTERED

## 2023-05-09 PROCEDURE — 33264 RMVL & RPLCMT DFB GEN MLT LD: CPT | Mod: ,,, | Performed by: INTERNAL MEDICINE

## 2023-05-09 PROCEDURE — C1900 LEAD, CORONARY VENOUS: HCPCS | Performed by: INTERNAL MEDICINE

## 2023-05-09 PROCEDURE — 99499 UNLISTED E&M SERVICE: CPT | Mod: ,,, | Performed by: INTERNAL MEDICINE

## 2023-05-09 DEVICE — LEFT HEART LEAD
Type: IMPLANTABLE DEVICE | Site: HEART | Status: FUNCTIONAL
Brand: QUARTET™

## 2023-05-09 DEVICE — DF-1 (3.2 MM) RECEPTACLE PLUG
Type: IMPLANTABLE DEVICE | Site: CHEST | Status: FUNCTIONAL
Brand: SJM™

## 2023-05-09 DEVICE — CARDIAC RESYNCHRONIZATION DEVICE, TIERED-THERAPY CARDIOVERTER/DEFIBRILLATOR VVED DDDRV
Type: IMPLANTABLE DEVICE | Site: CHEST | Status: FUNCTIONAL
Brand: QUADRA ASSURA MP™

## 2023-05-09 RX ORDER — ONDANSETRON 2 MG/ML
4 INJECTION INTRAMUSCULAR; INTRAVENOUS ONCE AS NEEDED
Status: DISCONTINUED | OUTPATIENT
Start: 2023-05-09 | End: 2023-05-10 | Stop reason: HOSPADM

## 2023-05-09 RX ORDER — MEMANTINE HYDROCHLORIDE 5 MG/1
5 TABLET ORAL 2 TIMES DAILY
Status: DISCONTINUED | OUTPATIENT
Start: 2023-05-09 | End: 2023-05-10 | Stop reason: HOSPADM

## 2023-05-09 RX ORDER — DEXAMETHASONE SODIUM PHOSPHATE 4 MG/ML
INJECTION, SOLUTION INTRA-ARTICULAR; INTRALESIONAL; INTRAMUSCULAR; INTRAVENOUS; SOFT TISSUE
Status: DISCONTINUED | OUTPATIENT
Start: 2023-05-09 | End: 2023-05-09

## 2023-05-09 RX ORDER — ACETAMINOPHEN 325 MG/1
650 TABLET ORAL EVERY 4 HOURS PRN
Status: DISCONTINUED | OUTPATIENT
Start: 2023-05-09 | End: 2023-05-10 | Stop reason: HOSPADM

## 2023-05-09 RX ORDER — DOXYCYCLINE 100 MG/1
100 CAPSULE ORAL EVERY 12 HOURS
Qty: 10 CAPSULE | Refills: 0 | Status: SHIPPED | OUTPATIENT
Start: 2023-05-09 | End: 2023-05-14

## 2023-05-09 RX ORDER — FENTANYL CITRATE 50 UG/ML
25 INJECTION, SOLUTION INTRAMUSCULAR; INTRAVENOUS EVERY 5 MIN PRN
Status: DISCONTINUED | OUTPATIENT
Start: 2023-05-09 | End: 2023-05-10 | Stop reason: HOSPADM

## 2023-05-09 RX ORDER — EPHEDRINE SULFATE 50 MG/ML
INJECTION, SOLUTION INTRAVENOUS
Status: DISCONTINUED | OUTPATIENT
Start: 2023-05-09 | End: 2023-05-09

## 2023-05-09 RX ORDER — AMLODIPINE BESYLATE 10 MG/1
10 TABLET ORAL DAILY
Status: DISCONTINUED | OUTPATIENT
Start: 2023-05-10 | End: 2023-05-10 | Stop reason: HOSPADM

## 2023-05-09 RX ORDER — MIDAZOLAM HYDROCHLORIDE 1 MG/ML
INJECTION, SOLUTION INTRAMUSCULAR; INTRAVENOUS
Status: DISCONTINUED | OUTPATIENT
Start: 2023-05-09 | End: 2023-05-09

## 2023-05-09 RX ORDER — LABETALOL 100 MG/1
200 TABLET, FILM COATED ORAL 2 TIMES DAILY
Status: DISCONTINUED | OUTPATIENT
Start: 2023-05-09 | End: 2023-05-10 | Stop reason: HOSPADM

## 2023-05-09 RX ORDER — SODIUM CHLORIDE 9 MG/ML
INJECTION, SOLUTION INTRAVENOUS CONTINUOUS PRN
Status: DISCONTINUED | OUTPATIENT
Start: 2023-05-09 | End: 2023-05-09

## 2023-05-09 RX ORDER — ONDANSETRON 2 MG/ML
INJECTION INTRAMUSCULAR; INTRAVENOUS
Status: DISCONTINUED | OUTPATIENT
Start: 2023-05-09 | End: 2023-05-09

## 2023-05-09 RX ORDER — DIPHENHYDRAMINE HYDROCHLORIDE 50 MG/ML
25 INJECTION INTRAMUSCULAR; INTRAVENOUS EVERY 6 HOURS PRN
Status: DISCONTINUED | OUTPATIENT
Start: 2023-05-09 | End: 2023-05-10 | Stop reason: HOSPADM

## 2023-05-09 RX ORDER — LIDOCAINE HYDROCHLORIDE 20 MG/ML
INJECTION INTRAVENOUS
Status: DISCONTINUED | OUTPATIENT
Start: 2023-05-09 | End: 2023-05-09

## 2023-05-09 RX ORDER — FENTANYL CITRATE 50 UG/ML
INJECTION, SOLUTION INTRAMUSCULAR; INTRAVENOUS
Status: DISCONTINUED | OUTPATIENT
Start: 2023-05-09 | End: 2023-05-09

## 2023-05-09 RX ORDER — IODIXANOL 320 MG/ML
INJECTION, SOLUTION INTRAVASCULAR
Status: DISCONTINUED | OUTPATIENT
Start: 2023-05-09 | End: 2023-05-10 | Stop reason: HOSPADM

## 2023-05-09 RX ORDER — DOXYCYCLINE HYCLATE 100 MG
100 TABLET ORAL EVERY 12 HOURS
Status: DISCONTINUED | OUTPATIENT
Start: 2023-05-10 | End: 2023-05-10 | Stop reason: HOSPADM

## 2023-05-09 RX ORDER — LOSARTAN POTASSIUM 50 MG/1
50 TABLET ORAL DAILY
Status: DISCONTINUED | OUTPATIENT
Start: 2023-05-10 | End: 2023-05-10 | Stop reason: HOSPADM

## 2023-05-09 RX ORDER — SODIUM CHLORIDE 0.9 G/100ML
IRRIGANT IRRIGATION
Status: DISCONTINUED | OUTPATIENT
Start: 2023-05-09 | End: 2023-05-10 | Stop reason: HOSPADM

## 2023-05-09 RX ORDER — HYDROMORPHONE HYDROCHLORIDE 1 MG/ML
0.2 INJECTION, SOLUTION INTRAMUSCULAR; INTRAVENOUS; SUBCUTANEOUS EVERY 5 MIN PRN
Status: DISCONTINUED | OUTPATIENT
Start: 2023-05-09 | End: 2023-05-10 | Stop reason: HOSPADM

## 2023-05-09 RX ORDER — NAPROXEN SODIUM 220 MG/1
81 TABLET, FILM COATED ORAL NIGHTLY
Status: DISCONTINUED | OUTPATIENT
Start: 2023-05-10 | End: 2023-05-10 | Stop reason: HOSPADM

## 2023-05-09 RX ORDER — PROPOFOL 10 MG/ML
VIAL (ML) INTRAVENOUS CONTINUOUS PRN
Status: DISCONTINUED | OUTPATIENT
Start: 2023-05-09 | End: 2023-05-09

## 2023-05-09 RX ORDER — LIDOCAINE HYDROCHLORIDE 20 MG/ML
INJECTION, SOLUTION INFILTRATION; PERINEURAL
Status: DISCONTINUED | OUTPATIENT
Start: 2023-05-09 | End: 2023-05-10 | Stop reason: HOSPADM

## 2023-05-09 RX ORDER — VANCOMYCIN HYDROCHLORIDE 1 G/20ML
INJECTION, POWDER, LYOPHILIZED, FOR SOLUTION INTRAVENOUS
Status: DISCONTINUED | OUTPATIENT
Start: 2023-05-09 | End: 2023-05-10 | Stop reason: HOSPADM

## 2023-05-09 RX ORDER — BUPIVACAINE HYDROCHLORIDE 2.5 MG/ML
INJECTION, SOLUTION EPIDURAL; INFILTRATION; INTRACAUDAL
Status: DISCONTINUED | OUTPATIENT
Start: 2023-05-09 | End: 2023-05-10 | Stop reason: HOSPADM

## 2023-05-09 RX ADMIN — EPHEDRINE SULFATE 10 MG: 50 INJECTION INTRAMUSCULAR; INTRAVENOUS; SUBCUTANEOUS at 10:05

## 2023-05-09 RX ADMIN — EPHEDRINE SULFATE 5 MG: 50 INJECTION INTRAMUSCULAR; INTRAVENOUS; SUBCUTANEOUS at 11:05

## 2023-05-09 RX ADMIN — EPHEDRINE SULFATE 5 MG: 50 INJECTION INTRAMUSCULAR; INTRAVENOUS; SUBCUTANEOUS at 10:05

## 2023-05-09 RX ADMIN — EPHEDRINE SULFATE 5 MG: 50 INJECTION INTRAMUSCULAR; INTRAVENOUS; SUBCUTANEOUS at 12:05

## 2023-05-09 RX ADMIN — LABETALOL HYDROCHLORIDE 200 MG: 100 TABLET, FILM COATED ORAL at 09:05

## 2023-05-09 RX ADMIN — DEXAMETHASONE SODIUM PHOSPHATE 4 MG: 4 INJECTION, SOLUTION INTRAMUSCULAR; INTRAVENOUS at 11:05

## 2023-05-09 RX ADMIN — PROPOFOL 100 MCG/KG/MIN: 10 INJECTION, EMULSION INTRAVENOUS at 10:05

## 2023-05-09 RX ADMIN — ONDANSETRON 4 MG: 2 INJECTION INTRAMUSCULAR; INTRAVENOUS at 11:05

## 2023-05-09 RX ADMIN — LIDOCAINE HYDROCHLORIDE 40 MG: 20 INJECTION INTRAVENOUS at 10:05

## 2023-05-09 RX ADMIN — EPHEDRINE SULFATE 10 MG: 50 INJECTION INTRAMUSCULAR; INTRAVENOUS; SUBCUTANEOUS at 11:05

## 2023-05-09 RX ADMIN — FENTANYL CITRATE 25 MCG: 0.05 INJECTION, SOLUTION INTRAMUSCULAR; INTRAVENOUS at 10:05

## 2023-05-09 RX ADMIN — MEMANTINE HYDROCHLORIDE 5 MG: 5 TABLET ORAL at 09:05

## 2023-05-09 RX ADMIN — MIDAZOLAM HYDROCHLORIDE 1 MG: 1 INJECTION, SOLUTION INTRAMUSCULAR; INTRAVENOUS at 10:05

## 2023-05-09 RX ADMIN — SODIUM CHLORIDE: 9 INJECTION, SOLUTION INTRAVENOUS at 10:05

## 2023-05-09 RX ADMIN — CEFAZOLIN 2 G: 2 INJECTION, POWDER, FOR SOLUTION INTRAMUSCULAR; INTRAVENOUS at 10:05

## 2023-05-09 RX ADMIN — CEFAZOLIN 2 G: 2 INJECTION, POWDER, FOR SOLUTION INTRAMUSCULAR; INTRAVENOUS at 09:05

## 2023-05-09 NOTE — ANESTHESIA PREPROCEDURE EVALUATION
05/09/2023  Lynn Mckinney is a 87 y.o., female.  Patient Active Problem List   Diagnosis    Primary hypertension    Pure hypercholesterolemia    Peripheral arterial disease    Coronary artery disease involving native coronary artery of native heart without angina pectoris    Hypothyroidism    Incontinence    Stage 3a chronic kidney disease    Vitamin D deficiency    CHB (complete heart block)    ICD (implantable cardioverter-defibrillator), dual, in situ    Left-sided carotid artery disease    Claudication    Right hip pain    Lumbar stenosis    Thoracic or lumbosacral neuritis or radiculitis    Left hip pain    Unsteady gait    Bilateral carotid artery stenosis    Lumbar radiculitis    Age-related osteoporosis without current pathological fracture    History of dental problems    VF (ventricular fibrillation)    VT (ventricular tachycardia)    S/P CABG x 3    Macular subretinal hemorrhage, right    Memory difficulties    MRI contraindicated due to metal implant    Aortic atherosclerosis    Closed displaced fracture of left femoral neck s/p left hip hemiarthroplasty on 1/13/2023    Chronic combined systolic and diastolic heart failure    Age-related osteoporosis with current pathological fracture with routine healing    Encounter for long-term (current) use of medications    Thrombocytopenia, unspecified    Dementia with anxiety, unspecified dementia severity, unspecified dementia type    Encounter for medical examination to establish care    Acute hypoxemic respiratory failure    A-fib    Hypokalemia           Pre-op Assessment          Review of Systems      Physical Exam    Airway:  No airway management difficulties anticipated  Dental:No active dental issues noted  Chest/Lungs:  Clear to auscultation    Heart:  Rate: Normal  Rhythm: Regular Rhythm  Sounds:  Normal        Anesthesia Plan  Type of Anesthesia, risks & benefits discussed:    Anesthesia Type: Gen Natural Airway, MAC  Informed Consent: Informed consent signed with the Patient and all parties understand the risks and agree with anesthesia plan.  All questions answered.   ASA Score: 3  Anesthesia Plan Notes: Chart reviewed. Patient seen and examined. Anesthesia plan discussed and questions answered. E-consent signed. Zac Arriaga MD    Ready For Surgery From Anesthesia Perspective.     .

## 2023-05-09 NOTE — TRANSFER OF CARE
"Anesthesia Transfer of Care Note    Patient: Lynn Mckinney    Procedure(s) Performed: Procedure(s) (LRB):  INSERTION, ICD, BIVENTRICULAR (Left)    Patient location: PACU    Anesthesia Type: general    Transport from OR: Transported from OR on 6-10 L/min O2 by face mask with adequate spontaneous ventilation    Post pain: adequate analgesia    Post assessment: no apparent anesthetic complications and tolerated procedure well    Post vital signs: stable    Level of consciousness: awake and responds to stimulation    Nausea/Vomiting: no nausea/vomiting    Complications: none    Transfer of care protocol was followed      Last vitals:   Visit Vitals  BP (!) 167/74 (BP Location: Left arm, Patient Position: Lying)   Pulse 88   Temp 36.3 °C (97.4 °F) (Temporal)   Resp 18   Ht 5' 4" (1.626 m)   Wt 59 kg (130 lb)   SpO2 99%   Breastfeeding No   BMI 22.31 kg/m²     "

## 2023-05-09 NOTE — NURSING TRANSFER
Nursing Transfer Note      5/9/2023     Reason patient is being transferred: post op    Transfer To: SSCU 1    Transfer via stretcher    Transfer with cardiac monitoring    Transported by Carlotta    Telemetry: Telemetry box in placeNone    Medicines sent: None    Any special needs or follow-up needed: None    Chart send with patient: Yes    Notified: pt adalgisa    Patient reassessed at: 5/9/2023 1515  1  Upon arrival to floor: cardiac monitor applied, patient oriented to room, call bell in reach, and bed in lowest position

## 2023-05-09 NOTE — ANESTHESIA PROCEDURE NOTES
Intubation    Date/Time: 5/9/2023 11:17 AM  Performed by: Sandra Ness CRNA  Authorized by: Zac Arriaga MD     Intubation:     Induction:  Intravenous    Intubated:  Postinduction    Mask Ventilation:  Easy mask    Attempts:  1    Attempted By:  Staff anesthesiologist    Difficult Airway Encountered?: No      Complications:  None    Airway Device:  Supraglottic airway/LMA    Airway Device Size:  3.0    Style/Cuff Inflation:  Cuffed (inflated to minimal occlusive pressure)    Placement Verified By:  Capnometry    Complicating Factors:  None    Findings Post-Intubation:  BS equal bilateral and atraumatic/condition of teeth unchanged

## 2023-05-09 NOTE — BRIEF OP NOTE
Attending: Diony Coleman MD  Date of Procedure: 05/09/2023    Post-operative Diagnosis: EF 35%, NYHA class III symptoms, and high burden of RV pacing    Procedure Performed: BiV upgrade    Description of Procedure: The patient was brought to the EP lab in the fasting state. Prepped and draped in sterile fashion. Safety timeout was performed. Sedation administered by anesthesia staff. Selective venogram of the left axillary and cephalic veins performed via left ac IV. Lidocaine used for local anesthetic. Incision made. Blunt and electrocautery dissection performed into previous pocket. Generator removed from pocket.  Fluoroscopic guided axillary vein access obtained. Peel away sheath advanced over J wire. CS catheter advanced into CS. Selective venogram of the CS performed to evaluate anatomy to define optimal positioning. CS lead advanced into place. Testing performed including testing for diaphragmatic stimulation. Pocket washed using antibiotic solution. Leads connected to generator. Generator placed into pocket, sutured in place, and washed with antibiotic solution. Deep layer closed with interrupted 3-0 suture. Intermediate layer closed with running 3-0 suture. Superficial layer closed with running 4-0 suture. Skin closed with Dermabond. Meplex dressing to be placed after dermabond has dried.     EBL: <10 mL    Specimens: none  Complications: no immediate    Post-CIED implantation instructions:  Doxycycline 100 mg BID x 5 days starting this evening  Avoid all heparin products (e.g., DOACs, enoxaparin, heparin) for 5 days following implant. If the patient is taking coumadin, this can be continued uninterrupted  CXR & ECG (ordered)  Sling to arm for first 48 hours continuously, then only nightly for 6 weeks  No lifting elbow above shoulder height on arm ipsilateral to implant device for 6 weeks  No lifting over 5 lbs. for 2 weeks with arm ipsilateral to implanted device  No driving for 1 week if patient uses arm  contralateral to implantation and 4 weeks if patient uses arm ipsilateral to implantation  Patient can shower starting post-procedure day 1. Do not submerge surgical site for 1 month (e.g., bathing or swimming)  Dressing can be removed tomorrow (Mepilex dressing only) or in 1 week at device clinic follow up (Aquacel dressing only)  Patient will be scheduled for device clinic follow up in 1 week to assess surgical site and device interrogation  Please contact EP for any questions or concerns at [phone] or page EP fellow on call  Patient is to seek immediate medical attention for acute onset of chest pain, shortness of breath, syncope, or evidence of surgical site infection or hematoma.    Plan for discharge following bedrest if patient tolerating PO intake, voiding, and ambulatory without evidence of complications     The attending physician was present for entire duration of the procedure    Remi Reyes MD, PGY7  Electrophysiology

## 2023-05-09 NOTE — NURSING
Received report from ТАТЬЯНА Jung. Patient s/p ICD insertion, AAOx3. VSS, no c/o pain or discomfort at this time, resp even and unlabored. Aquacel dressing to L chest. No active bleeding. No hematoma noted. Post procedure protocol reviewed with patient and patient's niece. Understanding verbalized. Niece at bedside. Nurse call bell within reach. Will continue to monitor per post procedure protocol. Pt to stay overnight in CSU.

## 2023-05-09 NOTE — Clinical Note
A venogram was performed in the coronary sinus. A balloon was inserted. The vessel was injected via hand injection  with 2 mL of contrast.

## 2023-05-09 NOTE — PLAN OF CARE
Received report from ТАТЬЯНА Hernandes. Patient s/p icd insertion, AAOx3. VSS, no c/o pain or discomfort at this time, resp even and unlabored. Dressing to chest wall is CDI. No active bleeding. No hematoma noted. Post procedure protocol reviewed with patient and patient's family. Understanding verbalized. Family members at bedside. Nurse call bell within reach. Will continue to monitor per post procedure protocol.

## 2023-05-09 NOTE — Clinical Note
A generator pocket was made at the left chest with blunt dissection, electrocautery, plasma blade and sharp dissection.

## 2023-05-09 NOTE — H&P
[facility] - Short Stay Cardiac Unit  Cardiac Electrophysiology  History and Physical     Admission Date: 5/9/2023  Code Status: Prior   Attending Provider: Diony Coleman MD   Principal Problem:<principal problem not specified>    Subjective:     Chief Complaint:  HFrEF LVEF 35% with chronic high burden of RV pacing and NYHA class III symptoms    HPI:  Ms. Mckinney is an 87 y.o. female with pAF (distant past), CAD (s/p CABH 2001), HTN, PVD, PPM (10/2013) here for CRT-D upgrade. S/p device upgrade from PPM to dual chamber ICD in 2021 with capped/abandoned RV pace/sense lead. She now has reduced EF to 35% with chronic RV pacing close to 100%. She has NYHA class III HF symptoms. She is on Eliquis 5 mg BID with last dose being this past Friday 5/5. She was recently discharged after being admitted with hypoxemic respiratory failure thought to be secondary to newly diagnosed NSIP +/- ADHF. ECG shows AS-.    From most recent EP visit:   pAF (in past? No sustained AF seen on device reports)  CAD  CABG 2001   HL on meds   HTN on meds   PVD carotidStent  holter showed 2:1 AV block, AVWB. Also has RBBB and LAFB.  PPM placed 10/13. Feels well since. C/o PARMAR at 2 flights (stable), but exertion is limited mostly by PAD.     3/10/2021: She was called today regarding a VF episode that occurred at 0413 this morning which lasted 25 seconds. Patient reports being awake during this time, but denies any symptoms.   Patient admitted to CCU after episode of asymptomatic VT on PPM. LHC completed on 3/12 with SHEREEN SVG to RCA. Patient evaluated by EP with AICD placement on 3/12.      She c/o PARMAR with long walks, consistent with NYHA III heart failure symptoms.     Echo 3/21 55% -> 8/22 45-50% -> 35%  ICD: 98% V-paced      Past Medical History:   Diagnosis Date    Allergy     seasonal    Blood clotting tendency     Carotid artery disease 5/29/2014    Coronary artery disease     Heart block     Hyperlipidemia     Hypertension      "Hypothyroid     Left displaced femoral neck fracture 1/12/2023    Obesity     Pacemaker     PAD (peripheral artery disease)     Spinal stenosis     Thoracic or lumbosacral neuritis or radiculitis 11/25/2014    Tubular adenoma        Past Surgical History:   Procedure Laterality Date    ANGIOPLASTY      APPENDECTOMY      BREAST CYST ASPIRATION Bilateral     CARDIAC CATHETERIZATION      CARDIAC PACEMAKER PLACEMENT  10/10/13    by Dr. Coleman    CAROTID STENT Left 2007    by Dr. Bowen    CATARACT EXTRACTION W/  INTRAOCULAR LENS IMPLANT Bilateral 2012    Dr Sharp    CORONARY ARTERY BYPASS GRAFT  2001     x 3 LIMA-Ramus, SVG-OM1 & SVG-PDA    CORONARY BYPASS GRAFT ANGIOGRAPHY  3/11/2021    Procedure: Bypass graft study;  Surgeon: Gil Garcia MD;  Location: [facility] CATH LAB;  Service: Cardiology;;    CYST REMOVAL      wrist    EYE SURGERY      HEMIARTHROPLASTY OF HIP Left 1/13/2023    Procedure: HEMIARTHROPLASTY, HIP, LEFT;  Surgeon: Alan Vieyra MD;  Location: [facility] OR 2ND FLR;  Service: Orthopedics;  Laterality: Left;    HYSTERECTOMY      LEFT HEART CATHETERIZATION Left 3/11/2021    Procedure: Left heart cath;  Surgeon: Gil Garcia MD;  Location: [facility] CATH LAB;  Service: Cardiology;  Laterality: Left;    OOPHORECTOMY      THYROIDECTOMY      VITRECTOMY BY PARS PLANA APPROACH Right 10/12/2021    Procedure: VITRECTOMY, PARS PLANA APPROACH;  Surgeon: Flako Carver MD;  [facility];  Service: Ophthalmology;  Laterality: Right;       Review of patient's allergies indicates:   Allergen Reactions    Lipitor [atorvastatin] Itching    Fosamax [alendronate]      Felt "strange"    Iodinated contrast media      Dye is only to be used if absolutely needed because of kidney function    Prolia [denosumab]      Reaction after the Prolia    Sulfa (sulfonamide antibiotics)        No current facility-administered medications on file prior to encounter.     Current Outpatient " Medications on File Prior to Encounter   Medication Sig    aspirin 81 MG Chew Take 81 mg by mouth every evening. 1 Tablet, Chewable Oral Every day    cholecalciferol, vitamin D3, (VITAMIN D3) 50 mcg (2,000 unit) Cap capsule Take 2,000 Units by mouth once daily.    coenzyme Q10 200 mg capsule Take 200 mg by mouth once daily.    irbesartan (AVAPRO) 300 MG tablet TAKE 1 TABLET (300 MG TOTAL) BY MOUTH EVERY EVENING. CANCEL IRBESARTAN/HCTZ COMBO    labetaloL (NORMODYNE) 200 MG tablet Take 1 tablet (200 mg total) by mouth 2 (two) times daily.    nitroGLYCERIN (NITROSTAT) 0.4 MG SL tablet PLACE 1 TABLET UNDER THE TONGUE EVERY 5 MIN AS NEEDED FOR CHEST PAIN. MAX:3 DOSES (Patient not taking: Reported on 2023)    SALMON OIL-OMEGA-3 FATTY ACIDS ORAL Take 1 capsule by mouth once daily.    vitamins  A,C,E-zinc-copper (PRESERVISION AREDS) 14,320-226-200 unit-mg-unit Cap Take by mouth Daily.     Family History       Problem Relation (Age of Onset)    Alzheimer's disease Mother    Diabetes Brother    Heart disease Father, Mother, Brother    Hypertension Father, Mother    No Known Problems Sister, Maternal Grandmother, Maternal Grandfather, Paternal Grandmother, Paternal Grandfather, Maternal Aunt, Maternal Uncle, Paternal Aunt, Paternal Uncle          Tobacco Use    Smoking status: Former     Types: Cigarettes     Quit date: 1984     Years since quittin.7    Smokeless tobacco: Never   Substance and Sexual Activity    Alcohol use: Yes     Alcohol/week: 1.0 standard drink     Types: 1 Shots of liquor per week     Comment: old fashion  with dinner every 6 mo     Drug use: No    Sexual activity: Not on file     Review of Systems   All other systems reviewed and are negative.  Objective:     Vital Signs (Most Recent):    Vital Signs (24h Range):  BP: ()/()   Arterial Line BP: ()/()           There is no height or weight on file to calculate BMI.             Physical Exam  General: NAD. AAO  HENT: EOMI  Neck:  "supple. No JVD  CV: RRR. Normal S1/S2. No gallops, rubs, or murmurs. 2+ radial pulses  Resp: CTAB. No increased work of breathing  Ext: warm. No edema.         Significant Labs: All pertinent lab results from the last 24 hours have been reviewed.    Significant Imaging:  Reviewed    Assessment and Plan:     Chronic combined systolic and diastolic heart failure  Patient is here for CRT-D upgrade (dcICD in situ with capped/abandoned RV lead)  - CXR and ECG post-implant  - Device clinic follow up in 1 week  - Antibiotics x 5 days  - If patient is on oral anticoagulation, they will hold this for 5 days post-procedure    Indication: EF 40% with chronic RV pacing (CHB), NYHA class III symptoms  LVEF on most recent imagin%  Anticoagulation status: Eliquis 5 mg BID (held since )  Hgb: 12.4  Cr: 1.4  INR: 1.1  Constrast allergy: ?yes. Notes indicate "allergy" is related to reduced kidney function but not a true allergy.    The indication(s), risks, benefits and alternatives of the procedure were explained to the patient, patient's family and/or surrogate decision maker. Risks include (but not limited to) bleeding, pocket site hematoma, pocket and/or device infection which would often require extraction, pain, damage of vascular structures or surrounding structures, myocardial damage [perforation & tamponade requiring pericardiocentesis, valvular damage, injury to conduction system], pneumothorax, CVA, MI, and death. All questions were answered. Patient is understanding of these risks, and wishes to proceed. Consents signed.        Aleksey Reyes MD  Cardiac Electrophysiology  [facility] - Short Stay Cardiac Unit    "

## 2023-05-09 NOTE — ANESTHESIA POSTPROCEDURE EVALUATION
Anesthesia Post Evaluation    Patient: Lynn Mckinney    Procedure(s) Performed: Procedure(s) (LRB):  INSERTION, ICD, BIVENTRICULAR (Left)    Final Anesthesia Type: general      Patient location during evaluation: PACU  Patient participation: Yes- Able to Participate  Level of consciousness: awake and alert and oriented  Post-procedure vital signs: reviewed and stable  Pain management: adequate  Airway patency: patent    PONV status at discharge: No PONV  Anesthetic complications: no      Cardiovascular status: stable  Respiratory status: unassisted, spontaneous ventilation and face mask  Hydration status: euvolemic  Follow-up not needed.          Vitals Value Taken Time   /58 05/09/23 1346   Temp  05/09/23 1359   Pulse 75 05/09/23 1359   Resp 23 05/09/23 1359   SpO2 100 % 05/09/23 1359   Vitals shown include unvalidated device data.      No case tracking events are documented in the log.      Pain/Dillan Score: No data recorded

## 2023-05-09 NOTE — Clinical Note
dry, intact, no bleeding and no hematoma. Aquacel dressing applied 30 mins after dermabond dries followed with pressure dressing

## 2023-05-09 NOTE — Clinical Note
A percutaneous stick to the left axillary vein vein was performed. Show Applicator Variable?: Yes Pared With?: 15 blade Medical Necessity Clause: This procedure was medically necessary because the lesions that were treated were: Medical Necessity Information: It is in your best interest to select a reason for this procedure from the list below. All of these items fulfill various CMS LCD requirements except the new and changing color options. Post-Care Instructions: I reviewed with the patient in detail post-care instructions. Patient is to wear sunprotection, and avoid picking at any of the treated lesions. Pt may apply Vaseline to crusted or scabbing areas. Consent: The patient's consent was obtained including but not limited to risks of crusting, scabbing, blistering, scarring, darker or lighter pigmentary change, recurrence, incomplete removal and infection. Include Z78.9 (Other Specified Conditions Influencing Health Status) As An Associated Diagnosis?: No Detail Level: Detailed

## 2023-05-10 ENCOUNTER — TELEPHONE (OUTPATIENT)
Dept: FAMILY MEDICINE | Facility: CLINIC | Age: 88
End: 2023-05-10
Payer: MEDICARE

## 2023-05-10 VITALS
HEART RATE: 60 BPM | WEIGHT: 134.94 LBS | BODY MASS INDEX: 23.04 KG/M2 | OXYGEN SATURATION: 98 % | RESPIRATION RATE: 18 BRPM | HEIGHT: 64 IN | DIASTOLIC BLOOD PRESSURE: 51 MMHG | TEMPERATURE: 98 F | SYSTOLIC BLOOD PRESSURE: 107 MMHG

## 2023-05-10 DIAGNOSIS — I10 PRIMARY HYPERTENSION: ICD-10-CM

## 2023-05-10 DIAGNOSIS — I50.9 CONGESTIVE HEART FAILURE, UNSPECIFIED HF CHRONICITY, UNSPECIFIED HEART FAILURE TYPE: Primary | ICD-10-CM

## 2023-05-10 PROCEDURE — 25000003 PHARM REV CODE 250: Performed by: INTERNAL MEDICINE

## 2023-05-10 RX ORDER — IRBESARTAN 300 MG/1
TABLET ORAL
Qty: 30 TABLET | Refills: 11 | Status: SHIPPED | OUTPATIENT
Start: 2023-05-10

## 2023-05-10 RX ADMIN — LABETALOL HYDROCHLORIDE 200 MG: 100 TABLET, FILM COATED ORAL at 09:05

## 2023-05-10 RX ADMIN — AMLODIPINE BESYLATE 10 MG: 10 TABLET ORAL at 09:05

## 2023-05-10 RX ADMIN — MEMANTINE HYDROCHLORIDE 5 MG: 5 TABLET ORAL at 09:05

## 2023-05-10 RX ADMIN — DOXYCYCLINE HYCLATE 100 MG: 100 TABLET, COATED ORAL at 09:05

## 2023-05-10 RX ADMIN — LOSARTAN POTASSIUM 50 MG: 50 TABLET, FILM COATED ORAL at 09:05

## 2023-05-10 RX ADMIN — ACETAMINOPHEN 650 MG: 325 TABLET ORAL at 03:05

## 2023-05-10 RX ADMIN — LEVOTHYROXINE SODIUM 125 MCG: 25 TABLET ORAL at 05:05

## 2023-05-10 NOTE — PLAN OF CARE
No acute events overnight. Device function normal on telemetry. CXR and ECG reviewed yesterday. No hematoma or bleeding. Discharge home this morning.     Remi Reyes MD, PGY7  Electrophysiology

## 2023-05-10 NOTE — NURSING
Pt transferred to . Pt transferred on 3L oxygen and telemetry monitoring. Rn transferred pt via wheelchair. Aquacel dressing CDI. Vital signs stable upon transfer. No c/o pain or discomfort at this time, resp even and unlabored. Pt Left arm in sling with swathe. Safety measures in place. Niece at bedside.   Received by ТАТЬЯНА Pryor.

## 2023-05-10 NOTE — TELEPHONE ENCOUNTER
I have signed for the following orders AND/OR meds.  Please call the patient and ask the patient to schedule the testing AND/OR inform about any medications that were sent.      Orders Placed This Encounter   Procedures    Ambulatory referral/consult to Home Health     Standing Status:   Future     Standing Expiration Date:   6/10/2024     Referral Priority:   Routine     Referral Type:   Home Health     Referral Reason:   Specialty Services Required     Requested Specialty:   Home Health Services     Number of Visits Requested:   1

## 2023-05-10 NOTE — TELEPHONE ENCOUNTER
No care due was identified.  [facility] Embedded Care Due Messages. Reference number: [id number].   5/10/2023 1:31:59 PM CDT

## 2023-05-10 NOTE — TELEPHONE ENCOUNTER
Refill request routed to [facility] Review Pool for Pharmacist Review.

## 2023-05-10 NOTE — TELEPHONE ENCOUNTER
Refill Routing Note   Medication(s) are not appropriate for processing by [facility] for the following reason(s):      Patient seen in ED/Hospital since LOV with PCP: ED visits 5/2/23 and 5/9/23 due to interstitial lung disease and CHF, respectively  No active prescription written by PCP: previously prescribed by Lisa Cannon NP    OR action(s):  Defer Care Due:  None identified        Pharmacist review requested: Yes   Extended chart review required: Yes     Appointments  past 12m or future 3m with PCP    Date Provider   Last Visit   4/25/2023 Abdiel Robles MD   Next Visit   5/10/2023 Abdiel Robles MD   ED visits in past 90 days: 0        Note composed:3:21 PM 05/10/2023

## 2023-05-10 NOTE — DISCHARGE SUMMARY
[facility]  Cardiac Electrophysiology  Discharge Summary      Patient Name: Lynn Mckinney  MRN: [id number]  Admission Date: 5/9/2023  Hospital Length of Stay: 0 days  Discharge Date and Time:  05/10/2023 6:41 AM  Attending Physician: Diony Coleman MD    Discharging Provider: Aleksey Reyes MD  Primary Care Physician: Abdiel Robles MD    Hospital Course:  S/p successful CRT-D upgrade. No immediate complications. Doxy x 5 days. No OAC x 5 days.    HPI:   Ms. Mckinney is an 87 y.o. female with pAF (distant past), CAD (s/p CABH 2001), HTN, PVD, PPM (10/2013) here for CRT-D upgrade. S/p device upgrade from PPM to dual chamber ICD in 2021 with capped/abandoned RV pace/sense lead. She now has reduced EF to 35% with chronic RV pacing close to 100%. She has NYHA class III HF symptoms. She is on Eliquis 5 mg BID with last dose being this past Friday 5/5. She was recently discharged after being admitted with hypoxemic respiratory failure thought to be secondary to newly diagnosed NSIP +/- ADHF. ECG shows AS-.    From most recent EP visit:   pAF (in past? No sustained AF seen on device reports)  CAD  CABG 2001   HL on meds   HTN on meds   PVD carotidStent  holter showed 2:1 AV block, AVWB. Also has RBBB and LAFB.  PPM placed 10/13. Feels well since. C/o PARMAR at 2 flights (stable), but exertion is limited mostly by PAD.     3/10/2021: She was called today regarding a VF episode that occurred at 0413 this morning which lasted 25 seconds. Patient reports being awake during this time, but denies any symptoms.   Patient admitted to CCU after episode of asymptomatic VT on PPM. LHC completed on 3/12 with SHEREEN SVG to RCA. Patient evaluated by EP with AICD placement on 3/12.      She c/o PARMAR with long walks, consistent with NYHA III heart failure symptoms.     Echo 3/21 55% -> 8/22 45-50% -> 35%  ICD: 98% V-paced      Procedure(s) (LRB):  INSERTION, ICD, BIVENTRICULAR (Left)     Indwelling Lines/Drains at time  of discharge:  Lines/Drains/Airways     None                   Goals of Care Treatment Preferences:  Code Status: Full Code    Significant Diagnostic Studies: Labs: All labs within the past 24 hours have been reviewed    Pending Diagnostic Studies:     None          Final Active Diagnoses:    Diagnosis Date Noted POA    Chronic systolic congestive heart failure, NYHA class 3 [I50.22] 05/09/2023 Yes    Chronic combined systolic and diastolic heart failure [I50.42] 01/12/2023 Yes      Problems Resolved During this Admission:     No new Assessment & Plan notes have been filed under this hospital service since the last note was generated.  Service: Arrhythmia      Discharged Condition: stable    Disposition: discharge home    Follow Up:   Follow-up Information     Diony Coleman MD Follow up in 4 month(s).    Specialties: Electrophysiology, Cardiology  Contact information:  [address] [place] [address] [phone]             DEVICE CHECK CLINIC Follow up in 1 week(s).    Why: For wound and device check                     Patient Instructions:   Medication instructions:   Complete your 5 days of antibiotics   If you are on a blood thinner (examples: apixiban [Eliquis], rivaroxaban [Xarelto], dabigatran [Pradaxa], or enoxaparin [Lovenox]), do not take your blood thinner for the next 5 days after your procedure. You can resume after 5 days post-procedure (i.e., when you complete your antibiotics). If you are on Coumadin you can continue to take it the day of your procedure   Pain control: you can take up to Tylenol 1000 mg every 6 hours as needed or (if you do not have kidney disease) ibuprofen 600 mg every 6 hours as needed as needed for pain control (if you do not have kidney disease). If unsure, please contact your primary care physician for recommendations.    Activity restrictions & precautions:   Wear you sling for 48 hours total, then nightly for the next 6 weeks   Surgical site dressing (see  images below)  **Note: these are general rules to follow unless instructed otherwise**  o If you have a brown rectangular gel bandage (A.K.A. Aquacel Ag dressing) over your surgical incision do not remove it. This will be removed at your device clinic follow up appointment in 1 week.  o If you have a square light brown bandage (A.K.A Mepilex dressing) you should remove in 48 hours after your procedure.   You can shower after 24 hours post-procedure, but do not let the jet directly hit your pocket site for at least 2 weeks   Do not submerge your surgical incision site under water (swimming, bathing if you submerge the actual surgical site) for at least 6 week   No lifting over 5-10 pounds using your arm (on the side of your device) for the 2 weeks after your procedure    Do not lift your arm (on the side of your device) above shoulder height for 6 weeks   No driving for 1 week if you use the arm for driving that is on the opposite side as your device, and for 4 weeks if you use your arm for driving on the same side as your device   Follow up in device clinic in 1 week to check your incision and device function   Please contact the electrophysiology clinic if you have any questions or if you experience: potential surgical/pocket site complications (pain, swelling, bleeding, drainage), fevers, chest pain/shortness of breath, or for any other concerns.         Medications:  Reconciled Home Medications:      Medication List      START taking these medications    doxycycline 100 MG Cap  Commonly known as: VIBRAMYCIN  Take 1 capsule (100 mg total) by mouth every 12 (twelve) hours. for 5 days        CHANGE how you take these medications    apixaban 5 mg Tab  Commonly known as: ELIQUIS  Take 1 tablet (5 mg total) by mouth 2 (two) times daily.  Start taking on: May 15, 2023  What changed: These instructions start on May 15, 2023. If you are unsure what to do until then, ask your doctor or other care provider.         CONTINUE taking these medications    * acetaminophen 325 MG tablet  Commonly known as: TYLENOL  Take 2 tablets (650 mg total) by mouth every 6 (six) hours as needed (Mild to moderate pain).     * acetaminophen 500 MG tablet  Commonly known as: TYLENOL  Take 1 tablet (500 mg total) by mouth every 6 (six) hours as needed for Pain.     amLODIPine 10 MG tablet  Commonly known as: NORVASC  Take 1 tablet (10 mg total) by mouth once daily.     aspirin 81 MG Chew  Take 81 mg by mouth every evening. 1 Tablet, Chewable Oral Every day     cholecalciferol (vitamin D3) 50 mcg (2,000 unit) Cap capsule  Commonly known as: VITAMIN D3  Take 2,000 Units by mouth once daily.     coenzyme Q10 200 mg capsule  Take 200 mg by mouth once daily.     * divalproex 125 mg capsule  Commonly known as: DEPAKOTE SPRINKLE  Take 1 capsule (125 mg total) by mouth every evening.     * divalproex 125 mg capsule  Commonly known as: DEPAKOTE SPRINKLE  Take 1 capsule (125 mg total) by mouth every evening.     irbesartan 300 MG tablet  Commonly known as: AVAPRO  TAKE 1 TABLET (300 MG TOTAL) BY MOUTH EVERY EVENING. CANCEL IRBESARTAN/HCTZ COMBO     labetaloL 200 MG tablet  Commonly known as: NORMODYNE  Take 1 tablet (200 mg total) by mouth 2 (two) times daily.     levothyroxine 125 MCG tablet  Commonly known as: SYNTHROID  Take 1 tablet (125 mcg total) by mouth before breakfast.     memantine 5 MG Tab  Commonly known as: NAMENDA  TAKE 1 TABLET BY MOUTH TWICE DAILY     multivitamin per tablet  Commonly known as: THERAGRAN  Take 1 tablet by mouth once daily.     PRESERVISION AREDS 4,296 mcg-226 mg-90 mg Cap  Generic drug: vitamins A,C,E-zinc-copper  Take by mouth Daily.     rosuvastatin 10 MG tablet  Commonly known as: CRESTOR  Take 1 tablet (10 mg total) by mouth once daily.     SALMON OIL-OMEGA-3 FATTY ACIDS ORAL  Take 1 capsule by mouth once daily.         * This list has 4 medication(s) that are the same as other medications prescribed for you. Read the  directions carefully, and ask your doctor or other care provider to review them with you.            ASK your doctor about these medications    nitroGLYCERIN 0.4 MG SL tablet  Commonly known as: NITROSTAT  PLACE 1 TABLET UNDER THE TONGUE EVERY 5 MIN AS NEEDED FOR CHEST PAIN. MAX:3 DOSES            Time spent on the discharge of patient: 15 minutes    Aleksey Reyes MD  Cardiac Electrophysiology  [facility]

## 2023-05-10 NOTE — TELEPHONE ENCOUNTER
----- Message from Marian Samayoa sent at 5/10/2023  2:33 PM CDT -----  Denise with [facility] called regarding home health orders for the pt. Call Denise back at [phone]. Thx. EL

## 2023-05-11 ENCOUNTER — PATIENT MESSAGE (OUTPATIENT)
Dept: PULMONOLOGY | Facility: CLINIC | Age: 88
End: 2023-05-11
Payer: MEDICARE

## 2023-05-12 ENCOUNTER — PATIENT MESSAGE (OUTPATIENT)
Dept: FAMILY MEDICINE | Facility: CLINIC | Age: 88
End: 2023-05-12
Payer: MEDICARE

## 2023-05-12 DIAGNOSIS — F03.94 DEMENTIA WITH ANXIETY, UNSPECIFIED DEMENTIA SEVERITY, UNSPECIFIED DEMENTIA TYPE: Primary | ICD-10-CM

## 2023-05-12 RX ORDER — TALC
3 POWDER (GRAM) TOPICAL NIGHTLY
Qty: 30 TABLET | Refills: 12 | COMMUNITY
Start: 2023-05-12

## 2023-05-12 NOTE — TELEPHONE ENCOUNTER
"I received your message which was reviewed along with the the medication list and allergies that we have below.  Please review it for accuracy to make sure that we have the most recent records on your history.     Based on this, the following orders were placed AND/OR medicines were sent in.     No orders of the defined types were placed in this encounter.      Medications written and sent at this time include:  Medications Ordered This Encounter   Medications    melatonin (MELATIN) 3 mg tablet     Sig: Take 1 tablet (3 mg total) by mouth every evening.     Dispense:  30 tablet     Refill:  12       Your pharmacy(ies) of choice at this time on record include the list below and any medications would have been sent to the one at the top.    [facility]. [address] [address]  Phone: [phone] Fax: [phone]      Thank you for choosing us as your healthcare provider!  Dr. Abdiel Robles    ALLERGY LIST  Review of patient's allergies indicates:   Allergen Reactions    Lipitor [atorvastatin] Itching    Fosamax [alendronate]      Felt "strange"    Iodinated contrast media      Dye is only to be used if absolutely needed because of kidney function    Prolia [denosumab]      Reaction after the Prolia    Sulfa (sulfonamide antibiotics)        MEDICATION LIST  Current Outpatient Medications on File Prior to Visit   Medication Sig Dispense Refill    irbesartan (AVAPRO) 300 MG tablet TAKE 1 TABLET BY MOUTH EVERY DAY 30 tablet 11    acetaminophen (TYLENOL) 325 MG tablet Take 2 tablets (650 mg total) by mouth every 6 (six) hours as needed (Mild to moderate pain).  0    acetaminophen (TYLENOL) 500 MG tablet Take 1 tablet (500 mg total) by mouth every 6 (six) hours as needed for Pain. 90 tablet 12    amLODIPine (NORVASC) 10 MG tablet Take 1 tablet (10 mg total) by mouth once daily. 90 tablet 3    [START ON 5/15/2023] apixaban (ELIQUIS) 5 mg Tab " Take 1 tablet (5 mg total) by mouth 2 (two) times daily. 60 tablet 11    aspirin 81 MG Chew Take 81 mg by mouth every evening. 1 Tablet, Chewable Oral Every day      cholecalciferol, vitamin D3, (VITAMIN D3) 50 mcg (2,000 unit) Cap capsule Take 2,000 Units by mouth once daily.      coenzyme Q10 200 mg capsule Take 200 mg by mouth once daily.      divalproex (DEPAKOTE SPRINKLE) 125 mg capsule Take 1 capsule (125 mg total) by mouth every evening. 30 capsule 2    divalproex (DEPAKOTE SPRINKLE) 125 mg capsule Take 1 capsule (125 mg total) by mouth every evening. 30 capsule 2    doxycycline (VIBRAMYCIN) 100 MG Cap Take 1 capsule (100 mg total) by mouth every 12 (twelve) hours. for 5 days 10 capsule 0    labetaloL (NORMODYNE) 200 MG tablet Take 1 tablet (200 mg total) by mouth 2 (two) times daily. 180 tablet 3    levothyroxine (SYNTHROID) 125 MCG tablet Take 1 tablet (125 mcg total) by mouth before breakfast. 90 tablet 4    memantine (NAMENDA) 5 MG Tab TAKE 1 TABLET BY MOUTH TWICE DAILY 60 tablet 11    multivitamin (THERAGRAN) per tablet Take 1 tablet by mouth once daily.      nitroGLYCERIN (NITROSTAT) 0.4 MG SL tablet PLACE 1 TABLET UNDER THE TONGUE EVERY 5 MIN AS NEEDED FOR CHEST PAIN. MAX:3 DOSES (Patient not taking: Reported on 4/25/2023) 30 tablet 3    rosuvastatin (CRESTOR) 10 MG tablet Take 1 tablet (10 mg total) by mouth once daily. 90 tablet 3    SALMON OIL-OMEGA-3 FATTY ACIDS ORAL Take 1 capsule by mouth once daily.      vitamins  A,C,E-zinc-copper (PRESERVISION AREDS) 14,320-226-200 unit-mg-unit Cap Take by mouth Daily.       No current facility-administered medications on file prior to visit.       HEALTH MAINTENANCE THAT IS OVERDUE OR NEEDS TO BE UPDATED ON OUR CHART IS LISTED BELOW.  IF YOU HAVE HAD IT DONE ELSEWHERE, PLEASE SEND US DATES AND RECORDS IF YOU HAVE THEM TO MAKE YOUR CHART ACCURATE.  IF YOU HAVE NOT HAD THESE DONE AND ARE READY FOR US TO SCHEDULE THEM, PLEASE SEND US A MESSAGE.  Health  Maintenance Due   Topic Date Due    Shingles Vaccine (3 of 3) 10/20/2020       DISCLAIMER: This note was compiled by using a speech recognition dictation system and therefore please be aware that typographical / speech recognition errors can and do occur.  Please contact me if you see any errors specifically.    Abdiel Robles MD  We Offer Telehealth & Same Day Appointments!   Book your Telehealth appointment with me through my nurse or   Clinic appointments on [facility]!  [phone]     Check out my Facebook Page and Follow Me at: CLICK HERE    Check out my website at [facility] by clicking on: CLICK HERE    To Schedule appointments online, go to [employer]: CLICK HERE     Location: [email or web address]    [address] [place] [address]    FAX: [phone]

## 2023-05-15 ENCOUNTER — DOCUMENT SCAN (OUTPATIENT)
Dept: HOME HEALTH SERVICES | Facility: HOSPITAL | Age: 88
End: 2023-05-15
Payer: MEDICARE

## 2023-05-15 ENCOUNTER — TELEPHONE (OUTPATIENT)
Dept: TRANSPLANT | Facility: CLINIC | Age: 88
End: 2023-05-15
Payer: MEDICARE

## 2023-05-15 DIAGNOSIS — J84.9 INTERSTITIAL LUNG DISEASE: Primary | ICD-10-CM

## 2023-05-15 NOTE — TELEPHONE ENCOUNTER
----- Message from Beni Heller MD sent at 5/15/2023 10:52 AM CDT -----  Regarding: New patient  This patient was found to have probable ILD during recent hospital admission. Could we get her in to see me whenever we have availability with 6 MWT as well as PFTs. I looks like I have some spots open next Tuesday AM. We can cancel her appointment for my regular pulmonary appointment in 8/2023.    Thanks,   Beni

## 2023-05-16 ENCOUNTER — PATIENT MESSAGE (OUTPATIENT)
Dept: FAMILY MEDICINE | Facility: CLINIC | Age: 88
End: 2023-05-16
Payer: MEDICARE

## 2023-05-16 ENCOUNTER — OFFICE VISIT (OUTPATIENT)
Dept: PODIATRY | Facility: CLINIC | Age: 88
End: 2023-05-16
Payer: MEDICARE

## 2023-05-16 VITALS — BODY MASS INDEX: 22.88 KG/M2 | WEIGHT: 134 LBS | HEIGHT: 64 IN

## 2023-05-16 DIAGNOSIS — I73.9 PAD (PERIPHERAL ARTERY DISEASE): Primary | ICD-10-CM

## 2023-05-16 DIAGNOSIS — B35.1 DERMATOPHYTOSIS OF NAIL: ICD-10-CM

## 2023-05-16 PROCEDURE — 1100F PTFALLS ASSESS-DOCD GE2>/YR: CPT | Mod: CPTII,,, | Performed by: PODIATRIST

## 2023-05-16 PROCEDURE — 1100F PR PT FALLS ASSESS DOC 2+ FALLS/FALL W/INJURY/YR: ICD-10-PCS | Mod: CPTII,,, | Performed by: PODIATRIST

## 2023-05-16 PROCEDURE — 99203 OFFICE O/P NEW LOW 30 MIN: CPT | Mod: 25,,, | Performed by: PODIATRIST

## 2023-05-16 PROCEDURE — 99203 PR OFFICE/OUTPT VISIT, NEW, LEVL III, 30-44 MIN: ICD-10-PCS | Mod: 25,,, | Performed by: PODIATRIST

## 2023-05-16 PROCEDURE — 99999 PR PBB SHADOW E&M-EST. PATIENT-LVL IV: ICD-10-PCS | Mod: PBBFAC,,, | Performed by: PODIATRIST

## 2023-05-16 PROCEDURE — 1126F PR PAIN SEVERITY QUANTIFIED, NO PAIN PRESENT: ICD-10-PCS | Mod: CPTII,,, | Performed by: PODIATRIST

## 2023-05-16 PROCEDURE — 3288F FALL RISK ASSESSMENT DOCD: CPT | Mod: CPTII,,, | Performed by: PODIATRIST

## 2023-05-16 PROCEDURE — 1159F MED LIST DOCD IN RCRD: CPT | Mod: CPTII,,, | Performed by: PODIATRIST

## 2023-05-16 PROCEDURE — 1126F AMNT PAIN NOTED NONE PRSNT: CPT | Mod: CPTII,,, | Performed by: PODIATRIST

## 2023-05-16 PROCEDURE — 1160F RVW MEDS BY RX/DR IN RCRD: CPT | Mod: CPTII,,, | Performed by: PODIATRIST

## 2023-05-16 PROCEDURE — 11721 DEBRIDE NAIL 6 OR MORE: CPT | Mod: Q8,,, | Performed by: PODIATRIST

## 2023-05-16 PROCEDURE — 1111F DSCHRG MED/CURRENT MED MERGE: CPT | Mod: CPTII,,, | Performed by: PODIATRIST

## 2023-05-16 PROCEDURE — 1159F PR MEDICATION LIST DOCUMENTED IN MEDICAL RECORD: ICD-10-PCS | Mod: CPTII,,, | Performed by: PODIATRIST

## 2023-05-16 PROCEDURE — 99999 PR PBB SHADOW E&M-EST. PATIENT-LVL IV: CPT | Mod: PBBFAC,,, | Performed by: PODIATRIST

## 2023-05-16 PROCEDURE — 1160F PR REVIEW ALL MEDS BY PRESCRIBER/CLIN PHARMACIST DOCUMENTED: ICD-10-PCS | Mod: CPTII,,, | Performed by: PODIATRIST

## 2023-05-16 PROCEDURE — 11721 PR DEBRIDEMENT OF NAILS, 6 OR MORE: ICD-10-PCS | Mod: Q8,,, | Performed by: PODIATRIST

## 2023-05-16 PROCEDURE — 1111F PR DISCHARGE MEDS RECONCILED W/ CURRENT OUTPATIENT MED LIST: ICD-10-PCS | Mod: CPTII,,, | Performed by: PODIATRIST

## 2023-05-16 PROCEDURE — 3288F PR FALLS RISK ASSESSMENT DOCUMENTED: ICD-10-PCS | Mod: CPTII,,, | Performed by: PODIATRIST

## 2023-05-16 NOTE — PROGRESS NOTES
Subjective:     Patient ID: Lynn Mckinney is a 87 y.o. female.    Chief Complaint: Nail Care (Nail care, non diabetic wears tennis shoes and socks, Last seen PCP Dr. Robles 04/25/2023)    Lynn is a 87 y.o. female who presents to the clinic for evaluation and treatment of high risk feet. Lynn has a past medical history of Allergy, Anticoagulant long-term use, Blood clotting tendency, Carotid artery disease (05/29/2014), Chronic systolic congestive heart failure, NYHA class 3 (5/9/2023), Coronary artery disease, Heart block, Hyperlipidemia, Hypertension, Hypothyroid, Left displaced femoral neck fracture (01/12/2023), Obesity, Pacemaker, PAD (peripheral artery disease), Pulmonary fibrosis, Spinal stenosis, Thoracic or lumbosacral neuritis or radiculitis (11/25/2014), and Tubular adenoma. The patient's chief complaint is long, thick toenails. This patient has documented high risk feet requiring routine maintenance secondary to peripheral vascular disease.    PCP: Abdiel Robles MD    Date Last Seen by PCP: 04/25/2023    Current shoe gear:  Affected Foot: Tennis shoes     Unaffected Foot: Tennis shoes    Last encounter in this department: Visit date not found    Hemoglobin A1C   Date Value Ref Range Status   05/02/2023 5.2 4.0 - 5.6 % Final     Comment:     ADA Screening Guidelines:  5.7-6.4%  Consistent with prediabetes  >or=6.5%  Consistent with diabetes    High levels of fetal hemoglobin interfere with the HbA1C  assay. Heterozygous hemoglobin variants (HbS, HgC, etc)do  not significantly interfere with this assay.   However, presence of multiple variants may affect accuracy.     01/12/2023 5.3 4.0 - 5.6 % Final     Comment:     ADA Screening Guidelines:  5.7-6.4%  Consistent with prediabetes  >or=6.5%  Consistent with diabetes    High levels of fetal hemoglobin interfere with the HbA1C  assay. Heterozygous hemoglobin variants (HbS, HgC, etc)do  not significantly interfere with this assay.   However, presence of  multiple variants may affect accuracy.     06/09/2016 5.2 4.5 - 6.2 % Final       Patient Active Problem List   Diagnosis    Primary hypertension    Pure hypercholesterolemia    Peripheral arterial disease    Coronary artery disease involving native coronary artery of native heart without angina pectoris    Hypothyroidism    Incontinence    Stage 3a chronic kidney disease    Vitamin D deficiency    CHB (complete heart block)    ICD (implantable cardioverter-defibrillator), dual, in situ    Left-sided carotid artery disease    Claudication    Right hip pain    Lumbar stenosis    Thoracic or lumbosacral neuritis or radiculitis    Left hip pain    Unsteady gait    Bilateral carotid artery stenosis    Lumbar radiculitis    Age-related osteoporosis without current pathological fracture    History of dental problems    VF (ventricular fibrillation)    VT (ventricular tachycardia)    S/P CABG x 3    Macular subretinal hemorrhage, right    Memory difficulties    MRI contraindicated due to metal implant    Aortic atherosclerosis    Closed displaced fracture of left femoral neck s/p left hip hemiarthroplasty on 1/13/2023    Chronic combined systolic and diastolic heart failure    Age-related osteoporosis with current pathological fracture with routine healing    Encounter for long-term (current) use of medications    Thrombocytopenia, unspecified    Dementia with anxiety, unspecified dementia severity, unspecified dementia type    Encounter for medical examination to establish care    Acute hypoxemic respiratory failure    A-fib    Hypokalemia    Chronic systolic congestive heart failure, NYHA class 3       Medication List with Changes/Refills   Current Medications    ACETAMINOPHEN (TYLENOL) 325 MG TABLET    Take 2 tablets (650 mg total) by mouth every 6 (six) hours as needed (Mild to moderate pain).    ACETAMINOPHEN (TYLENOL) 500 MG TABLET    Take 1 tablet (500 mg total) by mouth every 6 (six) hours as needed for Pain.     "AMLODIPINE (NORVASC) 10 MG TABLET    Take 1 tablet (10 mg total) by mouth once daily.    APIXABAN (ELIQUIS) 5 MG TAB    Take 1 tablet (5 mg total) by mouth 2 (two) times daily.    ASPIRIN 81 MG CHEW    Take 81 mg by mouth every evening. 1 Tablet, Chewable Oral Every day    CHOLECALCIFEROL, VITAMIN D3, (VITAMIN D3) 50 MCG (2,000 UNIT) CAP CAPSULE    Take 2,000 Units by mouth once daily.    COENZYME Q10 200 MG CAPSULE    Take 200 mg by mouth once daily.    DIVALPROEX (DEPAKOTE SPRINKLE) 125 MG CAPSULE    Take 1 capsule (125 mg total) by mouth every evening.    DIVALPROEX (DEPAKOTE SPRINKLE) 125 MG CAPSULE    Take 1 capsule (125 mg total) by mouth every evening.    IRBESARTAN (AVAPRO) 300 MG TABLET    TAKE 1 TABLET BY MOUTH EVERY DAY    LABETALOL (NORMODYNE) 200 MG TABLET    Take 1 tablet (200 mg total) by mouth 2 (two) times daily.    LEVOTHYROXINE (SYNTHROID) 125 MCG TABLET    Take 1 tablet (125 mcg total) by mouth before breakfast.    MELATONIN (MELATIN) 3 MG TABLET    Take 1 tablet (3 mg total) by mouth every evening.    MEMANTINE (NAMENDA) 5 MG TAB    TAKE 1 TABLET BY MOUTH TWICE DAILY    MULTIVITAMIN (THERAGRAN) PER TABLET    Take 1 tablet by mouth once daily.    NITROGLYCERIN (NITROSTAT) 0.4 MG SL TABLET    PLACE 1 TABLET UNDER THE TONGUE EVERY 5 MIN AS NEEDED FOR CHEST PAIN. MAX:3 DOSES    ROSUVASTATIN (CRESTOR) 10 MG TABLET    Take 1 tablet (10 mg total) by mouth once daily.    SALMON OIL-OMEGA-3 FATTY ACIDS ORAL    Take 1 capsule by mouth once daily.    VITAMINS  A,C,E-ZINC-COPPER (PRESERVISION AREDS) 14,320-226-200 UNIT-MG-UNIT CAP    Take by mouth Daily.       Review of patient's allergies indicates:   Allergen Reactions    Lipitor [atorvastatin] Itching    Fosamax [alendronate]      Felt "strange"    Iodinated contrast media      Dye is only to be used if absolutely needed because of kidney function    Prolia [denosumab]      Reaction after the Prolia    Sulfa (sulfonamide antibiotics)        Past Surgical " History:   Procedure Laterality Date    ANGIOPLASTY      APPENDECTOMY      BREAST CYST ASPIRATION Bilateral     CARDIAC CATHETERIZATION      CARDIAC PACEMAKER PLACEMENT  10/10/13    by Dr. Coleman    CAROTID STENT Left 2007    by Dr. Bowen    CATARACT EXTRACTION W/  INTRAOCULAR LENS IMPLANT Bilateral 2012    Dr Sharp    CORONARY ARTERY BYPASS GRAFT  2001     x 3 LIMA-Ramus, SVG-OM1 & SVG-PDA    CORONARY BYPASS GRAFT ANGIOGRAPHY  3/11/2021    Procedure: Bypass graft study;  Surgeon: Gil Garcia MD;  Location: [facility] CATH LAB;  Service: Cardiology;;    CYST REMOVAL      wrist    EYE SURGERY      HEMIARTHROPLASTY OF HIP Left 1/13/2023    Procedure: HEMIARTHROPLASTY, HIP, LEFT;  Surgeon: Alan Vieyra MD;  Location: [facility] 2ND FLR;  Service: Orthopedics;  Laterality: Left;    HYSTERECTOMY      INSERTION OF BIVENTRICULAR IMPLANTABLE CARDIOVERTER-DEFIBRILLATOR (ICD) Left 5/9/2023    Procedure: INSERTION, ICD, BIVENTRICULAR;  Surgeon: Diony Coleman MD;  Location: [facility] EP LAB;  Service: Cardiology;  Laterality: Left;  VF/VT, CRT-D upgrade, SJM, MAC, DM, 3 Prep *dICD in place*    LEFT HEART CATHETERIZATION Left 3/11/2021    Procedure: Left heart cath;  Surgeon: Gil Garcia MD;  Location: [facility] CATH LAB;  Service: Cardiology;  Laterality: Left;    OOPHORECTOMY      REMOVAL, DEVICE  5/9/2023    Procedure: Removal, Device;  Surgeon: Diony Coleman MD;  Location: [facility] EP LAB;  Service: Cardiology;;    THYROIDECTOMY      VITRECTOMY BY PARS PLANA APPROACH Right 10/12/2021    Procedure: VITRECTOMY, PARS PLANA APPROACH;  Surgeon: Flako Carver MD;  Location: [facility] 1ST FLR;  Service: Ophthalmology;  Laterality: Right;       Family History   Problem Relation Age of Onset    Heart disease Father         coronary thrombosis    Hypertension Father     Alzheimer's disease Mother     Heart disease Mother         pacemaker    Hypertension Mother     Diabetes Brother     No Known Problems Sister     No Known  Problems Maternal Grandmother     No Known Problems Maternal Grandfather     No Known Problems Paternal Grandmother     No Known Problems Paternal Grandfather     Heart disease Brother     No Known Problems Maternal Aunt     No Known Problems Maternal Uncle     No Known Problems Paternal Aunt     No Known Problems Paternal Uncle     Amblyopia Neg Hx     Blindness Neg Hx     Cancer Neg Hx     Cataracts Neg Hx     Glaucoma Neg Hx     Macular degeneration Neg Hx     Retinal detachment Neg Hx     Strabismus Neg Hx     Stroke Neg Hx     Thyroid disease Neg Hx     Melanoma Neg Hx        Social History     Socioeconomic History    Marital status: Single   Tobacco Use    Smoking status: Former     Types: Cigarettes     Quit date: 1984     Years since quittin.7    Smokeless tobacco: Never   Substance and Sexual Activity    Alcohol use: Not Currently     Alcohol/week: 1.0 standard drink     Types: 1 Shots of liquor per week     Comment: old fashion  with dinner every 6 mo     Drug use: No   Other Topics Concern    Are you pregnant or think you may be? No    Breast-feeding No   Social History Narrative    Minimal physical activity. Does housework.         Drives a little.      Social Determinants of Health     Financial Resource Strain: Low Risk     Difficulty of Paying Living Expenses: Not hard at all   Food Insecurity: No Food Insecurity    Worried About Running Out of Food in the Last Year: Never true    Ran Out of Food in the Last Year: Never true   Transportation Needs: No Transportation Needs    Lack of Transportation (Medical): No    Lack of Transportation (Non-Medical): No   Physical Activity: Inactive    Days of Exercise per Week: 0 days    Minutes of Exercise per Session: 0 min   Stress: No Stress Concern Present    Feeling of Stress : Not at all   Social Connections: Unknown    Frequency of Communication with Friends and Family: More than three times a week    Frequency of Social Gatherings with Friends  "and Family: More than three times a week    Attends Voodoo Services: Never    Active Member of Clubs or Organizations: No    Attends Club or Organization Meetings: Never   Housing Stability: Low Risk     Unable to Pay for Housing in the Last Year: No    Number of Places Lived in the Last Year: 2    Unstable Housing in the Last Year: No       Vitals:    05/16/23 0948   Weight: 60.8 kg (134 lb)   Height: 5' 4" (1.626 m)   PainSc: 0-No pain       Hemoglobin A1C   Date Value Ref Range Status   05/02/2023 5.2 4.0 - 5.6 % Final     Comment:     ADA Screening Guidelines:  5.7-6.4%  Consistent with prediabetes  >or=6.5%  Consistent with diabetes    High levels of fetal hemoglobin interfere with the HbA1C  assay. Heterozygous hemoglobin variants (HbS, HgC, etc)do  not significantly interfere with this assay.   However, presence of multiple variants may affect accuracy.     01/12/2023 5.3 4.0 - 5.6 % Final     Comment:     ADA Screening Guidelines:  5.7-6.4%  Consistent with prediabetes  >or=6.5%  Consistent with diabetes    High levels of fetal hemoglobin interfere with the HbA1C  assay. Heterozygous hemoglobin variants (HbS, HgC, etc)do  not significantly interfere with this assay.   However, presence of multiple variants may affect accuracy.     06/09/2016 5.2 4.5 - 6.2 % Final       Review of Systems   Constitutional:  Negative for chills and fever.   Respiratory:  Negative for shortness of breath.    Cardiovascular:  Negative for chest pain, palpitations, orthopnea, claudication and leg swelling.   Gastrointestinal:  Negative for diarrhea, nausea and vomiting.   Musculoskeletal:  Negative for joint pain.   Skin:  Negative for rash.   Neurological:  Negative for dizziness, tingling, sensory change, focal weakness and weakness.   Psychiatric/Behavioral: Negative.             Objective:      PHYSICAL EXAM: Apperance: Alert and orient in no distress,well developed, and with good attention to grooming and body " habits  Patient presents ambulating in tennis shoes  LOWER EXTREMITY EXAM:  VASCULAR: Dorsalis pedis pulses 0/4 bilateral and Posterior Tibial pulses 1/4 bilateral. Capillary fill time <blanched bilateral. Mild edema observed bilateral. Varicosities present bilateral. Skin temperature of the lower extremities is warm to warm, proximal to distal. Hair growth absent bilateral.  DERMATOLOGICAL: No skin rashes, subcutaneous nodules, lesions, or ulcers observed bilateral. Nails 1,2,3,4,5 bilateral elongated, thickened, and discolored with subungual debris. Webspaces 1,2,3,4 bilateral clean, dry and without evidence of break in skin integrity.   NEUROLOGICAL: Light touch, sharp-dull, proprioception all present and equal bilaterally.    MUSCULOSKELETAL: Muscle strength is 5/5 for foot inverters, everters, plantarflexors, and dorsiflexors. Muscle tone is normal.         Assessment:       ICD-10-CM ICD-9-CM   1. PAD (peripheral artery disease)  I73.9 443.9   2. Dermatophytosis of nail  B35.1 110.1       Plan:   PAD (peripheral artery disease)  -     Ambulatory referral/consult to Podiatry    Dermatophytosis of nail  -     Ambulatory referral/consult to Podiatry    I counseled the patient on her conditions, regarding findings of my examination, my impressions, and usual treatment plan.   This visit spent on counseling and coordination of care.  Appointment spent on education about the PVD, and prevention of limb loss.  Shoe inspection. Patient instructed on proper foot hygeine. We discussed wearing proper shoe gear, daily foot inspections, never walking without protective shoe gear, never putting sharp instruments to feet.    With patient's permission, nails 1-5 bilateral were trimmed in length and thickness aggressively to their soft tissue attachment mechanically and with electric , removing all offending nail and debris. Patient relates relief following the procedure.  Patient  will continue to monitor the areas  daily, inspect feet, wear protective shoe gear when ambulatory, moisturizer to maintain skin integrity.   Patient to return 6 months or sooner if needed.         Guanakito Alfredo DPM  [facility]

## 2023-05-16 NOTE — TELEPHONE ENCOUNTER
"I received your message which was reviewed along with the the medication list and allergies that we have below.  Please review it for accuracy to make sure that we have the most recent records on your history.     Based on this, the following orders were placed AND/OR medicines were sent in.     Orders Placed This Encounter   Procedures    Ambulatory referral/consult to Neurology     Standing Status:   Future     Standing Expiration Date:   6/16/2024     Referral Priority:   Routine     Referral Type:   Consultation     Referral Reason:   Specialty Services Required     Requested Specialty:   Neurology     Number of Visits Requested:   1    Neuropsychological testing     Standing Status:   Future     Standing Expiration Date:   5/16/2024     Order Specific Question:   Release to patient     Answer:   Immediate       Medications written and sent at this time include:       Your pharmacy(ies) of choice at this time on record include the list below and any medications would have been sent to the one at the top.    [facility]. [address] [address]  Phone: [phone] Fax: [phone]      Thank you for choosing us as your healthcare provider!  Dr. Abdiel Robles    ALLERGY LIST  Review of patient's allergies indicates:   Allergen Reactions    Lipitor [atorvastatin] Itching    Fosamax [alendronate]      Felt "strange"    Iodinated contrast media      Dye is only to be used if absolutely needed because of kidney function    Prolia [denosumab]      Reaction after the Prolia    Sulfa (sulfonamide antibiotics)        MEDICATION LIST  Current Outpatient Medications on File Prior to Visit   Medication Sig Dispense Refill    acetaminophen (TYLENOL) 325 MG tablet Take 2 tablets (650 mg total) by mouth every 6 (six) hours as needed (Mild to moderate pain).  0    acetaminophen (TYLENOL) 500 MG tablet Take 1 tablet (500 mg total) by mouth every 6 " (six) hours as needed for Pain. 90 tablet 12    amLODIPine (NORVASC) 10 MG tablet Take 1 tablet (10 mg total) by mouth once daily. 90 tablet 3    apixaban (ELIQUIS) 5 mg Tab Take 1 tablet (5 mg total) by mouth 2 (two) times daily. 60 tablet 11    aspirin 81 MG Chew Take 81 mg by mouth every evening. 1 Tablet, Chewable Oral Every day      cholecalciferol, vitamin D3, (VITAMIN D3) 50 mcg (2,000 unit) Cap capsule Take 2,000 Units by mouth once daily.      coenzyme Q10 200 mg capsule Take 200 mg by mouth once daily.      divalproex (DEPAKOTE SPRINKLE) 125 mg capsule Take 1 capsule (125 mg total) by mouth every evening. 30 capsule 2    divalproex (DEPAKOTE SPRINKLE) 125 mg capsule Take 1 capsule (125 mg total) by mouth every evening. 30 capsule 2    irbesartan (AVAPRO) 300 MG tablet TAKE 1 TABLET BY MOUTH EVERY DAY 30 tablet 11    labetaloL (NORMODYNE) 200 MG tablet Take 1 tablet (200 mg total) by mouth 2 (two) times daily. 180 tablet 3    levothyroxine (SYNTHROID) 125 MCG tablet Take 1 tablet (125 mcg total) by mouth before breakfast. 90 tablet 4    melatonin (MELATIN) 3 mg tablet Take 1 tablet (3 mg total) by mouth every evening. 30 tablet 12    memantine (NAMENDA) 5 MG Tab TAKE 1 TABLET BY MOUTH TWICE DAILY 60 tablet 11    multivitamin (THERAGRAN) per tablet Take 1 tablet by mouth once daily.      nitroGLYCERIN (NITROSTAT) 0.4 MG SL tablet PLACE 1 TABLET UNDER THE TONGUE EVERY 5 MIN AS NEEDED FOR CHEST PAIN. MAX:3 DOSES 30 tablet 3    rosuvastatin (CRESTOR) 10 MG tablet Take 1 tablet (10 mg total) by mouth once daily. 90 tablet 3    SALMON OIL-OMEGA-3 FATTY ACIDS ORAL Take 1 capsule by mouth once daily.      vitamins  A,C,E-zinc-copper (PRESERVISION AREDS) 14,320-226-200 unit-mg-unit Cap Take by mouth Daily.       No current facility-administered medications on file prior to visit.       HEALTH MAINTENANCE THAT IS OVERDUE OR NEEDS TO BE UPDATED ON OUR CHART IS LISTED BELOW.  IF YOU HAVE HAD IT DONE ELSEWHERE, PLEASE  SEND US DATES AND RECORDS IF YOU HAVE THEM TO MAKE YOUR CHART ACCURATE.  IF YOU HAVE NOT HAD THESE DONE AND ARE READY FOR US TO SCHEDULE THEM, PLEASE SEND US A MESSAGE.  Health Maintenance Due   Topic Date Due    Shingles Vaccine (3 of 3) 10/20/2020       DISCLAIMER: This note was compiled by using a speech recognition dictation system and therefore please be aware that typographical / speech recognition errors can and do occur.  Please contact me if you see any errors specifically.    Abdiel Robles MD  We Offer Telehealth & Same Day Appointments!   Book your Telehealth appointment with me through my nurse or   Clinic appointments on [employer]!  [phone]     Check out my Facebook Page and Follow Me at: CLICK HERE    Check out my website at [facility] by clicking on: CLICK HERE    To Schedule appointments online, go to [employer]: CLICK HERE     Location: [email or web address]    [address] [place] [address]    FAX: [phone]

## 2023-05-17 ENCOUNTER — EXTERNAL HOME HEALTH (OUTPATIENT)
Dept: HOME HEALTH SERVICES | Facility: HOSPITAL | Age: 88
End: 2023-05-17
Payer: MEDICARE

## 2023-05-17 ENCOUNTER — PATIENT MESSAGE (OUTPATIENT)
Dept: NEUROLOGY | Facility: CLINIC | Age: 88
End: 2023-05-17
Payer: MEDICARE

## 2023-05-17 ENCOUNTER — TELEPHONE (OUTPATIENT)
Dept: NEUROLOGY | Facility: CLINIC | Age: 88
End: 2023-05-17
Payer: MEDICARE

## 2023-05-17 NOTE — TELEPHONE ENCOUNTER
----- Message from Angeles Cano sent at 5/17/2023 10:16 AM CDT -----  Pt niece calling in regards to a follow up appt , pt dementia has gotten worst , pt not sleeping at night       Confirmed patient's contact info below:  Contact Name: Lynn Mckinney  Phone Number: [phone]

## 2023-05-19 ENCOUNTER — HOSPITAL ENCOUNTER (INPATIENT)
Facility: HOSPITAL | Age: 88
LOS: 6 days | Discharge: HOME | DRG: 291 | End: 2023-05-25
Attending: EMERGENCY MEDICINE | Admitting: STUDENT IN AN ORGANIZED HEALTH CARE EDUCATION/TRAINING PROGRAM
Payer: MEDICARE

## 2023-05-19 DIAGNOSIS — J96.21 ACUTE ON CHRONIC RESPIRATORY FAILURE WITH HYPOXIA: ICD-10-CM

## 2023-05-19 DIAGNOSIS — R09.02 HYPOXIA: ICD-10-CM

## 2023-05-19 DIAGNOSIS — I50.43 ACUTE ON CHRONIC COMBINED SYSTOLIC AND DIASTOLIC CONGESTIVE HEART FAILURE: Primary | ICD-10-CM

## 2023-05-19 DIAGNOSIS — R06.02 SHORTNESS OF BREATH: ICD-10-CM

## 2023-05-19 PROBLEM — J84.9 ILD (INTERSTITIAL LUNG DISEASE): Status: ACTIVE | Noted: 2023-05-19

## 2023-05-19 PROBLEM — Z86.79 HISTORY OF VENTRICULAR FIBRILLATION: Status: ACTIVE | Noted: 2021-03-10

## 2023-05-19 LAB
ALBUMIN SERPL BCP-MCNC: 3.4 G/DL (ref 3.5–5.2)
ALP SERPL-CCNC: 81 U/L (ref 55–135)
ALT SERPL W/O P-5'-P-CCNC: 21 U/L (ref 10–44)
ANION GAP SERPL CALC-SCNC: 11 MMOL/L (ref 8–16)
AST SERPL-CCNC: 32 U/L (ref 10–40)
BASOPHILS # BLD AUTO: 0.01 K/UL (ref 0–0.2)
BASOPHILS NFR BLD: 0.1 % (ref 0–1.9)
BILIRUB SERPL-MCNC: 0.9 MG/DL (ref 0.1–1)
BNP SERPL-MCNC: 1620 PG/ML (ref 0–99)
BUN SERPL-MCNC: 22 MG/DL (ref 8–23)
CALCIUM SERPL-MCNC: 8.4 MG/DL (ref 8.7–10.5)
CHLORIDE SERPL-SCNC: 103 MMOL/L (ref 95–110)
CHOLEST SERPL-MCNC: 138 MG/DL (ref 120–199)
CHOLEST/HDLC SERPL: 2.2 {RATIO} (ref 2–5)
CO2 SERPL-SCNC: 23 MMOL/L (ref 23–29)
CREAT SERPL-MCNC: 1 MG/DL (ref 0.5–1.4)
DIFFERENTIAL METHOD: ABNORMAL
EOSINOPHIL # BLD AUTO: 0 K/UL (ref 0–0.5)
EOSINOPHIL NFR BLD: 0.4 % (ref 0–8)
ERYTHROCYTE [DISTWIDTH] IN BLOOD BY AUTOMATED COUNT: 13.1 % (ref 11.5–14.5)
EST. GFR  (NO RACE VARIABLE): 54.5 ML/MIN/1.73 M^2
ESTIMATED AVG GLUCOSE: 105 MG/DL (ref 68–131)
GLUCOSE SERPL-MCNC: 107 MG/DL (ref 70–110)
HBA1C MFR BLD: 5.3 % (ref 4–5.6)
HCT VFR BLD AUTO: 37.1 % (ref 37–48.5)
HDLC SERPL-MCNC: 62 MG/DL (ref 40–75)
HDLC SERPL: 44.9 % (ref 20–50)
HGB BLD-MCNC: 11.9 G/DL (ref 12–16)
IMM GRANULOCYTES # BLD AUTO: 0.04 K/UL (ref 0–0.04)
IMM GRANULOCYTES NFR BLD AUTO: 0.5 % (ref 0–0.5)
LDLC SERPL CALC-MCNC: 67.2 MG/DL (ref 63–159)
LYMPHOCYTES # BLD AUTO: 0.3 K/UL (ref 1–4.8)
LYMPHOCYTES NFR BLD: 4.2 % (ref 18–48)
MAGNESIUM SERPL-MCNC: 1.8 MG/DL (ref 1.6–2.6)
MCH RBC QN AUTO: 31.9 PG (ref 27–31)
MCHC RBC AUTO-ENTMCNC: 32.1 G/DL (ref 32–36)
MCV RBC AUTO: 100 FL (ref 82–98)
MONOCYTES # BLD AUTO: 0.6 K/UL (ref 0.3–1)
MONOCYTES NFR BLD: 7.2 % (ref 4–15)
NEUTROPHILS # BLD AUTO: 6.8 K/UL (ref 1.8–7.7)
NEUTROPHILS NFR BLD: 87.6 % (ref 38–73)
NONHDLC SERPL-MCNC: 76 MG/DL
NRBC BLD-RTO: 0 /100 WBC
PHOSPHATE SERPL-MCNC: 3.5 MG/DL (ref 2.7–4.5)
PLATELET # BLD AUTO: 155 K/UL (ref 150–450)
PMV BLD AUTO: 9.6 FL (ref 9.2–12.9)
POTASSIUM SERPL-SCNC: 4.1 MMOL/L (ref 3.5–5.1)
PROT SERPL-MCNC: 6.6 G/DL (ref 6–8.4)
RBC # BLD AUTO: 3.73 M/UL (ref 4–5.4)
SODIUM SERPL-SCNC: 137 MMOL/L (ref 136–145)
TRIGL SERPL-MCNC: 44 MG/DL (ref 30–150)
TROPONIN I SERPL DL<=0.01 NG/ML-MCNC: 0.03 NG/ML (ref 0–0.03)
TROPONIN I SERPL DL<=0.01 NG/ML-MCNC: 0.05 NG/ML (ref 0–0.03)
WBC # BLD AUTO: 7.8 K/UL (ref 3.9–12.7)

## 2023-05-19 PROCEDURE — 83735 ASSAY OF MAGNESIUM: CPT | Mod: NTX

## 2023-05-19 PROCEDURE — 80053 COMPREHEN METABOLIC PANEL: CPT | Mod: NTX | Performed by: EMERGENCY MEDICINE

## 2023-05-19 PROCEDURE — 99285 EMERGENCY DEPT VISIT HI MDM: CPT | Mod: 25,NTX

## 2023-05-19 PROCEDURE — 63600175 PHARM REV CODE 636 W HCPCS: Mod: NTX

## 2023-05-19 PROCEDURE — 93005 ELECTROCARDIOGRAM TRACING: CPT | Mod: NTX

## 2023-05-19 PROCEDURE — 20600001 HC STEP DOWN PRIVATE ROOM: Mod: NTX

## 2023-05-19 PROCEDURE — 93010 ELECTROCARDIOGRAM REPORT: CPT | Mod: NTX,,, | Performed by: INTERNAL MEDICINE

## 2023-05-19 PROCEDURE — 84484 ASSAY OF TROPONIN QUANT: CPT | Mod: NTX | Performed by: EMERGENCY MEDICINE

## 2023-05-19 PROCEDURE — 99291 CRITICAL CARE FIRST HOUR: CPT | Mod: ,,, | Performed by: EMERGENCY MEDICINE

## 2023-05-19 PROCEDURE — 96374 THER/PROPH/DIAG INJ IV PUSH: CPT | Mod: NTX

## 2023-05-19 PROCEDURE — 99223 PR INITIAL HOSPITAL CARE,LEVL III: ICD-10-PCS | Mod: NTX,,, | Performed by: STUDENT IN AN ORGANIZED HEALTH CARE EDUCATION/TRAINING PROGRAM

## 2023-05-19 PROCEDURE — 84100 ASSAY OF PHOSPHORUS: CPT | Mod: NTX

## 2023-05-19 PROCEDURE — 93010 EKG 12-LEAD: ICD-10-PCS | Mod: NTX,,, | Performed by: INTERNAL MEDICINE

## 2023-05-19 PROCEDURE — 80061 LIPID PANEL: CPT | Mod: NTX

## 2023-05-19 PROCEDURE — 84484 ASSAY OF TROPONIN QUANT: CPT | Mod: 91,NTX

## 2023-05-19 PROCEDURE — 99223 1ST HOSP IP/OBS HIGH 75: CPT | Mod: NTX,,, | Performed by: STUDENT IN AN ORGANIZED HEALTH CARE EDUCATION/TRAINING PROGRAM

## 2023-05-19 PROCEDURE — 99291 PR CRITICAL CARE, E/M 30-74 MINUTES: ICD-10-PCS | Mod: ,,, | Performed by: EMERGENCY MEDICINE

## 2023-05-19 PROCEDURE — 83036 HEMOGLOBIN GLYCOSYLATED A1C: CPT | Mod: NTX

## 2023-05-19 PROCEDURE — 27000221 HC OXYGEN, UP TO 24 HOURS: Mod: NTX

## 2023-05-19 PROCEDURE — 83880 ASSAY OF NATRIURETIC PEPTIDE: CPT | Mod: NTX | Performed by: EMERGENCY MEDICINE

## 2023-05-19 PROCEDURE — 25000003 PHARM REV CODE 250: Mod: NTX

## 2023-05-19 PROCEDURE — 25000003 PHARM REV CODE 250: Mod: NTX | Performed by: STUDENT IN AN ORGANIZED HEALTH CARE EDUCATION/TRAINING PROGRAM

## 2023-05-19 PROCEDURE — 85025 COMPLETE CBC W/AUTO DIFF WBC: CPT | Mod: NTX | Performed by: EMERGENCY MEDICINE

## 2023-05-19 PROCEDURE — 94761 N-INVAS EAR/PLS OXIMETRY MLT: CPT | Mod: NTX

## 2023-05-19 RX ORDER — CHOLECALCIFEROL (VITAMIN D3) 25 MCG
2000 TABLET ORAL DAILY
Status: DISCONTINUED | OUTPATIENT
Start: 2023-05-19 | End: 2023-05-25 | Stop reason: HOSPADM

## 2023-05-19 RX ORDER — MEMANTINE HYDROCHLORIDE 5 MG/1
5 TABLET ORAL 2 TIMES DAILY
Status: DISCONTINUED | OUTPATIENT
Start: 2023-05-19 | End: 2023-05-25 | Stop reason: HOSPADM

## 2023-05-19 RX ORDER — ONDANSETRON 2 MG/ML
4 INJECTION INTRAMUSCULAR; INTRAVENOUS EVERY 8 HOURS PRN
Status: DISCONTINUED | OUTPATIENT
Start: 2023-05-19 | End: 2023-05-25 | Stop reason: HOSPADM

## 2023-05-19 RX ORDER — CARVEDILOL 12.5 MG/1
12.5 TABLET ORAL 2 TIMES DAILY
Status: DISCONTINUED | OUTPATIENT
Start: 2023-05-19 | End: 2023-05-25

## 2023-05-19 RX ORDER — POLYETHYLENE GLYCOL 3350 17 G/17G
17 POWDER, FOR SOLUTION ORAL DAILY
Status: DISCONTINUED | OUTPATIENT
Start: 2023-05-19 | End: 2023-05-25 | Stop reason: HOSPADM

## 2023-05-19 RX ORDER — FUROSEMIDE 10 MG/ML
40 INJECTION INTRAMUSCULAR; INTRAVENOUS ONCE
Status: COMPLETED | OUTPATIENT
Start: 2023-05-19 | End: 2023-05-19

## 2023-05-19 RX ORDER — LOSARTAN POTASSIUM 50 MG/1
100 TABLET ORAL DAILY
Status: DISCONTINUED | OUTPATIENT
Start: 2023-05-19 | End: 2023-05-19

## 2023-05-19 RX ORDER — FUROSEMIDE 10 MG/ML
40 INJECTION INTRAMUSCULAR; INTRAVENOUS 2 TIMES DAILY
Status: DISCONTINUED | OUTPATIENT
Start: 2023-05-19 | End: 2023-05-21

## 2023-05-19 RX ORDER — SODIUM CHLORIDE 0.9 % (FLUSH) 0.9 %
10 SYRINGE (ML) INJECTION
Status: DISCONTINUED | OUTPATIENT
Start: 2023-05-19 | End: 2023-05-25 | Stop reason: HOSPADM

## 2023-05-19 RX ORDER — NAPROXEN SODIUM 220 MG/1
81 TABLET, FILM COATED ORAL NIGHTLY
Status: DISCONTINUED | OUTPATIENT
Start: 2023-05-19 | End: 2023-05-25 | Stop reason: HOSPADM

## 2023-05-19 RX ORDER — AMLODIPINE BESYLATE 10 MG/1
10 TABLET ORAL DAILY
Status: DISCONTINUED | OUTPATIENT
Start: 2023-05-19 | End: 2023-05-19

## 2023-05-19 RX ORDER — OMEGA-3/DHA/EPA/FISH OIL 300-1000MG
1 CAPSULE,DELAYED RELEASE (ENTERIC COATED) ORAL DAILY
Status: DISCONTINUED | OUTPATIENT
Start: 2023-05-19 | End: 2023-05-25 | Stop reason: HOSPADM

## 2023-05-19 RX ORDER — DIVALPROEX SODIUM 125 MG/1
125 CAPSULE, COATED PELLETS ORAL NIGHTLY
Status: DISCONTINUED | OUTPATIENT
Start: 2023-05-19 | End: 2023-05-25 | Stop reason: HOSPADM

## 2023-05-19 RX ORDER — TALC
3 POWDER (GRAM) TOPICAL NIGHTLY
Status: DISCONTINUED | OUTPATIENT
Start: 2023-05-19 | End: 2023-05-25 | Stop reason: HOSPADM

## 2023-05-19 RX ORDER — CETIRIZINE HYDROCHLORIDE 10 MG/1
10 TABLET ORAL DAILY PRN
COMMUNITY

## 2023-05-19 RX ORDER — PRAVASTATIN SODIUM 40 MG/1
80 TABLET ORAL DAILY
Status: DISCONTINUED | OUTPATIENT
Start: 2023-05-19 | End: 2023-05-25 | Stop reason: HOSPADM

## 2023-05-19 RX ORDER — LABETALOL 100 MG/1
200 TABLET, FILM COATED ORAL 2 TIMES DAILY
Status: DISCONTINUED | OUTPATIENT
Start: 2023-05-19 | End: 2023-05-19

## 2023-05-19 RX ADMIN — Medication 3 MG: at 08:05

## 2023-05-19 RX ADMIN — FUROSEMIDE 40 MG: 10 INJECTION, SOLUTION INTRAMUSCULAR; INTRAVENOUS at 06:05

## 2023-05-19 RX ADMIN — MEMANTINE HYDROCHLORIDE 5 MG: 5 TABLET ORAL at 08:05

## 2023-05-19 RX ADMIN — DIVALPROEX SODIUM 125 MG: 125 CAPSULE, COATED PELLETS ORAL at 08:05

## 2023-05-19 RX ADMIN — CARVEDILOL 12.5 MG: 12.5 TABLET, FILM COATED ORAL at 08:05

## 2023-05-19 RX ADMIN — APIXABAN 5 MG: 5 TABLET, FILM COATED ORAL at 09:05

## 2023-05-19 RX ADMIN — ASPIRIN 81 MG 81 MG: 81 TABLET ORAL at 08:05

## 2023-05-19 RX ADMIN — FUROSEMIDE 40 MG: 10 INJECTION, SOLUTION INTRAMUSCULAR; INTRAVENOUS at 11:05

## 2023-05-19 NOTE — SUBJECTIVE & OBJECTIVE
Past Medical History:   Diagnosis Date    Allergy     seasonal    Anticoagulant long-term use     Blood clotting tendency     Carotid artery disease 05/29/2014    Chronic systolic congestive heart failure, NYHA class 3 5/9/2023    Coronary artery disease     Heart block     Hyperlipidemia     Hypertension     Hypothyroid     Left displaced femoral neck fracture 01/12/2023    Obesity     Pacemaker     PAD (peripheral artery disease)     Pulmonary fibrosis     Spinal stenosis     Thoracic or lumbosacral neuritis or radiculitis 11/25/2014    Tubular adenoma        Past Surgical History:   Procedure Laterality Date    ANGIOPLASTY      APPENDECTOMY      BREAST CYST ASPIRATION Bilateral     CARDIAC CATHETERIZATION      CARDIAC PACEMAKER PLACEMENT  10/10/13    by Dr. Coleman    CAROTID STENT Left 2007    by Dr. Bowen    CATARACT EXTRACTION W/  INTRAOCULAR LENS IMPLANT Bilateral 2012    Dr Sharp    CORONARY ARTERY BYPASS GRAFT  2001     x 3 LIMA-Ramus, SVG-OM1 & SVG-PDA    CORONARY BYPASS GRAFT ANGIOGRAPHY  3/11/2021    Procedure: Bypass graft study;  Surgeon: Gil Garcia MD;  Location: [facility] CATH LAB;  Service: Cardiology;;    CYST REMOVAL      wrist    EYE SURGERY      HEMIARTHROPLASTY OF HIP Left 1/13/2023    Procedure: HEMIARTHROPLASTY, HIP, LEFT;  Surgeon: Alan Vieyra MD;  Location: [facility] OR [address] [place] [address];  Service: Orthopedics;  Laterality: Left;    HYSTERECTOMY      INSERTION OF BIVENTRICULAR IMPLANTABLE CARDIOVERTER-DEFIBRILLATOR (ICD) Left 5/9/2023    Procedure: INSERTION, ICD, BIVENTRICULAR;  Surgeon: Diony Coleman MD;  Location: [facility] EP LAB;  Service: Cardiology;  Laterality: Left;  VF/VT, CRT-D upgrade, SJM, MAC, DM, 3 Prep *dICD in place*    LEFT HEART CATHETERIZATION Left 3/11/2021    Procedure: Left heart cath;  Surgeon: Gil Garcia MD;  Location: [facility] CATH LAB;  Service: Cardiology;  Laterality: Left;    OOPHORECTOMY      REMOVAL, DEVICE  5/9/2023    Procedure: Removal, Device;  Surgeon: Diony  "INES Coleman MD;  Location: [facility] EP LAB;  Service: Cardiology;;    THYROIDECTOMY      VITRECTOMY BY PARS PLANA APPROACH Right 10/12/2021    Procedure: VITRECTOMY, PARS PLANA APPROACH;  Surgeon: Flako Carver MD;  Location: [facility] OR [address] [place] [address];  Service: Ophthalmology;  Laterality: Right;       Review of patient's allergies indicates:   Allergen Reactions    Lipitor [atorvastatin] Itching    Fosamax [alendronate]      Felt "strange"    Iodinated contrast media      Dye is only to be used if absolutely needed because of kidney function    Prolia [denosumab]      Reaction after the Prolia    Sulfa (sulfonamide antibiotics)        No current facility-administered medications on file prior to encounter.     Current Outpatient Medications on File Prior to Encounter   Medication Sig    acetaminophen (TYLENOL) 500 MG tablet Take 1 tablet (500 mg total) by mouth every 6 (six) hours as needed for Pain.    amLODIPine (NORVASC) 10 MG tablet Take 1 tablet (10 mg total) by mouth once daily.    apixaban (ELIQUIS) 5 mg Tab Take 1 tablet (5 mg total) by mouth 2 (two) times daily.    aspirin 81 MG Chew Take 81 mg by mouth every evening. 1 Tablet, Chewable Oral Every day    cetirizine (ZYRTEC) 10 MG tablet Take 10 mg by mouth daily as needed for Allergies.    cholecalciferol, vitamin D3, (VITAMIN D3) 50 mcg (2,000 unit) Cap capsule Take 2,000 Units by mouth once daily.    coenzyme Q10 200 mg capsule Take 200 mg by mouth once daily.    divalproex (DEPAKOTE SPRINKLE) 125 mg capsule Take 1 capsule (125 mg total) by mouth every evening.    irbesartan (AVAPRO) 300 MG tablet TAKE 1 TABLET BY MOUTH EVERY DAY    labetaloL (NORMODYNE) 200 MG tablet Take 1 tablet (200 mg total) by mouth 2 (two) times daily.    levothyroxine (SYNTHROID) 125 MCG tablet Take 1 tablet (125 mcg total) by mouth before breakfast.    melatonin (MELATIN) 3 mg tablet Take 1 tablet (3 mg total) by mouth every evening.    memantine (NAMENDA) 5 MG Tab TAKE 1 TABLET BY MOUTH " TWICE DAILY    multivitamin (THERAGRAN) per tablet Take 1 tablet by mouth once daily.    nitroGLYCERIN (NITROSTAT) 0.4 MG SL tablet PLACE 1 TABLET UNDER THE TONGUE EVERY 5 MIN AS NEEDED FOR CHEST PAIN. MAX:3 DOSES    rosuvastatin (CRESTOR) 10 MG tablet Take 1 tablet (10 mg total) by mouth once daily.    vitamins  A,C,E-zinc-copper (PRESERVISION AREDS) [phone] unit-mg-unit Cap Take by mouth Daily.    [DISCONTINUED] acetaminophen (TYLENOL) 325 MG tablet Take 2 tablets (650 mg total) by mouth every 6 (six) hours as needed (Mild to moderate pain).    [DISCONTINUED] divalproex (DEPAKOTE SPRINKLE) 125 mg capsule Take 1 capsule (125 mg total) by mouth every evening.    [DISCONTINUED] SALMON OIL-OMEGA-3 FATTY ACIDS ORAL Take 1 capsule by mouth once daily.     Family History       Problem Relation (Age of Onset)    Alzheimer's disease Mother    Diabetes Brother    Heart disease Father, Mother, Brother    Hypertension Father, Mother    No Known Problems Sister, Maternal Grandmother, Maternal Grandfather, Paternal Grandmother, Paternal Grandfather, Maternal Aunt, Maternal Uncle, Paternal Aunt, Paternal Uncle          Tobacco Use    Smoking status: Former     Types: Cigarettes     Quit date: 1984     Years since quittin.7    Smokeless tobacco: Never   Substance and Sexual Activity    Alcohol use: Not Currently     Alcohol/week: 1.0 standard drink     Types: 1 Shots of liquor per week     Comment: old fashion  with dinner every 6 mo     Drug use: No    Sexual activity: Not on file     Review of Systems   Constitutional:  Negative for activity change, chills and fatigue.   HENT:  Negative for facial swelling and sore throat.    Eyes:  Negative for photophobia and visual disturbance.   Respiratory:  Positive for shortness of breath and wheezing. Negative for cough.    Cardiovascular:  Positive for leg swelling. Negative for chest pain.   Gastrointestinal:  Negative for abdominal pain, diarrhea, nausea and  vomiting.   Genitourinary:  Negative for difficulty urinating and hematuria.   Neurological:  Negative for dizziness, syncope and numbness.   Psychiatric/Behavioral:  Negative for agitation and confusion.    Objective:     Vital Signs (Most Recent):  Temp: 98.1 °F (36.7 °C) (05/19/23 1315)  Pulse: 71 (05/19/23 1530)  Resp: 20 (05/19/23 1530)  BP: (!) 170/88 (05/19/23 1315)  SpO2: 98 % (05/19/23 1530) Vital Signs (24h Range):  Temp:  [98.1 °F (36.7 °C)-98.8 °F (37.1 °C)] 98.1 °F (36.7 °C)  Pulse:  [70-93] 71  Resp:  [16-26] 20  SpO2:  [79 %-98 %] 98 %  BP: (156-170)/(75-88) 170/88     Weight: 60.8 kg (134 lb)  Body mass index is 23 kg/m².     Physical Exam  Vitals and nursing note reviewed.   Constitutional:       General: She is not in acute distress.     Appearance: Normal appearance. She is ill-appearing.   HENT:      Head: Normocephalic and atraumatic.      Mouth/Throat:      Mouth: Mucous membranes are moist.   Eyes:      General: No scleral icterus.     Extraocular Movements: Extraocular movements intact.      Conjunctiva/sclera: Conjunctivae normal.   Cardiovascular:      Rate and Rhythm: Normal rate.      Heart sounds: Normal heart sounds.   Pulmonary:      Effort: Pulmonary effort is normal. No respiratory distress.      Breath sounds: Wheezing and rales present.   Abdominal:      General: Abdomen is flat. There is no distension.      Palpations: Abdomen is soft.      Tenderness: There is no abdominal tenderness. There is no guarding.   Musculoskeletal:      Right lower leg: Edema present.      Left lower leg: Edema present.   Skin:     General: Skin is warm.   Neurological:      General: No focal deficit present.      Mental Status: She is alert. Mental status is at baseline.      Comments: Alert and oriented to self, place, situation. Not time   Psychiatric:         Mood and Affect: Mood normal.         Behavior: Behavior normal.              Significant Labs: All pertinent labs within the past 24 hours  have been reviewed.  Recent Lab Results         05/19/23  1324   05/19/23  1119        Albumin 3.4         Alkaline Phosphatase 81         ALT 21         Anion Gap 11         AST 32  Comment: *Result may be interfered by visible hemolysis         Baso #   0.01       Basophil %   0.1       BILIRUBIN TOTAL 0.9  Comment: For infants and newborns, interpretation of results should be based  on gestational age, weight and in agreement with clinical  observations.    Premature Infant recommended reference ranges:  Up to 24 hours.............<8.0 mg/dL  Up to 48 hours............<12.0 mg/dL  3-5 days..................<15.0 mg/dL  6-29 days.................<15.0 mg/dL           BNP   1,620  Comment: Values of less than 100 pg/ml are consistent with non-CHF populations.       BUN 22         Calcium 8.4         Chloride 103         CO2 23         Creatinine 1.0         Differential Method   Automated       eGFR 54.5         Eos #   0.0       Eosinophil %   0.4       Glucose 107         Gran # (ANC)   6.8       Gran %   87.6       Hematocrit   37.1       Hemoglobin   11.9       Immature Grans (Abs)   0.04  Comment: Mild elevation in immature granulocytes is non specific and   can be seen in a variety of conditions including stress response,   acute inflammation, trauma and pregnancy. Correlation with other   laboratory and clinical findings is essential.         Immature Granulocytes   0.5       Lymph #   0.3       Lymph %   4.2       MCH   31.9       MCHC   32.1       MCV   100       Mono #   0.6       Mono %   7.2       MPV   9.6       nRBC   0       Platelets   155       Potassium 4.1         PROTEIN TOTAL 6.6         RBC   3.73       RDW   13.1       Sodium 137         Troponin I   0.033  Comment: The reference interval for Troponin I represents the 99th percentile   cutoff   for our facility and is consistent with 3rd generation assay   performance.         WBC   7.80               Significant Imaging: I have reviewed all  pertinent imaging results/findings within the past 24 hours.

## 2023-05-19 NOTE — ASSESSMENT & PLAN NOTE
S/p ICD placement with recent exchange. EKG on admission ventricular paced.     - continue telemetry

## 2023-05-19 NOTE — ED NOTES
Pt urinating / using external catheter without problems. 500ml output of urine. Denies complaints. Resting in bed on 5lnc, no resp distress noted. A&ox4.  Pending labwork results

## 2023-05-19 NOTE — ED PROVIDER NOTES
"Encounter Date: 5/19/2023       History     Chief Complaint   Patient presents with    Shortness of Breath     SOB, unable to maintain O2 sats per pt family member. 78-81% on home 3L at triage. Hx of idiopathic pulmonary fibrosis.       87 y.o. female with pAF on eliquis, CAD (s/p CABG 2001), HTN, PVD, CRT-D upgrade from PPM and recently diagnosed ILD/fibrosis presenting with increasing home O2 demands and SOB with patient satting 80% on 3L, the max of her home concentrator. Daughter reports she was admitted 5/2-5/5 for ADHF and pulmonary edema requiring IV diuretics. She was also diagnosed with pulmonary fibrosis during this admission with formal pulm follow up scheduled for next week. Family reports she has been noticing gradually worsening edema  following this discharge. She was not on any PO lasix prior to or following her HF admission. She denies recent fever, chills, productive cough or change in her chronic nonproductive cough but family reports worsening "gurgling" over the last 3 days. She had a CRT upgrade 1.5wk ago, she denies any pain, redness or discharge from the pocket incision. No N/V/D, CP    The history is provided by the patient, a relative, the EMS personnel and medical records.   Review of patient's allergies indicates:   Allergen Reactions    Lipitor [atorvastatin] Itching    Fosamax [alendronate]      Felt "strange"    Iodinated contrast media      Dye is only to be used if absolutely needed because of kidney function    Prolia [denosumab]      Reaction after the Prolia    Sulfa (sulfonamide antibiotics)      Past Medical History:   Diagnosis Date    Allergy     seasonal    Anticoagulant long-term use     Blood clotting tendency     Carotid artery disease 05/29/2014    Chronic systolic congestive heart failure, NYHA class 3 5/9/2023    Coronary artery disease     Heart block     Hyperlipidemia     Hypertension     Hypothyroid     Left displaced femoral neck fracture 01/12/2023    Obesity     " Pacemaker     PAD (peripheral artery disease)     Pulmonary fibrosis     Spinal stenosis     Thoracic or lumbosacral neuritis or radiculitis 11/25/2014    Tubular adenoma      Past Surgical History:   Procedure Laterality Date    ANGIOPLASTY      APPENDECTOMY      BREAST CYST ASPIRATION Bilateral     CARDIAC CATHETERIZATION      CARDIAC PACEMAKER PLACEMENT  10/10/13    by Dr. Coleman    CAROTID STENT Left 2007    by Dr. Bowen    CATARACT EXTRACTION W/  INTRAOCULAR LENS IMPLANT Bilateral 2012    Dr Sharp    CORONARY ARTERY BYPASS GRAFT  2001     x 3 LIMA-Ramus, SVG-OM1 & SVG-PDA    CORONARY BYPASS GRAFT ANGIOGRAPHY  3/11/2021    Procedure: Bypass graft study;  Surgeon: Gil Garcia MD;  Location: [facility] CATH LAB;  Service: Cardiology;;    CYST REMOVAL      wrist    EYE SURGERY      HEMIARTHROPLASTY OF HIP Left 1/13/2023    Procedure: HEMIARTHROPLASTY, HIP, LEFT;  Surgeon: Alan Vieyra MD;  Location: [facility] 2ND FLR;  Service: Orthopedics;  Laterality: Left;    HYSTERECTOMY      INSERTION OF BIVENTRICULAR IMPLANTABLE CARDIOVERTER-DEFIBRILLATOR (ICD) Left 5/9/2023    Procedure: INSERTION, ICD, BIVENTRICULAR;  Surgeon: Diony Coleman MD;  Location: [facility] EP LAB;  Service: Cardiology;  Laterality: Left;  VF/VT, CRT-D upgrade, SJM, MAC, DM, 3 Prep *dICD in place*    LEFT HEART CATHETERIZATION Left 3/11/2021    Procedure: Left heart cath;  Surgeon: Gil Garcia MD;  Location: [facility] CATH LAB;  Service: Cardiology;  Laterality: Left;    OOPHORECTOMY      REMOVAL, DEVICE  5/9/2023    Procedure: Removal, Device;  Surgeon: Diony Coleman MD;  Location: [facility] EP LAB;  Service: Cardiology;;    THYROIDECTOMY      VITRECTOMY BY PARS PLANA APPROACH Right 10/12/2021    Procedure: VITRECTOMY, PARS PLANA APPROACH;  Surgeon: Flako Carver MD;  Location: [facility] OR 1ST FLR;  Service: Ophthalmology;  Laterality: Right;     Family History   Problem Relation Age of Onset    Heart disease Father         coronary thrombosis     Hypertension Father     Alzheimer's disease Mother     Heart disease Mother         pacemaker    Hypertension Mother     Diabetes Brother     No Known Problems Sister     No Known Problems Maternal Grandmother     No Known Problems Maternal Grandfather     No Known Problems Paternal Grandmother     No Known Problems Paternal Grandfather     Heart disease Brother     No Known Problems Maternal Aunt     No Known Problems Maternal Uncle     No Known Problems Paternal Aunt     No Known Problems Paternal Uncle     Amblyopia Neg Hx     Blindness Neg Hx     Cancer Neg Hx     Cataracts Neg Hx     Glaucoma Neg Hx     Macular degeneration Neg Hx     Retinal detachment Neg Hx     Strabismus Neg Hx     Stroke Neg Hx     Thyroid disease Neg Hx     Melanoma Neg Hx      Social History     Tobacco Use    Smoking status: Former     Types: Cigarettes     Quit date: 1984     Years since quittin.7    Smokeless tobacco: Never   Substance Use Topics    Alcohol use: Not Currently     Alcohol/week: 1.0 standard drink     Types: 1 Shots of liquor per week     Comment: old fashion  with dinner every 6 mo     Drug use: No     Review of Systems   Unable to perform ROS: Dementia     Physical Exam     Initial Vitals [23 1033]   BP Pulse Resp Temp SpO2   (!) 157/75 93 (!) 26 98.8 °F (37.1 °C) (!) 79 %      MAP       --         Physical Exam    Constitutional: She appears well-developed and well-nourished. She is not diaphoretic. No distress.   HENT:   Head: Normocephalic and atraumatic.   Eyes: EOM are normal.   Neck: Neck supple.   Difficult to assess JVP    Normal range of motion.  Cardiovascular:  Normal rate, regular rhythm and intact distal pulses.     Exam reveals gallop (S3). Exam reveals no friction rub.       Pulmonary/Chest: No stridor. She is in respiratory distress. She has rhonchi. She has rales.   Crackles diffusely worse at the bases, fine crackles at the BL apex   Abdominal: Abdomen is soft. Bowel sounds are  normal. She exhibits no distension. There is no abdominal tenderness. There is no rebound and no guarding.   Musculoskeletal:         General: Edema (2-3+ BLE) present.      Cervical back: Normal range of motion and neck supple.     Neurological: She is alert. She has normal strength.   Skin: Skin is warm and dry. Capillary refill takes less than 2 seconds. No rash and no abscess noted.       ED Course   Procedures  Labs Reviewed   CBC W/ AUTO DIFFERENTIAL - Abnormal; Notable for the following components:       Result Value    RBC 3.73 (*)     Hemoglobin 11.9 (*)      (*)     MCH 31.9 (*)     Lymph # 0.3 (*)     Gran % 87.6 (*)     Lymph % 4.2 (*)     All other components within normal limits   TROPONIN I - Abnormal; Notable for the following components:    Troponin I 0.033 (*)     All other components within normal limits   B-TYPE NATRIURETIC PEPTIDE - Abnormal; Notable for the following components:    BNP 1,620 (*)     All other components within normal limits   COMPREHENSIVE METABOLIC PANEL - Abnormal; Notable for the following components:    Calcium 8.4 (*)     Albumin 3.4 (*)     eGFR 54.5 (*)     All other components within normal limits        ECG Results              EKG 12-lead (Final result)  Result time 05/19/23 11:33:20      Final result by Interface, [facility] (05/19/23 11:33:20)                   Narrative:    Test Reason : R06.02,    Vent. Rate : 076 BPM     Atrial Rate : 076 BPM     P-R Int : 212 ms          QRS Dur : 120 ms      QT Int : 462 ms       P-R-T Axes : 041 181 085 degrees     QTc Int : 519 ms    ventiruclar paced rythm  Abnormal ECG  When compared with ECG of 09-MAY-2023 13:07,  No significant change was found    Confirmed by Erum Zarco MD ([id number]) on 5/19/2023 11:33:10 AM    Referred By: [email or web address]   SELF           Confirmed By:Erum Zarco MD                                  Imaging Results              X-Ray Chest AP Portable (Final result)  Result time 05/19/23  12:41:29      Final result by Mickey Antunez DO (05/19/23 12:41:29)                   Impression:      Interstitial and alveolar opacities compatible with pulmonary edema/CHF.    Small effusions.      Electronically signed by: Mickey Antunez  Date:    05/19/2023  Time:    12:41               Narrative:    EXAMINATION:  XR CHEST AP PORTABLE    CLINICAL HISTORY:  CHF;    TECHNIQUE:  Single frontal view of the chest was performed.    COMPARISON:  05/09/2023.    FINDINGS:  There is a cardiac pacer/AICD, unchanged in position from prior.  The lungs are hypoexpanded.  There are bilateral interstitial and alveolar opacities compatible with pulmonary edema.  Overlying infection difficult to entirely exclude.  There are small bilateral pleural effusions.  No pneumothorax.  The cardiac silhouette is enlarged.  The visualized osseous structures demonstrate degenerative changes.  There are median sternotomy wires.                                       Medications   furosemide injection 40 mg (40 mg Intravenous Given 5/19/23 1121)     Medical Decision Making:   Initial Assessment:    87 y.o. female with pAF on eliquis, CAD (s/p CABG 2001), HTN, PVD, CRT-D upgrade from PPM and recently diagnosed ILD/fibrosis presenting with increasing home O2 demands and SOB with patient satting 80% on 3L, the max of her home concentrator. No infectious symptoms, fever, chills, productive cough. Patient with worsening edema since last admission, no on home diuretic. Currently satting 93% on 3.5L. No CP, AICD firing, palpitations, hemoptysis.   Differential Diagnosis:   ADHF, ILD flair, PNA, pneumothorax, ACS  Clinical Tests:   Lab Tests: Ordered  Radiological Study: Ordered  Medical Tests: Ordered  ED Management:  Will order routine labs plus, BNP, trop, CXR and trial Lasix 40m IV, purewick placed for I/O monitoring. BNP elevated to 1600s from 1200s 2 weeks ago. Trop mildly elevated but down trending from 2wk ago following CRT placement. CXR  with increased pulmonary edema and congestion. Patient diuresing well with Lasix 40mg IV. Will admit to  for ADHF treatment requiring IV diuresis          Attending Attestation:             Attending ED Notes:   Attending Note:  I have seen the patient, have repeated the key portions of the history and physical, reviewed and agree with the medical documentation, and supervised and managed the medical care of the patient. Additionally, I was present for the critical portion of any procedure(s) performed.      87 F hx above here for sob, hypoxemia w/ increased oxygen requirements.  Saturation 79 % on arrival, increased to 5 L then titrated down to 3.5 L w/ saturations 93 %.  Appears hypervolemic on exam  Labs consistent with decompensated heart failure  Treated with lasix w/ good urine output.   Admit to  for further treatment.     ARRON Terrell MD  Staff ED Physician  05/20/2023 9:35 AM      Critical Care time:35 minutes inclusive of direct patient care, review of previous records, interpretation of labs, imaging and ekg, as well as discussion of my impression and plan of care with the patient, family and other clinicians/consultants. This time is exclusive of any separate billable procedures and of treating other patients.                   Clinical Impression:   Final diagnoses:  [R06.02] Shortness of breath  [I50.43] Acute on chronic combined systolic and diastolic congestive heart failure (Primary)  [R09.02] Hypoxia                      Ha Alfonso, DO  Resident  05/19/23 1250       Ha Alfonso, DO  Resident  05/19/23 1414       Ha Alfonso, DO  Resident  05/19/23 1431       Madelin Terrell MD  05/20/23 5780

## 2023-05-19 NOTE — ASSESSMENT & PLAN NOTE
Pt with hx of HFpEF and ILD. On 3L home oxygen. Requiring 5 L on admission to maintain O2 sat 88-92%. Most recent chest CT 5/4/23 showed subpleural reticulations, traction bronchiectasis, and several min of GGO. No definite honeycomb abnormality. Minimal L pleural effusion. Pulmonology was consulted at the time of last hospitalization 5/5 and felt risk>benefit of high dose steroids for ILD. Workup for causes of ILD was significant for positive DARRIAN. Pt was scheduled to see pulmonology clinic in the next few days for further workup and PFTs. Was not sent home with PO lasix at the time.     Presented with BNP 1600, 2+ pitting edema BLE.    Combined systolic and diastolic HF  - Most recent TTE on demonstrates: LVef 40 %,   - Troponin 0.033. Cycle troponin to rule out ischemic etiology  - BNP 1600 and CXR with diffuse pulmonary edema   - Fluid restriction at 1500 cc with strict I/Os and daily weights    - IV diuresis with Lasix 40 IV BID and monitor response.  Goal diuresis 2-3L/day.  Home diuretic dose: None  - Check Electrolytes, keep Mag >2 & K+ >4  - SCDs, Nursing communication to elevated LE   - Ambulate as tolerated

## 2023-05-19 NOTE — ED NOTES
LOC: The patient is awake and alert; oriented x 3 and speaking appropriately.  APPEARANCE: Patient resting comfortably, patient is clean and well groomed  SKIN: warm and dry, normal skin turgor & moist mucus membranes, skin intact, no breakdown noted.Healed skin tears on forearms  MUSCULOSKELETAL: Patient moving all extremities well, no obvious swelling or deformities noted  RESPIRATORY: Airway is open and patent, moist cough and audible wheezing noted, SOB w/ slight exertion.  respirations are spontaneous, normal effort and rate  CARDIAC: Patient has a normal rate, no peripheral edema noted, capillary refill < 3 seconds; No complaints of chest pain , new pacemaker left upper chest w/ dressing.   ABDOMEN: Soft and non tender to palpation, no distention noted.

## 2023-05-19 NOTE — ED TRIAGE NOTES
Recently dx'd w/ pulmonary fibrosis and had a pacer placed  1 1/2 wks ago. Now having increased sob  and swelling to ankles. Has audible wheezing w/ a moist cough. Pulse ox 94% on 5l/nc. Recently had a thoracentesis. Lives at the [facility] in [place].family at bedside

## 2023-05-19 NOTE — ASSESSMENT & PLAN NOTE
Patient with Paroxysmal (<7 days) atrial fibrillation which is controlled currently with Beta Blocker. Patient is currently in sinus rhythm.[id number] Score: 4. Anticoagulation indicated. Anticoagulation done with Eliquis.

## 2023-05-19 NOTE — ASSESSMENT & PLAN NOTE
Home medications include: amlodipine 10 mg qd, labetalol 200 mg BID, irbesartan 300 mg qd    - Continue home meds, switch labetalol to coreg

## 2023-05-19 NOTE — H&P
"[facility] - Emergency Dept  [facility] Medicine  History & Physical    Patient Name: Lynn Mckinney  MRN: [id number]  Patient Class: IP- Inpatient  Admission Date: 5/19/2023  Attending Physician: Keith Marti DO   Primary Care Provider: Abdiel Robles MD         Patient information was obtained from patient, caregiver / friend, past medical records and ER records.     Subjective:     Principal Problem:Acute hypoxemic respiratory failure    Chief Complaint:   Chief Complaint   Patient presents with    Shortness of Breath     SOB, unable to maintain O2 sats per pt family member. 78-81% on home 3L at triage. Hx of idiopathic pulmonary fibrosis.         HPI: Lynn Mckinney is a 87 y.o. female with pAF on eliquis, CAD (s/p CABG 2001), HTN, PVD, CRT-D upgrade from PPM and recently diagnosed ILD/fibrosis presenting with increasing home O2 demands and SOB with patient satting 80% on 3L, the max of her home concentrator. Daughter reports she was admitted 5/2-5/5 for CHF exacerbation and pulmonary edema requiring IV diuretics. She was also diagnosed with pulmonary fibrosis during said admission with formal pulm follow up scheduled for next week. Family reports she has been noticing gradually worsening edema  following recent discharge. She was not on any PO lasix prior to or following her recent HF admission. She denies recent fever, chills, productive cough or change in her chronic nonproductive cough but family reports worsening "gurgling" over the last 3 days. She had a CRT upgrade 1.5wk ago, she denies any pain, redness or discharge from the pocket incision.     In the ED, VS afebrile HR 93, RR 26, /75, SpO2 79% on 3L. Up to 90% on 5L NC. Labs demonstrated WBC 7, Hgb 11.9, Plt 155, Na 137, K 4.1, BUN/Cr 11/1.0. BNP 1600, Trop 0.033. CXR with interstitial and alveolar opacities compatible with pulmonary edema/CHF. Given IV lasix 40 mg x1. Patient admitted to medicine for further management of acute hypoxic respiratory " failure in the setting of ILD and CHF.       Past Medical History:   Diagnosis Date    Allergy     seasonal    Anticoagulant long-term use     Blood clotting tendency     Carotid artery disease 05/29/2014    Chronic systolic congestive heart failure, NYHA class 3 5/9/2023    Coronary artery disease     Heart block     Hyperlipidemia     Hypertension     Hypothyroid     Left displaced femoral neck fracture 01/12/2023    Obesity     Pacemaker     PAD (peripheral artery disease)     Pulmonary fibrosis     Spinal stenosis     Thoracic or lumbosacral neuritis or radiculitis 11/25/2014    Tubular adenoma        Past Surgical History:   Procedure Laterality Date    ANGIOPLASTY      APPENDECTOMY      BREAST CYST ASPIRATION Bilateral     CARDIAC CATHETERIZATION      CARDIAC PACEMAKER PLACEMENT  10/10/13    by Dr. Coleman    CAROTID STENT Left 2007    by Dr. Bowen    CATARACT EXTRACTION W/  INTRAOCULAR LENS IMPLANT Bilateral 2012    Dr Sharp    CORONARY ARTERY BYPASS GRAFT  2001     x 3 LIMA-Ramus, SVG-OM1 & SVG-PDA    CORONARY BYPASS GRAFT ANGIOGRAPHY  3/11/2021    Procedure: Bypass graft study;  Surgeon: Gil Garcia MD;  Location: [facility] CATH LAB;  Service: Cardiology;;    CYST REMOVAL      wrist    EYE SURGERY      HEMIARTHROPLASTY OF HIP Left 1/13/2023    Procedure: HEMIARTHROPLASTY, HIP, LEFT;  Surgeon: Alan Vieyra MD;  Location: [facility] OR [address] [place] [address];  Service: Orthopedics;  Laterality: Left;    HYSTERECTOMY      INSERTION OF BIVENTRICULAR IMPLANTABLE CARDIOVERTER-DEFIBRILLATOR (ICD) Left 5/9/2023    Procedure: INSERTION, ICD, BIVENTRICULAR;  Surgeon: Diony Coleman MD;  Location: [facility] EP LAB;  Service: Cardiology;  Laterality: Left;  VF/VT, CRT-D upgrade, SJM, MAC, DM, 3 Prep *dICD in place*    LEFT HEART CATHETERIZATION Left 3/11/2021    Procedure: Left heart cath;  Surgeon: Gil Garcia MD;  Location: [facility] CATH LAB;  Service: Cardiology;  Laterality: Left;    OOPHORECTOMY  "     REMOVAL, DEVICE  5/9/2023    Procedure: Removal, Device;  Surgeon: Diony Coleman MD;  Location: [facility] EP LAB;  Service: Cardiology;;    THYROIDECTOMY      VITRECTOMY BY PARS PLANA APPROACH Right 10/12/2021    Procedure: VITRECTOMY, PARS PLANA APPROACH;  Surgeon: Flako Carver MD;  [facility];  Service: Ophthalmology;  Laterality: Right;       Review of patient's allergies indicates:   Allergen Reactions    Lipitor [atorvastatin] Itching    Fosamax [alendronate]      Felt "strange"    Iodinated contrast media      Dye is only to be used if absolutely needed because of kidney function    Prolia [denosumab]      Reaction after the Prolia    Sulfa (sulfonamide antibiotics)        No current facility-administered medications on file prior to encounter.     Current Outpatient Medications on File Prior to Encounter   Medication Sig    acetaminophen (TYLENOL) 500 MG tablet Take 1 tablet (500 mg total) by mouth every 6 (six) hours as needed for Pain.    amLODIPine (NORVASC) 10 MG tablet Take 1 tablet (10 mg total) by mouth once daily.    apixaban (ELIQUIS) 5 mg Tab Take 1 tablet (5 mg total) by mouth 2 (two) times daily.    aspirin 81 MG Chew Take 81 mg by mouth every evening. 1 Tablet, Chewable Oral Every day    cetirizine (ZYRTEC) 10 MG tablet Take 10 mg by mouth daily as needed for Allergies.    cholecalciferol, vitamin D3, (VITAMIN D3) 50 mcg (2,000 unit) Cap capsule Take 2,000 Units by mouth once daily.    coenzyme Q10 200 mg capsule Take 200 mg by mouth once daily.    divalproex (DEPAKOTE SPRINKLE) 125 mg capsule Take 1 capsule (125 mg total) by mouth every evening.    irbesartan (AVAPRO) 300 MG tablet TAKE 1 TABLET BY MOUTH EVERY DAY    labetaloL (NORMODYNE) 200 MG tablet Take 1 tablet (200 mg total) by mouth 2 (two) times daily.    levothyroxine (SYNTHROID) 125 MCG tablet Take 1 tablet (125 mcg total) by mouth before breakfast.    melatonin (MELATIN) 3 mg tablet Take 1 " tablet (3 mg total) by mouth every evening.    memantine (NAMENDA) 5 MG Tab TAKE 1 TABLET BY MOUTH TWICE DAILY    multivitamin (THERAGRAN) per tablet Take 1 tablet by mouth once daily.    nitroGLYCERIN (NITROSTAT) 0.4 MG SL tablet PLACE 1 TABLET UNDER THE TONGUE EVERY 5 MIN AS NEEDED FOR CHEST PAIN. MAX:3 DOSES    rosuvastatin (CRESTOR) 10 MG tablet Take 1 tablet (10 mg total) by mouth once daily.    vitamins  A,C,E-zinc-copper (PRESERVISION AREDS) 14,320-226-200 unit-mg-unit Cap Take by mouth Daily.    [DISCONTINUED] acetaminophen (TYLENOL) 325 MG tablet Take 2 tablets (650 mg total) by mouth every 6 (six) hours as needed (Mild to moderate pain).    [DISCONTINUED] divalproex (DEPAKOTE SPRINKLE) 125 mg capsule Take 1 capsule (125 mg total) by mouth every evening.    [DISCONTINUED] SALMON OIL-OMEGA-3 FATTY ACIDS ORAL Take 1 capsule by mouth once daily.     Family History       Problem Relation (Age of Onset)    Alzheimer's disease Mother    Diabetes Brother    Heart disease Father, Mother, Brother    Hypertension Father, Mother    No Known Problems Sister, Maternal Grandmother, Maternal Grandfather, Paternal Grandmother, Paternal Grandfather, Maternal Aunt, Maternal Uncle, Paternal Aunt, Paternal Uncle          Tobacco Use    Smoking status: Former     Types: Cigarettes     Quit date: 1984     Years since quittin.7    Smokeless tobacco: Never   Substance and Sexual Activity    Alcohol use: Not Currently     Alcohol/week: 1.0 standard drink     Types: 1 Shots of liquor per week     Comment: old fashion  with dinner every 6 mo     Drug use: No    Sexual activity: Not on file     Review of Systems   Constitutional:  Negative for activity change, chills and fatigue.   HENT:  Negative for facial swelling and sore throat.    Eyes:  Negative for photophobia and visual disturbance.   Respiratory:  Positive for shortness of breath and wheezing. Negative for cough.    Cardiovascular:  Positive for leg  swelling. Negative for chest pain.   Gastrointestinal:  Negative for abdominal pain, diarrhea, nausea and vomiting.   Genitourinary:  Negative for difficulty urinating and hematuria.   Neurological:  Negative for dizziness, syncope and numbness.   Psychiatric/Behavioral:  Negative for agitation and confusion.    Objective:     Vital Signs (Most Recent):  Temp: 98.1 °F (36.7 °C) (05/19/23 1315)  Pulse: 71 (05/19/23 1530)  Resp: 20 (05/19/23 1530)  BP: (!) 170/88 (05/19/23 1315)  SpO2: 98 % (05/19/23 1530) Vital Signs (24h Range):  Temp:  [98.1 °F (36.7 °C)-98.8 °F (37.1 °C)] 98.1 °F (36.7 °C)  Pulse:  [70-93] 71  Resp:  [16-26] 20  SpO2:  [79 %-98 %] 98 %  BP: (156-170)/(75-88) 170/88     Weight: 60.8 kg (134 lb)  Body mass index is 23 kg/m².     Physical Exam  Vitals and nursing note reviewed.   Constitutional:       General: She is not in acute distress.     Appearance: Normal appearance. She is ill-appearing.   HENT:      Head: Normocephalic and atraumatic.      Mouth/Throat:      Mouth: Mucous membranes are moist.   Eyes:      General: No scleral icterus.     Extraocular Movements: Extraocular movements intact.      Conjunctiva/sclera: Conjunctivae normal.   Cardiovascular:      Rate and Rhythm: Normal rate.      Heart sounds: Normal heart sounds.   Pulmonary:      Effort: Pulmonary effort is normal. No respiratory distress.      Breath sounds: Wheezing and rales present.   Abdominal:      General: Abdomen is flat. There is no distension.      Palpations: Abdomen is soft.      Tenderness: There is no abdominal tenderness. There is no guarding.   Musculoskeletal:      Right lower leg: Edema present.      Left lower leg: Edema present.   Skin:     General: Skin is warm.   Neurological:      General: No focal deficit present.      Mental Status: She is alert. Mental status is at baseline.      Comments: Alert and oriented to self, place, situation. Not time   Psychiatric:         Mood and Affect: Mood normal.          Behavior: Behavior normal.              Significant Labs: All pertinent labs within the past 24 hours have been reviewed.  Recent Lab Results         05/19/23  1324   05/19/23  1119        Albumin 3.4         Alkaline Phosphatase 81         ALT 21         Anion Gap 11         AST 32  Comment: *Result may be interfered by visible hemolysis         Baso #   0.01       Basophil %   0.1       BILIRUBIN TOTAL 0.9  Comment: For infants and newborns, interpretation of results should be based  on gestational age, weight and in agreement with clinical  observations.    Premature Infant recommended reference ranges:  Up to 24 hours.............<8.0 mg/dL  Up to 48 hours............<12.0 mg/dL  3-5 days..................<15.0 mg/dL  6-29 days.................<15.0 mg/dL           BNP   1,620  Comment: Values of less than 100 pg/ml are consistent with non-CHF populations.       BUN 22         Calcium 8.4         Chloride 103         CO2 23         Creatinine 1.0         Differential Method   Automated       eGFR 54.5         Eos #   0.0       Eosinophil %   0.4       Glucose 107         Gran # (ANC)   6.8       Gran %   87.6       Hematocrit   37.1       Hemoglobin   11.9       Immature Grans (Abs)   0.04  Comment: Mild elevation in immature granulocytes is non specific and   can be seen in a variety of conditions including stress response,   acute inflammation, trauma and pregnancy. Correlation with other   laboratory and clinical findings is essential.         Immature Granulocytes   0.5       Lymph #   0.3       Lymph %   4.2       MCH   31.9       MCHC   32.1       MCV   100       Mono #   0.6       Mono %   7.2       MPV   9.6       nRBC   0       Platelets   155       Potassium 4.1         PROTEIN TOTAL 6.6         RBC   3.73       RDW   13.1       Sodium 137         Troponin I   0.033  Comment: The reference interval for Troponin I represents the 99th percentile   cutoff   for our facility and is consistent with 3rd  generation assay   performance.         WBC   7.80               Significant Imaging: I have reviewed all pertinent imaging results/findings within the past 24 hours.    Assessment/Plan:     * Acute hypoxemic respiratory failure  Pt with hx of HFpEF and ILD. On 3L home oxygen. Requiring 5 L on admission to maintain O2 sat 88-92%. Most recent chest CT 5/4/23 showed subpleural reticulations, traction bronchiectasis, and several min of GGO. No definite honeycomb abnormality. Minimal L pleural effusion. Pulmonology was consulted at the time of last hospitalization 5/5 and felt risk>benefit of high dose steroids for ILD. Workup for causes of ILD was significant for positive DARRIAN. Pt was scheduled to see pulmonology clinic in the next few days for further workup and PFTs. Was not sent home with PO lasix at the time.     Presented with BNP 1600, 2+ pitting edema BLE.    Combined systolic and diastolic HF  - Most recent TTE on demonstrates: LVef 40 %,   - Troponin 0.033. Cycle troponin to rule out ischemic etiology  - BNP 1600 and CXR with diffuse pulmonary edema   - Fluid restriction at 1500 cc with strict I/Os and daily weights    - IV diuresis with Lasix 40 IV BID and monitor response.  Goal diuresis 2-3L/day.  Home diuretic dose: None  - Check Electrolytes, keep Mag >2 & K+ >4  - SCDs, Nursing communication to elevated LE   - Ambulate as tolerated            ILD (interstitial lung disease)  On 3L NC at home. Not on steroids outpatient due to severe osteoporosis. Presented with severe hypoxia. See acute hypoxic respiratory failure.       A-fib  Patient with Paroxysmal (<7 days) atrial fibrillation which is controlled currently with Beta Blocker. Patient is currently in sinus rhythm.[id number] Score: 4. Anticoagulation indicated. Anticoagulation done with Eliquis.        Dementia with anxiety, unspecified dementia severity, unspecified dementia type  Continue memantine    Thrombocytopenia, unspecified  Chronic anemia with  thrombocytopenia. Unknown cause at this time. Plt 137 on admission.     - CBC daily, continue to monitor  - transfuse plt <50 or signs of significant bleeding      Acute on chronic combined systolic and diastolic heart failure  TTE  5/2/23   Severe left atrial enlargement.   The left ventricle is normal in size with eccentric hypertrophy and mildly decreased systolic function.   The estimated ejection fraction is 40%. There are segmental wall motion abnormalities at the apex.   Grade II left ventricular diastolic dysfunction.   Normal right ventricular size with normal right ventricular systolic function.   Mild aortic regurgitation.   The estimated PA systolic pressure is 27 mmHg.   Normal central venous pressure (3 mmHg).     - Change labetalol to Coreg   - Continue home irbesartan    Aortic atherosclerosis  Follows with cardiology outpatient.     - Continue ASA, statin.      History of ventricular fibrillation  S/p ICD placement with recent exchange. EKG on admission ventricular paced.     - continue telemetry      Bilateral carotid artery stenosis  L carotid stent placement in 2007.     - continue ASA, statin      CHB (complete heart block)  S/p pacemaker placement      Stage 3a chronic kidney disease  Creatinine 1.0 on admit, baseline around 1.0.    Plan:   Lab Results   Component Value Date    CREATININE 1.0 05/19/2023     - Avoid nephrotoxic agents such as NSAIDs, gadolinium and IV radiocontrast.  - Renally dose meds to current GFR.  - Maintain MAP > 65.        Hypothyroidism  Continue home synthroid      Coronary artery disease involving native coronary artery of native heart without angina pectoris  S/p CABG x3. Continue ASA, Statin.     - tele      Pure hypercholesterolemia  Continue home statin      Primary hypertension  Home medications include: amlodipine 10 mg qd, labetalol 200 mg BID, irbesartan 300 mg qd    - Continue home meds, switch labetalol to coreg         VTE Risk Mitigation (From  admission, onward)         Ordered     apixaban tablet 5 mg  2 times daily         05/19/23 1445     Reason for no Mechanical VTE Prophylaxis  Once        Question:  Reasons:  Answer:  Physician Provided (leave comment)    05/19/23 1441     IP VTE HIGH RISK PATIENT  Once         05/19/23 1441     Place sequential compression device  Until discontinued         05/19/23 1441                           Wily Cherry DO  Department of Hospital Medicine  [facility] - Emergency Dept

## 2023-05-19 NOTE — ED NOTES
Pt placed on cardiac monitor, continuous pulse ox, cycling blood pressures. Side rails up x2, call bell in reach, bed in low position with brake engaged.  Family at bed side

## 2023-05-19 NOTE — ASSESSMENT & PLAN NOTE
TTE  5/2/23   Severe left atrial enlargement.   The left ventricle is normal in size with eccentric hypertrophy and mildly decreased systolic function.   The estimated ejection fraction is 40%. There are segmental wall motion abnormalities at the apex.   Grade II left ventricular diastolic dysfunction.   Normal right ventricular size with normal right ventricular systolic function.   Mild aortic regurgitation.   The estimated PA systolic pressure is 27 mmHg.   Normal central venous pressure (3 mmHg).     - Change labetalol to Coreg   - Continue home irbesartan

## 2023-05-19 NOTE — NURSING
Admitted to room 73394 from ED. Alert and oriented x 4. Speech is clear. O2 at 5 L/ NC. Purewick intact.  at bedside. Safety measures in place. Dinner tray ordered. Bed in low position. Call light in reach.

## 2023-05-19 NOTE — HPI
"Lynn Mckinney is a 87 y.o. female with pAF on eliquis, CAD (s/p CABG 2001), HTN, PVD, CRT-D upgrade from PPM and recently diagnosed ILD/fibrosis presenting with increasing home O2 demands and SOB with patient satting 80% on 3L, the max of her home concentrator. Daughter reports she was admitted 5/2-5/5 for CHF exacerbation and pulmonary edema requiring IV diuretics. She was also diagnosed with pulmonary fibrosis during said admission with formal pulm follow up scheduled for next week. Family reports she has been noticing gradually worsening edema  following recent discharge. She was not on any PO lasix prior to or following her recent HF admission. She denies recent fever, chills, productive cough or change in her chronic nonproductive cough but family reports worsening "gurgling" over the last 3 days. She had a CRT upgrade 1.5wk ago, she denies any pain, redness or discharge from the pocket incision.     In the ED, VS afebrile HR 93, RR 26, /75, SpO2 79% on 3L. Up to 90% on 5L NC. Labs demonstrated WBC 7, Hgb 11.9, Plt 155, Na 137, K 4.1, BUN/Cr 11/1.0. BNP 1600, Trop 0.033. CXR with interstitial and alveolar opacities compatible with pulmonary edema/CHF. Given IV lasix 40 mg x1. Patient admitted to medicine for further management of acute hypoxic respiratory failure in the setting of ILD and CHF.   "

## 2023-05-19 NOTE — ASSESSMENT & PLAN NOTE
Chronic anemia with thrombocytopenia. Unknown cause at this time. Plt 137 on admission.     - CBC daily, continue to monitor  - transfuse plt <50 or signs of significant bleeding

## 2023-05-19 NOTE — ASSESSMENT & PLAN NOTE
Creatinine 1.0 on admit, baseline around 1.0.    Plan:   Lab Results   Component Value Date    CREATININE 1.0 05/19/2023     - Avoid nephrotoxic agents such as NSAIDs, gadolinium and IV radiocontrast.  - Renally dose meds to current GFR.  - Maintain MAP > 65.

## 2023-05-19 NOTE — ASSESSMENT & PLAN NOTE
On 3L NC at home. Not on steroids outpatient due to severe osteoporosis. Presented with severe hypoxia. See acute hypoxic respiratory failure.

## 2023-05-19 NOTE — PHARMACY MED REC
"  Admission Medication History     The home medication history was taken by Danny Bertrand.    You may go to "Admission" then "Reconcile Home Medications" tabs to review and/or act upon these items.     The home medication list has been updated by the Pharmacy department.   Please read ALL comments highlighted in yellow.   Please address this information as you see fit.    Feel free to contact us if you have any questions or require assistance.      The medications listed below were removed from the home medication list. Please reorder if appropriate:  Patient reports no longer taking the following medication(s):  SALMON OIL-OMEGA-3 FATTY ACIDS ORAL    Medications listed below were obtained from: Nursing home  Current Outpatient Medications on File Prior to Encounter   Medication Sig    acetaminophen (TYLENOL) 500 MG tablet   Take 1 tablet (500 mg total) by mouth every 6 (six) hours as needed for Pain.    amLODIPine (NORVASC) 10 MG tablet   Take 1 tablet (10 mg total) by mouth once daily.    apixaban (ELIQUIS) 5 mg Tab Take 1 tablet (5 mg total) by mouth 2 (two) times daily.      aspirin 81 MG Chew Take 81 mg by mouth every evening. 1 Tablet, Chewable Oral Every day      cetirizine (ZYRTEC) 10 MG tablet   Take 10 mg by mouth daily as needed for Allergies.    cholecalciferol, vitamin D3, (VITAMIN D3) 50 mcg (2,000 unit) Cap capsule   Take 2,000 Units by mouth once daily.    coenzyme Q10 200 mg capsule   Take 200 mg by mouth once daily.    divalproex (DEPAKOTE SPRINKLE) 125 mg capsule   Take 1 capsule (125 mg total) by mouth every evening.    irbesartan (AVAPRO) 300 MG tablet   TAKE 1 TABLET BY MOUTH EVERY DAY    labetaloL (NORMODYNE) 200 MG tablet   Take 1 tablet (200 mg total) by mouth 2 (two) times daily.    levothyroxine (SYNTHROID) 125 MCG tablet   Take 1 tablet (125 mcg total) by mouth before breakfast.    melatonin (MELATIN) 3 mg tablet   Take 1 tablet (3 mg total) by mouth every evening.    memantine (NAMENDA) 5 MG " Tab   TAKE 1 TABLET BY MOUTH TWICE DAILY    multivitamin (THERAGRAN) per tablet   Take 1 tablet by mouth once daily.    nitroGLYCERIN (NITROSTAT) 0.4 MG SL tablet PLACE 1 TABLET UNDER THE TONGUE EVERY 5 MIN AS NEEDED FOR CHEST PAIN. MAX:3 DOSES.      rosuvastatin (CRESTOR) 10 MG tablet   Take 1 tablet (10 mg total) by mouth once daily.    vitamins  A,C,E-zinc-copper (PRESERVISION AREDS) 14,320-226-200 unit-mg-unit Cap   Take 1 capsule by mouth Daily.     Potential issues to be addressed PRIOR TO DISCHARGE  The listed medications were obtained from another facility ([facility]). The patient may not have been able to fill these prescriptions prior to this admission and may require new scripts upon discharge.           Danny Bertrand  [id number]                .

## 2023-05-20 LAB
ANION GAP SERPL CALC-SCNC: 11 MMOL/L (ref 8–16)
BUN SERPL-MCNC: 21 MG/DL (ref 8–23)
CALCIUM SERPL-MCNC: 8.5 MG/DL (ref 8.7–10.5)
CHLORIDE SERPL-SCNC: 102 MMOL/L (ref 95–110)
CO2 SERPL-SCNC: 27 MMOL/L (ref 23–29)
CREAT SERPL-MCNC: 1 MG/DL (ref 0.5–1.4)
EST. GFR  (NO RACE VARIABLE): 54.5 ML/MIN/1.73 M^2
GLUCOSE SERPL-MCNC: 88 MG/DL (ref 70–110)
MAGNESIUM SERPL-MCNC: 1.7 MG/DL (ref 1.6–2.6)
PHOSPHATE SERPL-MCNC: 3.6 MG/DL (ref 2.7–4.5)
POTASSIUM SERPL-SCNC: 3.2 MMOL/L (ref 3.5–5.1)
SODIUM SERPL-SCNC: 140 MMOL/L (ref 136–145)
TROPONIN I SERPL DL<=0.01 NG/ML-MCNC: 0.05 NG/ML (ref 0–0.03)
TROPONIN I SERPL DL<=0.01 NG/ML-MCNC: 0.05 NG/ML (ref 0–0.03)

## 2023-05-20 PROCEDURE — 27000221 HC OXYGEN, UP TO 24 HOURS: Mod: NTX

## 2023-05-20 PROCEDURE — 63600175 PHARM REV CODE 636 W HCPCS: Mod: NTX

## 2023-05-20 PROCEDURE — 80048 BASIC METABOLIC PNL TOTAL CA: CPT | Mod: NTX

## 2023-05-20 PROCEDURE — 25000003 PHARM REV CODE 250: Mod: NTX

## 2023-05-20 PROCEDURE — 83735 ASSAY OF MAGNESIUM: CPT | Mod: NTX

## 2023-05-20 PROCEDURE — 84484 ASSAY OF TROPONIN QUANT: CPT | Mod: NTX

## 2023-05-20 PROCEDURE — 99233 SBSQ HOSP IP/OBS HIGH 50: CPT | Mod: GC,NTX,, | Performed by: STUDENT IN AN ORGANIZED HEALTH CARE EDUCATION/TRAINING PROGRAM

## 2023-05-20 PROCEDURE — 94761 N-INVAS EAR/PLS OXIMETRY MLT: CPT | Mod: NTX

## 2023-05-20 PROCEDURE — 36415 COLL VENOUS BLD VENIPUNCTURE: CPT | Mod: NTX

## 2023-05-20 PROCEDURE — 84484 ASSAY OF TROPONIN QUANT: CPT | Mod: 91,NTX

## 2023-05-20 PROCEDURE — 20600001 HC STEP DOWN PRIVATE ROOM: Mod: NTX

## 2023-05-20 PROCEDURE — 84100 ASSAY OF PHOSPHORUS: CPT | Mod: NTX

## 2023-05-20 PROCEDURE — 25000003 PHARM REV CODE 250: Mod: NTX | Performed by: STUDENT IN AN ORGANIZED HEALTH CARE EDUCATION/TRAINING PROGRAM

## 2023-05-20 PROCEDURE — 99900035 HC TECH TIME PER 15 MIN (STAT): Mod: NTX

## 2023-05-20 PROCEDURE — 99233 PR SUBSEQUENT HOSPITAL CARE,LEVL III: ICD-10-PCS | Mod: GC,NTX,, | Performed by: STUDENT IN AN ORGANIZED HEALTH CARE EDUCATION/TRAINING PROGRAM

## 2023-05-20 RX ORDER — MAGNESIUM SULFATE HEPTAHYDRATE 40 MG/ML
2 INJECTION, SOLUTION INTRAVENOUS ONCE
Status: COMPLETED | OUTPATIENT
Start: 2023-05-20 | End: 2023-05-20

## 2023-05-20 RX ORDER — LANOLIN ALCOHOL/MO/W.PET/CERES
400 CREAM (GRAM) TOPICAL ONCE
Status: COMPLETED | OUTPATIENT
Start: 2023-05-20 | End: 2023-05-20

## 2023-05-20 RX ADMIN — DIVALPROEX SODIUM 125 MG: 125 CAPSULE, COATED PELLETS ORAL at 08:05

## 2023-05-20 RX ADMIN — THERA TABS 1 TABLET: TAB at 09:05

## 2023-05-20 RX ADMIN — CARVEDILOL 12.5 MG: 12.5 TABLET, FILM COATED ORAL at 09:05

## 2023-05-20 RX ADMIN — Medication 1 CAPSULE: at 09:05

## 2023-05-20 RX ADMIN — FUROSEMIDE 40 MG: 10 INJECTION, SOLUTION INTRAMUSCULAR; INTRAVENOUS at 05:05

## 2023-05-20 RX ADMIN — FUROSEMIDE 40 MG: 10 INJECTION, SOLUTION INTRAMUSCULAR; INTRAVENOUS at 09:05

## 2023-05-20 RX ADMIN — ASPIRIN 81 MG 81 MG: 81 TABLET ORAL at 08:05

## 2023-05-20 RX ADMIN — CARVEDILOL 12.5 MG: 12.5 TABLET, FILM COATED ORAL at 08:05

## 2023-05-20 RX ADMIN — CHOLECALCIFEROL TAB 25 MCG (1000 UNIT) 2000 UNITS: 25 TAB at 09:05

## 2023-05-20 RX ADMIN — POTASSIUM BICARBONATE 40 MEQ: 391 TABLET, EFFERVESCENT ORAL at 01:05

## 2023-05-20 RX ADMIN — APIXABAN 5 MG: 5 TABLET, FILM COATED ORAL at 09:05

## 2023-05-20 RX ADMIN — MEMANTINE HYDROCHLORIDE 5 MG: 5 TABLET ORAL at 09:05

## 2023-05-20 RX ADMIN — LEVOTHYROXINE SODIUM 125 MCG: 25 TABLET ORAL at 05:05

## 2023-05-20 RX ADMIN — PRAVASTATIN SODIUM 80 MG: 40 TABLET ORAL at 09:05

## 2023-05-20 RX ADMIN — POLYETHYLENE GLYCOL 3350 17 G: 17 POWDER, FOR SOLUTION ORAL at 09:05

## 2023-05-20 RX ADMIN — Medication 400 MG: at 11:05

## 2023-05-20 RX ADMIN — APIXABAN 5 MG: 5 TABLET, FILM COATED ORAL at 08:05

## 2023-05-20 RX ADMIN — POTASSIUM BICARBONATE 40 MEQ: 391 TABLET, EFFERVESCENT ORAL at 02:05

## 2023-05-20 RX ADMIN — MEMANTINE HYDROCHLORIDE 5 MG: 5 TABLET ORAL at 08:05

## 2023-05-20 RX ADMIN — MAGNESIUM SULFATE 2 G: 2 INJECTION INTRAVENOUS at 01:05

## 2023-05-20 RX ADMIN — Medication 3 MG: at 08:05

## 2023-05-20 NOTE — ASSESSMENT & PLAN NOTE
Home medications include: amlodipine 10 mg qd, labetalol 200 mg BID, irbesartan 300 mg qd    - switch labetalol to coreg   - holding amlodipine and ARB in setting of normotension

## 2023-05-20 NOTE — ASSESSMENT & PLAN NOTE
Pt with hx of HFpEF and ILD. On 3L home oxygen. Requiring 5 L on admission to maintain O2 sat 88-92%. Most recent chest CT 5/4/23 showed subpleural reticulations, traction bronchiectasis, and several min of GGO. No definite honeycomb abnormality. Minimal L pleural effusion. Pulmonology was consulted at the time of last hospitalization 5/5 and felt risk>benefit of high dose steroids for ILD. Workup for causes of ILD was significant for positive DARRIAN. Pt was scheduled to see pulmonology clinic in the next few days for further workup and PFTs. Was not sent home with PO lasix at the time.     Presented with BNP 1600, 2+ pitting edema BLE.    Combined systolic and diastolic HF  - Most recent TTE on demonstrates: LVef 40 %,   - Troponin 0.033 --> 0.049. Cycle troponin to rule out ischemic etiology  - BNP 1600 and CXR with diffuse pulmonary edema   - Fluid restriction at 1500 cc with strict I/Os and daily weights    - IV diuresis with Lasix 40 IV BID and monitor response.  Goal diuresis 2-3L/day.  Home diuretic dose: None  - Check Electrolytes, keep Mag >2 & K+ >4  - SCDs, Nursing communication to elevated LE   - Ambulate as tolerated

## 2023-05-20 NOTE — PROGRESS NOTES
"[facility] ([place] [address])  [facility] Medicine  Progress Note    Patient Name: Lynn Mckinney  MRN: [id number]  Patient Class: IP- Inpatient   Admission Date: 5/19/2023  Length of Stay: 1 days  Attending Physician: Gallito Cortez MD  Primary Care Provider: Abdiel Robles MD        Subjective:     Principal Problem:Acute hypoxemic respiratory failure        HPI:  Lynn Mckinney is a 87 y.o. female with pAF on eliquis, CAD (s/p CABG 2001), HTN, PVD, CRT-D upgrade from PPM and recently diagnosed ILD/fibrosis presenting with increasing home O2 demands and SOB with patient satting 80% on 3L, the max of her home concentrator. Daughter reports she was admitted 5/2-5/5 for CHF exacerbation and pulmonary edema requiring IV diuretics. She was also diagnosed with pulmonary fibrosis during said admission with formal pulm follow up scheduled for next week. Family reports she has been noticing gradually worsening edema  following recent discharge. She was not on any PO lasix prior to or following her recent HF admission. She denies recent fever, chills, productive cough or change in her chronic nonproductive cough but family reports worsening "gurgling" over the last 3 days. She had a CRT upgrade 1.5wk ago, she denies any pain, redness or discharge from the pocket incision.     In the ED, VS afebrile HR 93, RR 26, /75, SpO2 79% on 3L. Up to 90% on 5L NC. Labs demonstrated WBC 7, Hgb 11.9, Plt 155, Na 137, K 4.1, BUN/Cr 11/1.0. BNP 1600, Trop 0.033. CXR with interstitial and alveolar opacities compatible with pulmonary edema/CHF. Given IV lasix 40 mg x1. Patient admitted to medicine for further management of acute hypoxic respiratory failure in the setting of ILD and CHF.       Overview/Hospital Course:  Patient admitted to hospital medicine for further management of acute on chronic hypoxic respiratory failure. Started on IV lasix for diuresis. Changed labetalol to coreg for better GDMT.      Interval History: " NAEON. With some delirium this am during vitals. Good UOP with IV lasix. Reports feeling better this am.     Review of Systems   Constitutional:  Negative for activity change, chills and fatigue.   HENT:  Negative for facial swelling and sore throat.    Eyes:  Negative for photophobia and visual disturbance.   Respiratory:  Positive for shortness of breath and wheezing. Negative for cough.    Cardiovascular:  Positive for leg swelling. Negative for chest pain.   Gastrointestinal:  Negative for abdominal pain, diarrhea, nausea and vomiting.   Genitourinary:  Negative for difficulty urinating and hematuria.   Neurological:  Negative for dizziness, syncope and numbness.   Psychiatric/Behavioral:  Negative for agitation and confusion.      Objective:     Vital Signs (Most Recent):  Temp: 98.3 °F (36.8 °C) (05/20/23 0734)  Pulse: 87 (05/20/23 0734)  Resp: 18 (05/20/23 0734)  BP: (!) 140/69 (05/20/23 0734)  SpO2: (!) 93 % (05/20/23 0407) Vital Signs (24h Range):  Temp:  [98.1 °F (36.7 °C)-99.6 °F (37.6 °C)] 98.3 °F (36.8 °C)  Pulse:  [63-93] 87  Resp:  [16-26] 18  SpO2:  [77 %-98 %] 93 %  BP: (130-170)/(61-88) 140/69     Weight: 60.8 kg (134 lb)  Body mass index is 23 kg/m².    Intake/Output Summary (Last 24 hours) at 5/20/2023 0912  Last data filed at 5/20/2023 0905  Gross per 24 hour   Intake 300 ml   Output 1150 ml   Net -850 ml         Physical Exam  Vitals and nursing note reviewed.   Constitutional:       General: She is not in acute distress.     Appearance: Normal appearance. She is ill-appearing.   HENT:      Head: Normocephalic and atraumatic.      Mouth/Throat:      Mouth: Mucous membranes are moist.   Eyes:      General: No scleral icterus.     Extraocular Movements: Extraocular movements intact.      Conjunctiva/sclera: Conjunctivae normal.   Cardiovascular:      Rate and Rhythm: Normal rate.      Heart sounds: Normal heart sounds.   Pulmonary:      Effort: Pulmonary effort is normal. No respiratory distress.       Breath sounds: Rales (coarse breath sounds throughout, worse at bases) present. No wheezing.   Abdominal:      General: Abdomen is flat. There is no distension.      Palpations: Abdomen is soft.      Tenderness: There is no abdominal tenderness. There is no guarding.   Musculoskeletal:      Right lower leg: Edema present.      Left lower leg: Edema present.   Skin:     General: Skin is warm.   Neurological:      General: No focal deficit present.      Mental Status: She is alert. Mental status is at baseline.      Comments: Alert and oriented to self, place, situation. Not time   Psychiatric:         Mood and Affect: Mood normal.         Behavior: Behavior normal.           Significant Labs: All pertinent labs within the past 24 hours have been reviewed.  CBC:   Recent Labs   Lab 05/19/23  1119   WBC 7.80   HGB 11.9*   HCT 37.1        CMP:   Recent Labs   Lab 05/19/23  1324      K 4.1      CO2 23      BUN 22   CREATININE 1.0   CALCIUM 8.4*   PROT 6.6   ALBUMIN 3.4*   BILITOT 0.9   ALKPHOS 81   AST 32   ALT 21   ANIONGAP 11       Significant Imaging: I have reviewed all pertinent imaging results/findings within the past 24 hours.      Assessment/Plan:      * Acute hypoxemic respiratory failure  Pt with hx of HFpEF and ILD. On 3L home oxygen. Requiring 5 L on admission to maintain O2 sat 88-92%. Most recent chest CT 5/4/23 showed subpleural reticulations, traction bronchiectasis, and several min of GGO. No definite honeycomb abnormality. Minimal L pleural effusion. Pulmonology was consulted at the time of last hospitalization 5/5 and felt risk>benefit of high dose steroids for ILD. Workup for causes of ILD was significant for positive DARRIAN. Pt was scheduled to see pulmonology clinic in the next few days for further workup and PFTs. Was not sent home with PO lasix at the time.     Presented with BNP 1600, 2+ pitting edema BLE.    Combined systolic and diastolic HF  - Most recent TTE on  demonstrates: LVef 40 %,   - Troponin 0.033 --> 0.049. Cycle troponin to rule out ischemic etiology  - BNP 1600 and CXR with diffuse pulmonary edema   - Fluid restriction at 1500 cc with strict I/Os and daily weights    - IV diuresis with Lasix 40 IV BID and monitor response.  Goal diuresis 2-3L/day.  Home diuretic dose: None  - Check Electrolytes, keep Mag >2 & K+ >4  - SCDs, Nursing communication to elevated LE   - Ambulate as tolerated      ILD (interstitial lung disease)  On 3L NC at home. Not on steroids outpatient due to severe osteoporosis. Presented with severe hypoxia. See acute hypoxic respiratory failure.       A-fib  Patient with Paroxysmal (<7 days) atrial fibrillation which is controlled currently with Beta Blocker. Patient is currently in sinus rhythm.[id number] Score: 4. Anticoagulation indicated. Anticoagulation done with Eliquis.        Dementia with anxiety, unspecified dementia severity, unspecified dementia type  Continue memantine    Thrombocytopenia, unspecified  Chronic anemia with thrombocytopenia. Unknown cause at this time. Plt 137 on admission.     - CBC daily, continue to monitor  - transfuse plt <50 or signs of significant bleeding      Acute on chronic combined systolic and diastolic heart failure  TTE  5/2/23   Severe left atrial enlargement.   The left ventricle is normal in size with eccentric hypertrophy and mildly decreased systolic function.   The estimated ejection fraction is 40%. There are segmental wall motion abnormalities at the apex.   Grade II left ventricular diastolic dysfunction.   Normal right ventricular size with normal right ventricular systolic function.   Mild aortic regurgitation.   The estimated PA systolic pressure is 27 mmHg.   Normal central venous pressure (3 mmHg).     - Change labetalol to Coreg for better GDMT  - Will defer starting Entresto per ACC/AHA HFrEF Guidelines to outpatient Cardiology in light of her advanced age and other  co-morbidities; will resume home ARB    Aortic atherosclerosis  Follows with cardiology outpatient.     - Continue ASA, statin.      History of ventricular fibrillation  S/p ICD placement with recent exchange. EKG on admission ventricular paced.     - continue telemetry      Bilateral carotid artery stenosis  L carotid stent placement in 2007.     - continue ASA, statin      CHB (complete heart block)  S/p pacemaker placement      Stage 3a chronic kidney disease  Creatinine 1.0 on admit, baseline around 1.0.    Plan:   Lab Results   Component Value Date    CREATININE 1.0 05/19/2023     - Avoid nephrotoxic agents such as NSAIDs, gadolinium and IV radiocontrast.  - Renally dose meds to current GFR.  - Maintain MAP > 65.        Hypothyroidism  Continue home synthroid      Coronary artery disease involving native coronary artery of native heart without angina pectoris  S/p CABG x3. Continue ASA, Statin.     - tele      Pure hypercholesterolemia  Continue home statin      Primary hypertension  Home medications include: amlodipine 10 mg qd, labetalol 200 mg BID, irbesartan 300 mg qd    - switch labetalol to coreg   - holding amlodipine and ARB in setting of normotension        VTE Risk Mitigation (From admission, onward)         Ordered     apixaban tablet 5 mg  2 times daily         05/19/23 1445     Reason for no Mechanical VTE Prophylaxis  Once        Question:  Reasons:  Answer:  Physician Provided (leave comment)    05/19/23 1441     IP VTE HIGH RISK PATIENT  Once         05/19/23 1441     Place sequential compression device  Until discontinued         05/19/23 1441                Discharge Planning   AIDEE: 5/21/2023     Code Status: Full Code   Is the patient medically ready for discharge?: No    Reason for patient still in hospital (select all that apply): Treatment                     Karina Cheney MD  Department of Hospital Medicine   [facility] - Intensive Care ([place])

## 2023-05-20 NOTE — SUBJECTIVE & OBJECTIVE
Interval History: NAEON. With some delirium this am during vitals. Good UOP with IV lasix. Reports feeling better this am.     Review of Systems   Constitutional:  Negative for activity change, chills and fatigue.   HENT:  Negative for facial swelling and sore throat.    Eyes:  Negative for photophobia and visual disturbance.   Respiratory:  Positive for shortness of breath and wheezing. Negative for cough.    Cardiovascular:  Positive for leg swelling. Negative for chest pain.   Gastrointestinal:  Negative for abdominal pain, diarrhea, nausea and vomiting.   Genitourinary:  Negative for difficulty urinating and hematuria.   Neurological:  Negative for dizziness, syncope and numbness.   Psychiatric/Behavioral:  Negative for agitation and confusion.      Objective:     Vital Signs (Most Recent):  Temp: 98.3 °F (36.8 °C) (05/20/23 0734)  Pulse: 87 (05/20/23 0734)  Resp: 18 (05/20/23 0734)  BP: (!) 140/69 (05/20/23 0734)  SpO2: (!) 93 % (05/20/23 0407) Vital Signs (24h Range):  Temp:  [98.1 °F (36.7 °C)-99.6 °F (37.6 °C)] 98.3 °F (36.8 °C)  Pulse:  [63-93] 87  Resp:  [16-26] 18  SpO2:  [77 %-98 %] 93 %  BP: (130-170)/(61-88) 140/69     Weight: 60.8 kg (134 lb)  Body mass index is 23 kg/m².    Intake/Output Summary (Last 24 hours) at 5/20/2023 0912  Last data filed at 5/20/2023 0905  Gross per 24 hour   Intake 300 ml   Output 1150 ml   Net -850 ml         Physical Exam  Vitals and nursing note reviewed.   Constitutional:       General: She is not in acute distress.     Appearance: Normal appearance. She is ill-appearing.   HENT:      Head: Normocephalic and atraumatic.      Mouth/Throat:      Mouth: Mucous membranes are moist.   Eyes:      General: No scleral icterus.     Extraocular Movements: Extraocular movements intact.      Conjunctiva/sclera: Conjunctivae normal.   Cardiovascular:      Rate and Rhythm: Normal rate.      Heart sounds: Normal heart sounds.   Pulmonary:      Effort: Pulmonary effort is normal. No  respiratory distress.      Breath sounds: Rales (coarse breath sounds throughout, worse at bases) present. No wheezing.   Abdominal:      General: Abdomen is flat. There is no distension.      Palpations: Abdomen is soft.      Tenderness: There is no abdominal tenderness. There is no guarding.   Musculoskeletal:      Right lower leg: Edema present.      Left lower leg: Edema present.   Skin:     General: Skin is warm.   Neurological:      General: No focal deficit present.      Mental Status: She is alert. Mental status is at baseline.      Comments: Alert and oriented to self, place, situation. Not time   Psychiatric:         Mood and Affect: Mood normal.         Behavior: Behavior normal.           Significant Labs: All pertinent labs within the past 24 hours have been reviewed.  CBC:   Recent Labs   Lab 05/19/23  1119   WBC 7.80   HGB 11.9*   HCT 37.1        CMP:   Recent Labs   Lab 05/19/23  1324      K 4.1      CO2 23      BUN 22   CREATININE 1.0   CALCIUM 8.4*   PROT 6.6   ALBUMIN 3.4*   BILITOT 0.9   ALKPHOS 81   AST 32   ALT 21   ANIONGAP 11       Significant Imaging: I have reviewed all pertinent imaging results/findings within the past 24 hours.

## 2023-05-20 NOTE — ASSESSMENT & PLAN NOTE
Patient with Paroxysmal (<7 days) atrial fibrillation which is controlled currently with Beta Blocker. Patient is currently in sinus rhythm.[id number] Score: 4. Anticoagulation indicated. Anticoagulation done with Eliquis.

## 2023-05-20 NOTE — HOSPITAL COURSE
Admitted with acute on chronic hypoxic respiratory failure secondary to CHF. Required transfer to ICU for respiratory distress. Improved with IV diuresis. Weaned to home oxygen. Has follow up scheduled with transitional heart failure clinic.

## 2023-05-20 NOTE — PLAN OF CARE
Problem: Adult Inpatient Plan of Care  Goal: Plan of Care Review  Outcome: Ongoing, Progressing  Goal: Patient-Specific Goal (Individualized)  Outcome: Ongoing, Progressing  Goal: Absence of Hospital-Acquired Illness or Injury  Outcome: Ongoing, Progressing  Goal: Optimal Comfort and Wellbeing  Outcome: Ongoing, Progressing  Goal: Readiness for Transition of Care  Outcome: Ongoing, Progressing     Problem: Skin Injury Risk Increased  Goal: Skin Health and Integrity  Outcome: Ongoing, Progressing    Pt slept well this shift 02 @ 5L per N/C. A&O x 3  VSS. No c/o Pain. Safety precautions in place Bed low and locked. Family at bedside. Call light in reach. No further concerns noted at this time.

## 2023-05-20 NOTE — ASSESSMENT & PLAN NOTE
TTE  5/2/23   Severe left atrial enlargement.   The left ventricle is normal in size with eccentric hypertrophy and mildly decreased systolic function.   The estimated ejection fraction is 40%. There are segmental wall motion abnormalities at the apex.   Grade II left ventricular diastolic dysfunction.   Normal right ventricular size with normal right ventricular systolic function.   Mild aortic regurgitation.   The estimated PA systolic pressure is 27 mmHg.   Normal central venous pressure (3 mmHg).     - Change labetalol to Coreg for better GDMT  - Will defer starting Entresto per ACC/AHA HFrEF Guidelines to outpatient Cardiology in light of her advanced age and other co-morbidities; will resume home ARB

## 2023-05-20 NOTE — CONSULTS
Food & Nutrition  Education     Diet Education: Fluid and Salt restriction   Time Spent: 10 minutes   Learners: Patient, pt's niece     Handouts provided: Low Sodium Nutrition Therapy, Fluid-Restricted Nutrition Therapy      Comments: Niece at bedside reports Lynn has no children, and her niece's rotate care. Reports patient lives at [facility]. Family member reports taking Lynn out to eat occasionally. Discussed importance of a low sodium diet. Reviewed high sodium foods that should be avoided. Food labels, salt free seasonings, and recommended sodium intake reviewed. Encouraged healthy, fresh foods that are low in sodium that are good for consumption. Fluid intake and conversions discussed. Foods considered fluids were reviewed and encouraged to monitor. General healthy diet encouraged. All questions/concerns were addressed. LBM noted 5/17. Miralax given per family member at bedside. Patient denies C/S problems.       Follow-Up: Yes     Please Re-consult as needed.     Thanks!    Agnes Alvarado, MS, RD, LDN

## 2023-05-21 PROBLEM — J96.21 ACUTE ON CHRONIC RESPIRATORY FAILURE WITH HYPOXIA: Status: ACTIVE | Noted: 2023-05-02

## 2023-05-21 PROBLEM — I48.0 PAF (PAROXYSMAL ATRIAL FIBRILLATION): Status: ACTIVE | Noted: 2023-05-02

## 2023-05-21 LAB
ALLENS TEST: ABNORMAL
ALLENS TEST: ABNORMAL
ANION GAP SERPL CALC-SCNC: 11 MMOL/L (ref 8–16)
BASOPHILS # BLD AUTO: 0.03 K/UL (ref 0–0.2)
BASOPHILS NFR BLD: 0.4 % (ref 0–1.9)
BNP SERPL-MCNC: 1180 PG/ML (ref 0–99)
BNP SERPL-MCNC: 1180 PG/ML (ref 0–99)
BUN SERPL-MCNC: 27 MG/DL (ref 8–23)
CALCIUM SERPL-MCNC: 8.8 MG/DL (ref 8.7–10.5)
CHLORIDE SERPL-SCNC: 95 MMOL/L (ref 95–110)
CO2 SERPL-SCNC: 31 MMOL/L (ref 23–29)
CREAT SERPL-MCNC: 1.4 MG/DL (ref 0.5–1.4)
DELSYS: ABNORMAL
DIFFERENTIAL METHOD: ABNORMAL
EOSINOPHIL # BLD AUTO: 0.2 K/UL (ref 0–0.5)
EOSINOPHIL NFR BLD: 2.1 % (ref 0–8)
ERYTHROCYTE [DISTWIDTH] IN BLOOD BY AUTOMATED COUNT: 12.8 % (ref 11.5–14.5)
EST. GFR  (NO RACE VARIABLE): 36.4 ML/MIN/1.73 M^2
FLOW: 13
GLUCOSE SERPL-MCNC: 98 MG/DL (ref 70–110)
HCO3 UR-SCNC: 36 MMOL/L (ref 24–28)
HCO3 UR-SCNC: 36.9 MMOL/L (ref 24–28)
HCT VFR BLD AUTO: 35.5 % (ref 37–48.5)
HGB BLD-MCNC: 11.4 G/DL (ref 12–16)
IMM GRANULOCYTES # BLD AUTO: 0.02 K/UL (ref 0–0.04)
IMM GRANULOCYTES NFR BLD AUTO: 0.2 % (ref 0–0.5)
LACTATE SERPL-SCNC: 1.9 MMOL/L (ref 0.5–2.2)
LYMPHOCYTES # BLD AUTO: 0.7 K/UL (ref 1–4.8)
LYMPHOCYTES NFR BLD: 8.5 % (ref 18–48)
MAGNESIUM SERPL-MCNC: 2.1 MG/DL (ref 1.6–2.6)
MCH RBC QN AUTO: 31.9 PG (ref 27–31)
MCHC RBC AUTO-ENTMCNC: 32.1 G/DL (ref 32–36)
MCV RBC AUTO: 99 FL (ref 82–98)
MODE: ABNORMAL
MONOCYTES # BLD AUTO: 1.3 K/UL (ref 0.3–1)
MONOCYTES NFR BLD: 15.5 % (ref 4–15)
NEUTROPHILS # BLD AUTO: 5.9 K/UL (ref 1.8–7.7)
NEUTROPHILS NFR BLD: 73.3 % (ref 38–73)
NRBC BLD-RTO: 0 /100 WBC
PCO2 BLDA: 40 MMHG (ref 35–45)
PCO2 BLDA: 60.4 MMHG (ref 35–45)
PH SMN: 7.39 [PH] (ref 7.35–7.45)
PH SMN: 7.56 [PH] (ref 7.35–7.45)
PHOSPHATE SERPL-MCNC: 3.3 MG/DL (ref 2.7–4.5)
PLATELET # BLD AUTO: 154 K/UL (ref 150–450)
PMV BLD AUTO: 9.2 FL (ref 9.2–12.9)
PO2 BLDA: 22 MMHG (ref 40–60)
PO2 BLDA: 68 MMHG (ref 80–100)
POC BE: 12 MMOL/L
POC BE: 14 MMOL/L
POC SATURATED O2: 36 % (ref 95–100)
POC SATURATED O2: 96 % (ref 95–100)
POC TCO2: 37 MMOL/L (ref 23–27)
POC TCO2: 39 MMOL/L (ref 24–29)
POCT GLUCOSE: 159 MG/DL (ref 70–110)
POTASSIUM SERPL-SCNC: 3.6 MMOL/L (ref 3.5–5.1)
RBC # BLD AUTO: 3.57 M/UL (ref 4–5.4)
SAMPLE: ABNORMAL
SAMPLE: ABNORMAL
SITE: ABNORMAL
SITE: ABNORMAL
SODIUM SERPL-SCNC: 137 MMOL/L (ref 136–145)
SP02: 95
TROPONIN I SERPL DL<=0.01 NG/ML-MCNC: 0.03 NG/ML (ref 0–0.03)
TROPONIN I SERPL DL<=0.01 NG/ML-MCNC: 0.04 NG/ML (ref 0–0.03)
WBC # BLD AUTO: 8.08 K/UL (ref 3.9–12.7)

## 2023-05-21 PROCEDURE — 99900035 HC TECH TIME PER 15 MIN (STAT): Mod: NTX

## 2023-05-21 PROCEDURE — 84100 ASSAY OF PHOSPHORUS: CPT | Mod: NTX

## 2023-05-21 PROCEDURE — 80048 BASIC METABOLIC PNL TOTAL CA: CPT | Mod: NTX

## 2023-05-21 PROCEDURE — 83605 ASSAY OF LACTIC ACID: CPT | Mod: NTX

## 2023-05-21 PROCEDURE — 99233 PR SUBSEQUENT HOSPITAL CARE,LEVL III: ICD-10-PCS | Mod: 25,GC,NTX, | Performed by: STUDENT IN AN ORGANIZED HEALTH CARE EDUCATION/TRAINING PROGRAM

## 2023-05-21 PROCEDURE — 99291 CRITICAL CARE FIRST HOUR: CPT | Mod: NTX,,, | Performed by: STUDENT IN AN ORGANIZED HEALTH CARE EDUCATION/TRAINING PROGRAM

## 2023-05-21 PROCEDURE — 25000242 PHARM REV CODE 250 ALT 637 W/ HCPCS: Mod: NTX

## 2023-05-21 PROCEDURE — 99233 SBSQ HOSP IP/OBS HIGH 50: CPT | Mod: 25,GC,NTX, | Performed by: STUDENT IN AN ORGANIZED HEALTH CARE EDUCATION/TRAINING PROGRAM

## 2023-05-21 PROCEDURE — 87205 SMEAR GRAM STAIN: CPT | Mod: NTX | Performed by: STUDENT IN AN ORGANIZED HEALTH CARE EDUCATION/TRAINING PROGRAM

## 2023-05-21 PROCEDURE — 27000200 HC HIGH FLOW DEL DISP CIRCUIT: Mod: NTX

## 2023-05-21 PROCEDURE — 85025 COMPLETE CBC W/AUTO DIFF WBC: CPT | Mod: NTX

## 2023-05-21 PROCEDURE — 20000000 HC ICU ROOM: Mod: NTX

## 2023-05-21 PROCEDURE — 84484 ASSAY OF TROPONIN QUANT: CPT | Mod: NTX | Performed by: STUDENT IN AN ORGANIZED HEALTH CARE EDUCATION/TRAINING PROGRAM

## 2023-05-21 PROCEDURE — 63600175 PHARM REV CODE 636 W HCPCS: Mod: NTX

## 2023-05-21 PROCEDURE — 94761 N-INVAS EAR/PLS OXIMETRY MLT: CPT | Mod: NTX

## 2023-05-21 PROCEDURE — 99497 PR ADVNCD CARE PLAN 30 MIN: ICD-10-PCS | Mod: NTX,,, | Performed by: STUDENT IN AN ORGANIZED HEALTH CARE EDUCATION/TRAINING PROGRAM

## 2023-05-21 PROCEDURE — 84484 ASSAY OF TROPONIN QUANT: CPT | Mod: 91,NTX

## 2023-05-21 PROCEDURE — 25000003 PHARM REV CODE 250: Mod: NTX | Performed by: STUDENT IN AN ORGANIZED HEALTH CARE EDUCATION/TRAINING PROGRAM

## 2023-05-21 PROCEDURE — 25000003 PHARM REV CODE 250: Mod: NTX

## 2023-05-21 PROCEDURE — 83735 ASSAY OF MAGNESIUM: CPT | Mod: NTX

## 2023-05-21 PROCEDURE — 27100171 HC OXYGEN HIGH FLOW UP TO 24 HOURS: Mod: NTX

## 2023-05-21 PROCEDURE — 94640 AIRWAY INHALATION TREATMENT: CPT | Mod: NTX

## 2023-05-21 PROCEDURE — 27000221 HC OXYGEN, UP TO 24 HOURS: Mod: NTX

## 2023-05-21 PROCEDURE — 83880 ASSAY OF NATRIURETIC PEPTIDE: CPT | Mod: NTX

## 2023-05-21 PROCEDURE — 82803 BLOOD GASES ANY COMBINATION: CPT | Mod: NTX

## 2023-05-21 PROCEDURE — 36415 COLL VENOUS BLD VENIPUNCTURE: CPT | Mod: NTX | Performed by: STUDENT IN AN ORGANIZED HEALTH CARE EDUCATION/TRAINING PROGRAM

## 2023-05-21 PROCEDURE — 87040 BLOOD CULTURE FOR BACTERIA: CPT | Mod: NTX | Performed by: STUDENT IN AN ORGANIZED HEALTH CARE EDUCATION/TRAINING PROGRAM

## 2023-05-21 PROCEDURE — 99497 ADVNCD CARE PLAN 30 MIN: CPT | Mod: NTX,,, | Performed by: STUDENT IN AN ORGANIZED HEALTH CARE EDUCATION/TRAINING PROGRAM

## 2023-05-21 PROCEDURE — 99291 PR CRITICAL CARE, E/M 30-74 MINUTES: ICD-10-PCS | Mod: NTX,,, | Performed by: STUDENT IN AN ORGANIZED HEALTH CARE EDUCATION/TRAINING PROGRAM

## 2023-05-21 PROCEDURE — 36415 COLL VENOUS BLD VENIPUNCTURE: CPT | Mod: NTX

## 2023-05-21 PROCEDURE — 36600 WITHDRAWAL OF ARTERIAL BLOOD: CPT | Mod: NTX

## 2023-05-21 RX ORDER — FUROSEMIDE 10 MG/ML
80 INJECTION INTRAMUSCULAR; INTRAVENOUS ONCE
Status: COMPLETED | OUTPATIENT
Start: 2023-05-21 | End: 2023-05-21

## 2023-05-21 RX ORDER — ALBUTEROL SULFATE 2.5 MG/.5ML
2.5 SOLUTION RESPIRATORY (INHALATION)
Status: DISCONTINUED | OUTPATIENT
Start: 2023-05-21 | End: 2023-05-25 | Stop reason: HOSPADM

## 2023-05-21 RX ORDER — LIDOCAINE 50 MG/G
1 PATCH TOPICAL DAILY
Status: DISCONTINUED | OUTPATIENT
Start: 2023-05-21 | End: 2023-05-25 | Stop reason: HOSPADM

## 2023-05-21 RX ORDER — MORPHINE SULFATE 2 MG/ML
2 INJECTION, SOLUTION INTRAMUSCULAR; INTRAVENOUS ONCE
Status: COMPLETED | OUTPATIENT
Start: 2023-05-21 | End: 2023-05-21

## 2023-05-21 RX ORDER — FUROSEMIDE 40 MG/1
40 TABLET ORAL DAILY
Status: DISCONTINUED | OUTPATIENT
Start: 2023-05-21 | End: 2023-05-23

## 2023-05-21 RX ORDER — DEXMEDETOMIDINE HYDROCHLORIDE 4 UG/ML
0-1.4 INJECTION, SOLUTION INTRAVENOUS CONTINUOUS
Status: DISCONTINUED | OUTPATIENT
Start: 2023-05-21 | End: 2023-05-22

## 2023-05-21 RX ORDER — ACETAMINOPHEN 500 MG
1000 TABLET ORAL EVERY 8 HOURS PRN
Status: DISCONTINUED | OUTPATIENT
Start: 2023-05-21 | End: 2023-05-25 | Stop reason: HOSPADM

## 2023-05-21 RX ORDER — MORPHINE SULFATE 2 MG/ML
INJECTION, SOLUTION INTRAMUSCULAR; INTRAVENOUS
Status: COMPLETED
Start: 2023-05-21 | End: 2023-05-21

## 2023-05-21 RX ADMIN — ALBUTEROL SULFATE 2.5 MG: 2.5 SOLUTION RESPIRATORY (INHALATION) at 03:05

## 2023-05-21 RX ADMIN — ALBUTEROL SULFATE 2.5 MG: 2.5 SOLUTION RESPIRATORY (INHALATION) at 11:05

## 2023-05-21 RX ADMIN — APIXABAN 5 MG: 5 TABLET, FILM COATED ORAL at 08:05

## 2023-05-21 RX ADMIN — PRAVASTATIN SODIUM 80 MG: 40 TABLET ORAL at 08:05

## 2023-05-21 RX ADMIN — CHOLECALCIFEROL TAB 25 MCG (1000 UNIT) 2000 UNITS: 25 TAB at 08:05

## 2023-05-21 RX ADMIN — MEMANTINE HYDROCHLORIDE 5 MG: 5 TABLET ORAL at 08:05

## 2023-05-21 RX ADMIN — Medication 3 MG: at 08:05

## 2023-05-21 RX ADMIN — ALBUTEROL SULFATE 2.5 MG: 2.5 SOLUTION RESPIRATORY (INHALATION) at 08:05

## 2023-05-21 RX ADMIN — LEVOTHYROXINE SODIUM 125 MCG: 25 TABLET ORAL at 06:05

## 2023-05-21 RX ADMIN — THERA TABS 1 TABLET: TAB at 08:05

## 2023-05-21 RX ADMIN — DIVALPROEX SODIUM 125 MG: 125 CAPSULE, COATED PELLETS ORAL at 08:05

## 2023-05-21 RX ADMIN — ASPIRIN 81 MG 81 MG: 81 TABLET ORAL at 08:05

## 2023-05-21 RX ADMIN — POLYETHYLENE GLYCOL 3350 17 G: 17 POWDER, FOR SOLUTION ORAL at 08:05

## 2023-05-21 RX ADMIN — DEXMEDETOMIDINE HYDROCHLORIDE 0.4 MCG/KG/HR: 4 INJECTION, SOLUTION INTRAVENOUS at 02:05

## 2023-05-21 RX ADMIN — Medication 1 CAPSULE: at 08:05

## 2023-05-21 RX ADMIN — MORPHINE SULFATE 2 MG: 2 INJECTION, SOLUTION INTRAMUSCULAR; INTRAVENOUS at 02:05

## 2023-05-21 RX ADMIN — CARVEDILOL 12.5 MG: 12.5 TABLET, FILM COATED ORAL at 08:05

## 2023-05-21 RX ADMIN — FUROSEMIDE 80 MG: 10 INJECTION, SOLUTION INTRAMUSCULAR; INTRAVENOUS at 02:05

## 2023-05-21 NOTE — NURSING
Patient slept well this shift, AAO x 3, pur wick in place and working well, Weaned oxygen, currently on 3 L Nasal cannula at 97% No WOB  nor Distress noted. Patient sad at times wishing she was happy Niece at bedside, Safety Precautions in place. Bed low and locked, call light in reach.

## 2023-05-21 NOTE — SIGNIFICANT EVENT
Patient had good UOP with IV diuretics over past 24 hours and was successfully weaned to her baseline resp status of 2L nc. We attempted to ambulate her given her debilitating hospitalization, but she was unable to tolerate side of bed 2/2 debility. She subsequently developed worsening tachypnea that progressed throughout the morning. Her O2 requirements acutely increased, now requiring 13L HFNC (hypoxemia confirmed via ABG with PaO2 68 on 13L). Repeat CXR showed improvement in pulmonary edema. Rapid Response notified and patient was switched to CF. Patient is acutely agitated and continues to pull of pulse ox and NC. MICU was consulted and accepted patient for closer monitoring.    Critical care time spent on the evaluation and treatment of severe organ dysfunction, review of pertinent labs and imaging studies, discussions with consulting providers and discussions with patient/family: 60 minutes.    Gallito Cortez MD  Department of Internal Medicine  [facility]

## 2023-05-21 NOTE — CARE UPDATE
RAPID RESPONSE NURSE NOTE        Admit Date: 2023  LOS: 2  Code Status: Full Code   Date of Consult: 2023  : 1935  Age: 87 y.o.  Weight:   Wt Readings from Last 1 Encounters:   23 60.8 kg (134 lb)     Sex: female  Race: White   Bed: 54899/99287 A:   MRN: [id number]  Time Rapid Response Team page Received: 1203  Time Rapid Response Team at Bedside: 1204  Time Rapid Response Team left Bedside: 1245  Was the patient discharged from an ICU this admission? No   Was the patient discharged from a PACU within last 24 hours? No   Did the patient receive conscious sedation/general anesthesia in last 24 hours? No  Was the patient in the ED within the past 24 hours? No  Was the patient on NIPPV within the past 24 hours? No   Did this progress into an ARC or CPA: No  Attending Physician: Gallito Cortez MD  Primary Service: [facility] 5       SITUATION    Notified by pager.  Reason for alert: Respiratory Distress  Called to evaluate the patient for Respiratory    BACKGROUND     Why is the patient in the hospital?: Acute on chronic respiratory failure with hypoxia    Patient has a past medical history of Allergy, Anticoagulant long-term use, Blood clotting tendency, Carotid artery disease, Chronic systolic congestive heart failure, NYHA class 3, Coronary artery disease, Heart block, Hyperlipidemia, Hypertension, Hypothyroid, Left displaced femoral neck fracture, Obesity, Pacemaker, PAD (peripheral artery disease), Pulmonary fibrosis, Spinal stenosis, Thoracic or lumbosacral neuritis or radiculitis, and Tubular adenoma.    Last Vitals:  Temp: 98.1 °F (36.7 °C) ( 1204)  Pulse: 80 ( 1204)  Resp: 14 ( 1135)  BP: 160/74 ( 1214)  SpO2: 95 % ( 1214)    24 Hours Vitals Range:  Temp:  [97.9 °F (36.6 °C)-99.3 °F (37.4 °C)]   Pulse:  [60-82]   Resp:  [14-22]   BP: (120-160)/(56-74)   SpO2:  [86 %-97 %]     Labs:  Recent Labs     23  1119 23  0523   WBC 7.80 8.08   HGB 11.9* 11.4*    HCT 37.1 35.5*    154       Recent Labs     05/19/23  1324 05/19/23  1640 05/20/23  0448 05/21/23  0523     --  140 137   K 4.1  --  3.2* 3.6     --  102 95   CO2 23  --  27 31*   CREATININE 1.0  --  1.0 1.4     --  88 98   PHOS  --  3.5 3.6 3.3   MG  --  1.8 1.7 2.1        No results for input(s): PH, PCO2, PO2, HCO3, POCSATURATED, BE in the last 72 hours.     ASSESSMENT    Physical Exam    INTERVENTIONS    The patient was seen for Respiratory problem. Staff concerns included tachypnea, new onset of difficulty breathing, oxygen saturation < 90% despite supplemental oxygen, and increased WOB. The following interventions were performed: portable chest x-ray, continued pulse ox monitoring, and CF., airvo 25L/70%    RECOMMENDATIONS    We recommend: GOC care discussed with family at the bedside.  Code status changed to DNR.  ABG completed, see results. CF 25L/70% initiated per RRT.  Patient transferred to ICU 6091.    PROVIDER ESCALATION    Orders received and case discussed with Dr. Cortez .    Primary team arrival time: at bedside    Disposition: Tx in ICU bed 6091..    FOLLOW UP    charge RNDemarco  updated on plan of care. Instructed to call the Rapid Response Nurse, Gretchen C[place]neros, RN at [phone] for additional questions or concerns.

## 2023-05-21 NOTE — CONSULTS
Consult seen and acknowledged; pt admitted to MICU. H&P to follow.    Stephen Goff MD  [employer] Pulmonary Critical Care Fellow

## 2023-05-21 NOTE — PLAN OF CARE
[facility] Medical [facility]  Initial Discharge Assessment       Primary Care Provider: Abdiel Robles MD    Admission Diagnosis: Shortness of breath [R06.02]  Hypoxia [R09.02]  Acute on chronic combined systolic and diastolic congestive heart failure [I50.43]    Admission Date: 5/19/2023  Expected Discharge Date: 5/21/2023    Transition of Care Barriers: None    Payor: [employer] [facility] MCARE / Plan: [employer] MEDICARE ADVANTAGE / Product Type: Medicare Advantage /     Extended Emergency Contact Information  Primary Emergency Contact: Kristina Mckinney  Address: [address] [place] [address] [facility]  Home Phone: [phone]  Work Phone: [phone]  Mobile Phone: [phone]  Relation: Brother  Secondary Emergency Contact: ALONZOKHUSHBOO  Mobile Phone: [phone]  Relation: Relative  Preferred language: English   needed? No    Discharge Plan A: Assisted Living, Home  Discharge Plan B: New Nursing Home placement - [facility] care facility      [facility] [address] [place] [id number]  Phone: [phone] Fax: [phone]     SW met with patient patient's brother Kristina Mckinney and niece Scarlett Hyde at bedside. Patient verified her Name and birthday. She lives in a MEMORY unit at [facility] at [address] [facility]. [address]. Phone number is [phone]. The information on her face sheet is her mailing address. Patient owns a home oxygen with portable tank, walker with wheels, rollator, and BSC. She is taking her medication as prescribed.     Initial Assessment (most recent)       Adult Discharge Assessment - 05/21/23 1643          Discharge Assessment    Assessment Type Discharge Planning Assessment     Confirmed/corrected address, phone number and insurance Yes     Confirmed Demographics Correct on Facesheet     Source of Information  family;patient     Communicated AIDEE with patient/caregiver Date not available/Unable to determine     People in Home facility resident     Facility Arrived From: [facility]     Do you expect to return to your current living situation? Yes     Do you have help at home or someone to help you manage your care at home? Yes     Who are your caregiver(s) and their phone number(s)? staff     Prior to hospitilization cognitive status: Alert/Oriented     Current cognitive status: Alert/Oriented     Equipment Currently Used at Home walker, rolling;rollator;oxygen;bedside commode;cane, straight     Readmission within 30 days? Yes     Patient currently being followed by outpatient case management? No     Do you currently have service(s) that help you manage your care at home? No     Do you take prescription medications? Yes     Do you have prescription coverage? Yes     Do you have any problems affording any of your prescribed medications? No     Is the patient taking medications as prescribed? yes     Who is going to help you get home at discharge? staff     How do you get to doctors appointments? family or friend will provide;agency     Are you on dialysis? No     Do you take coumadin? No     Discharge Plan A Assisted Living;Home     Discharge Plan B New Nursing Home placement - skilled nursing care facility     DME Needed Upon Discharge  none     Discharge Plan discussed with: Sibling;Patient     Transition of Care Barriers None

## 2023-05-21 NOTE — ASSESSMENT & PLAN NOTE
Was speaking coherently and joking with our team on admission and up to 5/21; was AAOx3.   5/21: became confused and significantly less coherent after attempting 6MWT.

## 2023-05-21 NOTE — PLAN OF CARE
CMICU DAILY GOALS       A: Awake    RASS: Goal -    Actual - RASS (Lowery Agitation-Sedation Scale): drowsy   Restraint necessity: Clinical Justification: Removing medical devices, Treatment Interference  B: Breathe   SBT: Not intubated   C: Coordinate A & B, analgesics/sedatives   Pain: managed    SAT: Not intubated  D: Delirium   CAM-ICU:    E: Early(intubated/ Progressive (non-intubated) Mobility   MOVE Screen: Pass   Activity: Activity Management: Rolling - L1  FAS: Feeding/Nutrition   Diet order: Diet/Nutrition Received: 2 gram sodium, restrict fluids,    T: Thrombus   DVT prophylaxis: VTE Required Core Measure: Pharmacological prophylaxis initiated/maintained  H: HOB Elevation   Head of Bed (HOB) Positioning: HOB at 30 degrees  U: Ulcer Prophylaxis   GI: yes  G: Glucose control   managed    S: Skin   Bathing/Skin Care: bath, partial, incontinence care, linen changed  Device Skin Pressure Protection: absorbent pad utilized/changed, positioning supports utilized, pressure points protected  Pressure Reduction Devices: foam padding utilized  Pressure Reduction Techniques: weight shift assistance provided, positioned off wounds, pressure points protected  Skin Protection: adhesive use limited, incontinence pads utilized  B: Bowel Function   no issues   I: Indwelling Catheters   Duong necessity:     CVC necessity: No  D: De-escalation Antibiotics   Yes    Family/Goals of care/Code Status   Code Status: DNR    24H Vital Sign Range  Temp:  [97.9 °F (36.6 °C)-98.9 °F (37.2 °C)]   Pulse:  [60-87]   Resp:  [14-41]   BP: ()/(52-77)   SpO2:  [87 %-100 %]      Shift Events   Pt arrived safely from 14WT to unit at approx 1330. Pt was extremely agitated upon arrival and requiring 60 L/100% O2; administered morphine and started precedex gtt. Blood cultures, labs, and respiratory culture obtained. Pt now on 15 L/40% O2 and resting comfortably. Family at the bedside.    VS and assessment per flow sheet, patient  progressing towards goals as tolerated, plan of care reviewed with  pt Lynn Mckinney and family , all concerns addressed, will continue to monitor.    Yao Smith

## 2023-05-21 NOTE — PROGRESS NOTES
"[facility] Card[facility] Medical [facility]  [facility] Medicine  Progress Note    Patient Name: Lynn Mckinney  MRN: [id number]  Patient Class: IP- Inpatient   Admission Date: 5/19/2023  Length of Stay: 2 days  Attending Physician: Gallito Cortez MD  Primary Care Provider: Abdiel Robles MD        Subjective:     Principal Problem:Acute on chronic respiratory failure with hypoxia        HPI:  Lynn Mckinney is a 87 y.o. female with pAF on eliquis, CAD (s/p CABG 2001), HTN, PVD, CRT-D upgrade from PPM and recently diagnosed ILD/fibrosis presenting with increasing home O2 demands and SOB with patient satting 80% on 3L, the max of her home concentrator. Daughter reports she was admitted 5/2-5/5 for CHF exacerbation and pulmonary edema requiring IV diuretics. She was also diagnosed with pulmonary fibrosis during said admission with formal pulm follow up scheduled for next week. Family reports she has been noticing gradually worsening edema  following recent discharge. She was not on any PO lasix prior to or following her recent HF admission. She denies recent fever, chills, productive cough or change in her chronic nonproductive cough but family reports worsening "gurgling" over the last 3 days. She had a CRT upgrade 1.5wk ago, she denies any pain, redness or discharge from the pocket incision.     In the ED, VS afebrile HR 93, RR 26, /75, SpO2 79% on 3L. Up to 90% on 5L NC. Labs demonstrated WBC 7, Hgb 11.9, Plt 155, Na 137, K 4.1, BUN/Cr 11/1.0. BNP 1600, Trop 0.033. CXR with interstitial and alveolar opacities compatible with pulmonary edema/CHF. Given IV lasix 40 mg x1. Patient admitted to medicine for further management of acute hypoxic respiratory failure in the setting of ILD and CHF.       Overview/Hospital Course:  Patient admitted to hospital medicine for further management of acute on chronic hypoxic respiratory failure. Started on IV lasix for diuresis. Changed labetalol to coreg for better GDMT.  Diuresed to the point " of being clinically dry with a bump in her Crt and bicarb.  We then planned on discharging, and ordered a 6MWT to try to set the best level of home O2 for her back at her assisted living.  She was unable to stand up for it and became acutely distressed, needing to lie back down in bed.  Increased RR, WOB.  We ordered an albuterol breathing treatment and continued rounds.  Called back to bedside by RN for AMS and sustained hypoxia.  Rapid called, and MICU assessed at bedside.  Given acute decline, she was stepped up to MICU.      Interval History: See hospital course.  Transferring to MICU.  DNR DNI    Objective:     Vital Signs (Most Recent):  Temp: 98.1 °F (36.7 °C) (05/21/23 1204)  Pulse: 80 (05/21/23 1204)  Resp: 14 (05/21/23 1135)  BP: (!) 160/74 (05/21/23 1214)  SpO2: 95 % (05/21/23 1214) Vital Signs (24h Range):  Temp:  [97.9 °F (36.6 °C)-99.3 °F (37.4 °C)] 98.1 °F (36.7 °C)  Pulse:  [60-82] 80  Resp:  [14-22] 14  SpO2:  [86 %-97 %] 95 %  BP: (120-160)/(56-74) 160/74     Weight: 60.8 kg (134 lb)  Body mass index is 23 kg/m².    Intake/Output Summary (Last 24 hours) at 5/21/2023 1307  Last data filed at 5/20/2023 1717  Gross per 24 hour   Intake 250 ml   Output 600 ml   Net -350 ml         Physical Exam  Constitutional:       General: She is in acute distress.      Appearance: She is ill-appearing and toxic-appearing.   HENT:      Head: Normocephalic and atraumatic.   Cardiovascular:      Rate and Rhythm: Tachycardia present. Rhythm irregular.      Pulses: Normal pulses.      Heart sounds: Normal heart sounds.   Pulmonary:      Effort: Respiratory distress present.      Breath sounds: Wheezing present.   Abdominal:      General: Abdomen is flat.      Palpations: Abdomen is soft.   Musculoskeletal:         General: Normal range of motion.   Skin:     General: Skin is warm and dry.      Capillary Refill: Capillary refill takes less than 2 seconds.      Findings: Bruising present.      Comments: Extremities  perfused   Neurological:      Mental Status: She is disoriented.           Significant Labs: All pertinent labs within the past 24 hours have been reviewed.  ABGs:   Recent Labs   Lab 05/21/23  1239   PH 7.562*   PCO2 40.0   HCO3 36.0*   POCSATURATED 96   BE 14   PO2 68*     CBC:   Recent Labs   Lab 05/21/23  0523   WBC 8.08   HGB 11.4*   HCT 35.5*        CMP:   Recent Labs   Lab 05/19/23  1324 05/20/23  0448 05/21/23  0523    140 137   K 4.1 3.2* 3.6    102 95   CO2 23 27 31*    88 98   BUN 22 21 27*   CREATININE 1.0 1.0 1.4   CALCIUM 8.4* 8.5* 8.8   PROT 6.6  --   --    ALBUMIN 3.4*  --   --    BILITOT 0.9  --   --    ALKPHOS 81  --   --    AST 32  --   --    ALT 21  --   --    ANIONGAP 11 11 11     Cardiac Markers: No results for input(s): CKMB, MYOGLOBIN, BNP, TROPISTAT in the last 48 hours.  Lactic Acid: No results for input(s): LACTATE in the last 48 hours.  Magnesium:   Recent Labs   Lab 05/19/23  1640 05/20/23  0448 05/21/23  0523   MG 1.8 1.7 2.1     Troponin:   Recent Labs   Lab 05/19/23  1640 05/20/23  0448 05/20/23  1438   TROPONINI 0.049* 0.053* 0.047*       Significant Imaging: I have reviewed all pertinent imaging results/findings within the past 24 hours.  CXR: I have reviewed all pertinent results/findings within the past 24 hours and my personal findings are:  Improved volume status since admission, but remains concerning for ILD      Assessment/Plan:      * Acute on chronic respiratory failure with hypoxia  Pt with hx of HFpEF and ILD. On 3L home oxygen. Requiring 5 L on admission to maintain O2 sat 88-92%. Most recent chest CT 5/4/23 showed subpleural reticulations, traction bronchiectasis, and several min of GGO. No definite honeycomb abnormality. Minimal L pleural effusion. Pulmonology was consulted at the time of last hospitalization 5/5 and felt risk>benefit of high dose steroids for ILD. Workup for causes of ILD was significant for positive DARRIAN. Pt was scheduled to  see pulmonology clinic in the next few days for further workup and PFTs. Was not sent home with PO lasix at the time.     Presented with BNP 1600, 2+ pitting edema BLE.  Diuresed until Crt bumped; repeat CXR w improved interstitial edema.    5/21: acutely increased dyspnea, WOB, and O2 reqs after sitting up in bed for a 6MWT.  Stepped up to MICU on 15L HFNC with Airvo ordered.    - Further care per MICU  - DDX for acute decline includes PE, MI, aspiration/PNA, worsening ILD flare, sepsis  - Repeat cardiac enzymes pending  - Respiratpry viral panel pending  - Check Electrolytes, keep Mag >2 & K+ >4         Acute on chronic combined systolic and diastolic heart failure  TTE  5/2/23   Severe left atrial enlargement.   The left ventricle is normal in size with eccentric hypertrophy and mildly decreased systolic function.   The estimated ejection fraction is 40%. There are segmental wall motion abnormalities at the apex.   Grade II left ventricular diastolic dysfunction.   Normal right ventricular size with normal right ventricular systolic function.   Mild aortic regurgitation.   The estimated PA systolic pressure is 27 mmHg.   Normal central venous pressure (3 mmHg).     - Changed labetalol to Coreg for better GDMT  - Will defer starting Entresto to outpatient Cardiology in light of her advanced age and other co-morbidities; will resume home ARB on DC    ILD (interstitial lung disease)  On 3L NC at home. Not on steroids outpatient due to severe osteoporosis. Presented with severe hypoxia. See acute hypoxic respiratory failure.       PAF (paroxysmal atrial fibrillation)  Patient with Paroxysmal (<7 days) atrial fibrillation which is controlled currently with Beta Blocker. Patient is currently in sinus rhythm.[id number] Score: 4. Anticoagulation indicated. Anticoagulation done with Eliquis.        Dementia with anxiety, unspecified dementia severity, unspecified dementia type  Was speaking coherently and joking  with our team on admission and up to 5/21; was AAOx3.   5/21: became confused and significantly less coherent after attempting 6MWT.    Thrombocytopenia, unspecified  Chronic anemia with thrombocytopenia. Unknown cause at this time. Plt 137 on admission.     - CBC daily, continue to monitor  - transfuse plt <50 or signs of significant bleeding      Aortic atherosclerosis  Follows with cardiology outpatient.     - Continue ASA, statin.      History of ventricular fibrillation  S/p ICD placement with recent exchange. EKG on admission ventricular paced.     - continue telemetry      Bilateral carotid artery stenosis  L carotid stent placement in 2007.     - continue ASA, statin      CHB (complete heart block)  S/p pacemaker placement      Stage 3a chronic kidney disease  Creatinine 1.0 on admit, baseline around 1.0.    Plan:   Lab Results   Component Value Date    CREATININE 1.0 05/19/2023     - Avoid nephrotoxic agents such as NSAIDs, gadolinium and IV radiocontrast.  - Renally dose meds to current GFR.  - Maintain MAP > 65.        Hypothyroidism  Continue home synthroid      Coronary artery disease involving native coronary artery of native heart without angina pectoris  S/p CABG x3. Continue ASA, Statin.     - tele      Pure hypercholesterolemia  Continue home statin      Primary hypertension  Home medications include: amlodipine 10 mg qd, labetalol 200 mg BID, irbesartan 300 mg qd    - switch labetalol to coreg   - holding amlodipine and ARB in setting of normotension      VTE Risk Mitigation (From admission, onward)         Ordered     apixaban tablet 5 mg  2 times daily         05/19/23 1445     Reason for no Mechanical VTE Prophylaxis  Once        Question:  Reasons:  Answer:  Physician Provided (leave comment)    05/19/23 1441     IP VTE HIGH RISK PATIENT  Once         05/19/23 1441     Place sequential compression device  Until discontinued         05/19/23 1441                Discharge Planning   AIDEE:  5/21/2023     Code Status: DNR   Is the patient medically ready for discharge?: No    Reason for patient still in hospital (select all that apply): Patient unstable, Patient new problem, Patient trending condition, Laboratory test, Treatment, Imaging, Consult recommendations, PT / OT recommendations and Pending disposition                     Paramjit Bartlett MD  Department of Hospital Medicine   [facility] - Cardiac Medical ICU

## 2023-05-21 NOTE — HPI
Lynn Mckinney is a 87 y.o. female with pAF on eliquis, CAD (s/p CABG 2001), chronic respiratory failure on 3L NC, HTN, PVD, CRT-D upgrade from PPM who was admitted to hospital medicine on 5/19/23 for acute on chronic respiratory failure presumed from heart failure exacerbation. She was aggressively diuresed until she had a bump in sCr. Prior to discharge primary team requested 6 minute walk test. With initiation of standing, patient became acutely distressed and developed tachypnea and increased WOB. Albuterol treatment ordered with minimal improvement. Patient became increasing more agitated with increasing oxygen requirements and rapid response was called. Critical care medicine evaluated the patient and transferred to the MICU for increasing oxygen requirements.

## 2023-05-21 NOTE — SUBJECTIVE & OBJECTIVE
Interval History: See hospital course.  Transferring to MICU.  DNR DNI    Objective:     Vital Signs (Most Recent):  Temp: 98.1 °F (36.7 °C) (05/21/23 1204)  Pulse: 80 (05/21/23 1204)  Resp: 14 (05/21/23 1135)  BP: (!) 160/74 (05/21/23 1214)  SpO2: 95 % (05/21/23 1214) Vital Signs (24h Range):  Temp:  [97.9 °F (36.6 °C)-99.3 °F (37.4 °C)] 98.1 °F (36.7 °C)  Pulse:  [60-82] 80  Resp:  [14-22] 14  SpO2:  [86 %-97 %] 95 %  BP: (120-160)/(56-74) 160/74     Weight: 60.8 kg (134 lb)  Body mass index is 23 kg/m².    Intake/Output Summary (Last 24 hours) at 5/21/2023 1307  Last data filed at 5/20/2023 1717  Gross per 24 hour   Intake 250 ml   Output 600 ml   Net -350 ml         Physical Exam  Constitutional:       General: She is in acute distress.      Appearance: She is ill-appearing and toxic-appearing.   HENT:      Head: Normocephalic and atraumatic.   Cardiovascular:      Rate and Rhythm: Tachycardia present. Rhythm irregular.      Pulses: Normal pulses.      Heart sounds: Normal heart sounds.   Pulmonary:      Effort: Respiratory distress present.      Breath sounds: Wheezing present.   Abdominal:      General: Abdomen is flat.      Palpations: Abdomen is soft.   Musculoskeletal:         General: Normal range of motion.   Skin:     General: Skin is warm and dry.      Capillary Refill: Capillary refill takes less than 2 seconds.      Findings: Bruising present.      Comments: Extremities perfused   Neurological:      Mental Status: She is disoriented.           Significant Labs: All pertinent labs within the past 24 hours have been reviewed.  ABGs:   Recent Labs   Lab 05/21/23  1239   PH 7.562*   PCO2 40.0   HCO3 36.0*   POCSATURATED 96   BE 14   PO2 68*     CBC:   Recent Labs   Lab 05/21/23  0523   WBC 8.08   HGB 11.4*   HCT 35.5*        CMP:   Recent Labs   Lab 05/19/23  1324 05/20/23  0448 05/21/23  0523    140 137   K 4.1 3.2* 3.6    102 95   CO2 23 27 31*    88 98   BUN 22 21 27*    CREATININE 1.0 1.0 1.4   CALCIUM 8.4* 8.5* 8.8   PROT 6.6  --   --    ALBUMIN 3.4*  --   --    BILITOT 0.9  --   --    ALKPHOS 81  --   --    AST 32  --   --    ALT 21  --   --    ANIONGAP 11 11 11     Cardiac Markers: No results for input(s): CKMB, MYOGLOBIN, BNP, TROPISTAT in the last 48 hours.  Lactic Acid: No results for input(s): LACTATE in the last 48 hours.  Magnesium:   Recent Labs   Lab 05/19/23  1640 05/20/23  0448 05/21/23  0523   MG 1.8 1.7 2.1     Troponin:   Recent Labs   Lab 05/19/23  1640 05/20/23  0448 05/20/23  1438   TROPONINI 0.049* 0.053* 0.047*       Significant Imaging: I have reviewed all pertinent imaging results/findings within the past 24 hours.  CXR: I have reviewed all pertinent results/findings within the past 24 hours and my personal findings are:  Improved volume status since admission, but remains concerning for ILD

## 2023-05-21 NOTE — ASSESSMENT & PLAN NOTE
Pt with hx of HFpEF and ILD. On 3L home oxygen. Requiring 5 L on admission to maintain O2 sat 88-92%. Most recent chest CT 5/4/23 showed subpleural reticulations, traction bronchiectasis, and several min of GGO. No definite honeycomb abnormality. Minimal L pleural effusion. Pulmonology was consulted at the time of last hospitalization 5/5 and felt risk>benefit of high dose steroids for ILD. Workup for causes of ILD was significant for positive DARRIAN. Pt was scheduled to see pulmonology clinic in the next few days for further workup and PFTs. Was not sent home with PO lasix at the time.     Presented with BNP 1600, 2+ pitting edema BLE.  Diuresed until Crt bumped; repeat CXR w improved interstitial edema.    5/21: acutely increased dyspnea, WOB, and O2 reqs after sitting up in bed for a 6MWT.  Stepped up to MICU on 15L HFNC with Airvo ordered.    - Further care per MICU  - DDX for acute decline includes PE, MI, aspiration/PNA, worsening ILD flare, sepsis  - Repeat cardiac enzymes pending  - Respiratpry viral panel pending  - Check Electrolytes, keep Mag >2 & K+ >4

## 2023-05-21 NOTE — RESPIRATORY THERAPY
RAPID RESPONSE RESPIRATORY THERAPY NOTE             Code Status: DNR   : 1935  Bed: 6091/6091 A:   MRN: [id number]  Time page Received: 1144  Time Rapid Response RT at Bedside: 1145  Time Rapid Response RT left Bedside: 1320    SITUATION    Evaluated patient for: increased WOB/AMS    BACKGROUND    Why is the patient in the hospital?: Acute on chronic respiratory failure with hypoxia    Patient has a past medical history of Allergy, Anticoagulant long-term use, Blood clotting tendency, Carotid artery disease, Chronic systolic congestive heart failure, NYHA class 3, Coronary artery disease, Heart block, Hyperlipidemia, Hypertension, Hypothyroid, Left displaced femoral neck fracture, Obesity, Pacemaker, PAD (peripheral artery disease), Pulmonary fibrosis, Spinal stenosis, Thoracic or lumbosacral neuritis or radiculitis, and Tubular adenoma.    24 Hours Vitals Range:  Temp:  [97.9 °F (36.6 °C)-99.3 °F (37.4 °C)]   Pulse:  [60-82]   Resp:  [14-22]   BP: (120-160)/(56-74)   SpO2:  [86 %-97 %]     Labs:    Recent Labs     23  1324 23  1640 23  0448 23  0523     --  140 137   K 4.1  --  3.2* 3.6     --  102 95   CO2 23  --  27 31*   CREATININE 1.0  --  1.0 1.4     --  88 98   PHOS  --  3.5 3.6 3.3   MG  --  1.8 1.7 2.1        Recent Labs     23  1239   PH 7.562*   PCO2 40.0   PO2 68*   HCO3 36.0*   POCSATURATED 96   BE 14       ASSESSMENT/INTERVENTIONS  Pt in bed confused and tachypnea      Last Vitals: Temp: 98.1 °F (36.7 °C) ( 1204)  Pulse: 80 ( 1204)  Resp: 14 ( 1135)  BP: 160/74 ( 1214)  SpO2: 92 % ( 1324)  Level of Consciousness: Level of Consciousness (AVPU): alert  Respiratory Effort: Respiratory Effort: Moderate, Severe  Expansion/Accessory Muscle Usage: Expansion/Accessory Muscles/Retractions: no use of accessory muscles, no retractions  All Lung Field Breath Sounds: All Lung Fields Breath Sounds: Anterior:, Lateral:, coarse, crackles,  coarse  JERALD Breath Sounds: coarse, diminished  RUL Breath Sounds: clear  O2 Device/Concentration: 13 LPM N.C.  NIPPV: No Surgical airway: No Vent: No  ETCO2 monitored:    Ambu at bedside:      Active Orders   Respiratory Care    Inhalation Treatment Q4H WAKE     Frequency: Q4H WAKE     Number of Occurrences: Until Specified    Oxygen Continuous     Frequency: Continuous     Number of Occurrences: Until Specified     Order Questions:      Device type: High flow      Device: Comfort Flow      LPM: 60      Titrate O2 per Oxygen Titration Protocol: Yes      To maintain SpO2 goal of: >= 88%      Notify MD of: Inability to achieve desired SpO2; Sudden change in patient status and requires 20% increase in FiO2; Patient requires >60% FiO2    POCT Venous Blood Gas     Frequency: Once     Number of Occurrences: 1 Occurrences     Order Comments: This test should be used for VBGs.  If using this order for other tests (K, creatinine, HCT, PT/INR, lactate etc)  ONLY do so in the case of an emergency or rapid response.Notify Physician if: see parameters below.         Order Questions:      Component: Blood Gas    Pulse Oximetry Q4H     Frequency: Q4H     Number of Occurrences: Until Specified     Order Comments: With vitals         RECOMMENDATIONS  ?  We recommend: RRT Recs: Placed on Airvo and taken to ICU    ESCALATION        FOLLOW-UP    Disposition: Tx in ICU bed 6091.    Please call back the Rapid Response RTMeng RRT at x [phone] for any questions or concerns.

## 2023-05-21 NOTE — NURSING
Lynn Mckinney is a 87 y.o.   female patient, who presents for a 6 minute walk test ordered by  Paramjit Bartlett MD.  The diagnosis is acute on chronic respiratory failure with hypoxia .  The patient's BMI is 23kg/m2.    Test Results:    The test was not performed .  Prior to walking, the patient reported:  feeling weak .      05/21/2023     O2 Sat % Supplemental Oxygen Heart Rate Blood Pressure Chauncey Scale   Pre-exercise  (Resting)  94 3 L NC            Performing nurse/tech:  NICOLE Conti RN      CLINICAL INTERPRETATION:  Testing was not performed due to concern for patient safety. MD at bedside. As patient sat on edge of bed with difficulty patient verbalized feeling very weak. Patient breathing labored and o2sat 86%. Walking test not performed. MD verbalized that OT/ PT consult is placed.

## 2023-05-21 NOTE — ACP (ADVANCE CARE PLANNING)
"DO NOT RESUSCITATE  DO NOT INTUBATE    Patient Lynn Mckinney currently does not have decision-making capacity. Patient is currently confused and is not aware of current situation. Initiated the discussion of goals of care and code status with patient and all family present at bedside.  These present family members included brother Kristina Mckinney (HCPOA).     Patient's / family's understanding of illness: Family has good understanding.  Patient's prognosis and understanding thereof: Guarded.  Family has good understanding.  Patient's stated hopes for current plan of care: Get back to [facility] assisted living.  Patient's definition of living well (e.g. if they were having a good day, what would happen on that day): Being with family and friends at [facility].  Patient's stated outcomes that would be unacceptable to them if their illness should progress (outlined in Living Will, as well as in conversations with our team and with her family recently): "being dependent on machines".  Family reports she has a brother who was recently intubated in an ICU for an extended period of time, and she does not want to go through anything similar.  She has been speaking with her niece about this of late, and decided she would not want to be intubated if there was little hope of meaningful QOL afterwards.  We explained that given her debilitated state and comorbidities, that the chances of her ever coming off a vent are virtually nil.  Accordingly, per her wishes, the family asks that we not intubate but do everything short thereof including HFNC, Airvo, BiPAP, pressors/IV medicines, steroids, etc.    In the event where patient's heart stops or patient stops breathing, patient would not want life-sustaining measures (ie CPR, intubation). Per patient's wishes, code status confirmed as DNR; status confirmed/order placed in chart.     After reviewing the patient's values, hopes, fears, and outcomes they defined as " unacceptable, we concluded that overall treatment goals/wishes are continue all treatment except for CPR and intubation.     Functional status -  declining  Designated MPOA - brother Kristina Mckinney, Kristina's daughter has also been involved.    Greater than 16 minutes were spent having this conversation.     Paramjit Bartlett MD MPH  Medical Resident  [facility]    DO NOT RESUSCITATE  DO NOT INTUBATE

## 2023-05-21 NOTE — ASSESSMENT & PLAN NOTE
TTE  5/2/23   Severe left atrial enlargement.   The left ventricle is normal in size with eccentric hypertrophy and mildly decreased systolic function.   The estimated ejection fraction is 40%. There are segmental wall motion abnormalities at the apex.   Grade II left ventricular diastolic dysfunction.   Normal right ventricular size with normal right ventricular systolic function.   Mild aortic regurgitation.   The estimated PA systolic pressure is 27 mmHg.   Normal central venous pressure (3 mmHg).     - Changed labetalol to Coreg for better GDMT  - Will defer starting Entresto to outpatient Cardiology in light of her advanced age and other co-morbidities; will resume home ARB on DC

## 2023-05-21 NOTE — NURSING
Patient is  AAO*3 ,  on and off with time and  sometime situation .  Had bladder movement . Patient was  desaturated to  86 % on 6 liter  NC , RT and charge nurse  made aware ,  Now  patient is in 8liter  HFNC . Had call light within reach , Will continue monitoring.

## 2023-05-22 ENCOUNTER — PATIENT MESSAGE (OUTPATIENT)
Dept: PULMONOLOGY | Facility: CLINIC | Age: 88
End: 2023-05-22
Payer: MEDICARE

## 2023-05-22 ENCOUNTER — DOCUMENT SCAN (OUTPATIENT)
Dept: HOME HEALTH SERVICES | Facility: HOSPITAL | Age: 88
End: 2023-05-22

## 2023-05-22 ENCOUNTER — DOCUMENTATION ONLY (OUTPATIENT)
Dept: CARDIOLOGY | Facility: HOSPITAL | Age: 88
End: 2023-05-22
Payer: MEDICARE

## 2023-05-22 ENCOUNTER — DOCUMENT SCAN (OUTPATIENT)
Dept: HOME HEALTH SERVICES | Facility: HOSPITAL | Age: 88
End: 2023-05-22
Payer: MEDICARE

## 2023-05-22 ENCOUNTER — DOCUMENTATION ONLY (OUTPATIENT)
Dept: ELECTROPHYSIOLOGY | Facility: CLINIC | Age: 88
End: 2023-05-22
Payer: MEDICARE

## 2023-05-22 DIAGNOSIS — R06.02 SOB (SHORTNESS OF BREATH): Primary | ICD-10-CM

## 2023-05-22 LAB
ANION GAP SERPL CALC-SCNC: 14 MMOL/L (ref 8–16)
BACTERIA SPEC AEROBE CULT: NORMAL
BASOPHILS # BLD AUTO: 0.02 K/UL (ref 0–0.2)
BASOPHILS NFR BLD: 0.2 % (ref 0–1.9)
BUN SERPL-MCNC: 35 MG/DL (ref 8–23)
CALCIUM SERPL-MCNC: 8.9 MG/DL (ref 8.7–10.5)
CHLORIDE SERPL-SCNC: 96 MMOL/L (ref 95–110)
CO2 SERPL-SCNC: 29 MMOL/L (ref 23–29)
CREAT SERPL-MCNC: 1.3 MG/DL (ref 0.5–1.4)
DIFFERENTIAL METHOD: ABNORMAL
EOSINOPHIL # BLD AUTO: 0.1 K/UL (ref 0–0.5)
EOSINOPHIL NFR BLD: 0.8 % (ref 0–8)
ERYTHROCYTE [DISTWIDTH] IN BLOOD BY AUTOMATED COUNT: 12.5 % (ref 11.5–14.5)
EST. GFR  (NO RACE VARIABLE): 39.8 ML/MIN/1.73 M^2
GLUCOSE SERPL-MCNC: 102 MG/DL (ref 70–110)
GRAM STN SPEC: NORMAL
GRAM STN SPEC: NORMAL
HCT VFR BLD AUTO: 34.3 % (ref 37–48.5)
HGB BLD-MCNC: 11 G/DL (ref 12–16)
IMM GRANULOCYTES # BLD AUTO: 0.02 K/UL (ref 0–0.04)
IMM GRANULOCYTES NFR BLD AUTO: 0.2 % (ref 0–0.5)
LYMPHOCYTES # BLD AUTO: 0.8 K/UL (ref 1–4.8)
LYMPHOCYTES NFR BLD: 8.9 % (ref 18–48)
MAGNESIUM SERPL-MCNC: 2.2 MG/DL (ref 1.6–2.6)
MCH RBC QN AUTO: 31.7 PG (ref 27–31)
MCHC RBC AUTO-ENTMCNC: 32.1 G/DL (ref 32–36)
MCV RBC AUTO: 99 FL (ref 82–98)
MONOCYTES # BLD AUTO: 1.2 K/UL (ref 0.3–1)
MONOCYTES NFR BLD: 13.4 % (ref 4–15)
NEUTROPHILS # BLD AUTO: 6.5 K/UL (ref 1.8–7.7)
NEUTROPHILS NFR BLD: 76.5 % (ref 38–73)
NRBC BLD-RTO: 0 /100 WBC
PHOSPHATE SERPL-MCNC: 4.6 MG/DL (ref 2.7–4.5)
PLATELET # BLD AUTO: 132 K/UL (ref 150–450)
PMV BLD AUTO: 9.4 FL (ref 9.2–12.9)
POTASSIUM SERPL-SCNC: 4 MMOL/L (ref 3.5–5.1)
RBC # BLD AUTO: 3.47 M/UL (ref 4–5.4)
SODIUM SERPL-SCNC: 139 MMOL/L (ref 136–145)
WBC # BLD AUTO: 8.56 K/UL (ref 3.9–12.7)

## 2023-05-22 PROCEDURE — 99024 POSTOP FOLLOW-UP VISIT: CPT | Mod: NTX,,, | Performed by: INTERNAL MEDICINE

## 2023-05-22 PROCEDURE — 94761 N-INVAS EAR/PLS OXIMETRY MLT: CPT | Mod: NTX

## 2023-05-22 PROCEDURE — 99024 PR POST-OP FOLLOW-UP VISIT: ICD-10-PCS | Mod: NTX,,, | Performed by: INTERNAL MEDICINE

## 2023-05-22 PROCEDURE — 94640 AIRWAY INHALATION TREATMENT: CPT | Mod: NTX

## 2023-05-22 PROCEDURE — 85025 COMPLETE CBC W/AUTO DIFF WBC: CPT | Mod: NTX

## 2023-05-22 PROCEDURE — 25000003 PHARM REV CODE 250: Mod: NTX | Performed by: STUDENT IN AN ORGANIZED HEALTH CARE EDUCATION/TRAINING PROGRAM

## 2023-05-22 PROCEDURE — 84100 ASSAY OF PHOSPHORUS: CPT | Mod: NTX

## 2023-05-22 PROCEDURE — 99291 PR CRITICAL CARE, E/M 30-74 MINUTES: ICD-10-PCS | Mod: NTX,,, | Performed by: INTERNAL MEDICINE

## 2023-05-22 PROCEDURE — 99900035 HC TECH TIME PER 15 MIN (STAT): Mod: NTX

## 2023-05-22 PROCEDURE — 99291 CRITICAL CARE FIRST HOUR: CPT | Mod: NTX,,, | Performed by: INTERNAL MEDICINE

## 2023-05-22 PROCEDURE — 25000003 PHARM REV CODE 250: Mod: NTX

## 2023-05-22 PROCEDURE — 80048 BASIC METABOLIC PNL TOTAL CA: CPT | Mod: NTX

## 2023-05-22 PROCEDURE — 27100171 HC OXYGEN HIGH FLOW UP TO 24 HOURS: Mod: NTX

## 2023-05-22 PROCEDURE — 20000000 HC ICU ROOM: Mod: NTX

## 2023-05-22 PROCEDURE — 25000242 PHARM REV CODE 250 ALT 637 W/ HCPCS: Mod: NTX

## 2023-05-22 PROCEDURE — 25000003 PHARM REV CODE 250: Mod: NTX | Performed by: INTERNAL MEDICINE

## 2023-05-22 PROCEDURE — 83735 ASSAY OF MAGNESIUM: CPT | Mod: NTX

## 2023-05-22 RX ADMIN — CARVEDILOL 12.5 MG: 12.5 TABLET, FILM COATED ORAL at 08:05

## 2023-05-22 RX ADMIN — ALBUTEROL SULFATE 2.5 MG: 2.5 SOLUTION RESPIRATORY (INHALATION) at 03:05

## 2023-05-22 RX ADMIN — APIXABAN 5 MG: 5 TABLET, FILM COATED ORAL at 09:05

## 2023-05-22 RX ADMIN — ALBUTEROL SULFATE 2.5 MG: 2.5 SOLUTION RESPIRATORY (INHALATION) at 08:05

## 2023-05-22 RX ADMIN — ASPIRIN 81 MG 81 MG: 81 TABLET ORAL at 08:05

## 2023-05-22 RX ADMIN — MEMANTINE HYDROCHLORIDE 5 MG: 5 TABLET ORAL at 09:05

## 2023-05-22 RX ADMIN — CHOLECALCIFEROL TAB 25 MCG (1000 UNIT) 2000 UNITS: 25 TAB at 09:05

## 2023-05-22 RX ADMIN — PRAVASTATIN SODIUM 80 MG: 40 TABLET ORAL at 09:05

## 2023-05-22 RX ADMIN — FUROSEMIDE 40 MG: 40 TABLET ORAL at 09:05

## 2023-05-22 RX ADMIN — DIVALPROEX SODIUM 125 MG: 125 CAPSULE, COATED PELLETS ORAL at 08:05

## 2023-05-22 RX ADMIN — MEMANTINE HYDROCHLORIDE 5 MG: 5 TABLET ORAL at 08:05

## 2023-05-22 RX ADMIN — APIXABAN 2.5 MG: 2.5 TABLET, FILM COATED ORAL at 08:05

## 2023-05-22 RX ADMIN — CARVEDILOL 12.5 MG: 12.5 TABLET, FILM COATED ORAL at 09:05

## 2023-05-22 RX ADMIN — Medication 3 MG: at 08:05

## 2023-05-22 RX ADMIN — POLYETHYLENE GLYCOL 3350 17 G: 17 POWDER, FOR SOLUTION ORAL at 09:05

## 2023-05-22 RX ADMIN — LIDOCAINE 1 PATCH: 50 PATCH CUTANEOUS at 09:05

## 2023-05-22 RX ADMIN — THERA TABS 1 TABLET: TAB at 09:05

## 2023-05-22 RX ADMIN — Medication 1 CAPSULE: at 09:05

## 2023-05-22 RX ADMIN — ALBUTEROL SULFATE 2.5 MG: 2.5 SOLUTION RESPIRATORY (INHALATION) at 11:05

## 2023-05-22 NOTE — PROGRESS NOTES
[facility] - Cardiac Medical ICU  Critical Care Medicine  Progress Note    Patient Name: Lynn Mckinney  MRN: [id number]  Admission Date: 5/19/2023  Hospital Length of Stay: 3 days  Code Status: DNR  Attending Provider: Emilia Blankenship MD  Primary Care Provider: Abdiel Robles MD   Principal Problem: Acute on chronic respiratory failure with hypoxia    Subjective:     HPI:  Lynn Mckinney is a 87 y.o. female with pAF on eliquis, CAD (s/p CABG 2001), chronic respiratory failure on 3L NC, HTN, PVD, CRT-D upgrade from PPM who was admitted to hospital medicine on 5/19/23 for acute on chronic respiratory failure presumed from heart failure exacerbation. She was aggressively diuresed until she had a bump in sCr. Prior to discharge primary team requested 6 minute walk test. With initiation of standing, patient became acutely distressed and developed tachypnea and increased WOB. Albuterol treatment ordered with minimal improvement. Patient became increasing more agitated with increasing oxygen requirements and rapid response was called. Critical care medicine evaluated the patient and transferred to the MICU for increasing oxygen requirements.       Hospital/ICU Course:  5/22: Improving oxygen requirement, currently on Airvo 15L60%.  Continuing diuresis      Interval History/Significant Events:  On Airvo 15L60%.  Continue diuresis    Review of Systems   Respiratory:  Positive for shortness of breath.    Cardiovascular:  Negative for chest pain.   All other systems reviewed and are negative.  Objective:     Vital Signs (Most Recent):  Temp: 97.6 °F (36.4 °C) (05/22/23 0705)  Pulse: 60 (05/22/23 1122)  Resp: (!) 25 (05/22/23 1122)  BP: (!) 150/67 (05/22/23 1000)  SpO2: 100 % (05/22/23 1122) Vital Signs (24h Range):  Temp:  [97.6 °F (36.4 °C)-98.5 °F (36.9 °C)] 97.6 °F (36.4 °C)  Pulse:  [60-87] 60  Resp:  [13-41] 25  SpO2:  [92 %-100 %] 100 %  BP: ()/(46-77) 150/67   Weight: 60.8 kg (134 lb)  Body mass index is 23  kg/m².      Intake/Output Summary (Last 24 hours) at 5/22/2023 1211  Last data filed at 5/22/2023 0705  Gross per 24 hour   Intake 93.66 ml   Output 750 ml   Net -656.34 ml          Physical Exam  Vitals and nursing note reviewed.   Constitutional:       General: She is not in acute distress.     Appearance: Normal appearance. She is normal weight. She is not ill-appearing, toxic-appearing or diaphoretic.   HENT:      Head: Normocephalic and atraumatic.      Right Ear: External ear normal.      Left Ear: External ear normal.      Nose: Nose normal.   Cardiovascular:      Rate and Rhythm: Normal rate and regular rhythm.      Pulses: Normal pulses.      Heart sounds: Normal heart sounds. No murmur heard.    No friction rub. No gallop.      Comments: No JVD and no edema today  Pulmonary:      Comments: Improving respiratory effort, mild bilateral crackles, no wheezing  Abdominal:      General: Abdomen is flat. Bowel sounds are normal. There is no distension.      Palpations: Abdomen is soft.      Tenderness: There is no abdominal tenderness. There is no guarding or rebound.   Musculoskeletal:         General: No swelling. Normal range of motion.   Skin:     General: Skin is warm and dry.      Coloration: Skin is not jaundiced.   Neurological:      General: No focal deficit present.      Mental Status: She is alert and oriented to person, place, and time. Mental status is at baseline.   Psychiatric:         Mood and Affect: Mood normal.         Behavior: Behavior normal.         Thought Content: Thought content normal.         Judgment: Judgment normal.          Vents:  Oxygen Concentration (%): 40 (05/22/23 1122)  Lines/Drains/Airways       Drain  Duration             Female External Urinary Catheter 05/21/23 1602 <1 day              Peripheral Intravenous Line  Duration                  Peripheral IV - Single Lumen 05/21/23 1553 20 G Right Antecubital <1 day         Peripheral IV - Single Lumen 05/22/23 0240 18 G  Anterior;Right Forearm <1 day                  Significant Labs:    CBC/Anemia Profile:  Recent Labs   Lab 05/21/23  0523 05/22/23  0350   WBC 8.08 8.56   HGB 11.4* 11.0*   HCT 35.5* 34.3*    132*   MCV 99* 99*   RDW 12.8 12.5        Chemistries:  Recent Labs   Lab 05/21/23  0523 05/22/23  0350    139   K 3.6 4.0   CL 95 96   CO2 31* 29   BUN 27* 35*   CREATININE 1.4 1.3   CALCIUM 8.8 8.9   MG 2.1 2.2   PHOS 3.3 4.6*       All pertinent labs within the past 24 hours have been reviewed.    Significant Imaging:  I have reviewed all pertinent imaging results/findings within the past 24 hours.      ABG  Recent Labs   Lab 05/21/23  1434   PH 7.395   PO2 22*   PCO2 60.4*   HCO3 36.9*   BE 12     Assessment/Plan:     Pulmonary  * Acute on chronic respiratory failure with hypoxia  Patient with Hypoxic Respiratory failure which is Acute on chronic.  she is on home oxygen at 2.5 LPM. Supplemental oxygen was provided and noted- Oxygen Concentration (%):  [] 40    .   Signs/symptoms of respiratory failure include- increased work of breathing. Contributing diagnoses includes - CHF Labs and images were reviewed. Patient Has recent ABG, which has been reviewed. Will treat underlying causes and adjust management of respiratory failure as follows- Heart failure     ILD (interstitial lung disease)  · Recently diagnosed with LD--CT-chest starting for nonspecific interstitial pneumonia  · PFTs pending    Cardiac/Vascular  PAF (paroxysmal atrial fibrillation)  · Stable with controlled ventricular response  · Continue apixaban    Acute on chronic combined systolic and diastolic heart failure  · Echocardiogram 5/3   Severe left atrial enlargement.   The left ventricle is normal in size with eccentric hypertrophy and mildly decreased systolic function.   The estimated ejection fraction is 40%. There are segmental wall motion abnormalities at the apex.   Grade II left ventricular diastolic dysfunction.   Normal right  ventricular size with normal right ventricular systolic function.   Mild aortic regurgitation.   The estimated PA systolic pressure is 27 mmHg.   Normal central venous pressure (3 mmHg).  · Good diuresis with furosemide--will continue  · Close monitoring of urine output  · Wean oxygen as tolerated    History of ventricular fibrillation  · BiVICD placed 5/9    CHB (complete heart block)  · BiVICD placed 5/9, scheduled for clinic check 5/22  · Consult Electrophysiology    Coronary artery disease involving native coronary artery of native heart without angina pectoris  · s/p CABG 2001 and PCI 2021  · Continue home regimen of aspirin and rosuvastatin  · At a carvedilol    Pure hypercholesterolemia  · Continue home regimen of rosuvastatin    Primary hypertension  · Continue home regimen of amlodipine and irbesartan  · Added carvedilol  · Diuresis with furosemide    Renal/  Stage 3a chronic kidney disease  · Baseline creatinine around 1.0-1.2 mg/dL  · Avoid nephrotoxins  · Continue to monitor    Endocrine  Hypothyroidism  · Continue home regimen of levothyroxine    Other  Dementia with anxiety, unspecified dementia severity, unspecified dementia type  · Continue home regimen of memantine            Critical Care Time: 35 minutes  Critical secondary to Patient has a condition that poses threat to life and bodily function: Severe Respiratory Distress      Critical care was time spent personally by me on the following activities: development of treatment plan with patient or surrogate and bedside caregivers, discussions with consultants, evaluation of patient's response to treatment, examination of patient, ordering and performing treatments and interventions, ordering and review of laboratory studies, ordering and review of radiographic studies, pulse oximetry, re-evaluation of patient's condition. This critical care time did not overlap with that of any other provider or involve time for any procedures.         Can  MANDO Blankenship M.D.  Rapid Response/Critical Care  Department of Pulmonary Medicine  [facility]        N.B.: Portions of this note was dictated using M*Modal Fluency Direct--there may be voice recognition errors occasionally missed on review.

## 2023-05-22 NOTE — ASSESSMENT & PLAN NOTE
· Continue home regimen of amlodipine and irbesartan  · Added carvedilol  · Diuresis with furosemide

## 2023-05-22 NOTE — HOSPITAL COURSE
5/22:  Improving oxygen requirement, currently on Airvo 15L60%.  Continuing diuresis  5/23:  Weaned down to nasal cannula at 5 L/min overnight  5/24:  Patient continues to improve, currently on nasal cannula at 4 L/min.  Working with PT/OT.

## 2023-05-22 NOTE — PROGRESS NOTES
Called to bedside by Dr. Coleman to interrogate device post CRT-D upgrade to ensure device is functioning WNL.     Interrogation revealed:    Presenting egram demonstrates ApBp  Underlying rhythm c/w SB w/ CHB, junctional escape noted  Device function WNL.  RA pacing 38%  BiV pacing 97%  Anticoagulation Status: Eliquis  Atrial arrhythmias: AMS x 8, max 20 secs, EGM c/w nsAT  Ventricular arrhythmias: none  Battery Status/Longevity: 4.9-5.2 years  Reprogramming at this visit: changed LV pulse configuration from P4-RVC to D1-M2 and adjusted pulse amplitude to allow safety margin  Follow up in EP clinic in 3-4 months.  Follow up via remote monitoring as scheduled.      Dr. Coleman aware of device changes.

## 2023-05-22 NOTE — SUBJECTIVE & OBJECTIVE
Interval History/Significant Events:  On Airvo 15L60%.  Continue diuresis    Review of Systems   Respiratory:  Positive for shortness of breath.    Cardiovascular:  Negative for chest pain.   All other systems reviewed and are negative.  Objective:     Vital Signs (Most Recent):  Temp: 97.6 °F (36.4 °C) (05/22/23 0705)  Pulse: 60 (05/22/23 1122)  Resp: (!) 25 (05/22/23 1122)  BP: (!) 150/67 (05/22/23 1000)  SpO2: 100 % (05/22/23 1122) Vital Signs (24h Range):  Temp:  [97.6 °F (36.4 °C)-98.5 °F (36.9 °C)] 97.6 °F (36.4 °C)  Pulse:  [60-87] 60  Resp:  [13-41] 25  SpO2:  [92 %-100 %] 100 %  BP: ()/(46-77) 150/67   Weight: 60.8 kg (134 lb)  Body mass index is 23 kg/m².      Intake/Output Summary (Last 24 hours) at 5/22/2023 1211  Last data filed at 5/22/2023 0705  Gross per 24 hour   Intake 93.66 ml   Output 750 ml   Net -656.34 ml          Physical Exam  Vitals and nursing note reviewed.   Constitutional:       General: She is not in acute distress.     Appearance: Normal appearance. She is normal weight. She is not ill-appearing, toxic-appearing or diaphoretic.   HENT:      Head: Normocephalic and atraumatic.      Right Ear: External ear normal.      Left Ear: External ear normal.      Nose: Nose normal.   Cardiovascular:      Rate and Rhythm: Normal rate and regular rhythm.      Pulses: Normal pulses.      Heart sounds: Normal heart sounds. No murmur heard.    No friction rub. No gallop.      Comments: No JVD and no edema today  Pulmonary:      Comments: Improving respiratory effort, mild bilateral crackles, no wheezing  Abdominal:      General: Abdomen is flat. Bowel sounds are normal. There is no distension.      Palpations: Abdomen is soft.      Tenderness: There is no abdominal tenderness. There is no guarding or rebound.   Musculoskeletal:         General: No swelling. Normal range of motion.   Skin:     General: Skin is warm and dry.      Coloration: Skin is not jaundiced.   Neurological:      General:  No focal deficit present.      Mental Status: She is alert and oriented to person, place, and time. Mental status is at baseline.   Psychiatric:         Mood and Affect: Mood normal.         Behavior: Behavior normal.         Thought Content: Thought content normal.         Judgment: Judgment normal.          Vents:  Oxygen Concentration (%): 40 (05/22/23 1122)  Lines/Drains/Airways       Drain  Duration             Female External Urinary Catheter 05/21/23 1602 <1 day              Peripheral Intravenous Line  Duration                  Peripheral IV - Single Lumen 05/21/23 1553 20 G Right Antecubital <1 day         Peripheral IV - Single Lumen 05/22/23 0240 18 G Anterior;Right Forearm <1 day                  Significant Labs:    CBC/Anemia Profile:  Recent Labs   Lab 05/21/23 0523 05/22/23  0350   WBC 8.08 8.56   HGB 11.4* 11.0*   HCT 35.5* 34.3*    132*   MCV 99* 99*   RDW 12.8 12.5        Chemistries:  Recent Labs   Lab 05/21/23 0523 05/22/23  0350    139   K 3.6 4.0   CL 95 96   CO2 31* 29   BUN 27* 35*   CREATININE 1.4 1.3   CALCIUM 8.8 8.9   MG 2.1 2.2   PHOS 3.3 4.6*       All pertinent labs within the past 24 hours have been reviewed.    Significant Imaging:  I have reviewed all pertinent imaging results/findings within the past 24 hours.

## 2023-05-22 NOTE — SUBJECTIVE & OBJECTIVE
Past Medical History:   Diagnosis Date    Allergy     seasonal    Anticoagulant long-term use     Blood clotting tendency     Carotid artery disease 05/29/2014    Chronic systolic congestive heart failure, NYHA class 3 5/9/2023    Coronary artery disease     Heart block     Hyperlipidemia     Hypertension     Hypothyroid     Left displaced femoral neck fracture 01/12/2023    Obesity     Pacemaker     PAD (peripheral artery disease)     Pulmonary fibrosis     Spinal stenosis     Thoracic or lumbosacral neuritis or radiculitis 11/25/2014    Tubular adenoma        Past Surgical History:   Procedure Laterality Date    ANGIOPLASTY      APPENDECTOMY      BREAST CYST ASPIRATION Bilateral     CARDIAC CATHETERIZATION      CARDIAC PACEMAKER PLACEMENT  10/10/13    by Dr. Coleman    CAROTID STENT Left 2007    by Dr. Bowen    CATARACT EXTRACTION W/  INTRAOCULAR LENS IMPLANT Bilateral 2012    Dr Sharp    CORONARY ARTERY BYPASS GRAFT  2001     x 3 LIMA-Ramus, SVG-OM1 & SVG-PDA    CORONARY BYPASS GRAFT ANGIOGRAPHY  3/11/2021    Procedure: Bypass graft study;  Surgeon: Gil Garcia MD;  Location: [facility] CATH LAB;  Service: Cardiology;;    CYST REMOVAL      wrist    EYE SURGERY      HEMIARTHROPLASTY OF HIP Left 1/13/2023    Procedure: HEMIARTHROPLASTY, HIP, LEFT;  Surgeon: Alan Vieyra MD;  Location: [facility] OR [address] [place] [address];  Service: Orthopedics;  Laterality: Left;    HYSTERECTOMY      INSERTION OF BIVENTRICULAR IMPLANTABLE CARDIOVERTER-DEFIBRILLATOR (ICD) Left 5/9/2023    Procedure: INSERTION, ICD, BIVENTRICULAR;  Surgeon: Diony Coleman MD;  Location: [facility] EP LAB;  Service: Cardiology;  Laterality: Left;  VF/VT, CRT-D upgrade, SJM, MAC, DM, 3 Prep *dICD in place*    LEFT HEART CATHETERIZATION Left 3/11/2021    Procedure: Left heart cath;  Surgeon: Gil Garcia MD;  Location: [facility] CATH LAB;  Service: Cardiology;  Laterality: Left;    OOPHORECTOMY      REMOVAL, DEVICE  5/9/2023    Procedure: Removal, Device;  Surgeon: Diony  "INES Coleman MD;  Location: [facility] EP LAB;  Service: Cardiology;;    THYROIDECTOMY      VITRECTOMY BY PARS PLANA APPROACH Right 10/12/2021    Procedure: VITRECTOMY, PARS PLANA APPROACH;  Surgeon: Flako Carver MD;  Location: [facility] OR [address] [place] [address];  Service: Ophthalmology;  Laterality: Right;       Review of patient's allergies indicates:   Allergen Reactions    Lipitor [atorvastatin] Itching    Fosamax [alendronate]      Felt "strange"    Iodinated contrast media      Dye is only to be used if absolutely needed because of kidney function    Prolia [denosumab]      Reaction after the Prolia    Sulfa (sulfonamide antibiotics)        Family History       Problem Relation (Age of Onset)    Alzheimer's disease Mother    Diabetes Brother    Heart disease Father, Mother, Brother    Hypertension Father, Mother    No Known Problems Sister, Maternal Grandmother, Maternal Grandfather, Paternal Grandmother, Paternal Grandfather, Maternal Aunt, Maternal Uncle, Paternal Aunt, Paternal Uncle          Tobacco Use    Smoking status: Former     Types: Cigarettes     Quit date: 1984     Years since quittin.7    Smokeless tobacco: Never   Substance and Sexual Activity    Alcohol use: Not Currently     Alcohol/week: 1.0 standard drink     Types: 1 Shots of liquor per week     Comment: old fashion  with dinner every 6 mo     Drug use: No    Sexual activity: Not on file      Review of Systems   Respiratory:  Positive for shortness of breath.    Cardiovascular:  Negative for chest pain.   All other systems reviewed and are negative.  Objective:     Vital Signs (Most Recent):  Temp: 97.6 °F (36.4 °C) (23 0705)  Pulse: 60 (23 1122)  Resp: (!) 25 (23 1122)  BP: (!) 150/67 (23 1000)  SpO2: 100 % (23 1122) Vital Signs (24h Range):  Temp:  [97.6 °F (36.4 °C)-98.5 °F (36.9 °C)] 97.6 °F (36.4 °C)  Pulse:  [60-87] 60  Resp:  [13-41] 25  SpO2:  [92 %-100 %] 100 %  BP: ()/(46-77) 150/67   Weight: 60.8 kg (134 " lb)  Body mass index is 23 kg/m².      Intake/Output Summary (Last 24 hours) at 5/22/2023 1209  Last data filed at 5/22/2023 0705  Gross per 24 hour   Intake 93.66 ml   Output 750 ml   Net -656.34 ml          Physical Exam  Vitals and nursing note reviewed.   Constitutional:       General: She is in acute distress.      Appearance: Normal appearance. She is normal weight. She is not ill-appearing, toxic-appearing or diaphoretic.   HENT:      Head: Normocephalic and atraumatic.      Right Ear: External ear normal.      Left Ear: External ear normal.   Cardiovascular:      Rate and Rhythm: Normal rate and regular rhythm.      Pulses: Normal pulses.      Heart sounds: Normal heart sounds. No murmur heard.    No friction rub. No gallop.   Pulmonary:      Comments: Increased respiratory effort with tachypnea with bilateral crackles, no wheezing  Abdominal:      General: Abdomen is flat. Bowel sounds are normal. There is no distension.      Palpations: Abdomen is soft.      Tenderness: There is no abdominal tenderness. There is no guarding or rebound.   Musculoskeletal:         General: Swelling present. Normal range of motion.   Skin:     General: Skin is warm and dry.      Coloration: Skin is not jaundiced.   Neurological:      General: No focal deficit present.      Mental Status: She is alert and oriented to person, place, and time. Mental status is at baseline.   Psychiatric:         Mood and Affect: Mood normal.         Behavior: Behavior normal.         Thought Content: Thought content normal.         Judgment: Judgment normal.          Vents:  Oxygen Concentration (%): 40 (05/22/23 1122)  Lines/Drains/Airways       Drain  Duration             Female External Urinary Catheter 05/21/23 1602 <1 day              Peripheral Intravenous Line  Duration                  Peripheral IV - Single Lumen 05/21/23 1553 20 G Right Antecubital <1 day         Peripheral IV - Single Lumen 05/22/23 0240 18 G Anterior;Right Forearm <1  day                  Significant Labs:    CBC/Anemia Profile:  Recent Labs   Lab 05/21/23  0523 05/22/23  0350   WBC 8.08 8.56   HGB 11.4* 11.0*   HCT 35.5* 34.3*    132*   MCV 99* 99*   RDW 12.8 12.5        Chemistries:  Recent Labs   Lab 05/21/23  0523 05/22/23  0350    139   K 3.6 4.0   CL 95 96   CO2 31* 29   BUN 27* 35*   CREATININE 1.4 1.3   CALCIUM 8.8 8.9   MG 2.1 2.2   PHOS 3.3 4.6*       All pertinent labs within the past 24 hours have been reviewed.    Significant Imaging: I have reviewed all pertinent imaging results/findings within the past 24 hours.

## 2023-05-22 NOTE — PLAN OF CARE
CMICU DAILY GOALS       A: Awake    RASS: Goal -    Actual - RASS (Lowery Agitation-Sedation Scale): alert and calm   Restraint necessity: Clinical Justification: Removing medical devices  B: Breathe   SBT: Not intubated   C: Coordinate A & B, analgesics/sedatives   Pain: managed    SAT: Not intubated  D: Delirium   CAM-ICU: Overall CAM-ICU: Negative  E: Early(intubated/ Progressive (non-intubated) Mobility   MOVE Screen: Pass   Activity: Activity Management: Rolling - L1  FAS: Feeding/Nutrition   Diet order: Diet/Nutrition Received: 2 gram sodium, restrict fluids,    T: Thrombus   DVT prophylaxis: VTE Required Core Measure: Pharmacological prophylaxis initiated/maintained  H: HOB Elevation   Head of Bed (HOB) Positioning: HOB at 30-45 degrees  U: Ulcer Prophylaxis   GI: yes  G: Glucose control   managed    S: Skin   Bathing/Skin Care: bath, complete, dressed/undressed, incontinence care  Device Skin Pressure Protection: absorbent pad utilized/changed, adhesive use limited, positioning supports utilized, pressure points protected, skin-to-device areas padded, skin-to-skin areas padded  Pressure Reduction Devices: specialty bed utilized, pressure-redistributing mattress utilized, positioning supports utilized  Pressure Reduction Techniques: weight shift assistance provided, pressure points protected, positioned off wounds  Skin Protection: adhesive use limited, drying agents applied, pectin skin barriers applied, silicone foam dressing in place, pulse oximeter probe site changed, skin-to-device areas padded, skin-to-skin areas padded, transparent dressing maintained, tubing/devices free from skin contact  B: Bowel Function   no issues   I: Indwelling Catheters   Duong necessity:     CVC necessity: No  D: De-escalation Antibiotics   Yes    Family/Goals of care/Code Status   Code Status: DNR    24H Vital Sign Range  Temp:  [97.6 °F (36.4 °C)-98.4 °F (36.9 °C)]   Pulse:  [59-65]   Resp:  [13-29]   BP: ()/(46-72)    SpO2:  [93 %-100 %]      Shift Events   No acute events throughout shift    VS and assessment per flow sheet, patient progressing towards goals as tolerated, plan of care reviewed with family, all concerns addressed, will continue to monitor.    Brianna Saucedo

## 2023-05-22 NOTE — ASSESSMENT & PLAN NOTE
· Recently diagnosed with LD--CT-chest starting for nonspecific interstitial pneumonia  · PFTs pending

## 2023-05-22 NOTE — ASSESSMENT & PLAN NOTE
· Echocardiogram 5/3   Severe left atrial enlargement.   The left ventricle is normal in size with eccentric hypertrophy and mildly decreased systolic function.   The estimated ejection fraction is 40%. There are segmental wall motion abnormalities at the apex.   Grade II left ventricular diastolic dysfunction.   Normal right ventricular size with normal right ventricular systolic function.   Mild aortic regurgitation.   The estimated PA systolic pressure is 27 mmHg.   Normal central venous pressure (3 mmHg).  · Good diuresis with furosemide--will continue  · Close monitoring of urine output  · Wean oxygen as tolerated

## 2023-05-22 NOTE — NURSING
Telemetry call regarding pulse ox being off and during call that it had just started to  at 84%. RN arrived to bedside. Patient appeared in respiratory distress on 3L NC and was showing confusion during conversation (previous to this patient able to have appropriate responses to long conversations even though she was AOXself). [place] and MD notified. Oxygen increased to 5 L on high flow, VS taken, patient pulled up in bed, BG taken. MD and rapids arrived to bedside minutes after. Orders placed per MD/ rapids report. Report given to Yao ICU RN. Patient transferred via bed to room 6091 with 2 respiratory therapists and 2 Rns.

## 2023-05-22 NOTE — PROGRESS NOTES
S/P  CRT-D upgrade. Incision is healing well with dermabond intact, no s/s of infection noted at this time. Discussed wound care, arm restrictions and home monitoring.

## 2023-05-22 NOTE — H&P
[facility] - Cardiac Medical ICU  Critical Care Medicine  History & Physical    Patient Name: Lynn Mckinney  MRN: [id number]  Admission Date: 5/19/2023  Hospital Length of Stay: 3 days  Code Status: DNR  Attending Physician: Emilia Blankenship MD   Primary Care Provider: Abdiel Robles MD   Principal Problem: Acute on chronic respiratory failure with hypoxia    Subjective:     HPI:  Lynn Mckinney is a 87 y.o. female with pAF on eliquis, CAD (s/p CABG 2001), chronic respiratory failure on 3L NC, HTN, PVD, CRT-D upgrade from PPM who was admitted to hospital medicine on 5/19/23 for acute on chronic respiratory failure presumed from heart failure exacerbation. She was aggressively diuresed until she had a bump in sCr. Prior to discharge primary team requested 6 minute walk test. With initiation of standing, patient became acutely distressed and developed tachypnea and increased WOB. Albuterol treatment ordered with minimal improvement. Patient became increasing more agitated with increasing oxygen requirements and rapid response was called. Critical care medicine evaluated the patient and transferred to the MICU for increasing oxygen requirements.       Hospital/ICU Course:  No notes on file     Past Medical History:   Diagnosis Date    Allergy     seasonal    Anticoagulant long-term use     Blood clotting tendency     Carotid artery disease 05/29/2014    Chronic systolic congestive heart failure, NYHA class 3 5/9/2023    Coronary artery disease     Heart block     Hyperlipidemia     Hypertension     Hypothyroid     Left displaced femoral neck fracture 01/12/2023    Obesity     Pacemaker     PAD (peripheral artery disease)     Pulmonary fibrosis     Spinal stenosis     Thoracic or lumbosacral neuritis or radiculitis 11/25/2014    Tubular adenoma        Past Surgical History:   Procedure Laterality Date    ANGIOPLASTY      APPENDECTOMY      BREAST CYST ASPIRATION Bilateral     CARDIAC CATHETERIZATION      CARDIAC PACEMAKER  "PLACEMENT  10/10/13    by Dr. Coleman    CAROTID STENT Left 2007    by Dr. Bowen    CATARACT EXTRACTION W/  INTRAOCULAR LENS IMPLANT Bilateral 2012    Dr Sharp    CORONARY ARTERY BYPASS GRAFT  2001     x 3 LIMA-Ramus, SVG-OM1 & SVG-PDA    CORONARY BYPASS GRAFT ANGIOGRAPHY  3/11/2021    Procedure: Bypass graft study;  Surgeon: Gil Garcia MD;  Location: [facility] CATH LAB;  Service: Cardiology;;    CYST REMOVAL      wrist    EYE SURGERY      HEMIARTHROPLASTY OF HIP Left 1/13/2023    Procedure: HEMIARTHROPLASTY, HIP, LEFT;  Surgeon: Alan Vieyra MD;  Location: [facility] OR 2ND FLR;  Service: Orthopedics;  Laterality: Left;    HYSTERECTOMY      INSERTION OF BIVENTRICULAR IMPLANTABLE CARDIOVERTER-DEFIBRILLATOR (ICD) Left 5/9/2023    Procedure: INSERTION, ICD, BIVENTRICULAR;  Surgeon: Diony Coleman MD;  Location: [facility] EP LAB;  Service: Cardiology;  Laterality: Left;  VF/VT, CRT-D upgrade, SJM, MAC, DM, 3 Prep *dICD in place*    LEFT HEART CATHETERIZATION Left 3/11/2021    Procedure: Left heart cath;  Surgeon: Gil Garcia MD;  Location: [facility] CATH LAB;  Service: Cardiology;  Laterality: Left;    OOPHORECTOMY      REMOVAL, DEVICE  5/9/2023    Procedure: Removal, Device;  Surgeon: Diony Coleman MD;  Location: [facility] EP LAB;  Service: Cardiology;;    THYROIDECTOMY      VITRECTOMY BY PARS PLANA APPROACH Right 10/12/2021    Procedure: VITRECTOMY, PARS PLANA APPROACH;  Surgeon: Flako Carver MD;  Location: [facility] 1ST FLR;  Service: Ophthalmology;  Laterality: Right;       Review of patient's allergies indicates:   Allergen Reactions    Lipitor [atorvastatin] Itching    Fosamax [alendronate]      Felt "strange"    Iodinated contrast media      Dye is only to be used if absolutely needed because of kidney function    Prolia [denosumab]      Reaction after the Prolia    Sulfa (sulfonamide antibiotics)        Family History       Problem Relation (Age of Onset)    Alzheimer's disease Mother    Diabetes Brother    " Heart disease Father, Mother, Brother    Hypertension Father, Mother    No Known Problems Sister, Maternal Grandmother, Maternal Grandfather, Paternal Grandmother, Paternal Grandfather, Maternal Aunt, Maternal Uncle, Paternal Aunt, Paternal Uncle          Tobacco Use    Smoking status: Former     Types: Cigarettes     Quit date: 1984     Years since quittin.7    Smokeless tobacco: Never   Substance and Sexual Activity    Alcohol use: Not Currently     Alcohol/week: 1.0 standard drink     Types: 1 Shots of liquor per week     Comment: old fashion  with dinner every 6 mo     Drug use: No    Sexual activity: Not on file      Review of Systems   Respiratory:  Positive for shortness of breath.    Cardiovascular:  Negative for chest pain.   All other systems reviewed and are negative.  Objective:     Vital Signs (Most Recent):  Temp: 97.6 °F (36.4 °C) (23 0705)  Pulse: 60 (23 1122)  Resp: (!) 25 (23 1122)  BP: (!) 150/67 (23 1000)  SpO2: 100 % (23 1122) Vital Signs (24h Range):  Temp:  [97.6 °F (36.4 °C)-98.5 °F (36.9 °C)] 97.6 °F (36.4 °C)  Pulse:  [60-87] 60  Resp:  [13-41] 25  SpO2:  [92 %-100 %] 100 %  BP: ()/(46-77) 150/67   Weight: 60.8 kg (134 lb)  Body mass index is 23 kg/m².      Intake/Output Summary (Last 24 hours) at 2023 1209  Last data filed at 2023 0705  Gross per 24 hour   Intake 93.66 ml   Output 750 ml   Net -656.34 ml          Physical Exam  Vitals and nursing note reviewed.   Constitutional:       General: She is in acute distress.      Appearance: Normal appearance. She is normal weight. She is not ill-appearing, toxic-appearing or diaphoretic.   HENT:      Head: Normocephalic and atraumatic.      Right Ear: External ear normal.      Left Ear: External ear normal.   Cardiovascular:      Rate and Rhythm: Normal rate and regular rhythm.      Pulses: Normal pulses.      Heart sounds: Normal heart sounds. No murmur heard.    No friction rub. No  gallop.   Pulmonary:      Comments: Increased respiratory effort with tachypnea with bilateral crackles, no wheezing  Abdominal:      General: Abdomen is flat. Bowel sounds are normal. There is no distension.      Palpations: Abdomen is soft.      Tenderness: There is no abdominal tenderness. There is no guarding or rebound.   Musculoskeletal:         General: Swelling present. Normal range of motion.   Skin:     General: Skin is warm and dry.      Coloration: Skin is not jaundiced.   Neurological:      General: No focal deficit present.      Mental Status: She is alert and oriented to person, place, and time. Mental status is at baseline.   Psychiatric:         Mood and Affect: Mood normal.         Behavior: Behavior normal.         Thought Content: Thought content normal.         Judgment: Judgment normal.          Vents:  Oxygen Concentration (%): 40 (05/22/23 1122)  Lines/Drains/Airways       Drain  Duration             Female External Urinary Catheter 05/21/23 1602 <1 day              Peripheral Intravenous Line  Duration                  Peripheral IV - Single Lumen 05/21/23 1553 20 G Right Antecubital <1 day         Peripheral IV - Single Lumen 05/22/23 0240 18 G Anterior;Right Forearm <1 day                  Significant Labs:    CBC/Anemia Profile:  Recent Labs   Lab 05/21/23  0523 05/22/23  0350   WBC 8.08 8.56   HGB 11.4* 11.0*   HCT 35.5* 34.3*    132*   MCV 99* 99*   RDW 12.8 12.5        Chemistries:  Recent Labs   Lab 05/21/23  0523 05/22/23  0350    139   K 3.6 4.0   CL 95 96   CO2 31* 29   BUN 27* 35*   CREATININE 1.4 1.3   CALCIUM 8.8 8.9   MG 2.1 2.2   PHOS 3.3 4.6*       All pertinent labs within the past 24 hours have been reviewed.    Significant Imaging: I have reviewed all pertinent imaging results/findings within the past 24 hours.    Assessment/Plan:     Pulmonary  * Acute on chronic respiratory failure with hypoxia  Patient with Hypoxic Respiratory failure which is Acute on  chronic.  she is on home oxygen at 2.5 LPM. Supplemental oxygen was provided and noted- Oxygen Concentration (%):  [] 40    .   Signs/symptoms of respiratory failure include- increased work of breathing. Contributing diagnoses includes - CHF Labs and images were reviewed. Patient Has recent ABG, which has been reviewed. Will treat underlying causes and adjust management of respiratory failure as follows- Heart failure     ILD (interstitial lung disease)  Recently diagnosed with LD--CT-chest starting for nonspecific interstitial pneumonia  PFTs pending    Cardiac/Vascular  PAF (paroxysmal atrial fibrillation)  Stable with controlled ventricular response  Continue apixaban    Acute on chronic combined systolic and diastolic heart failure  Echocardiogram 5/3  Severe left atrial enlargement.  The left ventricle is normal in size with eccentric hypertrophy and mildly decreased systolic function.  The estimated ejection fraction is 40%. There are segmental wall motion abnormalities at the apex.  Grade II left ventricular diastolic dysfunction.  Normal right ventricular size with normal right ventricular systolic function.  Mild aortic regurgitation.  The estimated PA systolic pressure is 27 mmHg.  Normal central venous pressure (3 mmHg).  Good diuresis with furosemide--will continue  Close monitoring of urine output  Wean oxygen as tolerated    History of ventricular fibrillation  BiVICD placed 5/9    CHB (complete heart block)  BiVICD placed 5/9, scheduled for clinic check 5/22  Consult Electrophysiology    Coronary artery disease involving native coronary artery of native heart without angina pectoris  s/p CABG 2001 and PCI 2021  Continue home regimen of aspirin and rosuvastatin  At a carvedilol    Pure hypercholesterolemia  Continue home regimen of rosuvastatin    Primary hypertension  Continue home regimen of amlodipine and irbesartan  Added carvedilol  Diuresis with furosemide    Renal/  Stage 3a chronic  kidney disease  Baseline creatinine around 1.0-1.2 mg/dL  Avoid nephrotoxins  Continue to monitor    Endocrine  Hypothyroidism  Continue home regimen of levothyroxine    Other  Dementia with anxiety, unspecified dementia severity, unspecified dementia type  Continue home regimen of memantine               Critical Care Time: 45 minutes  Critical secondary to Patient has a condition that poses threat to life and bodily function: Severe Respiratory Distress     Critical care was time spent personally by me on the following activities: development of treatment plan with patient or surrogate and bedside caregivers, discussions with consultants, evaluation of patient's response to treatment, examination of patient, ordering and performing treatments and interventions, ordering and review of laboratory studies, ordering and review of radiographic studies, pulse oximetry, re-evaluation of patient's condition. This critical care time did not overlap with that of any other provider or involve time for any procedures.         Emilia Blankenship M.D.  Rapid Response/Critical Care  Department of Pulmonary Medicine  [facility]        N.B.: Portions of this note was dictated using M*Modal Fluency Direct--there may be voice recognition errors occasionally missed on review.

## 2023-05-22 NOTE — ASSESSMENT & PLAN NOTE
Patient with Hypoxic Respiratory failure which is Acute on chronic.  she is on home oxygen at 2.5 LPM. Supplemental oxygen was provided and noted- Oxygen Concentration (%):  [] 40    .   Signs/symptoms of respiratory failure include- increased work of breathing. Contributing diagnoses includes - CHF Labs and images were reviewed. Patient Has recent ABG, which has been reviewed. Will treat underlying causes and adjust management of respiratory failure as follows- Heart failure

## 2023-05-22 NOTE — ASSESSMENT & PLAN NOTE
· s/p CABG 2001 and PCI 2021  · Continue home regimen of aspirin and rosuvastatin  · At a carvedilol

## 2023-05-22 NOTE — PLAN OF CARE
CMICU DAILY GOALS       A: Awake    RASS: Goal -    Actual - RASS (Lowery Agitation-Sedation Scale): light sedation   Restraint necessity: Clinical Justification: Removing medical devices, Treatment Interference  B: Breathe   SBT: Not intubated   C: Coordinate A & B, analgesics/sedatives   Pain: managed    SAT: Not intubated  D: Delirium   CAM-ICU:    E: Early(intubated/ Progressive (non-intubated) Mobility   MOVE Screen: Pass   Activity: Activity Management: Leg kicks - L2  FAS: Feeding/Nutrition   Diet order: Diet/Nutrition Received: 2 gram sodium, restrict fluids,    T: Thrombus   DVT prophylaxis: VTE Required Core Measure: Pharmacological prophylaxis initiated/maintained  H: HOB Elevation   Head of Bed (HOB) Positioning: HOB at 30-45 degrees  U: Ulcer Prophylaxis   GI: yes  G: Glucose control   managed    S: Skin   Bathing/Skin Care: bath, complete  Device Skin Pressure Protection: adhesive use limited  Pressure Reduction Devices: specialty bed utilized  Pressure Reduction Techniques: frequent weight shift encouraged  Skin Protection: adhesive use limited  B: Bowel Function   no issues   I: Indwelling Catheters   Duong necessity:     CVC necessity: No  D: De-escalation Antibiotics   Yes    Family/Goals of care/Code Status   Code Status: DNR    24H Vital Sign Range  Temp:  [97.9 °F (36.6 °C)-98.5 °F (36.9 °C)]   Pulse:  [60-87]   Resp:  [14-41]   BP: ()/(46-77)   SpO2:  [87 %-100 %]      Shift Events   No acute events throughout shift    VS and assessment per flow sheet, patient progressing towards goals as tolerated, plan of care reviewed with family, all concerns addressed, will continue to monitor.    [place]

## 2023-05-22 NOTE — CONSULTS
CARDIAC ELECTROPHYSIOLOGY CONSULTATION NOTE    PATIENT: Lynn Mckinney  MRN: [id number]  : 1935  DATE OF SERVICE: 2023    REFERRING PRACTITIONER: Self, Qamar  PRIMARY CARE PROVIDER: Abdiel Robles MD  ATTENDING PHYSICIAN: Emilia Blankenship MD      Reason for Consultation:   Device Interrogation    Problem List:   87 y.o.female with:    2:1 AV block, AVWB. Also has RBBB and LAFB. PPM placed 10/13 ICD upgrade on 22 and later upgrade to CRTD on 23 by Dr Jared Campos (in past? No sustained AF seen on device reports)  CAD s/p CABG    CHFrEF 40% and GII DD  HLD, HTN , PVD carotidStent  Chronic respiratory failure on 3L NC      HPI:     Lynn Mckinney is a 87 y.o. female with pAF on eliquis, CAD (s/p CABG ), chronic respiratory failure on 3L NC, HTN, PVD, CRT-D upgrade from PPM who was admitted to hospital medicine on 23 for acute on chronic respiratory failure presumed from heart failure exacerbation. She was aggressively diuresed until she had a bump in sCr. Prior to discharge primary team requested 6 minute walk test. With initiation of standing, patient became acutely distressed and developed tachypnea and increased WOB. Albuterol treatment ordered with minimal improvement. Patient became increasing more agitated with increasing oxygen requirements and rapid response was called. Critical care medicine evaluated the patient and transferred to the MICU for increasing oxygen requirements. EP was consulted as he had a device clinic appointment     Echo 3/21 55% ->  45-50% -> 35%  ICD: 98% V-paced    Active Problems:     Patient Active Problem List   Diagnosis    Primary hypertension    Pure hypercholesterolemia    Peripheral arterial disease    Coronary artery disease involving native coronary artery of native heart without angina pectoris    Hypothyroidism    Incontinence    Stage 3a chronic kidney disease    Vitamin D deficiency    CHB (complete heart block)    ICD (implantable  cardioverter-defibrillator), dual, in situ    Left-sided carotid artery disease    Claudication    Right hip pain    Lumbar stenosis    Thoracic or lumbosacral neuritis or radiculitis    Left hip pain    Unsteady gait    Bilateral carotid artery stenosis    Lumbar radiculitis    Age-related osteoporosis without current pathological fracture    History of dental problems    History of ventricular fibrillation    VT (ventricular tachycardia)    S/P CABG x 3    Macular subretinal hemorrhage, right    Memory difficulties    MRI contraindicated due to metal implant    Aortic atherosclerosis    Closed displaced fracture of left femoral neck s/p left hip hemiarthroplasty on 1/13/2023    Acute on chronic combined systolic and diastolic heart failure    Age-related osteoporosis with current pathological fracture with routine healing    Encounter for long-term (current) use of medications    Thrombocytopenia, unspecified    Dementia with anxiety, unspecified dementia severity, unspecified dementia type    Encounter for medical examination to establish care    Acute on chronic respiratory failure with hypoxia    PAF (paroxysmal atrial fibrillation)    Hypokalemia    Chronic systolic congestive heart failure, NYHA class 3    ILD (interstitial lung disease)       Past Medical History:     Past Medical History:   Diagnosis Date    Allergy     seasonal    Anticoagulant long-term use     Blood clotting tendency     Carotid artery disease 05/29/2014    Chronic systolic congestive heart failure, NYHA class 3 5/9/2023    Coronary artery disease     Heart block     Hyperlipidemia     Hypertension     Hypothyroid     Left displaced femoral neck fracture 01/12/2023    Obesity     Pacemaker     PAD (peripheral artery disease)     Pulmonary fibrosis     Spinal stenosis     Thoracic or lumbosacral neuritis or radiculitis 11/25/2014    Tubular adenoma        Past Surgical History:     Past Surgical History:   Procedure Laterality Date     ANGIOPLASTY      APPENDECTOMY      BREAST CYST ASPIRATION Bilateral     CARDIAC CATHETERIZATION      CARDIAC PACEMAKER PLACEMENT  10/10/13    by Dr. Coleman    CAROTID STENT Left     by Dr. Bowen    CATARACT EXTRACTION W/  INTRAOCULAR LENS IMPLANT Bilateral 2012    Dr Sharp    CORONARY ARTERY BYPASS GRAFT  2001     x 3 LIMA-Ramus, SVG-OM1 & SVG-PDA    CORONARY BYPASS GRAFT ANGIOGRAPHY  3/11/2021    Procedure: Bypass graft study;  Surgeon: Gil Garcia MD;  Location: [facility] CATH LAB;  Service: Cardiology;;    CYST REMOVAL      wrist    EYE SURGERY      HEMIARTHROPLASTY OF HIP Left 2023    Procedure: HEMIARTHROPLASTY, HIP, LEFT;  Surgeon: Alan Vieyra MD;  Location: [facility] OR 2ND FLR;  Service: Orthopedics;  Laterality: Left;    HYSTERECTOMY      INSERTION OF BIVENTRICULAR IMPLANTABLE CARDIOVERTER-DEFIBRILLATOR (ICD) Left 2023    Procedure: INSERTION, ICD, BIVENTRICULAR;  Surgeon: Diony Coleman MD;  Location: [facility] EP LAB;  Service: Cardiology;  Laterality: Left;  VF/VT, CRT-D upgrade, SJM, MAC, DM, 3 Prep *dICD in place*    LEFT HEART CATHETERIZATION Left 3/11/2021    Procedure: Left heart cath;  Surgeon: Gil Garcia MD;  Location: [facility] CATH LAB;  Service: Cardiology;  Laterality: Left;    OOPHORECTOMY      REMOVAL, DEVICE  2023    Procedure: Removal, Device;  Surgeon: Diony Coleman MD;  Location: [facility] EP LAB;  Service: Cardiology;;    THYROIDECTOMY      VITRECTOMY BY PARS PLANA APPROACH Right 10/12/2021    Procedure: VITRECTOMY, PARS PLANA APPROACH;  Surgeon: Flako Carver MD;  Location: [facility] 1ST FLR;  Service: Ophthalmology;  Laterality: Right;       Social History/Family History:     Social History     Socioeconomic History    Marital status: Single   Tobacco Use    Smoking status: Former     Types: Cigarettes     Quit date: 1984     Years since quittin.7    Smokeless tobacco: Never   Substance and Sexual Activity    Alcohol use: Not Currently      Alcohol/week: 1.0 standard drink     Types: 1 Shots of liquor per week     Comment: old fashion  with dinner every 6 mo     Drug use: No   Other Topics Concern    Are you pregnant or think you may be? No    Breast-feeding No   Social History Narrative    Minimal physical activity. Does housework.         Drives a little.      Social Determinants of Health     Financial Resource Strain: Low Risk     Difficulty of Paying Living Expenses: Not hard at all   Food Insecurity: No Food Insecurity    Worried About Running Out of Food in the Last Year: Never true    Ran Out of Food in the Last Year: Never true   Transportation Needs: No Transportation Needs    Lack of Transportation (Medical): No    Lack of Transportation (Non-Medical): No   Physical Activity: Inactive    Days of Exercise per Week: 0 days    Minutes of Exercise per Session: 0 min   Stress: No Stress Concern Present    Feeling of Stress : Not at all   Social Connections: Unknown    Frequency of Communication with Friends and Family: More than three times a week    Frequency of Social Gatherings with Friends and Family: More than three times a week    Attends Yazdanism Services: Never    Active Member of Clubs or Organizations: No    Attends Club or Organization Meetings: Never   Housing Stability: Low Risk     Unable to Pay for Housing in the Last Year: No    Number of Places Lived in the Last Year: 2    Unstable Housing in the Last Year: No     Family History   Problem Relation Age of Onset    Heart disease Father         coronary thrombosis    Hypertension Father     Alzheimer's disease Mother     Heart disease Mother         pacemaker    Hypertension Mother     Diabetes Brother     No Known Problems Sister     No Known Problems Maternal Grandmother     No Known Problems Maternal Grandfather     No Known Problems Paternal Grandmother     No Known Problems Paternal Grandfather     Heart disease Brother     No Known Problems Maternal Aunt     No Known Problems  Maternal Uncle     No Known Problems Paternal Aunt     No Known Problems Paternal Uncle     Amblyopia Neg Hx     Blindness Neg Hx     Cancer Neg Hx     Cataracts Neg Hx     Glaucoma Neg Hx     Macular degeneration Neg Hx     Retinal detachment Neg Hx     Strabismus Neg Hx     Stroke Neg Hx     Thyroid disease Neg Hx     Melanoma Neg Hx        Medications:     Medications Prior to Admission   Medication Sig Dispense Refill Last Dose    acetaminophen (TYLENOL) 500 MG tablet Take 1 tablet (500 mg total) by mouth every 6 (six) hours as needed for Pain. 90 tablet 12     apixaban (ELIQUIS) 5 mg Tab Take 1 tablet (5 mg total) by mouth 2 (two) times daily. 60 tablet 11     aspirin 81 MG Chew Take 81 mg by mouth every evening. 1 Tablet, Chewable Oral Every day       cetirizine (ZYRTEC) 10 MG tablet Take 10 mg by mouth daily as needed for Allergies.       cholecalciferol, vitamin D3, (VITAMIN D3) 50 mcg (2,000 unit) Cap capsule Take 2,000 Units by mouth once daily.       coenzyme Q10 200 mg capsule Take 200 mg by mouth once daily.       divalproex (DEPAKOTE SPRINKLE) 125 mg capsule Take 1 capsule (125 mg total) by mouth every evening. 30 capsule 2     levothyroxine (SYNTHROID) 125 MCG tablet Take 1 tablet (125 mcg total) by mouth before breakfast. 90 tablet 4     melatonin (MELATIN) 3 mg tablet Take 1 tablet (3 mg total) by mouth every evening. 30 tablet 12     memantine (NAMENDA) 5 MG Tab TAKE 1 TABLET BY MOUTH TWICE DAILY 60 tablet 11     multivitamin (THERAGRAN) per tablet Take 1 tablet by mouth once daily.       nitroGLYCERIN (NITROSTAT) 0.4 MG SL tablet PLACE 1 TABLET UNDER THE TONGUE EVERY 5 MIN AS NEEDED FOR CHEST PAIN. MAX:3 DOSES 30 tablet 3     rosuvastatin (CRESTOR) 10 MG tablet Take 1 tablet (10 mg total) by mouth once daily. 90 tablet 3     vitamins  A,C,E-zinc-copper (PRESERVISION AREDS) 14,320-226-200 unit-mg-unit Cap Take by mouth Daily.       amLODIPine (NORVASC) 10 MG tablet Take 1 tablet (10 mg total) by  "mouth once daily. 90 tablet 3     irbesartan (AVAPRO) 300 MG tablet TAKE 1 TABLET BY MOUTH EVERY DAY 30 tablet 11        Allergies:     Review of patient's allergies indicates:   Allergen Reactions    Lipitor [atorvastatin] Itching    Fosamax [alendronate]      Felt "strange"    Iodinated contrast media      Dye is only to be used if absolutely needed because of kidney function    Prolia [denosumab]      Reaction after the Prolia    Sulfa (sulfonamide antibiotics)        Review of Systems:   Review of Systems   All other systems reviewed and are negative.     Physical Exam:     VITALS: BP (!) 146/65 (BP Location: Left arm, Patient Position: Lying)   Pulse 60   Temp 97.8 °F (36.6 °C) (Axillary)   Resp 15   Ht 5' 4" (1.626 m)   Wt 60.8 kg (134 lb)   SpO2 96%   BMI 23.00 kg/m²    On HFNC. Resting in bed.     Eyes: Sclerae anicteric. Ears/nose/throat: Mucous membranes moist. Neck: No thyromegaly, lymphadenopathy, or jugular venous distention. Respiratory: Lungs clear to auscultation bilaterally. Cardiovascular: Heart was regular with normal first and second heart sounds. No S3 or S4. GI: Soft, nontender, nondistended, with normal bowel sounds. Musculoskeletal: extremities without cyanosis, clubbing or pitting edema. Neurologic: Patient is alert and oriented x3 and there is no focal strength deficit. Skin: no rashes, cuts, sores. Psychiatric: normal affect. Hematologic: no abnormal bruising or bleeding.    Laboratory:   BUN/Cr/glu/ALT/AST/amyl/lip:  35/1.3/--/--/--/--/-- (05/22 0350)  WBC/Hgb/Hct/Plts:  8.56/11.0/34.3/132 (05/22 0350)       Plan:   2:1 AV block, AVWB. Also has RBBB and LAFB. PPM placed 10/13 ICD upgrade on 12/09/22 and later upgrade to CRTD on 5/9/23 by Dr Coleman  Device was interrogated:  The first part of the device interrogation was done with Dr Coleman and was significant for:  Increase capture threshold P4 to coil (1.1); was switched to D1 to M2 with better tresholds.  Please see media for the " complete device interrogation.     pAF (in past? No sustained AF seen on device reports)  On apixaban 2.5 mg BID     CAD s/p CABG 2001   CHFrEF 40% and GII DD  HLD, HTN , PVD carotidStent  Chronic respiratory failure on 3L NC    We appreciate the opportunity to assist in the care of Ms. Lynn Costa. Should you have any further questions, please do not hesitate to call.     Diony Marcano MD  [facility]  PGY 4 Cardiology Fellow

## 2023-05-22 NOTE — PROGRESS NOTES
"[facility]) nurse navigator notified of HFTC candidate in need of education and introduction to 4-6 week program.      PT aao x 3 while lying in bed  with Niece, Susanna and Susanna's Father at bedside. Introduced self to pt as HFT[facility] nurse navigator.     Patient given "[facility] Home Care Guide" which includes "Daily weight and symptom tracker".  Encouraged pt and Susanna to review information.      Reviewed the following key points of HFTCC program with pt and family:   1.) Take your medications as directed. Call Ms. Madie if anyone tries to change or discontinue your fluid medications.   2.) Weight yourself daily Teach Dry Weight and emphasized that weights and BP and Pulse need to be daily to evaluate fluid medication effectiveness.   3.) Follow low salt and limited fluid diet. Teach: Deli Meats/Cheeses, Sausages/Hot Dogs, Fast Food/Restaurant Food, Anything in a box/bottle/can or seasoning powders are full of salt/sodium.   4.) Stop smoking and start exercising . Walking is good for you as long as you walk briskly with a purpose and walk where you will be safe.   5.) Go to your appointments and call your team.    Pt reminded to follow Symptom tracker and to call at the onset of symptoms according to tracker.     Reviewed plan for follow up once discharged to include phone calls, in person and virtual visits to assist pt optimizing their heart failure medication regimen and encouraging healthy lifestyle modifications.  Reminded pt that program will assist them over the next 4-6 weeks and then patient will be transferred to long term care provider .  Reminded pt how to contact HFTCC navigator via phone and or via [employer].     Pt instructed appointment will be printed on hospital discharge paperwork.     Susanna states that they found out about salt and fluid the hard way. She tells me that when I call she will probably be the one answering the " phone.    Pt also reminded RN will call 48-72 hours after discharge to check on them.     PT and family verbalize read back of information given.  Encouraged pt and family to read over information often and contact team with any questions or concerns.

## 2023-05-23 ENCOUNTER — CLINICAL SUPPORT (OUTPATIENT)
Dept: CARDIOLOGY | Facility: HOSPITAL | Age: 88
End: 2023-05-23
Payer: MEDICARE

## 2023-05-23 DIAGNOSIS — I49.01 VENTRICULAR FIBRILLATION: ICD-10-CM

## 2023-05-23 DIAGNOSIS — Z95.810 PRESENCE OF AUTOMATIC (IMPLANTABLE) CARDIAC DEFIBRILLATOR: ICD-10-CM

## 2023-05-23 DIAGNOSIS — I47.29 OTHER VENTRICULAR TACHYCARDIA: ICD-10-CM

## 2023-05-23 DIAGNOSIS — I50.9 HEART FAILURE, UNSPECIFIED: ICD-10-CM

## 2023-05-23 DIAGNOSIS — I44.2 ATRIOVENTRICULAR BLOCK, COMPLETE: ICD-10-CM

## 2023-05-23 LAB
ANION GAP SERPL CALC-SCNC: 10 MMOL/L (ref 8–16)
BASOPHILS # BLD AUTO: 0.02 K/UL (ref 0–0.2)
BASOPHILS NFR BLD: 0.2 % (ref 0–1.9)
BUN SERPL-MCNC: 36 MG/DL (ref 8–23)
CALCIUM SERPL-MCNC: 8.7 MG/DL (ref 8.7–10.5)
CHLORIDE SERPL-SCNC: 97 MMOL/L (ref 95–110)
CO2 SERPL-SCNC: 33 MMOL/L (ref 23–29)
CREAT SERPL-MCNC: 1.2 MG/DL (ref 0.5–1.4)
DIFFERENTIAL METHOD: ABNORMAL
EOSINOPHIL # BLD AUTO: 0.2 K/UL (ref 0–0.5)
EOSINOPHIL NFR BLD: 2.6 % (ref 0–8)
ERYTHROCYTE [DISTWIDTH] IN BLOOD BY AUTOMATED COUNT: 12.8 % (ref 11.5–14.5)
EST. GFR  (NO RACE VARIABLE): 43.8 ML/MIN/1.73 M^2
GLUCOSE SERPL-MCNC: 95 MG/DL (ref 70–110)
HCT VFR BLD AUTO: 33.2 % (ref 37–48.5)
HGB BLD-MCNC: 10.8 G/DL (ref 12–16)
IMM GRANULOCYTES # BLD AUTO: 0.01 K/UL (ref 0–0.04)
IMM GRANULOCYTES NFR BLD AUTO: 0.1 % (ref 0–0.5)
LYMPHOCYTES # BLD AUTO: 0.6 K/UL (ref 1–4.8)
LYMPHOCYTES NFR BLD: 7.1 % (ref 18–48)
MAGNESIUM SERPL-MCNC: 2.2 MG/DL (ref 1.6–2.6)
MCH RBC QN AUTO: 31.8 PG (ref 27–31)
MCHC RBC AUTO-ENTMCNC: 32.5 G/DL (ref 32–36)
MCV RBC AUTO: 98 FL (ref 82–98)
MONOCYTES # BLD AUTO: 1.1 K/UL (ref 0.3–1)
MONOCYTES NFR BLD: 13.4 % (ref 4–15)
NEUTROPHILS # BLD AUTO: 6.3 K/UL (ref 1.8–7.7)
NEUTROPHILS NFR BLD: 76.6 % (ref 38–73)
NRBC BLD-RTO: 0 /100 WBC
PHOSPHATE SERPL-MCNC: 3.8 MG/DL (ref 2.7–4.5)
PLATELET # BLD AUTO: 141 K/UL (ref 150–450)
PMV BLD AUTO: 9.3 FL (ref 9.2–12.9)
POTASSIUM SERPL-SCNC: 3.6 MMOL/L (ref 3.5–5.1)
RBC # BLD AUTO: 3.4 M/UL (ref 4–5.4)
SODIUM SERPL-SCNC: 140 MMOL/L (ref 136–145)
WBC # BLD AUTO: 8.19 K/UL (ref 3.9–12.7)

## 2023-05-23 PROCEDURE — 94761 N-INVAS EAR/PLS OXIMETRY MLT: CPT | Mod: NTX

## 2023-05-23 PROCEDURE — 20000000 HC ICU ROOM: Mod: NTX

## 2023-05-23 PROCEDURE — 27100171 HC OXYGEN HIGH FLOW UP TO 24 HOURS: Mod: NTX

## 2023-05-23 PROCEDURE — 25000242 PHARM REV CODE 250 ALT 637 W/ HCPCS: Mod: NTX

## 2023-05-23 PROCEDURE — 97535 SELF CARE MNGMENT TRAINING: CPT | Mod: NTX

## 2023-05-23 PROCEDURE — 25000003 PHARM REV CODE 250: Mod: NTX | Performed by: INTERNAL MEDICINE

## 2023-05-23 PROCEDURE — 97165 OT EVAL LOW COMPLEX 30 MIN: CPT | Mod: NTX

## 2023-05-23 PROCEDURE — 97530 THERAPEUTIC ACTIVITIES: CPT | Mod: NTX

## 2023-05-23 PROCEDURE — 93295 CARDIAC DEVICE CHECK - REMOTE: ICD-10-PCS | Mod: ,,, | Performed by: INTERNAL MEDICINE

## 2023-05-23 PROCEDURE — 94640 AIRWAY INHALATION TREATMENT: CPT | Mod: NTX

## 2023-05-23 PROCEDURE — 25000003 PHARM REV CODE 250: Mod: NTX | Performed by: STUDENT IN AN ORGANIZED HEALTH CARE EDUCATION/TRAINING PROGRAM

## 2023-05-23 PROCEDURE — 99900035 HC TECH TIME PER 15 MIN (STAT): Mod: NTX

## 2023-05-23 PROCEDURE — 93296 REM INTERROG EVL PM/IDS: CPT | Performed by: INTERNAL MEDICINE

## 2023-05-23 PROCEDURE — 99233 SBSQ HOSP IP/OBS HIGH 50: CPT | Mod: NTX,,, | Performed by: INTERNAL MEDICINE

## 2023-05-23 PROCEDURE — 85025 COMPLETE CBC W/AUTO DIFF WBC: CPT | Mod: NTX

## 2023-05-23 PROCEDURE — 93295 DEV INTERROG REMOTE 1/2/MLT: CPT | Mod: ,,, | Performed by: INTERNAL MEDICINE

## 2023-05-23 PROCEDURE — 25000003 PHARM REV CODE 250: Mod: NTX

## 2023-05-23 PROCEDURE — 99233 PR SUBSEQUENT HOSPITAL CARE,LEVL III: ICD-10-PCS | Mod: NTX,,, | Performed by: INTERNAL MEDICINE

## 2023-05-23 PROCEDURE — 84100 ASSAY OF PHOSPHORUS: CPT | Mod: NTX

## 2023-05-23 PROCEDURE — 80048 BASIC METABOLIC PNL TOTAL CA: CPT | Mod: NTX

## 2023-05-23 PROCEDURE — 97162 PT EVAL MOD COMPLEX 30 MIN: CPT | Mod: NTX

## 2023-05-23 PROCEDURE — 83735 ASSAY OF MAGNESIUM: CPT | Mod: NTX

## 2023-05-23 RX ADMIN — ALBUTEROL SULFATE 2.5 MG: 2.5 SOLUTION RESPIRATORY (INHALATION) at 07:05

## 2023-05-23 RX ADMIN — CARVEDILOL 12.5 MG: 12.5 TABLET, FILM COATED ORAL at 08:05

## 2023-05-23 RX ADMIN — PRAVASTATIN SODIUM 80 MG: 40 TABLET ORAL at 08:05

## 2023-05-23 RX ADMIN — DIVALPROEX SODIUM 125 MG: 125 CAPSULE, COATED PELLETS ORAL at 08:05

## 2023-05-23 RX ADMIN — POLYETHYLENE GLYCOL 3350 17 G: 17 POWDER, FOR SOLUTION ORAL at 08:05

## 2023-05-23 RX ADMIN — ALBUTEROL SULFATE 2.5 MG: 2.5 SOLUTION RESPIRATORY (INHALATION) at 04:05

## 2023-05-23 RX ADMIN — Medication 1 CAPSULE: at 08:05

## 2023-05-23 RX ADMIN — ASPIRIN 81 MG 81 MG: 81 TABLET ORAL at 08:05

## 2023-05-23 RX ADMIN — LEVOTHYROXINE SODIUM 125 MCG: 25 TABLET ORAL at 05:05

## 2023-05-23 RX ADMIN — APIXABAN 2.5 MG: 2.5 TABLET, FILM COATED ORAL at 08:05

## 2023-05-23 RX ADMIN — FUROSEMIDE 40 MG: 40 TABLET ORAL at 08:05

## 2023-05-23 RX ADMIN — CHOLECALCIFEROL TAB 25 MCG (1000 UNIT) 2000 UNITS: 25 TAB at 08:05

## 2023-05-23 RX ADMIN — ALBUTEROL SULFATE 2.5 MG: 2.5 SOLUTION RESPIRATORY (INHALATION) at 01:05

## 2023-05-23 RX ADMIN — ALBUTEROL SULFATE 2.5 MG: 2.5 SOLUTION RESPIRATORY (INHALATION) at 08:05

## 2023-05-23 RX ADMIN — THERA TABS 1 TABLET: TAB at 08:05

## 2023-05-23 RX ADMIN — MEMANTINE HYDROCHLORIDE 5 MG: 5 TABLET ORAL at 08:05

## 2023-05-23 RX ADMIN — LIDOCAINE 1 PATCH: 50 PATCH CUTANEOUS at 08:05

## 2023-05-23 RX ADMIN — Medication 3 MG: at 08:05

## 2023-05-23 NOTE — ASSESSMENT & PLAN NOTE
· s/p CABG 2001 and PCI 2021  · Continue home regimen of aspirin and rosuvastatin  · Added carvedilol

## 2023-05-23 NOTE — PLAN OF CARE
Problem: Physical Therapy  Goal: Physical Therapy Goal  Description: Goals to be met by: 23    Patient will increase functional independence with mobility by performin. Supine to sit with Stand-by Assistance  2. Sit to supine with Stand-by Assistance with RW  3. Sit to stand transfer with Stand-by Assistance  4. Bed to chair transfer with Stand-by Assistance using Rolling Walker  5. Gait  x 15 feet with Stand-by Assistance using Rolling Walker.   6. Sitting at edge of bed x5 minutes with Stand-by Assistance    Outcome: Ongoing, Progressing     Evaluation completed. Goals appropriate.

## 2023-05-23 NOTE — PT/OT/SLP EVAL
"Occupational Therapy   Evaluation and Treatment    Co-eval with PT to have 2 skilled therapists present to safely assess pt's functional mobility.     Name: Lynn Mckinney  MRN: [id number]  Admitting Diagnosis: Acute on chronic respiratory failure with hypoxia  Recent Surgery: * No surgery found *      Recommendations:     Discharge Recommendations: other (see comments) (return to [facility])  Discharge Equipment Recommendations:  none  Barriers to discharge:  None    Assessment:     Lynn Mckinney is a 87 y.o. female with a medical diagnosis of Acute on chronic respiratory failure with hypoxia.  Pt tolerated session well and without incident, transferring to the bedside chair with RW.  However, she's performing below her functional baseline and would continue to benefit from skilled OT services at acute for maximal gains in functional independence.  She presents with the following.  Performance deficits affecting function: weakness, impaired endurance, impaired self care skills, impaired functional mobility, gait instability, impaired balance, impaired cognition, decreased safety awareness, impaired cardiopulmonary response to activity.      Rehab Prognosis: Good; patient would benefit from acute skilled OT services to address these deficits and reach maximum level of function.       Plan:     Patient to be seen 4 x/week to address the above listed problems via self-care/home management, therapeutic activities, therapeutic exercises  Plan of Care Expires: 06/21/23  Plan of Care Reviewed with: patient, other (see comments) (niece)    Subjective     Chief Complaint: "I'm just irritated I can't move when I want to."  Patient/Family Comments/goals: to return to her PLOF    Occupational Profile:  Living Environment: Pt lives at a memory care assisted living facility ([facility]).    Previous level of function: Independent for ADLs except with showering (A) from staff due to safety concerns. Mod I to " ambulate with RW and recently using a rollator.  Roles and Routines: aunt, [facility] resident   Equipment Used at Home: oxygen, walker, rolling, rollator, bedside commode, shower chair  Assistance upon Discharge: [facility] staff    Pain/Comfort:  Pain Rating 1: 0/10  Pain Rating Post-Intervention 1: 0/10    Patients cultural, spiritual, Oriental orthodox conflicts given the current situation: no    Objective:     Communicated with: nursing and PT prior to session.  Patient found HOB elevated with blood pressure cuff, telemetry, pulse ox (continuous), peripheral IV, oxygen, PureWick upon OT entry to room.    General Precautions: Standard, fall  Orthopedic Precautions: N/A  Braces: N/A  Respiratory Status: Nasal cannula, flow 5 L/min with humidification    Occupational Performance:    Bed Mobility:    Patient completed Supine to Sit to the R with minimum assistance for trunk management    Functional Mobility/Transfers:  Patient completed Sit <> Stand Transfer from EOB x 1 trial with contact guard assistance with rolling walker   Patient completed Bed <> Chair Transfer using Step Transfer technique with contact guard assistance with rolling walker, requiring cueing for RW management while turning to sit in chair.    Functional Mobility: Pt ambulated ~5 ft from EOB to the bedside chair with CGA with RW.     Activities of Daily Living:  Grooming: stand by assistance to wash her face with a wash cloth while seated EOB.  Setup assistance to comb her hair while seated in bedside chair.     Cognitive/Visual Perceptual:  Cognitive/Psychosocial Skills:     -       Oriented to: Person and Place, taking increased time to identify place.  Pt initially couldn't ID her niece but could later in the session.  -       Follows Commands/attention:Follows one-step commands with intermittent cueing  -       Communication: clear/fluent    Physical Exam:  Upper Extremity Range of Motion:     -       Right Upper Extremity: WFL  -       Left Upper Extremity:  WFL  Upper Extremity Strength:    -       Right Upper Extremity: WFL  -       Left Upper Extremity: WFL   Strength:    -       Right Upper Extremity: WFL  -       Left Upper Extremity: WFL  Fine Motor Coordination:    -       Intact    AMPAC 6 Click ADL:  AMPAC Total Score: 16    Treatment & Education:  Pt and her niece edu on role of OT, POC, safety when performing self care tasks, benefit of performing OOB activity, and safety when performing functional transfers and mobility.    - Self care tasks completed-- as noted above      Patient left up in chair with all lines intact, call button in reach, nursing notified, and her family and nursing present    GOALS:   Multidisciplinary Problems       Occupational Therapy Goals          Problem: Occupational Therapy    Goal Priority Disciplines Outcome Interventions   Occupational Therapy Goal     OT, PT/OT Ongoing, Progressing    Description: Goals to be met by: 6/6/2023     Patient will increase functional independence with ADLs by performing:    UE Dressing with Set-up Assistance.  LE Dressing with Stand-by Assistance.  Grooming while standing at sink with Stand-by Assistance.  Toileting from toilet with Stand-by Assistance for hygiene and clothing management.   Supine to sit with Stand-by Assistance.  Step transfer with Stand-by Assistance with RW.  Toilet transfer to toilet with Stand-by Assistance with RW.                         History:     Past Medical History:   Diagnosis Date    Allergy     seasonal    Anticoagulant long-term use     Blood clotting tendency     Carotid artery disease 05/29/2014    Chronic systolic congestive heart failure, NYHA class 3 5/9/2023    Coronary artery disease     Heart block     Hyperlipidemia     Hypertension     Hypothyroid     Left displaced femoral neck fracture 01/12/2023    Obesity     Pacemaker     PAD (peripheral artery disease)     Pulmonary fibrosis     Spinal stenosis     Thoracic or lumbosacral neuritis or  radiculitis 11/25/2014    Tubular adenoma          Past Surgical History:   Procedure Laterality Date    ANGIOPLASTY      APPENDECTOMY      BREAST CYST ASPIRATION Bilateral     CARDIAC CATHETERIZATION      CARDIAC PACEMAKER PLACEMENT  10/10/13    by Dr. Coleman    CAROTID STENT Left 2007    by Dr. Bowen    CATARACT EXTRACTION W/  INTRAOCULAR LENS IMPLANT Bilateral 2012    Dr Sharp    CORONARY ARTERY BYPASS GRAFT  2001     x 3 LIMA-Ramus, SVG-OM1 & SVG-PDA    CORONARY BYPASS GRAFT ANGIOGRAPHY  3/11/2021    Procedure: Bypass graft study;  Surgeon: Gil Garcia MD;  Location: [facility] CATH LAB;  Service: Cardiology;;    CYST REMOVAL      wrist    EYE SURGERY      HEMIARTHROPLASTY OF HIP Left 1/13/2023    Procedure: HEMIARTHROPLASTY, HIP, LEFT;  Surgeon: Alan Vieyra MD;  Location: [facility] OR 2ND FLR;  Service: Orthopedics;  Laterality: Left;    HYSTERECTOMY      INSERTION OF BIVENTRICULAR IMPLANTABLE CARDIOVERTER-DEFIBRILLATOR (ICD) Left 5/9/2023    Procedure: INSERTION, ICD, BIVENTRICULAR;  Surgeon: Diony Coleman MD;  Location: [facility] EP LAB;  Service: Cardiology;  Laterality: Left;  VF/VT, CRT-D upgrade, SJM, MAC, DM, 3 Prep *dICD in place*    LEFT HEART CATHETERIZATION Left 3/11/2021    Procedure: Left heart cath;  Surgeon: Gil Garcia MD;  Location: [facility] CATH LAB;  Service: Cardiology;  Laterality: Left;    OOPHORECTOMY      REMOVAL, DEVICE  5/9/2023    Procedure: Removal, Device;  Surgeon: Diony Coleman MD;  Location: [facility] EP LAB;  Service: Cardiology;;    THYROIDECTOMY      VITRECTOMY BY PARS PLANA APPROACH Right 10/12/2021    Procedure: VITRECTOMY, PARS PLANA APPROACH;  Surgeon: Flako Carver MD;  Location: [facility] 1ST FLR;  Service: Ophthalmology;  Laterality: Right;       Time Tracking:     OT Date of Treatment: 05/23/23  OT Start Time: 1018  OT Stop Time: 1043  OT Total Time (min): 25 min    Billable Minutes:Evaluation 15 min  Self Care/Home Management 10 min    5/23/2023

## 2023-05-23 NOTE — PT/OT/SLP EVAL
Physical Therapy Evaluation    Patient Name:  Lynn Mckinney   MRN:  [id number]  Admit Date: 5/19/2023  Admitting Diagnosis:  Acute on chronic respiratory failure with hypoxia  Length of Stay: 4 days  Recent Surgery: * No surgery found *      Recommendations:     Discharge Recommendations:  other (see comments) (return to Memory Care Assisted Living Facility)   Discharge Equipment Recommendations: none   Barriers to discharge:  Deconditioning, Decreased tolerance to activity    Assessment:     Lynn Mckinney is a 87 y.o. female admitted with a medical diagnosis of Acute on chronic respiratory failure with hypoxia.  Patient found alert, confused (dementia), and cooperative. VS remained stable throughout eval. Patient limited by deconditioning at this time. Patient was able to perform step transfer (5 steps) from EOB to chair with CGA and RW and required assistance for RW management alone. PT plans to continue functional transfer and gait activities at next session to increase activity tolerance and improve mobility.    Problem List: weakness, impaired endurance, impaired self care skills, impaired functional mobility, gait instability, impaired balance, impaired cardiopulmonary response to activity, impaired cognition  Rehab Prognosis: Good; patient would benefit from acute skilled PT services to address these deficits and reach maximum level of function.      Plan:     During this hospitalization, patient to be seen 4 x/week to address the identified rehab impairments via gait training, therapeutic activities, therapeutic exercises, neuromuscular re-education and progress towards the established goals.    Plan of Care Expires:  06/22/23    Subjective   Communicated with RN prior to session.  Patient found supine upon PT entry to room, agreeable to evaluation. Lynn Mckinney's niece present during session.    Chief Complaint: Shortness of Breath (SOB, unable to maintain O2 sats per pt family member. 78-81% on home 3L at  "triage. Hx of idiopathic pulmonary fibrosis. )    Patient/Family Comments/goals: To increase activity tolerance, improve mobility, and return to Assisted Living Facility  Pain/Comfort:  Pain Rating 1: 0/10  Pain Rating Post-Intervention 1: 0/10    Living Environment:  Patient lives with caregivers in a assistive living facility   Prior Level of Function:   Patient reports being IND with mobility & with most ADLs (Patient requires caregiver presence for showers due to safety concerns).  Patient uses DME as follows: rollator, walker, rolling, oxygen.  Roles/Repsonsibilities:   Managing Medicines/Managing Home: No, caregivers manage patient's medications.   Patient reports they will have assistance from caregivers at AL Facility and niece upon discharge.    Objective:   Patient found with: peripheral IV, PureWick, telemetry, pulse ox (continuous), blood pressure cuff, oxygen     General Precautions: Standard, Cardiac fall   Orthopedic Precautions:    Braces:     Oxygen Device:  Nasal Cannula with Humidification 5L  Vitals: BP (!) 131/58 (BP Location: Right arm, Patient Position: Lying)   Pulse 63   Temp 98.1 °F (36.7 °C) (Oral)   Resp (!) 34   Ht 5' 4" (1.626 m)   Wt 60.8 kg (134 lb)   SpO2 98%   BMI 23.00 kg/m²     Exams:  Cognition:   Distractible, Cooperative, Confused, and Tangential  AxOx2 (oriented to self and location)  Command following: Follows one-step commands  Fluency: clear/fluent  Hearing: Intact  Vision:  Intact visual fields    RLE ROM: WFL  RLE Strength: WFL  LLE ROM: WFL  LLE Strength: WFL    Outcome Measures:  AM-PAC 6 CLICK MOBILITY  Turning over in bed (including adjusting bedclothes, sheets and blankets)?: 3  Sitting down on and standing up from a chair with arms (e.g., wheelchair, bedside commode, etc.): 3  Moving from lying on back to sitting on the side of the bed?: 3  Moving to and from a bed to a chair (including a wheelchair)?: 3  Need to walk in hospital room?: 3  Climbing 3-5 steps " "with a railing?: 3  Basic Mobility Total Score: 18     Functional Mobility:  Additional staff present: OT and Supervising PT  Bed Mobility:  Rolling/Turning to Left: minimum assistance  Rolling/Turning to Right: minimum assistance  Supine to Sit: minimum assistance; HOB elevated and to right side of bed  Scooting anteriorly to EOB to have both feet planted on floor: contact guard assistance    Sitting Balance at Edge of Bed:  Assistance Level Required: Stand-by Assistance  Time: 8 minutes  Postural deviations noted: slouched posture, rounded shoulders, increased kyphosis, posterior pelvic tilt, and increased cervical flexion  Facilitated:   PT provided facilitation for trunk and cervical extension and provided tactile cueing for patient to maintain position with shoulders back and gaze forward.  Comments:   Patient was able to tolerate sitting EOB for 8 mins; however, about 1 min after transferring from supine>sit, patient states, "I think I need to go back down."   VSS and Patient not in distress, so PT provided simple cueing for patient to remain sitting up for a few more minutes and patient was able to adhere  Patient requires extensive verbal cueing for attention to task  Transfers:   Sit <> Stand Transfer: contact guard assistance with rolling walker   Bed <> Chair Transfer: Step Transfer technique with contact guard assistance with rolling walker  Chair on patient's Left  Patient able to initiate functional transfer independently upon seeing/holding the RW and requires less verbal cueing from PT  PT provides mild facilitation of anterior weight shift, hip extension, and knee extension while rising from sit>stand  Patient performs transfer with no LOB and only requires assistance to help with RW management due to space confinements      Gait:   Patient ambulated: 5 steps to chair  Patient required: contact guard  Patient used: rolling walker  Gait Deviation(s): occasional unsteady gait, decreased step length, " flexed posture, and decreased sarahy  Impairments due to: impaired balance, decreased strength, decreased endurance, and impaired coordination  Comments:   Patient demonstrates slightly unsteady gait characterized by decreased step length and sarahy; however, she experiences no LOB  Patient able to follow simple commands during transfer and demonstrates good functional mobility at this time with CGA and RW  Patient does not demonstrates or report any fatigue or dizziness upon exertion     Therapeutic Activities, Exercises, & Education:   Patient Education on PT role and purpose  Patient Education on importance of mobility  Patient Education on in-room ambulation rules (RN must be present)    Patient left up in chair with all lines intact, call button in reach, and RN notified.    GOALS:   Multidisciplinary Problems       Physical Therapy Goals          Problem: Physical Therapy    Goal Priority Disciplines Outcome Goal Variances Interventions   Physical Therapy Goal     PT, PT/OT Ongoing, Progressing     Description: Goals to be met by: 23    Patient will increase functional independence with mobility by performin. Supine to sit with Stand-by Assistance  2. Sit to supine with Stand-by Assistance  3. Sit to stand transfer with Stand-by Assistance  4. Bed to chair transfer with Stand-by Assistance using Rolling Walker  5. Gait  x 15 feet with Stand-by Assistance using Rolling Walker.   6. Sitting at edge of bed x5 minutes with Stand-by Assistance                         History:     Past Medical History:   Diagnosis Date    Allergy     seasonal    Anticoagulant long-term use     Blood clotting tendency     Carotid artery disease 2014    Chronic systolic congestive heart failure, NYHA class 3 2023    Coronary artery disease     Heart block     Hyperlipidemia     Hypertension     Hypothyroid     Left displaced femoral neck fracture 2023    Obesity     Pacemaker     PAD (peripheral artery  disease)     Pulmonary fibrosis     Spinal stenosis     Thoracic or lumbosacral neuritis or radiculitis 11/25/2014    Tubular adenoma        Past Surgical History:   Procedure Laterality Date    ANGIOPLASTY      APPENDECTOMY      BREAST CYST ASPIRATION Bilateral     CARDIAC CATHETERIZATION      CARDIAC PACEMAKER PLACEMENT  10/10/13    by Dr. Coleman    CAROTID STENT Left 2007    by Dr. Bowen    CATARACT EXTRACTION W/  INTRAOCULAR LENS IMPLANT Bilateral 2012    Dr Sharp    CORONARY ARTERY BYPASS GRAFT  2001     x 3 LIMA-Ramus, SVG-OM1 & SVG-PDA    CORONARY BYPASS GRAFT ANGIOGRAPHY  3/11/2021    Procedure: Bypass graft study;  Surgeon: Gil Garcia MD;  Location: [facility] CATH LAB;  Service: Cardiology;;    CYST REMOVAL      wrist    EYE SURGERY      HEMIARTHROPLASTY OF HIP Left 1/13/2023    Procedure: HEMIARTHROPLASTY, HIP, LEFT;  Surgeon: Alan Vieyra MD;  Location: [facility] OR 2ND FLR;  Service: Orthopedics;  Laterality: Left;    HYSTERECTOMY      INSERTION OF BIVENTRICULAR IMPLANTABLE CARDIOVERTER-DEFIBRILLATOR (ICD) Left 5/9/2023    Procedure: INSERTION, ICD, BIVENTRICULAR;  Surgeon: Diony Coleman MD;  Location: [facility] EP LAB;  Service: Cardiology;  Laterality: Left;  VF/VT, CRT-D upgrade, SJM, MAC, DM, 3 Prep *dICD in place*    LEFT HEART CATHETERIZATION Left 3/11/2021    Procedure: Left heart cath;  Surgeon: Gil Garcia MD;  Location: [facility] CATH LAB;  Service: Cardiology;  Laterality: Left;    OOPHORECTOMY      REMOVAL, DEVICE  5/9/2023    Procedure: Removal, Device;  Surgeon: Diony Coleman MD;  Location: [facility] EP LAB;  Service: Cardiology;;    THYROIDECTOMY      VITRECTOMY BY PARS PLANA APPROACH Right 10/12/2021    Procedure: VITRECTOMY, PARS PLANA APPROACH;  Surgeon: Flako Carver MD;  Location: [facility] OR 1ST FLR;  Service: Ophthalmology;  Laterality: Right;       Time Tracking:     PT Received On: 05/23/23  PT Start Time: 1022     PT Stop Time: 1042  PT Total Time (min): 20 min     Billable  Minutes: Evaluation 5 and Therapeutic Activity 15

## 2023-05-23 NOTE — ASSESSMENT & PLAN NOTE
· BiVICD placed 5/9, scheduled for clinic check 5/22  · Consulted Electrophysiology--appreciate input  · Device interrogated 5/22

## 2023-05-23 NOTE — PLAN OF CARE
CMICU DAILY GOALS       A: Awake    RASS: Goal - RASS Goal: -1-->drowsy  Actual - RASS (Lowery Agitation-Sedation Scale): alert and calm   Restraint necessity: Clinical Justification: Removing medical devices, Treatment Interference  B: Breathe   SBT: Not intubated   C: Coordinate A & B, analgesics/sedatives   Pain: managed    SAT: Not intubated  D: Delirium   CAM-ICU: Overall CAM-ICU: Positive  E: Early(intubated/ Progressive (non-intubated) Mobility   MOVE Screen: Pass   Activity: Activity Management: Rolling - L1  FAS: Feeding/Nutrition   Diet order: Diet/Nutrition Received: 2 gram sodium, restrict fluids,    T: Thrombus   DVT prophylaxis: VTE Required Core Measure: Pharmacological prophylaxis initiated/maintained  H: HOB Elevation   Head of Bed (HOB) Positioning: HOB at 30-45 degrees  U: Ulcer Prophylaxis   GI: yes  G: Glucose control   managed    S: Skin   Bathing/Skin Care: bath, complete, dressed/undressed, incontinence care  Device Skin Pressure Protection: absorbent pad utilized/changed, adhesive use limited, positioning supports utilized, pressure points protected, skin-to-device areas padded, skin-to-skin areas padded  Pressure Reduction Devices: specialty bed utilized  Pressure Reduction Techniques: frequent weight shift encouraged, pressure points protected, rest period provided between sit times, sit time limited to 2 hours, weight shift assistance provided  Skin Protection: adhesive use limited  B: Bowel Function   constipation   I: Indwelling Catheters   Duong necessity:     CVC necessity: No  D: De-escalation Antibiotics   No    Family/Goals of care/Code Status   Code Status: DNR    24H Vital Sign Range  Temp:  [97.6 °F (36.4 °C)-98.8 °F (37.1 °C)]   Pulse:  [58-70]   Resp:  [13-29]   BP: (109-174)/(53-75)   SpO2:  [93 %-100 %]      Shift Events   No acute events throughout shift.     VS and assessment per flow sheet, patient progressing towards goals as tolerated, plan of care reviewed with  patient and family, all concerns addressed.

## 2023-05-23 NOTE — PLAN OF CARE
Problem: Occupational Therapy  Goal: Occupational Therapy Goal  Description: Goals to be met by: 6/6/2023     Patient will increase functional independence with ADLs by performing:    UE Dressing with Set-up Assistance.  LE Dressing with Stand-by Assistance.  Grooming while standing at sink with Stand-by Assistance.  Toileting from toilet with Stand-by Assistance for hygiene and clothing management.   Supine to sit with Stand-by Assistance.  Step transfer with Stand-by Assistance with RW.  Toilet transfer to toilet with Stand-by Assistance with RW.    Outcome: Ongoing, Progressing     Pt evaluated and OT goals established.

## 2023-05-23 NOTE — ASSESSMENT & PLAN NOTE
Patient with Hypoxic Respiratory failure which is Acute on chronic.  she is on home oxygen at 2.5 LPM. Supplemental oxygen was provided and noted- Oxygen Concentration (%):  [40-60] 44    .   Signs/symptoms of respiratory failure include- increased work of breathing. Contributing diagnoses includes - CHF Labs and images were reviewed. Patient Has recent ABG, which has been reviewed. Will treat underlying causes and adjust management of respiratory failure as follows- Heart failure

## 2023-05-23 NOTE — SUBJECTIVE & OBJECTIVE
Interval History/Significant Events:  Able to wean down to nasal cannula overnight.  EP consulted and interrogated device    Review of Systems   Respiratory:  Negative for shortness of breath.    Cardiovascular:  Negative for chest pain.   All other systems reviewed and are negative.  Objective:     Vital Signs (Most Recent):  Temp: 98.8 °F (37.1 °C) (05/22/23 1901)  Pulse: 60 (05/23/23 0731)  Resp: (!) 33 (05/23/23 0731)  BP: (!) 146/86 (05/23/23 0731)  SpO2: 99 % (05/23/23 0731) Vital Signs (24h Range):  Temp:  [97.6 °F (36.4 °C)-98.8 °F (37.1 °C)] 98.8 °F (37.1 °C)  Pulse:  [58-70] 60  Resp:  [14-33] 33  SpO2:  [93 %-100 %] 99 %  BP: (109-174)/(53-86) 146/86   Weight: 60.8 kg (134 lb)  Body mass index is 23 kg/m².      Intake/Output Summary (Last 24 hours) at 5/23/2023 0741  Last data filed at 5/23/2023 0601  Gross per 24 hour   Intake 358 ml   Output 650 ml   Net -292 ml            Physical Exam  Vitals and nursing note reviewed.   Constitutional:       General: She is not in acute distress.     Appearance: Normal appearance. She is normal weight. She is not ill-appearing, toxic-appearing or diaphoretic.   HENT:      Head: Normocephalic and atraumatic.      Right Ear: External ear normal.      Left Ear: External ear normal.      Nose: Nose normal.   Cardiovascular:      Rate and Rhythm: Normal rate and regular rhythm.      Pulses: Normal pulses.      Heart sounds: Normal heart sounds. No murmur heard.    No friction rub. No gallop.      Comments: No JVD and no edema today  Pulmonary:      Comments: Improving respiratory effort, mild bilateral crackles, no wheezing  Abdominal:      General: Abdomen is flat. Bowel sounds are normal. There is no distension.      Palpations: Abdomen is soft.      Tenderness: There is no abdominal tenderness. There is no guarding or rebound.   Musculoskeletal:         General: No swelling. Normal range of motion.   Skin:     General: Skin is warm and dry.      Coloration: Skin is not  jaundiced.   Neurological:      General: No focal deficit present.      Mental Status: She is alert and oriented to person, place, and time. Mental status is at baseline.   Psychiatric:         Mood and Affect: Mood normal.         Behavior: Behavior normal.         Thought Content: Thought content normal.         Judgment: Judgment normal.          Vents:  Oxygen Concentration (%): 44 (05/22/23 1834)  Lines/Drains/Airways       Drain  Duration             Female External Urinary Catheter 05/21/23 1602 1 day              Peripheral Intravenous Line  Duration                  Peripheral IV - Single Lumen 05/21/23 1553 20 G Right Antecubital 1 day         Peripheral IV - Single Lumen 05/22/23 0240 18 G Anterior;Right Forearm 1 day                  Significant Labs:    CBC/Anemia Profile:  Recent Labs   Lab 05/22/23  0350 05/23/23  0524   WBC 8.56 8.19   HGB 11.0* 10.8*   HCT 34.3* 33.2*   * 141*   MCV 99* 98   RDW 12.5 12.8          Chemistries:  Recent Labs   Lab 05/22/23  0350 05/23/23  0524    140   K 4.0 3.6   CL 96 97   CO2 29 33*   BUN 35* 36*   CREATININE 1.3 1.2   CALCIUM 8.9 8.7   MG 2.2 2.2   PHOS 4.6* 3.8         All pertinent labs within the past 24 hours have been reviewed.    Significant Imaging:  I have reviewed all pertinent imaging results/findings within the past 24 hours.

## 2023-05-23 NOTE — PROGRESS NOTES
[facility] - Cardiac Medical ICU  Critical Care Medicine  Progress Note    Patient Name: Lynn Mckinney  MRN: [id number]  Admission Date: 5/19/2023  Hospital Length of Stay: 4 days  Code Status: DNR  Attending Provider: Emilia Blankenship MD  Primary Care Provider: Abdiel Robles MD   Principal Problem: Acute on chronic respiratory failure with hypoxia    Subjective:     HPI:  Lynn Mckinney is a 87 y.o. female with pAF on eliquis, CAD (s/p CABG 2001), chronic respiratory failure on 3L NC, HTN, PVD, CRT-D upgrade from PPM who was admitted to hospital medicine on 5/19/23 for acute on chronic respiratory failure presumed from heart failure exacerbation. She was aggressively diuresed until she had a bump in sCr. Prior to discharge primary team requested 6 minute walk test. With initiation of standing, patient became acutely distressed and developed tachypnea and increased WOB. Albuterol treatment ordered with minimal improvement. Patient became increasing more agitated with increasing oxygen requirements and rapid response was called. Critical care medicine evaluated the patient and transferred to the MICU for increasing oxygen requirements.       Hospital/ICU Course:  5/22: Improving oxygen requirement, currently on Airvo 15L60%.  Continuing diuresis  5/23: Weaned down to nasal cannula at 5 L/min overnight      Interval History/Significant Events:  Able to wean down to nasal cannula overnight.  EP consulted and interrogated device    Review of Systems   Respiratory:  Negative for shortness of breath.    Cardiovascular:  Negative for chest pain.   All other systems reviewed and are negative.  Objective:     Vital Signs (Most Recent):  Temp: 98.8 °F (37.1 °C) (05/22/23 1901)  Pulse: 60 (05/23/23 0731)  Resp: (!) 33 (05/23/23 0731)  BP: (!) 146/86 (05/23/23 0731)  SpO2: 99 % (05/23/23 0731) Vital Signs (24h Range):  Temp:  [97.6 °F (36.4 °C)-98.8 °F (37.1 °C)] 98.8 °F (37.1 °C)  Pulse:  [58-70] 60  Resp:  [14-33] 33  SpO2:  [93  %-100 %] 99 %  BP: (109-174)/(53-86) 146/86   Weight: 60.8 kg (134 lb)  Body mass index is 23 kg/m².      Intake/Output Summary (Last 24 hours) at 5/23/2023 0741  Last data filed at 5/23/2023 0601  Gross per 24 hour   Intake 358 ml   Output 650 ml   Net -292 ml            Physical Exam  Vitals and nursing note reviewed.   Constitutional:       General: She is not in acute distress.     Appearance: Normal appearance. She is normal weight. She is not ill-appearing, toxic-appearing or diaphoretic.   HENT:      Head: Normocephalic and atraumatic.      Right Ear: External ear normal.      Left Ear: External ear normal.      Nose: Nose normal.   Cardiovascular:      Rate and Rhythm: Normal rate and regular rhythm.      Pulses: Normal pulses.      Heart sounds: Normal heart sounds. No murmur heard.    No friction rub. No gallop.      Comments: No JVD and no edema today  Pulmonary:      Comments: Improving respiratory effort, mild bilateral crackles, no wheezing  Abdominal:      General: Abdomen is flat. Bowel sounds are normal. There is no distension.      Palpations: Abdomen is soft.      Tenderness: There is no abdominal tenderness. There is no guarding or rebound.   Musculoskeletal:         General: No swelling. Normal range of motion.   Skin:     General: Skin is warm and dry.      Coloration: Skin is not jaundiced.   Neurological:      General: No focal deficit present.      Mental Status: She is alert and oriented to person, place, and time. Mental status is at baseline.   Psychiatric:         Mood and Affect: Mood normal.         Behavior: Behavior normal.         Thought Content: Thought content normal.         Judgment: Judgment normal.          Vents:  Oxygen Concentration (%): 44 (05/22/23 1834)  Lines/Drains/Airways       Drain  Duration             Female External Urinary Catheter 05/21/23 1602 1 day              Peripheral Intravenous Line  Duration                  Peripheral IV - Single Lumen 05/21/23 1553  20 G Right Antecubital 1 day         Peripheral IV - Single Lumen 05/22/23 0240 18 G Anterior;Right Forearm 1 day                  Significant Labs:    CBC/Anemia Profile:  Recent Labs   Lab 05/22/23  0350 05/23/23  0524   WBC 8.56 8.19   HGB 11.0* 10.8*   HCT 34.3* 33.2*   * 141*   MCV 99* 98   RDW 12.5 12.8          Chemistries:  Recent Labs   Lab 05/22/23  0350 05/23/23  0524    140   K 4.0 3.6   CL 96 97   CO2 29 33*   BUN 35* 36*   CREATININE 1.3 1.2   CALCIUM 8.9 8.7   MG 2.2 2.2   PHOS 4.6* 3.8         All pertinent labs within the past 24 hours have been reviewed.    Significant Imaging:  I have reviewed all pertinent imaging results/findings within the past 24 hours.    ABG  Recent Labs   Lab 05/21/23  1434   PH 7.395   PO2 22*   PCO2 60.4*   HCO3 36.9*   BE 12     Assessment/Plan:     Pulmonary  * Acute on chronic respiratory failure with hypoxia  Patient with Hypoxic Respiratory failure which is Acute on chronic.  she is on home oxygen at 2.5 LPM. Supplemental oxygen was provided and noted- Oxygen Concentration (%):  [40-60] 44    .   Signs/symptoms of respiratory failure include- increased work of breathing. Contributing diagnoses includes - CHF Labs and images were reviewed. Patient Has recent ABG, which has been reviewed. Will treat underlying causes and adjust management of respiratory failure as follows- Heart failure     ILD (interstitial lung disease)  Recently diagnosed with LD--CT-chest starting for nonspecific interstitial pneumonia  PFTs pending    Cardiac/Vascular  PAF (paroxysmal atrial fibrillation)  Stable with controlled ventricular response  Continue apixaban    Acute on chronic combined systolic and diastolic heart failure  Echocardiogram 5/3  Severe left atrial enlargement.  The left ventricle is normal in size with eccentric hypertrophy and mildly decreased systolic function.  The estimated ejection fraction is 40%. There are segmental wall motion abnormalities at the  apex.  Grade II left ventricular diastolic dysfunction.  Normal right ventricular size with normal right ventricular systolic function.  Mild aortic regurgitation.  The estimated PA systolic pressure is 27 mmHg.  Normal central venous pressure (3 mmHg).  Good diuresis with furosemide--will continue  Close monitoring of urine output  Wean oxygen as tolerated    History of ventricular fibrillation  BiVICD placed 5/9 and interrogated 5/22    CHB (complete heart block)  BiVICD placed 5/9, scheduled for clinic check 5/22  Consulted Electrophysiology--appreciate input  Device interrogated 5/22    Coronary artery disease involving native coronary artery of native heart without angina pectoris  s/p CABG 2001 and PCI 2021  Continue home regimen of aspirin and rosuvastatin  Added carvedilol    Pure hypercholesterolemia  Continue home regimen of rosuvastatin    Primary hypertension  Continue home regimen of amlodipine and irbesartan  Added carvedilol  Diuresis with furosemide    Renal/  Stage 3a chronic kidney disease  Baseline creatinine around 1.0-1.2 mg/dL  Avoid nephrotoxins  Continue to monitor    Endocrine  Hypothyroidism  Continue home regimen of levothyroxine    Other  Dementia with anxiety, unspecified dementia severity, unspecified dementia type  Continue home regimen of memantine            Emilia Blankenship M.D.  Rapid Response/Critical Care  Department of Pulmonary Medicine  [facility]        N.B.: Portions of this note was dictated using M*Modal Fluency Direct--there may be voice recognition errors occasionally missed on review.

## 2023-05-24 ENCOUNTER — PATIENT MESSAGE (OUTPATIENT)
Dept: FAMILY MEDICINE | Facility: CLINIC | Age: 88
End: 2023-05-24
Payer: MEDICARE

## 2023-05-24 DIAGNOSIS — I50.22 CHRONIC SYSTOLIC CONGESTIVE HEART FAILURE, NYHA CLASS 3: Primary | ICD-10-CM

## 2023-05-24 LAB
ANION GAP SERPL CALC-SCNC: 13 MMOL/L (ref 8–16)
BASOPHILS # BLD AUTO: 0.02 K/UL (ref 0–0.2)
BASOPHILS NFR BLD: 0.3 % (ref 0–1.9)
BUN SERPL-MCNC: 34 MG/DL (ref 8–23)
CALCIUM SERPL-MCNC: 8.5 MG/DL (ref 8.7–10.5)
CHLORIDE SERPL-SCNC: 95 MMOL/L (ref 95–110)
CO2 SERPL-SCNC: 30 MMOL/L (ref 23–29)
CREAT SERPL-MCNC: 1.1 MG/DL (ref 0.5–1.4)
DIFFERENTIAL METHOD: ABNORMAL
EOSINOPHIL # BLD AUTO: 0.3 K/UL (ref 0–0.5)
EOSINOPHIL NFR BLD: 3.9 % (ref 0–8)
ERYTHROCYTE [DISTWIDTH] IN BLOOD BY AUTOMATED COUNT: 12.6 % (ref 11.5–14.5)
EST. GFR  (NO RACE VARIABLE): 48.6 ML/MIN/1.73 M^2
GLUCOSE SERPL-MCNC: 128 MG/DL (ref 70–110)
HCT VFR BLD AUTO: 33.9 % (ref 37–48.5)
HGB BLD-MCNC: 11.4 G/DL (ref 12–16)
IMM GRANULOCYTES # BLD AUTO: 0.01 K/UL (ref 0–0.04)
IMM GRANULOCYTES NFR BLD AUTO: 0.1 % (ref 0–0.5)
LYMPHOCYTES # BLD AUTO: 0.5 K/UL (ref 1–4.8)
LYMPHOCYTES NFR BLD: 6.9 % (ref 18–48)
MAGNESIUM SERPL-MCNC: 2.1 MG/DL (ref 1.6–2.6)
MCH RBC QN AUTO: 32.8 PG (ref 27–31)
MCHC RBC AUTO-ENTMCNC: 33.6 G/DL (ref 32–36)
MCV RBC AUTO: 97 FL (ref 82–98)
MONOCYTES # BLD AUTO: 1 K/UL (ref 0.3–1)
MONOCYTES NFR BLD: 13.3 % (ref 4–15)
NEUTROPHILS # BLD AUTO: 5.6 K/UL (ref 1.8–7.7)
NEUTROPHILS NFR BLD: 75.5 % (ref 38–73)
NRBC BLD-RTO: 0 /100 WBC
PHOSPHATE SERPL-MCNC: 3.1 MG/DL (ref 2.7–4.5)
PLATELET # BLD AUTO: 148 K/UL (ref 150–450)
PMV BLD AUTO: 9.8 FL (ref 9.2–12.9)
POTASSIUM SERPL-SCNC: 4.3 MMOL/L (ref 3.5–5.1)
RBC # BLD AUTO: 3.48 M/UL (ref 4–5.4)
SODIUM SERPL-SCNC: 138 MMOL/L (ref 136–145)
WBC # BLD AUTO: 7.42 K/UL (ref 3.9–12.7)

## 2023-05-24 PROCEDURE — 25000003 PHARM REV CODE 250: Mod: NTX | Performed by: STUDENT IN AN ORGANIZED HEALTH CARE EDUCATION/TRAINING PROGRAM

## 2023-05-24 PROCEDURE — 83735 ASSAY OF MAGNESIUM: CPT | Mod: NTX

## 2023-05-24 PROCEDURE — 94761 N-INVAS EAR/PLS OXIMETRY MLT: CPT | Mod: NTX

## 2023-05-24 PROCEDURE — 99233 SBSQ HOSP IP/OBS HIGH 50: CPT | Mod: NTX,,, | Performed by: INTERNAL MEDICINE

## 2023-05-24 PROCEDURE — 25000003 PHARM REV CODE 250: Mod: NTX

## 2023-05-24 PROCEDURE — 99900035 HC TECH TIME PER 15 MIN (STAT): Mod: NTX

## 2023-05-24 PROCEDURE — 25000242 PHARM REV CODE 250 ALT 637 W/ HCPCS: Mod: NTX

## 2023-05-24 PROCEDURE — 80048 BASIC METABOLIC PNL TOTAL CA: CPT | Mod: NTX

## 2023-05-24 PROCEDURE — 25000003 PHARM REV CODE 250: Mod: NTX | Performed by: INTERNAL MEDICINE

## 2023-05-24 PROCEDURE — 27000221 HC OXYGEN, UP TO 24 HOURS: Mod: NTX

## 2023-05-24 PROCEDURE — 94640 AIRWAY INHALATION TREATMENT: CPT | Mod: NTX

## 2023-05-24 PROCEDURE — 85025 COMPLETE CBC W/AUTO DIFF WBC: CPT | Mod: NTX

## 2023-05-24 PROCEDURE — 20600001 HC STEP DOWN PRIVATE ROOM: Mod: NTX

## 2023-05-24 PROCEDURE — 97116 GAIT TRAINING THERAPY: CPT | Mod: NTX

## 2023-05-24 PROCEDURE — 99233 PR SUBSEQUENT HOSPITAL CARE,LEVL III: ICD-10-PCS | Mod: NTX,,, | Performed by: INTERNAL MEDICINE

## 2023-05-24 PROCEDURE — 84100 ASSAY OF PHOSPHORUS: CPT | Mod: NTX

## 2023-05-24 PROCEDURE — 97530 THERAPEUTIC ACTIVITIES: CPT | Mod: NTX

## 2023-05-24 RX ADMIN — Medication 1 CAPSULE: at 08:05

## 2023-05-24 RX ADMIN — ALBUTEROL SULFATE 2.5 MG: 2.5 SOLUTION RESPIRATORY (INHALATION) at 11:05

## 2023-05-24 RX ADMIN — LEVOTHYROXINE SODIUM 125 MCG: 25 TABLET ORAL at 05:05

## 2023-05-24 RX ADMIN — ALBUTEROL SULFATE 2.5 MG: 2.5 SOLUTION RESPIRATORY (INHALATION) at 03:05

## 2023-05-24 RX ADMIN — LIDOCAINE 1 PATCH: 50 PATCH CUTANEOUS at 08:05

## 2023-05-24 RX ADMIN — DIVALPROEX SODIUM 125 MG: 125 CAPSULE, COATED PELLETS ORAL at 09:05

## 2023-05-24 RX ADMIN — ALBUTEROL SULFATE 2.5 MG: 2.5 SOLUTION RESPIRATORY (INHALATION) at 07:05

## 2023-05-24 RX ADMIN — PRAVASTATIN SODIUM 80 MG: 40 TABLET ORAL at 08:05

## 2023-05-24 RX ADMIN — THERA TABS 1 TABLET: TAB at 08:05

## 2023-05-24 RX ADMIN — MEMANTINE HYDROCHLORIDE 5 MG: 5 TABLET ORAL at 09:05

## 2023-05-24 RX ADMIN — POLYETHYLENE GLYCOL 3350 17 G: 17 POWDER, FOR SOLUTION ORAL at 08:05

## 2023-05-24 RX ADMIN — CHOLECALCIFEROL TAB 25 MCG (1000 UNIT) 2000 UNITS: 25 TAB at 08:05

## 2023-05-24 RX ADMIN — CARVEDILOL 12.5 MG: 12.5 TABLET, FILM COATED ORAL at 09:05

## 2023-05-24 RX ADMIN — CARVEDILOL 12.5 MG: 12.5 TABLET, FILM COATED ORAL at 08:05

## 2023-05-24 RX ADMIN — APIXABAN 2.5 MG: 2.5 TABLET, FILM COATED ORAL at 08:05

## 2023-05-24 RX ADMIN — Medication 3 MG: at 09:05

## 2023-05-24 RX ADMIN — APIXABAN 2.5 MG: 2.5 TABLET, FILM COATED ORAL at 09:05

## 2023-05-24 RX ADMIN — MEMANTINE HYDROCHLORIDE 5 MG: 5 TABLET ORAL at 08:05

## 2023-05-24 RX ADMIN — ALBUTEROL SULFATE 2.5 MG: 2.5 SOLUTION RESPIRATORY (INHALATION) at 08:05

## 2023-05-24 RX ADMIN — ASPIRIN 81 MG 81 MG: 81 TABLET ORAL at 09:05

## 2023-05-24 NOTE — PLAN OF CARE
[facility] - Cardiac Medical ICU  Discharge Reassessment    Primary Care Provider: Abdiel Robles MD    Expected Discharge Date: 5/26/2023    Reassessment (most recent)       Discharge Reassessment - 05/24/23 1613          Discharge Reassessment    Assessment Type Discharge Planning Reassessment     Did the patient's condition or plan change since previous assessment? Yes     Discharge Plan discussed with: Sibling     Name(s) and Number(s) Kristina Mckinney, brother/cp# [phone]     Communicated AIDEE with patient/caregiver Yes     Discharge Plan A Independent living facility   Memory Care Unit    Discharge Plan B Independent living facility   [facility]    DME Needed Upon Discharge  none     Transition of Care Barriers None     Why the patient remains in the hospital Requires continued medical care        Post-Acute Status    Discharge Delays None known at this time                   Patient not stable for discharge at this time.  Discharge plan is for patient to return back to the [facility] assisted living in the Memory Care Unit.  SW will continue to follow patient's progress to discharge from MICU.  SW remains available for any family concerns or needs.    Silvino Burns LMSW  [facility]  [phone]

## 2023-05-24 NOTE — PLAN OF CARE
Problem: Physical Therapy  Goal: Physical Therapy Goal  Description: Goals to be met by: 23    Patient will increase functional independence with mobility by performin. Supine to sit with Stand-by Assistance  2. Sit to supine with Stand-by Assistance  3. Sit to stand transfer with Stand-by Assistance and RW  4. Bed to chair transfer with Stand-by Assistance using Rolling Walker  5. Gait  x 15 feet with Stand-by Assistance using Rolling Walker.   6. Sitting at edge of bed x5 minutes with Stand-by Assistance Met 23    Outcome: Ongoing, Progressing     Treatment completed. 1 goal met. Goals appropriate.

## 2023-05-24 NOTE — ASSESSMENT & PLAN NOTE
Patient with Hypoxic Respiratory failure which is Acute on chronic.  she is on home oxygen at 2.5 LPM. Supplemental oxygen was provided and noted-      .   Signs/symptoms of respiratory failure include- increased work of breathing. Contributing diagnoses includes - CHF Labs and images were reviewed. Patient Has recent ABG, which has been reviewed. Will treat underlying causes and adjust management of respiratory failure as follows- Heart failure

## 2023-05-24 NOTE — PROVIDER TRANSFER
Transfer Note    Brief History: Lynn Mckinney is a 87 y.o. female with essential hypertension, paroxysmal atrial fibrillation on chronic anticoagulation with rivaroxaban, CAD s/p CABG & PCI, complete heart block with recent upgrade to CRT-D, CKD stage 3, hypothyroidism, and interstitial lung disease on home supplemental oxygen therapy at 2.5 L/min initially admitted to the Hospital Medicine service for heart failure exacerbation.  Patient was doing well initially with diuresis but had increased oxygen requirements with a plain chest radiograph revealing acute worsening of pulmonary edema.  The patient was stepped up to the MICU on Airvo and has been weaned off and stable the past couple days after additional diuresis.  The patient is currently on nasal cannula at 4 L/min.    Of note, Electrophysiology was consulted because she was scheduled for outpatient clinic follow-up for her CRT-D check.    PT/OT has been following along and no additional recommendations made.  She does stay at a memory care facility and will return there upon discharge.    Pending Studies:  None    Barriers to Discharge:  None        Emilia Blankenship M.D.  Rapid Response/Critical Care  Department of Pulmonary Medicine  [facility]  Spectralink: [phone]        N.B.: Portions of this note was dictated using M*Modal Fluency Direct--there may be voice recognition errors occasionally missed on review.

## 2023-05-24 NOTE — ASSESSMENT & PLAN NOTE
· Echocardiogram 5/3   Severe left atrial enlargement.   The left ventricle is normal in size with eccentric hypertrophy and mildly decreased systolic function.   The estimated ejection fraction is 40%. There are segmental wall motion abnormalities at the apex.   Grade II left ventricular diastolic dysfunction.   Normal right ventricular size with normal right ventricular systolic function.   Mild aortic regurgitation.   The estimated PA systolic pressure is 27 mmHg.   Normal central venous pressure (3 mmHg).  · Good diuresis with furosemide--will continue  · Close monitoring of urine output  · Wean oxygen as tolerated   Complex Repair And Flap Additional Text (Will Appearing After The Standard Complex Repair Text): The complex repair was not sufficient to completely close the primary defect. The remaining additional defect was repaired with the flap mentioned below.

## 2023-05-24 NOTE — PT/OT/SLP PROGRESS
Physical Therapy Treatment    Patient Name:  Lynn Mckinney   MRN:  [id number]  Admit Date: 2023  Admitting Diagnosis:  Acute on chronic respiratory failure with hypoxia   Length of Stay: 5 days  Recent Surgery: * No surgery found *      Recommendations:     Discharge Recommendations:  other (see comments) (return to Memory Care Assisted Living Facility)   Discharge Equipment Recommendations: none   Barriers to discharge:  Decreased tolerance to activity and Decreased mobility    Plan:     During this hospitalization, patient to be seen 4 x/week to address the listed problems via gait training, therapeutic activities, therapeutic exercises, neuromuscular re-education  Plan of Care Expires:  23  Plan of Care Reviewed with: patient, family    Assessment:     Lynn Mckinney is a 87 y.o. female admitted with a medical diagnosis of Acute on chronic respiratory failure with hypoxia. Patient found fatigued, pleasantly confused, and cooperative. VSS throughout. Patient limited by fatigue. Patient progressed in therapy by ambulating 25 ft with a RW and CGA. Patient demonstrates good functional mobility and safety awareness with the RW; however, she reports fatigue upon exertion with ambulation. Plan to continue gait activities to improve mobility and activity tolerance.    Problem List: weakness, impaired endurance, impaired self care skills, gait instability, impaired functional mobility, impaired balance, impaired cognition, decreased safety awareness, impaired cardiopulmonary response to activity.  Rehab Prognosis: Good     GOALS:   Multidisciplinary Problems       Physical Therapy Goals          Problem: Physical Therapy    Goal Priority Disciplines Outcome Goal Variances Interventions   Physical Therapy Goal     PT, PT/OT Ongoing, Progressing     Description: Goals to be met by: 23    Patient will increase functional independence with mobility by performin. Supine to sit with Stand-by Assistance  2.  "Sit to supine with Stand-by Assistance  3. Sit to stand transfer with Stand-by Assistance and RW  4. Bed to chair transfer with Stand-by Assistance using Rolling Walker  5. Gait  x 15 feet with Stand-by Assistance using Rolling Walker.   6. Sitting at edge of bed x5 minutes with Stand-by Assistance Met 5/24/23                         Subjective   Communicated with RN prior to session.  Patient found supine upon PT entry to room, agreeable to evaluation. Lynn Mckinney's nephew/godson present during session.    Chief Complaint: Weakness/Deconditioning  Patient/Family Comments/goals: To improve mobility and return to Memory Care [facility]  Pain/Comfort:  Pain Rating 1: 0/10  Pain Rating Post-Intervention 1: 0/10    Objective:   Patient found with: peripheral IV, PureWick, oxygen, telemetry, blood pressure cuff, pulse ox (continuous)   General Precautions: Standard, Cardiac fall   Orthopedic Precautions:    Braces:     Oxygen Device: Nasal Cannula 4L  Vitals: /60   Pulse 60   Temp 98.1 °F (36.7 °C) (Axillary)   Resp (!) 25   Ht 5' 4" (1.626 m)   Wt 60.8 kg (134 lb)   SpO2 99%   BMI 23.00 kg/m²     Outcome Measures:  AM-PAC 6 CLICK MOBILITY  Turning over in bed (including adjusting bedclothes, sheets and blankets)?: 3  Sitting down on and standing up from a chair with arms (e.g., wheelchair, bedside commode, etc.): 3  Moving from lying on back to sitting on the side of the bed?: 3  Moving to and from a bed to a chair (including a wheelchair)?: 3  Need to walk in hospital room?: 3  Climbing 3-5 steps with a railing?: 3  Basic Mobility Total Score: 18     Functional Mobility:  Additional staff present: Supervising PT  Bed Mobility:   Rolling/Turning to Left: minimum assistance  Rolling/Turning to Right: minimum assistance  Supine to Sit: minimum assistance; HOB elevated and to right side of bed  Scooting anteriorly to EOB to have both feet planted on floor: stand by assistance    Sitting Balance at Edge of " Bed:  Assistance Level Required: Stand-by Assistance  Time: 6 minutes  Postural deviations noted: slouched posture, rounded shoulders, forward head, increased kyphosis, posterior pelvic tilt, and increased cervical flexion  Comments:  PT provided verbal and tactile cueing to facilitate trunk extension, scapular retraction, and cervical extension; however, patient demonstrates that it's difficult for her to maintain her position with shoulders back and gaze forward  Patient demonstrates fatigue and dizziness upon rising from supine>sit; however, she experiences no LOB and VS remain stable    Transfers:   Sit <> Stand Transfer (from chair): contact guard assistance with rolling walker   Patient demonstrates no LOB upon rising from sit>stand and requires minimal cueing from PT for walker management and safety awareness  PT provides mild facilitation for anterior weight shifting and hip extension during transition from sit>stand  Sit <> Stand Transfer (from EOB): contact guard assistance with rolling walker   Patient demonstrates no LOB upon rising from sit>stand and requires minimal cueing from PT for walker management and safety awareness  PT provides less facilitation for anterior weight shifting and hip extension during transition from sit>stand  Gait:  Patient ambulated: 10 ft (to other side of room) + 15 (back to chair)   Patient required: contact guard  Patient used: rolling walker  Gait Pattern observed: reciprocal gait  Gait Deviation(s): occasional unsteady gait, decreased step length, flexed posture, and decreased sarahy  Impairments due to: impaired balance, decreased strength, and decreased endurance  Comments:  Patient demonstrates good functional mobility and requires verbal cueing to maintain attention to task only  Patient reports fatigue upon exertion with ambulation; however, VSS and patient able to continue and complete gait activities   Patient demonstrates good management of RW and good safety  awareness while navigating confined spaces in room    Therapeutic Activities, Exercises, and Education:   Patient Education regarding PT role and purpose  Patient Education about importance of mobility    Patient left up in chair with all lines intact, call button in reach, and nephew present..    Time Tracking:     PT Received On: 05/24/23  PT Start Time: 0927     PT Stop Time: 0950  PT Total Time (min): 23 min       Billable Minutes:   Gait Training 8 and Therapeutic Activity 15    Treatment Type: Treatment  PT/PTA: PT

## 2023-05-24 NOTE — PROGRESS NOTES
[facility]  Critical Care Medicine  Progress Note    Patient Name: Lynn Mckinney  MRN: [id number]  Admission Date: 5/19/2023  Hospital Length of Stay: 5 days  Code Status: DNR  Attending Provider: Emilia Blankenship MD  Primary Care Provider: Abdiel Robles MD   Principal Problem: Acute on chronic respiratory failure with hypoxia    Subjective:     HPI:  Lynn Mckinney is a 87 y.o. female with pAF on eliquis, CAD (s/p CABG 2001), chronic respiratory failure on 3L NC, HTN, PVD, CRT-D upgrade from PPM who was admitted to hospital medicine on 5/19/23 for acute on chronic respiratory failure presumed from heart failure exacerbation. She was aggressively diuresed until she had a bump in sCr. Prior to discharge primary team requested 6 minute walk test. With initiation of standing, patient became acutely distressed and developed tachypnea and increased WOB. Albuterol treatment ordered with minimal improvement. Patient became increasing more agitated with increasing oxygen requirements and rapid response was called. Critical care medicine evaluated the patient and transferred to the MICU for increasing oxygen requirements.       Hospital/ICU Course:  5/22:  Improving oxygen requirement, currently on Airvo 15L60%.  Continuing diuresis  5/23:  Weaned down to nasal cannula at 5 L/min overnight  5/24:  Patient continues to improve, currently on nasal cannula at 4 L/min.  Working with PT/OT.      Interval History/Significant Events:  The one this morning, on nasal cannula at 4 L/min.  Out of bed in chair today.  Discussed with niece and nephew at the bedside.    Review of Systems   Respiratory:  Negative for shortness of breath.    Cardiovascular:  Negative for chest pain.   All other systems reviewed and are negative.  Objective:     Vital Signs (Most Recent):  Temp: 97 °F (36.1 °C) (05/24/23 0301)  Pulse: 63 (05/24/23 0730)  Resp: 20 (05/24/23 0730)  BP: (!) 143/62 (05/24/23 0400)  SpO2: 100 % (05/24/23 0730)  Vital Signs (24h Range):  Temp:  [97 °F (36.1 °C)-98.3 °F (36.8 °C)] 97 °F (36.1 °C)  Pulse:  [57-68] 63  Resp:  [12-42] 20  SpO2:  [89 %-100 %] 100 %  BP: (100-172)/(50-98) 143/62   Weight: 60.8 kg (134 lb)  Body mass index is 23 kg/m².      Intake/Output Summary (Last 24 hours) at 5/24/2023 0758  Last data filed at 5/23/2023 1800  Gross per 24 hour   Intake 400 ml   Output 450 ml   Net -50 ml            Physical Exam  Vitals and nursing note reviewed.   Constitutional:       General: She is not in acute distress.     Appearance: Normal appearance. She is normal weight. She is not ill-appearing, toxic-appearing or diaphoretic.   HENT:      Head: Normocephalic and atraumatic.      Right Ear: External ear normal.      Left Ear: External ear normal.      Nose: Nose normal.   Cardiovascular:      Rate and Rhythm: Normal rate and regular rhythm.      Pulses: Normal pulses.      Heart sounds: Normal heart sounds. No murmur heard.    No friction rub. No gallop.      Comments: No JVD and no edema today  Pulmonary:      Comments: Improving respiratory effort, mild bilateral crackles, no wheezing  Abdominal:      General: Abdomen is flat. Bowel sounds are normal. There is no distension.      Palpations: Abdomen is soft.      Tenderness: There is no abdominal tenderness. There is no guarding or rebound.   Musculoskeletal:         General: No swelling. Normal range of motion.   Skin:     General: Skin is warm and dry.      Coloration: Skin is not jaundiced.   Neurological:      General: No focal deficit present.      Mental Status: She is alert and oriented to person, place, and time. Mental status is at baseline.   Psychiatric:         Mood and Affect: Mood normal.         Behavior: Behavior normal.         Thought Content: Thought content normal.         Judgment: Judgment normal.          Vents:  Oxygen Concentration (%): 44 (05/22/23 1834)  Lines/Drains/Airways       Drain  Duration             Female External Urinary  Catheter 05/21/23 1602 2 days              Peripheral Intravenous Line  Duration                  Peripheral IV - Single Lumen 05/21/23 1553 20 G Right Antecubital 2 days         Peripheral IV - Single Lumen 05/22/23 0240 18 G Anterior;Right Forearm 2 days                  Significant Labs:    CBC/Anemia Profile:  Recent Labs   Lab 05/23/23  0524 05/24/23  0435   WBC 8.19 7.42   HGB 10.8* 11.4*   HCT 33.2* 33.9*   * 148*   MCV 98 97   RDW 12.8 12.6          Chemistries:  Recent Labs   Lab 05/23/23  0524 05/24/23  0435    138   K 3.6 4.3   CL 97 95   CO2 33* 30*   BUN 36* 34*   CREATININE 1.2 1.1   CALCIUM 8.7 8.5*   MG 2.2 2.1   PHOS 3.8 3.1         All pertinent labs within the past 24 hours have been reviewed.    Significant Imaging:  I have reviewed all pertinent imaging results/findings within the past 24 hours.    ABG  Recent Labs   Lab 05/21/23  1434   PH 7.395   PO2 22*   PCO2 60.4*   HCO3 36.9*   BE 12     Assessment/Plan:     Pulmonary  * Acute on chronic respiratory failure with hypoxia  Patient with Hypoxic Respiratory failure which is Acute on chronic.  she is on home oxygen at 2.5 LPM. Supplemental oxygen was provided and noted-      .   Signs/symptoms of respiratory failure include- increased work of breathing. Contributing diagnoses includes - CHF Labs and images were reviewed. Patient Has recent ABG, which has been reviewed. Will treat underlying causes and adjust management of respiratory failure as follows- Heart failure     ILD (interstitial lung disease)  Recently diagnosed with LD--CT-chest starting for nonspecific interstitial pneumonia  PFTs pending    Cardiac/Vascular  PAF (paroxysmal atrial fibrillation)  Stable with controlled ventricular response  Continue apixaban    Acute on chronic combined systolic and diastolic heart failure  Echocardiogram 5/3  Severe left atrial enlargement.  The left ventricle is normal in size with eccentric hypertrophy and mildly decreased systolic  function.  The estimated ejection fraction is 40%. There are segmental wall motion abnormalities at the apex.  Grade II left ventricular diastolic dysfunction.  Normal right ventricular size with normal right ventricular systolic function.  Mild aortic regurgitation.  The estimated PA systolic pressure is 27 mmHg.  Normal central venous pressure (3 mmHg).  Good diuresis with furosemide--will continue  Close monitoring of urine output  Wean oxygen as tolerated    History of ventricular fibrillation  BiVICD placed 5/9 and interrogated 5/22    CHB (complete heart block)  BiVICD placed 5/9, scheduled for clinic check 5/22  Consulted Electrophysiology--appreciate input  Device interrogated 5/22    Coronary artery disease involving native coronary artery of native heart without angina pectoris  s/p CABG 2001 and PCI 2021  Continue home regimen of aspirin and rosuvastatin  Added carvedilol    Pure hypercholesterolemia  Continue home regimen of rosuvastatin    Primary hypertension  Continue home regimen of amlodipine and irbesartan  Added carvedilol  Diuresis with furosemide    Renal/  Stage 3a chronic kidney disease  Baseline creatinine around 1.0-1.2 mg/dL  Avoid nephrotoxins  Continue to monitor    Endocrine  Hypothyroidism  Continue home regimen of levothyroxine    Other  Dementia with anxiety, unspecified dementia severity, unspecified dementia type  Continue home regimen of memantine            Emilia Blankenship M.D.  Rapid Response/Critical Care  Department of Pulmonary Medicine  [facility]        N.B.: Portions of this note was dictated using M*Modal Fluency Direct--there may be voice recognition errors occasionally missed on review.

## 2023-05-24 NOTE — SUBJECTIVE & OBJECTIVE
Interval History/Significant Events:  The one this morning, on nasal cannula at 4 L/min.  Out of bed in chair today.  Discussed with niece and nephew at the bedside.    Review of Systems   Respiratory:  Negative for shortness of breath.    Cardiovascular:  Negative for chest pain.   All other systems reviewed and are negative.  Objective:     Vital Signs (Most Recent):  Temp: 97 °F (36.1 °C) (05/24/23 0301)  Pulse: 63 (05/24/23 0730)  Resp: 20 (05/24/23 0730)  BP: (!) 143/62 (05/24/23 0400)  SpO2: 100 % (05/24/23 0730) Vital Signs (24h Range):  Temp:  [97 °F (36.1 °C)-98.3 °F (36.8 °C)] 97 °F (36.1 °C)  Pulse:  [57-68] 63  Resp:  [12-42] 20  SpO2:  [89 %-100 %] 100 %  BP: (100-172)/(50-98) 143/62   Weight: 60.8 kg (134 lb)  Body mass index is 23 kg/m².      Intake/Output Summary (Last 24 hours) at 5/24/2023 0758  Last data filed at 5/23/2023 1800  Gross per 24 hour   Intake 400 ml   Output 450 ml   Net -50 ml            Physical Exam  Vitals and nursing note reviewed.   Constitutional:       General: She is not in acute distress.     Appearance: Normal appearance. She is normal weight. She is not ill-appearing, toxic-appearing or diaphoretic.   HENT:      Head: Normocephalic and atraumatic.      Right Ear: External ear normal.      Left Ear: External ear normal.      Nose: Nose normal.   Cardiovascular:      Rate and Rhythm: Normal rate and regular rhythm.      Pulses: Normal pulses.      Heart sounds: Normal heart sounds. No murmur heard.    No friction rub. No gallop.      Comments: No JVD and no edema today  Pulmonary:      Comments: Improving respiratory effort, mild bilateral crackles, no wheezing  Abdominal:      General: Abdomen is flat. Bowel sounds are normal. There is no distension.      Palpations: Abdomen is soft.      Tenderness: There is no abdominal tenderness. There is no guarding or rebound.   Musculoskeletal:         General: No swelling. Normal range of motion.   Skin:     General: Skin is warm  and dry.      Coloration: Skin is not jaundiced.   Neurological:      General: No focal deficit present.      Mental Status: She is alert and oriented to person, place, and time. Mental status is at baseline.   Psychiatric:         Mood and Affect: Mood normal.         Behavior: Behavior normal.         Thought Content: Thought content normal.         Judgment: Judgment normal.          Vents:  Oxygen Concentration (%): 44 (05/22/23 1834)  Lines/Drains/Airways       Drain  Duration             Female External Urinary Catheter 05/21/23 1602 2 days              Peripheral Intravenous Line  Duration                  Peripheral IV - Single Lumen 05/21/23 1553 20 G Right Antecubital 2 days         Peripheral IV - Single Lumen 05/22/23 0240 18 G Anterior;Right Forearm 2 days                  Significant Labs:    CBC/Anemia Profile:  Recent Labs   Lab 05/23/23  0524 05/24/23  0435   WBC 8.19 7.42   HGB 10.8* 11.4*   HCT 33.2* 33.9*   * 148*   MCV 98 97   RDW 12.8 12.6          Chemistries:  Recent Labs   Lab 05/23/23 0524 05/24/23  0435    138   K 3.6 4.3   CL 97 95   CO2 33* 30*   BUN 36* 34*   CREATININE 1.2 1.1   CALCIUM 8.7 8.5*   MG 2.2 2.1   PHOS 3.8 3.1         All pertinent labs within the past 24 hours have been reviewed.    Significant Imaging:  I have reviewed all pertinent imaging results/findings within the past 24 hours.

## 2023-05-25 VITALS
HEART RATE: 60 BPM | OXYGEN SATURATION: 90 % | BODY MASS INDEX: 22.43 KG/M2 | HEIGHT: 64 IN | DIASTOLIC BLOOD PRESSURE: 60 MMHG | WEIGHT: 131.38 LBS | RESPIRATION RATE: 17 BRPM | SYSTOLIC BLOOD PRESSURE: 120 MMHG | TEMPERATURE: 97 F

## 2023-05-25 LAB
ANION GAP SERPL CALC-SCNC: 11 MMOL/L (ref 8–16)
BASOPHILS # BLD AUTO: 0.02 K/UL (ref 0–0.2)
BASOPHILS NFR BLD: 0.3 % (ref 0–1.9)
BUN SERPL-MCNC: 33 MG/DL (ref 8–23)
CALCIUM SERPL-MCNC: 8.8 MG/DL (ref 8.7–10.5)
CHLORIDE SERPL-SCNC: 98 MMOL/L (ref 95–110)
CO2 SERPL-SCNC: 30 MMOL/L (ref 23–29)
CREAT SERPL-MCNC: 1.2 MG/DL (ref 0.5–1.4)
DIFFERENTIAL METHOD: ABNORMAL
EOSINOPHIL # BLD AUTO: 0.3 K/UL (ref 0–0.5)
EOSINOPHIL NFR BLD: 4.4 % (ref 0–8)
ERYTHROCYTE [DISTWIDTH] IN BLOOD BY AUTOMATED COUNT: 12.4 % (ref 11.5–14.5)
EST. GFR  (NO RACE VARIABLE): 43.8 ML/MIN/1.73 M^2
GLUCOSE SERPL-MCNC: 99 MG/DL (ref 70–110)
HCT VFR BLD AUTO: 34.3 % (ref 37–48.5)
HGB BLD-MCNC: 11.1 G/DL (ref 12–16)
IMM GRANULOCYTES # BLD AUTO: 0.02 K/UL (ref 0–0.04)
IMM GRANULOCYTES NFR BLD AUTO: 0.3 % (ref 0–0.5)
LYMPHOCYTES # BLD AUTO: 0.7 K/UL (ref 1–4.8)
LYMPHOCYTES NFR BLD: 10.4 % (ref 18–48)
MAGNESIUM SERPL-MCNC: 2.2 MG/DL (ref 1.6–2.6)
MCH RBC QN AUTO: 31.5 PG (ref 27–31)
MCHC RBC AUTO-ENTMCNC: 32.4 G/DL (ref 32–36)
MCV RBC AUTO: 97 FL (ref 82–98)
MONOCYTES # BLD AUTO: 0.9 K/UL (ref 0.3–1)
MONOCYTES NFR BLD: 12.2 % (ref 4–15)
NEUTROPHILS # BLD AUTO: 5.2 K/UL (ref 1.8–7.7)
NEUTROPHILS NFR BLD: 72.4 % (ref 38–73)
NRBC BLD-RTO: 0 /100 WBC
PHOSPHATE SERPL-MCNC: 3.4 MG/DL (ref 2.7–4.5)
PLATELET # BLD AUTO: 149 K/UL (ref 150–450)
PMV BLD AUTO: 9.3 FL (ref 9.2–12.9)
POTASSIUM SERPL-SCNC: 3.8 MMOL/L (ref 3.5–5.1)
RBC # BLD AUTO: 3.52 M/UL (ref 4–5.4)
SODIUM SERPL-SCNC: 139 MMOL/L (ref 136–145)
WBC # BLD AUTO: 7.12 K/UL (ref 3.9–12.7)

## 2023-05-25 PROCEDURE — 94640 AIRWAY INHALATION TREATMENT: CPT | Mod: NTX

## 2023-05-25 PROCEDURE — 25000242 PHARM REV CODE 250 ALT 637 W/ HCPCS: Mod: NTX

## 2023-05-25 PROCEDURE — 80048 BASIC METABOLIC PNL TOTAL CA: CPT | Mod: NTX

## 2023-05-25 PROCEDURE — 99900035 HC TECH TIME PER 15 MIN (STAT): Mod: NTX

## 2023-05-25 PROCEDURE — 27000221 HC OXYGEN, UP TO 24 HOURS: Mod: NTX

## 2023-05-25 PROCEDURE — 25000003 PHARM REV CODE 250: Mod: NTX

## 2023-05-25 PROCEDURE — 25000003 PHARM REV CODE 250: Mod: NTX | Performed by: INTERNAL MEDICINE

## 2023-05-25 PROCEDURE — 84100 ASSAY OF PHOSPHORUS: CPT | Mod: NTX

## 2023-05-25 PROCEDURE — 25000003 PHARM REV CODE 250: Mod: NTX | Performed by: HOSPITALIST

## 2023-05-25 PROCEDURE — 94761 N-INVAS EAR/PLS OXIMETRY MLT: CPT | Mod: NTX

## 2023-05-25 PROCEDURE — 85025 COMPLETE CBC W/AUTO DIFF WBC: CPT | Mod: NTX

## 2023-05-25 PROCEDURE — 83735 ASSAY OF MAGNESIUM: CPT | Mod: NTX

## 2023-05-25 PROCEDURE — 36415 COLL VENOUS BLD VENIPUNCTURE: CPT | Mod: NTX

## 2023-05-25 PROCEDURE — 99239 PR HOSPITAL DISCHARGE DAY,>30 MIN: ICD-10-PCS | Mod: NTX,,, | Performed by: HOSPITALIST

## 2023-05-25 PROCEDURE — 99239 HOSP IP/OBS DSCHRG MGMT >30: CPT | Mod: NTX,,, | Performed by: HOSPITALIST

## 2023-05-25 RX ORDER — FUROSEMIDE 40 MG/1
40 TABLET ORAL DAILY
Qty: 30 TABLET | Refills: 11 | Status: SHIPPED | OUTPATIENT
Start: 2023-05-25 | End: 2023-06-09

## 2023-05-25 RX ORDER — FUROSEMIDE 40 MG/1
40 TABLET ORAL ONCE
Status: COMPLETED | OUTPATIENT
Start: 2023-05-25 | End: 2023-05-25

## 2023-05-25 RX ORDER — METOPROLOL SUCCINATE 25 MG/1
12.5 TABLET, EXTENDED RELEASE ORAL DAILY
Qty: 15 TABLET | Refills: 11 | Status: SHIPPED | OUTPATIENT
Start: 2023-05-25 | End: 2024-05-24

## 2023-05-25 RX ADMIN — LIDOCAINE 1 PATCH: 50 PATCH CUTANEOUS at 09:05

## 2023-05-25 RX ADMIN — PRAVASTATIN SODIUM 80 MG: 40 TABLET ORAL at 09:05

## 2023-05-25 RX ADMIN — CHOLECALCIFEROL TAB 25 MCG (1000 UNIT) 2000 UNITS: 25 TAB at 09:05

## 2023-05-25 RX ADMIN — ALBUTEROL SULFATE 2.5 MG: 2.5 SOLUTION RESPIRATORY (INHALATION) at 07:05

## 2023-05-25 RX ADMIN — POLYETHYLENE GLYCOL 3350 17 G: 17 POWDER, FOR SOLUTION ORAL at 09:05

## 2023-05-25 RX ADMIN — Medication 1 CAPSULE: at 09:05

## 2023-05-25 RX ADMIN — APIXABAN 2.5 MG: 2.5 TABLET, FILM COATED ORAL at 09:05

## 2023-05-25 RX ADMIN — ALBUTEROL SULFATE 2.5 MG: 2.5 SOLUTION RESPIRATORY (INHALATION) at 03:05

## 2023-05-25 RX ADMIN — THERA TABS 1 TABLET: TAB at 09:05

## 2023-05-25 RX ADMIN — ALBUTEROL SULFATE 2.5 MG: 2.5 SOLUTION RESPIRATORY (INHALATION) at 12:05

## 2023-05-25 RX ADMIN — MEMANTINE HYDROCHLORIDE 5 MG: 5 TABLET ORAL at 09:05

## 2023-05-25 RX ADMIN — LEVOTHYROXINE SODIUM 125 MCG: 25 TABLET ORAL at 06:05

## 2023-05-25 RX ADMIN — FUROSEMIDE 40 MG: 40 TABLET ORAL at 02:05

## 2023-05-25 NOTE — PLAN OF CARE
05/25/23 1418   Final Note   Assessment Type Final Discharge Note   Anticipated Discharge Disposition [facility]   What phone number can be called within the next 1-3 days to see how you are doing after discharge? [phone]   Hospital Resources/Appts/Education Provided Provided patient/caregiver with written discharge plan information;Appointments scheduled and added to AVS;Appointments scheduled by Navigator/Coordinator   Post-Acute Status   Discharge Delays None known at this time     Patient discharging back to [facility] [place] [address] [place] [address] [phone] room 1208. She is going by wheelchair van with oxygen at 2 l/ NC. Niece at the bedside and aware of same . She stated patient has a concentrator in her room at the facility. Nurse at facility aware and received the orders . Asked if the nurse needs to call report and she stated she had the orders and history and physical. Nurse aware and can call report to [phone] ask for Jacinto Mercado RN    [phone]

## 2023-05-25 NOTE — PLAN OF CARE
NURSING HOME ORDERS    05/25/2023  [facility]  [facility] - CARDIOLOGY STEPDOWN  [address]  [facility] [address]  Dept: [phone]  Loc: [phone]     Admit to Memory Care Home:  Home or Self Care    Diagnoses:  Active Hospital Problems    Diagnosis  POA    *Acute on chronic respiratory failure with hypoxia [J96.21]  Yes    ILD (interstitial lung disease) [J84.9]  Yes    PAF (paroxysmal atrial fibrillation) [I48.0]  Yes    Dementia with anxiety, unspecified dementia severity, unspecified dementia type [F03.94]  Yes     Chronic     Chronic.  Control is uncertain.  Patient was seen by Neurology who started Namenda.  Patient recently moved here from [place].  Patient is staying at the [facility].  There has been no significant change while taking this medication.        Thrombocytopenia, unspecified [D69.6]  Yes     Chronic     Lab Results   Component Value Date    TRANSFERRIN 251 01/12/2023      Lab Results   Component Value Date    GVYOXMZF65 215 08/31/2022     No results found for: FOLATE  Lab Results   Component Value Date    WBC 5.08 01/19/2023    HGB 10.6 (L) 01/19/2023    HCT 32.3 (L) 01/19/2023    MCV 99 (H) 01/19/2023     (L) 01/19/2023             Acute on chronic combined systolic and diastolic heart failure [I50.43]  Yes    Aortic atherosclerosis [I70.0]  Yes     Chronic     Chronic.  Control is uncertain.  Patient follows with Cardiology.        History of ventricular fibrillation [Z86.79]  Not Applicable    Bilateral carotid artery stenosis [I65.23]  Yes     Left carotid stent placement in 2007 by Dr. Bowen after an episode of amaurosis fugax.  CREST trial participant.        CHB (complete heart block) [I44.2]  Yes    Stage 3a chronic kidney disease [N18.31]  Yes     Chronic    Coronary artery disease involving native coronary artery of native heart without angina pectoris [I25.10]  Yes     Chronic     CAD S/P CABG  2001                            "                               mid LAD       Hypothyroidism [E03.9]  Yes     Chronic     Chronic.  Uncontrolled.  Patient on levothyroxine 125 micrograms daily.  Lab Results   Component Value Date    TSH 96.744 (H) 08/31/2022    FREET4 0.76 08/31/2022           Primary hypertension [I10]  Yes     Chronic    Pure hypercholesterolemia [E78.00]  Yes     Chronic     reported side effects from atorvastatin.  pain in both hands, both shoulders and her left ankle 3/15 while on 80 mg of atorvastatin   Switched to rosuvastatin 20 in 3/15            Resolved Hospital Problems   No resolved problems to display.       Patient is homebound due to:  Acute on chronic respiratory failure with hypoxia    Allergies:  Review of patient's allergies indicates:   Allergen Reactions    Lipitor [atorvastatin] Itching    Fosamax [alendronate]      Felt "strange"    Iodinated contrast media      Dye is only to be used if absolutely needed because of kidney function    Prolia [denosumab]      Reaction after the Prolia    Sulfa (sulfonamide antibiotics)        Vitals:  Routine    Diet: cardiac diet and fluid restriction: 2 L    Activities:   Activity as tolerated    Goals of Care Treatment Preferences:  Code Status: DNR          Nursing Precautions:  Aspiration  and Fall          Medications: Discontinue all previous medication orders, if any. See new list below.     Medication List        START taking these medications      furosemide 40 MG tablet  Commonly known as: LASIX  Take 1 tablet (40 mg total) by mouth once daily.     metoprolol succinate 25 MG 24 hr tablet  Commonly known as: TOPROL-XL  Take 0.5 tablets (12.5 mg total) by mouth once daily.            CONTINUE taking these medications      acetaminophen 500 MG tablet  Commonly known as: TYLENOL  Take 1 tablet (500 mg total) by mouth every 6 (six) hours as needed for Pain.     amLODIPine 10 MG tablet  Commonly known as: NORVASC  Take 1 tablet (10 mg total) by mouth once daily.   "   apixaban 5 mg Tab  Commonly known as: ELIQUIS  Take 1 tablet (5 mg total) by mouth 2 (two) times daily.     aspirin 81 MG Chew  Take 81 mg by mouth every evening. 1 Tablet, Chewable Oral Every day     cetirizine 10 MG tablet  Commonly known as: ZYRTEC  Take 10 mg by mouth daily as needed for Allergies.     cholecalciferol (vitamin D3) 50 mcg (2,000 unit) Cap capsule  Commonly known as: VITAMIN D3  Take 2,000 Units by mouth once daily.     coenzyme Q10 200 mg capsule  Take 200 mg by mouth once daily.     divalproex 125 mg capsule  Commonly known as: DEPAKOTE SPRINKLE  Take 1 capsule (125 mg total) by mouth every evening.     irbesartan 300 MG tablet  Commonly known as: AVAPRO  TAKE 1 TABLET BY MOUTH EVERY DAY     levothyroxine 125 MCG tablet  Commonly known as: SYNTHROID  Take 1 tablet (125 mcg total) by mouth before breakfast.     melatonin 3 mg tablet  Commonly known as: MELATIN  Take 1 tablet (3 mg total) by mouth every evening.     memantine 5 MG Tab  Commonly known as: NAMENDA  TAKE 1 TABLET BY MOUTH TWICE DAILY     multivitamin per tablet  Commonly known as: THERAGRAN  Take 1 tablet by mouth once daily.     nitroGLYCERIN 0.4 MG SL tablet  Commonly known as: NITROSTAT  PLACE 1 TABLET UNDER THE TONGUE EVERY 5 MIN AS NEEDED FOR CHEST PAIN. MAX:3 DOSES     PRESERVISION AREDS 4,296 mcg-226 mg-90 mg Cap  Generic drug: vitamins A,C,E-zinc-copper  Take by mouth Daily.     rosuvastatin 10 MG tablet  Commonly known as: CRESTOR  Take 1 tablet (10 mg total) by mouth once daily.                Immunizations Administered as of 5/25/2023       Name Date Dose VIS Date Route Exp Date    COVID-19, MRNA, LN-S, PF (Pfizer) (Purple Cap) 11/4/2021 12:28 PM 0.3 mL 12/12/2020 Intramuscular 1/31/2022    Site: Left deltoid     Given By: Rey Albarado MA     : [employer]     Lot: [id number]     COVID-19, MRNA, LN-S, PF (Pfizer) (Purple Cap) 1/31/2021  8:04 AM 0.3 mL 12/12/2020 Intramuscular 5/31/2021    Site: Right deltoid      Given By: Susanna Moura RN     : [employer]     Lot: [id number]     COVID-19, MRNA, LN-S, PF (Pfizer) (Purple Cap) 1/10/2021  8:13 AM 0.3 mL 12/12/2020 Intramuscular 4/30/2021    Site: Right deltoid     Given By: Elizabeth Ball LPN     : [employer]     Lot: [id number]               _________________________________  Karolina Fernández MD  05/25/2023

## 2023-05-25 NOTE — DISCHARGE SUMMARY
"Jose A James - Cardiology [facility] Medicine  Discharge Summary      Patient Name: Lynn Mckinney  MRN: [id number]  AYSE: [id number]  Patient Class: IP- Inpatient  Admission Date: 5/19/2023  Hospital Length of Stay: 6 days  Discharge Date and Time:  05/25/2023 1:15 PM  Attending Physician: Karolina Fernández MD   Discharging Provider: Karolina Fernández MD  Primary Care Provider: Abdiel Robles MD  Hospital Medicine Team: [facility] Karolina Fernández MD  Primary Care Team: [facility]     HPI:   Lynn Mckinney is a 87 y.o. female with pAF on eliquis, CAD (s/p CABG 2001), HTN, PVD, CRT-D upgrade from PPM and recently diagnosed ILD/fibrosis presenting with increasing home O2 demands and SOB with patient satting 80% on 3L, the max of her home concentrator. Daughter reports she was admitted 5/2-5/5 for CHF exacerbation and pulmonary edema requiring IV diuretics. She was also diagnosed with pulmonary fibrosis during said admission with formal pulm follow up scheduled for next week. Family reports she has been noticing gradually worsening edema  following recent discharge. She was not on any PO lasix prior to or following her recent HF admission. She denies recent fever, chills, productive cough or change in her chronic nonproductive cough but family reports worsening "gurgling" over the last 3 days. She had a CRT upgrade 1.5wk ago, she denies any pain, redness or discharge from the pocket incision.     In the ED, VS afebrile HR 93, RR 26, /75, SpO2 79% on 3L. Up to 90% on 5L NC. Labs demonstrated WBC 7, Hgb 11.9, Plt 155, Na 137, K 4.1, BUN/Cr 11/1.0. BNP 1600, Trop 0.033. CXR with interstitial and alveolar opacities compatible with pulmonary edema/CHF. Given IV lasix 40 mg x1. Patient admitted to medicine for further management of acute hypoxic respiratory failure in the setting of ILD and CHF.       * No surgery found *      Hospital Course:   Admitted with acute on chronic hypoxic respiratory failure secondary to CHF. " Required transfer to ICU for respiratory distress. Improved with IV diuresis. Weaned to home oxygen. Has follow up scheduled with transitional heart failure clinic.        Goals of Care Treatment Preferences:  Code Status: DNR      Consults:   Consults (From admission, onward)        Status Ordering Provider     Inpatient consult to Critical Care Medicine  Once        Provider:  (Not yet assigned)    Completed JULIOCESAR ANGEL     Inpatient consult to Social Work/Case Management  Once        Provider:  (Not yet assigned)    Acknowledged JULIOCESAR ANGEL     Inpatient consult to Registered Dietitian/Nutritionist  Once        Provider:  (Not yet assigned)    Completed JULIOCESAR ANGEL          No new Assessment & Plan notes have been filed under this hospital service since the last note was generated.  Service: Hospital Medicine    Final Active Diagnoses:    Diagnosis Date Noted POA    PRINCIPAL PROBLEM:  Acute on chronic respiratory failure with hypoxia [J96.21] 05/02/2023 Yes    ILD (interstitial lung disease) [J84.9] 05/19/2023 Yes    PAF (paroxysmal atrial fibrillation) [I48.0] 05/02/2023 Yes    Dementia with anxiety, unspecified dementia severity, unspecified dementia type [F03.94] 04/25/2023 Yes     Chronic    Thrombocytopenia, unspecified [D69.6] 04/25/2023 Yes     Chronic    Acute on chronic combined systolic and diastolic heart failure [I50.43] 01/12/2023 Yes    Aortic atherosclerosis [I70.0] 12/09/2022 Yes     Chronic    History of ventricular fibrillation [Z86.79] 03/10/2021 Not Applicable    Bilateral carotid artery stenosis [I65.23]  Yes    CHB (complete heart block) [I44.2] 10/03/2013 Yes    Stage 3a chronic kidney disease [N18.31] 09/27/2013 Yes     Chronic    Coronary artery disease involving native coronary artery of native heart without angina pectoris [I25.10] 03/07/2013 Yes     Chronic    Hypothyroidism [E03.9] 03/07/2013 Yes     Chronic    Primary hypertension [I10] 08/13/2012 Yes      Chronic    Pure hypercholesterolemia [E78.00] 08/13/2012 Yes     Chronic      Problems Resolved During this Admission:       Discharged Condition: good    Disposition: Home or Self Care    Follow Up:   Follow-up Information     Abdiel Robles MD. Schedule an appointment as soon as possible for a visit.    Specialty: Family Medicine  Contact information:  [address] [address]  [phone]                       Patient Instructions:      Diet Cardiac     Notify your health care provider if you experience any of the following:  difficulty breathing or increased cough     Activity as tolerated       Significant Diagnostic Studies: Labs:   CMP   Recent Labs   Lab 05/24/23  0435 05/25/23  0647    139   K 4.3 3.8   CL 95 98   CO2 30* 30*   * 99   BUN 34* 33*   CREATININE 1.1 1.2   CALCIUM 8.5* 8.8   ANIONGAP 13 11    and CBC   Recent Labs   Lab 05/24/23 0435 05/25/23  0647   WBC 7.42 7.12   HGB 11.4* 11.1*   HCT 33.9* 34.3*   * 149*       Pending Diagnostic Studies:     None         Medications:  Reconciled Home Medications:      Medication List      START taking these medications    furosemide 40 MG tablet  Commonly known as: LASIX  Take 1 tablet (40 mg total) by mouth once daily.     metoprolol succinate 25 MG 24 hr tablet  Commonly known as: TOPROL-XL  Take 0.5 tablets (12.5 mg total) by mouth once daily.        CONTINUE taking these medications    acetaminophen 500 MG tablet  Commonly known as: TYLENOL  Take 1 tablet (500 mg total) by mouth every 6 (six) hours as needed for Pain.     amLODIPine 10 MG tablet  Commonly known as: NORVASC  Take 1 tablet (10 mg total) by mouth once daily.     apixaban 5 mg Tab  Commonly known as: ELIQUIS  Take 1 tablet (5 mg total) by mouth 2 (two) times daily.     aspirin 81 MG Chew  Take 81 mg by mouth every evening. 1 Tablet, Chewable Oral Every day     cetirizine 10 MG tablet  Commonly known as: ZYRTEC  Take 10 mg by mouth daily as needed for  Allergies.     cholecalciferol (vitamin D3) 50 mcg (2,000 unit) Cap capsule  Commonly known as: VITAMIN D3  Take 2,000 Units by mouth once daily.     coenzyme Q10 200 mg capsule  Take 200 mg by mouth once daily.     divalproex 125 mg capsule  Commonly known as: DEPAKOTE SPRINKLE  Take 1 capsule (125 mg total) by mouth every evening.     irbesartan 300 MG tablet  Commonly known as: AVAPRO  TAKE 1 TABLET BY MOUTH EVERY DAY     levothyroxine 125 MCG tablet  Commonly known as: SYNTHROID  Take 1 tablet (125 mcg total) by mouth before breakfast.     melatonin 3 mg tablet  Commonly known as: MELATIN  Take 1 tablet (3 mg total) by mouth every evening.     memantine 5 MG Tab  Commonly known as: NAMENDA  TAKE 1 TABLET BY MOUTH TWICE DAILY     multivitamin per tablet  Commonly known as: THERAGRAN  Take 1 tablet by mouth once daily.     nitroGLYCERIN 0.4 MG SL tablet  Commonly known as: NITROSTAT  PLACE 1 TABLET UNDER THE TONGUE EVERY 5 MIN AS NEEDED FOR CHEST PAIN. MAX:3 DOSES     PRESERVISION AREDS 4,296 mcg-226 mg-90 mg Cap  Generic drug: vitamins A,C,E-zinc-copper  Take by mouth Daily.     rosuvastatin 10 MG tablet  Commonly known as: CRESTOR  Take 1 tablet (10 mg total) by mouth once daily.            Indwelling Lines/Drains at time of discharge:   Lines/Drains/Airways     Drain  Duration           Female External Urinary Catheter 05/21/23 1602 3 days                Time spent on the discharge of patient: 35 minutes         Karolina Fernández MD  Department of Hospital Medicine  [facility] - Cardiology Stepdown

## 2023-05-25 NOTE — PLAN OF CARE
Had called and spoke with Vanessa (nurse at the facility asking all she needed and she stated the history and physicial and H&P and orders with the physician signature. Orders faxed with H&P and last 7 days of medications faxed attention Vanessa ([phone] fax number is ([phone]) . Awaiting to see if they received the orders so transportation can be arranged. Will continue to monitor for discharge needs. Niece at bedside and patient going per wheelchair van due to need for oxygen and niece stated the oxygen is at her home in [facility].     -----Original Message-----  From: Glarity E-Mail [place] <[email or web address] de [email or web address]>  Sent: Thursday, May 25, 2023 1:51 PM  To: Sandra Rothman <[email or web address]>  Subject: Your fax has been successfully sent to [phone] at [id number].    Your fax has been successfully sent to [phone] at [phone].  ------------------------------------------------------------  From: [id number]  ------------------------------------------------------------  5/25/2023 1:44:54 PM Transmission Record          Sent to [phone] with remote ID "          Result: (0/339;0/0) Success          Page record: 1 - 15          Elapsed time: 06:05 on channel 60    Pham Mercado RN    [phone]

## 2023-05-26 LAB
BACTERIA BLD CULT: NORMAL
BACTERIA BLD CULT: NORMAL

## 2023-05-27 LAB
ANTI-PM/SCL AB: <20 UNITS
ANTI-SS-A 52 KD AB, IGG: <20 UNITS
EJ AB SER QL: NEGATIVE
ENA JO1 AB SER IA-ACNC: <20 UNITS
ENA SM+RNP AB SER IA-ACNC: <20 UNITS
FIBRILLARIN (U3 RNP): NEGATIVE
KU AB SER QL: NEGATIVE
MDA-5 (P140): <20 UNITS
MI2 AB SER QL: NEGATIVE
NXP-2 (P140): <20 UNITS
OJ AB SER QL: NEGATIVE
PL12 AB SER QL: NEGATIVE
PL7 AB SER QL: NEGATIVE
SRP AB SERPL QL: NEGATIVE
TIF1 GAMMA (P155/140): <20 UNITS
U2 SNRNP: NEGATIVE

## 2023-05-29 ENCOUNTER — DOCUMENT SCAN (OUTPATIENT)
Dept: HOME HEALTH SERVICES | Facility: HOSPITAL | Age: 88
End: 2023-05-29
Payer: MEDICARE

## 2023-05-30 ENCOUNTER — PATIENT MESSAGE (OUTPATIENT)
Dept: FAMILY MEDICINE | Facility: CLINIC | Age: 88
End: 2023-05-30
Payer: MEDICARE

## 2023-05-30 ENCOUNTER — DOCUMENTATION ONLY (OUTPATIENT)
Dept: CARDIOLOGY | Facility: CLINIC | Age: 88
End: 2023-05-30

## 2023-05-30 ENCOUNTER — LAB VISIT (OUTPATIENT)
Dept: LAB | Facility: HOSPITAL | Age: 88
End: 2023-05-30
Payer: MEDICARE

## 2023-05-30 ENCOUNTER — PATIENT MESSAGE (OUTPATIENT)
Dept: NEUROLOGY | Facility: CLINIC | Age: 88
End: 2023-05-30
Payer: MEDICARE

## 2023-05-30 ENCOUNTER — TELEPHONE (OUTPATIENT)
Dept: CARDIOLOGY | Facility: CLINIC | Age: 88
End: 2023-05-30
Payer: MEDICARE

## 2023-05-30 ENCOUNTER — TELEPHONE (OUTPATIENT)
Dept: FAMILY MEDICINE | Facility: CLINIC | Age: 88
End: 2023-05-30
Payer: MEDICARE

## 2023-05-30 ENCOUNTER — OFFICE VISIT (OUTPATIENT)
Dept: CARDIOLOGY | Facility: CLINIC | Age: 88
End: 2023-05-30
Payer: MEDICARE

## 2023-05-30 VITALS
HEIGHT: 64 IN | HEART RATE: 68 BPM | WEIGHT: 130.38 LBS | DIASTOLIC BLOOD PRESSURE: 58 MMHG | BODY MASS INDEX: 22.26 KG/M2 | SYSTOLIC BLOOD PRESSURE: 122 MMHG | OXYGEN SATURATION: 96 %

## 2023-05-30 DIAGNOSIS — N18.31 STAGE 3A CHRONIC KIDNEY DISEASE: Chronic | ICD-10-CM

## 2023-05-30 DIAGNOSIS — I25.10 CORONARY ARTERY DISEASE INVOLVING NATIVE CORONARY ARTERY OF NATIVE HEART WITHOUT ANGINA PECTORIS: Chronic | ICD-10-CM

## 2023-05-30 DIAGNOSIS — Z95.810 ICD (IMPLANTABLE CARDIOVERTER-DEFIBRILLATOR), DUAL, IN SITU: ICD-10-CM

## 2023-05-30 DIAGNOSIS — I48.0 PAF (PAROXYSMAL ATRIAL FIBRILLATION): ICD-10-CM

## 2023-05-30 DIAGNOSIS — I50.42 CHRONIC COMBINED SYSTOLIC AND DIASTOLIC HEART FAILURE: Primary | ICD-10-CM

## 2023-05-30 DIAGNOSIS — I10 PRIMARY HYPERTENSION: Chronic | ICD-10-CM

## 2023-05-30 DIAGNOSIS — I70.0 AORTIC ATHEROSCLEROSIS: Chronic | ICD-10-CM

## 2023-05-30 DIAGNOSIS — Z95.1 S/P CABG X 3: ICD-10-CM

## 2023-05-30 DIAGNOSIS — F03.94 DEMENTIA WITH ANXIETY, UNSPECIFIED DEMENTIA SEVERITY, UNSPECIFIED DEMENTIA TYPE: Chronic | ICD-10-CM

## 2023-05-30 DIAGNOSIS — E78.00 PURE HYPERCHOLESTEROLEMIA: Chronic | ICD-10-CM

## 2023-05-30 DIAGNOSIS — R06.02 SOB (SHORTNESS OF BREATH): ICD-10-CM

## 2023-05-30 LAB
ALBUMIN SERPL BCP-MCNC: 3.3 G/DL (ref 3.5–5.2)
ALP SERPL-CCNC: 71 U/L (ref 55–135)
ALT SERPL W/O P-5'-P-CCNC: 15 U/L (ref 10–44)
ANION GAP SERPL CALC-SCNC: 9 MMOL/L (ref 8–16)
AST SERPL-CCNC: 21 U/L (ref 10–40)
BASOPHILS # BLD AUTO: 0.03 K/UL (ref 0–0.2)
BASOPHILS NFR BLD: 0.5 % (ref 0–1.9)
BILIRUB SERPL-MCNC: 0.8 MG/DL (ref 0.1–1)
BNP SERPL-MCNC: 410 PG/ML (ref 0–99)
BUN SERPL-MCNC: 41 MG/DL (ref 8–23)
CALCIUM SERPL-MCNC: 9 MG/DL (ref 8.7–10.5)
CHLORIDE SERPL-SCNC: 101 MMOL/L (ref 95–110)
CO2 SERPL-SCNC: 31 MMOL/L (ref 23–29)
CREAT SERPL-MCNC: 1.5 MG/DL (ref 0.5–1.4)
DIFFERENTIAL METHOD: ABNORMAL
EOSINOPHIL # BLD AUTO: 0.4 K/UL (ref 0–0.5)
EOSINOPHIL NFR BLD: 6 % (ref 0–8)
ERYTHROCYTE [DISTWIDTH] IN BLOOD BY AUTOMATED COUNT: 12.6 % (ref 11.5–14.5)
EST. GFR  (NO RACE VARIABLE): 33.5 ML/MIN/1.73 M^2
GLUCOSE SERPL-MCNC: 119 MG/DL (ref 70–110)
HCT VFR BLD AUTO: 32.3 % (ref 37–48.5)
HGB BLD-MCNC: 10.4 G/DL (ref 12–16)
IMM GRANULOCYTES # BLD AUTO: 0.04 K/UL (ref 0–0.04)
IMM GRANULOCYTES NFR BLD AUTO: 0.6 % (ref 0–0.5)
LYMPHOCYTES # BLD AUTO: 0.7 K/UL (ref 1–4.8)
LYMPHOCYTES NFR BLD: 10.4 % (ref 18–48)
MAGNESIUM SERPL-MCNC: 2.2 MG/DL (ref 1.6–2.6)
MCH RBC QN AUTO: 31.2 PG (ref 27–31)
MCHC RBC AUTO-ENTMCNC: 32.2 G/DL (ref 32–36)
MCV RBC AUTO: 97 FL (ref 82–98)
MONOCYTES # BLD AUTO: 0.6 K/UL (ref 0.3–1)
MONOCYTES NFR BLD: 9.4 % (ref 4–15)
NEUTROPHILS # BLD AUTO: 4.8 K/UL (ref 1.8–7.7)
NEUTROPHILS NFR BLD: 73.1 % (ref 38–73)
NRBC BLD-RTO: 0 /100 WBC
PHOSPHATE SERPL-MCNC: 4.1 MG/DL (ref 2.7–4.5)
PLATELET # BLD AUTO: 223 K/UL (ref 150–450)
PMV BLD AUTO: 8.9 FL (ref 9.2–12.9)
POTASSIUM SERPL-SCNC: 3.4 MMOL/L (ref 3.5–5.1)
PROT SERPL-MCNC: 6.7 G/DL (ref 6–8.4)
RBC # BLD AUTO: 3.33 M/UL (ref 4–5.4)
SODIUM SERPL-SCNC: 141 MMOL/L (ref 136–145)
WBC # BLD AUTO: 6.62 K/UL (ref 3.9–12.7)

## 2023-05-30 PROCEDURE — 1126F AMNT PAIN NOTED NONE PRSNT: CPT | Mod: CPTII,NTX,S$GLB,

## 2023-05-30 PROCEDURE — 83735 ASSAY OF MAGNESIUM: CPT | Mod: TXP

## 2023-05-30 PROCEDURE — 84100 ASSAY OF PHOSPHORUS: CPT | Mod: TXP

## 2023-05-30 PROCEDURE — 1111F DSCHRG MED/CURRENT MED MERGE: CPT | Mod: CPTII,NTX,S$GLB,

## 2023-05-30 PROCEDURE — 1100F PTFALLS ASSESS-DOCD GE2>/YR: CPT | Mod: CPTII,NTX,S$GLB,

## 2023-05-30 PROCEDURE — 3288F FALL RISK ASSESSMENT DOCD: CPT | Mod: CPTII,NTX,S$GLB,

## 2023-05-30 PROCEDURE — 1160F RVW MEDS BY RX/DR IN RCRD: CPT | Mod: CPTII,NTX,S$GLB,

## 2023-05-30 PROCEDURE — 85025 COMPLETE CBC W/AUTO DIFF WBC: CPT | Mod: NTX

## 2023-05-30 PROCEDURE — 1160F PR REVIEW ALL MEDS BY PRESCRIBER/CLIN PHARMACIST DOCUMENTED: ICD-10-PCS | Mod: CPTII,NTX,S$GLB,

## 2023-05-30 PROCEDURE — 80053 COMPREHEN METABOLIC PANEL: CPT | Mod: TXP

## 2023-05-30 PROCEDURE — 99999 PR PBB SHADOW E&M-EST. PATIENT-LVL V: ICD-10-PCS | Mod: PBBFAC,TXP,,

## 2023-05-30 PROCEDURE — 83880 ASSAY OF NATRIURETIC PEPTIDE: CPT | Mod: TXP

## 2023-05-30 PROCEDURE — 99214 PR OFFICE/OUTPT VISIT, EST, LEVL IV, 30-39 MIN: ICD-10-PCS | Mod: NTX,S$GLB,,

## 2023-05-30 PROCEDURE — 1159F MED LIST DOCD IN RCRD: CPT | Mod: CPTII,NTX,S$GLB,

## 2023-05-30 PROCEDURE — 99999 PR PBB SHADOW E&M-EST. PATIENT-LVL V: CPT | Mod: PBBFAC,TXP,,

## 2023-05-30 PROCEDURE — 1159F PR MEDICATION LIST DOCUMENTED IN MEDICAL RECORD: ICD-10-PCS | Mod: CPTII,NTX,S$GLB,

## 2023-05-30 PROCEDURE — 3288F PR FALLS RISK ASSESSMENT DOCUMENTED: ICD-10-PCS | Mod: CPTII,NTX,S$GLB,

## 2023-05-30 PROCEDURE — 1126F PR PAIN SEVERITY QUANTIFIED, NO PAIN PRESENT: ICD-10-PCS | Mod: CPTII,NTX,S$GLB,

## 2023-05-30 PROCEDURE — 99214 OFFICE O/P EST MOD 30 MIN: CPT | Mod: NTX,S$GLB,,

## 2023-05-30 PROCEDURE — 1111F PR DISCHARGE MEDS RECONCILED W/ CURRENT OUTPATIENT MED LIST: ICD-10-PCS | Mod: CPTII,NTX,S$GLB,

## 2023-05-30 PROCEDURE — 1100F PR PT FALLS ASSESS DOC 2+ FALLS/FALL W/INJURY/YR: ICD-10-PCS | Mod: CPTII,NTX,S$GLB,

## 2023-05-30 RX ORDER — QUETIAPINE FUMARATE 25 MG/1
25 TABLET, FILM COATED ORAL NIGHTLY PRN
Qty: 30 TABLET | Refills: 2 | Status: SHIPPED | OUTPATIENT
Start: 2023-05-30 | End: 2023-06-09 | Stop reason: SDUPTHER

## 2023-05-30 RX ORDER — SPIRONOLACTONE 25 MG/1
12.5 TABLET ORAL DAILY
Qty: 45 TABLET | Refills: 3 | Status: SHIPPED | OUTPATIENT
Start: 2023-05-30 | End: 2024-05-29

## 2023-05-30 NOTE — PROGRESS NOTES
"Met with Pt, her Niece and her Brother at first appointment with Ms. Ramachandran at the [facility]. Introduced myself to Ms. Arya Raines as she was napping when I visited in the Hospital room and spoke to her Brother and her Niece, Susanna. Re Educated on the need for Daily Dry Weights, Orders to be sent to [facility] Fax: [phone]. Telephone # for [facility] is [phone].  Re Educated on the salt content of any extra food since there is no way that we can control what [facility] prepares as meals. Pt uses Nu Salt which has no Sodium. I remind them that Sausage/Hot Dogs/Deli Meats/Cheeses/Sauces, Condiments,Seasoning all have loads of salt. Explained the best way to treat "cotton mouth" is to put 2 large ice cubes in a plastic cup and roll them around your mouth and put them back in the cup rather than using a glass of water to quell that dry mouth feeling. Ms. Ramachandran to send Clinic Visit to [facility] with recommendations for Daily Dry Weights and VS. When asked if they had any questions, susanna asks if Nu Salt is OK to use and I say as long as it doesn't have any Sodium. Susanna Googles Nu Salt and finds there is no Sodium in Nu Salt. I explain to Susanna that I will call her for the weights and VS and see if that works better than trying to get the information from the  [facility] directly. Susanna still has the Home Care Guide with our phone number and she will call with any questions.    "

## 2023-05-30 NOTE — PATIENT INSTRUCTIONS
Recommend 2-3 gram/day sodium restriction and 1500-2000mL/day fluid restriction.  Check vital signs and weight daily, and to notify us ([phone]) if weight increases by more than 3 lbs in 1 day or 5 lbs in 3 days or if BP <90/50mmHg.

## 2023-05-30 NOTE — TELEPHONE ENCOUNTER
Message was sent by pt family member this morning. This med refill request has already been taken care of in a separate encounter.

## 2023-05-30 NOTE — TELEPHONE ENCOUNTER
----- Message from Sonali Phillips sent at 5/30/2023 11:29 AM CDT -----  Contact: J[facility]  Jing is calling in regards to some concerns of the pt medication and also refills on apixaban (ELIQUIS) 5 mg Tab, coenzyme Q10 200 mg capsule, divalproex (DEPAKOTE SPRINKLE) 125 mg capsule, multivitamin (THERAGRAN) per tablet, nitroGLYCERIN (NITROSTAT) 0.4 MG SL tablet.Please call back at [phone]        [facility] - [address] [address]  Phone: [phone] Fax: [phone]  Hours: Not open 24 hours    Thanks  JACINDA

## 2023-05-30 NOTE — PROGRESS NOTES
HF TCC Provider Note (Initial Clinic) Consult Note    Date of original referral: 5/22/23  Age: 87 y.o.  Gender: female  Ethnicity: White  Number of admissions for CHF within the preceding year: 2-3 times   Duration of CHF: years  Type of Congestive Heart Failure: Combined   Etiology: Ischemic   Enrolled in Infusion suite: no    Diagnostic Labs:   EKG - 05/22/2023  CXR - 05/21/2023  ECHO - 05/03/2023  Stress test -   Stress echo - 03/09/2020  Pharmacologic stress -   Cardiac catheterization - 03/11/2021   Cardiac MRI -     Lab Results   Component Value Date     05/25/2023     05/24/2023    K 3.8 05/25/2023    K 4.3 05/24/2023    CL 98 05/25/2023    CL 95 05/24/2023    CO2 30 (H) 05/25/2023    CO2 30 (H) 05/24/2023    GLU 99 05/25/2023     (H) 05/24/2023    BUN 33 (H) 05/25/2023    BUN 34 (H) 05/24/2023    CREATININE 1.2 05/25/2023    CREATININE 1.1 05/24/2023    CALCIUM 8.8 05/25/2023    CALCIUM 8.5 (L) 05/24/2023    PROT 6.6 05/19/2023    PROT 6.5 05/05/2023    ALBUMIN 3.4 (L) 05/19/2023    ALBUMIN 3.3 (L) 05/05/2023    BILITOT 0.9 05/19/2023    BILITOT 0.5 05/05/2023    ALKPHOS 81 05/19/2023    ALKPHOS 80 05/05/2023    AST 32 05/19/2023    AST 18 05/05/2023    ALT 21 05/19/2023    ALT 10 05/05/2023    ANIONGAP 11 05/25/2023    ANIONGAP 13 05/24/2023    ESTGFRAFRICA 56.9 (A) 05/19/2022    ESTGFRAFRICA 58.0 (A) 08/05/2021    EGFRNONAA 49.3 (A) 05/19/2022    EGFRNONAA 50.3 (A) 08/05/2021       Lab Results   Component Value Date    WBC 7.12 05/25/2023    WBC 7.42 05/24/2023    RBC 3.52 (L) 05/25/2023    RBC 3.48 (L) 05/24/2023    HGB 11.1 (L) 05/25/2023    HGB 11.4 (L) 05/24/2023    HCT 34.3 (L) 05/25/2023    HCT 33.9 (L) 05/24/2023    MCV 97 05/25/2023    MCV 97 05/24/2023    MCH 31.5 (H) 05/25/2023    MCH 32.8 (H) 05/24/2023    MCHC 32.4 05/25/2023    MCHC 33.6 05/24/2023    RDW 12.4 05/25/2023    RDW 12.6 05/24/2023     (L) 05/25/2023     (L) 05/24/2023    MPV 9.3 05/25/2023    MPV  9.8 05/24/2023    IMMGR 0.3 05/25/2023    IMMGR 0.1 05/24/2023    IGABS 0.02 05/25/2023    IGABS 0.01 05/24/2023    LYMPH 0.7 (L) 05/25/2023    LYMPH 10.4 (L) 05/25/2023    MONO 0.9 05/25/2023    MONO 12.2 05/25/2023    EOS 0.3 05/25/2023    EOS 0.3 05/24/2023    BASO 0.02 05/25/2023    BASO 0.02 05/24/2023    NRBC 0 05/25/2023    NRBC 0 05/24/2023    GRAN 5.2 05/25/2023    GRAN 72.4 05/25/2023    EOSINOPHIL 4.4 05/25/2023    EOSINOPHIL 3.9 05/24/2023    BASOPHIL 0.3 05/25/2023    BASOPHIL 0.3 05/24/2023       Lab Results   Component Value Date    BNP 1,180 (H) 05/21/2023    BNP 1,180 (H) 05/21/2023    MG 2.2 05/25/2023    MG 2.1 05/24/2023    PHOS 3.4 05/25/2023    PHOS 3.1 05/24/2023    TROPONINI 0.037 (H) 05/21/2023    TROPONINI 0.029 (H) 05/21/2023    HGBA1C 5.3 05/19/2023    HGBA1C 5.2 05/02/2023    TSH 64.446 (H) 05/02/2023    TSH 96.744 (H) 08/31/2022    FREET4 0.74 05/02/2023    FREET4 0.76 08/31/2022       Lab Results   Component Value Date    CHOL 138 05/19/2023    TRIG 44 05/19/2023    HDL 62 05/19/2023    LDLCALC 67.2 05/19/2023    CHOLHDL 44.9 05/19/2023    TOTALCHOLEST 2.2 05/19/2023    NONHDLCHOL 76 05/19/2023    COLORU Yellow 05/02/2023    APPEARANCEUA Hazy (A) 05/02/2023    PHUR 6.0 05/02/2023    SPECGRAV 1.010 05/02/2023    PROTEINUA Negative 05/02/2023    GLUCUA Negative 05/02/2023    KETONESU Negative 05/02/2023    BILIRUBINUA Negative 05/02/2023    OCCULTUA 1+ (A) 05/02/2023    NITRITE Positive (A) 05/02/2023    LEUKOCYTESUR Trace (A) 05/02/2023       List all implanted cardiac devices:   Implanted cardio-defibrillator: CRT-D      Current Outpatient Medications on File Prior to Visit   Medication Sig Dispense Refill    acetaminophen (TYLENOL) 500 MG tablet Take 1 tablet (500 mg total) by mouth every 6 (six) hours as needed for Pain. 90 tablet 12    amLODIPine (NORVASC) 10 MG tablet Take 1 tablet (10 mg total) by mouth once daily. 90 tablet 3    apixaban (ELIQUIS) 5 mg Tab Take 1 tablet (5 mg  total) by mouth 2 (two) times daily. 60 tablet 11    aspirin 81 MG Chew Take 81 mg by mouth every evening. 1 Tablet, Chewable Oral Every day      cetirizine (ZYRTEC) 10 MG tablet Take 10 mg by mouth daily as needed for Allergies.      cholecalciferol, vitamin D3, (VITAMIN D3) 50 mcg (2,000 unit) Cap capsule Take 2,000 Units by mouth once daily.      coenzyme Q10 200 mg capsule Take 200 mg by mouth once daily.      divalproex (DEPAKOTE SPRINKLE) 125 mg capsule Take 1 capsule (125 mg total) by mouth every evening. 30 capsule 2    furosemide (LASIX) 40 MG tablet Take 1 tablet (40 mg total) by mouth once daily. 30 tablet 11    irbesartan (AVAPRO) 300 MG tablet TAKE 1 TABLET BY MOUTH EVERY DAY 30 tablet 11    levothyroxine (SYNTHROID) 125 MCG tablet Take 1 tablet (125 mcg total) by mouth before breakfast. 90 tablet 4    melatonin (MELATIN) 3 mg tablet Take 1 tablet (3 mg total) by mouth every evening. 30 tablet 12    memantine (NAMENDA) 5 MG Tab TAKE 1 TABLET BY MOUTH TWICE DAILY 60 tablet 11    metoprolol succinate (TOPROL-XL) 25 MG 24 hr tablet Take 0.5 tablets (12.5 mg total) by mouth once daily. 15 tablet 11    multivitamin (THERAGRAN) per tablet Take 1 tablet by mouth once daily.      nitroGLYCERIN (NITROSTAT) 0.4 MG SL tablet PLACE 1 TABLET UNDER THE TONGUE EVERY 5 MIN AS NEEDED FOR CHEST PAIN. MAX:3 DOSES 30 tablet 3    rosuvastatin (CRESTOR) 10 MG tablet Take 1 tablet (10 mg total) by mouth once daily. 90 tablet 3    vitamins  A,C,E-zinc-copper (PRESERVISION AREDS) 14,320-226-200 unit-mg-unit Cap Take by mouth Daily.       No current facility-administered medications on file prior to visit.         HPI:  Patient on 3L supplemental O2 at baseline. SpO2 96% on 3L in clinic. Presents to clinic in wheelchair. Endorses continued SOB on minimal exertion and intermittently at rest    Patient sleeping in recliner. Reports poor sleep, estimating she will get 2hrs/night. Endorses frequent daytime naps   Patient wakes up  SOB, has to get out of bed, associated cough- n/a, sleeping in recliner   Palpitations - denies    Dizzy, light-headed, pre-syncope or syncope- intermittent brief dizziness when first standing from sitting, denies pre-syncope/syncope   Since discharge frequency of performing weights, home weight and weight change- not performing daily weights. 131lbs on hospital discharge   Other information felt pertinent to HPI: Ms. 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 Initially presented to ED for increasing home O2 demands and SOB with patient satting 80% on 3L, the max of her home concentrator. In the ED, BNP 1600, Trop 0.033. CXR with interstitial and alveolar opacities compatible with pulmonary edema/CHF. She was adequately diuresed with IVP lasix 40mg BID and weaned to home supplemental O2 prior to discharge.     PHYSICAL:   Vitals:    05/30/23 1358   BP: (!) 122/58   Pulse: 68      Wt Readings from Last 3 Encounters:   05/30/23 59.1 kg (130 lb 6.4 oz)   05/25/23 59.6 kg (131 lb 6.3 oz)   05/16/23 60.8 kg (134 lb)     JVD: no  Heart rhythm: regular  Cardiac murmur: No    S3: no  S4: no  Lungs: crackles to mid lung fields  Hepatojugular reflux: yes  Edema: yes, 1+ BLE edema    Echo 5/3/23:  Severe left atrial enlargement.  The left ventricle is normal in size with eccentric hypertrophy and mildly decreased systolic function.  The estimated ejection fraction is 40%. There are segmental wall motion abnormalities at the apex.  Grade II left ventricular diastolic dysfunction.  Normal right ventricular size with normal right ventricular systolic function.  Mild aortic regurgitation.  The estimated PA systolic pressure is 27 mmHg.  Normal central venous pressure (3  mmHg).    ASSESSMENT: Chronic combined systolic and diastolic HF    PLAN:      Patient Instructions:   Instruct the patient to notify this clinic if HH, a physician or an advanced care provider wants to change medication one of their HF medications   Activity and Diet restrictions:   Recommend 2-3 gram sodium restriction and 1500cc- 2000cc fluid restriction.  Encourage physical activity with graded exercise program.  Requested patient to weigh themselves daily, and to notify us if their weight increases by more than 3 lbs in 1 day or 5 lbs in 3 days.    Assigned dry weight on home scale: unsure, 131lbs on hospital discharge  Medication changes (include current dose and changed dose): NYHA Class III-IV symptoms. Mild fluid volume on exam. Endorses worsening BLE edema today. Satting well in clinic (SpO2 96%, on 3L supplemental O2 at baseline). Recommend periodic leg elevation and compression stockings when keeping feet dependent. Remove compression stockings if/when laying in bed at night. BNP downtrending 1100->400. Start aldactone 12.5mg once daily to further optimize GDMT with repeat labs next week. Recently started on lasix 40mg daily, continue for now. Pending trend in renal function on repeat labs, consider reducing lasix to 20mg once daily.    Upcoming labs and date anticipated: Plan for weekly phone check ins with in person visits reinier Ramachandran PA-C

## 2023-05-30 NOTE — TELEPHONE ENCOUNTER
"Call from this Pt's niece Susanna, who states that Pt was D/C from Hospital on Thursday and her ankles are already puffy. I ask what Pt's weight is and she says she will get them to weigh her, wt is 128.5 Sat is 98%. I ask if she is SOB and Niece says she is always SOB, I ask at rest or with movement and she states "at rest, she doesn't move around much." Niece to bring Pt in to Clinic for a visit, I give her a 1:30 appt and she says she is in [place], I tell her 2:00 and she states I can get there for 1:30. Urgent appt. Requested.  "

## 2023-06-03 RX ORDER — MULTIVITAMIN
1 TABLET ORAL DAILY
Qty: 90 TABLET | Refills: 4 | Status: SHIPPED | OUTPATIENT
Start: 2023-06-03

## 2023-06-03 RX ORDER — VIT A/VIT C/VIT E/ZINC/COPPER 4296-226
1 CAPSULE ORAL DAILY
Qty: 90 CAPSULE | Refills: 4 | Status: SHIPPED | OUTPATIENT
Start: 2023-06-03

## 2023-06-03 RX ORDER — ACETAMINOPHEN 160 MG/5ML
200 SUSPENSION, ORAL (FINAL DOSE FORM) ORAL DAILY
Qty: 90 CAPSULE | Refills: 4 | Status: SHIPPED | OUTPATIENT
Start: 2023-06-03

## 2023-06-06 ENCOUNTER — TELEPHONE (OUTPATIENT)
Dept: CARDIOLOGY | Facility: CLINIC | Age: 88
End: 2023-06-06
Payer: MEDICARE

## 2023-06-06 NOTE — TELEPHONE ENCOUNTER
"Heart Failure Transitional Care Clinic    Attempted to call pt to complete 1 week "check in" call. Unable to reach pt at listed phone numbers.  Was able to leave message with family encouraging pt to return call with [facility] phone number and reminded of upcoming appt .     Will continue to try to reach patient.     Left a message with the  @ [facility] who states the nurses station is not answering. She takes my number and says she will have someone call me back.   "

## 2023-06-06 NOTE — TELEPHONE ENCOUNTER
"[facility]) weekly phone follow up / triage call completed.     [facility] RN Navigator spoke with Vanessa nurse @ [facility]    Current Patient reported weight: Has not been getting daily weights   Patient Goal Weight: (Unsure)  Recent Patient reported blood pressure and heart rate: No Bps Ps    Pt reports the following:  []  Shortness of Breath with Activity  []  Shortness of Breath at rest   []  Fatigue  []  Edema   [] Chest pain or tightness  [] Weight Increase since discharge  [] None of the above  Can't assess  Pt reports using "Daily weight and symptom tracker".    Pt reports being in the Green(color) Zone. If in yellow/red, reminded that they should be calling [facility] today or now.     Medications:   Medication compliance reviewed with pt.  Pt reports having medication list available and has all medications at home for use per list.     Education:   Confirmed pt still has "[facility] Home Care Guide"  .     Reminded of key points as listed below.     Recommend 2 -3 gram sodium restriction and 1500 cc-2000 cc fluid restriction.  Encourage physical activity with graded exercise program.  Requested patient to weigh themselves daily, and to notify us if their weight increases by more than 3 lbs in 1 day or 5 lbs in 3 days.   Reminded to use "Daily weight and symptom tracker".  Even if pt does not have a scale, to use symptom tracker.       Watch for these Signs and Symptoms: If any of these occur, contact [facility] immediately:   Increase in shortness of breath with movement   Increase in swelling in your legs and ankles   Weight gain of more than 3 pounds in a day or 5 pounds in 3 days.   Difficulty breathing when you are lying down   Worsening fatigue or tiredness   Stomach bloating, a full feeling or a loss of appetite   Increased coughing--especially when you are lying down      Pt was able to verbalize back to RN in their own words correct diet/fluid " restrictions, necessity for exercise, warning signs and symptoms, when and how to contact their [facility] team.      Pt reminded of upcoming appointment.  PT reports they will attend. Spoke with Susanna, Pt's daughter, earlier re checking labs on Thursday when Pt sees PCP and labs are scheduled in [place].      Pt reminded of how and when to contact [facility]:  [phone] (Mon-Fri, 8a-5p) & for urgent issues on the weekend to page the Heart Transplant MD on call.  Pt also encouraged utilize [facility] messaging as well.      Pt  verbalized understanding and in agreement of plan.   Spoke at length with Vanessa about the AVS that we faxed to [facility], she did not receive it, she states it may have gone to someone else @ [facility]. Vanessa says daily BP will be hard and I tell her that Daily weights would be of more help to us. Vanessa says that is easier to do and she requests that we re send the latest AVS. She states Pt doing fine for now. We discuss fluid restriction and she says Pt has had 2 small cups of water when she has asked for water. I ask if she can replace the water water with ice chips and she will try that. I tell her that she can call us if the Pt looks like she is starting to have swelling, gain 2-3 lbs overnight or becomes SOB. I will call back in a couple of days but I do give her Pt's Hospital D/C weight of 131 lbs.      Will follow up with pt at next clinic visit and RN navigator available for pt questions, issues or concerns.

## 2023-06-08 ENCOUNTER — OFFICE VISIT (OUTPATIENT)
Dept: FAMILY MEDICINE | Facility: CLINIC | Age: 88
End: 2023-06-08
Payer: MEDICARE

## 2023-06-08 ENCOUNTER — LAB VISIT (OUTPATIENT)
Dept: LAB | Facility: HOSPITAL | Age: 88
End: 2023-06-08
Payer: MEDICARE

## 2023-06-08 ENCOUNTER — TELEPHONE (OUTPATIENT)
Dept: CARDIOLOGY | Facility: CLINIC | Age: 88
End: 2023-06-08
Payer: MEDICARE

## 2023-06-08 VITALS
HEART RATE: 83 BPM | DIASTOLIC BLOOD PRESSURE: 58 MMHG | WEIGHT: 126 LBS | BODY MASS INDEX: 20.99 KG/M2 | HEIGHT: 65 IN | SYSTOLIC BLOOD PRESSURE: 88 MMHG | OXYGEN SATURATION: 97 %

## 2023-06-08 DIAGNOSIS — R06.02 SOB (SHORTNESS OF BREATH): ICD-10-CM

## 2023-06-08 DIAGNOSIS — R09.02 HYPOXIA: ICD-10-CM

## 2023-06-08 DIAGNOSIS — Z09 HOSPITAL DISCHARGE FOLLOW-UP: Primary | ICD-10-CM

## 2023-06-08 DIAGNOSIS — Z79.899 ENCOUNTER FOR LONG-TERM (CURRENT) USE OF MEDICATIONS: ICD-10-CM

## 2023-06-08 DIAGNOSIS — I50.22 CHRONIC SYSTOLIC CONGESTIVE HEART FAILURE, NYHA CLASS 3: ICD-10-CM

## 2023-06-08 DIAGNOSIS — I95.9 HYPOTENSION, UNSPECIFIED HYPOTENSION TYPE: ICD-10-CM

## 2023-06-08 LAB
ALBUMIN SERPL BCP-MCNC: 3.2 G/DL (ref 3.5–5.2)
ALP SERPL-CCNC: 67 U/L (ref 55–135)
ALT SERPL W/O P-5'-P-CCNC: 7 U/L (ref 10–44)
ANION GAP SERPL CALC-SCNC: 10 MMOL/L (ref 8–16)
AST SERPL-CCNC: 18 U/L (ref 10–40)
BASOPHILS # BLD AUTO: 0.03 K/UL (ref 0–0.2)
BASOPHILS NFR BLD: 0.4 % (ref 0–1.9)
BILIRUB SERPL-MCNC: 0.4 MG/DL (ref 0.1–1)
BUN SERPL-MCNC: 35 MG/DL (ref 8–23)
CALCIUM SERPL-MCNC: 8.9 MG/DL (ref 8.7–10.5)
CHLORIDE SERPL-SCNC: 104 MMOL/L (ref 95–110)
CO2 SERPL-SCNC: 29 MMOL/L (ref 23–29)
CREAT SERPL-MCNC: 1.7 MG/DL (ref 0.5–1.4)
DIFFERENTIAL METHOD: ABNORMAL
EOSINOPHIL # BLD AUTO: 0.4 K/UL (ref 0–0.5)
EOSINOPHIL NFR BLD: 5.2 % (ref 0–8)
ERYTHROCYTE [DISTWIDTH] IN BLOOD BY AUTOMATED COUNT: 13.3 % (ref 11.5–14.5)
EST. GFR  (NO RACE VARIABLE): 28.8 ML/MIN/1.73 M^2
GLUCOSE SERPL-MCNC: 114 MG/DL (ref 70–110)
HCT VFR BLD AUTO: 34.9 % (ref 37–48.5)
HGB BLD-MCNC: 11.5 G/DL (ref 12–16)
IMM GRANULOCYTES # BLD AUTO: 0.01 K/UL (ref 0–0.04)
IMM GRANULOCYTES NFR BLD AUTO: 0.1 % (ref 0–0.5)
LYMPHOCYTES # BLD AUTO: 0.9 K/UL (ref 1–4.8)
LYMPHOCYTES NFR BLD: 13.3 % (ref 18–48)
MAGNESIUM SERPL-MCNC: 2 MG/DL (ref 1.6–2.6)
MCH RBC QN AUTO: 31.9 PG (ref 27–31)
MCHC RBC AUTO-ENTMCNC: 33 G/DL (ref 32–36)
MCV RBC AUTO: 97 FL (ref 82–98)
MONOCYTES # BLD AUTO: 0.7 K/UL (ref 0.3–1)
MONOCYTES NFR BLD: 9.6 % (ref 4–15)
NEUTROPHILS # BLD AUTO: 4.8 K/UL (ref 1.8–7.7)
NEUTROPHILS NFR BLD: 71.4 % (ref 38–73)
NRBC BLD-RTO: 0 /100 WBC
PHOSPHATE SERPL-MCNC: 5 MG/DL (ref 2.7–4.5)
PLATELET # BLD AUTO: 172 K/UL (ref 150–450)
PMV BLD AUTO: 9.3 FL (ref 9.2–12.9)
POTASSIUM SERPL-SCNC: 3.9 MMOL/L (ref 3.5–5.1)
PROT SERPL-MCNC: 6.8 G/DL (ref 6–8.4)
RBC # BLD AUTO: 3.61 M/UL (ref 4–5.4)
SODIUM SERPL-SCNC: 143 MMOL/L (ref 136–145)
WBC # BLD AUTO: 6.75 K/UL (ref 3.9–12.7)

## 2023-06-08 PROCEDURE — 1126F AMNT PAIN NOTED NONE PRSNT: CPT | Mod: CPTII,S$GLB,, | Performed by: FAMILY MEDICINE

## 2023-06-08 PROCEDURE — 36415 COLL VENOUS BLD VENIPUNCTURE: CPT | Mod: PO,NTX

## 2023-06-08 PROCEDURE — 1111F PR DISCHARGE MEDS RECONCILED W/ CURRENT OUTPATIENT MED LIST: ICD-10-PCS | Mod: CPTII,S$GLB,, | Performed by: FAMILY MEDICINE

## 2023-06-08 PROCEDURE — 99999 PR PBB SHADOW E&M-EST. PATIENT-LVL V: CPT | Mod: PBBFAC,,, | Performed by: FAMILY MEDICINE

## 2023-06-08 PROCEDURE — 3288F FALL RISK ASSESSMENT DOCD: CPT | Mod: CPTII,S$GLB,, | Performed by: FAMILY MEDICINE

## 2023-06-08 PROCEDURE — 99214 OFFICE O/P EST MOD 30 MIN: CPT | Mod: S$GLB,,, | Performed by: FAMILY MEDICINE

## 2023-06-08 PROCEDURE — 1126F PR PAIN SEVERITY QUANTIFIED, NO PAIN PRESENT: ICD-10-PCS | Mod: CPTII,S$GLB,, | Performed by: FAMILY MEDICINE

## 2023-06-08 PROCEDURE — 1159F PR MEDICATION LIST DOCUMENTED IN MEDICAL RECORD: ICD-10-PCS | Mod: CPTII,S$GLB,, | Performed by: FAMILY MEDICINE

## 2023-06-08 PROCEDURE — 1111F DSCHRG MED/CURRENT MED MERGE: CPT | Mod: CPTII,S$GLB,, | Performed by: FAMILY MEDICINE

## 2023-06-08 PROCEDURE — 99999 PR PBB SHADOW E&M-EST. PATIENT-LVL V: ICD-10-PCS | Mod: PBBFAC,,, | Performed by: FAMILY MEDICINE

## 2023-06-08 PROCEDURE — 1159F MED LIST DOCD IN RCRD: CPT | Mod: CPTII,S$GLB,, | Performed by: FAMILY MEDICINE

## 2023-06-08 PROCEDURE — 83735 ASSAY OF MAGNESIUM: CPT | Mod: TXP

## 2023-06-08 PROCEDURE — 1160F RVW MEDS BY RX/DR IN RCRD: CPT | Mod: CPTII,S$GLB,, | Performed by: FAMILY MEDICINE

## 2023-06-08 PROCEDURE — 80053 COMPREHEN METABOLIC PANEL: CPT | Mod: NTX

## 2023-06-08 PROCEDURE — 3288F PR FALLS RISK ASSESSMENT DOCUMENTED: ICD-10-PCS | Mod: CPTII,S$GLB,, | Performed by: FAMILY MEDICINE

## 2023-06-08 PROCEDURE — 1100F PR PT FALLS ASSESS DOC 2+ FALLS/FALL W/INJURY/YR: ICD-10-PCS | Mod: CPTII,S$GLB,, | Performed by: FAMILY MEDICINE

## 2023-06-08 PROCEDURE — 1157F ADVNC CARE PLAN IN RCRD: CPT | Mod: CPTII,S$GLB,, | Performed by: FAMILY MEDICINE

## 2023-06-08 PROCEDURE — 84100 ASSAY OF PHOSPHORUS: CPT | Mod: TXP

## 2023-06-08 PROCEDURE — 1160F PR REVIEW ALL MEDS BY PRESCRIBER/CLIN PHARMACIST DOCUMENTED: ICD-10-PCS | Mod: CPTII,S$GLB,, | Performed by: FAMILY MEDICINE

## 2023-06-08 PROCEDURE — 99214 PR OFFICE/OUTPT VISIT, EST, LEVL IV, 30-39 MIN: ICD-10-PCS | Mod: S$GLB,,, | Performed by: FAMILY MEDICINE

## 2023-06-08 PROCEDURE — 83880 ASSAY OF NATRIURETIC PEPTIDE: CPT | Mod: TXP

## 2023-06-08 PROCEDURE — 85025 COMPLETE CBC W/AUTO DIFF WBC: CPT | Mod: NTX

## 2023-06-08 PROCEDURE — 1157F PR ADVANCE CARE PLAN OR EQUIV PRESENT IN MEDICAL RECORD: ICD-10-PCS | Mod: CPTII,S$GLB,, | Performed by: FAMILY MEDICINE

## 2023-06-08 PROCEDURE — 1100F PTFALLS ASSESS-DOCD GE2>/YR: CPT | Mod: CPTII,S$GLB,, | Performed by: FAMILY MEDICINE

## 2023-06-08 NOTE — TELEPHONE ENCOUNTER
Pt Susanna JESSICA contacted [facility] from PCP appt. Per pt FM pt BP is 88/58, pt asleep but easily aroused, no appreciable change in mentation per family report. Pt denies SOB, BLE edema. LILIANE recommends pt hold her lasix and BP medication today. Clinic to contact pt [facility] tomorrow for VS and assessment of pt sxs, review of lab results from 6/8 for additional medication instructions. Pt FM verbalzied understanding of plan.

## 2023-06-08 NOTE — ASSESSMENT & PLAN NOTE
Continue home oxygen.  Patient with hypotension.  Likely due to over-diuresis.  Labs today.  Hold blood pressure medications.    Transitional Care Note    Family and/or Caretaker present at visit?  Yes.  Diagnostic tests reviewed/disposition: I have reviewed all completed as well as pending diagnostic tests at the time of discharge.  Disease/illness education:  CHF exacerbation  Home health/community services discussion/referrals: Patient has home health established at [facility].   Establishment or re-establishment of referral orders for community resources: No other necessary community resources.   Discussion with other health care providers: No discussion with other health care providers necessary.

## 2023-06-09 ENCOUNTER — TELEPHONE (OUTPATIENT)
Dept: CARDIOLOGY | Facility: CLINIC | Age: 88
End: 2023-06-09
Payer: MEDICARE

## 2023-06-09 ENCOUNTER — TELEPHONE (OUTPATIENT)
Dept: NEUROLOGY | Facility: CLINIC | Age: 88
End: 2023-06-09
Payer: MEDICARE

## 2023-06-09 ENCOUNTER — OFFICE VISIT (OUTPATIENT)
Dept: NEUROLOGY | Facility: CLINIC | Age: 88
End: 2023-06-09
Payer: MEDICARE

## 2023-06-09 ENCOUNTER — CLINICAL SUPPORT (OUTPATIENT)
Dept: CARDIOLOGY | Facility: HOSPITAL | Age: 88
End: 2023-06-09
Attending: INTERNAL MEDICINE
Payer: MEDICARE

## 2023-06-09 VITALS
DIASTOLIC BLOOD PRESSURE: 73 MMHG | WEIGHT: 126 LBS | BODY MASS INDEX: 20.99 KG/M2 | HEART RATE: 82 BPM | HEIGHT: 65 IN | SYSTOLIC BLOOD PRESSURE: 128 MMHG

## 2023-06-09 DIAGNOSIS — I49.9 CARDIAC ARRHYTHMIA, UNSPECIFIED CARDIAC ARRHYTHMIA TYPE: ICD-10-CM

## 2023-06-09 DIAGNOSIS — F32.A DEPRESSION, UNSPECIFIED DEPRESSION TYPE: ICD-10-CM

## 2023-06-09 DIAGNOSIS — Z95.810 ICD (IMPLANTABLE CARDIOVERTER-DEFIBRILLATOR), DUAL, IN SITU: ICD-10-CM

## 2023-06-09 DIAGNOSIS — I44.2 CHB (COMPLETE HEART BLOCK): ICD-10-CM

## 2023-06-09 DIAGNOSIS — F03.90 MULTIFACTORIAL DEMENTIA: Primary | ICD-10-CM

## 2023-06-09 DIAGNOSIS — I47.20 VT (VENTRICULAR TACHYCARDIA): ICD-10-CM

## 2023-06-09 DIAGNOSIS — I49.01 VF (VENTRICULAR FIBRILLATION): ICD-10-CM

## 2023-06-09 LAB — BNP SERPL-MCNC: 301 PG/ML (ref 0–99)

## 2023-06-09 PROCEDURE — 1111F PR DISCHARGE MEDS RECONCILED W/ CURRENT OUTPATIENT MED LIST: ICD-10-PCS | Mod: CPTII,NTX,S$GLB, | Performed by: PSYCHIATRY & NEUROLOGY

## 2023-06-09 PROCEDURE — 1101F PT FALLS ASSESS-DOCD LE1/YR: CPT | Mod: CPTII,NTX,S$GLB, | Performed by: PSYCHIATRY & NEUROLOGY

## 2023-06-09 PROCEDURE — 1126F PR PAIN SEVERITY QUANTIFIED, NO PAIN PRESENT: ICD-10-PCS | Mod: CPTII,NTX,S$GLB, | Performed by: PSYCHIATRY & NEUROLOGY

## 2023-06-09 PROCEDURE — 1126F AMNT PAIN NOTED NONE PRSNT: CPT | Mod: CPTII,NTX,S$GLB, | Performed by: PSYCHIATRY & NEUROLOGY

## 2023-06-09 PROCEDURE — 93284 CARDIAC DEVICE CHECK - IN CLINIC & HOSPITAL: ICD-10-PCS | Mod: 26,TXP,, | Performed by: INTERNAL MEDICINE

## 2023-06-09 PROCEDURE — 1157F ADVNC CARE PLAN IN RCRD: CPT | Mod: CPTII,NTX,S$GLB, | Performed by: PSYCHIATRY & NEUROLOGY

## 2023-06-09 PROCEDURE — 1101F PR PT FALLS ASSESS DOC 0-1 FALLS W/OUT INJ PAST YR: ICD-10-PCS | Mod: CPTII,NTX,S$GLB, | Performed by: PSYCHIATRY & NEUROLOGY

## 2023-06-09 PROCEDURE — 99214 PR OFFICE/OUTPT VISIT, EST, LEVL IV, 30-39 MIN: ICD-10-PCS | Mod: NTX,S$GLB,, | Performed by: PSYCHIATRY & NEUROLOGY

## 2023-06-09 PROCEDURE — 1157F PR ADVANCE CARE PLAN OR EQUIV PRESENT IN MEDICAL RECORD: ICD-10-PCS | Mod: CPTII,NTX,S$GLB, | Performed by: PSYCHIATRY & NEUROLOGY

## 2023-06-09 PROCEDURE — 99999 PR PBB SHADOW E&M-EST. PATIENT-LVL III: CPT | Mod: PBBFAC,TXP,, | Performed by: PSYCHIATRY & NEUROLOGY

## 2023-06-09 PROCEDURE — 3288F FALL RISK ASSESSMENT DOCD: CPT | Mod: CPTII,NTX,S$GLB, | Performed by: PSYCHIATRY & NEUROLOGY

## 2023-06-09 PROCEDURE — 1159F PR MEDICATION LIST DOCUMENTED IN MEDICAL RECORD: ICD-10-PCS | Mod: CPTII,NTX,S$GLB, | Performed by: PSYCHIATRY & NEUROLOGY

## 2023-06-09 PROCEDURE — 99214 OFFICE O/P EST MOD 30 MIN: CPT | Mod: NTX,S$GLB,, | Performed by: PSYCHIATRY & NEUROLOGY

## 2023-06-09 PROCEDURE — 93284 PRGRMG EVAL IMPLANTABLE DFB: CPT | Mod: NTX

## 2023-06-09 PROCEDURE — 1159F MED LIST DOCD IN RCRD: CPT | Mod: CPTII,NTX,S$GLB, | Performed by: PSYCHIATRY & NEUROLOGY

## 2023-06-09 PROCEDURE — 3288F PR FALLS RISK ASSESSMENT DOCUMENTED: ICD-10-PCS | Mod: CPTII,NTX,S$GLB, | Performed by: PSYCHIATRY & NEUROLOGY

## 2023-06-09 PROCEDURE — 1111F DSCHRG MED/CURRENT MED MERGE: CPT | Mod: CPTII,NTX,S$GLB, | Performed by: PSYCHIATRY & NEUROLOGY

## 2023-06-09 PROCEDURE — 93284 PRGRMG EVAL IMPLANTABLE DFB: CPT | Mod: 26,TXP,, | Performed by: INTERNAL MEDICINE

## 2023-06-09 PROCEDURE — 99999 PR PBB SHADOW E&M-EST. PATIENT-LVL III: ICD-10-PCS | Mod: PBBFAC,TXP,, | Performed by: PSYCHIATRY & NEUROLOGY

## 2023-06-09 RX ORDER — SERTRALINE HYDROCHLORIDE 25 MG/1
25 TABLET, FILM COATED ORAL DAILY
Qty: 30 TABLET | Refills: 11 | Status: SHIPPED | OUTPATIENT
Start: 2023-06-09 | End: 2024-06-08

## 2023-06-09 RX ORDER — QUETIAPINE FUMARATE 25 MG/1
25 TABLET, FILM COATED ORAL NIGHTLY
Qty: 30 TABLET | Refills: 2 | Status: SHIPPED | OUTPATIENT
Start: 2023-06-09 | End: 2024-06-08

## 2023-06-09 RX ORDER — FUROSEMIDE 20 MG/1
20 TABLET ORAL DAILY
Qty: 30 TABLET | Refills: 11 | Status: SHIPPED | OUTPATIENT
Start: 2023-06-09 | End: 2024-06-08

## 2023-06-09 NOTE — TELEPHONE ENCOUNTER
RN updated Vanessa VAZ with [facility] that pt Lasix will be decreased to 20 mg daily based on her lab work. [facility] RN verbalized understanding, states she received the new order through [facility].

## 2023-06-09 NOTE — PROGRESS NOTES
PLAN:      Problem List Items Addressed This Visit       Encounter for long-term (current) use of medications (Chronic)     Complete history and physical was completed today.  Complete and thorough medication reconciliation was performed.  Discussed risks and benefits of medications.  Advised patient on orders and health maintenance.  We discussed old records and old labs if available.  Will request any records not available through epic.  Continue current medications listed on your summary sheet.           Chronic systolic congestive heart failure, NYHA class 3     Decrease Lasix to half dosage.  Hold blood pressure medications due to hypotension.  Orders have been relayed to assisted living.  Patient also following with heart failure clinic.  Heart failure precautions.  Daily weights.         Hospital discharge follow-up - Primary     Continue home oxygen.  Patient with hypotension.  Likely due to over-diuresis.  Labs today.  Hold blood pressure medications.    Transitional Care Note    Family and/or Caretaker present at visit?  Yes.  Diagnostic tests reviewed/disposition: I have reviewed all completed as well as pending diagnostic tests at the time of discharge.  Disease/illness education:  CHF exacerbation  Home health/community services discussion/referrals: Patient has home health established at [facility].   Establishment or re-establishment of referral orders for community resources: No other necessary community resources.   Discussion with other health care providers: No discussion with other health care providers necessary.                  Hypotension    Hypoxia     Continue oxygen.  Follow-up with Pulmonary and Cardiology.          Future Appointments       Next 10 Appointments       Date Provider Specialty Appt Notes    6/9/2023  Cardiology SJM DM-- need to pair home monitor, not connected since upgrade- pt bringing monitor    6/9/2023 Demarco Fontanez MD Neurology follow up/new problem    6/15/2023   Pulmonology ILD, O2 qualification and titration    6/15/2023  Pulmonology ILD    6/15/2023  Pulmonology ILD    6/15/2023  Pulmonology ILD    6/15/2023 Beni Heller MD Transplant ILD (new ALD)    7/14/2023 Flako Carver MD Ophthalmology 3m OCt/DFE    8/21/2023  Cardiology 90 Day Remote Cardiac Device Check    10/11/2023  Cardiology SJM CRTD, DM              Displaying the next 10 appointments. This patient has additional appointments scheduled.           Medication Management for assessment above:   Medication List with Changes/Refills   Current Medications    ACETAMINOPHEN (TYLENOL) 500 MG TABLET    Take 1 tablet (500 mg total) by mouth every 6 (six) hours as needed for Pain.    AMLODIPINE (NORVASC) 10 MG TABLET    Take 1 tablet (10 mg total) by mouth once daily.    APIXABAN (ELIQUIS) 5 MG TAB    Take 1 tablet (5 mg total) by mouth 2 (two) times daily.    ASPIRIN 81 MG CHEW    Take 81 mg by mouth every evening. 1 Tablet, Chewable Oral Every day    CETIRIZINE (ZYRTEC) 10 MG TABLET    Take 10 mg by mouth daily as needed for Allergies.    CHOLECALCIFEROL, VITAMIN D3, (VITAMIN D3) 50 MCG (2,000 UNIT) CAP CAPSULE    Take 2,000 Units by mouth once daily.    COENZYME Q10 200 MG CAPSULE    Take 200 mg by mouth once daily.    DIVALPROEX (DEPAKOTE SPRINKLE) 125 MG CAPSULE    Take 1 capsule (125 mg total) by mouth every evening.    FUROSEMIDE (LASIX) 40 MG TABLET    Take 1 tablet (40 mg total) by mouth once daily.    IRBESARTAN (AVAPRO) 300 MG TABLET    TAKE 1 TABLET BY MOUTH EVERY DAY    LEVOTHYROXINE (SYNTHROID) 125 MCG TABLET    Take 1 tablet (125 mcg total) by mouth before breakfast.    MELATONIN (MELATIN) 3 MG TABLET    Take 1 tablet (3 mg total) by mouth every evening.    MEMANTINE (NAMENDA) 5 MG TAB    TAKE 1 TABLET BY MOUTH TWICE DAILY    METOPROLOL SUCCINATE (TOPROL-XL) 25 MG 24 HR TABLET    Take 0.5 tablets (12.5 mg total) by mouth once daily.    MULTIVITAMIN (THERAGRAN) PER TABLET    Take 1 tablet by  mouth once daily.    NITROGLYCERIN (NITROSTAT) 0.4 MG SL TABLET    PLACE 1 TABLET UNDER THE TONGUE EVERY 5 MIN AS NEEDED FOR CHEST PAIN. MAX:3 DOSES    QUETIAPINE (SEROQUEL) 25 MG TAB    Take 1 tablet (25 mg total) by mouth nightly as needed.    ROSUVASTATIN (CRESTOR) 10 MG TABLET    Take 1 tablet (10 mg total) by mouth once daily.    SPIRONOLACTONE (ALDACTONE) 25 MG TABLET    Take 0.5 tablets (12.5 mg total) by mouth once daily.    VITAMINS A,C,E-ZINC-COPPER (PRESERVISION AREDS) 4,296 MCG-226 MG-90 MG CAP    Take 1 capsule by mouth Daily.       Abdiel Robles M.D.  ==========================================================================  Subjective:   Patient ID: Lynn Mckinney is a 87 y.o. female.  has a past medical history of Allergy, Anticoagulant long-term use, Blood clotting tendency, Carotid artery disease (05/29/2014), Chronic systolic congestive heart failure, NYHA class 3 (5/9/2023), Coronary artery disease, Heart block, Hyperlipidemia, Hypertension, Hypothyroid, Left displaced femoral neck fracture (01/12/2023), Obesity, Pacemaker, PAD (peripheral artery disease), Pulmonary fibrosis, Spinal stenosis, Thoracic or lumbosacral neuritis or radiculitis (11/25/2014), and Tubular adenoma.   Chief Complaint: Hospital Follow Up      Problem List Items Addressed This Visit       Encounter for long-term (current) use of medications (Chronic)    Overview     June 2023: Reviewed labs.  CHRONIC. Stable. Compliant with medications for managed conditions. See medication list. No SE reported.   Routine lab analysis is being monitored. Refills were addressed.  Lab Results   Component Value Date    WBC 6.62 05/30/2023    HGB 10.4 (L) 05/30/2023    HCT 32.3 (L) 05/30/2023    MCV 97 05/30/2023     05/30/2023         Chemistry        Component Value Date/Time     05/30/2023 1342    K 3.4 (L) 05/30/2023 1342     05/30/2023 1342    CO2 31 (H) 05/30/2023 1342    BUN 41 (H) 05/30/2023 1342    CREATININE  1.5 (H) 05/30/2023 1342     (H) 05/30/2023 1342        Component Value Date/Time    CALCIUM 9.0 05/30/2023 1342    ALKPHOS 71 05/30/2023 1342    AST 21 05/30/2023 1342    ALT 15 05/30/2023 1342    BILITOT 0.8 05/30/2023 1342    ESTGFRAFRICA 56.9 (A) 05/19/2022 1011    EGFRNONAA 49.3 (A) 05/19/2022 1011          Lab Results   Component Value Date    TSH 64.446 (H) 05/02/2023    FREET4 0.74 05/02/2023                Current Assessment & Plan     Complete history and physical was completed today.  Complete and thorough medication reconciliation was performed.  Discussed risks and benefits of medications.  Advised patient on orders and health maintenance.  We discussed old records and old labs if available.  Will request any records not available through epic.  Continue current medications listed on your summary sheet.           Chronic systolic congestive heart failure, NYHA class 3    Overview     Chronic.  Intermittent control.  Patient with recent hospitalizations x2.  Patient is currently having low blood pressure today.  Having increased fatigue and somnolence.         Current Assessment & Plan     Decrease Lasix to half dosage.  Hold blood pressure medications due to hypotension.  Orders have been relayed to assisted living.  Patient also following with heart failure clinic.  Heart failure precautions.  Daily weights.         Hospital discharge follow-up - Primary    Overview     [facility] - Cardiology [facility] Medicine  Discharge Summary        Patient Name: Lynn Mckinney  MRN: [id number]  AYSE: [id number]  Patient Class: IP- Inpatient  Admission Date: 5/19/2023  Hospital Length of Stay: 6 days  Discharge Date and Time:  05/25/2023 1:15 PM  Attending Physician: Karolina Fernández MD   Discharging Provider: Karolina Fernández MD  Primary Care Provider: Abdiel Robles MD  Hospital Medicine Team: [facility] [place] [facility] Karolina Fernández MD  Primary Care Team: [facility] [place] [facility]     HPI:   Lynn Mckinney is a 87 y.o. female  "with pAF on eliquis, CAD (s/p CABG 2001), HTN, PVD, CRT-D upgrade from PPM and recently diagnosed ILD/fibrosis presenting with increasing home O2 demands and SOB with patient satting 80% on 3L, the max of her home concentrator. Daughter reports she was admitted 5/2-5/5 for CHF exacerbation and pulmonary edema requiring IV diuretics. She was also diagnosed with pulmonary fibrosis during said admission with formal pulm follow up scheduled for next week. Family reports she has been noticing gradually worsening edema  following recent discharge. She was not on any PO lasix prior to or following her recent HF admission. She denies recent fever, chills, productive cough or change in her chronic nonproductive cough but family reports worsening "gurgling" over the last 3 days. She had a CRT upgrade 1.5wk ago, she denies any pain, redness or discharge from the pocket incision.      In the ED, VS afebrile HR 93, RR 26, /75, SpO2 79% on 3L. Up to 90% on 5L NC. Labs demonstrated WBC 7, Hgb 11.9, Plt 155, Na 137, K 4.1, BUN/Cr 11/1.0. BNP 1600, Trop 0.033. CXR with interstitial and alveolar opacities compatible with pulmonary edema/CHF. Given IV lasix 40 mg x1. Patient admitted to medicine for further management of acute hypoxic respiratory failure in the setting of ILD and CHF.         * No surgery found *       Hospital Course:   Admitted with acute on chronic hypoxic respiratory failure secondary to CHF. Required transfer to ICU for respiratory distress. Improved with IV diuresis. Weaned to home oxygen. Has follow up scheduled with transitional heart failure clinic.         Goals of Care Treatment Preferences:  Code Status: DNR        Consults:          Consults (From admission, onward)         Status Ordering Provider       Inpatient consult to Critical Care Medicine  Once        Provider:  (Not yet assigned)    Completed JULIOCESAR ANGEL       Inpatient consult to Social Work/Case Management  Once        Provider:  " (Not yet assigned)    Acknowledged JULIOCESAR ANGEL       Inpatient consult to Registered Dietitian/Nutritionist  Once        Provider:  (Not yet assigned)    Completed JULIOCESAR ANGEL             No new Assessment & Plan notes have been filed under this hospital service since the last note was generated.  Service: Hospital Medicine            Final Active Diagnoses:     Diagnosis Date Noted POA    PRINCIPAL PROBLEM:  Acute on chronic respiratory failure with hypoxia [J96.21] 05/02/2023 Yes    ILD (interstitial lung disease) [J84.9] 05/19/2023 Yes    PAF (paroxysmal atrial fibrillation) [I48.0] 05/02/2023 Yes    Dementia with anxiety, unspecified dementia severity, unspecified dementia type [F03.94] 04/25/2023 Yes       Chronic    Thrombocytopenia, unspecified [D69.6] 04/25/2023 Yes       Chronic    Acute on chronic combined systolic and diastolic heart failure [I50.43] 01/12/2023 Yes    Aortic atherosclerosis [I70.0] 12/09/2022 Yes       Chronic    History of ventricular fibrillation [Z86.79] 03/10/2021 Not Applicable    Bilateral carotid artery stenosis [I65.23]   Yes    CHB (complete heart block) [I44.2] 10/03/2013 Yes    Stage 3a chronic kidney disease [N18.31] 09/27/2013 Yes       Chronic    Coronary artery disease involving native coronary artery of native heart without angina pectoris [I25.10] 03/07/2013 Yes       Chronic    Hypothyroidism [E03.9] 03/07/2013 Yes       Chronic    Primary hypertension [I10] 08/13/2012 Yes       Chronic    Pure hypercholesterolemia [E78.00] 08/13/2012 Yes       Chronic       Problems Resolved During this Admission:         Discharged Condition: good     Disposition: Home or Self Care     Follow Up:        Follow-up Information      Abdiel Robles MD. Schedule an appointment as soon as possible for a visit.    Specialty: Family Medicine  Contact information:  [address] [facility] [place] [place] [address] [phone]                             Patient Instructions:       Diet  Cardiac      Notify your health care provider if you experience any of the following:  difficulty breathing or increased cough      Activity as tolerated         Significant Diagnostic Studies: Labs:   CMP        Recent Labs   Lab 05/24/23  0435 05/25/23  0647    139   K 4.3 3.8   CL 95 98   CO2 30* 30*   * 99   BUN 34* 33*   CREATININE 1.1 1.2   CALCIUM 8.5* 8.8   ANIONGAP 13 11    and CBC        Recent Labs   Lab 05/24/23  0435 05/25/23  0647   WBC 7.42 7.12   HGB 11.4* 11.1*   HCT 33.9* 34.3*   * 149*             Pending Diagnostic Studies:      None          Medications:  Reconciled Home Medications:       Medication List       START taking these medications    furosemide 40 MG tablet  Commonly known as: LASIX  Take 1 tablet (40 mg total) by mouth once daily.      metoprolol succinate 25 MG 24 hr tablet  Commonly known as: TOPROL-XL  Take 0.5 tablets (12.5 mg total) by mouth once daily.          CONTINUE taking these medications    acetaminophen 500 MG tablet  Commonly known as: TYLENOL  Take 1 tablet (500 mg total) by mouth every 6 (six) hours as needed for Pain.      amLODIPine 10 MG tablet  Commonly known as: NORVASC  Take 1 tablet (10 mg total) by mouth once daily.      apixaban 5 mg Tab  Commonly known as: ELIQUIS  Take 1 tablet (5 mg total) by mouth 2 (two) times daily.      aspirin 81 MG Chew  Take 81 mg by mouth every evening. 1 Tablet, Chewable Oral Every day      cetirizine 10 MG tablet  Commonly known as: ZYRTEC  Take 10 mg by mouth daily as needed for Allergies.      cholecalciferol (vitamin D3) 50 mcg (2,000 unit) Cap capsule  Commonly known as: VITAMIN D3  Take 2,000 Units by mouth once daily.      coenzyme Q10 200 mg capsule  Take 200 mg by mouth once daily.      divalproex 125 mg capsule  Commonly known as: DEPAKOTE SPRINKLE  Take 1 capsule (125 mg total) by mouth every evening.      irbesartan 300 MG tablet  Commonly known as: AVAPRO  TAKE 1 TABLET BY MOUTH EVERY DAY       levothyroxine 125 MCG tablet  Commonly known as: SYNTHROID  Take 1 tablet (125 mcg total) by mouth before breakfast.      melatonin 3 mg tablet  Commonly known as: MELATIN  Take 1 tablet (3 mg total) by mouth every evening.      memantine 5 MG Tab  Commonly known as: NAMENDA  TAKE 1 TABLET BY MOUTH TWICE DAILY      multivitamin per tablet  Commonly known as: THERAGRAN  Take 1 tablet by mouth once daily.      nitroGLYCERIN 0.4 MG SL tablet  Commonly known as: NITROSTAT  PLACE 1 TABLET UNDER THE TONGUE EVERY 5 MIN AS NEEDED FOR CHEST PAIN. MAX:3 DOSES      PRESERVISION AREDS 4,296 mcg-226 mg-90 mg Cap  Generic drug: vitamins A,C,E-zinc-copper  Take by mouth Daily.      rosuvastatin 10 MG tablet  Commonly known as: CRESTOR  Take 1 tablet (10 mg total) by mouth once daily.                Indwelling Lines/Drains at time of discharge:       Lines/Drains/Airways            Drain  Duration             Female External Urinary Catheter 05/21/23 1602 3 days                    Time spent on the discharge of patient: 35 minutes           Karolina Fernández MD  Department of Hospital Medicine  [facility] - Cardiology Stepdown      Electronically signed by Karolina Fernández MD at 5/25/2023  1:15 PM           Current Assessment & Plan     Continue home oxygen.  Patient with hypotension.  Likely due to over-diuresis.  Labs today.  Hold blood pressure medications.    Transitional Care Note    Family and/or Caretaker present at visit?  Yes.  Diagnostic tests reviewed/disposition: I have reviewed all completed as well as pending diagnostic tests at the time of discharge.  Disease/illness education:  CHF exacerbation  Home health/community services discussion/referrals: Patient has home health established at [facility].   Establishment or re-establishment of referral orders for community resources: No other necessary community resources.   Discussion with other health care providers: No discussion with other health care providers necessary.  "                 Hypotension    Overview     See heart failure.         Hypoxia    Overview     Chronic.  Patient on oxygen by nasal cannula at baseline.         Current Assessment & Plan     Continue oxygen.  Follow-up with Pulmonary and Cardiology.             Review of patient's allergies indicates:   Allergen Reactions    Lipitor [atorvastatin] Itching    Fosamax [alendronate]      Felt "strange"    Iodinated contrast media      Dye is only to be used if absolutely needed because of kidney function    Prolia [denosumab]      Reaction after the Prolia    Sulfa (sulfonamide antibiotics)      Current Outpatient Medications   Medication Instructions    acetaminophen (TYLENOL) 500 mg, Oral, Every 6 hours PRN    amLODIPine (NORVASC) 10 mg, Oral, Daily    apixaban (ELIQUIS) 5 mg, Oral, 2 times daily    aspirin 81 mg, Nightly    cetirizine (ZYRTEC) 10 mg, Oral, Daily PRN    cholecalciferol (vitamin D3) (VITAMIN D3) 2,000 Units, Oral, Daily    coenzyme Q10 200 mg, Oral, Daily    divalproex (DEPAKOTE SPRINKLE) 125 mg, Oral, Nightly    furosemide (LASIX) 40 mg, Oral, Daily    irbesartan (AVAPRO) 300 MG tablet TAKE 1 TABLET BY MOUTH EVERY DAY    levothyroxine (SYNTHROID) 125 mcg, Oral, Before breakfast    melatonin (MELATIN) 3 mg, Oral, Nightly    memantine (NAMENDA) 5 MG Tab TAKE 1 TABLET BY MOUTH TWICE DAILY    metoprolol succinate (TOPROL-XL) 12.5 mg, Oral, Daily    multivitamin (THERAGRAN) per tablet 1 tablet, Oral, Daily    nitroGLYCERIN (NITROSTAT) 0.4 MG SL tablet PLACE 1 TABLET UNDER THE TONGUE EVERY 5 MIN AS NEEDED FOR CHEST PAIN. MAX:3 DOSES    QUEtiapine (SEROQUEL) 25 mg, Oral, Nightly PRN    rosuvastatin (CRESTOR) 10 mg, Oral, Daily    spironolactone (ALDACTONE) 12.5 mg, Oral, Daily    vitamins A,C,E-zinc-copper (PRESERVISION AREDS) 4,296 mcg-226 mg-90 mg Cap 1 capsule, Oral, Daily      I have reviewed the PMH, social history, FamilyHx, surgical history, allergies and medications documented / confirmed by the " "patient at the time of this visit.  Review of Systems   Constitutional:  Positive for fatigue. Negative for chills, fever and unexpected weight change.   HENT:  Negative for ear pain and sore throat.    Eyes:  Negative for redness and visual disturbance.   Respiratory:  Negative for cough and shortness of breath.    Cardiovascular:  Negative for chest pain and palpitations.   Gastrointestinal:  Negative for nausea and vomiting.   Genitourinary:  Negative for difficulty urinating and hematuria.   Musculoskeletal:  Positive for arthralgias and gait problem. Negative for myalgias.   Skin:  Negative for rash and wound.   Neurological:  Negative for weakness and headaches.   Psychiatric/Behavioral:  Negative for sleep disturbance. The patient is not nervous/anxious.    Objective:   BP (!) 88/58   Pulse 83   Ht 5' 5" (1.651 m)   Wt 57.2 kg (126 lb)   SpO2 97%   BMI 20.97 kg/m²   Physical Exam  Vitals and nursing note reviewed.   Constitutional:       General: She is not in acute distress.     Appearance: She is well-developed. She is not ill-appearing, toxic-appearing or diaphoretic.      Comments: Patient here with family member, sitting in wheelchair comfortably, resting.  Patient nasal cannula oxygen   HENT:      Head: Normocephalic and atraumatic.      Right Ear: Hearing and external ear normal.      Left Ear: Hearing and external ear normal.      Nose: Nose normal. No rhinorrhea.   Eyes:      General: Lids are normal.      Extraocular Movements: Extraocular movements intact.      Conjunctiva/sclera: Conjunctivae normal.      Pupils: Pupils are equal, round, and reactive to light.   Cardiovascular:      Rate and Rhythm: Normal rate.      Pulses: Normal pulses.      Heart sounds: Murmur heard.   Pulmonary:      Effort: Pulmonary effort is normal. No respiratory distress.      Breath sounds: No wheezing or rales.   Abdominal:      General: Bowel sounds are normal.      Palpations: Abdomen is soft. "   Musculoskeletal:         General: Tenderness present. No deformity. Normal range of motion.      Cervical back: Normal range of motion and neck supple.   Skin:     General: Skin is warm and dry.      Capillary Refill: Capillary refill takes less than 2 seconds.      Coloration: Skin is not pale.   Neurological:      General: No focal deficit present.      Mental Status: She is alert and oriented to person, place, and time. She is not disoriented.      Cranial Nerves: No cranial nerve deficit.      Motor: No weakness.      Gait: Gait abnormal.   Psychiatric:         Attention and Perception: She is attentive.         Mood and Affect: Mood normal. Mood is not anxious or depressed.         Speech: Speech is not rapid and pressured or slurred.         Behavior: Behavior normal. Behavior is not agitated, aggressive or hyperactive. Behavior is cooperative.         Thought Content: Thought content normal. Thought content is not paranoid or delusional. Thought content does not include homicidal or suicidal ideation. Thought content does not include homicidal or suicidal plan.         Cognition and Memory: Memory is not impaired.         Judgment: Judgment normal.       Assessment:     1. Hospital discharge follow-up    2. Hypotension, unspecified hypotension type    3. Chronic systolic congestive heart failure, NYHA class 3    4. Hypoxia    5. Encounter for long-term (current) use of medications      MDM:   High medical complexity.  Moderate risk.  Total time: 46 minutes.  This includes total time spent on the encounter, which includes face to face time and non-face to face time preparing to see the patient (eg, review of previous medical records, tests), Obtaining and/or reviewing separately obtained history, documenting clinical information in the electronic or other health record, independently interpreting results (not separately reported)/communicating results to the patient/family/caregiver, and/or care coordination  (not separately reported).    I have Reviewed and summarized old records.  I have performed thorough medication reconciliation today and discussed risk and benefits of medications.  I have reviewed labs and discussed with patient.  All questions were answered.  I am requesting old records and will review them once they are available.  Cardiology, Pulmonary    I have signed for the following orders AND/OR meds.  No orders of the defined types were placed in this encounter.            Follow up in about 4 weeks (around 7/6/2023), or if symptoms worsen or fail to improve, for Med refills.  Future Appointments       Next 10 Appointments       Date Provider Specialty Appt Notes    6/9/2023  Cardiology SJM DM-- need to pair home monitor, not connected since upgrade- pt bringing monitor    6/9/2023 Demarco Fontanez MD Neurology follow up/new problem    6/15/2023  Pulmonology ILD, O2 qualification and titration    6/15/2023  Pulmonology ILD    6/15/2023  Pulmonology ILD    6/15/2023  Pulmonology ILD    6/15/2023 Beni Heller MD Transplant ILD (new ALD)    7/14/2023 Flako Carver MD Ophthalmology  OCt/DFE    8/21/2023  Cardiology 90 Day Remote Cardiac Device Check    10/11/2023  Cardiology [facility] CRTD, DM              Displaying the next 10 appointments. This patient has additional appointments scheduled.          If no improvement in symptoms or symptoms worsen, advised to call/follow-up at clinic or go to ER. Patient voiced understanding and all questions/concerns were addressed.   DISCLAIMER: This note was compiled by using a speech recognition dictation system and therefore please be aware that typographical / speech recognition errors can and do occur.  Please contact me if you see any errors specifically.    Abdiel Robles M.D.       Office: [phone] [address] [place] [address]  FAX: [phone]

## 2023-06-09 NOTE — ASSESSMENT & PLAN NOTE
Decrease Lasix to half dosage.  Hold blood pressure medications due to hypotension.  Orders have been relayed to assisted living.  Patient also following with heart failure clinic.  Heart failure precautions.  Daily weights.

## 2023-06-09 NOTE — PROGRESS NOTES
[facility] - NEUROLOGY 7TH [place] [facility], [place]    Date: 6/9/23  Patient Name: Lynn Mckinney   MRN: [id number]   PCP: Abdiel Robles  Referring Provider: No ref. provider found    Assessment:   Lynn Mckinney is a 87 y.o. female Presenting f in follow-up for management of dementia.  Seroquel changed to nightly dosing in lieu of p.r.n..  Starting Zoloft for management of depression/irritability.  All questions answered.  Reviewed advanced care planning and safety.  Plan:     Problem List Items Addressed This Visit    None  Visit Diagnoses       Multifactorial dementia    -  Primary    Relevant Medications    QUEtiapine (SEROQUEL) 25 MG Tab    Depression, unspecified depression type        Relevant Medications    sertraline (ZOLOFT) 25 MG tablet            I spent a total of 31 minutes preparing to see the patient, obtaining and reviewing history and results, performing a medically appropriate exam, counseling and educating the patient/family/caregiver, documenting clinical information, coordinating care, and ordering medications, tests, procedures, and referrals.    Demarco Fontanez MD  [facility]   Department of Neurology    Patient note was created using MModal Dictation.  Any errors in syntax or even information may not have been identified and edited on initial review prior to signing this note.  Subjective:        HPI:   Ms. Lynn Mckinney is a 87 y.o. female presenting in follow-up for management of dementia.  Patient presents with family who provide the history.  Patient reports that on the whole she was doing well however her family notes that she struggles significantly with sundowning require Seroquel most nights to sleep.  They state that she is irritable throughout the day and has a short fuse.  They are concerned that she is dealing with at least mild anxiety and depression noting that she does not seem to be her usual self and seems more withdrawn and  quicker to snap at others.  They deny any dangerous behaviors or other complaints.    PAST MEDICAL HISTORY:  Past Medical History:   Diagnosis Date    Allergy     seasonal    Anticoagulant long-term use     Blood clotting tendency     Carotid artery disease 05/29/2014    Chronic systolic congestive heart failure, NYHA class 3 5/9/2023    Coronary artery disease     Heart block     Hyperlipidemia     Hypertension     Hypothyroid     Left displaced femoral neck fracture 01/12/2023    Obesity     Pacemaker     PAD (peripheral artery disease)     Pulmonary fibrosis     Spinal stenosis     Thoracic or lumbosacral neuritis or radiculitis 11/25/2014    Tubular adenoma        PAST SURGICAL HISTORY:  Past Surgical History:   Procedure Laterality Date    ANGIOPLASTY      APPENDECTOMY      BREAST CYST ASPIRATION Bilateral     CARDIAC CATHETERIZATION      CARDIAC PACEMAKER PLACEMENT  10/10/13    by Dr. Coleman    CAROTID STENT Left 2007    by Dr. Bowen    CATARACT EXTRACTION W/  INTRAOCULAR LENS IMPLANT Bilateral 2012    Dr Sharp    CORONARY ARTERY BYPASS GRAFT  2001     x 3 LIMA-Ramus, SVG-OM1 & SVG-PDA    CORONARY BYPASS GRAFT ANGIOGRAPHY  3/11/2021    Procedure: Bypass graft study;  Surgeon: Gil Garcia MD;  Location: [facility] CATH LAB;  Service: Cardiology;;    CYST REMOVAL      wrist    EYE SURGERY      HEMIARTHROPLASTY OF HIP Left 1/13/2023    Procedure: HEMIARTHROPLASTY, HIP, LEFT;  Surgeon: Alan Vieyra MD;  Location: [facility] OR [address] [place] [address];  Service: Orthopedics;  Laterality: Left;    HYSTERECTOMY      INSERTION OF BIVENTRICULAR IMPLANTABLE CARDIOVERTER-DEFIBRILLATOR (ICD) Left 5/9/2023    Procedure: INSERTION, ICD, BIVENTRICULAR;  Surgeon: Diony Coleman MD;  Location: [facility] EP LAB;  Service: Cardiology;  Laterality: Left;  VF/VT, CRT-D upgrade, SJM, MAC, DM, 3 Prep *dICD in place*    LEFT HEART CATHETERIZATION Left 3/11/2021    Procedure: Left heart cath;  Surgeon: Gil Garcia MD;  Location: [facility] CATH LAB;   Service: Cardiology;  Laterality: Left;    OOPHORECTOMY      REMOVAL, DEVICE  5/9/2023    Procedure: Removal, Device;  Surgeon: Diony Coleman MD;  Location: [facility] EP LAB;  Service: Cardiology;;    THYROIDECTOMY      VITRECTOMY BY PARS PLANA APPROACH Right 10/12/2021    Procedure: VITRECTOMY, PARS PLANA APPROACH;  Surgeon: Flako Carver MD;  Location: [facility] OR [address] [place] [address];  Service: Ophthalmology;  Laterality: Right;       CURRENT MEDS:  Current Outpatient Medications   Medication Sig Dispense Refill    acetaminophen (TYLENOL) 500 MG tablet Take 1 tablet (500 mg total) by mouth every 6 (six) hours as needed for Pain. 90 tablet 12    amLODIPine (NORVASC) 10 MG tablet Take 1 tablet (10 mg total) by mouth once daily. 90 tablet 3    apixaban (ELIQUIS) 5 mg Tab Take 1 tablet (5 mg total) by mouth 2 (two) times daily. 60 tablet 11    aspirin 81 MG Chew Take 81 mg by mouth every evening. 1 Tablet, Chewable Oral Every day      cetirizine (ZYRTEC) 10 MG tablet Take 10 mg by mouth daily as needed for Allergies.      cholecalciferol, vitamin D3, (VITAMIN D3) 50 mcg (2,000 unit) Cap capsule Take 2,000 Units by mouth once daily.      coenzyme Q10 200 mg capsule Take 200 mg by mouth once daily. 90 capsule 4    divalproex (DEPAKOTE SPRINKLE) 125 mg capsule Take 1 capsule (125 mg total) by mouth every evening. 30 capsule 2    furosemide (LASIX) 20 MG tablet Take 1 tablet (20 mg total) by mouth once daily. 30 tablet 11    irbesartan (AVAPRO) 300 MG tablet TAKE 1 TABLET BY MOUTH EVERY DAY 30 tablet 11    levothyroxine (SYNTHROID) 125 MCG tablet Take 1 tablet (125 mcg total) by mouth before breakfast. 90 tablet 4    melatonin (MELATIN) 3 mg tablet Take 1 tablet (3 mg total) by mouth every evening. 30 tablet 12    metoprolol succinate (TOPROL-XL) 25 MG 24 hr tablet Take 0.5 tablets (12.5 mg total) by mouth once daily. 15 tablet 11    multivitamin (THERAGRAN) per tablet Take 1 tablet by mouth once daily. 90 tablet 4    nitroGLYCERIN  "(NITROSTAT) 0.4 MG SL tablet PLACE 1 TABLET UNDER THE TONGUE EVERY 5 MIN AS NEEDED FOR CHEST PAIN. MAX:3 DOSES 30 tablet 3    rosuvastatin (CRESTOR) 10 MG tablet Take 1 tablet (10 mg total) by mouth once daily. 90 tablet 3    spironolactone (ALDACTONE) 25 MG tablet Take 0.5 tablets (12.5 mg total) by mouth once daily. 45 tablet 3    vitamins A,C,E-zinc-copper (PRESERVISION AREDS) 4,296 mcg-226 mg-90 mg Cap Take 1 capsule by mouth Daily. 90 capsule 4    QUEtiapine (SEROQUEL) 25 MG Tab Take 1 tablet (25 mg total) by mouth every evening. 30 tablet 2    sertraline (ZOLOFT) 25 MG tablet Take 1 tablet (25 mg total) by mouth once daily. 30 tablet 11     No current facility-administered medications for this visit.       ALLERGIES:  Review of patient's allergies indicates:   Allergen Reactions    Lipitor [atorvastatin] Itching    Fosamax [alendronate]      Felt "strange"    Iodinated contrast media      Dye is only to be used if absolutely needed because of kidney function    Prolia [denosumab]      Reaction after the Prolia    Sulfa (sulfonamide antibiotics)        FAMILY HISTORY:  Family History   Problem Relation Age of Onset    Heart disease Father         coronary thrombosis    Hypertension Father     Alzheimer's disease Mother     Heart disease Mother         pacemaker    Hypertension Mother     Diabetes Brother     No Known Problems Sister     No Known Problems Maternal Grandmother     No Known Problems Maternal Grandfather     No Known Problems Paternal Grandmother     No Known Problems Paternal Grandfather     Heart disease Brother     No Known Problems Maternal Aunt     No Known Problems Maternal Uncle     No Known Problems Paternal Aunt     No Known Problems Paternal Uncle     Amblyopia Neg Hx     Blindness Neg Hx     Cancer Neg Hx     Cataracts Neg Hx     Glaucoma Neg Hx     Macular degeneration Neg Hx     Retinal detachment Neg Hx     Strabismus Neg Hx     Stroke Neg Hx     Thyroid disease Neg Hx     Melanoma " "Neg Hx        SOCIAL HISTORY:  Social History     Tobacco Use    Smoking status: Former     Packs/day: 1.00     Types: Cigarettes     Start date:      Quit date: 1984     Years since quittin.8     Passive exposure: Past    Smokeless tobacco: Never   Substance Use Topics    Alcohol use: Not Currently     Alcohol/week: 1.0 standard drink     Types: 1 Shots of liquor per week     Comment: old fashion  with dinner every 6 mo     Drug use: No       Review of Systems:  12 system review of systems is negative except for the symptoms mentioned in HPI.      Objective:     Vitals:    23 1041   BP: 128/73   Pulse: 82   Weight: 57.2 kg (126 lb)   Height: 5' 5" (1.651 m)     General: NAD, well nourished   Eyes: no tearing, discharge, no erythema   ENT: moist mucous membranes of the oral cavity, nares patent    Neck: Supple, full range of motion  Cardiovascular: Warm and well perfused, pulses equal and symmetrical  Lungs: Normal work of breathing, normal chest wall excursions  Skin: No rash, lesions, or breakdown on exposed skin  Psychiatry: Mood and affect are appropriate   Abdomen: soft, non tender, non distended  Extremeties: No cyanosis, clubbing or edema.    Neurological   MENTAL STATUS: Alert and oriented to person, place, not time. Attention and concentration fair. Speech without dysarthria, able to name and repeat without difficulty. Recent and remote memory fair to poor  CRANIAL NERVES: Visual fields intact. PERRL. EOMI. Facial sensation intact. Face symmetrical. Hearing grossly intact. Full shoulder shrug bilaterally. Tongue protrudes midline   SENSORY: Sensation is intact to light touch throughout.    MOTOR: Normal bulk and tone.  5/5 deltoid, biceps, triceps, interosseous, hand  bilaterally. 5/5 iliopsoas, knee extension/flexion, foot dorsi/plantarflexion bilaterally.    REFLEXES: Symmetric and 2+ throughout.   CEREBELLAR/COORDINATION/GAIT: Finger to nose intact. Normal rapid alternating " movements.

## 2023-06-09 NOTE — TELEPHONE ENCOUNTER
----- Message from Demarco Fontanez MD sent at 6/9/2023  2:21 PM CDT -----  Contact: @ [phone]  She should be getting 25 mg nightly    ----- Message -----  From: Nancy Vogel MA  Sent: 6/9/2023   1:45 PM CDT  To: Demarco Fontanez MD    I can call them I just need to know which one   ----- Message -----  From: Angeles Slade  Sent: 6/9/2023  12:40 PM CDT  To: Huseyin Ballesteros is calling on behalf of the patient to see if they want the patient to stay on QUEtiapine (SEROQUEL) 25 MG Tab scheduled or the one that is PRN please call and adv @ [phone]

## 2023-06-13 ENCOUNTER — PATIENT MESSAGE (OUTPATIENT)
Dept: FAMILY MEDICINE | Facility: CLINIC | Age: 88
End: 2023-06-13
Payer: MEDICARE

## 2023-06-13 DIAGNOSIS — I50.22 CHRONIC SYSTOLIC CONGESTIVE HEART FAILURE, NYHA CLASS 3: Primary | ICD-10-CM

## 2023-06-13 NOTE — TELEPHONE ENCOUNTER
"I received your message which was reviewed along with the the medication list and allergies that we have below.  Please review it for accuracy to make sure that we have the most recent records on your history.     Based on this, the following orders were placed AND/OR medicines were sent in.     Orders Placed This Encounter   Procedures    Ambulatory referral/consult to Hospice     Standing Status:   Future     Standing Expiration Date:   7/13/2024     Referral Priority:   Routine     Referral Type:   Consultation     Referral Reason:   Specialty Services Required     Referred to Provider:   [facility]     Requested Specialty:   Hospice Services     Number of Visits Requested:   1       Medications written and sent at this time include:       Your pharmacy(ies) of choice at this time on record include the list below and any medications would have been sent to the one at the top.    [facility]. [address] [address]  Phone: [phone] Fax: [phone]      Thank you for choosing us as your healthcare provider!  Dr. Abdiel Robles    ALLERGY LIST  Review of patient's allergies indicates:   Allergen Reactions    Lipitor [atorvastatin] Itching    Fosamax [alendronate]      Felt "strange"    Iodinated contrast media      Dye is only to be used if absolutely needed because of kidney function    Prolia [denosumab]      Reaction after the Prolia    Sulfa (sulfonamide antibiotics)        MEDICATION LIST  Current Outpatient Medications on File Prior to Visit   Medication Sig Dispense Refill    acetaminophen (TYLENOL) 500 MG tablet Take 1 tablet (500 mg total) by mouth every 6 (six) hours as needed for Pain. 90 tablet 12    amLODIPine (NORVASC) 10 MG tablet Take 1 tablet (10 mg total) by mouth once daily. 90 tablet 3    apixaban (ELIQUIS) 5 mg Tab Take 1 tablet (5 mg total) by mouth 2 (two) times daily. 60 tablet 11    aspirin 81 MG Chew Take " 81 mg by mouth every evening. 1 Tablet, Chewable Oral Every day      cetirizine (ZYRTEC) 10 MG tablet Take 10 mg by mouth daily as needed for Allergies.      cholecalciferol, vitamin D3, (VITAMIN D3) 50 mcg (2,000 unit) Cap capsule Take 2,000 Units by mouth once daily.      coenzyme Q10 200 mg capsule Take 200 mg by mouth once daily. 90 capsule 4    divalproex (DEPAKOTE SPRINKLE) 125 mg capsule Take 1 capsule (125 mg total) by mouth every evening. 30 capsule 2    furosemide (LASIX) 20 MG tablet Take 1 tablet (20 mg total) by mouth once daily. 30 tablet 11    irbesartan (AVAPRO) 300 MG tablet TAKE 1 TABLET BY MOUTH EVERY DAY 30 tablet 11    levothyroxine (SYNTHROID) 125 MCG tablet Take 1 tablet (125 mcg total) by mouth before breakfast. 90 tablet 4    melatonin (MELATIN) 3 mg tablet Take 1 tablet (3 mg total) by mouth every evening. 30 tablet 12    metoprolol succinate (TOPROL-XL) 25 MG 24 hr tablet Take 0.5 tablets (12.5 mg total) by mouth once daily. 15 tablet 11    multivitamin (THERAGRAN) per tablet Take 1 tablet by mouth once daily. 90 tablet 4    nitroGLYCERIN (NITROSTAT) 0.4 MG SL tablet PLACE 1 TABLET UNDER THE TONGUE EVERY 5 MIN AS NEEDED FOR CHEST PAIN. MAX:3 DOSES 30 tablet 3    QUEtiapine (SEROQUEL) 25 MG Tab Take 1 tablet (25 mg total) by mouth every evening. 30 tablet 2    rosuvastatin (CRESTOR) 10 MG tablet Take 1 tablet (10 mg total) by mouth once daily. 90 tablet 3    sertraline (ZOLOFT) 25 MG tablet Take 1 tablet (25 mg total) by mouth once daily. 30 tablet 11    spironolactone (ALDACTONE) 25 MG tablet Take 0.5 tablets (12.5 mg total) by mouth once daily. 45 tablet 3    vitamins A,C,E-zinc-copper (PRESERVISION AREDS) 4,296 mcg-226 mg-90 mg Cap Take 1 capsule by mouth Daily. 90 capsule 4     No current facility-administered medications on file prior to visit.       HEALTH MAINTENANCE THAT IS OVERDUE OR NEEDS TO BE UPDATED ON OUR CHART IS LISTED BELOW.  IF YOU HAVE HAD IT DONE ELSEWHERE, PLEASE SEND  US DATES AND RECORDS IF YOU HAVE THEM TO MAKE YOUR CHART ACCURATE.  IF YOU HAVE NOT HAD THESE DONE AND ARE READY FOR US TO SCHEDULE THEM, PLEASE SEND US A MESSAGE.  Health Maintenance Due   Topic Date Due    Shingles Vaccine (3 of 3) 10/20/2020    COVID-19 Vaccine (5 - Pfizer series) 03/17/2023       DISCLAIMER: This note was compiled by using a speech recognition dictation system and therefore please be aware that typographical / speech recognition errors can and do occur.  Please contact me if you see any errors specifically.    Abdiel Robles MD  We Offer Telehealth & Same Day Appointments!   Book your Telehealth appointment with me through my nurse or   Clinic appointments on [facility]!  Sededg-[phone]     Check out my Facebook Page and Follow Me at: CLICK HERE    Check out my website at [employer] by clicking on: CLICK HERE    To Schedule appointments online, go to [employer]: CLICK HERE     Location: [email or web address]    [address] [place] [address]    FAX: [phone]

## 2023-06-15 ENCOUNTER — HOSPITAL ENCOUNTER (OUTPATIENT)
Dept: PULMONOLOGY | Facility: CLINIC | Age: 88
Discharge: HOME | End: 2023-06-15
Payer: MEDICARE

## 2023-06-15 ENCOUNTER — OFFICE VISIT (OUTPATIENT)
Dept: TRANSPLANT | Facility: CLINIC | Age: 88
End: 2023-06-15
Payer: MEDICARE

## 2023-06-15 VITALS
HEIGHT: 62 IN | OXYGEN SATURATION: 100 % | DIASTOLIC BLOOD PRESSURE: 60 MMHG | BODY MASS INDEX: 23.61 KG/M2 | WEIGHT: 128.31 LBS | BODY MASS INDEX: 23.61 KG/M2 | HEIGHT: 62 IN | WEIGHT: 128.31 LBS | HEART RATE: 60 BPM | SYSTOLIC BLOOD PRESSURE: 158 MMHG

## 2023-06-15 DIAGNOSIS — J84.9 INTERSTITIAL LUNG DISEASE: ICD-10-CM

## 2023-06-15 DIAGNOSIS — J96.11 CHRONIC HYPOXEMIC RESPIRATORY FAILURE: ICD-10-CM

## 2023-06-15 DIAGNOSIS — I50.22 CHRONIC SYSTOLIC CONGESTIVE HEART FAILURE, NYHA CLASS 3: ICD-10-CM

## 2023-06-15 DIAGNOSIS — J84.9 INTERSTITIAL LUNG DISEASE: Primary | ICD-10-CM

## 2023-06-15 LAB
DLCO ADJ PRE: 4.63 ML/(MIN*MMHG) (ref 11.82–23.29)
DLCO SINGLE BREATH LLN: 11.82
DLCO SINGLE BREATH PRE REF: 24.7 %
DLCO SINGLE BREATH REF: 17.55
DLCOC SBVA LLN: 2.32
DLCOC SBVA PRE REF: 62.4 %
DLCOC SBVA REF: 3.81
DLCOC SINGLE BREATH LLN: 11.82
DLCOC SINGLE BREATH PRE REF: 26.4 %
DLCOC SINGLE BREATH REF: 17.55
DLCOCSBVAULN: 5.31
DLCOCSINGLEBREATHULN: 23.29
DLCOSINGLEBREATHULN: 23.29
DLCOVA LLN: 2.32
DLCOVA PRE REF: 58.4 %
DLCOVA PRE: 2.23 ML/(MIN*MMHG*L) (ref 2.32–5.31)
DLCOVA REF: 3.81
DLCOVAULN: 5.31
DLVAADJ PRE: 2.38 ML/(MIN*MMHG*L) (ref 2.32–5.31)
FEF 25 75 LLN: 0.5
FEF 25 75 PRE REF: 151.7 %
FEF 25 75 REF: 1.3
FEV05 LLN: 0.51
FEV05 REF: 1.37
FEV1 FVC LLN: 61
FEV1 FVC PRE REF: 114.7 %
FEV1 FVC REF: 77
FEV1 LLN: 1.11
FEV1 PRE REF: 80.5 %
FEV1 REF: 1.64
FVC LLN: 1.48
FVC PRE REF: 69 %
FVC REF: 2.18
IVC PRE: 1.33 L (ref 1.48–2.93)
IVC SINGLE BREATH LLN: 1.48
IVC SINGLE BREATH PRE REF: 61.2 %
IVC SINGLE BREATH REF: 2.18
IVCSINGLEBREATHULN: 2.93
PEF LLN: 2.16
PEF PRE REF: 151.9 %
PEF REF: 3.77
PHYSICIAN COMMENT: ABNORMAL
PRE DLCO: 4.33 ML/(MIN*MMHG) (ref 11.82–23.29)
PRE FEF 25 75: 1.98 L/S (ref 0.5–2.57)
PRE FET 100: 5.53 SEC
PRE FEV05 REF: 83.8 %
PRE FEV1 FVC: 87.79 % (ref 60.8–90.29)
PRE FEV1: 1.32 L (ref 1.11–2.14)
PRE FEV5: 1.15 L (ref 0.51–2.22)
PRE FVC: 1.5 L (ref 1.48–2.93)
PRE PEF: 5.72 L/S (ref 2.16–5.38)
VA PRE: 1.95 L (ref 4.45–4.45)
VA SINGLE BREATH LLN: 4.45
VA SINGLE BREATH PRE REF: 43.7 %
VA SINGLE BREATH REF: 4.45
VASINGLEBREATHULN: 4.45

## 2023-06-15 PROCEDURE — 1126F PR PAIN SEVERITY QUANTIFIED, NO PAIN PRESENT: ICD-10-PCS | Mod: CPTII,NTX,S$GLB, | Performed by: INTERNAL MEDICINE

## 2023-06-15 PROCEDURE — 94729 PR C02/MEMBANE DIFFUSE CAPACITY: ICD-10-PCS | Mod: NTX,S$GLB,, | Performed by: INTERNAL MEDICINE

## 2023-06-15 PROCEDURE — 1111F DSCHRG MED/CURRENT MED MERGE: CPT | Mod: CPTII,NTX,S$GLB, | Performed by: INTERNAL MEDICINE

## 2023-06-15 PROCEDURE — 1157F PR ADVANCE CARE PLAN OR EQUIV PRESENT IN MEDICAL RECORD: ICD-10-PCS | Mod: CPTII,NTX,S$GLB, | Performed by: INTERNAL MEDICINE

## 2023-06-15 PROCEDURE — 94618 PULMONARY STRESS TESTING: CPT | Mod: NTX,S$GLB,, | Performed by: INTERNAL MEDICINE

## 2023-06-15 PROCEDURE — 94727 PR PULM FUNCTION TEST BY GAS: ICD-10-PCS | Mod: 53,NTX,S$GLB, | Performed by: INTERNAL MEDICINE

## 2023-06-15 PROCEDURE — 99999 PR PBB SHADOW E&M-EST. PATIENT-LVL III: CPT | Mod: PBBFAC,TXP,, | Performed by: INTERNAL MEDICINE

## 2023-06-15 PROCEDURE — 94618 PULMONARY STRESS TESTING: ICD-10-PCS | Mod: NTX,S$GLB,, | Performed by: INTERNAL MEDICINE

## 2023-06-15 PROCEDURE — 99214 OFFICE O/P EST MOD 30 MIN: CPT | Mod: NTX,S$GLB,, | Performed by: INTERNAL MEDICINE

## 2023-06-15 PROCEDURE — 1111F PR DISCHARGE MEDS RECONCILED W/ CURRENT OUTPATIENT MED LIST: ICD-10-PCS | Mod: CPTII,NTX,S$GLB, | Performed by: INTERNAL MEDICINE

## 2023-06-15 PROCEDURE — 3288F PR FALLS RISK ASSESSMENT DOCUMENTED: ICD-10-PCS | Mod: CPTII,NTX,S$GLB, | Performed by: INTERNAL MEDICINE

## 2023-06-15 PROCEDURE — 94010 BREATHING CAPACITY TEST: ICD-10-PCS | Mod: NTX,S$GLB,, | Performed by: INTERNAL MEDICINE

## 2023-06-15 PROCEDURE — 1126F AMNT PAIN NOTED NONE PRSNT: CPT | Mod: CPTII,NTX,S$GLB, | Performed by: INTERNAL MEDICINE

## 2023-06-15 PROCEDURE — 1100F PTFALLS ASSESS-DOCD GE2>/YR: CPT | Mod: CPTII,NTX,S$GLB, | Performed by: INTERNAL MEDICINE

## 2023-06-15 PROCEDURE — 1100F PR PT FALLS ASSESS DOC 2+ FALLS/FALL W/INJURY/YR: ICD-10-PCS | Mod: CPTII,NTX,S$GLB, | Performed by: INTERNAL MEDICINE

## 2023-06-15 PROCEDURE — 99999 PR PBB SHADOW E&M-EST. PATIENT-LVL III: ICD-10-PCS | Mod: PBBFAC,TXP,, | Performed by: INTERNAL MEDICINE

## 2023-06-15 PROCEDURE — 3288F FALL RISK ASSESSMENT DOCD: CPT | Mod: CPTII,NTX,S$GLB, | Performed by: INTERNAL MEDICINE

## 2023-06-15 PROCEDURE — 94010 BREATHING CAPACITY TEST: CPT | Mod: NTX,S$GLB,, | Performed by: INTERNAL MEDICINE

## 2023-06-15 PROCEDURE — 94729 DIFFUSING CAPACITY: CPT | Mod: NTX,S$GLB,, | Performed by: INTERNAL MEDICINE

## 2023-06-15 PROCEDURE — 99214 PR OFFICE/OUTPT VISIT, EST, LEVL IV, 30-39 MIN: ICD-10-PCS | Mod: NTX,S$GLB,, | Performed by: INTERNAL MEDICINE

## 2023-06-15 PROCEDURE — 94727 GAS DIL/WSHOT DETER LNG VOL: CPT | Mod: 53,NTX,S$GLB, | Performed by: INTERNAL MEDICINE

## 2023-06-15 PROCEDURE — 1157F ADVNC CARE PLAN IN RCRD: CPT | Mod: CPTII,NTX,S$GLB, | Performed by: INTERNAL MEDICINE

## 2023-06-15 NOTE — LETTER
June 17, 2023        Beni Heller  [address]  [facility] [address]  Phone: [phone]  Fax: [phone]             [facility] 1st Fl  [address]  [facility] [address]  Phone: [phone]   Patient: Lynn Mckinney   MR Number: [id number]   YOB: 1935   Date of Visit: 6/15/2023       Dear Dr. Beni Heller    Thank you for referring Lynn Mckinney to me for evaluation. Attached you will find relevant portions of my assessment and plan of care.    If you have questions, please do not hesitate to call me. I look forward to following Lynn Mckinney along with you.    Sincerely,    Beni Heller MD    Enclosure    If you would like to receive this communication electronically, please contact [email or web address] or [phone] to request [employer] access.    [employer] is a tool which provides read-only access to select patient information with whom you have a relationship. Its easy to use and provides real time access to review your patients record including encounter summaries, notes, results, and demographic information.    If you feel you have received this communication in error or would no longer like to receive these types of communications, please e-mail [email or web address]

## 2023-06-15 NOTE — PROGRESS NOTES
Advanced Lung Disease INITIAL EVALUATION                                                                                                                                           Reason for Visit:  Evaluation for ALD    Referring Physician: Beni Heller MD    History of Present Illness:   Lynn Mckinney is a 87 y.o. female who is on 4L of oxygen.  She is on no assisted ventilation.  Her New York Heart Association Class is III and a Karnofsky score of 50% - Requires considerable assistance and frequent medical care. She is not diabetic.    Pt is a 86 yo yo CW pmh chronic hypoxemic respiratory failure, HTN, HLD, CHB s/p ICD/pacemaker, pAF, hx of VT, CABG x3, bilateral carotid artery stenosis, PAD, HFrEF (40%), CKD3, hypothyroidism, dementia,  who presents for evaluation of ILD. Found on CT chest during hospitalization for acute on chronic CHF exacerbation with improvement with diuresis. Lived around [facility] several years. Afrin nose spray.     Amiodarone: once in hospital    Smoking hx: quit 1985, ppd, 17 yo  Work hx:   Exposure hx: none  Inhaler use:   PRN inhaler use:   Family hx: none  Personal hx: none    Immunology:   Positive: DARRIAN (1:640),   Negative:       Past Medical History:   Diagnosis Date    Allergy     seasonal    Anticoagulant long-term use     Blood clotting tendency     Carotid artery disease 05/29/2014    Chronic systolic congestive heart failure, NYHA class 3 5/9/2023    Coronary artery disease     Heart block     Hyperlipidemia     Hypertension     Hypothyroid     Left displaced femoral neck fracture 01/12/2023    Obesity     Pacemaker     PAD (peripheral artery disease)     Pulmonary fibrosis     Spinal stenosis     Thoracic or lumbosacral neuritis or radiculitis 11/25/2014    Tubular adenoma        Past Surgical History:   Procedure Laterality Date    ANGIOPLASTY      APPENDECTOMY      BREAST CYST ASPIRATION Bilateral     CARDIAC CATHETERIZATION      CARDIAC  PACEMAKER PLACEMENT  10/10/13    by Dr. Coleman    CAROTID STENT Left 2007    by Dr. Bowen    CATARACT EXTRACTION W/  INTRAOCULAR LENS IMPLANT Bilateral 2012    Dr Sharp    CORONARY ARTERY BYPASS GRAFT  2001     x 3 LIMA-Ramus, SVG-OM1 & SVG-PDA    CORONARY BYPASS GRAFT ANGIOGRAPHY  3/11/2021    Procedure: Bypass graft study;  Surgeon: Gil Garcia MD;  Location: [facility] CATH LAB;  Service: Cardiology;;    CYST REMOVAL      wrist    EYE SURGERY      HEMIARTHROPLASTY OF HIP Left 1/13/2023    Procedure: HEMIARTHROPLASTY, HIP, LEFT;  Surgeon: Alan Vieyra MD;  Location: [facility] OR 2ND FLR;  Service: Orthopedics;  Laterality: Left;    HYSTERECTOMY      INSERTION OF BIVENTRICULAR IMPLANTABLE CARDIOVERTER-DEFIBRILLATOR (ICD) Left 5/9/2023    Procedure: INSERTION, ICD, BIVENTRICULAR;  Surgeon: Diony Coleman MD;  Location: [facility] EP LAB;  Service: Cardiology;  Laterality: Left;  VF/VT, CRT-D upgrade, SJM, MAC, DM, 3 Prep *dICD in place*    LEFT HEART CATHETERIZATION Left 3/11/2021    Procedure: Left heart cath;  Surgeon: Gil Garcia MD;  Location: [facility] CATH LAB;  Service: Cardiology;  Laterality: Left;    OOPHORECTOMY      REMOVAL, DEVICE  5/9/2023    Procedure: Removal, Device;  Surgeon: Diony Coleman MD;  Location: [facility] EP LAB;  Service: Cardiology;;    THYROIDECTOMY      VITRECTOMY BY PARS PLANA APPROACH Right 10/12/2021    Procedure: VITRECTOMY, PARS PLANA APPROACH;  Surgeon: Flako Carver MD;  Location: [facility] 1ST FLR;  Service: Ophthalmology;  Laterality: Right;       Allergies: Lipitor [atorvastatin], Fosamax [alendronate], Iodinated contrast media, Prolia [denosumab], and Sulfa (sulfonamide antibiotics)    Current Outpatient Medications   Medication Sig    acetaminophen (TYLENOL) 500 MG tablet Take 1 tablet (500 mg total) by mouth every 6 (six) hours as needed for Pain.    amLODIPine (NORVASC) 10 MG tablet Take 1 tablet (10 mg total) by mouth once daily.    apixaban (ELIQUIS) 5 mg Tab Take 1  tablet (5 mg total) by mouth 2 (two) times daily.    aspirin 81 MG Chew Take 81 mg by mouth every evening. 1 Tablet, Chewable Oral Every day    cetirizine (ZYRTEC) 10 MG tablet Take 10 mg by mouth daily as needed for Allergies.    cholecalciferol, vitamin D3, (VITAMIN D3) 50 mcg (2,000 unit) Cap capsule Take 2,000 Units by mouth once daily.    coenzyme Q10 200 mg capsule Take 200 mg by mouth once daily.    divalproex (DEPAKOTE SPRINKLE) 125 mg capsule Take 1 capsule (125 mg total) by mouth every evening.    furosemide (LASIX) 20 MG tablet Take 1 tablet (20 mg total) by mouth once daily.    irbesartan (AVAPRO) 300 MG tablet TAKE 1 TABLET BY MOUTH EVERY DAY    levothyroxine (SYNTHROID) 125 MCG tablet Take 1 tablet (125 mcg total) by mouth before breakfast.    melatonin (MELATIN) 3 mg tablet Take 1 tablet (3 mg total) by mouth every evening.    metoprolol succinate (TOPROL-XL) 25 MG 24 hr tablet Take 0.5 tablets (12.5 mg total) by mouth once daily.    multivitamin (THERAGRAN) per tablet Take 1 tablet by mouth once daily.    nitroGLYCERIN (NITROSTAT) 0.4 MG SL tablet PLACE 1 TABLET UNDER THE TONGUE EVERY 5 MIN AS NEEDED FOR CHEST PAIN. MAX:3 DOSES    QUEtiapine (SEROQUEL) 25 MG Tab Take 1 tablet (25 mg total) by mouth every evening.    rosuvastatin (CRESTOR) 10 MG tablet Take 1 tablet (10 mg total) by mouth once daily.    sertraline (ZOLOFT) 25 MG tablet Take 1 tablet (25 mg total) by mouth once daily.    spironolactone (ALDACTONE) 25 MG tablet Take 0.5 tablets (12.5 mg total) by mouth once daily.    vitamins A,C,E-zinc-copper (PRESERVISION AREDS) 4,296 mcg-226 mg-90 mg Cap Take 1 capsule by mouth Daily.     No current facility-administered medications for this visit.       Immunization History   Administered Date(s) Administered    COVID-19, MRNA, LN-S, PF (Pfizer) (Purple Cap) 01/10/2021, 01/31/2021, 11/04/2021    COVID-19, mRNA, LNP-S, bivalent booster, PF (PFIZER OMICRON) 11/17/2022    Influenza 11/01/2007,  09/28/2009, 09/20/2010    Influenza (FLUAD) - Quadrivalent - Adjuvanted - PF *Preferred* (65+) 09/08/2020    Influenza - High Dose - PF (65 years and older) 09/14/2011, 09/25/2012, 09/10/2013, 10/22/2015, 10/10/2016, 09/25/2017, 12/11/2017, 09/26/2018, 10/18/2019    Influenza - Quadrivalent 10/13/2014    Influenza - Quadrivalent - High Dose - PF (65 years and older) 09/15/2021    PPD Test 01/06/2023, 01/17/2023    Pneumococcal Conjugate - 13 Valent 03/03/2016    Pneumococcal Conjugate - 7 Valent 04/09/2012    Pneumococcal Polysaccharide - 23 Valent 11/06/2014    Tdap 01/05/2023    Zoster 11/26/2014    Zoster Recombinant 08/25/2020     Family History:    Family History   Problem Relation Age of Onset    Heart disease Father         coronary thrombosis    Hypertension Father     Alzheimer's disease Mother     Heart disease Mother         pacemaker    Hypertension Mother     Diabetes Brother     No Known Problems Sister     No Known Problems Maternal Grandmother     No Known Problems Maternal Grandfather     No Known Problems Paternal Grandmother     No Known Problems Paternal Grandfather     Heart disease Brother     No Known Problems Maternal Aunt     No Known Problems Maternal Uncle     No Known Problems Paternal Aunt     No Known Problems Paternal Uncle     Amblyopia Neg Hx     Blindness Neg Hx     Cancer Neg Hx     Cataracts Neg Hx     Glaucoma Neg Hx     Macular degeneration Neg Hx     Retinal detachment Neg Hx     Strabismus Neg Hx     Stroke Neg Hx     Thyroid disease Neg Hx     Melanoma Neg Hx      Social History     Substance and Sexual Activity   Alcohol Use Not Currently    Alcohol/week: 1.0 standard drink    Types: 1 Shots of liquor per week    Comment: old fashion  with dinner every 6 mo       Social History     Substance and Sexual Activity   Drug Use No      Social History     Socioeconomic History    Marital status: Single   Tobacco Use    Smoking status: Former     Types: Cigarettes     Quit date:  1984     Years since quittin.8    Smokeless tobacco: Never   Substance and Sexual Activity    Alcohol use: Not Currently     Alcohol/week: 1.0 standard drink     Types: 1 Shots of liquor per week     Comment: old fashion  with dinner every 6 mo     Drug use: No   Other Topics Concern    Are you pregnant or think you may be? No    Breast-feeding No   Social History Narrative    Minimal physical activity. Does housework.         Drives a little.      Social Determinants of Health     Financial Resource Strain: Low Risk     Difficulty of Paying Living Expenses: Not hard at all   Food Insecurity: No Food Insecurity    Worried About Running Out of Food in the Last Year: Never true    Ran Out of Food in the Last Year: Never true   Transportation Needs: No Transportation Needs    Lack of Transportation (Medical): No    Lack of Transportation (Non-Medical): No   Physical Activity: Inactive    Days of Exercise per Week: 0 days    Minutes of Exercise per Session: 0 min   Stress: No Stress Concern Present    Feeling of Stress : Not at all   Social Connections: Unknown    Frequency of Communication with Friends and Family: More than three times a week    Frequency of Social Gatherings with Friends and Family: More than three times a week    Attends Evangelical Services: Never    Active Member of Clubs or Organizations: No    Attends Club or Organization Meetings: Never   Housing Stability: Low Risk     Unable to Pay for Housing in the Last Year: No    Number of Places Lived in the Last Year: 2    Unstable Housing in the Last Year: No     ROS    Vitals  There were no vitals taken for this visit.    Physical Exam    Labs:  No visits with results within 7 Day(s) from this visit.   Latest known visit with results is:   Lab Visit on 2023   Component Date Value    BNP 2023 301 (H)     WBC 2023 6.75     RBC 2023 3.61 (L)     Hemoglobin 2023 11.5 (L)     Hematocrit 2023 34.9 (L)     MCV  06/08/2023 97     MCH 06/08/2023 31.9 (H)     MCHC 06/08/2023 33.0     RDW 06/08/2023 13.3     Platelets 06/08/2023 172     MPV 06/08/2023 9.3     Immature Granulocytes 06/08/2023 0.1     Gran # (ANC) 06/08/2023 4.8     Immature Grans (Abs) 06/08/2023 0.01     Lymph # 06/08/2023 0.9 (L)     Mono # 06/08/2023 0.7     Eos # 06/08/2023 0.4     Baso # 06/08/2023 0.03     nRBC 06/08/2023 0     Gran % 06/08/2023 71.4     Lymph % 06/08/2023 13.3 (L)     Mono % 06/08/2023 9.6     Eosinophil % 06/08/2023 5.2     Basophil % 06/08/2023 0.4     Differential Method 06/08/2023 Automated     Sodium 06/08/2023 143     Potassium 06/08/2023 3.9     Chloride 06/08/2023 104     CO2 06/08/2023 29     Glucose 06/08/2023 114 (H)     BUN 06/08/2023 35 (H)     Creatinine 06/08/2023 1.7 (H)     Calcium 06/08/2023 8.9     Total Protein 06/08/2023 6.8     Albumin 06/08/2023 3.2 (L)     Total Bilirubin 06/08/2023 0.4     Alkaline Phosphatase 06/08/2023 67     AST 06/08/2023 18     ALT 06/08/2023 7 (L)     Anion Gap 06/08/2023 10     eGFR 06/08/2023 28.8 (A)     Magnesium 06/08/2023 2.0     Phosphorus 06/08/2023 5.0 (H)      PFT:   6/15/23  FEV1: 1.32L (80.5%)  FVC: 1.51L (69%)  FEV1/FVC: 88  TLC: unable to perform  DLCO: 4.63 (26.4%)    Test Results:     The test was completed with stops.  The patient stopped 1 time for a total of 72 seconds.  The total time walked was 288 seconds.  During walking, the patient reported:  Dyspnea, Lightheadedness. The patient used supplemental oxygen and a wheelchair for assistance during testing.      06/15/2023---------Distance: 152.4 meters (500 feet)       Lap Walk Time O2 Sat % Supplemental Oxygen Heart Rate Blood Pressure Chauncey Scale Comment   Pre-exercise  (Resting) 0 0 92 % Room Air 74 bpm 126/60 mmHg 3     During Exercise 1 103 sec 88 % Room Air 110 bpm     Patient paused to rest;  Oxygen started   During Exercise 2 270 sec 82 % 2 L/M 93 bpm     Oxygen increased   End of Exercise   360 sec 85 % 3 L/M 87  bpm 144/66 mmHg 4     Post-exercise  (Recovery)     92 % 3 L/M  77 bpm            Recovery Time: 179 seconds     Performing nurse/tech: ANALISA Whitfield        PREVIOUS STUDY:   The patient has not had a previous study.        CLINICAL INTERPRETATION:  Six minute walk distance is 152.4 meters (500 feet) with somewhat heavy dyspnea.  During exercise, there was significant desaturation while breathing room air.  Blood pressure remained stable and Heart rate increased significantly with walking.  The patient reported non-pulmonary symptoms during exercise.  Significant exercise impairment is likely due to desaturation and subjective symptoms.  The patient did complete the study, walking 288 seconds of the 360 second test.  The patient may benefit from using supplemental oxygen during exertion.  No previous study performed.  Based upon age and body mass index, exercise capacity is less than predicted.    Imaging:  Results for orders placed during the hospital encounter of 01/31/23    X-Ray Chest PA And Lateral    Narrative  EXAM:   XR CHEST PA AND LATERAL    CLINICAL HISTORY: Dyspnea    COMPARISON: 01/25/2023.    FINDINGS:  Chronic reticular interstitial opacities throughout the periphery of the upper and lower lobes, greatest in the left lung base.  No acute infiltrates. No pneumothorax.  Cardiomegaly.  Status post CABG.  Stable position of the left-sided pacemaker.    Impression  Chronic interstitial lung disease.  No acute findings.    Finalized on: 1/31/2023 10:37 AM By:  NAV Talamantes MD, MD  [facility]# [id number]      2023-01-31 10:39:24.212    [facility]    Results for orders placed during the hospital encounter of 07/06/22    DXA Bone Density Spine And Hip    Narrative  EXAMINATION:  DEXA BONE DENSITY SPINE HIP    CLINICAL HISTORY:  Age-related osteoporosis without current pathological fracture    TECHNIQUE:  DXA scanning was performed over the left hip and lumbar spine.  Review of the images confirms satisfactory positioning and  technique.    COMPARISON:  05/28/2020, DEXA scan    FINDINGS:  The L1 to L4 vertebral bone mineral density is equal to 1.212 g/cm squared with a T score of 0.1.  There has been no significant change relative to the prior study.    The left femoral neck bone mineral density is equal to 0.65 g/cm squared with a T score of -2.8.  There has been  no significant change relative to the prior study.    The total hip bone mineral density is equal to -2.9 g/cm squared with a T score of .  There has been decreased relative to the prior study.  Prior T-score was -2.0.    Impression  Osteoporosis is present.  Bone density is decreased involving the total left hip since previous study.    Consider FDA approved medical therapies in postmenopausal women and men aged 50 years and older, based on the following:    *A hip or vertebral (clinical or morphometric) fracture  *T score less than or equal to -2.5 at the femoral neck or spine after appropriate evaluation to exclude secondary causes.  *Low bone mass -- also known as osteopenia (T score between -1.0 and -2.5 at the femoral neck or spine) and a 10 year probability of hip fracture greater than or equal to 3% or a 10 year probability of major osteoporosis-related fracture greater than or equal to 20% based on the [place]-adapted WHO algorithm.  *Clinicians judgment and/or patient preference may indicate treatment for people with 10 year fracture probabilities is above or below these levels.      Electronically signed by: Isabela Henning MD  Date:    07/06/2022  Time:    08:38    CT chest w/o 5/4/23:  Bilateral sub pleural reticulation with interlobular septal thickening, primarily in bases. GGO with mosaic attenuation in lower lobes concerning for possible air trapping.       Cardiodiagnostics:    Echo: 5/3/23  Severe left atrial enlargement.  The left ventricle is normal in size with eccentric hypertrophy and mildly decreased systolic function.  The estimated ejection fraction is  40%. There are segmental wall motion abnormalities at the apex.  Grade II left ventricular diastolic dysfunction.  Normal right ventricular size with normal right ventricular systolic function.  Mild aortic regurgitation.  The estimated PA systolic pressure is 27 mmHg.  Normal central venous pressure (3 mmHg).    Assessment:  1. Interstitial lung disease    2. Chronic systolic congestive heart failure, NYHA class 3    3. Chronic hypoxemic respiratory failure      Plan:   Interstitial lung disease  Unsure etiology. Discussed possible causes with patient and niece. States that she would prefer to not work up cause and just try to be as comfortable as possible. Discussed possibly attempting steroids to see if responsive, but patient was not interested as she would not want any of the possible side effects.     Chronic hypoxemic respiratory failure  Requiring 3L of NC at most recent walk test. Will continue to monitor oxygen needs and functional status with walk tests.     Chronic systolic congestive heart failure, NYHA class 3  Evidence fluid overload and recent hospitalizations for decompensated heart failure improved with lasix. Had to reduce lasix in half due to hypotension.     Follow up in 3 months in regular pulmonology clinic with walk test. Will discharge from ALD clinic.     Beni Heller MD  [facility]

## 2023-06-15 NOTE — PROCEDURES
Lynn Mckinney is a 87 y.o.  female patient, who presents for a 6 minute walk test ordered by MD Arron.  The diagnosis is Interstitial Lung Disease.  The patient's BMI is 23.5 kg/m2.  Predicted distance (lower limit of normal) is 224.15 meters.      Test Results:    The test was completed with stops.  The patient stopped 1 time for a total of 72 seconds.  The total time walked was 288 seconds.  During walking, the patient reported:  Dyspnea, Lightheadedness. The patient used supplemental oxygen and a wheelchair for assistance during testing.     06/15/2023---------Distance: 152.4 meters (500 feet)     Lap Walk Time O2 Sat % Supplemental Oxygen Heart Rate Blood Pressure Chauncey Scale Comment   Pre-exercise  (Resting) 0 0 92 % Room Air 74 bpm 126/60 mmHg 3    During Exercise 1 103 sec 88 % Room Air 110 bpm   Patient paused to rest;  Oxygen started   During Exercise 2 270 sec 82 % 2 L/M 93 bpm   Oxygen increased   End of Exercise  360 sec 85 % 3 L/M 87 bpm 144/66 mmHg 4    Post-exercise  (Recovery)   92 % 3 L/M  77 bpm        Recovery Time: 179 seconds    Performing nurse/tech: ANALISA Whitfield      PREVIOUS STUDY:   The patient has not had a previous study.      CLINICAL INTERPRETATION:  Six minute walk distance is 152.4 meters (500 feet) with somewhat heavy dyspnea.  During exercise, there was significant desaturation while breathing room air.  Blood pressure remained stable and Heart rate increased significantly with walking.  The patient reported non-pulmonary symptoms during exercise.  Significant exercise impairment is likely due to desaturation and subjective symptoms.  The patient did complete the study, walking 288 seconds of the 360 second test.  The patient may benefit from using supplemental oxygen during exertion.  No previous study performed.  Based upon age and body mass index, exercise capacity is less than predicted.

## 2023-06-18 PROBLEM — J96.11 CHRONIC HYPOXEMIC RESPIRATORY FAILURE: Status: ACTIVE | Noted: 2023-06-18

## 2023-06-19 ENCOUNTER — PATIENT MESSAGE (OUTPATIENT)
Dept: OPHTHALMOLOGY | Facility: CLINIC | Age: 88
End: 2023-06-19
Payer: OTHER MISCELLANEOUS

## 2023-06-19 NOTE — ASSESSMENT & PLAN NOTE
Requiring 3L of NC at most recent walk test. Will continue to monitor oxygen needs and functional status with walk tests.

## 2023-06-19 NOTE — ASSESSMENT & PLAN NOTE
Unsure etiology. Discussed possible causes with patient and niece. States that she would prefer to not work up cause and just try to be as comfortable as possible. Discussed possibly attempting steroids to see if responsive, but patient was not interested as she would not want any of the possible side effects.

## 2023-06-19 NOTE — ASSESSMENT & PLAN NOTE
Evidence fluid overload and recent hospitalizations for decompensated heart failure improved with lasix. Had to reduce lasix in half due to hypotension.

## 2023-06-29 ENCOUNTER — CLINICAL SUPPORT (OUTPATIENT)
Dept: CARDIOLOGY | Facility: HOSPITAL | Age: 88
End: 2023-06-29
Payer: OTHER MISCELLANEOUS

## 2023-06-29 DIAGNOSIS — Z95.810 PRESENCE OF AUTOMATIC (IMPLANTABLE) CARDIAC DEFIBRILLATOR: ICD-10-CM

## 2023-06-29 PROCEDURE — 93296 REM INTERROG EVL PM/IDS: CPT | Performed by: INTERNAL MEDICINE

## 2023-07-03 ENCOUNTER — PATIENT MESSAGE (OUTPATIENT)
Dept: FAMILY MEDICINE | Facility: CLINIC | Age: 88
End: 2023-07-03
Payer: OTHER MISCELLANEOUS

## 2023-07-20 ENCOUNTER — PATIENT MESSAGE (OUTPATIENT)
Dept: FAMILY MEDICINE | Facility: CLINIC | Age: 88
End: 2023-07-20
Payer: OTHER MISCELLANEOUS

## 2023-07-31 PROBLEM — Z00.00 ENCOUNTER FOR MEDICAL EXAMINATION TO ESTABLISH CARE: Status: RESOLVED | Noted: 2023-04-25 | Resolved: 2023-07-31

## 2023-08-07 ENCOUNTER — TELEPHONE (OUTPATIENT)
Dept: ELECTROPHYSIOLOGY | Facility: CLINIC | Age: 88
End: 2023-08-07
Payer: OTHER MISCELLANEOUS

## 2023-08-07 NOTE — TELEPHONE ENCOUNTER
Received a call from pt's adalgisa Mullins who is listed as pt's Caregiver/point of contact on this am.  Susanna stated that the pt is currently in an Inpatient Hospice Facility and they would like to have the pt's ICD turned off.  Informed Susanna that the pt's Hospice MD would have to order this and the Facility would then contact [facility] who will have a Rep go out to the Facility to disable the Tachy Therapies.  Understanding was verbalized.  Susanna appreciated the assistance.

## 2023-08-21 PROBLEM — J96.21 ACUTE ON CHRONIC RESPIRATORY FAILURE WITH HYPOXIA: Status: RESOLVED | Noted: 2023-05-02 | Resolved: 2023-08-21

## 2023-09-11 PROBLEM — Z09 HOSPITAL DISCHARGE FOLLOW-UP: Status: RESOLVED | Noted: 2023-06-08 | Resolved: 2023-09-11

## 2023-09-18 PROBLEM — J96.11 CHRONIC HYPOXEMIC RESPIRATORY FAILURE: Status: RESOLVED | Noted: 2023-06-18 | Resolved: 2023-09-18

## (undated) DEVICE — CATH IMA INFINITI 4FRX100CM

## (undated) DEVICE — CATH BLLN FG APEX MR 2.50X15MM

## (undated) DEVICE — SLING SWATHE UNIVERSAL FOAM

## (undated) DEVICE — COVER MAYO STAND REINFRCD 30

## (undated) DEVICE — KIT GLIDESHEATH SLEND 6FR 10CM

## (undated) DEVICE — CATH 5FR AL2.0

## (undated) DEVICE — SOL BALANCED SALT 500ML

## (undated) DEVICE — Device

## (undated) DEVICE — SEE MEDLINE ITEM 146292

## (undated) DEVICE — KIT CO-PILOT

## (undated) DEVICE — SHEATH INTRODUCER 7FR 11CM

## (undated) DEVICE — ELECTRODE REM PLYHSV RETURN 9

## (undated) DEVICE — INFLATOR ENCORE 26 BLLN INFL

## (undated) DEVICE — CATH BLLN COR SINUS VENOGRAPHY

## (undated) DEVICE — SUT 2-0 VICRYL / SH (J417)

## (undated) DEVICE — MULTI-LINK RX ULTRA CORONARY STENT SYSTEM 5.00 MM X 18 MM
Type: IMPLANTABLE DEVICE | Site: CORONARY | Status: NON-FUNCTIONAL
Brand: MULTI-LINK ULTRA
Removed: 2021-03-11

## (undated) DEVICE — SUT STRATAFIX 3-0 30CM

## (undated) DEVICE — DRESSING TRANS 4X4 3/4

## (undated) DEVICE — BLADE PLASMA WIDE SPATULA TIP

## (undated) DEVICE — SUT VICRYL PLUS 0 CT1 18IN

## (undated) DEVICE — SEE MEDLINE ITEM 157187

## (undated) DEVICE — SOL BETADINE 5%

## (undated) DEVICE — DRESSING EYE OVAL LF

## (undated) DEVICE — SOL WATER STRL IRR 1000ML

## (undated) DEVICE — PROTECTION STATION PLUS

## (undated) DEVICE — DRAPE INCISE IOBAN 2 23X17IN

## (undated) DEVICE — KIT WRENCH

## (undated) DEVICE — SPONGE GAUZE 16PLY 4X4

## (undated) DEVICE — SYS EMB PROTEC.014 190CM

## (undated) DEVICE — SUT ETHIBOND XTRA2 OS-4 30

## (undated) DEVICE — PAD DEFIB CADENCE ADULT R2

## (undated) DEVICE — DRAPE STERI INCISE 17X23IN

## (undated) DEVICE — CATH ANG PIGTAIL 4FR INFINITY

## (undated) DEVICE — CONVERTORS PACEMAKER SHEET

## (undated) DEVICE — PACK PACER PERMANENT

## (undated) DEVICE — MULTI-LINK RX ULTRA CORONARY STENT SYSTEM 4.50 MM X 18 MM
Type: IMPLANTABLE DEVICE | Site: CORONARY | Status: NON-FUNCTIONAL
Brand: MULTI-LINK ULTRA
Removed: 2021-03-11

## (undated) DEVICE — PACK PACER PERMANENT OMC

## (undated) DEVICE — SUT VICRYL PLUS 2-0 CT1 18

## (undated) DEVICE — SUT VICRYL BR 1 GEN 27 CT-1

## (undated) DEVICE — SEE MEDLINE ITEM 157131

## (undated) DEVICE — SAFESHEATH II 8FR 13CM

## (undated) DEVICE — KIT GLIDESHEATH SLEND 7FR 10CM

## (undated) DEVICE — ADHESIVE DERMABOND ADVANCED

## (undated) DEVICE — NDL HYPO A BEVEL 30X1/2

## (undated) DEVICE — COVER PROXIMA MAYO STAND

## (undated) DEVICE — SOL GONAK

## (undated) DEVICE — CATH ANGIO GUID MPA1 6FX100CM

## (undated) DEVICE — KIT GREY EYE

## (undated) DEVICE — DRAPE IOBAN 2 STERI

## (undated) DEVICE — CATH GUIDE LINER  V3 6F

## (undated) DEVICE — SHEILD & GARTERS FOX METAL EYE

## (undated) DEVICE — VALVE HEMOSTASIS HONOR

## (undated) DEVICE — CUTTER LEAD

## (undated) DEVICE — SPIKE CONTRAST CONTROLLER

## (undated) DEVICE — PACK INJ VISC FLD 20G/23G SIL

## (undated) DEVICE — KIT IRR SUCTION HND PIECE

## (undated) DEVICE — GUIDEWIRE X SPORT .014IN 190CM

## (undated) DEVICE — CATH DXTERITY JL40 100CM 6FR

## (undated) DEVICE — WIRE GUIDE WHOLEY MOD J .035

## (undated) DEVICE — CATH DXTERITY JR40 100CM 6FR

## (undated) DEVICE — DRESSING AQUACEL RIBBON 2X45CM

## (undated) DEVICE — MASK FLYTE HOOD PEEL AWAY

## (undated) DEVICE — APPLICATOR CHLORAPREP ORN 26ML

## (undated) DEVICE — DRAPE EYE

## (undated) DEVICE — SEE MEDLINE ITEM 156894

## (undated) DEVICE — PACK AUTO GAS FILL

## (undated) DEVICE — DRAPE STERI INSTRUMENT 1018

## (undated) DEVICE — GAUZE SPONGE 4X4 12PLY

## (undated) DEVICE — DRESSING COVER AQUACEL AG SURG

## (undated) DEVICE — OMNIPAQUE 350 200ML

## (undated) DEVICE — CATH 5FR MP 125CM 5/BX

## (undated) DEVICE — CATH BLLN FG APEX MR 3.00X15MM

## (undated) DEVICE — DRESSING AQUACEL AG RBBN 2X45

## (undated) DEVICE — GUIDE CATH CPS DIRECT 54CM 135

## (undated) DEVICE — SOL IRR NACL .9% 3000ML

## (undated) DEVICE — CONTRAST VISIPAQUE 150ML

## (undated) DEVICE — SYR 10CC LUER LOCK

## (undated) DEVICE — SHEATH SAFE ULTRA 9FR

## (undated) DEVICE — WIRE WHISPER MS 014 X 190

## (undated) DEVICE — GUIDE VISTA 6FR AL3

## (undated) DEVICE — GOWN SURGICAL X-LARGE

## (undated) DEVICE — TAPE SURG DURAPORE 2 X10YD

## (undated) DEVICE — GUIDEWIRE CHOICE PT  XS 182CM

## (undated) DEVICE — GUIDEWIRE SUPRA CORE 035 190CM

## (undated) DEVICE — TRAY MUSCLE LID EYE

## (undated) DEVICE — SOL BSS BALANCED SALT

## (undated) DEVICE — HEMOSTAT VASC BAND REG 24CM

## (undated) DEVICE — DRAPE THREE-QTR REINF 53X77IN

## (undated) DEVICE — CATH ELECTRD DECAPOLAR 6F

## (undated) DEVICE — SUT VICRYL+ 1 CT1 18IN

## (undated) DEVICE — BLADE RECP OFFSET 77.5X11X1.23

## (undated) DEVICE — KIT CUSTOM MANIFOLD

## (undated) DEVICE — CATH NC QUANTUM APEX MR 4.5X8

## (undated) DEVICE — TIP YANKAUERS BULB NO VENT

## (undated) DEVICE — KIT TOTAL HIP HPOFH OMC

## (undated) DEVICE — COVER INSTR ELASTIC BAND 40X20

## (undated) DEVICE — CATH MPA2 INFINITI 4FR 100CM

## (undated) DEVICE — SUT ETHIBOND XTRA 1 OS-6